# Patient Record
Sex: MALE | Race: WHITE | NOT HISPANIC OR LATINO | Employment: FULL TIME | URBAN - METROPOLITAN AREA
[De-identification: names, ages, dates, MRNs, and addresses within clinical notes are randomized per-mention and may not be internally consistent; named-entity substitution may affect disease eponyms.]

---

## 2023-10-20 ENCOUNTER — HOSPITAL ENCOUNTER (INPATIENT)
Facility: HOSPITAL | Age: 43
LOS: 3 days | Discharge: HOME | DRG: 872 | End: 2023-10-24
Attending: EMERGENCY MEDICINE | Admitting: INTERNAL MEDICINE
Payer: COMMERCIAL

## 2023-10-20 DIAGNOSIS — A41.9 SEPSIS, DUE TO UNSPECIFIED ORGANISM, UNSPECIFIED WHETHER ACUTE ORGAN DYSFUNCTION PRESENT: Primary | ICD-10-CM

## 2023-10-20 DIAGNOSIS — K51.00 PANCOLITIS: ICD-10-CM

## 2023-10-20 DIAGNOSIS — N17.9 AKI (ACUTE KIDNEY INJURY): ICD-10-CM

## 2023-10-20 DIAGNOSIS — R10.84 GENERALIZED ABDOMINAL PAIN: ICD-10-CM

## 2023-10-20 DIAGNOSIS — R11.2 NAUSEA AND VOMITING, UNSPECIFIED VOMITING TYPE: ICD-10-CM

## 2023-10-20 PROCEDURE — 99285 EMERGENCY DEPT VISIT HI MDM: CPT

## 2023-10-21 LAB
ABS NEUT (OLG): 10.4 X10(3)/MCL (ref 2.1–9.2)
ADV 40+41 DNA STL QL NAA+NON-PROBE: NOT DETECTED
ALBUMIN SERPL-MCNC: 2.6 G/DL (ref 3.5–5)
ALBUMIN/GLOB SERPL: 0.6 RATIO (ref 1.1–2)
ALP SERPL-CCNC: 35 UNIT/L (ref 40–150)
ALT SERPL-CCNC: 17 UNIT/L (ref 0–55)
APPEARANCE UR: CLEAR
AST SERPL-CCNC: 12 UNIT/L (ref 5–34)
ASTRO TYP 1-8 RNA STL QL NAA+NON-PROBE: NOT DETECTED
BACTERIA #/AREA URNS AUTO: ABNORMAL /HPF
BASOPHILS NFR BLD MANUAL: 0.12 X10(3)/MCL (ref 0–0.2)
BASOPHILS NFR BLD MANUAL: 1 %
BILIRUB SERPL-MCNC: 0.9 MG/DL
BILIRUB UR QL STRIP.AUTO: ABNORMAL
BUN SERPL-MCNC: 21.7 MG/DL (ref 8.9–20.6)
C CAYETANENSIS DNA STL QL NAA+NON-PROBE: NOT DETECTED
C COLI+JEJ+UPSA DNA STL QL NAA+NON-PROBE: NOT DETECTED
CALCIUM SERPL-MCNC: 8.4 MG/DL (ref 8.4–10.2)
CHLORIDE SERPL-SCNC: 96 MMOL/L (ref 98–107)
CK SERPL-CCNC: <70 U/L (ref 30–200)
CLOSTRIDIUM DIFFICILE GDH ANTIGEN (OHS): NEGATIVE
CLOSTRIDIUM DIFFICILE TOXIN A/B (OHS): NEGATIVE
CO2 SERPL-SCNC: 24 MMOL/L (ref 22–29)
COLOR UR AUTO: ABNORMAL
CREAT SERPL-MCNC: 1.52 MG/DL (ref 0.73–1.18)
CRP SERPL-MCNC: 317.9 MG/L
CRYPTOSP DNA STL QL NAA+NON-PROBE: NOT DETECTED
E HISTOLYT DNA STL QL NAA+NON-PROBE: NOT DETECTED
EAEC PAA PLAS AGGR+AATA ST NAA+NON-PRB: NOT DETECTED
EC STX1+STX2 GENES STL QL NAA+NON-PROBE: NOT DETECTED
EPEC EAE GENE STL QL NAA+NON-PROBE: NOT DETECTED
ERYTHROCYTE [DISTWIDTH] IN BLOOD BY AUTOMATED COUNT: 14.1 % (ref 11.5–17)
ETEC LTA+ST1A+ST1B TOX ST NAA+NON-PROBE: NOT DETECTED
FECAL LEUKOCYTE (OHS): POSITIVE
FLUAV AG UPPER RESP QL IA.RAPID: NOT DETECTED
FLUBV AG UPPER RESP QL IA.RAPID: NOT DETECTED
G LAMBLIA DNA STL QL NAA+NON-PROBE: NOT DETECTED
GFR SERPLBLD CREATININE-BSD FMLA CKD-EPI: 58 MLS/MIN/1.73/M2
GLOBULIN SER-MCNC: 4.1 GM/DL (ref 2.4–3.5)
GLUCOSE SERPL-MCNC: 139 MG/DL (ref 74–100)
GLUCOSE UR QL STRIP.AUTO: NEGATIVE
GRAN CASTS URNS QL MICRO: ABNORMAL /LPF
HCT VFR BLD AUTO: 35.6 % (ref 42–52)
HGB BLD-MCNC: 11.4 G/DL (ref 14–18)
INSTRUMENT WBC (OLG): 11.95 X10(3)/MCL
KETONES UR QL STRIP.AUTO: ABNORMAL
LACTATE SERPL-SCNC: 1.4 MMOL/L (ref 0.5–2.2)
LEUKOCYTE ESTERASE UR QL STRIP.AUTO: NEGATIVE
LIPASE SERPL-CCNC: 8 U/L
LYMPHOCYTES NFR BLD MANUAL: 0.36 X10(3)/MCL
LYMPHOCYTES NFR BLD MANUAL: 3 %
MCH RBC QN AUTO: 28.2 PG (ref 27–31)
MCHC RBC AUTO-ENTMCNC: 32 G/DL (ref 33–36)
MCV RBC AUTO: 88.1 FL (ref 80–94)
METAMYELOCYTES NFR BLD MANUAL: 2 %
MONOCYTES NFR BLD MANUAL: 0.96 X10(3)/MCL (ref 0.1–1.3)
MONOCYTES NFR BLD MANUAL: 8 %
NEUTROPHILS NFR BLD MANUAL: 87 %
NITRITE UR QL STRIP.AUTO: NEGATIVE
NOROVIRUS GI+II RNA STL QL NAA+NON-PROBE: NOT DETECTED
NRBC BLD AUTO-RTO: 0.2 %
NRBC BLD MANUAL-RTO: 1 %
P SHIGELLOIDES DNA STL QL NAA+NON-PROBE: NOT DETECTED
PH UR STRIP.AUTO: 5.5 [PH]
PLATELET # BLD AUTO: 267 X10(3)/MCL (ref 130–400)
PLATELET # BLD EST: NORMAL 10*3/UL
PMV BLD AUTO: 9.7 FL (ref 7.4–10.4)
POTASSIUM SERPL-SCNC: 3.6 MMOL/L (ref 3.5–5.1)
PROT SERPL-MCNC: 6.7 GM/DL (ref 6.4–8.3)
PROT UR QL STRIP.AUTO: ABNORMAL
RBC # BLD AUTO: 4.04 X10(6)/MCL (ref 4.7–6.1)
RBC #/AREA URNS AUTO: <5 /HPF
RBC MORPH BLD: NORMAL
RBC UR QL AUTO: NEGATIVE
RVA RNA STL QL NAA+NON-PROBE: NOT DETECTED
S ENT+BONG DNA STL QL NAA+NON-PROBE: NOT DETECTED
SAPO I+II+IV+V RNA STL QL NAA+NON-PROBE: NOT DETECTED
SARS-COV-2 RNA RESP QL NAA+PROBE: NOT DETECTED
SHIGELLA SP+EIEC IPAH ST NAA+NON-PROBE: NOT DETECTED
SODIUM SERPL-SCNC: 132 MMOL/L (ref 136–145)
SP GR UR STRIP.AUTO: >=1.04 (ref 1–1.03)
SQUAMOUS #/AREA URNS AUTO: <5 /HPF
TSH SERPL-ACNC: 2.88 UIU/ML (ref 0.35–4.94)
UROBILINOGEN UR STRIP-ACNC: 1
V CHOL+PARA+VUL DNA STL QL NAA+NON-PROBE: NOT DETECTED
V CHOLERAE DNA STL QL NAA+NON-PROBE: NOT DETECTED
WBC # SPEC AUTO: 11.95 X10(3)/MCL (ref 4.5–11.5)
WBC #/AREA URNS AUTO: 12 /HPF
Y ENTEROCOL DNA STL QL NAA+NON-PROBE: NOT DETECTED

## 2023-10-21 PROCEDURE — 96375 TX/PRO/DX INJ NEW DRUG ADDON: CPT

## 2023-10-21 PROCEDURE — 81003 URINALYSIS AUTO W/O SCOPE: CPT | Performed by: EMERGENCY MEDICINE

## 2023-10-21 PROCEDURE — 25500020 PHARM REV CODE 255: Performed by: EMERGENCY MEDICINE

## 2023-10-21 PROCEDURE — 87077 CULTURE AEROBIC IDENTIFY: CPT | Performed by: EMERGENCY MEDICINE

## 2023-10-21 PROCEDURE — 85027 COMPLETE CBC AUTOMATED: CPT | Performed by: EMERGENCY MEDICINE

## 2023-10-21 PROCEDURE — 86318 IA INFECTIOUS AGENT ANTIBODY: CPT | Performed by: EMERGENCY MEDICINE

## 2023-10-21 PROCEDURE — 82550 ASSAY OF CK (CPK): CPT | Performed by: EMERGENCY MEDICINE

## 2023-10-21 PROCEDURE — 63600175 PHARM REV CODE 636 W HCPCS: Performed by: PHYSICIAN ASSISTANT

## 2023-10-21 PROCEDURE — 84443 ASSAY THYROID STIM HORMONE: CPT | Performed by: EMERGENCY MEDICINE

## 2023-10-21 PROCEDURE — 63600175 PHARM REV CODE 636 W HCPCS: Performed by: NURSE PRACTITIONER

## 2023-10-21 PROCEDURE — 87507 IADNA-DNA/RNA PROBE TQ 12-25: CPT | Performed by: EMERGENCY MEDICINE

## 2023-10-21 PROCEDURE — 87045 FECES CULTURE AEROBIC BACT: CPT | Performed by: EMERGENCY MEDICINE

## 2023-10-21 PROCEDURE — 86140 C-REACTIVE PROTEIN: CPT | Performed by: INTERNAL MEDICINE

## 2023-10-21 PROCEDURE — 11000001 HC ACUTE MED/SURG PRIVATE ROOM

## 2023-10-21 PROCEDURE — 63600175 PHARM REV CODE 636 W HCPCS: Performed by: INTERNAL MEDICINE

## 2023-10-21 PROCEDURE — 25000003 PHARM REV CODE 250: Performed by: INTERNAL MEDICINE

## 2023-10-21 PROCEDURE — 87040 BLOOD CULTURE FOR BACTERIA: CPT | Performed by: EMERGENCY MEDICINE

## 2023-10-21 PROCEDURE — 96374 THER/PROPH/DIAG INJ IV PUSH: CPT

## 2023-10-21 PROCEDURE — 80053 COMPREHEN METABOLIC PANEL: CPT | Performed by: EMERGENCY MEDICINE

## 2023-10-21 PROCEDURE — 83605 ASSAY OF LACTIC ACID: CPT | Performed by: EMERGENCY MEDICINE

## 2023-10-21 PROCEDURE — 89055 LEUKOCYTE ASSESSMENT FECAL: CPT | Performed by: EMERGENCY MEDICINE

## 2023-10-21 PROCEDURE — 83690 ASSAY OF LIPASE: CPT | Performed by: INTERNAL MEDICINE

## 2023-10-21 PROCEDURE — 27000207 HC ISOLATION

## 2023-10-21 PROCEDURE — 63600175 PHARM REV CODE 636 W HCPCS: Performed by: EMERGENCY MEDICINE

## 2023-10-21 PROCEDURE — 0240U COVID/FLU A&B PCR: CPT | Performed by: STUDENT IN AN ORGANIZED HEALTH CARE EDUCATION/TRAINING PROGRAM

## 2023-10-21 PROCEDURE — 96361 HYDRATE IV INFUSION ADD-ON: CPT

## 2023-10-21 PROCEDURE — 87088 URINE BACTERIA CULTURE: CPT | Performed by: EMERGENCY MEDICINE

## 2023-10-21 RX ORDER — SODIUM CHLORIDE, SODIUM LACTATE, POTASSIUM CHLORIDE, CALCIUM CHLORIDE 600; 310; 30; 20 MG/100ML; MG/100ML; MG/100ML; MG/100ML
INJECTION, SOLUTION INTRAVENOUS CONTINUOUS
Status: DISCONTINUED | OUTPATIENT
Start: 2023-10-21 | End: 2023-10-21

## 2023-10-21 RX ORDER — SODIUM CHLORIDE 9 MG/ML
1000 INJECTION, SOLUTION INTRAVENOUS
Status: DISCONTINUED | OUTPATIENT
Start: 2023-10-21 | End: 2023-10-21

## 2023-10-21 RX ORDER — DIPHENHYDRAMINE HYDROCHLORIDE 50 MG/ML
25 INJECTION INTRAMUSCULAR; INTRAVENOUS EVERY 6 HOURS PRN
Status: DISCONTINUED | OUTPATIENT
Start: 2023-10-21 | End: 2023-10-22

## 2023-10-21 RX ORDER — FENTANYL CITRATE 50 UG/ML
50 INJECTION, SOLUTION INTRAMUSCULAR; INTRAVENOUS
Status: COMPLETED | OUTPATIENT
Start: 2023-10-21 | End: 2023-10-21

## 2023-10-21 RX ORDER — ACETAMINOPHEN 325 MG/1
650 TABLET ORAL EVERY 6 HOURS PRN
Status: DISCONTINUED | OUTPATIENT
Start: 2023-10-21 | End: 2023-10-24 | Stop reason: HOSPADM

## 2023-10-21 RX ORDER — IBUPROFEN 200 MG
24 TABLET ORAL
Status: DISCONTINUED | OUTPATIENT
Start: 2023-10-21 | End: 2023-10-24 | Stop reason: HOSPADM

## 2023-10-21 RX ORDER — DICYCLOMINE HYDROCHLORIDE 10 MG/ML
20 INJECTION INTRAMUSCULAR ONCE
Status: COMPLETED | OUTPATIENT
Start: 2023-10-21 | End: 2023-10-21

## 2023-10-21 RX ORDER — LOPERAMIDE HYDROCHLORIDE 2 MG/1
2 CAPSULE ORAL 4 TIMES DAILY PRN
Status: DISCONTINUED | OUTPATIENT
Start: 2023-10-21 | End: 2023-10-24 | Stop reason: HOSPADM

## 2023-10-21 RX ORDER — CIPROFLOXACIN 2 MG/ML
400 INJECTION, SOLUTION INTRAVENOUS
Status: COMPLETED | OUTPATIENT
Start: 2023-10-21 | End: 2023-10-21

## 2023-10-21 RX ORDER — ONDANSETRON 2 MG/ML
4 INJECTION INTRAMUSCULAR; INTRAVENOUS
Status: COMPLETED | OUTPATIENT
Start: 2023-10-21 | End: 2023-10-21

## 2023-10-21 RX ORDER — MORPHINE SULFATE 4 MG/ML
2 INJECTION, SOLUTION INTRAMUSCULAR; INTRAVENOUS EVERY 4 HOURS PRN
Status: DISCONTINUED | OUTPATIENT
Start: 2023-10-21 | End: 2023-10-24 | Stop reason: HOSPADM

## 2023-10-21 RX ORDER — METRONIDAZOLE 500 MG/100ML
500 INJECTION, SOLUTION INTRAVENOUS
Status: COMPLETED | OUTPATIENT
Start: 2023-10-21 | End: 2023-10-21

## 2023-10-21 RX ORDER — SODIUM CHLORIDE 9 MG/ML
INJECTION, SOLUTION INTRAVENOUS CONTINUOUS
Status: DISCONTINUED | OUTPATIENT
Start: 2023-10-21 | End: 2023-10-24 | Stop reason: HOSPADM

## 2023-10-21 RX ORDER — GLUCAGON 1 MG
1 KIT INJECTION
Status: DISCONTINUED | OUTPATIENT
Start: 2023-10-21 | End: 2023-10-24 | Stop reason: HOSPADM

## 2023-10-21 RX ORDER — DICYCLOMINE HYDROCHLORIDE 10 MG/ML
20 INJECTION INTRAMUSCULAR 4 TIMES DAILY PRN
Status: DISCONTINUED | OUTPATIENT
Start: 2023-10-21 | End: 2023-10-24 | Stop reason: HOSPADM

## 2023-10-21 RX ORDER — IBUPROFEN 200 MG
16 TABLET ORAL
Status: DISCONTINUED | OUTPATIENT
Start: 2023-10-21 | End: 2023-10-24 | Stop reason: HOSPADM

## 2023-10-21 RX ORDER — CIPROFLOXACIN 2 MG/ML
400 INJECTION, SOLUTION INTRAVENOUS
Status: DISCONTINUED | OUTPATIENT
Start: 2023-10-21 | End: 2023-10-24

## 2023-10-21 RX ORDER — ACETAMINOPHEN 10 MG/ML
1000 INJECTION, SOLUTION INTRAVENOUS ONCE
Status: COMPLETED | OUTPATIENT
Start: 2023-10-21 | End: 2023-10-21

## 2023-10-21 RX ORDER — ONDANSETRON 2 MG/ML
4 INJECTION INTRAMUSCULAR; INTRAVENOUS EVERY 4 HOURS PRN
Status: DISCONTINUED | OUTPATIENT
Start: 2023-10-21 | End: 2023-10-24 | Stop reason: HOSPADM

## 2023-10-21 RX ORDER — METRONIDAZOLE 500 MG/100ML
500 INJECTION, SOLUTION INTRAVENOUS
Status: DISCONTINUED | OUTPATIENT
Start: 2023-10-21 | End: 2023-10-24

## 2023-10-21 RX ADMIN — DICYCLOMINE HYDROCHLORIDE 20 MG: 20 INJECTION, SOLUTION INTRAMUSCULAR at 06:10

## 2023-10-21 RX ADMIN — CIPROFLOXACIN 400 MG: 2 INJECTION, SOLUTION INTRAVENOUS at 03:10

## 2023-10-21 RX ADMIN — METRONIDAZOLE 500 MG: 5 INJECTION, SOLUTION INTRAVENOUS at 03:10

## 2023-10-21 RX ADMIN — MORPHINE SULFATE 2 MG: 4 INJECTION, SOLUTION INTRAMUSCULAR; INTRAVENOUS at 06:10

## 2023-10-21 RX ADMIN — ACETAMINOPHEN 1000 MG: 10 INJECTION, SOLUTION INTRAVENOUS at 01:10

## 2023-10-21 RX ADMIN — METRONIDAZOLE 500 MG: 5 INJECTION, SOLUTION INTRAVENOUS at 07:10

## 2023-10-21 RX ADMIN — DICYCLOMINE HYDROCHLORIDE 20 MG: 20 INJECTION, SOLUTION INTRAMUSCULAR at 08:10

## 2023-10-21 RX ADMIN — FENTANYL CITRATE 50 MCG: 50 INJECTION, SOLUTION INTRAMUSCULAR; INTRAVENOUS at 12:10

## 2023-10-21 RX ADMIN — LOPERAMIDE HYDROCHLORIDE 2 MG: 2 CAPSULE ORAL at 04:10

## 2023-10-21 RX ADMIN — LOPERAMIDE HYDROCHLORIDE 2 MG: 2 CAPSULE ORAL at 10:10

## 2023-10-21 RX ADMIN — SODIUM CHLORIDE: 9 INJECTION, SOLUTION INTRAVENOUS at 04:10

## 2023-10-21 RX ADMIN — METHYLPREDNISOLONE SODIUM SUCCINATE 60 MG: 40 INJECTION, POWDER, FOR SOLUTION INTRAMUSCULAR; INTRAVENOUS at 08:10

## 2023-10-21 RX ADMIN — ONDANSETRON 4 MG: 2 INJECTION INTRAMUSCULAR; INTRAVENOUS at 12:10

## 2023-10-21 RX ADMIN — SODIUM CHLORIDE, POTASSIUM CHLORIDE, SODIUM LACTATE AND CALCIUM CHLORIDE 2517 ML: 600; 310; 30; 20 INJECTION, SOLUTION INTRAVENOUS at 12:10

## 2023-10-21 RX ADMIN — DIPHENHYDRAMINE HYDROCHLORIDE 25 MG: 50 INJECTION INTRAMUSCULAR; INTRAVENOUS at 12:10

## 2023-10-21 RX ADMIN — MORPHINE SULFATE 2 MG: 4 INJECTION, SOLUTION INTRAMUSCULAR; INTRAVENOUS at 08:10

## 2023-10-21 RX ADMIN — SODIUM CHLORIDE, POTASSIUM CHLORIDE, SODIUM LACTATE AND CALCIUM CHLORIDE: 600; 310; 30; 20 INJECTION, SOLUTION INTRAVENOUS at 02:10

## 2023-10-21 RX ADMIN — METRONIDAZOLE 500 MG: 5 INJECTION, SOLUTION INTRAVENOUS at 11:10

## 2023-10-21 RX ADMIN — DIPHENHYDRAMINE HYDROCHLORIDE 25 MG: 50 INJECTION INTRAMUSCULAR; INTRAVENOUS at 06:10

## 2023-10-21 RX ADMIN — DICYCLOMINE HYDROCHLORIDE 20 MG: 20 INJECTION, SOLUTION INTRAMUSCULAR at 01:10

## 2023-10-21 RX ADMIN — IOPAMIDOL 100 ML: 755 INJECTION, SOLUTION INTRAVENOUS at 02:10

## 2023-10-21 NOTE — H&P
"[facility]  Hospital Medicine History & Physical Examination       Patient Name: Lionel León  MRN: [id number]  Patient Class: IP- Inpatient   Admission Date: 10/20/2023   Admitting Physician: Melissa Carr MD  Length of Stay: 0  Attending Physician: Sayda Harris MD   Face-to-Face encounter date: 10/21/2023  Code Status: Full code   Chief Complaint: Abdominal Pain, Diarrhea, and Vomiting (Generalized abd pain, vomiting, fever, and diarrhea x3 wks, was seen by his PCP and was given a "steroid shot" that helped the symptoms, but they began again x3 days ago. Hx of diverticulitis. )        Patient information was obtained from patient, patient's family, past medical records and ER records.     HISTORY OF PRESENT ILLNESS:   Lionel León is a 42 y.o. male with a past medical history of diverticulitis s/p colon resection presented to [facility] on 10/20/2023 for abdominal pain, vomiting, diarrhea, and fever.  Patient reported intermittent symptoms for the past 3 weeks.  Patient reported worsening symptoms over the past 3 days.  Patient also endorsed rectal bleeding and dysuria.  Initial vital signs in ED were BP 96/56, pulse 119, respirations 19, temperature 39.1° C, and SpO2 98% on room air.  Labs revealed WBC 11.95, RBC 4.04, hemoglobin 11.4, hematocrit 35.6, sodium 132, chloride 96, BUN 21.7, creatinine 1.52, glucose 139, ALP 35, and lactic acid 1.4.  Flu and COVID testing were negative.  UA revealed 2+ protein, trace ketones, 1+ bilirubin, and 12 wbc's.  Stool, urine, and blood cultures were obtained.  C diff was negative.  Stool was positive for wbc's.  CT abdomen and pelvis with contrast revealed mild diffuse circumferential wall thickening of the ascending, transverse, descending, and sigmoid colon with pericolonic fat stranding, consistent with pan colitis.  Patient was given IV fluids, Ciprofloxacin 400 mg, and Flagyl 500 mg in ED.  Patient was admitted to hospital medicine service for " further medical management.     PAST MEDICAL HISTORY:   Diverticulitis     PAST SURGICAL HISTORY:   Colon resection     ALLERGIES:   Patient has no known allergies.    FAMILY HISTORY:   Father:  Diabetes mellitus    SOCIAL HISTORY:   Occasional alcohol use  Denies illicit drug and tobacco use    HOME MEDICATIONS:     Prior to Admission medications    Not on File       REVIEW OF SYSTEMS:   Except as documented, all other systems reviewed and negative     PHYSICAL EXAM:     VITAL SIGNS: 24 HRS MIN & MAX LAST   Temp  Min: 98.3 °F (36.8 °C)  Max: 102.6 °F (39.2 °C) 98.3 °F (36.8 °C)   BP  Min: 96/56  Max: 118/71 118/71   Pulse  Min: 87  Max: 119  90   Resp  Min: 16  Max: 23 20   SpO2  Min: 95 %  Max: 98 % 96 %       General appearance: Male in no apparent distress.  HEENNT: Atraumatic head.   Lungs: Clear to auscultation bilaterally.   Heart: Regular rate and rhythm.    Abdomen: Soft, non-distended, generalized abdominal tenderness. Bowel sounds are normal.   Extremities:  No deformities.   Skin: No Rash. Warm and dry.   Neuro: Awake, alert, and oriented. Motor and sensory exams grossly intact.   Psych/mental status: Appropriate mood and affect. Responds appropriately to questions.     LABS AND IMAGING:     Recent Labs   Lab 10/21/23  0030   WBC 11.95  11.95*   RBC 4.04*   HGB 11.4*   HCT 35.6*   MCV 88.1   MCH 28.2   MCHC 32.0*   RDW 14.1      MPV 9.7       Recent Labs   Lab 10/21/23  0030   *   K 3.6   CO2 24   BUN 21.7*   CREATININE 1.52*   CALCIUM 8.4   ALBUMIN 2.6*   ALKPHOS 35*   ALT 17   AST 12   BILITOT 0.9       Microbiology Results (last 7 days)       Procedure Component Value Units Date/Time    Urine culture [[id number]] Collected: 10/21/23 0345    Order Status: Sent Specimen: Urine Updated: 10/21/23 0433    Clostridium Diff Toxin, A & B, EIA [[id number]]  (Normal) Collected: 10/21/23 0338    Order Status: Completed Specimen: Stool Updated: 10/21/23 0408     Clostridium Difficile GDH Antigen  Negative     Clostridium Difficile Toxin A/B Negative    Stool Culture **CANNOT BE ORDERED STAT** [[id number]] Collected: 10/21/23 0223    Order Status: Sent Specimen: Stool Updated: 10/21/23 0224    Blood culture #1 **CANNOT BE ORDERED STAT** [[id number]] Collected: 10/21/23 0030    Order Status: Resulted Specimen: Blood Updated: 10/21/23 0030    Blood culture #2 **CANNOT BE ORDERED STAT** [[id number]] Collected: 10/21/23 0030    Order Status: Resulted Specimen: Blood Updated: 10/21/23 0030             CT Abdomen Pelvis With Contrast  START OF REPORT:  Technique: CT of the abdomen and pelvis was performed with axial images as well as sagittal and coronal reconstruction images with intravenous contrast.    Comparison: None available.    Clinical History: Generalized abd pain, vomiting, fever, and diarrhea x3 wks, was seen by his PCP and was given a steroid shot that helped the symptoms, but they began again x3 days ago. Hx of diverticulitis.    Dosage Information: Automated Exposure Control was utilized.    Findings:  Thorax:  Lungs: There is mild nonspecific dependent change at the lung bases.  Pleura: No effusions or thickening are seen.  Heart: The heart size is within normal limits.  Abdomen:  Abdominal Wall: No abdominal wall pathology is seen.  Liver: There is a subcentimeter hypodensity in the left lobe of the liver, which is too small to further characterize. The liver otherwise appears unremarkable.  Biliary System: No intrahepatic or extrahepatic biliary duct dilatation is seen.  Gallbladder: The gallbladder appears unremarkable.  Pancreas: The pancreas appears unremarkable.  Spleen: The spleen appears unremarkable.  Adrenals: The adrenal glands appear unremarkable.  Kidneys: The left kidney appears unremarkable with no stones cysts masses or hydronephrosis. A single cyst measuring 8 mm is seen on series 2, image 77 in the lower pole of the right kidney. The right kidney otherwise appears unremarkable with  no cysts masses or hydronephrosis identified.  Aorta: The abdominal aorta appears unremarkable.  IVC: Unremarkable.  Bowel:  Esophagus: The visualized esophagus appears unremarkable.  Stomach: The stomach appears unremarkable.  Duodenum: Unremarkable appearing duodenum.  Small Bowel: The small bowel appears unremarkable.  Colon: There is mild diffuse circumferential wall thickening of the ascending, transverse, descending and sigmoid colon with pericolonic fat stranding, consistent with pancolitis. Anastomotic sutures are seen in the sigmoid colon.  Appendix: The appendix is not identified but no inflammatory changes are seen in the right lower quadrant to suggest appendicitis.  Peritoneum: No intraperitoneal free air or ascites is seen.    Pelvis:  Bladder: The bladder appears unremarkable.  Male:  Prostate gland: The prostate gland appears unremarkable.    Bony structures:  Dorsal Spine: There is mild spondylosis of the visualized dorsal spine.  Bony Pelvis: The visualized bony structures of the pelvis appear unremarkable.    Impression:  1. There is mild diffuse circumferential wall thickening of the ascending, transverse, descending and sigmoid colon with pericolonic fat stranding, consistent with pancolitis. Correlate with clinical and laboratory findings.  2. Details and other findings as discussed above.        ASSESSMENT & PLAN:   Assessment:  Sepsis secondary to pancolitis   JUAN PABLO   Normocytic anemia   History of diverticulitis s/p colon resection      Plan:  Continue IV Cipro and Flagyl   Blood, stool, and urine cultures pending, follow-up results   GI panel negative  PRN antiemetics and analgesics  IVF   Close H&H monitoring  GI consulted, appreciate recommendations   Continue appropriate home medications once med rec updated   Labs in a.m.    VTE Prophylaxis:  SCDs    __________________________________________________________________________  INPATIENT LIST OF MEDICATIONS     Scheduled Meds:    "ciprofloxacin  400 mg Intravenous Q12H    metronidazole  500 mg Intravenous Q8H     Continuous Infusions:   lactated ringers 125 mL/hr at 10/21/23 0224     PRN Meds:.morphine, ondansetron      IRoger PA-C, have reviewed and discussed the case with Dr. Sayda Harris MD   Please see the following addendum for further assessment and plan from there attending MD.    10/21/2023    ________________________________________________________________________________    MD Addendum:  I,  dr jorge nelson assumed care of this patient today  For the patient encounter, I performed the substantive portion of the visit, I reviewed the PA documentation, treatment plan, and medical decision making.  I had face to face time with this patient     A. History:  Chief Complaint: Abdominal Pain, Diarrhea, and Vomiting (Generalized abd pain, vomiting, fever, and diarrhea x3 wks, was seen by his PCP and was given a "steroid shot" that helped the symptoms, but they began again x3 days ago. Hx of diverticulitis. )        Patient information was obtained from patient, patient's family, past medical records and ER records.     HISTORY OF PRESENT ILLNESS:   Lionel León is a 42 y.o. male with a past medical history of diverticulitis s/p colon resection presented to [facility] on 10/20/2023 for abdominal pain, vomiting, diarrhea, and fever.  Patient reported intermittent symptoms for the past 3 weeks.  Patient reported worsening symptoms over the past 3 days.  Patient also endorsed rectal bleeding and dysuria.   Patient was admitted to hospital medicine service for further medical management.     Initial vital signs in ED were BP 96/56, pulse 119, respirations 19, temperature 39.1° C, and SpO2 98% on room air.      Labs revealed WBC 11.95, RBC 4.04, hemoglobin 11.4, hematocrit 35.6, sodium 132, chloride 96, BUN 21.7, creatinine 1.52, glucose 139, ALP 35, and lactic acid 1.4.  Flu and COVID testing were negative.    UA revealed 2+ protein, " trace ketones, 1+ bilirubin, and 12 wbc's.  C diff was negative.  Stool was positive for wbc's.      Imaging  CT abdomen and pelvis with contrast revealed mild diffuse circumferential wall thickening of the ascending, transverse, descending, and sigmoid colon with pericolonic fat stranding, consistent with pan colitis.       Physical exam:  General appearance: Male in no apparent distress.  HEENNT: Atraumatic head.   Lungs: Clear to auscultation bilaterally.   Heart: Regular rate and rhythm.    Abdomen: Soft, non-distended, generalized abdominal tenderness. Bowel sounds are normal.   Extremities:  No deformities.   Skin: No Rash. Warm and dry.   Neuro: Awake, alert, and oriented. Motor and sensory exams grossly intact.   Psych/mental status: Appropriate mood and affect. Responds appropriately to questions.     Assessment:  Sepsis secondary to pancolitis   2/4 SIRS - Intermittent fevers , Sinus tachycardia  Abdominal pain, nausea and vomiting   Diarrhea  Acute UTI with dysuria   JUAN PABLO , Mild   Hyponatremia, mild , likely due to dehydration  Normocytic anemia   History of diverticulitis s/p colon resection      Plan:  Admit   Continue IV Cipro and Flagyl   Blood, stool, and urine cultures pending, follow-up results   Check ESR, CRP   F/up GI recs   GI panel negative  C diff negative   Imodium prn  IM Bentyl prn abd spasms   PRN antiemetics and analgesics  Close H&H monitoring  Change IV fluids to normal saline, trend BMP, avoid nephrotoxic  Continue appropriate home medications once med rec updated   Labs in a.m.    VTE Prophylaxis:  SCDs    Discharge Planning and Disposition: No mobility needs. Ambulating well. Good social support system.   Anticipated discharge    All diagnosis and differential diagnosis have been reviewed; assessment and plan has been documented; I have personally reviewed the labs and test results that are presently available; I have reviewed the patients medication list; I have reviewed the  consulting providers response and recommendations. I have reviewed or attempted to review medical records based upon their availability.    All of the patient and family questions have been addressed and answered. Patient's is agreeable to the above stated plan. I will continue to monitor closely and make adjustments to medical management as needed.    Critical Care diagnoses sepsis requiring IV fluids and antibiotics   Critical care time greater than 35 minutes       10/21/2023

## 2023-10-21 NOTE — ED PROVIDER NOTES
"Encounter Date: 10/20/2023       History     Chief Complaint   Patient presents with    Abdominal Pain    Diarrhea    Vomiting     Generalized abd pain, vomiting, fever, and diarrhea x3 wks, was seen by his PCP and was given a "steroid shot" that helped the symptoms, but they began again x3 days ago. Hx of diverticulitis.      42-year-old male with a history of diverticulitis status post colon resection presents the ED for evaluation of abdominal pain.  Past couple of weeks he has been having some generalized abdominal discomfort that feels like a stabbing sensation that is slightly improved with vomiting or after a bowel movement but returns.  He was had intermittent fevers, diarrhea as well.  He was seen by his PCP and was given a steroid shot because he was some inflammation in his legs and he had some improvement for a short period of time however the past few days he has had very watery stools and today began vomiting is nonbloody nonbilious emesis.  He was not had any urine output in 6 hours in the last urination was concentrated and has some slight dysuria.  He was a previous episodes similar symptoms and no recent suspicious food intake or antibiotic use.  He has no history of inflammatory bowel disease    The history is provided by the patient. No  was used.     Review of patient's allergies indicates:  No Known Allergies  No past medical history on file.  No past surgical history on file.  No family history on file.     Review of Systems   Constitutional:  Positive for appetite change, fatigue and fever. Negative for activity change and diaphoresis.   HENT:  Negative for congestion, postnasal drip, rhinorrhea, sinus pain, sneezing and sore throat.    Respiratory:  Negative for cough, chest tightness, shortness of breath and wheezing.    Cardiovascular:  Negative for chest pain, palpitations and leg swelling.   Gastrointestinal:  Positive for abdominal pain, diarrhea, nausea and " vomiting. Negative for abdominal distention and blood in stool.   Genitourinary:  Negative for decreased urine volume, difficulty urinating and dysuria.   Musculoskeletal:  Positive for myalgias.   Skin:  Negative for color change and pallor.   Neurological:  Negative for dizziness, speech difficulty, weakness, light-headedness and numbness.   All other systems reviewed and are negative.      Physical Exam     Initial Vitals [10/20/23 2353]   BP Pulse Resp Temp SpO2   (!) 96/56 (!) 119 19 (!) 102.3 °F (39.1 °C) 98 %      MAP       --         Physical Exam    Nursing note and vitals reviewed.  Constitutional: He appears well-developed and well-nourished. He is not diaphoretic. No distress.   HENT:   Head: Normocephalic and atraumatic.   Nose: Nose normal.   Mouth/Throat: Oropharynx is clear and moist.   Eyes: Conjunctivae and EOM are normal. Pupils are equal, round, and reactive to light.   Neck: Trachea normal. Neck supple.   Normal range of motion.  Cardiovascular:  Regular rhythm, normal heart sounds and intact distal pulses.     Exam reveals no gallop and no friction rub.       No murmur heard.  Mildly tachycardic   Pulmonary/Chest: Breath sounds normal. No respiratory distress. He has no wheezes. He has no rhonchi. He has no rales. He exhibits no tenderness.   Abdominal: Abdomen is soft. Bowel sounds are normal. He exhibits no distension and no mass. There is abdominal tenderness.   Mild generalized tenderness There is no rebound.   Musculoskeletal:         General: No tenderness or edema. Normal range of motion.      Cervical back: Normal range of motion and neck supple.      Lumbar back: Normal. No tenderness. Normal range of motion.     Neurological: He is alert and oriented to person, place, and time. He has normal strength. No cranial nerve deficit or sensory deficit.   Skin: Skin is warm and dry. Capillary refill takes less than 2 seconds. No rash and no abscess noted. No erythema. No pallor.    Psychiatric: He has a normal mood and affect. His behavior is normal. Judgment and thought content normal.         ED Course   Critical Care    Date/Time: 10/21/2023 2:48 AM    Performed by: Althea Eagle MD  Authorized by: Althea Eagle MD  Direct patient critical care time: 45 minutes  Total critical care time (exclusive of procedural time) : 45 minutes  Critical care was necessary to treat or prevent imminent or life-threatening deterioration of the following conditions: sepsis and renal failure.  Critical care was time spent personally by me on the following activities: development of treatment plan with patient or surrogate, discussions with consultants, evaluation of patient's response to treatment, examination of patient, obtaining history from patient or surrogate, ordering and performing treatments and interventions, ordering and review of laboratory studies, ordering and review of radiographic studies, pulse oximetry, re-evaluation of patient's condition and review of old charts.        Labs Reviewed   COMPREHENSIVE METABOLIC PANEL - Abnormal; Notable for the following components:       Result Value    Sodium Level 132 (*)     Chloride 96 (*)     Glucose Level 139 (*)     Blood Urea Nitrogen 21.7 (*)     Creatinine 1.52 (*)     Albumin Level 2.6 (*)     Globulin 4.1 (*)     Albumin/Globulin Ratio 0.6 (*)     Alkaline Phosphatase 35 (*)     All other components within normal limits   FECAL LEUKOCYTES - LACTOFERRIN ON  STOOL - Abnormal; Notable for the following components:    Fecal Leukocyte Positive (*)     All other components within normal limits   CBC WITH DIFFERENTIAL - Abnormal; Notable for the following components:    WBC 11.95 (*)     RBC 4.04 (*)     Hgb 11.4 (*)     Hct 35.6 (*)     MCHC 32.0 (*)     All other components within normal limits   MANUAL DIFFERENTIAL - Abnormal; Notable for the following components:    Metamyelocytes % 2 (*)     Neutrophils Abs 10.3965 (*)     Lymphs Abs  0.3585 (*)     All other components within normal limits   COVID/FLU A&B PCR - Normal    Narrative:     The Xpert Xpress SARS-CoV-2/FLU/RSV plus is a rapid, multiplexed real-time PCR test intended for the simultaneous qualitative detection and differentiation of SARS-CoV-2, Influenza A, Influenza B, and respiratory syncytial virus (RSV) viral RNA in either nasopharyngeal swab or nasal swab specimens.         LACTIC ACID, PLASMA - Normal   TSH - Normal   BLOOD CULTURE [facility]   BLOOD CULTURE [facility]   STOOL CULTURE [facility]   CBC W/ AUTO DIFFERENTIAL    Narrative:     The following orders were created for panel order CBC auto differential.  Procedure                               Abnormality         Status                     ---------                               -----------         ------                     CBC with Differential[[id number]]       Abnormal            Final result               Manual Differential[[id number]]         Abnormal            Final result                 Please view results for these tests on the individual orders.   URINALYSIS, REFLEX TO URINE CULTURE   GI PANEL   CK          Imaging Results              CT Abdomen Pelvis With Contrast (Preliminary result)  Result time 10/21/23 02:04:46      Preliminary result by Nikos Smalls MD (10/21/23 02:04:46)                   Narrative:    START OF REPORT:  Technique: CT of the abdomen and pelvis was performed with axial images as well as sagittal and coronal reconstruction images with intravenous contrast.    Comparison: None available.    Clinical History: Generalized abd pain, vomiting, fever, and diarrhea x3 wks, was seen by his PCP and was given a steroid shot that helped the symptoms, but they began again x3 days ago. Hx of diverticulitis.    Dosage Information: Automated Exposure Control was utilized.    Findings:  Thorax:  Lungs: There is mild nonspecific dependent change at the lung bases.  Pleura: No effusions or thickening are seen.  Heart: The  heart size is within normal limits.  Abdomen:  Abdominal Wall: No abdominal wall pathology is seen.  Liver: There is a subcentimeter hypodensity in the left lobe of the liver, which is too small to further characterize. The liver otherwise appears unremarkable.  Biliary System: No intrahepatic or extrahepatic biliary duct dilatation is seen.  Gallbladder: The gallbladder appears unremarkable.  Pancreas: The pancreas appears unremarkable.  Spleen: The spleen appears unremarkable.  Adrenals: The adrenal glands appear unremarkable.  Kidneys: The left kidney appears unremarkable with no stones cysts masses or hydronephrosis. A single cyst measuring 8 mm is seen on series 2, image 77 in the lower pole of the right kidney. The right kidney otherwise appears unremarkable with no cysts masses or hydronephrosis identified.  Aorta: The abdominal aorta appears unremarkable.  IVC: Unremarkable.  Bowel:  Esophagus: The visualized esophagus appears unremarkable.  Stomach: The stomach appears unremarkable.  Duodenum: Unremarkable appearing duodenum.  Small Bowel: The small bowel appears unremarkable.  Colon: There is mild diffuse circumferential wall thickening of the ascending, transverse, descending and sigmoid colon with pericolonic fat stranding, consistent with pancolitis. Anastomotic sutures are seen in the sigmoid colon.  Appendix: The appendix is not identified but no inflammatory changes are seen in the right lower quadrant to suggest appendicitis.  Peritoneum: No intraperitoneal free air or ascites is seen.    Pelvis:  Bladder: The bladder appears unremarkable.  Male:  Prostate gland: The prostate gland appears unremarkable.    Bony structures:  Dorsal Spine: There is mild spondylosis of the visualized dorsal spine.  Bony Pelvis: The visualized bony structures of the pelvis appear unremarkable.      Impression:  1. There is mild diffuse circumferential wall thickening of the ascending, transverse, descending and sigmoid  colon with pericolonic fat stranding, consistent with pancolitis. Correlate with clinical and laboratory findings.  2. Details and other findings as discussed above.                                         Medications   lactated ringers infusion ( Intravenous New Bag 10/21/23 0224)   ciprofloxacin (CIPRO)400mg/200ml D5W IVPB 400 mg (has no administration in time range)   metronidazole IVPB 500 mg (has no administration in time range)   lactated ringers bolus 2,517 mL (0 mLs Intravenous Stopped 10/21/23 0151)   ondansetron injection 4 mg (4 mg Intravenous Given 10/21/23 0045)   fentaNYL injection 50 mcg (50 mcg Intravenous Given 10/21/23 0044)   acetaminophen 1,000 mg/100 mL (10 mg/mL) injection 1,000 mg (0 mg Intravenous Stopped 10/21/23 0115)   iopamidoL (ISOVUE-370) injection 100 mL (100 mLs Intravenous Given 10/21/23 0200)     Medical Decision Making  Given patient's presentation, differential diagnosis includes but is not limited to enteritis, uti, sepsis, renal fialure, diverticulitis, sbo  To evaluate these  possible etiologies  cbc, cmp, lactic, cultures, ua, stool studies, flu/covid were ordered and reviewed    Leukocytosis and JUAN PABLO without lactic acidosis or LFT abnormality.  Positive leukocytes in the stool and CT consistent with pancolitis.  Meet sepsis criteria and received 30 cc/kg of IV fluids and broad-spectrum antibiotics and will be admitted to hospitalist    Problems Addressed:  JUAN PABLO (acute kidney injury): acute illness or injury that poses a threat to life or bodily functions  Generalized abdominal pain: acute illness or injury that poses a threat to life or bodily functions  Nausea and vomiting, unspecified vomiting type: acute illness or injury that poses a threat to life or bodily functions  Pancolitis: acute illness or injury that poses a threat to life or bodily functions  Sepsis, due to unspecified organism, unspecified whether acute organ dysfunction present: acute illness or injury that  "poses a threat to life or bodily functions    Amount and/or Complexity of Data Reviewed  Independent Historian: spouse  External Data Reviewed: notes.  Labs: ordered.  Radiology: ordered.    Risk  OTC drugs.  Prescription drug management.  Parenteral controlled substances.  Decision regarding hospitalization.    Critical Care  Total time providing critical care: 45 minutes               ED Course as of 10/21/23 0249   Sat Oct 21, 2023   0146 Has not yet urinated or had another bowel movement [BS]   0245 Feeling better, agreeable with admit [BS]      ED Course User Index  [BS] Althea Eagle MD                 Medical Decision Making:   History:   Old Medical Records: I decided to obtain old medical records.  Old Records Summarized: records from clinic visits.       <> Summary of Records: Urgent care visits noncontributory  Initial Assessment:   See HPI  Clinical Tests:   Lab Tests: Ordered and Reviewed  Radiological Study: Ordered and Reviewed  Sepsis Perfusion Assessment: "I attest a sepsis perfusion exam was performed within 6 hours of sepsis, severe sepsis, or septic shock presentation, following fluid resuscitation."    Sepsis Perfusion Assessment Complete: 10/21/2023 1:47 AM        Clinical Impression:   Final diagnoses:  [A41.9] Sepsis, due to unspecified organism, unspecified whether acute organ dysfunction present (Primary)  [K51.00] Pancolitis  [N17.9] JUAN PABLO (acute kidney injury)  [R10.84] Generalized abdominal pain  [R11.2] Nausea and vomiting, unspecified vomiting type               Althea Eagle MD  10/21/23 0249    "

## 2023-10-22 LAB
ABS NEUT (OLG): 7.1 X10(3)/MCL (ref 2.1–9.2)
ALBUMIN SERPL-MCNC: 2.2 G/DL (ref 3.5–5)
ALBUMIN/GLOB SERPL: 0.8 RATIO (ref 1.1–2)
ALP SERPL-CCNC: 32 UNIT/L (ref 40–150)
ALT SERPL-CCNC: 19 UNIT/L (ref 0–55)
AST SERPL-CCNC: 27 UNIT/L (ref 5–34)
BILIRUB SERPL-MCNC: 0.5 MG/DL
BUN SERPL-MCNC: 15 MG/DL (ref 8.9–20.6)
CALCIUM SERPL-MCNC: 7.6 MG/DL (ref 8.4–10.2)
CHLORIDE SERPL-SCNC: 103 MMOL/L (ref 98–107)
CO2 SERPL-SCNC: 23 MMOL/L (ref 22–29)
CREAT SERPL-MCNC: 0.87 MG/DL (ref 0.73–1.18)
ERYTHROCYTE [DISTWIDTH] IN BLOOD BY AUTOMATED COUNT: 14.2 % (ref 11.5–17)
ERYTHROCYTE [SEDIMENTATION RATE] IN BLOOD: 85 MM/HR (ref 0–15)
GFR SERPLBLD CREATININE-BSD FMLA CKD-EPI: >60 MLS/MIN/1.73/M2
GLOBULIN SER-MCNC: 2.9 GM/DL (ref 2.4–3.5)
GLUCOSE SERPL-MCNC: 151 MG/DL (ref 74–100)
HCT VFR BLD AUTO: 32.3 % (ref 42–52)
HGB BLD-MCNC: 10 G/DL (ref 14–18)
INSTRUMENT WBC (OLG): 8.26 X10(3)/MCL
LYMPHOCYTES NFR BLD MANUAL: 0.41 X10(3)/MCL
LYMPHOCYTES NFR BLD MANUAL: 5 %
MAGNESIUM SERPL-MCNC: 2 MG/DL (ref 1.6–2.6)
MCH RBC QN AUTO: 27.5 PG (ref 27–31)
MCHC RBC AUTO-ENTMCNC: 31 G/DL (ref 33–36)
MCV RBC AUTO: 88.7 FL (ref 80–94)
METAMYELOCYTES NFR BLD MANUAL: 1 %
MONOCYTES NFR BLD MANUAL: 0.58 X10(3)/MCL (ref 0.1–1.3)
MONOCYTES NFR BLD MANUAL: 7 %
MYELOCYTES NFR BLD MANUAL: 1 %
NEUTROPHILS NFR BLD MANUAL: 86 %
NRBC BLD AUTO-RTO: 0 %
PLATELET # BLD AUTO: 229 X10(3)/MCL (ref 130–400)
PLATELET # BLD EST: ADEQUATE 10*3/UL
PMV BLD AUTO: 10.1 FL (ref 7.4–10.4)
POTASSIUM SERPL-SCNC: 4.2 MMOL/L (ref 3.5–5.1)
PROT SERPL-MCNC: 5.1 GM/DL (ref 6.4–8.3)
RBC # BLD AUTO: 3.64 X10(6)/MCL (ref 4.7–6.1)
RBC MORPH BLD: NORMAL
SODIUM SERPL-SCNC: 134 MMOL/L (ref 136–145)
WBC # SPEC AUTO: 8.26 X10(3)/MCL (ref 4.5–11.5)

## 2023-10-22 PROCEDURE — 11000001 HC ACUTE MED/SURG PRIVATE ROOM

## 2023-10-22 PROCEDURE — 85652 RBC SED RATE AUTOMATED: CPT | Performed by: INTERNAL MEDICINE

## 2023-10-22 PROCEDURE — 85027 COMPLETE CBC AUTOMATED: CPT | Performed by: NURSE PRACTITIONER

## 2023-10-22 PROCEDURE — 25000003 PHARM REV CODE 250: Performed by: INTERNAL MEDICINE

## 2023-10-22 PROCEDURE — 63600175 PHARM REV CODE 636 W HCPCS: Performed by: INTERNAL MEDICINE

## 2023-10-22 PROCEDURE — 63600175 PHARM REV CODE 636 W HCPCS: Performed by: NURSE PRACTITIONER

## 2023-10-22 PROCEDURE — 80053 COMPREHEN METABOLIC PANEL: CPT | Performed by: NURSE PRACTITIONER

## 2023-10-22 PROCEDURE — 83735 ASSAY OF MAGNESIUM: CPT | Performed by: NURSE PRACTITIONER

## 2023-10-22 PROCEDURE — 27000207 HC ISOLATION

## 2023-10-22 RX ORDER — DIPHENHYDRAMINE HYDROCHLORIDE 50 MG/ML
25 INJECTION INTRAMUSCULAR; INTRAVENOUS EVERY 4 HOURS PRN
Status: DISCONTINUED | OUTPATIENT
Start: 2023-10-22 | End: 2023-10-24 | Stop reason: HOSPADM

## 2023-10-22 RX ORDER — SODIUM, POTASSIUM,MAG SULFATES 17.5-3.13G
1 SOLUTION, RECONSTITUTED, ORAL ORAL 2 TIMES DAILY
Status: COMPLETED | OUTPATIENT
Start: 2023-10-22 | End: 2023-10-23

## 2023-10-22 RX ORDER — SODIUM, POTASSIUM,MAG SULFATES 17.5-3.13G
SOLUTION, RECONSTITUTED, ORAL ORAL ONCE
Status: DISCONTINUED | OUTPATIENT
Start: 2023-10-23 | End: 2023-10-22

## 2023-10-22 RX ADMIN — DICYCLOMINE HYDROCHLORIDE 20 MG: 20 INJECTION, SOLUTION INTRAMUSCULAR at 09:10

## 2023-10-22 RX ADMIN — SODIUM SULFATE, POTASSIUM SULFATE, MAGNESIUM SULFATE 354 ML: 17.5; 3.13; 1.6 SOLUTION, CONCENTRATE ORAL at 05:10

## 2023-10-22 RX ADMIN — METHYLPREDNISOLONE SODIUM SUCCINATE 60 MG: 40 INJECTION, POWDER, FOR SOLUTION INTRAMUSCULAR; INTRAVENOUS at 08:10

## 2023-10-22 RX ADMIN — CIPROFLOXACIN 400 MG: 2 INJECTION, SOLUTION INTRAVENOUS at 02:10

## 2023-10-22 RX ADMIN — CIPROFLOXACIN 400 MG: 2 INJECTION, SOLUTION INTRAVENOUS at 03:10

## 2023-10-22 RX ADMIN — DICYCLOMINE HYDROCHLORIDE 20 MG: 20 INJECTION, SOLUTION INTRAMUSCULAR at 12:10

## 2023-10-22 RX ADMIN — METRONIDAZOLE 500 MG: 5 INJECTION, SOLUTION INTRAVENOUS at 11:10

## 2023-10-22 RX ADMIN — METRONIDAZOLE 500 MG: 5 INJECTION, SOLUTION INTRAVENOUS at 03:10

## 2023-10-22 RX ADMIN — DICYCLOMINE HYDROCHLORIDE 20 MG: 20 INJECTION, SOLUTION INTRAMUSCULAR at 05:10

## 2023-10-22 RX ADMIN — LOPERAMIDE HYDROCHLORIDE 2 MG: 2 CAPSULE ORAL at 11:10

## 2023-10-22 NOTE — PROGRESS NOTES
"[facility]  Hospital Medicine Progress Note        A. History:  Chief Complaint: Abdominal Pain, Diarrhea, and Vomiting (Generalized abd pain, vomiting, fever, and diarrhea x3 wks, was seen by his PCP and was given a "steroid shot" that helped the symptoms, but they began again x3 days ago. Hx of diverticulitis. )         Patient information was obtained from patient, patient's family, past medical records and ER records.      HISTORY OF PRESENT ILLNESS:   Lionel León is a 42 y.o. male with a past medical history of diverticulitis s/p colon resection presented to [facility] on 10/20/2023 for abdominal pain, vomiting, diarrhea, and fever.  Patient reported intermittent symptoms for the past 3 weeks.  Patient reported worsening symptoms over the past 3 days.  Patient also endorsed rectal bleeding and dysuria.CT abdomen and pelvis with contrast revealed mild diffuse circumferential wall thickening of the ascending, transverse, descending, and sigmoid colon with pericolonic fat stranding, consistent with pan colitis.  Patient was admitted to hospital medicine service for further medical management.         Today's information  Patient seen and examined at bedside, wife at bedside   Patient reports he is feeling a little better than yesterday   Watery diarrhea persists, bloody, with associated abdominal cramps but improving   Patient reports the Bentyl is helping his pain but given him the redness on his face but he would want to continue with Bentyl   Will continue use of Benadryl to help the redness   GI has evaluated patient concern for possible ulcerative colitis plans for colonoscopy in the morning  Started on IV Solu-Medrol   Continue with IV normal saline given ongoing diarrhea patient unable to keep anything down   Continue antibiotics day 2  Will provide information about ulcerative colitis to patient tomorrow     Physical exam  General appearance: Male in no apparent " distress.  HEENNT: Atraumatic head.   Lungs: Clear to auscultation bilaterally.   Heart: Regular rate and rhythm.    Abdomen: Soft, non-distended, generalized abdominal tenderness. Bowel sounds are normal.   Extremities:  No deformities.   Skin: No Rash. Warm and dry.   Neuro: Awake, alert, and oriented. Motor and sensory exams grossly intact.   Psych/mental status: Appropriate mood and affect. Responds appropriately to questions.          Assessment:  Sepsis secondary to pancolitis   - concern for IBD   2/4 SIRS - Intermittent fevers , Sinus tachycardia  Abdominal pain, nausea and vomiting   Chronic Diarrhea  Acute UTI with dysuria   JUAN PABLO , Mild , resolved   Hyponatremia, mild , likely due to dehydration, resolved   Normocytic anemia   History of diverticulitis s/p colon resection        Plan:  Patient reports he is feeling a little better than yesterday   Watery diarrhea persists, bloody, with associated abdominal cramps but improving   Patient reports the Bentyl is helping his pain but given him the redness on his face but he would want to continue with Bentyl   Will continue use of Benadryl to help the redness   GI has evaluated patient concern for possible ulcerative colitis plans for colonoscopy in the morning  Started on IV Solu-Medrol   Continue with IV normal saline given ongoing diarrhea patient unable to keep anything down   Continue antibiotics day 2  Will provide information about ulcerative colitis to patient tomorrow   Esr, crp markedly elevated   GI panel negative  C diff negative   Imodium prn  IM Bentyl prn abd spasms   PRN antiemetics and analgesics  Close H&H monitoring  Continue appropriate home medications once med rec updated   Labs in a.m.     VTE Prophylaxis:  SCDs     Discharge Planning and Disposition: No mobility needs. Ambulating well. Good social support system.   Anticipated discharge     All diagnosis and differential diagnosis have been reviewed; assessment and plan has been  documented; I have personally reviewed the labs and test results that are presently available; I have reviewed the patients medication list; I have reviewed the consulting providers response and recommendations. I have reviewed or attempted to review medical records based upon their availability.    All of the patient and family questions have been addressed and answered. Patient's is agreeable to the above stated plan. I will continue to monitor closely and make adjustments to medical management as needed.     Critical Care diagnoses sepsis requiring IV fluids and antibiotics   Critical care time greater than 35 minutes            VITAL SIGNS: 24 HRS MIN & MAX LAST   Temp  Min: 98.1 °F (36.7 °C)  Max: 99.9 °F (37.7 °C) 98.5 °F (36.9 °C)   BP  Min: 100/60  Max: 117/67 113/64   Pulse  Min: 86  Max: 109  93   Resp  Min: 18  Max: 21 18   SpO2  Min: 94 %  Max: 97 % (!) 94 %     I have reviewed the following labs:  Recent Labs   Lab 10/21/23  0030 10/22/23  0625   WBC 11.95  11.95* 8.26  8.26   RBC 4.04* 3.64*   HGB 11.4* 10.0*   HCT 35.6* 32.3*   MCV 88.1 88.7   MCH 28.2 27.5   MCHC 32.0* 31.0*   RDW 14.1 14.2    229   MPV 9.7 10.1     Recent Labs   Lab 10/21/23  0030 10/22/23  0625   * 134*   K 3.6 4.2   CO2 24 23   BUN 21.7* 15.0   CREATININE 1.52* 0.87   CALCIUM 8.4 7.6*   MG  --  2.00   ALBUMIN 2.6* 2.2*   ALKPHOS 35* 32*   ALT 17 19   AST 12 27   BILITOT 0.9 0.5     Microbiology Results (last 7 days)       Procedure Component Value Units Date/Time    Stool Culture **CANNOT BE ORDERED STAT** [[id number]]  (Normal) Collected: 10/21/23 0223    Order Status: Completed Specimen: Stool Updated: 10/22/23 0834     Stool Culture Negative for Salmonella, Shigella, Campylobacter, Vibrio, Aeromonas, Pleisiomonas,Yersinia, or Shiga Toxin 1 and 2.    Urine culture [[id number]] Collected: 10/21/23 0345    Order Status: Completed Specimen: Urine Updated: 10/22/23 0633     Urine Culture No Growth At 24 Hours    Blood  culture #1 **CANNOT BE ORDERED STAT** [[id number]]  (Normal) Collected: 10/21/23 0030    Order Status: Completed Specimen: Blood Updated: 10/22/23 0501     CULTURE, BLOOD ([facility]) No Growth At 24 Hours    Blood culture #2 **CANNOT BE ORDERED STAT** [[id number]]  (Normal) Collected: 10/21/23 0030    Order Status: Completed Specimen: Blood Updated: 10/22/23 0501     CULTURE, BLOOD ([facility]) No Growth At 24 Hours    Clostridium Diff Toxin, A & B, EIA [[id number]]  (Normal) Collected: 10/21/23 0338    Order Status: Completed Specimen: Stool Updated: 10/21/23 0408     Clostridium Difficile GDH Antigen Negative     Clostridium Difficile Toxin A/B Negative             See below for Radiology    Scheduled Med:   ciprofloxacin  400 mg Intravenous Q12H    methylPREDNISolone sodium succinate injection  60 mg Intravenous Q12H    metronidazole  500 mg Intravenous Q8H    sodium,potassium,mag sulfates  1 kit Oral BID      Continuous Infusions:   sodium chloride 0.9% 100 mL/hr at 10/22/23 0927      PRN Meds:  acetaminophen, dextrose 10%, dextrose 10%, dicyclomine, diphenhydrAMINE, glucagon (human recombinant), glucose, glucose, loperamide, morphine, ondansetron     Assessment/Plan:      VTE prophylaxis:     Patient condition:  Stable/Fair/Guarded/ Serious/ Critical    Anticipated discharge and Disposition:         All diagnosis and differential diagnosis have been reviewed; assessment and plan has been documented; I have personally reviewed the labs and test results that are presently available; I have reviewed the patients medication list; I have reviewed the consulting providers response and recommendations. I have reviewed or attempted to review medical records based upon their availability    All of the patient's questions have been  addressed and answered. Patient's is agreeable to the above stated plan. I will continue to monitor closely and make adjustments to medical management as  needed.  _____________________________________________________________________    Nutrition Status:    Radiology:  I have personally reviewed the following imaging and agree with the radiologist.     CT Abdomen Pelvis With Contrast  Narrative: EXAMINATION:  CT ABDOMEN PELVIS WITH CONTRAST    CLINICAL HISTORY:  Abdominal pain, acute, nonlocalized;    TECHNIQUE:  Helically acquired images with axial, sagittal and coronal reformations were obtained from the lung bases to the pubic symphysis after the IV administration of contrast.    Automated tube current modulation, weight-based exposure dosing, and/or iterative reconstruction technique utilized to reach lowest reasonably achievable exposure rate.    DLP: 518 mGy*cm    COMPARISON:  No relevant prior available for comparison at the time of dictation.    FINDINGS:  HEART: Normal in size. No pericardial effusion.    LUNG BASES: Mild basilar atelectasis.    LIVER: Normal attenuation. No appreciable focal hepatic lesion.    BILIARY: No calcified gallstones.    PANCREAS: No inflammatory change.    SPLEEN: Normal in size    ADRENALS: No mass.    KIDNEYS/URETERS: The kidneys enhance symmetrically.  No hydronephrosis.    GI TRACT/MESENTERY:  Small bowel is normal in caliber without evidence of obstruction.  There is wall thickening and inflammatory fat stranding at the colon most pronounced at the descending colon.  There is a sigmoid anastomosis which is mildly gas distended without appreciable stricture.  The appendix is normal.    PERITONEUM: No free fluid.No free air.    LYMPH NODES: No enlarged lymph nodes by size criteria.    VASCULATURE: No significant atherosclerosis or aneurysm.    BLADDER: Normal appearance given degree of distention.    REPRODUCTIVE ORGANS: Normal as visualized.    SOFT TISSUES: Unremarkable.    BONES: Degenerative change at the lower lumbar spine.  Impression: 1. Nonspecific colitis  2. The preliminary and final reports are  concordant.    Electronically signed by: Fernanda Robert  Date:    10/21/2023  Time:    12:21      Sayda Harris MD   10/22/2023

## 2023-10-22 NOTE — PROGRESS NOTES
Patient feels much better today.  He denies any further abdominal pain.  He continues with diarrhea.  He denies any nausea or vomiting.    Physical exam: In general:  No acute distress     Abdomen:  Soft, nondistended, nontender, bowel sounds present    Lab:  Sed rate 85  other lab pending , stool studies negative so far with the exception of fecal leukocytes    Impression:  Pan colitis probably secondary to ulcerative colitis    Recommendation:  Continue IV Solu-Medrol.  Colonoscopy tomorrow

## 2023-10-23 ENCOUNTER — ANESTHESIA (OUTPATIENT)
Dept: ENDOSCOPY | Facility: HOSPITAL | Age: 43
DRG: 872 | End: 2023-10-23
Payer: COMMERCIAL

## 2023-10-23 ENCOUNTER — ANESTHESIA EVENT (OUTPATIENT)
Dept: ENDOSCOPY | Facility: HOSPITAL | Age: 43
DRG: 872 | End: 2023-10-23
Payer: COMMERCIAL

## 2023-10-23 VITALS
HEART RATE: 85 BPM | SYSTOLIC BLOOD PRESSURE: 127 MMHG | OXYGEN SATURATION: 97 % | RESPIRATION RATE: 20 BRPM | DIASTOLIC BLOOD PRESSURE: 80 MMHG

## 2023-10-23 LAB
BACTERIA STL CULT: NORMAL
BACTERIA UR CULT: NO GROWTH
CRP SERPL-MCNC: 101.5 MG/L

## 2023-10-23 PROCEDURE — 63600175 PHARM REV CODE 636 W HCPCS: Performed by: INTERNAL MEDICINE

## 2023-10-23 PROCEDURE — 37000009 HC ANESTHESIA EA ADD 15 MINS: Performed by: INTERNAL MEDICINE

## 2023-10-23 PROCEDURE — D9220A PRA ANESTHESIA: Mod: CRNA,,,

## 2023-10-23 PROCEDURE — 86140 C-REACTIVE PROTEIN: CPT | Performed by: INTERNAL MEDICINE

## 2023-10-23 PROCEDURE — 63600175 PHARM REV CODE 636 W HCPCS: Performed by: NURSE PRACTITIONER

## 2023-10-23 PROCEDURE — 11000001 HC ACUTE MED/SURG PRIVATE ROOM

## 2023-10-23 PROCEDURE — 63600175 PHARM REV CODE 636 W HCPCS

## 2023-10-23 PROCEDURE — 25000003 PHARM REV CODE 250

## 2023-10-23 PROCEDURE — D9220A PRA ANESTHESIA: ICD-10-PCS | Mod: CRNA,,,

## 2023-10-23 PROCEDURE — 27201423 OPTIME MED/SURG SUP & DEVICES STERILE SUPPLY: Performed by: INTERNAL MEDICINE

## 2023-10-23 PROCEDURE — 37000008 HC ANESTHESIA 1ST 15 MINUTES: Performed by: INTERNAL MEDICINE

## 2023-10-23 PROCEDURE — D9220A PRA ANESTHESIA: Mod: ANES,,, | Performed by: ANESTHESIOLOGY

## 2023-10-23 PROCEDURE — D9220A PRA ANESTHESIA: ICD-10-PCS | Mod: ANES,,, | Performed by: ANESTHESIOLOGY

## 2023-10-23 PROCEDURE — 45380 COLONOSCOPY AND BIOPSY: CPT | Performed by: INTERNAL MEDICINE

## 2023-10-23 PROCEDURE — 25000003 PHARM REV CODE 250: Performed by: INTERNAL MEDICINE

## 2023-10-23 PROCEDURE — 25000003 PHARM REV CODE 250: Performed by: NURSE PRACTITIONER

## 2023-10-23 RX ORDER — SODIUM CHLORIDE 9 MG/ML
INJECTION, SOLUTION INTRAVENOUS CONTINUOUS
Status: CANCELLED | OUTPATIENT
Start: 2023-10-23

## 2023-10-23 RX ORDER — PROPOFOL 10 MG/ML
VIAL (ML) INTRAVENOUS
Status: COMPLETED
Start: 2023-10-23 | End: 2023-10-23

## 2023-10-23 RX ORDER — PROPOFOL 10 MG/ML
VIAL (ML) INTRAVENOUS
Status: DISPENSED
Start: 2023-10-23 | End: 2023-10-24

## 2023-10-23 RX ORDER — ONDANSETRON 2 MG/ML
4 INJECTION INTRAMUSCULAR; INTRAVENOUS ONCE
Status: CANCELLED | OUTPATIENT
Start: 2023-10-23 | End: 2023-10-23

## 2023-10-23 RX ORDER — SIMETHICONE 80 MG
1 TABLET,CHEWABLE ORAL 3 TIMES DAILY PRN
Status: DISCONTINUED | OUTPATIENT
Start: 2023-10-23 | End: 2023-10-24 | Stop reason: HOSPADM

## 2023-10-23 RX ORDER — METOCLOPRAMIDE HYDROCHLORIDE 5 MG/ML
10 INJECTION INTRAMUSCULAR; INTRAVENOUS EVERY 10 MIN PRN
Status: CANCELLED | OUTPATIENT
Start: 2023-10-23

## 2023-10-23 RX ORDER — LIDOCAINE HYDROCHLORIDE 10 MG/ML
1 INJECTION, SOLUTION EPIDURAL; INFILTRATION; INTRACAUDAL; PERINEURAL ONCE
Status: CANCELLED | OUTPATIENT
Start: 2023-10-23 | End: 2023-10-23

## 2023-10-23 RX ORDER — LIDOCAINE HYDROCHLORIDE 20 MG/ML
INJECTION, SOLUTION EPIDURAL; INFILTRATION; INTRACAUDAL; PERINEURAL
Status: DISCONTINUED | OUTPATIENT
Start: 2023-10-23 | End: 2023-10-23

## 2023-10-23 RX ORDER — PROPOFOL 10 MG/ML
VIAL (ML) INTRAVENOUS
Status: DISCONTINUED | OUTPATIENT
Start: 2023-10-23 | End: 2023-10-23

## 2023-10-23 RX ORDER — LIDOCAINE HYDROCHLORIDE 20 MG/ML
INJECTION, SOLUTION EPIDURAL; INFILTRATION; INTRACAUDAL; PERINEURAL
Status: COMPLETED
Start: 2023-10-23 | End: 2023-10-23

## 2023-10-23 RX ORDER — PREDNISONE 20 MG/1
40 TABLET ORAL DAILY
Status: CANCELLED | OUTPATIENT
Start: 2023-10-23

## 2023-10-23 RX ADMIN — DIPHENHYDRAMINE HYDROCHLORIDE 25 MG: 50 INJECTION INTRAMUSCULAR; INTRAVENOUS at 02:10

## 2023-10-23 RX ADMIN — PROPOFOL 80 MG: 10 INJECTION, EMULSION INTRAVENOUS at 12:10

## 2023-10-23 RX ADMIN — METHYLPREDNISOLONE SODIUM SUCCINATE 60 MG: 40 INJECTION, POWDER, FOR SOLUTION INTRAMUSCULAR; INTRAVENOUS at 08:10

## 2023-10-23 RX ADMIN — LIDOCAINE HYDROCHLORIDE 5 ML: 20 INJECTION, SOLUTION INTRAVENOUS at 12:10

## 2023-10-23 RX ADMIN — SODIUM CHLORIDE, SODIUM GLUCONATE, SODIUM ACETATE, POTASSIUM CHLORIDE AND MAGNESIUM CHLORIDE: 526; 502; 368; 37; 30 INJECTION, SOLUTION INTRAVENOUS at 12:10

## 2023-10-23 RX ADMIN — METRONIDAZOLE 500 MG: 5 INJECTION, SOLUTION INTRAVENOUS at 08:10

## 2023-10-23 RX ADMIN — METRONIDAZOLE 500 MG: 5 INJECTION, SOLUTION INTRAVENOUS at 04:10

## 2023-10-23 RX ADMIN — PROPOFOL 60 MG: 10 INJECTION, EMULSION INTRAVENOUS at 12:10

## 2023-10-23 RX ADMIN — SIMETHICONE 80 MG: 80 TABLET, CHEWABLE ORAL at 09:10

## 2023-10-23 RX ADMIN — CIPROFLOXACIN 400 MG: 2 INJECTION, SOLUTION INTRAVENOUS at 03:10

## 2023-10-23 RX ADMIN — METHYLPREDNISOLONE SODIUM SUCCINATE 60 MG: 40 INJECTION, POWDER, FOR SOLUTION INTRAMUSCULAR; INTRAVENOUS at 10:10

## 2023-10-23 RX ADMIN — METRONIDAZOLE 500 MG: 5 INJECTION, SOLUTION INTRAVENOUS at 02:10

## 2023-10-23 RX ADMIN — PROPOFOL 50 MG: 10 INJECTION, EMULSION INTRAVENOUS at 12:10

## 2023-10-23 RX ADMIN — SODIUM SULFATE, POTASSIUM SULFATE, MAGNESIUM SULFATE 354 ML: 17.5; 3.13; 1.6 SOLUTION, CONCENTRATE ORAL at 04:10

## 2023-10-23 RX ADMIN — PROPOFOL 100 MG: 10 INJECTION, EMULSION INTRAVENOUS at 12:10

## 2023-10-23 RX ADMIN — CIPROFLOXACIN 400 MG: 2 INJECTION, SOLUTION INTRAVENOUS at 02:10

## 2023-10-23 NOTE — ANESTHESIA POSTPROCEDURE EVALUATION
Anesthesia Post Evaluation    Patient: Beau Mau    Procedure(s) Performed: Procedure(s) (LRB):  COLON (N/A)    Final Anesthesia Type: general      Patient location during evaluation: PACU  Level of consciousness: awake and alert  Post-procedure vital signs: reviewed and stable  Pain management: adequate  Airway patency: patent  TABBY mitigation strategies: Multimodal analgesia  PONV status at discharge: No PONV  Anesthetic complications: no      Cardiovascular status: hemodynamically stable  Respiratory status: unassisted  Hydration status: euvolemic  Follow-up not needed.          Vitals Value Taken Time   /79 10/23/23 1322   Temp 38 °C (100.4 °F) 10/23/23 1253   Pulse 84 10/23/23 1322   Resp 15 10/23/23 1322   SpO2 99 % 10/23/23 1322         No case tracking events are documented in the log.      Pain/Leonard Score: Leonard Score: 10 (10/23/2023  1:21 PM)

## 2023-10-23 NOTE — NURSING
Nurses Note -- 4 Eyes      10/23/2023   2:22 PM      Skin assessed during: Admit      [x] No Altered Skin Integrity Present    [x]Prevention Measures Documented      [] Yes- Altered Skin Integrity Present or Discovered   [] LDA Added if Not in Epic (Describe Wound)   [] New Altered Skin Integrity was Present on Admit and Documented in LDA   [] Wound Image Taken    Wound Care Consulted? No    Attending Nurse:  Tammy Weber RN     Second RN/Staff Member:   Sudarshan Cabrera LPN

## 2023-10-23 NOTE — PROGRESS NOTES
"[facility]  Hospital Medicine Progress Note           A. History:  Chief Complaint: Abdominal Pain, Diarrhea, and Vomiting (Generalized abd pain, vomiting, fever, and diarrhea x3 wks, was seen by his PCP and was given a "steroid shot" that helped the symptoms, but they began again x3 days ago. Hx of diverticulitis. )         Patient information was obtained from patient, patient's family, past medical records and ER records.      HISTORY OF PRESENT ILLNESS:   Lionel León is a 42 y.o. male with a past medical history of diverticulitis s/p colon resection presented to [facility] on 10/20/2023 for abdominal pain, vomiting, diarrhea, and fever.  Patient reported intermittent symptoms for the past 3 weeks.  Patient reported worsening symptoms over the past 3 days.  Patient also endorsed rectal bleeding and dysuria.CT abdomen and pelvis with contrast revealed mild diffuse circumferential wall thickening of the ascending, transverse, descending, and sigmoid colon with pericolonic fat stranding, consistent with pan colitis.  Patient was admitted to hospital medicine service for further medical management.        Today's information  Patient has gone to GI lab for colonoscopy will follow up with postprocedure recommendations   Vitals reviewed and stable on room air  No Overnight events reported        Physical exam  General appearance: Male in no apparent distress.  HEENNT: Atraumatic head.   Lungs: Clear to auscultation bilaterally.   Heart: Regular rate and rhythm.    Abdomen: Soft, non-distended, generalized abdominal tenderness. Bowel sounds are normal.   Extremities:  No deformities.   Skin: No Rash. Warm and dry.   Neuro: Awake, alert, and oriented. Motor and sensory exams grossly intact.   Psych/mental status: Appropriate mood and affect. Responds appropriately to questions.            Assessment:  Sepsis secondary to pancolitis   - concern for IBD   2/4 SIRS - Intermittent fevers , Sinus " tachycardia  Abdominal pain, nausea and vomiting   Chronic Diarrhea  Acute UTI with dysuria   JUAN PABLO , Mild , resolved   Hyponatremia, mild , likely due to dehydration, resolved   Normocytic anemia   History of diverticulitis s/p colon resection        Plan:  Patient has gone to GI lab for colonoscopy will follow up with postprocedure recommendations   Vitals reviewed and stable on room air        Watery diarrhea persists, bloody, with associated abdominal cramps but improving   Patient reports the Bentyl is helping his pain but given him the redness on his face but he would want to continue with Bentyl   Will continue use of Benadryl to help the redness   GI has evaluated patient concern for possible ulcerative colitis   IV Solu-Medrol   Continue with IV normal saline given ongoing diarrhea patient unable to keep anything down   Continue antibiotics day 3  Will provide information about ulcerative colitis to patient tomorrow   Esr, crp markedly elevated   GI panel negative  C diff negative   Imodium prn  IM Bentyl prn abd spasms   PRN antiemetics and analgesics  Close H&H monitoring  Continue appropriate home medications once med rec updated   Labs in a.m.     VTE Prophylaxis:  SCDs     Discharge Planning and Disposition: No mobility needs. Ambulating well. Good social support system.   Anticipated discharge     All diagnosis and differential diagnosis have been reviewed; assessment and plan has been documented; I have personally reviewed the labs and test results that are presently available; I have reviewed the patients medication list; I have reviewed the consulting providers response and recommendations. I have reviewed or attempted to review medical records based upon their availability.    All of the patient and family questions have been addressed and answered. Patient's is agreeable to the above stated plan. I will continue to monitor closely and make adjustments to medical management as needed.     Critical  Care diagnoses sepsis requiring IV fluids and antibiotics   Critical care time greater than 35 minutes        VITAL SIGNS: 24 HRS MIN & MAX LAST   Temp  Min: 98 °F (36.7 °C)  Max: 100.4 °F (38 °C) 98.1 °F (36.7 °C)   BP  Min: 106/70  Max: 142/96 111/70   Pulse  Min: 74  Max: 96  96   Resp  Min: 14  Max: 20 15   SpO2  Min: 94 %  Max: 99 % 95 %     I have reviewed the following labs:  Recent Labs   Lab 10/21/23  0030 10/22/23  0625   WBC 11.95  11.95* 8.26  8.26   RBC 4.04* 3.64*   HGB 11.4* 10.0*   HCT 35.6* 32.3*   MCV 88.1 88.7   MCH 28.2 27.5   MCHC 32.0* 31.0*   RDW 14.1 14.2    229   MPV 9.7 10.1     Recent Labs   Lab 10/21/23  0030 10/22/23  0625   * 134*   K 3.6 4.2   CO2 24 23   BUN 21.7* 15.0   CREATININE 1.52* 0.87   CALCIUM 8.4 7.6*   MG  --  2.00   ALBUMIN 2.6* 2.2*   ALKPHOS 35* 32*   ALT 17 19   AST 12 27   BILITOT 0.9 0.5     Microbiology Results (last 7 days)       Procedure Component Value Units Date/Time    Stool Culture **CANNOT BE ORDERED STAT** [[id number]]  (Normal) Collected: 10/21/23 0223    Order Status: Completed Specimen: Stool Updated: 10/23/23 1321     Stool Culture Negative for Salmonella, Shigella, Campylobacter, Vibrio, Aeromonas, Pleisiomonas,Yersinia, or Shiga Toxin 1 and 2.    Urine culture [[id number]] Collected: 10/21/23 0345    Order Status: Completed Specimen: Urine Updated: 10/23/23 0916     Urine Culture No Growth    Blood culture #1 **CANNOT BE ORDERED STAT** [[id number]]  (Normal) Collected: 10/21/23 0030    Order Status: Completed Specimen: Blood Updated: 10/23/23 0500     CULTURE, BLOOD ([facility]) No Growth At 48 Hours    Blood culture #2 **CANNOT BE ORDERED STAT** [[id number]]  (Normal) Collected: 10/21/23 0030    Order Status: Completed Specimen: Blood Updated: 10/23/23 0500     CULTURE, BLOOD ([facility]) No Growth At 48 Hours    Clostridium Diff Toxin, A & B, EIA [[id number]]  (Normal) Collected: 10/21/23 0338    Order Status: Completed Specimen: Stool Updated:  10/21/23 0408     Clostridium Difficile GDH Antigen Negative     Clostridium Difficile Toxin A/B Negative             See below for Radiology    Scheduled Med:   ciprofloxacin  400 mg Intravenous Q12H    methylPREDNISolone sodium succinate injection  60 mg Intravenous Q12H    metronidazole  500 mg Intravenous Q8H    propofol          Continuous Infusions:   sodium chloride 0.9% 100 mL/hr at 10/22/23 0927      PRN Meds:  acetaminophen, dextrose 10%, dextrose 10%, dicyclomine, diphenhydrAMINE, glucagon (human recombinant), glucose, glucose, loperamide, morphine, ondansetron, propofol     Assessment/Plan:      VTE prophylaxis:     Patient condition:  Stable/Fair/Guarded/ Serious/ Critical    Anticipated discharge and Disposition:         All diagnosis and differential diagnosis have been reviewed; assessment and plan has been documented; I have personally reviewed the labs and test results that are presently available; I have reviewed the patients medication list; I have reviewed the consulting providers response and recommendations. I have reviewed or attempted to review medical records based upon their availability    All of the patient's questions have been  addressed and answered. Patient's is agreeable to the above stated plan. I will continue to monitor closely and make adjustments to medical management as needed.  _____________________________________________________________________    Nutrition Status:    Radiology:  I have personally reviewed the following imaging and agree with the radiologist.     CT Abdomen Pelvis With Contrast  Narrative: EXAMINATION:  CT ABDOMEN PELVIS WITH CONTRAST    CLINICAL HISTORY:  Abdominal pain, acute, nonlocalized;    TECHNIQUE:  Helically acquired images with axial, sagittal and coronal reformations were obtained from the lung bases to the pubic symphysis after the IV administration of contrast.    Automated tube current modulation, weight-based exposure dosing, and/or iterative  reconstruction technique utilized to reach lowest reasonably achievable exposure rate.    DLP: 518 mGy*cm    COMPARISON:  No relevant prior available for comparison at the time of dictation.    FINDINGS:  HEART: Normal in size. No pericardial effusion.    LUNG BASES: Mild basilar atelectasis.    LIVER: Normal attenuation. No appreciable focal hepatic lesion.    BILIARY: No calcified gallstones.    PANCREAS: No inflammatory change.    SPLEEN: Normal in size    ADRENALS: No mass.    KIDNEYS/URETERS: The kidneys enhance symmetrically.  No hydronephrosis.    GI TRACT/MESENTERY:  Small bowel is normal in caliber without evidence of obstruction.  There is wall thickening and inflammatory fat stranding at the colon most pronounced at the descending colon.  There is a sigmoid anastomosis which is mildly gas distended without appreciable stricture.  The appendix is normal.    PERITONEUM: No free fluid.No free air.    LYMPH NODES: No enlarged lymph nodes by size criteria.    VASCULATURE: No significant atherosclerosis or aneurysm.    BLADDER: Normal appearance given degree of distention.    REPRODUCTIVE ORGANS: Normal as visualized.    SOFT TISSUES: Unremarkable.    BONES: Degenerative change at the lower lumbar spine.  Impression: 1. Nonspecific colitis  2. The preliminary and final reports are concordant.    Electronically signed by: Fernanda Robert  Date:    10/21/2023  Time:    12:21      Sayda Harris MD   10/23/2023

## 2023-10-23 NOTE — TRANSFER OF CARE
"Anesthesia Transfer of Care Note    Patient: Lionel León    Procedure(s) Performed: Procedure(s) (LRB):  COLON (N/A)    Patient location: PACU    Anesthesia Type: general    Transport from OR: Transported from OR on room air with adequate spontaneous ventilation    Post pain: adequate analgesia    Post assessment: no apparent anesthetic complications    Post vital signs: stable    Level of consciousness: responds to stimulation    Nausea/Vomiting: no nausea/vomiting    Complications: none    Transfer of care protocol was followed      Last vitals:   Visit Vitals  /78 (BP Location: Left arm, Patient Position: Lying)   Pulse 74   Temp (!) 38 °C (100.4 °F) (Tympanic)   Resp 17   Ht 5' 10" (1.778 m)   Wt 86.4 kg (190 lb 7.6 oz)   SpO2 96%   BMI 27.33 kg/m²     "

## 2023-10-23 NOTE — PLAN OF CARE
10/23/23 0855   Discharge Assessment   Assessment Type Discharge Planning Assessment   Confirmed/corrected address, phone number and insurance Yes   Confirmed Demographics Correct on Facesheet   Source of Information patient   Reason For Admission Abdominal Pain   People in Home spouse   Do you expect to return to your current living situation? Yes   Do you have help at home or someone to help you manage your care at home? Yes   Prior to hospitilization cognitive status: Alert/Oriented   Current cognitive status: Alert/Oriented   Home Accessibility stairs to enter home   Number of Stairs, Main Entrance four   Stair Railings, Main Entrance railings safe and in good condition   Home Layout Able to live on 1st floor   Equipment Currently Used at Home none   Readmission within 30 days? No   Patient currently being followed by outpatient case management? No   Do you currently have service(s) that help you manage your care at home? No   Do you take prescription medications? Yes   Do you have prescription coverage? Yes   Coverage BCBS   Do you have any problems affording any of your prescribed medications? No   Is the patient taking medications as prescribed? yes   How do you get to doctors appointments? car, drives self   Are you on dialysis? No   Do you take coumadin? No   DME Needed Upon Discharge  none   Discharge Plan discussed with: Patient;Spouse/sig other   Transition of Care Barriers None   Discharge Plan A Home with family   Discharge Plan B Home Health     Patient lives in a single story home with  spouse with 4 steps with railings upon entrance. Patient independent prior to admission: PCP: [facility] in [place], Pharmacy: [facility]

## 2023-10-23 NOTE — PROGRESS NOTES
Inpatient Nutrition Assessment    Admit Date: 10/20/2023   Total duration of encounter: 3 days     Nutrition Recommendation/Prescription     - continue low fiber diet    Communication of Recommendations: reviewed with nurse    Nutrition Assessment     Malnutrition Assessment/Nutrition-Focused Physical Exam    Malnutrition Context: acute illness or injury (10/23/23 1528)  Malnutrition Level:  (unable to evaluate) (10/23/23 1528)                                                        A minimum of two characteristics is recommended for diagnosis of either severe or non-severe malnutrition.    Chart Review    Reason Seen: malnutrition screening tool (MST)    Malnutrition Screening Tool Results   Have you recently lost weight without trying?: Yes: 2-13 lbs  Have you been eating poorly because of a decreased appetite?: Yes   MST Score: 2   Diagnosis:  Sepsis secondary to pancolitis   - concern for IBD   2/4 SIRS - Intermittent fevers , Sinus tachycardia  Abdominal pain, nausea and vomiting   Chronic Diarrhea  Acute UTI with dysuria   JUAN PABLO , Mild , resolved   Hyponatremia, mild , likely due to dehydration, resolved   Normocytic anemia     Relevant Medical History: diverticulitis s/p colon resection    Nutrition-Related Medications: Na Cl @100ml/hr  Calorie Containing IV Medications: no significant kcals from medications at this time    Nutrition-Related Labs:  10/22: Na 134, Glu 151, Ca 7.6, Alb 2.2, .5    Diet/PN Order: Diet low fiber/residue  Oral Supplement Order: none  Tube Feeding Order: none  Appetite/Oral Intake: NPO/NPO  Factors Affecting Nutritional Intake: NPO  Food/Christian/Cultural Preferences: unable to obtain  Food Allergies: no known food allergies    Wound(s):       Last Bowel Movement: 10/22/23    Comments    10/23 pt out of room for procedure; weight loss and appetite loss reported at admit, noted decreased intake of meals with chronic diarrhea in MD notes; will attempt early f/u; no EMR weight hx  "noted    Anthropometrics    Height: 5' 10" (177.8 cm) Height Method: Stated  Last Weight: 86.4 kg (190 lb 7.6 oz) (10/21/23 1713) Weight Method: Bed Scale  BMI (Calculated): 27.3  BMI Classification: overweight (BMI 25-29.9)        Ideal Body Weight (IBW), Male: 166 lb     % Ideal Body Weight, Male (lb): 114.75 %                          Usual Weight Provided By: unable to obtain usual weight    Wt Readings from Last 5 Encounters:   10/21/23 86.4 kg (190 lb 7.6 oz)     Weight Change(s) Since Admission:  Admit Weight: 83.9 kg (185 lb) (10/20/23 2353)      Estimated Needs    Weight Used For Calorie Calculations: 86.4 kg (190 lb 7.6 oz)  Energy Calorie Requirements (kcal): 2124 kcal (1.2 stress factor)  Energy Need Method: [place]-St Jeor  Weight Used For Protein Calculations: 86.4 kg (190 lb 7.6 oz)  Protein Requirements: 104gm (1.2 gm/kg)  Fluid Requirements (mL): 2124 ml (1ml/kcal)  Temp (24hrs), Av.6 °F (37 °C), Min:98 °F (36.7 °C), Max:100.4 °F (38 °C)       Enteral Nutrition    Patient not receiving enteral nutrition at this time.    Parenteral Nutrition    Patient not receiving parenteral nutrition support at this time.    Evaluation of Received Nutrient Intake    Calories: not meeting estimated needs  Protein: not meeting estimated needs    Patient Education    Not applicable.    Nutrition Diagnosis     PES: Inadequate oral intake related to suboptimal protein/energy intake as evidenced by NPO, <75% est energy needs met. (new)    Interventions/Goals     Intervention(s): collaboration with other providers  Goal: Meet greater than 75% of nutritional needs by follow-up. (new)    Monitoring & Evaluation     Dietitian will monitor energy intake and weight change.  Nutrition Risk/Follow-Up: high (follow-up in 1-4 days)   Please consult if re-assessment needed sooner.     "

## 2023-10-23 NOTE — PROVATION PATIENT INSTRUCTIONS
Discharge Summary/Instructions after an Endoscopic Procedure  Patient Name: Lionel León  Patient MRN: [id number]  Patient YOB: 1980 Monday, October 23, 2023  Dragan Underwood MD  Dear patient,  As a result of recent federal legislation (The Federal Cures Act), you may   receive lab or pathology results from your procedure in your [facility]   account before your physician is able to contact you. Your physician or   their representative will relay the results to you with their   recommendations at their soonest availability.  Thank you,  RESTRICTIONS:  During your procedure today, you received medications for sedation.  These   medications may affect your judgment, balance and coordination.  Therefore,   for 24 hours, you have the following restrictions:   - DO NOT drive a car, operate machinery, make legal/financial decisions,   sign important papers or drink alcohol.    ACTIVITY:  Today: no heavy lifting, straining or running due to procedural   sedation/anesthesia.  The following day: return to full activity including work.  DIET:  Eat and drink normally unless instructed otherwise.     TREATMENT FOR COMMON SIDE EFFECTS:  - Mild abdominal pain, nausea, belching, bloating or excessive gas:  rest,   eat lightly and use a heating pad.  - Sore Throat: treat with throat lozenges and/or gargle with warm salt   water.  - Because air was used during the procedure, expelling large amounts of air   from your rectum or belching is normal.  - If a bowel prep was taken, you may not have a bowel movement for 1-3 days.    This is normal.  SYMPTOMS TO WATCH FOR AND REPORT TO YOUR PHYSICIAN:  1. Abdominal pain or bloating, other than gas cramps.  2. Chest pain.  3. Back pain.  4. Signs of infection such as: chills or fever occurring within 24 hours   after the procedure.  5. Rectal bleeding, which would show as bright red, maroon, or black stools.   (A tablespoon of blood from the rectum is not serious,  especially if   hemorrhoids are present.)  6. Vomiting.  7. Weakness or dizziness.  GO DIRECTLY TO THE NEAREST EMERGENCY ROOM IF YOU HAVE ANY OF THE FOLLOWING:      Difficulty breathing              Chills and/or fever over 101 F   Persistent vomiting and/or vomiting blood   Severe abdominal pain   Severe chest pain   Black, tarry stools   Bleeding- more than one tablespoon   Any other symptom or condition that you feel may need urgent attention  Your doctor recommends these additional instructions:  If any biopsies were taken, your doctors clinic will contact you in 1 to 2   weeks with any results.  - Await pathology results.   -ok to change iv solumderol to prednisone  -ok to stop antibx  -outpatient GI follow up.  WE will overbook him in our clinc in 2 weeks  -we will check on him am  -he will need to go home wiht prednisone taper which we can help with   tomorrow am  -will likely need humira and methotrexate to start  -check TPMT and quant gold  For questions, problems or results please call your physician - Dragan Underwood MD at Work:  [phone].  [facility], EMERGENCY ROOM PHONE NUMBER: [phone]  IF A COMPLICATION OR EMERGENCY SITUATION ARISES AND YOU ARE UNABLE TO REACH   YOUR PHYSICIAN - GO DIRECTLY TO THE EMERGENCY ROOM.  MD Dragan Peacock MD  10/23/2023 1:08:01 PM  This report has been verified and signed electronically.  Dear patient,  As a result of recent federal legislation (The Federal Cures Act), you may   receive lab or pathology results from your procedure in your [facility]   account before your physician is able to contact you. Your physician or   their representative will relay the results to you with their   recommendations at their soonest availability.  Thank you,  PROVATION

## 2023-10-23 NOTE — ANESTHESIA PREPROCEDURE EVALUATION
10/23/2023  Lionel León is a 42 y.o., male w/ past medical history of diverticulitis s/p colon resection presented to [facility] on 10/20/2023 for abdominal pain, vomiting, diarrhea, and fever.  Patient reported intermittent symptoms for the past 3 weeks.  Patient reported worsening symptoms over the past 3 days.  Patient also endorsed rectal bleeding and dysuria.CT abdomen and pelvis with contrast revealed mild diffuse circumferential wall thickening of the ascending, transverse, descending, and sigmoid colon with pericolonic fat stranding, consistent with pan-colitis. He presents this morning for colonoscopy.    Pre-op Assessment    I have reviewed the Patient Summary Reports.     I have reviewed the Nursing Notes. I have reviewed the NPO Status.   I have reviewed the Medications.     Review of Systems  Anesthesia Hx:  No problems with previous Anesthesia    Social:  Non-Smoker    Hematology/Oncology:         -- Anemia:   Hepatic/GI:   Bowel Prep.        Physical Exam  General: Well nourished, Cooperative, Alert and Oriented    Airway:  Mallampati: II   Mouth Opening: Normal  TM Distance: Normal  Tongue: Normal  Neck ROM: Normal ROM    Dental:  Intact    Chest/Lungs:  Clear to auscultation, Normal Respiratory Rate    Heart:  Rate: Normal  Rhythm: Regular Rhythm  Sounds: Normal    Abdomen:  Normal, Soft, Nontender        Anesthesia Plan  Type of Anesthesia, risks & benefits discussed:    Anesthesia Type: MAC  Intra-op Monitoring Plan: Standard ASA Monitors  Post Op Pain Control Plan: multimodal analgesia  Induction:  IV  Airway Plan: Direct  Informed Consent: Informed consent signed with the Patient and all parties understand the risks and agree with anesthesia plan.  All questions answered.   ASA Score: 2  Day of Surgery Review of History & Physical: H&P Update referred to the surgeon/provider.    Ready For  Surgery From Anesthesia Perspective.     .

## 2023-10-24 VITALS
HEART RATE: 76 BPM | OXYGEN SATURATION: 93 % | BODY MASS INDEX: 27.27 KG/M2 | RESPIRATION RATE: 16 BRPM | HEIGHT: 70 IN | DIASTOLIC BLOOD PRESSURE: 74 MMHG | SYSTOLIC BLOOD PRESSURE: 124 MMHG | WEIGHT: 190.5 LBS | TEMPERATURE: 99 F

## 2023-10-24 PROBLEM — K51.00 PANCOLITIS: Status: ACTIVE | Noted: 2023-10-24

## 2023-10-24 LAB
ALBUMIN SERPL-MCNC: 2.2 G/DL (ref 3.5–5)
ALBUMIN/GLOB SERPL: 0.9 RATIO (ref 1.1–2)
ALP SERPL-CCNC: 31 UNIT/L (ref 40–150)
ALT SERPL-CCNC: 22 UNIT/L (ref 0–55)
AST SERPL-CCNC: 20 UNIT/L (ref 5–34)
BASOPHILS # BLD AUTO: 0.05 X10(3)/MCL
BASOPHILS NFR BLD AUTO: 0.7 %
BILIRUB SERPL-MCNC: 0.3 MG/DL
BUN SERPL-MCNC: 12.6 MG/DL (ref 8.9–20.6)
CALCIUM SERPL-MCNC: 7.4 MG/DL (ref 8.4–10.2)
CHLORIDE SERPL-SCNC: 103 MMOL/L (ref 98–107)
CO2 SERPL-SCNC: 27 MMOL/L (ref 22–29)
CREAT SERPL-MCNC: 0.84 MG/DL (ref 0.73–1.18)
CRP SERPL-MCNC: 60.2 MG/L
EOSINOPHIL # BLD AUTO: 0 X10(3)/MCL (ref 0–0.9)
EOSINOPHIL NFR BLD AUTO: 0 %
ERYTHROCYTE [DISTWIDTH] IN BLOOD BY AUTOMATED COUNT: 14.3 % (ref 11.5–17)
GFR SERPLBLD CREATININE-BSD FMLA CKD-EPI: >60 MLS/MIN/1.73/M2
GLOBULIN SER-MCNC: 2.5 GM/DL (ref 2.4–3.5)
GLUCOSE SERPL-MCNC: 169 MG/DL (ref 74–100)
HAV IGM SERPL QL IA: NONREACTIVE
HBV CORE IGM SERPL QL IA: NONREACTIVE
HBV SURFACE AG SERPL QL IA: NONREACTIVE
HCT VFR BLD AUTO: 33.2 % (ref 42–52)
HCV AB SERPL QL IA: NONREACTIVE
HGB BLD-MCNC: 10.6 G/DL (ref 14–18)
IMM GRANULOCYTES # BLD AUTO: 0.21 X10(3)/MCL (ref 0–0.04)
IMM GRANULOCYTES NFR BLD AUTO: 2.8 %
LYMPHOCYTES # BLD AUTO: 0.42 X10(3)/MCL (ref 0.6–4.6)
LYMPHOCYTES NFR BLD AUTO: 5.6 %
MCH RBC QN AUTO: 27.8 PG (ref 27–31)
MCHC RBC AUTO-ENTMCNC: 31.9 G/DL (ref 33–36)
MCV RBC AUTO: 87.1 FL (ref 80–94)
MONOCYTES # BLD AUTO: 0.81 X10(3)/MCL (ref 0.1–1.3)
MONOCYTES NFR BLD AUTO: 10.8 %
NEUTROPHILS # BLD AUTO: 6.03 X10(3)/MCL (ref 2.1–9.2)
NEUTROPHILS NFR BLD AUTO: 80.1 %
NRBC BLD AUTO-RTO: 0.4 %
PLATELET # BLD AUTO: 304 X10(3)/MCL (ref 130–400)
PMV BLD AUTO: 10 FL (ref 7.4–10.4)
POTASSIUM SERPL-SCNC: 3.9 MMOL/L (ref 3.5–5.1)
PROT SERPL-MCNC: 4.7 GM/DL (ref 6.4–8.3)
RBC # BLD AUTO: 3.81 X10(6)/MCL (ref 4.7–6.1)
SODIUM SERPL-SCNC: 137 MMOL/L (ref 136–145)
WBC # SPEC AUTO: 7.52 X10(3)/MCL (ref 4.5–11.5)

## 2023-10-24 PROCEDURE — 63600175 PHARM REV CODE 636 W HCPCS: Performed by: INTERNAL MEDICINE

## 2023-10-24 PROCEDURE — 80053 COMPREHEN METABOLIC PANEL: CPT | Performed by: INTERNAL MEDICINE

## 2023-10-24 PROCEDURE — 86140 C-REACTIVE PROTEIN: CPT | Performed by: INTERNAL MEDICINE

## 2023-10-24 PROCEDURE — 63600175 PHARM REV CODE 636 W HCPCS: Performed by: NURSE PRACTITIONER

## 2023-10-24 PROCEDURE — 80074 ACUTE HEPATITIS PANEL: CPT | Performed by: PHYSICIAN ASSISTANT

## 2023-10-24 PROCEDURE — 85025 COMPLETE CBC W/AUTO DIFF WBC: CPT | Performed by: INTERNAL MEDICINE

## 2023-10-24 RX ORDER — PREDNISONE 20 MG/1
40 TABLET ORAL DAILY
Status: DISCONTINUED | OUTPATIENT
Start: 2023-10-24 | End: 2023-10-24 | Stop reason: HOSPADM

## 2023-10-24 RX ORDER — LOPERAMIDE HYDROCHLORIDE 2 MG/1
2 CAPSULE ORAL 4 TIMES DAILY PRN
Qty: 30 CAPSULE | Refills: 0 | Status: SHIPPED | OUTPATIENT
Start: 2023-10-24 | End: 2023-11-03

## 2023-10-24 RX ORDER — DICYCLOMINE HYDROCHLORIDE 20 MG/1
20 TABLET ORAL 4 TIMES DAILY PRN
Qty: 30 TABLET | Refills: 1 | Status: ON HOLD | OUTPATIENT
Start: 2023-10-24 | End: 2023-11-30 | Stop reason: HOSPADM

## 2023-10-24 RX ORDER — PREDNISONE 10 MG/1
TABLET ORAL
Qty: 74 TABLET | Refills: 0 | Status: ON HOLD | OUTPATIENT
Start: 2023-10-24 | End: 2023-11-30 | Stop reason: HOSPADM

## 2023-10-24 RX ADMIN — METRONIDAZOLE 500 MG: 5 INJECTION, SOLUTION INTRAVENOUS at 06:10

## 2023-10-24 RX ADMIN — METHYLPREDNISOLONE SODIUM SUCCINATE 60 MG: 40 INJECTION, POWDER, FOR SOLUTION INTRAMUSCULAR; INTRAVENOUS at 09:10

## 2023-10-24 NOTE — DISCHARGE SUMMARY
"[facility]  Hospital Medicine Discharge Summary    Admit Date: 10/20/2023  Discharge Date and Time: 10/24/196308:22 PM  Admitting Physician:  Team  Discharging Physician: Sayda Harris MD.  Primary Care Physician: Manoj, Provider  Consults: Gastroenterology and Hospital Medicine    Discharge Diagnoses:  Sepsis secondary to pancolitis   - concern for IBD   2/4 SIRS - Intermittent fevers , Sinus tachycardia  Abdominal pain, nausea and vomiting   Chronic Diarrhea  Acute UTI with dysuria   JUAN PABLO , Mild , resolved   Hyponatremia, mild , likely due to dehydration, resolved   Normocytic anemia   History of diverticulitis s/p colon resection    Hospital Course:      A. History:  Chief Complaint: Abdominal Pain, Diarrhea, and Vomiting (Generalized abd pain, vomiting, fever, and diarrhea x3 wks, was seen by his PCP and was given a "steroid shot" that helped the symptoms, but they began again x3 days ago. Hx of diverticulitis. )         Patient information was obtained from patient, patient's family, past medical records and ER records.      HISTORY OF PRESENT ILLNESS:   Lionel León is a 42 y.o. male with a past medical history of diverticulitis s/p colon resection presented to [facility] on 10/20/2023 for abdominal pain, vomiting, diarrhea, and fever.  Patient reported intermittent symptoms for the past 3 weeks.  Patient reported worsening symptoms over the past 3 days.  Patient also endorsed rectal bleeding and dysuria.CT abdomen and pelvis with contrast revealed mild diffuse circumferential wall thickening of the ascending, transverse, descending, and sigmoid colon with pericolonic fat stranding, consistent with pan colitis.  Patient was admitted to hospital medicine service for further medical management.        Patient is admitted and started on IV antibiotics Flagyl and Cipro.  GI was consulted he is status post colonoscopy on 10/23 with findings at colonoscopy with suggestive of " ulcerative colitis.  Biopsies were taking in 2 places and sent for pathology.  Pathology is pending he will follow-up with GI for results In 2 weeks   GI has sent hepatitis panel, QuantiFERON TB test, TPMT testing in preparation for use of methotrexate or other immunosuppressants           Physical exam  General appearance: Male in no apparent distress.  HEENNT: Atraumatic head.   Lungs: Clear to auscultation bilaterally.   Heart: Regular rate and rhythm.    Abdomen: Soft, non-distended, generalized abdominal tenderness. Bowel sounds are normal.   Extremities:  No deformities.   Skin: No Rash. Warm and dry.   Neuro: Awake, alert, and oriented. Motor and sensory exams grossly intact.   Psych/mental status: Appropriate mood and affect. Responds appropriately to questions.           Pt was seen and examined on the day of discharge  Vitals:  VITAL SIGNS: 24 HRS MIN & MAX LAST   Temp  Min: 97.7 °F (36.5 °C)  Max: 100.4 °F (38 °C) 98.5 °F (36.9 °C)   BP  Min: 111/70  Max: 142/96 124/74   Pulse  Min: 74  Max: 96  76   Resp  Min: 14  Max: 20 16   SpO2  Min: 93 %  Max: 99 % (!) 93 %         Procedures Performed: No admission procedures for hospital encounter.     Significant Diagnostic Studies: See Full reports for all details    Recent Labs   Lab 10/21/23  0030 10/22/23  0625 10/24/23  0520   WBC 11.95  11.95* 8.26  8.26 7.52   RBC 4.04* 3.64* 3.81*   HGB 11.4* 10.0* 10.6*   HCT 35.6* 32.3* 33.2*   MCV 88.1 88.7 87.1   MCH 28.2 27.5 27.8   MCHC 32.0* 31.0* 31.9*   RDW 14.1 14.2 14.3    229 304   MPV 9.7 10.1 10.0       Recent Labs   Lab 10/21/23  0030 10/22/23  0625 10/24/23  0520   * 134* 137   K 3.6 4.2 3.9   CO2 24 23 27   BUN 21.7* 15.0 12.6   CREATININE 1.52* 0.87 0.84   CALCIUM 8.4 7.6* 7.4*   MG  --  2.00  --    ALBUMIN 2.6* 2.2* 2.2*   ALKPHOS 35* 32* 31*   ALT 17 19 22   AST 12 27 20   BILITOT 0.9 0.5 0.3        Microbiology Results (last 7 days)       Procedure Component Value Units Date/Time     Blood culture #1 **CANNOT BE ORDERED STAT** [[id number]]  (Normal) Collected: 10/21/23 0030    Order Status: Completed Specimen: Blood Updated: 10/24/23 0500     CULTURE, BLOOD ([facility]) No Growth At 72 Hours    Blood culture #2 **CANNOT BE ORDERED STAT** [[id number]]  (Normal) Collected: 10/21/23 0030    Order Status: Completed Specimen: Blood Updated: 10/24/23 0500     CULTURE, BLOOD ([facility]) No Growth At 72 Hours    Stool Culture **CANNOT BE ORDERED STAT** [[id number]]  (Normal) Collected: 10/21/23 0223    Order Status: Completed Specimen: Stool Updated: 10/23/23 1321     Stool Culture Negative for Salmonella, Shigella, Campylobacter, Vibrio, Aeromonas, Pleisiomonas,Yersinia, or Shiga Toxin 1 and 2.    Urine culture [[id number]] Collected: 10/21/23 0345    Order Status: Completed Specimen: Urine Updated: 10/23/23 0916     Urine Culture No Growth    Clostridium Diff Toxin, A & B, EIA [[id number]]  (Normal) Collected: 10/21/23 0338    Order Status: Completed Specimen: Stool Updated: 10/21/23 0408     Clostridium Difficile GDH Antigen Negative     Clostridium Difficile Toxin A/B Negative             CT Abdomen Pelvis With Contrast  Narrative: EXAMINATION:  CT ABDOMEN PELVIS WITH CONTRAST    CLINICAL HISTORY:  Abdominal pain, acute, nonlocalized;    TECHNIQUE:  Helically acquired images with axial, sagittal and coronal reformations were obtained from the lung bases to the pubic symphysis after the IV administration of contrast.    Automated tube current modulation, weight-based exposure dosing, and/or iterative reconstruction technique utilized to reach lowest reasonably achievable exposure rate.    DLP: 518 mGy*cm    COMPARISON:  No relevant prior available for comparison at the time of dictation.    FINDINGS:  HEART: Normal in size. No pericardial effusion.    LUNG BASES: Mild basilar atelectasis.    LIVER: Normal attenuation. No appreciable focal hepatic lesion.    BILIARY: No calcified gallstones.    PANCREAS: No  inflammatory change.    SPLEEN: Normal in size    ADRENALS: No mass.    KIDNEYS/URETERS: The kidneys enhance symmetrically.  No hydronephrosis.    GI TRACT/MESENTERY:  Small bowel is normal in caliber without evidence of obstruction.  There is wall thickening and inflammatory fat stranding at the colon most pronounced at the descending colon.  There is a sigmoid anastomosis which is mildly gas distended without appreciable stricture.  The appendix is normal.    PERITONEUM: No free fluid.No free air.    LYMPH NODES: No enlarged lymph nodes by size criteria.    VASCULATURE: No significant atherosclerosis or aneurysm.    BLADDER: Normal appearance given degree of distention.    REPRODUCTIVE ORGANS: Normal as visualized.    SOFT TISSUES: Unremarkable.    BONES: Degenerative change at the lower lumbar spine.  Impression: 1. Nonspecific colitis  2. The preliminary and final reports are concordant.    Electronically signed by: Fernanda Robert  Date:    10/21/2023  Time:    12:21         Medication List        START taking these medications      dicyclomine 20 mg tablet  Commonly known as: BENTYL  Take 1 tablet (20 mg total) by mouth 4 (four) times daily as needed (abd pain/cramp).     loperamide 2 mg capsule  Commonly known as: IMODIUM  Take 1 capsule (2 mg total) by mouth 4 (four) times daily as needed for Diarrhea.     predniSONE 10 MG tablet  Commonly known as: DELTASONE  Take 4 tablets (40 mg total) by mouth once daily for 7 days, THEN 3 tablets (30 mg total) once daily for 7 days, THEN 2 tablets (20 mg total) once daily for 7 days, THEN 1 tablet (10 mg total) once daily for 7 days, THEN 0.5 tablets (5 mg total) once daily for 7 days.  Start taking on: October 24, 2023               Where to Get Your Medications        These medications were sent to [facility] - [address] [address]      Phone: [phone]   dicyclomine 20 mg tablet  loperamide 2  mg capsule  predniSONE 10 MG tablet          Explained in detail to the patient about the discharge plan, medications, and follow-up visits. Pt understands and agrees with the treatment plan  Discharge Disposition:     Discharged Condition: stable  Diet-   Dietary Orders (From admission, onward)       Start     Ordered    10/23/23 1317  Diet low fiber/residue  Diet effective now         10/23/23 1317                   Medications Per DC med rec  Activities as tolerated   Follow-up Information       [facility], Provider Follow up.    Why: needs GI appt in 2 weeks prior to discharge                         For further questions contact hospitalist office    Discharge time 33 minutes    For worsening symptoms, chest pain, shortness of breath, increased abdominal pain, high grade fever, stroke or stroke like symptoms, immediately go to the nearest Emergency Room or call 911 as soon as possible.      Sayda Kimbrough M.D on 10/24/2023. at 12:22 PM.

## 2023-10-24 NOTE — PROGRESS NOTES
"Gastroenterology Progress Note    Subjective/Interval History:  Feeling much better overall.  Still having some bloody diarrhea about 2 episodes today and some abdominal pain, mostly in the RLQ this AM but it is intermittent.  Ate breakfast without issue.    Further hx obtained: he has been having bloody diarrhea and abdominal pain for a few weeks.  Initially dx with stomach bug.  Then it happened again and was associated with joint pain/swelling to the point where it was difficult to walk.  He was given a steroid taper with rapid improvement and then had recurrent symptoms after completing it.  He also had left eye vision change/pain and saw and eye dr who was concerned for IBD extraintestinal manifestation. Denies rashes.      Vital Signs:  /78   Pulse 83   Temp 97.9 °F (36.6 °C) (Oral)   Resp 16   Ht 5' 10" (1.778 m)   Wt 86.4 kg (190 lb 7.6 oz)   SpO2 (!) 94%   BMI 27.33 kg/m²   Body mass index is 27.33 kg/m².    Physical Exam:  Physical Exam  Constitutional:       General: He is not in acute distress.     Appearance: He is not ill-appearing.   HENT:      Head: Normocephalic and atraumatic.   Eyes:      General: No scleral icterus.     Extraocular Movements: Extraocular movements intact.   Cardiovascular:      Rate and Rhythm: Normal rate and regular rhythm.   Pulmonary:      Effort: Pulmonary effort is normal. No respiratory distress.   Abdominal:      General: Bowel sounds are normal. There is no distension.      Palpations: Abdomen is soft. There is no mass.      Tenderness: There is no abdominal tenderness. There is no guarding or rebound.   Musculoskeletal:      Right lower leg: No edema.      Left lower leg: No edema.   Skin:     General: Skin is warm and dry.      Coloration: Skin is not jaundiced.   Neurological:      Mental Status: He is alert and oriented to person, place, and time.   Psychiatric:         Mood and Affect: Mood normal.         Behavior: Behavior normal. "         Labs:  Recent Results (from the past 48 hour(s))   C-Reactive Protein    Collection Time: 10/23/23  2:52 PM   Result Value Ref Range    C-Reactive Protein 101.50 (H) <5.00 mg/L   C-Reactive Protein    Collection Time: 10/24/23  5:20 AM   Result Value Ref Range    C-Reactive Protein 60.20 (H) <5.00 mg/L   Comprehensive Metabolic Panel    Collection Time: 10/24/23  5:20 AM   Result Value Ref Range    Sodium Level 137 136 - 145 mmol/L    Potassium Level 3.9 3.5 - 5.1 mmol/L    Chloride 103 98 - 107 mmol/L    Carbon Dioxide 27 22 - 29 mmol/L    Glucose Level 169 (H) 74 - 100 mg/dL    Blood Urea Nitrogen 12.6 8.9 - 20.6 mg/dL    Creatinine 0.84 0.73 - 1.18 mg/dL    Calcium Level Total 7.4 (L) 8.4 - 10.2 mg/dL    Protein Total 4.7 (L) 6.4 - 8.3 gm/dL    Albumin Level 2.2 (L) 3.5 - 5.0 g/dL    Globulin 2.5 2.4 - 3.5 gm/dL    Albumin/Globulin Ratio 0.9 (L) 1.1 - 2.0 ratio    Bilirubin Total 0.3 <=1.5 mg/dL    Alkaline Phosphatase 31 (L) 40 - 150 unit/L    Alanine Aminotransferase 22 0 - 55 unit/L    Aspartate Aminotransferase 20 5 - 34 unit/L    eGFR >60 mls/min/1.73/m2   CBC with Differential    Collection Time: 10/24/23  5:20 AM   Result Value Ref Range    WBC 7.52 4.50 - 11.50 x10(3)/mcL    RBC 3.81 (L) 4.70 - 6.10 x10(6)/mcL    Hgb 10.6 (L) 14.0 - 18.0 g/dL    Hct 33.2 (L) 42.0 - 52.0 %    MCV 87.1 80.0 - 94.0 fL    MCH 27.8 27.0 - 31.0 pg    MCHC 31.9 (L) 33.0 - 36.0 g/dL    RDW 14.3 11.5 - 17.0 %    Platelet 304 130 - 400 x10(3)/mcL    MPV 10.0 7.4 - 10.4 fL    Neut % 80.1 %    Lymph % 5.6 %    Mono % 10.8 %    Eos % 0.0 %    Basophil % 0.7 %    Lymph # 0.42 (L) 0.6 - 4.6 x10(3)/mcL    Neut # 6.03 2.1 - 9.2 x10(3)/mcL    Mono # 0.81 0.1 - 1.3 x10(3)/mcL    Eos # 0.00 0 - 0.9 x10(3)/mcL    Baso # 0.05 <=0.2 x10(3)/mcL    IG# 0.21 (H) 0 - 0.04 x10(3)/mcL    IG% 2.8 %    NRBC% 0.4 %       Imaging:  CT Abdomen Pelvis With Contrast    Result Date: 10/21/2023  EXAMINATION: CT ABDOMEN PELVIS WITH CONTRAST CLINICAL  HISTORY: Abdominal pain, acute, nonlocalized; TECHNIQUE: Helically acquired images with axial, sagittal and coronal reformations were obtained from the lung bases to the pubic symphysis after the IV administration of contrast. Automated tube current modulation, weight-based exposure dosing, and/or iterative reconstruction technique utilized to reach lowest reasonably achievable exposure rate. DLP: 518 mGy*cm COMPARISON: No relevant prior available for comparison at the time of dictation. FINDINGS: HEART: Normal in size. No pericardial effusion. LUNG BASES: Mild basilar atelectasis. LIVER: Normal attenuation. No appreciable focal hepatic lesion. BILIARY: No calcified gallstones. PANCREAS: No inflammatory change. SPLEEN: Normal in size ADRENALS: No mass. KIDNEYS/URETERS: The kidneys enhance symmetrically.  No hydronephrosis. GI TRACT/MESENTERY:  Small bowel is normal in caliber without evidence of obstruction.  There is wall thickening and inflammatory fat stranding at the colon most pronounced at the descending colon.  There is a sigmoid anastomosis which is mildly gas distended without appreciable stricture.  The appendix is normal. PERITONEUM: No free fluid.No free air. LYMPH NODES: No enlarged lymph nodes by size criteria. VASCULATURE: No significant atherosclerosis or aneurysm. BLADDER: Normal appearance given degree of distention. REPRODUCTIVE ORGANS: Normal as visualized. SOFT TISSUES: Unremarkable. BONES: Degenerative change at the lower lumbar spine.     1. Nonspecific colitis 2. The preliminary and final reports are concordant. Electronically signed by: Fernanda Robert Date:    10/21/2023 Time:    12:21         Assessment/Plan:  42-year-old CM unknown to our group with a history of a partial colon resection for diverticulitis approximately 4 years ago.  Presented with abdominal pain and diarrhea.     Pancolitis, most likely UC    CT 10/21/23: wall thickening and inflammatory fat stranding at the colon most  pronounced at the descending colon.    -- 101 -- 60 this am  ESR 85  GI PCR, stool cx, and cdiff toxin negative.   Colonoscopy 10/23/23: moderate to borderline severe erythmea, edema, ulceration that was continuous from rectum to the cecum. There was near complete loss of normal vascular pattern. This was suspicious for Ulcerative colitis. Bx taken in cecum, AC, TC, DC, and rectum. Somers score of 2. anastamosis at 30cm was healthy appearing.     - f/u pathology   - obtain hep panel, quant gold, and tpmt   - ok to d/c with Prednisone taper: 40 mg x1 week, then 30 mg x1 week, then 20 mg x1 week, then 10 mg x1week, then 5mg x1 week, then stop.     Will arrange clinic follow-up in our office in 2 weeks to discuss path results and initiate biologic therapy.      WESTON AldridgeC

## 2023-10-25 LAB — PSYCHE PATHOLOGY RESULT: NORMAL

## 2023-10-26 LAB
BACTERIA BLD CULT: NORMAL
BACTERIA BLD CULT: NORMAL

## 2023-10-30 LAB
CLINICAL BIOCHEMIST REVIEW: NORMAL
PROVIDER SIGNING NAME: NORMAL
TPMT RBC PRODUCTION 6-MMP RIBOSIDE-CCNC: 6.07 NMOL/ML/H (ref 5.04–9.57)
TPMT RBC PRODUCTION 6-MTG RIBOSIDE-CCNC: 3.19 NMOL/ML/H (ref 2.7–5.84)
TPMT RBC PRODUCTION OF 6-MMP-CCNC: 4.36 NMOL/ML/H (ref 3–6.66)

## 2023-11-06 ENCOUNTER — HOSPITAL ENCOUNTER (INPATIENT)
Facility: HOSPITAL | Age: 43
LOS: 24 days | Discharge: HOME HEALTH | DRG: 853 | End: 2023-11-30
Attending: EMERGENCY MEDICINE | Admitting: INTERNAL MEDICINE
Payer: COMMERCIAL

## 2023-11-06 DIAGNOSIS — R13.10 ODYNOPHAGIA: ICD-10-CM

## 2023-11-06 DIAGNOSIS — K51.919 ULCERATIVE COLITIS WITH COMPLICATION, UNSPECIFIED LOCATION: ICD-10-CM

## 2023-11-06 DIAGNOSIS — A41.9 SEPSIS, DUE TO UNSPECIFIED ORGANISM, UNSPECIFIED WHETHER ACUTE ORGAN DYSFUNCTION PRESENT: ICD-10-CM

## 2023-11-06 DIAGNOSIS — K62.5 RECTAL BLEEDING: ICD-10-CM

## 2023-11-06 DIAGNOSIS — K92.2 LOWER GI BLEED: Primary | ICD-10-CM

## 2023-11-06 DIAGNOSIS — N49.3 FOURNIER'S GANGRENE: ICD-10-CM

## 2023-11-06 DIAGNOSIS — N49.3 FOURNIER GANGRENE: ICD-10-CM

## 2023-11-06 DIAGNOSIS — R55 SYNCOPE: ICD-10-CM

## 2023-11-06 DIAGNOSIS — K12.0 APHTHOUS ULCER: ICD-10-CM

## 2023-11-06 DIAGNOSIS — K51.00 PANCOLITIS: ICD-10-CM

## 2023-11-06 LAB
ABO + RH BLD: NORMAL
ABO + RH BLD: NORMAL
ABORH RETYPE: NORMAL
ABS NEUT (OLG): 5.76 X10(3)/MCL (ref 2.1–9.2)
ALBUMIN SERPL-MCNC: 2 G/DL (ref 3.5–5)
ALBUMIN/GLOB SERPL: 0.7 RATIO (ref 1.1–2)
ALP SERPL-CCNC: 43 UNIT/L (ref 40–150)
ALT SERPL-CCNC: 20 UNIT/L (ref 0–55)
AST SERPL-CCNC: 9 UNIT/L (ref 5–34)
AV INDEX (PROSTH): 0.67
AV MEAN GRADIENT: 6 MMHG
AV PEAK GRADIENT: 11 MMHG
AV VALVE AREA BY VELOCITY RATIO: 3.02 CM²
AV VALVE AREA: 2.78 CM²
AV VELOCITY RATIO: 0.73
BILIRUB SERPL-MCNC: 0.7 MG/DL
BLD PROD TYP BPU: NORMAL
BLD PROD TYP BPU: NORMAL
BLOOD UNIT EXPIRATION DATE: NORMAL
BLOOD UNIT EXPIRATION DATE: NORMAL
BLOOD UNIT TYPE CODE: 5100
BLOOD UNIT TYPE CODE: 5100
BSA FOR ECHO PROCEDURE: 1.92 M2
BUN SERPL-MCNC: 21.9 MG/DL (ref 8.9–20.6)
CALCIUM SERPL-MCNC: 7.7 MG/DL (ref 8.4–10.2)
CHLORIDE SERPL-SCNC: 98 MMOL/L (ref 98–107)
CLOSTRIDIUM DIFFICILE GDH ANTIGEN (OHS): NEGATIVE
CLOSTRIDIUM DIFFICILE TOXIN A/B (OHS): NEGATIVE
CO2 SERPL-SCNC: 24 MMOL/L (ref 22–29)
CREAT SERPL-MCNC: 0.88 MG/DL (ref 0.73–1.18)
CROSSMATCH INTERPRETATION: NORMAL
CROSSMATCH INTERPRETATION: NORMAL
CRP SERPL-MCNC: 192.6 MG/L
CV ECHO LV RWT: 0.38 CM
DISPENSE STATUS: NORMAL
DISPENSE STATUS: NORMAL
DOP CALC AO PEAK VEL: 1.68 M/S
DOP CALC AO VTI: 30.3 CM
DOP CALC LVOT AREA: 4.2 CM2
DOP CALC LVOT DIAMETER: 2.3 CM
DOP CALC LVOT PEAK VEL: 1.22 M/S
DOP CALC LVOT STROKE VOLUME: 84.3 CM3
DOP CALC MV VTI: 28.8 CM
DOP CALCLVOT PEAK VEL VTI: 20.3 CM
E WAVE DECELERATION TIME: 180 MSEC
E/A RATIO: 1.21
E/E' RATIO: 7.07 M/S
ECHO LV POSTERIOR WALL: 1 CM (ref 0.6–1.1)
ERYTHROCYTE [DISTWIDTH] IN BLOOD BY AUTOMATED COUNT: 15.4 % (ref 11.5–17)
ERYTHROCYTE [SEDIMENTATION RATE] IN BLOOD: 86 MM/HR (ref 0–15)
FERRITIN SERPL-MCNC: 299.94 NG/ML (ref 21.81–274.66)
FRACTIONAL SHORTENING: 26 % (ref 28–44)
GFR SERPLBLD CREATININE-BSD FMLA CKD-EPI: >60 MLS/MIN/1.73/M2
GLOBULIN SER-MCNC: 3 GM/DL (ref 2.4–3.5)
GLUCOSE SERPL-MCNC: 130 MG/DL (ref 74–100)
GROUP & RH: NORMAL
HCT VFR BLD AUTO: 23.6 % (ref 42–52)
HGB BLD-MCNC: 7.3 G/DL (ref 14–18)
INDIRECT COOMBS GEL: NORMAL
INSTRUMENT WBC (OLG): 6.7 X10(3)/MCL
INTERVENTRICULAR SEPTUM: 1 CM (ref 0.6–1.1)
LACTATE SERPL-SCNC: 1.3 MMOL/L (ref 0.5–2.2)
LEFT ATRIUM SIZE: 3.9 CM
LEFT ATRIUM VOLUME INDEX MOD: 35 ML/M2
LEFT ATRIUM VOLUME MOD: 67.2 CM3
LEFT INTERNAL DIMENSION IN SYSTOLE: 3.9 CM (ref 2.1–4)
LEFT VENTRICLE DIASTOLIC VOLUME INDEX: 70.31 ML/M2
LEFT VENTRICLE DIASTOLIC VOLUME: 135 ML
LEFT VENTRICLE MASS INDEX: 104 G/M2
LEFT VENTRICLE SYSTOLIC VOLUME INDEX: 34.3 ML/M2
LEFT VENTRICLE SYSTOLIC VOLUME: 65.9 ML
LEFT VENTRICULAR INTERNAL DIMENSION IN DIASTOLE: 5.3 CM (ref 3.5–6)
LEFT VENTRICULAR MASS: 200.4 G
LV LATERAL E/E' RATIO: 6.19 M/S
LV SEPTAL E/E' RATIO: 8.25 M/S
LVOT MG: 3 MMHG
LVOT MV: 0.75 CM/S
LYMPHOCYTES NFR BLD MANUAL: 0.47 X10(3)/MCL
LYMPHOCYTES NFR BLD MANUAL: 7 %
MCH RBC QN AUTO: 26.1 PG (ref 27–31)
MCHC RBC AUTO-ENTMCNC: 30.9 G/DL (ref 33–36)
MCV RBC AUTO: 84.3 FL (ref 80–94)
MONOCYTES NFR BLD MANUAL: 0.47 X10(3)/MCL (ref 0.1–1.3)
MONOCYTES NFR BLD MANUAL: 7 %
MV MEAN GRADIENT: 3 MMHG
MV PEAK A VEL: 0.82 M/S
MV PEAK E VEL: 0.99 M/S
MV PEAK GRADIENT: 5 MMHG
MV VALVE AREA BY CONTINUITY EQUATION: 2.93 CM2
NEUTROPHILS NFR BLD MANUAL: 86 %
NRBC BLD AUTO-RTO: 0.4 %
NRBC BLD MANUAL-RTO: 2 %
OHS LV EJECTION FRACTION SIMPSONS BIPLANE MOD: 53 %
PLATELET # BLD AUTO: 358 X10(3)/MCL (ref 130–400)
PLATELET # BLD EST: NORMAL 10*3/UL
PMV BLD AUTO: 9.2 FL (ref 7.4–10.4)
POTASSIUM SERPL-SCNC: 3.7 MMOL/L (ref 3.5–5.1)
PROT SERPL-MCNC: 5 GM/DL (ref 6.4–8.3)
PV PEAK GRADIENT: 6 MMHG
PV PEAK VELOCITY: 1.27 M/S
RA PRESSURE ESTIMATED: 3 MMHG
RBC # BLD AUTO: 2.8 X10(6)/MCL (ref 4.7–6.1)
RBC MORPH BLD: NORMAL
SODIUM SERPL-SCNC: 134 MMOL/L (ref 136–145)
SPECIMEN OUTDATE: NORMAL
TDI LATERAL: 0.16 M/S
TDI SEPTAL: 0.12 M/S
TDI: 0.14 M/S
TRICUSPID ANNULAR PLANE SYSTOLIC EXCURSION: 2.61 CM
UNIT NUMBER: NORMAL
UNIT NUMBER: NORMAL
WBC # SPEC AUTO: 6.72 X10(3)/MCL (ref 4.5–11.5)
Z-SCORE OF LEFT VENTRICULAR DIMENSION IN END DIASTOLE: -0.22
Z-SCORE OF LEFT VENTRICULAR DIMENSION IN END SYSTOLE: 1.23

## 2023-11-06 PROCEDURE — 96361 HYDRATE IV INFUSION ADD-ON: CPT

## 2023-11-06 PROCEDURE — 25000003 PHARM REV CODE 250: Performed by: INTERNAL MEDICINE

## 2023-11-06 PROCEDURE — 96375 TX/PRO/DX INJ NEW DRUG ADDON: CPT

## 2023-11-06 PROCEDURE — 86923 COMPATIBILITY TEST ELECTRIC: CPT | Performed by: INTERNAL MEDICINE

## 2023-11-06 PROCEDURE — 86140 C-REACTIVE PROTEIN: CPT | Performed by: NURSE PRACTITIONER

## 2023-11-06 PROCEDURE — 63600175 PHARM REV CODE 636 W HCPCS: Performed by: EMERGENCY MEDICINE

## 2023-11-06 PROCEDURE — 82728 ASSAY OF FERRITIN: CPT | Performed by: INTERNAL MEDICINE

## 2023-11-06 PROCEDURE — 99291 CRITICAL CARE FIRST HOUR: CPT

## 2023-11-06 PROCEDURE — C9113 INJ PANTOPRAZOLE SODIUM, VIA: HCPCS | Performed by: PHYSICIAN ASSISTANT

## 2023-11-06 PROCEDURE — 86318 IA INFECTIOUS AGENT ANTIBODY: CPT

## 2023-11-06 PROCEDURE — 25000003 PHARM REV CODE 250: Performed by: PHYSICIAN ASSISTANT

## 2023-11-06 PROCEDURE — 93010 EKG 12-LEAD: ICD-10-PCS | Mod: ,,, | Performed by: STUDENT IN AN ORGANIZED HEALTH CARE EDUCATION/TRAINING PROGRAM

## 2023-11-06 PROCEDURE — 96374 THER/PROPH/DIAG INJ IV PUSH: CPT

## 2023-11-06 PROCEDURE — 63600175 PHARM REV CODE 636 W HCPCS: Performed by: PHYSICIAN ASSISTANT

## 2023-11-06 PROCEDURE — 93005 ELECTROCARDIOGRAM TRACING: CPT

## 2023-11-06 PROCEDURE — 11000001 HC ACUTE MED/SURG PRIVATE ROOM

## 2023-11-06 PROCEDURE — 85652 RBC SED RATE AUTOMATED: CPT | Performed by: NURSE PRACTITIONER

## 2023-11-06 PROCEDURE — 85027 COMPLETE CBC AUTOMATED: CPT | Performed by: EMERGENCY MEDICINE

## 2023-11-06 PROCEDURE — 87040 BLOOD CULTURE FOR BACTERIA: CPT | Performed by: EMERGENCY MEDICINE

## 2023-11-06 PROCEDURE — 96376 TX/PRO/DX INJ SAME DRUG ADON: CPT

## 2023-11-06 PROCEDURE — 80053 COMPREHEN METABOLIC PANEL: CPT | Performed by: EMERGENCY MEDICINE

## 2023-11-06 PROCEDURE — 63600175 PHARM REV CODE 636 W HCPCS: Performed by: INTERNAL MEDICINE

## 2023-11-06 PROCEDURE — 86850 RBC ANTIBODY SCREEN: CPT | Performed by: EMERGENCY MEDICINE

## 2023-11-06 PROCEDURE — 25500020 PHARM REV CODE 255: Performed by: INTERNAL MEDICINE

## 2023-11-06 PROCEDURE — 82272 OCCULT BLD FECES 1-3 TESTS: CPT

## 2023-11-06 PROCEDURE — 27000207 HC ISOLATION

## 2023-11-06 PROCEDURE — P9016 RBC LEUKOCYTES REDUCED: HCPCS | Performed by: INTERNAL MEDICINE

## 2023-11-06 PROCEDURE — 36430 TRANSFUSION BLD/BLD COMPNT: CPT

## 2023-11-06 PROCEDURE — 93010 ELECTROCARDIOGRAM REPORT: CPT | Mod: ,,, | Performed by: STUDENT IN AN ORGANIZED HEALTH CARE EDUCATION/TRAINING PROGRAM

## 2023-11-06 PROCEDURE — 83605 ASSAY OF LACTIC ACID: CPT | Performed by: EMERGENCY MEDICINE

## 2023-11-06 RX ORDER — DICYCLOMINE HYDROCHLORIDE 10 MG/ML
20 INJECTION INTRAMUSCULAR 4 TIMES DAILY PRN
Status: DISCONTINUED | OUTPATIENT
Start: 2023-11-06 | End: 2023-11-30 | Stop reason: HOSPADM

## 2023-11-06 RX ORDER — IBUPROFEN 200 MG
16 TABLET ORAL
Status: DISCONTINUED | OUTPATIENT
Start: 2023-11-06 | End: 2023-11-30 | Stop reason: HOSPADM

## 2023-11-06 RX ORDER — SODIUM CHLORIDE 0.9 % (FLUSH) 0.9 %
10 SYRINGE (ML) INJECTION EVERY 12 HOURS PRN
Status: DISCONTINUED | OUTPATIENT
Start: 2023-11-06 | End: 2023-11-30 | Stop reason: HOSPADM

## 2023-11-06 RX ORDER — HYDROCORTISONE ACETATE 25 MG/1
25 SUPPOSITORY RECTAL 2 TIMES DAILY
Status: DISCONTINUED | OUTPATIENT
Start: 2023-11-06 | End: 2023-11-08

## 2023-11-06 RX ORDER — HYDROMORPHONE HYDROCHLORIDE 2 MG/ML
1 INJECTION, SOLUTION INTRAMUSCULAR; INTRAVENOUS; SUBCUTANEOUS EVERY 4 HOURS PRN
Status: DISCONTINUED | OUTPATIENT
Start: 2023-11-06 | End: 2023-11-09

## 2023-11-06 RX ORDER — ACETAMINOPHEN 325 MG/1
650 TABLET ORAL EVERY 8 HOURS PRN
Status: DISCONTINUED | OUTPATIENT
Start: 2023-11-06 | End: 2023-11-30 | Stop reason: HOSPADM

## 2023-11-06 RX ORDER — PANTOPRAZOLE SODIUM 40 MG/10ML
40 INJECTION, POWDER, LYOPHILIZED, FOR SOLUTION INTRAVENOUS 2 TIMES DAILY
Status: DISCONTINUED | OUTPATIENT
Start: 2023-11-06 | End: 2023-11-06

## 2023-11-06 RX ORDER — SODIUM CHLORIDE 9 MG/ML
INJECTION, SOLUTION INTRAVENOUS CONTINUOUS
Status: ACTIVE | OUTPATIENT
Start: 2023-11-06 | End: 2023-11-07

## 2023-11-06 RX ORDER — HYDROMORPHONE HYDROCHLORIDE 2 MG/ML
0.5 INJECTION, SOLUTION INTRAMUSCULAR; INTRAVENOUS; SUBCUTANEOUS
Status: COMPLETED | OUTPATIENT
Start: 2023-11-06 | End: 2023-11-06

## 2023-11-06 RX ORDER — ONDANSETRON 2 MG/ML
4 INJECTION INTRAMUSCULAR; INTRAVENOUS
Status: COMPLETED | OUTPATIENT
Start: 2023-11-06 | End: 2023-11-06

## 2023-11-06 RX ORDER — ONDANSETRON 2 MG/ML
4 INJECTION INTRAMUSCULAR; INTRAVENOUS EVERY 4 HOURS PRN
Status: DISCONTINUED | OUTPATIENT
Start: 2023-11-06 | End: 2023-11-30 | Stop reason: HOSPADM

## 2023-11-06 RX ORDER — GLUCAGON 1 MG
1 KIT INJECTION
Status: DISCONTINUED | OUTPATIENT
Start: 2023-11-06 | End: 2023-11-30 | Stop reason: HOSPADM

## 2023-11-06 RX ORDER — ACETAMINOPHEN 325 MG/1
650 TABLET ORAL EVERY 4 HOURS PRN
Status: DISCONTINUED | OUTPATIENT
Start: 2023-11-06 | End: 2023-11-30 | Stop reason: HOSPADM

## 2023-11-06 RX ORDER — HYDROCODONE BITARTRATE AND ACETAMINOPHEN 500; 5 MG/1; MG/1
TABLET ORAL
Status: CANCELLED | OUTPATIENT
Start: 2023-11-06

## 2023-11-06 RX ORDER — HYDROCODONE BITARTRATE AND ACETAMINOPHEN 500; 5 MG/1; MG/1
TABLET ORAL
Status: DISCONTINUED | OUTPATIENT
Start: 2023-11-06 | End: 2023-11-12

## 2023-11-06 RX ORDER — IBUPROFEN 200 MG
24 TABLET ORAL
Status: DISCONTINUED | OUTPATIENT
Start: 2023-11-06 | End: 2023-11-30 | Stop reason: HOSPADM

## 2023-11-06 RX ADMIN — METHYLPREDNISOLONE SODIUM SUCCINATE 30 MG: 40 INJECTION, POWDER, FOR SOLUTION INTRAMUSCULAR; INTRAVENOUS at 11:11

## 2023-11-06 RX ADMIN — ONDANSETRON 4 MG: 2 INJECTION INTRAMUSCULAR; INTRAVENOUS at 04:11

## 2023-11-06 RX ADMIN — HYDROCORTISONE SODIUM SUCCINATE 100 MG: 100 INJECTION, POWDER, FOR SOLUTION INTRAMUSCULAR; INTRAVENOUS at 04:11

## 2023-11-06 RX ADMIN — SODIUM CHLORIDE, POTASSIUM CHLORIDE, SODIUM LACTATE AND CALCIUM CHLORIDE 1000 ML: 600; 310; 30; 20 INJECTION, SOLUTION INTRAVENOUS at 04:11

## 2023-11-06 RX ADMIN — HYDROMORPHONE HYDROCHLORIDE 0.5 MG: 2 INJECTION INTRAMUSCULAR; INTRAVENOUS; SUBCUTANEOUS at 04:11

## 2023-11-06 RX ADMIN — HYDROCORTISONE ACETATE 25 MG: 25 SUPPOSITORY RECTAL at 09:11

## 2023-11-06 RX ADMIN — HYDROMORPHONE HYDROCHLORIDE 1 MG: 2 INJECTION INTRAMUSCULAR; INTRAVENOUS; SUBCUTANEOUS at 09:11

## 2023-11-06 RX ADMIN — HYDROCORTISONE ACETATE 25 MG: 25 SUPPOSITORY RECTAL at 02:11

## 2023-11-06 RX ADMIN — IOPAMIDOL 100 ML: 755 INJECTION, SOLUTION INTRAVENOUS at 11:11

## 2023-11-06 RX ADMIN — PANTOPRAZOLE SODIUM 40 MG: 40 INJECTION, POWDER, FOR SOLUTION INTRAVENOUS at 08:11

## 2023-11-06 RX ADMIN — SODIUM CHLORIDE: 9 INJECTION, SOLUTION INTRAVENOUS at 08:11

## 2023-11-06 RX ADMIN — HYDROMORPHONE HYDROCHLORIDE 1 MG: 2 INJECTION INTRAMUSCULAR; INTRAVENOUS; SUBCUTANEOUS at 11:11

## 2023-11-06 NOTE — ED PROVIDER NOTES
Encounter Date: 11/6/2023    SCRIBE #1 NOTE: I, Florencio Roberson, am scribing for, and in the presence of,  Jermaine Abebe III, MD. I have scribed the following portions of the note - Other sections scribed: HPI,ROS,PE.       History     Chief Complaint   Patient presents with    Rectal Bleeding     Pt c/o GI bleeding and rectal pain since he was released  from the hospital on 10/31/23. PMH of UC. Pt states had 2 syncopal episodes today at home and hit his head once on the floor. Denies blood thinners.     41 y/o male with Pmhx of ulcerative colitis and JUAN PABLO presents to ED c/o rectal pain onset 7x days. Pt reports going to the hospital 10x days ago and was diagnosed with ulcerative colitis. He reports being discharged 7x days ago. He reports symptoms of rectal bleeding with bright red blood since onset, abdominal pain since onset, hot flashes when standing up, and sore throat for 2x weeks. He denies chest pain, SOB, headache, neck pain, or fevers. He reports having 2x syncopal episodes on 11/5 when he got up to go use the restroom. He reports having an appointment with gastrology on 11/17. He reports having a colonoscopy done when he was admitted to the hospital. He denies any drug use.     The history is provided by the patient. No  was used.     Review of patient's allergies indicates:  No Known Allergies  No past medical history on file.  Past Surgical History:   Procedure Laterality Date    COLONOSCOPY, WITH 1 OR MORE BIOPSIES N/A 10/23/2023    Procedure: COLON;  Surgeon: Dragan Underwood MD;  Location: [facility] ENDOSCOPY;  Service: Gastroenterology;  Laterality: N/A;     No family history on file.     Review of Systems   Constitutional:  Negative for chills and fever.        Hot flashes after standing    HENT:  Positive for sore throat.    Respiratory:  Negative for cough, chest tightness and shortness of breath.    Cardiovascular:  Negative for chest pain.   Gastrointestinal:   Positive for abdominal pain, anal bleeding and rectal pain. Negative for nausea and vomiting.   Musculoskeletal:  Negative for myalgias and neck pain.   Neurological:  Positive for syncope. Negative for headaches.   All other systems reviewed and are negative.      Physical Exam     Initial Vitals [11/06/23 0328]   BP Pulse Resp Temp SpO2   (!) 96/51 109 17 98.3 °F (36.8 °C) 99 %      MAP       --         Physical Exam    Nursing note and vitals reviewed.  Constitutional: No distress.   HENT:   Head: Normocephalic and atraumatic.   Mouth/Throat: Mucous membranes are dry.   Neck: Trachea normal.   Cardiovascular:  Regular rhythm.   Tachycardia present.         No murmur heard.  Pulmonary/Chest: Breath sounds normal. No respiratory distress.   Abdominal: Abdomen is soft. Bowel sounds are normal. He exhibits no distension. There is abdominal tenderness (global abdominal tenderness). There is no rebound and no guarding.   Musculoskeletal:         General: Normal range of motion.      Lumbar back: Normal.     Neurological: He is alert and oriented to person, place, and time. He has normal strength. No cranial nerve deficit.   Skin: Skin is warm and dry. No rash noted.   Psychiatric: He has a normal mood and affect. Judgment normal.         ED Course   Critical Care    Date/Time: 11/6/2023 5:54 AM    Performed by: Jermaine Abebe III, MD  Authorized by: Jermaine Abebe III, MD  Direct patient critical care time: 35 minutes  Documentation critical care time: 5 minutes  Consulting other physicians critical care time: 5 minutes  Total critical care time (exclusive of procedural time) : 45 minutes  Critical care was necessary to treat or prevent imminent or life-threatening deterioration of the following conditions: metabolic crisis.  Critical care was time spent personally by me on the following activities: discussions with primary provider, discussions with consultants, examination of patient, re-evaluation of patient's  condition, review of old charts, ordering and review of laboratory studies and ordering and performing treatments and interventions.        Labs Reviewed   COMPREHENSIVE METABOLIC PANEL - Abnormal; Notable for the following components:       Result Value    Sodium Level 134 (*)     Glucose Level 130 (*)     Blood Urea Nitrogen 21.9 (*)     Calcium Level Total 7.7 (*)     Protein Total 5.0 (*)     Albumin Level 2.0 (*)     Albumin/Globulin Ratio 0.7 (*)     All other components within normal limits   CBC WITH DIFFERENTIAL - Abnormal; Notable for the following components:    RBC 2.80 (*)     Hgb 7.3 (*)     Hct 23.6 (*)     MCH 26.1 (*)     MCHC 30.9 (*)     All other components within normal limits   LACTIC ACID, PLASMA - Normal   BLOOD CULTURE [facility]   BLOOD CULTURE [facility]   CBC W/ AUTO DIFFERENTIAL    Narrative:     The following orders were created for panel order CBC auto differential.  Procedure                               Abnormality         Status                     ---------                               -----------         ------                     CBC with Differential[[id number]]       Abnormal            Final result               Manual Differential[[id number]]                             In process                   Please view results for these tests on the individual orders.   MANUAL DIFFERENTIAL   SEDIMENTATION RATE   C-REACTIVE PROTEIN   TYPE & SCREEN   ABORH RETYPE     EKG Readings: (Independently Interpreted)   Initial Reading: No STEMI. Rhythm: Sinus Tachycardia. Heart Rate: 108. Ectopy: No Ectopy. Conduction: Normal. ST Segments: Normal ST Segments. T Waves: Normal. Axis: Normal.   Taken at 0323       Imaging Results    None          Medications   HYDROmorphone (PF) injection 0.5 mg (0.5 mg Intravenous Given 11/6/23 8952)   ondansetron injection 4 mg (4 mg Intravenous Given 11/6/23 0423)   lactated ringers bolus 1,000 mL (0 mLs Intravenous Stopped 11/6/23 0500)   hydrocortisone sodium succinate  injection 100 mg (100 mg Intravenous Given 11/6/23 0423)   lactated ringers bolus 1,000 mL (1,000 mLs Intravenous New Bag 11/6/23 1095)             Scribe Attestation:   Scribe #1: I performed the above scribed service and the documentation accurately describes the services I performed. I attest to the accuracy of the note.    Attending Attestation:           Physician Attestation for Scribe:  Physician Attestation Statement for Scribe #1: I, Jermaine Abebe III, MD, reviewed documentation, as scribed by Florencio Roberson in my presence, and it is both accurate and complete.         Medical Decision Making  The differential diagnosis includes, but is not limited to, anemia, ulcerative colitis, colitis, diverticulitis, and dehydration.    Patient maintained stable blood pressure outside of attempting to do orthostatics whenever patient stood up he felt weak tired and had a hypotensive event.  Patient was given IV fluids was also given hydrocortisone secondary to patient being on recent prednisone patient decrease in H&H will admit and transfuse 2 units normal lactic acid discussed case with Hospital Medicine    Problems Addressed:  Rectal bleeding: acute illness or injury  Ulcerative colitis with complication, unspecified location: complicated acute illness or injury that poses a threat to life or bodily functions    Amount and/or Complexity of Data Reviewed  Labs: ordered. Decision-making details documented in ED Course.  ECG/medicine tests: ordered and independent interpretation performed. Decision-making details documented in ED Course.    Risk  Prescription drug management.  Decision regarding hospitalization.                            Clinical Impression:   Final diagnoses:  [R55] Syncope  [K62.5] Rectal bleeding (Primary)  [K92.2] Lower GI bleed  [K51.919] Ulcerative colitis with complication, unspecified location        ED Disposition Condition    Admit Stable                Jermaine Abebe III, MD  11/06/23  4717

## 2023-11-06 NOTE — H&P
[facility] Hospital Medicine History & Physical Examination       Patient Name: Lionel León  MRN: [id number]  Patient Class: IP- Inpatient   Admission Date: 11/6/2023   Admitting Physician: Melissa Carr MD   Length of Stay: 0  Attending Physician: Abdoulaye Garcia MD   Primary Care Provider: Sherry Aranda  Face-to-Face encounter date: 11/06/2023  Code Status: Full Code   Chief Complaint: Rectal Bleeding (Pt c/o GI bleeding and rectal pain since he was released  from the hospital on 10/31/23. PMH of UC. Pt states had 2 syncopal episodes today at home and hit his head once on the floor. Denies blood thinners.)        Patient information was obtained from patient, patient's family, past medical records and ER records.     HISTORY OF PRESENT ILLNESS:   Lionel León is a 42 y.o. White male with a past medical history of diverticulitis status post colon resection and ulcerative colitis. Patient had recent admission to hospital medicine services at [facility] on 10/21/2023 to 10/24/2023 for sepsis secondary to pancolitis. He was treated with antibiotics and GI was consulted. Colonoscopy was performed on 10/23/2023 with findings concerning for ulcerative colitis and biopsies of the cecum, ascending, transverse, ascending colon and rectum positive for active chronic colitis consistent with inflammatory bowel disorder. No transfusion was required for rectal bleeding. Patient was discharged with prednisone taper and GI follow up appointment on 11/17/2023. The patient presented to [facility] on 11/6/2023 with a primary complaint of bright red rectal bleeding and lower abdominal pain.  Patient reports rectal bleeding has been ongoing since discharge.  Other associated symptoms include nausea, rectal pain, subjective fevers, weakness, fatigue and a 30 40 lb weight loss over last 3 weeks.  Patient states this morning he was walking to the bathroom when he felt flushed and vision went black. He passed out and  hit his head on the wooden floor. Approximately hour and a half later when he went to get up he passed out again.  Second syncopal episode was witnessed by patient's wife who is at bedside reports loss of consciousness lasts for approximately 30 seconds.    Upon presentation to the ED, temperature 98.3F, heart rate 109, blood pressure 96/51, respiratory rate 17, spO2 99%. Labs with H&H 7.3/23.6 (10.6/33.2 on 10/24/2023), BUN 21.9, creatinine 0.88, calcium 7.7, .6, ESR 86. EKG sinus tachycardia with a ventricular rate of 108 and age undetermined possible inferior infarct. In the ED patient received Dilaudid, Zofran, Hydrocortisone and IVF. He was typed and crossmatched for transfusion of 2 units of packed RBC. Patient is admitted to hospital medicine services for further medical management.     PAST MEDICAL HISTORY:   Ulcerative colitis  Diverticulitis status post colon resection     PAST SURGICAL HISTORY:     Past Surgical History:   Procedure Laterality Date    COLONOSCOPY, WITH 1 OR MORE BIOPSIES N/A 10/23/2023    Procedure: COLON;  Surgeon: Dragan Underwood MD;  Location: [facility] ENDOSCOPY;  Service: Gastroenterology;  Laterality: N/A;   Colon resection     ALLERGIES:   Patient has no known allergies.    FAMILY HISTORY:   Father:  Diabetes mellitus    SOCIAL HISTORY:   Occasional alcohol use  Denies illicit drug and tobacco use    HOME MEDICATIONS:     Prior to Admission medications    Medication Sig Start Date End Date Taking? Authorizing Provider   dicyclomine (BENTYL) 20 mg tablet Take 1 tablet (20 mg total) by mouth 4 (four) times daily as needed (abd pain/cramp). 10/24/23 11/23/23  Sayda Harris MD   predniSONE (DELTASONE) 10 MG tablet Take 4 tablets (40 mg total) by mouth once daily for 7 days, THEN 3 tablets (30 mg total) once daily for 7 days, THEN 2 tablets (20 mg total) once daily for 7 days, THEN 1 tablet (10 mg total) once daily for 7 days, THEN 0.5 tablets (5 mg total) once daily  for 7 days. 10/24/23 11/28/23  Sayda Harris MD       REVIEW OF SYSTEMS:   Except as documented, all other systems reviewed and negative     PHYSICAL EXAM:     VITAL SIGNS: 24 HRS MIN & MAX LAST   Temp  Min: 98.2 °F (36.8 °C)  Max: 98.3 °F (36.8 °C) 98.2 °F (36.8 °C)   BP  Min: 87/54  Max: 118/60 108/60   Pulse  Min: 78  Max: 109  78   Resp  Min: 14  Max: 18 18   SpO2  Min: 95 %  Max: 99 % 97 %       General appearance: Well-developed, well-nourished male in no apparent distress. Wife at bedside.  HEENT: Atraumatic head. Moist mucous membranes of oral cavity.  Lungs: Clear to auscultation bilaterally.   Heart: Regular rate and rhythm.   Abdomen: Soft, non-distended, generalized tenderness, lower quadrants greater than upper. Bowel sounds are hypoactive.   Extremities: No cyanosis, clubbing. No deformities.  Skin: No Rash. Warm and dry.  Neuro: Awake, alert and oriented. Motor and sensory exams grossly intact.  Psych/mental status: Appropriate mood and affect. Cooperative. Responds appropriately to questions.       LABS AND IMAGING:     Recent Labs   Lab 11/06/23 0405   WBC 6.72   RBC 2.80*   HGB 7.3*   HCT 23.6*   MCV 84.3   MCH 26.1*   MCHC 30.9*   RDW 15.4      MPV 9.2       Recent Labs   Lab 11/06/23 0405   *   K 3.7   CO2 24   BUN 21.9*   CREATININE 0.88   CALCIUM 7.7*   ALBUMIN 2.0*   ALKPHOS 43   ALT 20   AST 9   BILITOT 0.7       Microbiology Results (last 7 days)       Procedure Component Value Units Date/Time    Blood culture #1 **CANNOT BE ORDERED STAT** [[id number]] Collected: 11/06/23 0405    Order Status: Resulted Specimen: Blood from Antecubital, Right Updated: 11/06/23 0406    Blood culture #2 **CANNOT BE ORDERED STAT** [[id number]] Collected: 11/06/23 0405    Order Status: Resulted Specimen: Blood from Antecubital, Left Updated: 11/06/23 0406             CT Abdomen Pelvis With Contrast  Narrative: EXAMINATION:  CT ABDOMEN PELVIS WITH CONTRAST    CLINICAL HISTORY:  Abdominal pain,  acute, nonlocalized;    TECHNIQUE:  Helically acquired images with axial, sagittal and coronal reformations were obtained from the lung bases to the pubic symphysis after the IV administration of contrast.    Automated tube current modulation, weight-based exposure dosing, and/or iterative reconstruction technique utilized to reach lowest reasonably achievable exposure rate.    DLP: 518 mGy*cm    COMPARISON:  No relevant prior available for comparison at the time of dictation.    FINDINGS:  HEART: Normal in size. No pericardial effusion.    LUNG BASES: Mild basilar atelectasis.    LIVER: Normal attenuation. No appreciable focal hepatic lesion.    BILIARY: No calcified gallstones.    PANCREAS: No inflammatory change.    SPLEEN: Normal in size    ADRENALS: No mass.    KIDNEYS/URETERS: The kidneys enhance symmetrically.  No hydronephrosis.    GI TRACT/MESENTERY:  Small bowel is normal in caliber without evidence of obstruction.  There is wall thickening and inflammatory fat stranding at the colon most pronounced at the descending colon.  There is a sigmoid anastomosis which is mildly gas distended without appreciable stricture.  The appendix is normal.    PERITONEUM: No free fluid.No free air.    LYMPH NODES: No enlarged lymph nodes by size criteria.    VASCULATURE: No significant atherosclerosis or aneurysm.    BLADDER: Normal appearance given degree of distention.    REPRODUCTIVE ORGANS: Normal as visualized.    SOFT TISSUES: Unremarkable.    BONES: Degenerative change at the lower lumbar spine.  Impression: 1. Nonspecific colitis  2. The preliminary and final reports are concordant.    Electronically signed by: Fernanda Robert  Date:    10/21/2023  Time:    12:21        ASSESSMENT & PLAN:   Assessment:  Acute blood loss anemia secondary to GI bleed due to acute ulcerative colitis flare  Syncope due to acute blood loss anemia    Plan:  - GI consulted. Appreciated recommendation   - Continue with transfusion of 2  units of packed RBCs  - NPO. IVF hydration   - IV Protonix BID  - IM Bentyl and IV Dilaudid  - CT head and echo ordered for syncope. Follow results  - Resume appropriate home medications when deemed necessary   - Labs in AM      VTE Prophylaxis: will be placed on SCD for DVT prophylaxis and will be advised to be as mobile as possible and sit in a chair as tolerated      __________________________________________________________________________  INPATIENT LIST OF MEDICATIONS     Current Facility-Administered Medications:     0.9%  NaCl infusion (for blood administration), , Intravenous, Q24H PRN, Abdoulaye Garcia MD    0.9%  NaCl infusion, , Intravenous, Continuous, Karie Lynch, PA-WENDY    acetaminophen tablet 650 mg, 650 mg, Oral, Q8H PRN, Karie Lynch, PA-C    acetaminophen tablet 650 mg, 650 mg, Oral, Q4H PRN, Karie Lynch, PA-WENDY    dextrose 10% bolus 125 mL 125 mL, 12.5 g, Intravenous, PRN, Karie Lynch, PA-C    dextrose 10% bolus 250 mL 250 mL, 25 g, Intravenous, PRN, Karie Lynch, PA-C    glucagon (human recombinant) injection 1 mg, 1 mg, Intramuscular, PRN, Karie Lynch, PA-WENDY    glucose chewable tablet 16 g, 16 g, Oral, PRNSyed Kallie E., PA-C    glucose chewable tablet 24 g, 24 g, Oral, PRN, Karie Lynch, PA-C    ondansetron injection 4 mg, 4 mg, Intravenous, Q4H PRN, Karie Lynch, PA-WENDY    pantoprazole injection 40 mg, 40 mg, Intravenous, BID, Karie Lynch, PA-C    sodium chloride 0.9% flush 10 mL, 10 mL, Intravenous, Q12H PRN, Karie Lynch, PA-C    Current Outpatient Medications:     dicyclomine (BENTYL) 20 mg tablet, Take 1 tablet (20 mg total) by mouth 4 (four) times daily as needed (abd pain/cramp)., Disp: 30 tablet, Rfl: 1    predniSONE (DELTASONE) 10 MG tablet, Take 4 tablets (40 mg total) by mouth once daily for 7 days, THEN 3 tablets (30 mg total) once daily for 7 days, THEN 2 tablets (20 mg total) once daily for 7 days, THEN 1 tablet (10 mg total) once  daily for 7 days, THEN 0.5 tablets (5 mg total) once daily for 7 days., Disp: 74 tablet, Rfl: 0      Scheduled Meds:   pantoprazole  40 mg Intravenous BID     Continuous Infusions:   sodium chloride 0.9%       PRN Meds:.0.9%  NaCl infusion (for blood administration), acetaminophen, acetaminophen, dextrose 10%, dextrose 10%, glucagon (human recombinant), glucose, glucose, ondansetron, sodium chloride 0.9%      Discharge Planning and Disposition: Anticipated discharge to be determined.    IKarie PA, have reviewed and discussed the case with Dr. Abdoulaye Garcia.    Please see the following addendum for further assessment and plan from there attending MD.    Karie Lynch PA-C  11/06/2023

## 2023-11-06 NOTE — CONSULTS
Gastroenterology Consultation Note    Reason for Consult:  UC Flare     PCP:   Manoj, Provider      History of Present Illness (HPI):  42-year-old male known to Dr. He with past medical history of diverticulitis s/p colon resection and recent new diagnosis of ulcerative colitis.  He presented to the ED with complaints of bright red blood per rectum and lower abdominal pain associated with nausea, rectal pain, weakness and 30-40 lb weight loss over the past 3 weeks with syncopal episode x2- 2nd episode with LOC.    Patient was seen by our group on recent admit 10/21/2023 for sepsis secondary to pancolitis.  Colonoscopy 10/23/2023 with findings c/w IBD.  He was discharged on prednisone taper with outpatient follow up appointment scheduled 11/17/2023.    On arrival, patient hypotensive but otherwise stable with labs notable for H&H 7.3/23.6, sed rate 86, .60.  Patient has received Dilaudid, Zofran, hydrocortisone and IV fluids with plans to receive 2 units PRBC.  CT head and echo pending for syncope.     Following recent discharge, patient was sent home on prednisone taper with last dose to have been taken today.  On prednisone, patient reports that abdominal pain and frequency of bowel movements did improve however hematochezia persisted and never went away. Denies N/V.   He was initially having up to a dozen bowel movements a day, now having a 4-5 associated with nocturnal symptoms.  He reports lower abdominal pain and severe rectal pain and pressure.  Rectal pain has worsened since being discharged.  He denies fever/chills.    ROS:  Review of Systems   Constitutional:  Positive for weight loss. Negative for chills and fever.   HENT:  Negative for sore throat.    Respiratory:  Negative for shortness of breath, wheezing and stridor.    Gastrointestinal:  Positive for abdominal pain, blood in stool and diarrhea. Negative for heartburn, melena, nausea and vomiting.   Skin:  Negative for itching and  "rash.   Neurological:  Positive for loss of consciousness and weakness. Negative for seizures.   Psychiatric/Behavioral:  The patient is not nervous/anxious.        Medical History:   No past medical history on file.    Surgical History:   Past Surgical History:   Procedure Laterality Date    COLONOSCOPY, WITH 1 OR MORE BIOPSIES N/A 10/23/2023    Procedure: COLON;  Surgeon: Dragan Underwood MD;  Location: [facility] ENDOSCOPY;  Service: Gastroenterology;  Laterality: N/A;       Family History:   No family history on file..     Social History:   Social History     Tobacco Use    Smoking status: Not on file    Smokeless tobacco: Not on file   Substance Use Topics    Alcohol use: Not on file       Allergies:  Review of patient's allergies indicates:  No Known Allergies    (Not in a hospital admission)        Objective Findings:    Vital Signs:  /60   Pulse 70   Temp 98.1 °F (36.7 °C)   Resp 16   Ht 5' 10" (1.778 m)   Wt 74.8 kg (165 lb)   SpO2 97%   BMI 23.68 kg/m²   Body mass index is 23.68 kg/m².    Physical Exam:  Physical Exam  Constitutional:       General: He is not in acute distress.  HENT:      Head: Normocephalic and atraumatic.      Nose: Nose normal.   Eyes:      General: No scleral icterus.  Cardiovascular:      Pulses: Normal pulses.   Pulmonary:      Effort: Pulmonary effort is normal. No respiratory distress.      Breath sounds: Normal breath sounds. No stridor.   Abdominal:      General: Bowel sounds are normal. There is no distension.      Tenderness: There is abdominal tenderness (mild to lower abdomen).   Genitourinary:     Comments: Limited RITIKA secondary to pain. Palpable inflamed, flesh-colored lump on external inspection @12 o'clock position. Mild tender to palpation.   Neurological:      Mental Status: He is alert.         Labs:  Recent Results (from the past 24 hour(s))   ABORH RETYPE    Collection Time: 11/06/23  4:00 AM   Result Value Ref Range    ABORH Retype O POS  "   Comprehensive metabolic panel    Collection Time: 11/06/23  4:05 AM   Result Value Ref Range    Sodium Level 134 (L) 136 - 145 mmol/L    Potassium Level 3.7 3.5 - 5.1 mmol/L    Chloride 98 98 - 107 mmol/L    Carbon Dioxide 24 22 - 29 mmol/L    Glucose Level 130 (H) 74 - 100 mg/dL    Blood Urea Nitrogen 21.9 (H) 8.9 - 20.6 mg/dL    Creatinine 0.88 0.73 - 1.18 mg/dL    Calcium Level Total 7.7 (L) 8.4 - 10.2 mg/dL    Protein Total 5.0 (L) 6.4 - 8.3 gm/dL    Albumin Level 2.0 (L) 3.5 - 5.0 g/dL    Globulin 3.0 2.4 - 3.5 gm/dL    Albumin/Globulin Ratio 0.7 (L) 1.1 - 2.0 ratio    Bilirubin Total 0.7 <=1.5 mg/dL    Alkaline Phosphatase 43 40 - 150 unit/L    Alanine Aminotransferase 20 0 - 55 unit/L    Aspartate Aminotransferase 9 5 - 34 unit/L    eGFR >60 mls/min/1.73/m2   CBC with Differential    Collection Time: 11/06/23  4:05 AM   Result Value Ref Range    WBC 6.72 4.50 - 11.50 x10(3)/mcL    RBC 2.80 (L) 4.70 - 6.10 x10(6)/mcL    Hgb 7.3 (L) 14.0 - 18.0 g/dL    Hct 23.6 (L) 42.0 - 52.0 %    MCV 84.3 80.0 - 94.0 fL    MCH 26.1 (L) 27.0 - 31.0 pg    MCHC 30.9 (L) 33.0 - 36.0 g/dL    RDW 15.4 11.5 - 17.0 %    Platelet 358 130 - 400 x10(3)/mcL    MPV 9.2 7.4 - 10.4 fL    NRBC% 0.4 %   Lactic acid, plasma    Collection Time: 11/06/23  4:05 AM   Result Value Ref Range    Lactic Acid Level 1.3 0.5 - 2.2 mmol/L   Manual Differential    Collection Time: 11/06/23  4:05 AM   Result Value Ref Range    WBC 6.7 x10(3)/mcL    Neutrophils % 86 %    Lymphs % 7 %    Monocytes % 7 %    nRBC % 2 %    Neutrophils Abs 5.762 2.1 - 9.2 x10(3)/mcL    Lymphs Abs 0.469 (L) 0.6 - 4.6 x10(3)/mcL    Monocytes Abs 0.469 0.1 - 1.3 x10(3)/mcL    Platelets Normal Normal, Adequate    RBC Morph Normal Normal   C-Reactive Protein    Collection Time: 11/06/23  4:05 AM   Result Value Ref Range    C-Reactive Protein 192.60 (H) <5.00 mg/L   Type & Screen    Collection Time: 11/06/23  4:21 AM   Result Value Ref Range    Group & Rh O POS     Indirect Arthur  GEL NEG     Specimen Outdate 11/09/2023 23:59    Prepare RBC 2 Units; bleed    Collection Time: 11/06/23  4:21 AM   Result Value Ref Range    UNIT NUMBER [id number]     UNIT ABO/RH O POS     DISPENSE STATUS Issued     Unit Expiration [id number]     Product Code [id number]     Unit Blood Type Code 5100     CROSSMATCH INTERPRETATION Compatible     UNIT NUMBER [id number]     UNIT ABO/RH O POS     DISPENSE STATUS Selected     Unit Expiration [id number]     Product Code [id number]     Unit Blood Type Code 5100     CROSSMATCH INTERPRETATION Compatible    Sedimentation rate    Collection Time: 11/06/23  6:00 AM   Result Value Ref Range    Sed Rate 86 (H) 0 - 15 mm/hr       No orders to display       Assessment/Plan:  42-year-old male known to Dr. He with past medical history of diverticulitis s/p colon resection and recent new diagnosis of ulcerative colitis.  He presented to the ED with complaints of bright red blood per rectum and lower abdominal pain associated with nausea, rectal pain, weakness and 30-40 lb weight loss over the past 3 weeks with syncopal episode x2- 2nd episode with LOC.    Newly diagnosed UC, active flare  - will check stool for cdiff  -  will start IV Solumedrol, 30mg BID and monitor for improvement   - CT abd/pelvis to further assess    - upcoming outpatient appt scheduled for 11/17   - [facility] care      Thank you for allowing us to participate in the care of Lionel León.    Leslie He PA-C  Gastroenterology  [facility]

## 2023-11-07 LAB
ABS NEUT (OLG): 4.86 X10(3)/MCL (ref 2.1–9.2)
ALBUMIN SERPL-MCNC: 1.7 G/DL (ref 3.5–5)
ALBUMIN/GLOB SERPL: 0.6 RATIO (ref 1.1–2)
ALP SERPL-CCNC: 39 UNIT/L (ref 40–150)
ALT SERPL-CCNC: 16 UNIT/L (ref 0–55)
AST SERPL-CCNC: 8 UNIT/L (ref 5–34)
BILIRUB SERPL-MCNC: 0.5 MG/DL
BUN SERPL-MCNC: 17.3 MG/DL (ref 8.9–20.6)
CALCIUM SERPL-MCNC: 7.4 MG/DL (ref 8.4–10.2)
CHLORIDE SERPL-SCNC: 100 MMOL/L (ref 98–107)
CO2 SERPL-SCNC: 26 MMOL/L (ref 22–29)
CREAT SERPL-MCNC: 0.77 MG/DL (ref 0.73–1.18)
ERYTHROCYTE [DISTWIDTH] IN BLOOD BY AUTOMATED COUNT: 15.5 % (ref 11.5–17)
GFR SERPLBLD CREATININE-BSD FMLA CKD-EPI: >60 MLS/MIN/1.73/M2
GLOBULIN SER-MCNC: 2.8 GM/DL (ref 2.4–3.5)
GLUCOSE SERPL-MCNC: 161 MG/DL (ref 74–100)
HCT VFR BLD AUTO: 25.9 % (ref 42–52)
HGB BLD-MCNC: 8.2 G/DL (ref 14–18)
INSTRUMENT WBC (OLG): 5.4 X10(3)/MCL
LYMPHOCYTES NFR BLD MANUAL: 0.16 X10(3)/MCL
LYMPHOCYTES NFR BLD MANUAL: 3 %
MCH RBC QN AUTO: 27.1 PG (ref 27–31)
MCHC RBC AUTO-ENTMCNC: 31.7 G/DL (ref 33–36)
MCV RBC AUTO: 85.5 FL (ref 80–94)
MONOCYTES NFR BLD MANUAL: 0.38 X10(3)/MCL (ref 0.1–1.3)
MONOCYTES NFR BLD MANUAL: 7 %
NEUTROPHILS NFR BLD MANUAL: 90 %
NRBC BLD AUTO-RTO: 0.4 %
PLATELET # BLD AUTO: 277 X10(3)/MCL (ref 130–400)
PLATELET # BLD EST: NORMAL 10*3/UL
PMV BLD AUTO: 9.2 FL (ref 7.4–10.4)
POTASSIUM SERPL-SCNC: 4.4 MMOL/L (ref 3.5–5.1)
PROT SERPL-MCNC: 4.5 GM/DL (ref 6.4–8.3)
RBC # BLD AUTO: 3.03 X10(6)/MCL (ref 4.7–6.1)
RBC MORPH BLD: NORMAL
SODIUM SERPL-SCNC: 133 MMOL/L (ref 136–145)
WBC # SPEC AUTO: 5.52 X10(3)/MCL (ref 4.5–11.5)

## 2023-11-07 PROCEDURE — 80053 COMPREHEN METABOLIC PANEL: CPT | Performed by: PHYSICIAN ASSISTANT

## 2023-11-07 PROCEDURE — 25000003 PHARM REV CODE 250: Performed by: PHYSICIAN ASSISTANT

## 2023-11-07 PROCEDURE — 11000001 HC ACUTE MED/SURG PRIVATE ROOM

## 2023-11-07 PROCEDURE — 63600175 PHARM REV CODE 636 W HCPCS: Performed by: INTERNAL MEDICINE

## 2023-11-07 PROCEDURE — 63600175 PHARM REV CODE 636 W HCPCS: Performed by: PHYSICIAN ASSISTANT

## 2023-11-07 PROCEDURE — 25000003 PHARM REV CODE 250: Performed by: INTERNAL MEDICINE

## 2023-11-07 PROCEDURE — 85027 COMPLETE CBC AUTOMATED: CPT | Performed by: PHYSICIAN ASSISTANT

## 2023-11-07 RX ORDER — HYDROCORTISONE 25 MG/G
CREAM TOPICAL
Status: DISPENSED | OUTPATIENT
Start: 2023-11-07 | End: 2023-11-17

## 2023-11-07 RX ADMIN — HYDROCORTISONE ACETATE 25 MG: 25 SUPPOSITORY RECTAL at 08:11

## 2023-11-07 RX ADMIN — HYDROMORPHONE HYDROCHLORIDE 1 MG: 2 INJECTION INTRAMUSCULAR; INTRAVENOUS; SUBCUTANEOUS at 08:11

## 2023-11-07 RX ADMIN — METHYLPREDNISOLONE SODIUM SUCCINATE 30 MG: 40 INJECTION, POWDER, FOR SOLUTION INTRAMUSCULAR; INTRAVENOUS at 10:11

## 2023-11-07 RX ADMIN — HYDROMORPHONE HYDROCHLORIDE 1 MG: 2 INJECTION INTRAMUSCULAR; INTRAVENOUS; SUBCUTANEOUS at 04:11

## 2023-11-07 RX ADMIN — HYDROCORTISONE ACETATE 25 MG: 25 SUPPOSITORY RECTAL at 09:11

## 2023-11-07 RX ADMIN — HYDROMORPHONE HYDROCHLORIDE 1 MG: 2 INJECTION INTRAMUSCULAR; INTRAVENOUS; SUBCUTANEOUS at 12:11

## 2023-11-07 RX ADMIN — HYDROMORPHONE HYDROCHLORIDE 1 MG: 2 INJECTION INTRAMUSCULAR; INTRAVENOUS; SUBCUTANEOUS at 02:11

## 2023-11-07 RX ADMIN — SODIUM CHLORIDE: 9 INJECTION, SOLUTION INTRAVENOUS at 04:11

## 2023-11-07 RX ADMIN — HYDROMORPHONE HYDROCHLORIDE 1 MG: 2 INJECTION INTRAMUSCULAR; INTRAVENOUS; SUBCUTANEOUS at 09:11

## 2023-11-07 RX ADMIN — SODIUM CHLORIDE: 9 INJECTION, SOLUTION INTRAVENOUS at 05:11

## 2023-11-07 NOTE — PROGRESS NOTES
[facility] - 8th Floor [facility] MEDICINE ~ PROGRESS NOTE        CHIEF COMPLAINT   Hospital follow up    HOSPITAL COURSE       Today  Doing better feeling better pain is better.  4 bowel movements yesterday with some blood as well.  Hemoglobin did not have appropriate response but will continue to monitor.  Discuss with GI physician assistant as well.        OBJECTIVE/PHYSICAL EXAM     VITAL SIGNS (MOST RECENT):  Temp: 98.4 °F (36.9 °C) (11/07/23 1100)  Pulse: 98 (11/07/23 1100)  Resp: 16 (11/07/23 1242)  BP: 131/78 (11/07/23 1100)  SpO2: 98 % (11/07/23 1100) VITAL SIGNS (24 HOUR RANGE):  Temp:  [98.1 °F (36.7 °C)-100.3 °F (37.9 °C)] 98.4 °F (36.9 °C)  Pulse:  [77-98] 98  Resp:  [11-20] 16  SpO2:  [96 %-98 %] 98 %  BP: (102-131)/(55-78) 131/78   GENERAL: In no acute distress, afebrile  HEENT:  CHEST: Clear to auscultation bilaterally  HEART: S1, S2, no appreciable murmur  ABDOMEN: Soft, nontender, BS +  MSK: Warm, no lower extremity edema, no clubbing or cyanosis  NEUROLOGIC: Alert and oriented x4, moving all extremities with good strength   INTEGUMENTARY:  PSYCHIATRY:        ASSESSMENT/PLAN   Acute severe ulcerative colitis   Orthostatic syncope   Symptomatic anemia requiring blood transfusion  Acute blood loss anemia        GI following.  Solu-Medrol 30 b.i.d., he was on steroids by our to coming into the hospital and did not improve.  Likely needs biologic agent.  Continue Anusol suppository b.i.d.  Status post 2 units    DVT prophylaxis:     Anticipated discharge and disposition:   __________________________________________________________________________    LABS/MICRO/MEDS/DIAGNOSTICS       LABS  Recent Labs     11/07/23  0445   *   K 4.4   CHLORIDE 100   CO2 26   BUN 17.3   CREATININE 0.77   GLUCOSE 161*   CALCIUM 7.4*   ALKPHOS 39*   AST 8   ALT 16   ALBUMIN 1.7*     Recent Labs     11/07/23  0443   WBC 5.4  5.52   RBC 3.03*   HCT 25.9*   MCV 85.5     "      MICROBIOLOGY  Microbiology Results (last 7 days)       Procedure Component Value Units Date/Time    Blood culture #1 **CANNOT BE ORDERED STAT** [[id number]]  (Normal) Collected: 11/06/23 0405    Order Status: Completed Specimen: Blood from Antecubital, Right Updated: 11/07/23 0700     CULTURE, BLOOD ([facility]) No Growth At 24 Hours    Blood culture #2 **CANNOT BE ORDERED STAT** [[id number]]  (Normal) Collected: 11/06/23 0405    Order Status: Completed Specimen: Blood from Antecubital, Left Updated: 11/07/23 0600     CULTURE, BLOOD ([facility]) No Growth At 24 Hours    Clostridium Diff Toxin, A & B, EIA [[id number]]  (Normal) Collected: 11/06/23 1741    Order Status: Completed Specimen: Stool Updated: 11/06/23 1816     Clostridium Difficile GDH Antigen Negative     Clostridium Difficile Toxin A/B Negative               MEDICATIONS   hydrocortisone  25 mg Rectal BID    methylPREDNISolone sodium succinate injection  30 mg Intravenous Q12H         INFUSIONS         DIAGNOSTIC TESTS  CT Abdomen Pelvis W Wo Contrast   Final Result      Findings seen consistent with colitis involving the ascending colon and portions of the descending colon.  Follow-up is recommended to resolution as underlying mass cannot be completely excluded.      The gastric wall appears to be slightly thickened.  Underlying gastritis should be excluded.         Electronically signed by: John Gale   Date:    11/06/2023   Time:    12:02           No results found for: "EF"       NUTRITION STATUS  Patient meets ASPEN criteria for   malnutrition of   per RD assessment as evidenced by:                       A minimum of two characteristics is recommended for diagnosis of either severe or non-severe malnutrition.       Case related differential diagnoses have been reviewed; assessment and plan has been documented. I have personally reviewed the labs and test results that are currently available; I have reviewed the patients medication list. I have " reviewed the consulting providers recommendations. I have reviewed or attempted to review medical records based upon their availability.  All of the patient's and/or family's questions have been addressed and answered to the best of my ability.  I will continue to monitor closely and make adjustments to medical management as needed.  This document was created using [employer].  Transcription errors may have been made.  Please contact me if any questions may rise regarding documentation to clarify transcription.        Abdoulaye Garcia MD   Internal Medicine  Department of Hospital Medicine  [facility] - 8th Floor Med Surg

## 2023-11-07 NOTE — NURSING
Nurses Note -- 4 Eyes      11/7/2023   6:49 AM      Skin assessed during: Admit      [x] No Altered Skin Integrity Present    []Prevention Measures Documented      [] Yes- Altered Skin Integrity Present or Discovered   [] LDA Added if Not in Epic (Describe Wound)   [] New Altered Skin Integrity was Present on Admit and Documented in LDA   [] Wound Image Taken    Wound Care Consulted? No    Attending Nurse:  Mariah Shelton RN/Staff Member:   Kae SIERRA RN

## 2023-11-07 NOTE — PROGRESS NOTES
"Gastroenterology Progress Note    Subjective:  NAEON. Pt with 4-5BMs thus far today, however becoming more formed per pt. Hematochezia persists.   Hgb 7.3--8.2  Main complaints continues to be rectal pain with bowel movements, suppository helping.  Tolerating all PO    Objective:    ROS:    Review of Systems   Constitutional:  Negative for chills and fever.   HENT:  Negative for sore throat.    Respiratory:  Negative for shortness of breath and wheezing.    Gastrointestinal:  Positive for abdominal pain (lower) and blood in stool. Negative for heartburn, nausea and vomiting.   Neurological:  Negative for seizures, loss of consciousness and weakness.   Psychiatric/Behavioral:  The patient is not nervous/anxious.          Vital Signs:  /78   Pulse 98   Temp 98.4 °F (36.9 °C)   Resp 16   Ht 5' 10" (1.778 m)   Wt 74.8 kg (165 lb)   SpO2 98%   BMI 23.68 kg/m²   Body mass index is 23.68 kg/m².    Physical Exam:    Physical Exam  Constitutional:       General: He is not in acute distress.     Appearance: He is not ill-appearing.   HENT:      Head: Normocephalic and atraumatic.      Nose: Nose normal.   Eyes:      General: No scleral icterus.  Cardiovascular:      Pulses: Normal pulses.   Pulmonary:      Effort: No respiratory distress.      Breath sounds: No stridor.   Abdominal:      General: There is no distension.      Tenderness: There is abdominal tenderness (lower abd). There is no guarding.   Skin:     General: Skin is warm.      Coloration: Skin is not jaundiced or pale.   Neurological:      General: No focal deficit present.      Mental Status: He is alert and oriented to person, place, and time. Mental status is at baseline.   Psychiatric:         Mood and Affect: Mood normal.         Behavior: Behavior normal.         Thought Content: Thought content normal.         Judgment: Judgment normal.         Labs:  Recent Results (from the past 24 hour(s))   Clostridium Diff Toxin, A & B, EIA    Collection " Time: 11/06/23  5:41 PM    Specimen: Stool   Result Value Ref Range    Clostridium Difficile GDH Antigen Negative Negative    Clostridium Difficile Toxin A/B Negative Negative   CBC with Differential    Collection Time: 11/07/23  4:43 AM   Result Value Ref Range    WBC 5.52 4.50 - 11.50 x10(3)/mcL    RBC 3.03 (L) 4.70 - 6.10 x10(6)/mcL    Hgb 8.2 (L) 14.0 - 18.0 g/dL    Hct 25.9 (L) 42.0 - 52.0 %    MCV 85.5 80.0 - 94.0 fL    MCH 27.1 27.0 - 31.0 pg    MCHC 31.7 (L) 33.0 - 36.0 g/dL    RDW 15.5 11.5 - 17.0 %    Platelet 277 130 - 400 x10(3)/mcL    MPV 9.2 7.4 - 10.4 fL    NRBC% 0.4 %   Manual Differential    Collection Time: 11/07/23  4:43 AM   Result Value Ref Range    WBC 5.4 x10(3)/mcL    Neutrophils % 90 %    Lymphs % 3 %    Monocytes % 7 %    Neutrophils Abs 4.86 2.1 - 9.2 x10(3)/mcL    Lymphs Abs 0.162 (L) 0.6 - 4.6 x10(3)/mcL    Monocytes Abs 0.378 0.1 - 1.3 x10(3)/mcL    Platelets Normal Normal, Adequate    RBC Morph Normal Normal   Comprehensive Metabolic Panel (CMP)    Collection Time: 11/07/23  4:45 AM   Result Value Ref Range    Sodium Level 133 (L) 136 - 145 mmol/L    Potassium Level 4.4 3.5 - 5.1 mmol/L    Chloride 100 98 - 107 mmol/L    Carbon Dioxide 26 22 - 29 mmol/L    Glucose Level 161 (H) 74 - 100 mg/dL    Blood Urea Nitrogen 17.3 8.9 - 20.6 mg/dL    Creatinine 0.77 0.73 - 1.18 mg/dL    Calcium Level Total 7.4 (L) 8.4 - 10.2 mg/dL    Protein Total 4.5 (L) 6.4 - 8.3 gm/dL    Albumin Level 1.7 (L) 3.5 - 5.0 g/dL    Globulin 2.8 2.4 - 3.5 gm/dL    Albumin/Globulin Ratio 0.6 (L) 1.1 - 2.0 ratio    Bilirubin Total 0.5 <=1.5 mg/dL    Alkaline Phosphatase 39 (L) 40 - 150 unit/L    Alanine Aminotransferase 16 0 - 55 unit/L    Aspartate Aminotransferase 8 5 - 34 unit/L    eGFR >60 mls/min/1.73/m2         Assessment/Plan:  42-year-old male known to Dr. He with past medical history of diverticulitis s/p colon resection and recent new diagnosis of ulcerative colitis.  He presented to the ED with  complaints of bright red blood per rectum and lower abdominal pain associated with nausea, rectal pain, weakness and 30-40 lb weight loss over the past 3 weeks with syncopal episode x2- 2nd episode with LOC.     Newly diagnosed UC, active flare  - cdiff neg  -  continue IV Solumedrol, 30mg BID and monitor for improvement   - upcoming outpatient appt scheduled for 11/17- plans to start humira and imura- currently in the process of getting approval. Message was sent to our office for anything that can be done to speed up this process   - supp care  2. Rectal pain with BM, hx hemorrhoids  - alleviated with hydrocortisone suppository   - will also order cream prn         Thank you for allowing us to participate in the care of Lionel Restrepotenot.       WESTON NelsonC  Gastroenterology  [facility]

## 2023-11-07 NOTE — PROGRESS NOTES
Inpatient Nutrition Assessment    Admit Date: 11/6/2023   Total duration of encounter: 1 day   Patient Age: 42 y.o.    Nutrition Recommendation/Prescription     -Continue current diet as tolerated.   -Pain and diarrhea management per MD.   -Consider a MVI with minerals as medically feasible.   -Monitor wt, labs, and intake.    Communication of Recommendations: reviewed with patient and reviewed with family    Nutrition Assessment     Malnutrition Assessment/Nutrition-Focused Physical Exam    Malnutrition Context: acute illness or injury (11/07/23 1313)  Malnutrition Level: moderate (11/07/23 1313)  Energy Intake (Malnutrition): less than 75% for greater than 7 days (11/07/23 1313)  Weight Loss (Malnutrition): greater than 2% in 1 week (11/07/23 1313)  Subcutaneous Fat (Malnutrition): moderate depletion (11/07/23 1313)  Orbital Region (Subcutaneous Fat Loss): moderate depletion        Muscle Mass (Malnutrition): moderate depletion (11/07/23 1313)  Bailey Region (Muscle Loss): moderate depletion                       Fluid Accumulation (Malnutrition): other (see comments) (does not meet criteria) (11/07/23 1313)  Hand  Strength, Left (Malnutrition): unable to evaluate (11/07/23 1313)  Hand  Strength, Right (Malnutrition): unable to evaluate (11/07/23 1313)  A minimum of two characteristics is recommended for diagnosis of either severe or non-severe malnutrition.    Chart Review    Reason Seen: malnutrition screening tool (MST)    Malnutrition Screening Tool Results   Have you recently lost weight without trying?: Yes: 34 lbs or more  Have you been eating poorly because of a decreased appetite?: Yes   MST Score: 5   Diagnosis:  Acute severe ulcerative colitis   Orthostatic syncope   Symptomatic anemia requiring blood transfusion  Acute blood loss anemia    Relevant Medical History: none noted    Nutrition-Related Medications: Reviewed  Calorie Containing IV Medications: no significant kcals from medications at  "this time    Nutrition-Related Labs:  23: Na 133, Glu 161, GFR>60    Nutrition Orders:   Diet low fiber/residue      Appetite/Oral Intake: good/% of meals    Factors Affecting Nutritional Intake: none identified    Food/Druze/Cultural Preferences: none reported    Food Allergies: no known food allergies    Wound(s):   none noted    Last Bowel Movement: 23    Comments    23: Pt reports good appetite/intake while in hospital; reports that his pain and diarrhea are better managed here than it is at home. Pt reports significant wt loss and frequent watery and bloody diarrhea for the last 3 weeks.     Anthropometrics    Height: 5' 10" (177.8 cm) Height Method: Stated  Last Weight: 74.8 kg (165 lb) (23 1312) Weight Method: Bed Scale  BMI (Calculated): 23.7  BMI Classification: normal (BMI 18.5-24.9)        Ideal Body Weight (IBW), Male: 166 lb     % Ideal Body Weight, Male (lb): 99.4 %                 Usual Body Weight (UBW), k.18 kg  % Usual Body Weight: 80.49  % Weight Change From Usual Weight: -19.68 %  Usual Weight Provided By: patient    Wt Readings from Last 5 Encounters:   23 74.8 kg (165 lb)   10/21/23 86.4 kg (190 lb 7.6 oz)     Weight Change(s) Since Admission:   Wt Readings from Last 1 Encounters:   23 1312 74.8 kg (165 lb)   23 1835 74.8 kg (165 lb)   23 0328 74.8 kg (165 lb)   Admit Weight: 74.8 kg (165 lb) (23 0328), Weight Method: Stated    Estimated Needs    Weight Used For Calorie Calculations: 74.8 kg (164 lb 14.5 oz)  Energy Calorie Requirements (kcal): 8451-8462 kcal (30-35 kcal/kg)  Energy Need Method: Kcal/kg  Weight Used For Protein Calculations: 74.8 kg (164 lb 14.5 oz)  Protein Requirements: 112 gm (1.5g/kg)  Fluid Requirements (mL): 2244 mL  Temp (24hrs), Av °F (37.2 °C), Min:98.1 °F (36.7 °C), Max:100.3 °F (37.9 °C)       Enteral Nutrition    Patient not receiving enteral nutrition at this time.    Parenteral " Nutrition    Patient not receiving parenteral nutrition support at this time.    Evaluation of Received Nutrient Intake    Calories: meeting estimated needs  Protein: meeting estimated needs    Patient Education    Not applicable.    Nutrition Diagnosis     PES: Malnutrition related to altered GI function as evidenced by >2% wt loss x 1 week, <75% est energy requirements met >7 days, physical evidence of moderate fat and muscle wasting. (new)    Interventions/Goals     Intervention(s): modified composition of meals/snacks, multivitamin/mineral supplement therapy, prescription medication, and collaboration with other providers    Goal: Maintain weight throughout hospitalization. (new)    Monitoring & Evaluation     Dietitian will monitor energy intake and weight change.    Nutrition Risk/Follow-Up: high (follow-up in 1-4 days)   Please consult if re-assessment needed sooner.

## 2023-11-07 NOTE — PLAN OF CARE
11/07/23 1445   Discharge Assessment   Assessment Type Discharge Planning Assessment   Confirmed/corrected address, phone number and insurance Yes   Confirmed Demographics Correct on Facesheet   Source of Information patient;family  (Pt's wifeLul)   Communicated TOD with patient/caregiver Date not available/Unable to determine   Reason For Admission GI bleed   People in Home spouse;child(karl), dependent  (Pt lives with is wifeLul and 3 teenage sons in a single story home with 5 steps to enter)   Do you expect to return to your current living situation? Yes   Do you have help at home or someone to help you manage your care at home? Yes   Who are your caregiver(s) and their phone number(s)? Pt's wifeLul will be able to assist pt should he need   Prior to hospitilization cognitive status: Alert/Oriented   Current cognitive status: Alert/Oriented   Walking or Climbing Stairs   (independent with mobility)   Home Layout Able to live on 1st floor   Equipment Currently Used at Home none   Readmission within 30 days? No   Patient currently being followed by outpatient case management? No   Do you currently have service(s) that help you manage your care at home? No   Who is going to help you get home at discharge? pt's wifeLul   How do you get to doctors appointments? car, drives self   Are you on dialysis? No   DME Needed Upon Discharge  none   Discharge Plan discussed with: Patient;Spouse/sig other   Name(s) and Number(s) Lul---[phone]   Transition of Care Barriers None   Discharge Plan A Home with family   Discharge Plan B Home with family   Housing Stability   In the last 12 months, was there a time when you were not able to pay the mortgage or rent on time? N   Transportation Needs   In the past 12 months, has lack of transportation kept you from medical appointments or from getting medications? no   Food Insecurity   Within the past 12 months, you worried that your food would run out before you got  the money to buy more. Never true   OTHER   Name(s) of People in Home pt's wife, Lul and 3 teenage sons     [facility] [place]. Pt's  is his wife, Lul ([phone]). Pt has never had HH services. Pt uses [facility] in [place]. Pt does drive and works in the [employer] field. CM to follow for dc needs.

## 2023-11-08 LAB
ABS NEUT (OLG): 5.07 X10(3)/MCL (ref 2.1–9.2)
ERYTHROCYTE [DISTWIDTH] IN BLOOD BY AUTOMATED COUNT: 15.3 % (ref 11.5–17)
HCT VFR BLD AUTO: 25.6 % (ref 42–52)
HGB BLD-MCNC: 8.1 G/DL (ref 14–18)
INSTRUMENT WBC (OLG): 5.51 X10(3)/MCL
LYMPHOCYTES NFR BLD MANUAL: 0.28 X10(3)/MCL
LYMPHOCYTES NFR BLD MANUAL: 5 %
MCH RBC QN AUTO: 26.4 PG (ref 27–31)
MCHC RBC AUTO-ENTMCNC: 31.6 G/DL (ref 33–36)
MCV RBC AUTO: 83.4 FL (ref 80–94)
MONOCYTES NFR BLD MANUAL: 0.17 X10(3)/MCL (ref 0.1–1.3)
MONOCYTES NFR BLD MANUAL: 3 %
NEUTROPHILS NFR BLD MANUAL: 92 %
NRBC BLD AUTO-RTO: 0.4 %
PLATELET # BLD AUTO: 283 X10(3)/MCL (ref 130–400)
PLATELET # BLD EST: ADEQUATE 10*3/UL
PMV BLD AUTO: 10 FL (ref 7.4–10.4)
RBC # BLD AUTO: 3.07 X10(6)/MCL (ref 4.7–6.1)
RBC MORPH BLD: NORMAL
WBC # SPEC AUTO: 5.51 X10(3)/MCL (ref 4.5–11.5)

## 2023-11-08 PROCEDURE — 85027 COMPLETE CBC AUTOMATED: CPT | Performed by: INTERNAL MEDICINE

## 2023-11-08 PROCEDURE — 25000003 PHARM REV CODE 250: Performed by: INTERNAL MEDICINE

## 2023-11-08 PROCEDURE — 25000003 PHARM REV CODE 250

## 2023-11-08 PROCEDURE — 63600175 PHARM REV CODE 636 W HCPCS: Performed by: PHYSICIAN ASSISTANT

## 2023-11-08 PROCEDURE — 11000001 HC ACUTE MED/SURG PRIVATE ROOM

## 2023-11-08 PROCEDURE — 63600175 PHARM REV CODE 636 W HCPCS: Performed by: INTERNAL MEDICINE

## 2023-11-08 RX ORDER — HYDROCORTISONE ACETATE 25 MG/1
25 SUPPOSITORY RECTAL 3 TIMES DAILY
Status: DISCONTINUED | OUTPATIENT
Start: 2023-11-08 | End: 2023-11-11

## 2023-11-08 RX ADMIN — HYDROMORPHONE HYDROCHLORIDE 1 MG: 2 INJECTION INTRAMUSCULAR; INTRAVENOUS; SUBCUTANEOUS at 10:11

## 2023-11-08 RX ADMIN — HYDROMORPHONE HYDROCHLORIDE 1 MG: 2 INJECTION INTRAMUSCULAR; INTRAVENOUS; SUBCUTANEOUS at 06:11

## 2023-11-08 RX ADMIN — HYDROMORPHONE HYDROCHLORIDE 1 MG: 2 INJECTION INTRAMUSCULAR; INTRAVENOUS; SUBCUTANEOUS at 07:11

## 2023-11-08 RX ADMIN — HYDROMORPHONE HYDROCHLORIDE 1 MG: 2 INJECTION INTRAMUSCULAR; INTRAVENOUS; SUBCUTANEOUS at 11:11

## 2023-11-08 RX ADMIN — HYDROCORTISONE ACETATE 25 MG: 25 SUPPOSITORY RECTAL at 08:11

## 2023-11-08 RX ADMIN — METHYLPREDNISOLONE SODIUM SUCCINATE 30 MG: 40 INJECTION, POWDER, FOR SOLUTION INTRAMUSCULAR; INTRAVENOUS at 10:11

## 2023-11-08 RX ADMIN — HYDROMORPHONE HYDROCHLORIDE 1 MG: 2 INJECTION INTRAMUSCULAR; INTRAVENOUS; SUBCUTANEOUS at 01:11

## 2023-11-08 RX ADMIN — METHYLPREDNISOLONE SODIUM SUCCINATE 30 MG: 40 INJECTION, POWDER, FOR SOLUTION INTRAMUSCULAR; INTRAVENOUS at 11:11

## 2023-11-08 RX ADMIN — HYDROCORTISONE ACETATE 25 MG: 25 SUPPOSITORY RECTAL at 02:11

## 2023-11-08 RX ADMIN — HYDROMORPHONE HYDROCHLORIDE 1 MG: 2 INJECTION INTRAMUSCULAR; INTRAVENOUS; SUBCUTANEOUS at 02:11

## 2023-11-08 RX ADMIN — HYDROCORTISONE ACETATE 25 MG: 25 SUPPOSITORY RECTAL at 09:11

## 2023-11-08 RX ADMIN — HYDROCORTISONE ACETATE 25 MG: 25 SUPPOSITORY RECTAL at 07:11

## 2023-11-08 NOTE — PROGRESS NOTES
"Gastroenterology Progress Note    Subjective:  Pt continues to improve, one BM overnight and one this am. Stool continues to become more formed with ongoing hematochezia- though pt unaware if this is r/t to UC or hemorrhoids.   Rectal pain improving with suppository and cream.     Tolerating regular diet without issues.     Objective:    ROS:    Review of Systems   Constitutional:  Negative for chills and fever.   HENT:  Negative for sore throat.    Respiratory:  Negative for shortness of breath and wheezing.    Gastrointestinal:  Positive for abdominal pain (lower) and blood in stool. Negative for heartburn, nausea and vomiting.   Neurological:  Negative for seizures, loss of consciousness and weakness.   Psychiatric/Behavioral:  The patient is not nervous/anxious.          Vital Signs:  /74   Pulse 86   Temp 98 °F (36.7 °C) (Oral)   Resp 16   Ht 5' 10" (1.778 m)   Wt 74.8 kg (165 lb)   SpO2 99%   BMI 23.68 kg/m²   Body mass index is 23.68 kg/m².    Physical Exam:    Physical Exam  Constitutional:       General: He is not in acute distress.     Appearance: He is not ill-appearing.   HENT:      Head: Normocephalic and atraumatic.      Nose: Nose normal.   Eyes:      General: No scleral icterus.  Cardiovascular:      Pulses: Normal pulses.   Pulmonary:      Effort: No respiratory distress.      Breath sounds: No stridor.   Abdominal:      General: There is no distension.      Tenderness: There is abdominal tenderness (lower abd). There is no guarding.   Skin:     General: Skin is warm.      Coloration: Skin is not jaundiced or pale.   Neurological:      General: No focal deficit present.      Mental Status: He is alert and oriented to person, place, and time. Mental status is at baseline.   Psychiatric:         Mood and Affect: Mood normal.         Behavior: Behavior normal.         Thought Content: Thought content normal.         Judgment: Judgment normal.         Labs:  Recent Results (from the past 24 " hour(s))   CBC with Differential    Collection Time: 11/08/23  4:47 AM   Result Value Ref Range    WBC 5.51 4.50 - 11.50 x10(3)/mcL    RBC 3.07 (L) 4.70 - 6.10 x10(6)/mcL    Hgb 8.1 (L) 14.0 - 18.0 g/dL    Hct 25.6 (L) 42.0 - 52.0 %    MCV 83.4 80.0 - 94.0 fL    MCH 26.4 (L) 27.0 - 31.0 pg    MCHC 31.6 (L) 33.0 - 36.0 g/dL    RDW 15.3 11.5 - 17.0 %    Platelet 283 130 - 400 x10(3)/mcL    MPV 10.0 7.4 - 10.4 fL    NRBC% 0.4 %   Manual Differential    Collection Time: 11/08/23  4:47 AM   Result Value Ref Range    WBC 5.51 x10(3)/mcL    Neutrophils % 92 %    Lymphs % 5 %    Monocytes % 3 %    Neutrophils Abs 5.0692 2.1 - 9.2 x10(3)/mcL    Lymphs Abs 0.2755 (L) 0.6 - 4.6 x10(3)/mcL    Monocytes Abs 0.1653 0.1 - 1.3 x10(3)/mcL    Platelets Adequate Normal, Adequate    RBC Morph Normal Normal         Assessment/Plan:  42-year-old male known to Dr. He with past medical history of diverticulitis s/p colon resection and recent new diagnosis of ulcerative colitis.  He presented to the ED with complaints of bright red blood per rectum and lower abdominal pain associated with nausea, rectal pain, weakness and 30-40 lb weight loss over the past 3 weeks with syncopal episode x2- 2nd episode with LOC.     Newly diagnosed UC, active flare  - cdiff neg  -  continue IV Solumedrol, 30mg BID and monitor for improvement   - upcoming outpatient appt scheduled for 11/17- plans to start humira and imura- currently in the process of getting approval. Message was sent to our office and medication has been expedited/ also looking into availability of samples in the meantime   - supp care  2. Rectal pain with BM, hx hemorrhoids  - alleviated with hydrocortisone suppository- increased to TID  - continue cream prn      Thank you for allowing us to participate in the care of Lionel León.     Leslie He PA-C  Gastroenterology  [facility]

## 2023-11-08 NOTE — PLAN OF CARE
Problem: Infection  Goal: Absence of Infection Signs and Symptoms  Outcome: Ongoing, Progressing     Problem: Adult Inpatient Plan of Care  Goal: Readiness for Transition of Care  Outcome: Ongoing, Progressing     Problem: Adult Inpatient Plan of Care  Goal: Optimal Comfort and Wellbeing  Outcome: Ongoing, Progressing     Problem: Adult Inpatient Plan of Care  Goal: Absence of Hospital-Acquired Illness or Injury  Outcome: Ongoing, Progressing

## 2023-11-09 LAB
ALBUMIN SERPL-MCNC: 1.6 G/DL (ref 3.5–5)
ALBUMIN/GLOB SERPL: 0.5 RATIO (ref 1.1–2)
ALP SERPL-CCNC: 64 UNIT/L (ref 40–150)
ALT SERPL-CCNC: 83 UNIT/L (ref 0–55)
AST SERPL-CCNC: 52 UNIT/L (ref 5–34)
BASOPHILS # BLD AUTO: 0.03 X10(3)/MCL
BASOPHILS NFR BLD AUTO: 0.5 %
BILIRUB SERPL-MCNC: 0.3 MG/DL
BUN SERPL-MCNC: 14.7 MG/DL (ref 8.9–20.6)
CALCIUM SERPL-MCNC: 7.3 MG/DL (ref 8.4–10.2)
CHLORIDE SERPL-SCNC: 96 MMOL/L (ref 98–107)
CO2 SERPL-SCNC: 31 MMOL/L (ref 22–29)
CREAT SERPL-MCNC: 0.78 MG/DL (ref 0.73–1.18)
CRP SERPL-MCNC: 95.4 MG/L
EOSINOPHIL # BLD AUTO: 0 X10(3)/MCL (ref 0–0.9)
EOSINOPHIL NFR BLD AUTO: 0 %
ERYTHROCYTE [DISTWIDTH] IN BLOOD BY AUTOMATED COUNT: 15.3 % (ref 11.5–17)
ERYTHROCYTE [SEDIMENTATION RATE] IN BLOOD: 87 MM/HR (ref 0–15)
GFR SERPLBLD CREATININE-BSD FMLA CKD-EPI: >60 MLS/MIN/1.73/M2
GLOBULIN SER-MCNC: 3 GM/DL (ref 2.4–3.5)
GLUCOSE SERPL-MCNC: 164 MG/DL (ref 74–100)
HCT VFR BLD AUTO: 24.7 % (ref 42–52)
HGB BLD-MCNC: 7.8 G/DL (ref 14–18)
IMM GRANULOCYTES # BLD AUTO: 0.07 X10(3)/MCL (ref 0–0.04)
IMM GRANULOCYTES NFR BLD AUTO: 1.1 %
LYMPHOCYTES # BLD AUTO: 0.27 X10(3)/MCL (ref 0.6–4.6)
LYMPHOCYTES NFR BLD AUTO: 4.1 %
MAGNESIUM SERPL-MCNC: 2.3 MG/DL (ref 1.6–2.6)
MCH RBC QN AUTO: 26.4 PG (ref 27–31)
MCHC RBC AUTO-ENTMCNC: 31.6 G/DL (ref 33–36)
MCV RBC AUTO: 83.7 FL (ref 80–94)
MONOCYTES # BLD AUTO: 0.36 X10(3)/MCL (ref 0.1–1.3)
MONOCYTES NFR BLD AUTO: 5.4 %
NEUTROPHILS # BLD AUTO: 5.89 X10(3)/MCL (ref 2.1–9.2)
NEUTROPHILS NFR BLD AUTO: 88.9 %
NRBC BLD AUTO-RTO: 0.5 %
PLATELET # BLD AUTO: 266 X10(3)/MCL (ref 130–400)
PMV BLD AUTO: 9.3 FL (ref 7.4–10.4)
POTASSIUM SERPL-SCNC: 5 MMOL/L (ref 3.5–5.1)
PROT SERPL-MCNC: 4.6 GM/DL (ref 6.4–8.3)
RBC # BLD AUTO: 2.95 X10(6)/MCL (ref 4.7–6.1)
SODIUM SERPL-SCNC: 130 MMOL/L (ref 136–145)
WBC # SPEC AUTO: 6.62 X10(3)/MCL (ref 4.5–11.5)

## 2023-11-09 PROCEDURE — 86140 C-REACTIVE PROTEIN: CPT | Performed by: INTERNAL MEDICINE

## 2023-11-09 PROCEDURE — 63600175 PHARM REV CODE 636 W HCPCS: Performed by: INTERNAL MEDICINE

## 2023-11-09 PROCEDURE — 25000003 PHARM REV CODE 250: Performed by: PHYSICIAN ASSISTANT

## 2023-11-09 PROCEDURE — 11000001 HC ACUTE MED/SURG PRIVATE ROOM

## 2023-11-09 PROCEDURE — 25000003 PHARM REV CODE 250: Performed by: INTERNAL MEDICINE

## 2023-11-09 PROCEDURE — 85652 RBC SED RATE AUTOMATED: CPT | Performed by: INTERNAL MEDICINE

## 2023-11-09 PROCEDURE — 25000003 PHARM REV CODE 250

## 2023-11-09 PROCEDURE — 63600175 PHARM REV CODE 636 W HCPCS: Performed by: PHYSICIAN ASSISTANT

## 2023-11-09 PROCEDURE — 80053 COMPREHEN METABOLIC PANEL: CPT | Performed by: INTERNAL MEDICINE

## 2023-11-09 PROCEDURE — 83735 ASSAY OF MAGNESIUM: CPT | Performed by: INTERNAL MEDICINE

## 2023-11-09 PROCEDURE — 85025 COMPLETE CBC W/AUTO DIFF WBC: CPT | Performed by: INTERNAL MEDICINE

## 2023-11-09 RX ORDER — SODIUM CHLORIDE 9 MG/ML
INJECTION, SOLUTION INTRAVENOUS CONTINUOUS
Status: DISCONTINUED | OUTPATIENT
Start: 2023-11-09 | End: 2023-11-11

## 2023-11-09 RX ORDER — OXYCODONE AND ACETAMINOPHEN 10; 325 MG/1; MG/1
1 TABLET ORAL EVERY 6 HOURS PRN
Status: DISCONTINUED | OUTPATIENT
Start: 2023-11-09 | End: 2023-11-12

## 2023-11-09 RX ORDER — OXYCODONE AND ACETAMINOPHEN 10; 325 MG/1; MG/1
1 TABLET ORAL EVERY 6 HOURS PRN
Status: DISCONTINUED | OUTPATIENT
Start: 2023-11-09 | End: 2023-11-09

## 2023-11-09 RX ORDER — HYDROMORPHONE HYDROCHLORIDE 2 MG/ML
0.5 INJECTION, SOLUTION INTRAMUSCULAR; INTRAVENOUS; SUBCUTANEOUS EVERY 4 HOURS PRN
Status: DISCONTINUED | OUTPATIENT
Start: 2023-11-09 | End: 2023-11-12

## 2023-11-09 RX ADMIN — HYDROMORPHONE HYDROCHLORIDE 0.5 MG: 2 INJECTION INTRAMUSCULAR; INTRAVENOUS; SUBCUTANEOUS at 11:11

## 2023-11-09 RX ADMIN — HYDROMORPHONE HYDROCHLORIDE 1 MG: 2 INJECTION INTRAMUSCULAR; INTRAVENOUS; SUBCUTANEOUS at 06:11

## 2023-11-09 RX ADMIN — METHYLPREDNISOLONE SODIUM SUCCINATE 30 MG: 40 INJECTION, POWDER, FOR SOLUTION INTRAMUSCULAR; INTRAVENOUS at 11:11

## 2023-11-09 RX ADMIN — METHYLPREDNISOLONE SODIUM SUCCINATE 30 MG: 40 INJECTION, POWDER, FOR SOLUTION INTRAMUSCULAR; INTRAVENOUS at 10:11

## 2023-11-09 RX ADMIN — HYDROMORPHONE HYDROCHLORIDE 0.5 MG: 2 INJECTION INTRAMUSCULAR; INTRAVENOUS; SUBCUTANEOUS at 03:11

## 2023-11-09 RX ADMIN — HYDROCORTISONE ACETATE 25 MG: 25 SUPPOSITORY RECTAL at 02:11

## 2023-11-09 RX ADMIN — HYDROCORTISONE ACETATE 25 MG: 25 SUPPOSITORY RECTAL at 08:11

## 2023-11-09 RX ADMIN — HYDROMORPHONE HYDROCHLORIDE 1 MG: 2 INJECTION INTRAMUSCULAR; INTRAVENOUS; SUBCUTANEOUS at 03:11

## 2023-11-09 RX ADMIN — SODIUM CHLORIDE: 9 INJECTION, SOLUTION INTRAVENOUS at 01:11

## 2023-11-09 RX ADMIN — HYDROCORTISONE ACETATE 25 MG: 25 SUPPOSITORY RECTAL at 09:11

## 2023-11-09 RX ADMIN — HYDROMORPHONE HYDROCHLORIDE 0.5 MG: 2 INJECTION INTRAMUSCULAR; INTRAVENOUS; SUBCUTANEOUS at 07:11

## 2023-11-09 RX ADMIN — ACETAMINOPHEN 650 MG: 325 TABLET, FILM COATED ORAL at 08:11

## 2023-11-09 RX ADMIN — OXYCODONE AND ACETAMINOPHEN 1 TABLET: 10; 325 TABLET ORAL at 04:11

## 2023-11-09 RX ADMIN — OXYCODONE AND ACETAMINOPHEN 1 TABLET: 10; 325 TABLET ORAL at 10:11

## 2023-11-09 NOTE — PROGRESS NOTES
"Gastroenterology Progress Note    Subjective:  Today, patient reports stool has again become loose with increase in hematochezia- BM x3 thus far today associated with weakness. BP 98/57  He reports abd pain only when having a BM and rectal pain is ongoing but not worsening- controlled with current regimen.  Continue to tolerate PO    Objective:    ROS:    Review of Systems   Constitutional:  Negative for chills and fever.   HENT:  Negative for sore throat.    Respiratory:  Negative for shortness of breath and wheezing.    Gastrointestinal:  Positive for abdominal pain (lower) and blood in stool. Negative for heartburn, nausea and vomiting.   Neurological:  Negative for seizures, loss of consciousness and weakness.   Psychiatric/Behavioral:  The patient is not nervous/anxious.          Vital Signs:  BP (!) 98/57   Pulse 88   Temp 99.2 °F (37.3 °C) (Oral)   Resp 16   Ht 5' 10" (1.778 m)   Wt 74.8 kg (165 lb)   SpO2 97%   BMI 23.68 kg/m²   Body mass index is 23.68 kg/m².    Physical Exam:    Physical Exam  Constitutional:       General: He is not in acute distress.     Appearance: He is not ill-appearing.   HENT:      Head: Normocephalic and atraumatic.      Nose: Nose normal.   Eyes:      General: No scleral icterus.  Cardiovascular:      Pulses: Normal pulses.   Pulmonary:      Effort: No respiratory distress.      Breath sounds: No stridor.   Abdominal:      General: There is no distension.      Tenderness: There is no abdominal tenderness. There is no guarding.   Skin:     General: Skin is warm.      Coloration: Skin is not jaundiced or pale.   Neurological:      General: No focal deficit present.      Mental Status: He is alert and oriented to person, place, and time. Mental status is at baseline.   Psychiatric:         Mood and Affect: Mood normal.         Behavior: Behavior normal.         Thought Content: Thought content normal.         Judgment: Judgment normal.         Labs:  Recent Results (from the " past 24 hour(s))   Comprehensive Metabolic Panel    Collection Time: 11/09/23  4:42 AM   Result Value Ref Range    Sodium Level 130 (L) 136 - 145 mmol/L    Potassium Level 5.0 3.5 - 5.1 mmol/L    Chloride 96 (L) 98 - 107 mmol/L    Carbon Dioxide 31 (H) 22 - 29 mmol/L    Glucose Level 164 (H) 74 - 100 mg/dL    Blood Urea Nitrogen 14.7 8.9 - 20.6 mg/dL    Creatinine 0.78 0.73 - 1.18 mg/dL    Calcium Level Total 7.3 (L) 8.4 - 10.2 mg/dL    Protein Total 4.6 (L) 6.4 - 8.3 gm/dL    Albumin Level 1.6 (L) 3.5 - 5.0 g/dL    Globulin 3.0 2.4 - 3.5 gm/dL    Albumin/Globulin Ratio 0.5 (L) 1.1 - 2.0 ratio    Bilirubin Total 0.3 <=1.5 mg/dL    Alkaline Phosphatase 64 40 - 150 unit/L    Alanine Aminotransferase 83 (H) 0 - 55 unit/L    Aspartate Aminotransferase 52 (H) 5 - 34 unit/L    eGFR >60 mls/min/1.73/m2   Magnesium    Collection Time: 11/09/23  4:42 AM   Result Value Ref Range    Magnesium Level 2.30 1.60 - 2.60 mg/dL   Sedimentation rate    Collection Time: 11/09/23  4:42 AM   Result Value Ref Range    Sed Rate 87 (H) 0 - 15 mm/hr   C-Reactive Protein    Collection Time: 11/09/23  4:42 AM   Result Value Ref Range    C-Reactive Protein 95.40 (H) <5.00 mg/L   CBC with Differential    Collection Time: 11/09/23  4:42 AM   Result Value Ref Range    WBC 6.62 4.50 - 11.50 x10(3)/mcL    RBC 2.95 (L) 4.70 - 6.10 x10(6)/mcL    Hgb 7.8 (L) 14.0 - 18.0 g/dL    Hct 24.7 (L) 42.0 - 52.0 %    MCV 83.7 80.0 - 94.0 fL    MCH 26.4 (L) 27.0 - 31.0 pg    MCHC 31.6 (L) 33.0 - 36.0 g/dL    RDW 15.3 11.5 - 17.0 %    Platelet 266 130 - 400 x10(3)/mcL    MPV 9.3 7.4 - 10.4 fL    Neut % 88.9 %    Lymph % 4.1 %    Mono % 5.4 %    Eos % 0.0 %    Basophil % 0.5 %    Lymph # 0.27 (L) 0.6 - 4.6 x10(3)/mcL    Neut # 5.89 2.1 - 9.2 x10(3)/mcL    Mono # 0.36 0.1 - 1.3 x10(3)/mcL    Eos # 0.00 0 - 0.9 x10(3)/mcL    Baso # 0.03 <=0.2 x10(3)/mcL    IG# 0.07 (H) 0 - 0.04 x10(3)/mcL    IG% 1.1 %    NRBC% 0.5 %         Assessment/Plan:  42-year-old male known to  Dr. He with past medical history of diverticulitis s/p colon resection and recent new diagnosis of ulcerative colitis.  He presented to the ED with complaints of bright red blood per rectum and lower abdominal pain associated with nausea, rectal pain, weakness and 30-40 lb weight loss over the past 3 weeks with syncopal episode x2- 2nd episode with LOC.     Newly diagnosed UC, active flare  - cdiff neg  - continue IV Solumedrol, 30mg BID and monitor for improvement   - upcoming outpatient appt scheduled for 11/17- plans to start humira and imura- currently in the process of getting approval. Message was sent to our office and medication has been expedited/ also looking into availability of samples in the meantime. Update as of 11/09- medication has been approved with hopeful shipping in the next 24-48 hrs.   - supp care  2. Rectal pain with BM, hx hemorrhoids  - alleviated with hydrocortisone suppository- increased to TID  - continue cream prn        Thank you for allowing us to participate in the care of Emilianoarabella León.     WESTON NelsonC  Gastroenterology  [facility]

## 2023-11-09 NOTE — PROGRESS NOTES
Inpatient Nutrition Assessment    Admit Date: 11/6/2023   Total duration of encounter: 3 days   Patient Age: 42 y.o.    Nutrition Recommendation/Prescription     -Continue current diet as tolerated.   -Consider a MVI with minerals as medically feasible.   -Monitor wt, labs, and intake.    Communication of Recommendations: reviewed with patient and reviewed with family    Nutrition Assessment     Malnutrition Assessment/Nutrition-Focused Physical Exam    Malnutrition Context: acute illness or injury (11/07/23 1313)  Malnutrition Level: moderate (11/07/23 1313)  Energy Intake (Malnutrition): less than 75% for greater than 7 days (11/07/23 1313)  Weight Loss (Malnutrition): greater than 2% in 1 week (11/07/23 1313)  Subcutaneous Fat (Malnutrition): moderate depletion (11/07/23 1313)  Orbital Region (Subcutaneous Fat Loss): moderate depletion        Muscle Mass (Malnutrition): moderate depletion (11/07/23 1313)  Congregation Region (Muscle Loss): moderate depletion                       Fluid Accumulation (Malnutrition): other (see comments) (does not meet criteria) (11/07/23 1313)  Hand  Strength, Left (Malnutrition): unable to evaluate (11/07/23 1313)  Hand  Strength, Right (Malnutrition): unable to evaluate (11/07/23 1313)  A minimum of two characteristics is recommended for diagnosis of either severe or non-severe malnutrition.    Chart Review    Reason Seen: malnutrition screening tool (MST) and follow-up    Malnutrition Screening Tool Results   Have you recently lost weight without trying?: Yes: 34 lbs or more  Have you been eating poorly because of a decreased appetite?: Yes   MST Score: 5   Diagnosis:  Acute severe ulcerative colitis   Orthostatic syncope   Symptomatic anemia requiring blood transfusion  Acute blood loss anemia    Relevant Medical History: none noted    Nutrition-Related Medications: Reviewed  Calorie Containing IV Medications: no significant kcals from medications at this  "time    Nutrition-Related Labs:  23: Na 133, Glu 161, GFR>60  23: Na 130, Glu 164, GFR>60, CRP 95.4    Nutrition Orders:   Diet low fiber/residue      Appetite/Oral Intake: good/% of meals    Factors Affecting Nutritional Intake: none identified    Food/Samaritan/Cultural Preferences: none reported    Food Allergies: no known food allergies    Wound(s):   none noted    Last Bowel Movement: 23    Comments    23: Pt reports good appetite/intake while in hospital; reports that his pain and diarrhea are better managed here than it is at home. Pt reports significant wt loss and frequent watery and bloody diarrhea for the last 3 weeks.     23: Per pt and pt's wife, pt is eating well, most of meals; denies n/v.     Anthropometrics    Height: 5' 10" (177.8 cm) Height Method: Stated  Last Weight: 74.8 kg (165 lb) (23 1312) Weight Method: Bed Scale  BMI (Calculated): 23.7  BMI Classification: normal (BMI 18.5-24.9)        Ideal Body Weight (IBW), Male: 166 lb     % Ideal Body Weight, Male (lb): 99.4 %                 Usual Body Weight (UBW), k.18 kg  % Usual Body Weight: 80.49  % Weight Change From Usual Weight: -19.68 %  Usual Weight Provided By: patient    Wt Readings from Last 5 Encounters:   23 74.8 kg (165 lb)   10/21/23 86.4 kg (190 lb 7.6 oz)     Weight Change(s) Since Admission:   Wt Readings from Last 1 Encounters:   23 1312 74.8 kg (165 lb)   23 1835 74.8 kg (165 lb)   23 0328 74.8 kg (165 lb)   Admit Weight: 74.8 kg (165 lb) (23 0328), Weight Method: Stated  23: no new wt noted    Estimated Needs    Weight Used For Calorie Calculations: 74.8 kg (164 lb 14.5 oz)  Energy Calorie Requirements (kcal): 6060-7221 kcal (30-35 kcal/kg)  Energy Need Method: Kcal/kg  Weight Used For Protein Calculations: 74.8 kg (164 lb 14.5 oz)  Protein Requirements: 112 gm (1.5g/kg)  Fluid Requirements (mL): 2244 mL  Temp (24hrs), Av.9 °F (37.2 °C), Min:97.9 " °F (36.6 °C), Max:99.7 °F (37.6 °C)       Enteral Nutrition    Patient not receiving enteral nutrition at this time.    Parenteral Nutrition    Patient not receiving parenteral nutrition support at this time.    Evaluation of Received Nutrient Intake    Calories: meeting estimated needs  Protein: meeting estimated needs    Patient Education    Not applicable.    Nutrition Diagnosis     PES: Malnutrition related to altered GI function as evidenced by >2% wt loss x 1 week, <75% est energy requirements met >7 days, physical evidence of moderate fat and muscle wasting. (active)    Interventions/Goals     Intervention(s): modified composition of meals/snacks, multivitamin/mineral supplement therapy, prescription medication, and collaboration with other providers    Goal: Maintain weight throughout hospitalization. (goal progressing)    Monitoring & Evaluation     Dietitian will monitor energy intake and weight change.    Nutrition Risk/Follow-Up: high (follow-up in 1-4 days)   Please consult if re-assessment needed sooner.

## 2023-11-09 NOTE — PROGRESS NOTES
[facility] - 8th Floor [facility] Surg  [facility] MEDICINE ~ PROGRESS NOTE        CHIEF COMPLAINT   Hospital follow up    HOSPITAL COURSE   Lionel León is a 42 y.o. White male with a past medical history of diverticulitis status post colon resection and ulcerative colitis. Patient had recent admission to hospital medicine services at [facility] on 10/21/2023 to 10/24/2023 for sepsis secondary to pancolitis. He was treated with antibiotics and GI was consulted. Colonoscopy was performed on 10/23/2023 with findings concerning for ulcerative colitis and biopsies of the cecum, ascending, transverse, ascending colon and rectum positive for active chronic colitis consistent with inflammatory bowel disorder. No transfusion was required for rectal bleeding. Patient was discharged with prednisone taper and GI follow up appointment on 11/17/2023. The patient presented to [facility] on 11/6/2023 with a primary complaint of bright red rectal bleeding and lower abdominal pain.  Patient reports rectal bleeding has been ongoing since discharge.  Other associated symptoms include nausea, rectal pain, subjective fevers, weakness, fatigue and a 30 40 lb weight loss over last 3 weeks.  Patient states this morning he was walking to the bathroom when he felt flushed and vision went black. He passed out and hit his head on the wooden floor. Approximately hour and a half later when he went to get up he passed out again.  Second syncopal episode was witnessed by patient's wife who is at bedside reports loss of consciousness lasts for approximately 30 seconds.         Upon presentation to the ED, temperature 98.3F, heart rate 109, blood pressure 96/51, respiratory rate 17, spO2 99%. Labs with H&H 7.3/23.6 (10.6/33.2 on 10/24/2023), BUN 21.9, creatinine 0.88, calcium 7.7, .6, ESR 86. EKG sinus tachycardia with a ventricular rate of 108 and age undetermined possible inferior infarct. In the ED patient received Dilaudid, Zofran,  Hydrocortisone and IVF. He was typed and crossmatched for transfusion of 2 units of packed RBC. Patient is admitted to hospital medicine services for further medical management.      Patient was seen by GI and started on IV steroids, Solu-Medrol 30 mg IV b.I.d. for active ulcerative colitis flare up  Plus Anusol suppository per rectum b.I.d..  Patient is status post 2 units PRBC for symptomatic anemia.  CT abdomen and pelvis was also ordered to assess extent. CT of the abdomen and pelvis was pertinent for findings consistent with colitis involving the ascending colon, portions of the descending colon.  Underlying gastritis also can not be excluded secondary to slightly thickened gastric wall.      May complain continues to be rectal pain but refers that suppositories are helping.  Patient is tolerating p.o. intake     According to GI notes plans is for patient to start Humira/imura once approved by patient's insurance.                    Today  11/7/23-Doing better feeling better pain is better.  4 bowel movements yesterday with some blood as well.  Hemoglobin did not have appropriate response but will continue to monitor.  Discuss with GI physician assistant as well.    11/8/23 dr alvarez -  Today patient had 1 BM overnight and another one this a.m..  Refers that stools are becoming more formed still with some ongoing hematochezia.  Rectal pain continues to improve.  For now we will continue hydrocortisone suppositories t.I.d. as well as IV Solu-Medrol 30 mg IV b.I.d. with plans to start Humira/ Imura as outpt.  Patient today refers feeling okay, his pain goes up and down but overall feels improved since admission not ready to go home.  We will continue present management and continue following recommendations by GI    11/09/2023 Dr. Alvarez-chart reviewed patient examined.  Still having episodes of bright red per rectum.  Also refers some dizziness when getting out of bed.  Hematocrit dropped to 24.7.  BP running at  98/57.  We started normal saline solution.  will require PRBC transfusion tomorrow.        OBJECTIVE/PHYSICAL EXAM     VITAL SIGNS (MOST RECENT):  Temp: 99.2 °F (37.3 °C) (11/09/23 1109)  Pulse: 88 (11/09/23 1109)  Resp: 16 (11/09/23 1112)  BP: (!) 98/57 (11/09/23 1109)  SpO2: 97 % (11/09/23 1109) VITAL SIGNS (24 HOUR RANGE):  Temp:  [97.9 °F (36.6 °C)-99.7 °F (37.6 °C)] 99.2 °F (37.3 °C)  Pulse:  [] 88  Resp:  [14-20] 16  SpO2:  [96 %-97 %] 97 %  BP: ()/(57-75) 98/57   GENERAL: In no acute distress, afebrile  HEENT:  CHEST: Clear to auscultation bilaterally  HEART: S1, S2, no appreciable murmur  ABDOMEN: Soft,  generalized abdominal pain, BS +  MSK: Warm, no lower extremity edema, no clubbing or cyanosis  NEUROLOGIC: Alert and oriented x4, moving all extremities with good strength   INTEGUMENTARY: intact  PSYCHIATRY: alert/ active and oriented.        ASSESSMENT/PLAN   Acute severe ulcerative colitis   Orthostatic syncope   Symptomatic anemia requiring blood transfusion  Acute blood loss anemia        GI following.  Solu-Medrol 30 b.i.d.    Awaiting Humira as outpt   Continue Anusol suppository b.i.d.>>tid  Severe protein calorie malnutrition      Cc time 35 minutes  Cc dx- symptomatic anemia with low bp  requiring 500 cc nss bolus.       DVT prophylaxis:     Anticipated discharge and disposition: tbd  __________________________________________________________________________    LABS/MICRO/MEDS/DIAGNOSTICS       LABS  Recent Labs     11/09/23  0442   *   K 5.0   CHLORIDE 96*   CO2 31*   BUN 14.7   CREATININE 0.78   GLUCOSE 164*   CALCIUM 7.3*   ALKPHOS 64   AST 52*   ALT 83*   ALBUMIN 1.6*       Recent Labs     11/09/23  0442   WBC 6.62   RBC 2.95*   HCT 24.7*   MCV 83.7            MICROBIOLOGY  Microbiology Results (last 7 days)       Procedure Component Value Units Date/Time    Blood culture #1 **CANNOT BE ORDERED STAT** [[id number]]  (Normal) Collected: 11/06/23 0405    Order Status:  "Completed Specimen: Blood from Antecubital, Right Updated: 11/09/23 0702     CULTURE, BLOOD ([facility]) No Growth At 72 Hours    Blood culture #2 **CANNOT BE ORDERED STAT** [[id number]]  (Normal) Collected: 11/06/23 0405    Order Status: Completed Specimen: Blood from Antecubital, Left Updated: 11/09/23 0601     CULTURE, BLOOD ([facility]) No Growth At 72 Hours    Clostridium Diff Toxin, A & B, EIA [[id number]]  (Normal) Collected: 11/06/23 1741    Order Status: Completed Specimen: Stool Updated: 11/06/23 1816     Clostridium Difficile GDH Antigen Negative     Clostridium Difficile Toxin A/B Negative               MEDICATIONS   hydrocortisone  25 mg Rectal TID    methylPREDNISolone sodium succinate injection  30 mg Intravenous Q12H         INFUSIONS   sodium chloride 0.9%            DIAGNOSTIC TESTS  CT Abdomen Pelvis W Wo Contrast   Final Result      Findings seen consistent with colitis involving the ascending colon and portions of the descending colon.  Follow-up is recommended to resolution as underlying mass cannot be completely excluded.      The gastric wall appears to be slightly thickened.  Underlying gastritis should be excluded.         Electronically signed by: John Gale   Date:    11/06/2023   Time:    12:02           No results found for: "EF"       NUTRITION STATUS  Patient meets ASPEN criteria for moderate malnutrition of acute illness or injury per RD assessment as evidenced by:  Energy Intake (Malnutrition): less than 75% for greater than 7 days  Weight Loss (Malnutrition): greater than 2% in 1 week  Subcutaneous Fat (Malnutrition): moderate depletion  Muscle Mass (Malnutrition): moderate depletion  Fluid Accumulation (Malnutrition): other (see comments) (does not meet criteria)  Hand  Strength, Left (Malnutrition): unable to evaluate  Hand  Strength, Right (Malnutrition): unable to evaluate  A minimum of two characteristics is recommended for diagnosis of either severe or non-severe " malnutrition.       Case related differential diagnoses have been reviewed; assessment and plan has been documented. I have personally reviewed the labs and test results that are currently available; I have reviewed the patients medication list. I have reviewed the consulting providers recommendations. I have reviewed or attempted to review medical records based upon their availability.  All of the patient's and/or family's questions have been addressed and answered to the best of my ability.  I will continue to monitor closely and make adjustments to medical management as needed.  This document was created using M*Modal Fluency Direct.  Transcription errors may have been made.  Please contact me if any questions may rise regarding documentation to clarify transcription.        Sloan Alvarez MD   Internal Medicine  Department of Hospital Medicine  [facility] - 8th Floor Med Surg

## 2023-11-09 NOTE — PROGRESS NOTES
[facility] - 8th Floor [facility] Surg  [facility] MEDICINE ~ PROGRESS NOTE        CHIEF COMPLAINT   Hospital follow up    HOSPITAL COURSE   Lionel León is a 42 y.o. White male with a past medical history of diverticulitis status post colon resection and ulcerative colitis. Patient had recent admission to hospital medicine services at [facility] on 10/21/2023 to 10/24/2023 for sepsis secondary to pancolitis. He was treated with antibiotics and GI was consulted. Colonoscopy was performed on 10/23/2023 with findings concerning for ulcerative colitis and biopsies of the cecum, ascending, transverse, ascending colon and rectum positive for active chronic colitis consistent with inflammatory bowel disorder. No transfusion was required for rectal bleeding. Patient was discharged with prednisone taper and GI follow up appointment on 11/17/2023. The patient presented to [facility] on 11/6/2023 with a primary complaint of bright red rectal bleeding and lower abdominal pain.  Patient reports rectal bleeding has been ongoing since discharge.  Other associated symptoms include nausea, rectal pain, subjective fevers, weakness, fatigue and a 30 40 lb weight loss over last 3 weeks.  Patient states this morning he was walking to the bathroom when he felt flushed and vision went black. He passed out and hit his head on the wooden floor. Approximately hour and a half later when he went to get up he passed out again.  Second syncopal episode was witnessed by patient's wife who is at bedside reports loss of consciousness lasts for approximately 30 seconds.         Upon presentation to the ED, temperature 98.3F, heart rate 109, blood pressure 96/51, respiratory rate 17, spO2 99%. Labs with H&H 7.3/23.6 (10.6/33.2 on 10/24/2023), BUN 21.9, creatinine 0.88, calcium 7.7, .6, ESR 86. EKG sinus tachycardia with a ventricular rate of 108 and age undetermined possible inferior infarct. In the ED patient received Dilaudid, Zofran,  Hydrocortisone and IVF. He was typed and crossmatched for transfusion of 2 units of packed RBC. Patient is admitted to hospital medicine services for further medical management.      Patient was seen by GI and started on IV steroids, Solu-Medrol 30 mg IV b.I.d. for active ulcerative colitis flare up  Plus Anusol suppository per rectum b.I.d..  Patient is status post 2 units PRBC for symptomatic anemia.  CT abdomen and pelvis was also ordered to assess extent. CT of the abdomen and pelvis was pertinent for findings consistent with colitis involving the ascending colon, portions of the descending colon.  Underlying gastritis also can not be excluded secondary to slightly thickened gastric wall.      May complain continues to be rectal pain but refers that suppositories are helping.  Patient is tolerating p.o. intake     According to GI notes plans is for patient to start Humira/imura once approved by patient's insurance.                    Today  11/7/23-Doing better feeling better pain is better.  4 bowel movements yesterday with some blood as well.  Hemoglobin did not have appropriate response but will continue to monitor.  Discuss with GI physician assistant as well.    11/8/23 dr umanzor -  Today patient had 1 BM overnight and another one this a.m..  Refers that stools are becoming more formed still with some ongoing hematochezia.  Rectal pain continues to improve.  For now we will continue hydrocortisone suppositories t.I.d. as well as IV Solu-Medrol 30 mg IV b.I.d. with plans to start Humira/ Imura as outpt.  Patient today refers feeling okay, his pain goes up and down but overall feels improved since admission not ready to go home.  We will continue present management and continue following recommendations by GI        OBJECTIVE/PHYSICAL EXAM     VITAL SIGNS (MOST RECENT):  Temp: 99.2 °F (37.3 °C) (11/08/23 1918)  Pulse: 92 (11/08/23 1918)  Resp: 16 (11/08/23 1920)  BP: (!) 140/68 (11/08/23 1918)  SpO2: 97 %  (11/08/23 1918) VITAL SIGNS (24 HOUR RANGE):  Temp:  [98 °F (36.7 °C)-99.2 °F (37.3 °C)] 99.2 °F (37.3 °C)  Pulse:  [86-92] 92  Resp:  [16-18] 16  SpO2:  [97 %-99 %] 97 %  BP: (127-140)/(68-84) 140/68   GENERAL: In no acute distress, afebrile  HEENT:  CHEST: Clear to auscultation bilaterally  HEART: S1, S2, no appreciable murmur  ABDOMEN: Soft,  generalized abdominal pain, BS +  MSK: Warm, no lower extremity edema, no clubbing or cyanosis  NEUROLOGIC: Alert and oriented x4, moving all extremities with good strength   INTEGUMENTARY: intact  PSYCHIATRY: alert/ active and oriented.        ASSESSMENT/PLAN   Acute severe ulcerative colitis   Orthostatic syncope   Symptomatic anemia requiring blood transfusion  Acute blood loss anemia        GI following.  Solu-Medrol 30 b.i.d., he was on steroids by our to coming into the hospital and did not improve.   Awaiting Humira/imura  Continue Anusol suppository b.i.d.>>tid   Severe protein calorie malnutrition    DVT prophylaxis:     Anticipated discharge and disposition:   __________________________________________________________________________    LABS/MICRO/MEDS/DIAGNOSTICS       LABS  Recent Labs     11/07/23  0445   *   K 4.4   CHLORIDE 100   CO2 26   BUN 17.3   CREATININE 0.77   GLUCOSE 161*   CALCIUM 7.4*   ALKPHOS 39*   AST 8   ALT 16   ALBUMIN 1.7*       Recent Labs     11/08/23  0447   WBC 5.51  5.51   RBC 3.07*   HCT 25.6*   MCV 83.4            MICROBIOLOGY  Microbiology Results (last 7 days)       Procedure Component Value Units Date/Time    Blood culture #1 **CANNOT BE ORDERED STAT** [[id number]]  (Normal) Collected: 11/06/23 0405    Order Status: Completed Specimen: Blood from Antecubital, Right Updated: 11/08/23 0700     CULTURE, BLOOD ([facility]) No Growth At 48 Hours    Blood culture #2 **CANNOT BE ORDERED STAT** [[id number]]  (Normal) Collected: 11/06/23 0405    Order Status: Completed Specimen: Blood from Antecubital, Left Updated: 11/08/23 0601      "CULTURE, BLOOD ([facility]) No Growth At 48 Hours    Clostridium Diff Toxin, A & B, EIA [[id number]]  (Normal) Collected: 11/06/23 1741    Order Status: Completed Specimen: Stool Updated: 11/06/23 1816     Clostridium Difficile GDH Antigen Negative     Clostridium Difficile Toxin A/B Negative               MEDICATIONS   hydrocortisone  25 mg Rectal TID    methylPREDNISolone sodium succinate injection  30 mg Intravenous Q12H         INFUSIONS         DIAGNOSTIC TESTS  CT Abdomen Pelvis W Wo Contrast   Final Result      Findings seen consistent with colitis involving the ascending colon and portions of the descending colon.  Follow-up is recommended to resolution as underlying mass cannot be completely excluded.      The gastric wall appears to be slightly thickened.  Underlying gastritis should be excluded.         Electronically signed by: John Gale   Date:    11/06/2023   Time:    12:02           No results found for: "EF"       NUTRITION STATUS  Patient meets ASPEN criteria for moderate malnutrition of acute illness or injury per RD assessment as evidenced by:  Energy Intake (Malnutrition): less than 75% for greater than 7 days  Weight Loss (Malnutrition): greater than 2% in 1 week  Subcutaneous Fat (Malnutrition): moderate depletion  Muscle Mass (Malnutrition): moderate depletion  Fluid Accumulation (Malnutrition): other (see comments) (does not meet criteria)  Hand  Strength, Left (Malnutrition): unable to evaluate  Hand  Strength, Right (Malnutrition): unable to evaluate  A minimum of two characteristics is recommended for diagnosis of either severe or non-severe malnutrition.       Case related differential diagnoses have been reviewed; assessment and plan has been documented. I have personally reviewed the labs and test results that are currently available; I have reviewed the patients medication list. I have reviewed the consulting providers recommendations. I have reviewed or attempted to review " medical records based upon their availability.  All of the patient's and/or family's questions have been addressed and answered to the best of my ability.  I will continue to monitor closely and make adjustments to medical management as needed.  This document was created using M*Modal Fluency Direct.  Transcription errors may have been made.  Please contact me if any questions may rise regarding documentation to clarify transcription.        Sloan Alvarez MD   Internal Medicine  Department of Hospital Medicine [facility] - 8th Floor Med Surg

## 2023-11-10 LAB
ABS NEUT (OLG): 6.73 X10(3)/MCL (ref 2.1–9.2)
ANION GAP SERPL CALC-SCNC: 8 MEQ/L
APPEARANCE UR: CLEAR
BACTERIA #/AREA URNS AUTO: ABNORMAL /HPF
BILIRUB UR QL STRIP.AUTO: NEGATIVE
BUN SERPL-MCNC: 12.9 MG/DL (ref 8.9–20.6)
CALCIUM SERPL-MCNC: 7.8 MG/DL (ref 8.4–10.2)
CHLORIDE SERPL-SCNC: 98 MMOL/L (ref 98–107)
CO2 SERPL-SCNC: 27 MMOL/L (ref 22–29)
COLOR UR AUTO: ABNORMAL
CREAT SERPL-MCNC: 0.73 MG/DL (ref 0.73–1.18)
CREAT/UREA NIT SERPL: 18
ERYTHROCYTE [DISTWIDTH] IN BLOOD BY AUTOMATED COUNT: 15.2 % (ref 11.5–17)
GFR SERPLBLD CREATININE-BSD FMLA CKD-EPI: >60 MLS/MIN/1.73/M2
GLUCOSE SERPL-MCNC: 169 MG/DL (ref 74–100)
GLUCOSE UR QL STRIP.AUTO: NORMAL
GROUP & RH: NORMAL
HCT VFR BLD AUTO: 24.6 % (ref 42–52)
HGB BLD-MCNC: 7.6 G/DL (ref 14–18)
INDIRECT COOMBS GEL: NORMAL
INSTRUMENT WBC (OLG): 7.32 X10(3)/MCL
KETONES UR QL STRIP.AUTO: NEGATIVE
LEUKOCYTE ESTERASE UR QL STRIP.AUTO: NEGATIVE
LYMPHOCYTES NFR BLD MANUAL: 0.37 X10(3)/MCL
LYMPHOCYTES NFR BLD MANUAL: 5 %
MAGNESIUM SERPL-MCNC: 2.2 MG/DL (ref 1.6–2.6)
MCH RBC QN AUTO: 25.8 PG (ref 27–31)
MCHC RBC AUTO-ENTMCNC: 30.9 G/DL (ref 33–36)
MCV RBC AUTO: 83.4 FL (ref 80–94)
METAMYELOCYTES NFR BLD MANUAL: 2 %
MONOCYTES NFR BLD MANUAL: 0.07 X10(3)/MCL (ref 0.1–1.3)
MONOCYTES NFR BLD MANUAL: 1 %
NEUTROPHILS NFR BLD MANUAL: 92 %
NITRITE UR QL STRIP.AUTO: NEGATIVE
NRBC BLD AUTO-RTO: 0.3 %
NRBC BLD MANUAL-RTO: 1 %
PH UR STRIP.AUTO: 6.5 [PH]
PLATELET # BLD AUTO: 268 X10(3)/MCL (ref 130–400)
PLATELET # BLD EST: NORMAL 10*3/UL
PMV BLD AUTO: 9.4 FL (ref 7.4–10.4)
POTASSIUM SERPL-SCNC: 4.4 MMOL/L (ref 3.5–5.1)
PROT UR QL STRIP.AUTO: ABNORMAL
RBC # BLD AUTO: 2.95 X10(6)/MCL (ref 4.7–6.1)
RBC #/AREA URNS AUTO: ABNORMAL /HPF
RBC MORPH BLD: NORMAL
RBC UR QL AUTO: ABNORMAL
SODIUM SERPL-SCNC: 133 MMOL/L (ref 136–145)
SP GR UR STRIP.AUTO: 1.02 (ref 1–1.03)
SPECIMEN OUTDATE: NORMAL
SQUAMOUS #/AREA URNS LPF: ABNORMAL /HPF
UROBILINOGEN UR STRIP-ACNC: NORMAL
WBC # SPEC AUTO: 7.32 X10(3)/MCL (ref 4.5–11.5)
WBC #/AREA URNS AUTO: ABNORMAL /HPF

## 2023-11-10 PROCEDURE — 80048 BASIC METABOLIC PNL TOTAL CA: CPT | Performed by: INTERNAL MEDICINE

## 2023-11-10 PROCEDURE — 63600175 PHARM REV CODE 636 W HCPCS: Performed by: INTERNAL MEDICINE

## 2023-11-10 PROCEDURE — 25000003 PHARM REV CODE 250

## 2023-11-10 PROCEDURE — 25000003 PHARM REV CODE 250: Performed by: INTERNAL MEDICINE

## 2023-11-10 PROCEDURE — 81001 URINALYSIS AUTO W/SCOPE: CPT | Performed by: INTERNAL MEDICINE

## 2023-11-10 PROCEDURE — 25000003 PHARM REV CODE 250: Performed by: PHYSICIAN ASSISTANT

## 2023-11-10 PROCEDURE — 87040 BLOOD CULTURE FOR BACTERIA: CPT | Performed by: INTERNAL MEDICINE

## 2023-11-10 PROCEDURE — 86480 TB TEST CELL IMMUN MEASURE: CPT

## 2023-11-10 PROCEDURE — 86923 COMPATIBILITY TEST ELECTRIC: CPT | Mod: 91 | Performed by: INTERNAL MEDICINE

## 2023-11-10 PROCEDURE — 85027 COMPLETE CBC AUTOMATED: CPT | Performed by: INTERNAL MEDICINE

## 2023-11-10 PROCEDURE — 83735 ASSAY OF MAGNESIUM: CPT | Performed by: INTERNAL MEDICINE

## 2023-11-10 PROCEDURE — 86901 BLOOD TYPING SEROLOGIC RH(D): CPT | Performed by: NURSE PRACTITIONER

## 2023-11-10 PROCEDURE — 11000001 HC ACUTE MED/SURG PRIVATE ROOM

## 2023-11-10 PROCEDURE — 87507 IADNA-DNA/RNA PROBE TQ 12-25: CPT | Performed by: INTERNAL MEDICINE

## 2023-11-10 PROCEDURE — 89055 LEUKOCYTE ASSESSMENT FECAL: CPT | Performed by: INTERNAL MEDICINE

## 2023-11-10 PROCEDURE — P9016 RBC LEUKOCYTES REDUCED: HCPCS | Performed by: INTERNAL MEDICINE

## 2023-11-10 RX ORDER — HYDROCODONE BITARTRATE AND ACETAMINOPHEN 500; 5 MG/1; MG/1
TABLET ORAL
Status: DISCONTINUED | OUTPATIENT
Start: 2023-11-10 | End: 2023-11-12

## 2023-11-10 RX ADMIN — OXYCODONE AND ACETAMINOPHEN 1 TABLET: 10; 325 TABLET ORAL at 10:11

## 2023-11-10 RX ADMIN — HYDROMORPHONE HYDROCHLORIDE 0.5 MG: 2 INJECTION INTRAMUSCULAR; INTRAVENOUS; SUBCUTANEOUS at 07:11

## 2023-11-10 RX ADMIN — HYDROCORTISONE ACETATE 25 MG: 25 SUPPOSITORY RECTAL at 07:11

## 2023-11-10 RX ADMIN — HYDROMORPHONE HYDROCHLORIDE 0.5 MG: 2 INJECTION INTRAMUSCULAR; INTRAVENOUS; SUBCUTANEOUS at 03:11

## 2023-11-10 RX ADMIN — METHYLPREDNISOLONE SODIUM SUCCINATE 30 MG: 40 INJECTION, POWDER, FOR SOLUTION INTRAMUSCULAR; INTRAVENOUS at 11:11

## 2023-11-10 RX ADMIN — HYDROMORPHONE HYDROCHLORIDE 0.5 MG: 2 INJECTION INTRAMUSCULAR; INTRAVENOUS; SUBCUTANEOUS at 11:11

## 2023-11-10 RX ADMIN — METHYLPREDNISOLONE SODIUM SUCCINATE 30 MG: 40 INJECTION, POWDER, FOR SOLUTION INTRAMUSCULAR; INTRAVENOUS at 10:11

## 2023-11-10 RX ADMIN — HYDROCORTISONE ACETATE 25 MG: 25 SUPPOSITORY RECTAL at 02:11

## 2023-11-10 RX ADMIN — ACETAMINOPHEN 650 MG: 325 TABLET, FILM COATED ORAL at 03:11

## 2023-11-10 NOTE — PROGRESS NOTES
"Gastroenterology Progress Note    Subjective:  Abdominal/rectal pain persists- not worsening. He continues to have hematochezia, stool is forming.  Denies N/V, tolerating PO.     Objective:    ROS:    Review of Systems   Constitutional:  Negative for chills and fever.   HENT:  Negative for sore throat.    Respiratory:  Negative for shortness of breath and wheezing.    Gastrointestinal:  Positive for abdominal pain (lower) and blood in stool. Negative for heartburn, nausea and vomiting.   Neurological:  Negative for seizures, loss of consciousness and weakness.   Psychiatric/Behavioral:  The patient is not nervous/anxious.          Vital Signs:  /63   Pulse 92   Temp 99.7 °F (37.6 °C)   Resp 18   Ht 5' 10" (1.778 m)   Wt 74.8 kg (165 lb)   SpO2 97%   BMI 23.68 kg/m²   Body mass index is 23.68 kg/m².    Physical Exam:    Physical Exam  Constitutional:       General: He is not in acute distress.     Appearance: He is not ill-appearing.   HENT:      Head: Normocephalic and atraumatic.      Nose: Nose normal.   Eyes:      General: No scleral icterus.  Cardiovascular:      Pulses: Normal pulses.   Pulmonary:      Effort: No respiratory distress.      Breath sounds: No stridor.   Abdominal:      General: There is no distension.      Tenderness: There is no abdominal tenderness. There is no guarding.   Genitourinary:     Comments: Erythema/irritation to bilat buttocks w/ calmoseptine cream  Skin:     General: Skin is warm.      Coloration: Skin is not jaundiced or pale.   Neurological:      General: No focal deficit present.      Mental Status: He is alert and oriented to person, place, and time. Mental status is at baseline.   Psychiatric:         Mood and Affect: Mood normal.         Behavior: Behavior normal.         Thought Content: Thought content normal.         Judgment: Judgment normal.         Labs:  Recent Results (from the past 24 hour(s))   Basic Metabolic Panel    Collection Time: 11/10/23  4:38 AM "   Result Value Ref Range    Sodium Level 133 (L) 136 - 145 mmol/L    Potassium Level 4.4 3.5 - 5.1 mmol/L    Chloride 98 98 - 107 mmol/L    Carbon Dioxide 27 22 - 29 mmol/L    Glucose Level 169 (H) 74 - 100 mg/dL    Blood Urea Nitrogen 12.9 8.9 - 20.6 mg/dL    Creatinine 0.73 0.73 - 1.18 mg/dL    BUN/Creatinine Ratio 18     Calcium Level Total 7.8 (L) 8.4 - 10.2 mg/dL    Anion Gap 8.0 mEq/L    eGFR >60 mls/min/1.73/m2   Magnesium    Collection Time: 11/10/23  4:38 AM   Result Value Ref Range    Magnesium Level 2.20 1.60 - 2.60 mg/dL   Type & Screen    Collection Time: 11/10/23  4:38 AM   Result Value Ref Range    Group & Rh O POS     Indirect Arthur GEL NEG     Specimen Outdate 11/13/2023 23:59    CBC with Differential    Collection Time: 11/10/23  4:38 AM   Result Value Ref Range    WBC 7.32 4.50 - 11.50 x10(3)/mcL    RBC 2.95 (L) 4.70 - 6.10 x10(6)/mcL    Hgb 7.6 (L) 14.0 - 18.0 g/dL    Hct 24.6 (L) 42.0 - 52.0 %    MCV 83.4 80.0 - 94.0 fL    MCH 25.8 (L) 27.0 - 31.0 pg    MCHC 30.9 (L) 33.0 - 36.0 g/dL    RDW 15.2 11.5 - 17.0 %    Platelet 268 130 - 400 x10(3)/mcL    MPV 9.4 7.4 - 10.4 fL    NRBC% 0.3 %   Manual Differential    Collection Time: 11/10/23  4:38 AM   Result Value Ref Range    WBC 7.32 x10(3)/mcL    Neutrophils % 92 %    Lymphs % 5 %    Monocytes % 1 %    Metamyelocytes % 2 (H) <=0 %    nRBC % 1 %    Neutrophils Abs 6.7344 2.1 - 9.2 x10(3)/mcL    Lymphs Abs 0.366 (L) 0.6 - 4.6 x10(3)/mcL    Monocytes Abs 0.0732 (L) 0.1 - 1.3 x10(3)/mcL    Platelets Normal Normal, Adequate    RBC Morph Normal Normal         Assessment/Plan:  42-year-old male known to Dr. He with past medical history of diverticulitis s/p colon resection and recent new diagnosis of ulcerative colitis.  He presented to the ED with complaints of bright red blood per rectum and lower abdominal pain associated with nausea, rectal pain, weakness and 30-40 lb weight loss over the past 3 weeks with syncopal episode x2- 2nd episode with  LOC.     Newly diagnosed UC, active flare  - cdiff neg  - continue IV Solumedrol, 30mg BID and monitor for improvement   - he will need to be transitioned to PO steroids 24hrs prior to eventual dc  - upcoming outpatient appt scheduled for 11/17- plans to start humira and imura- currently in the process of getting approval. Message was sent to our office and medication has been expedited/ also looking into availability of samples in the meantime. Update as of 11/09- medication had been approved with hopeful shipping in the next 24-48 hrs.   - supp care  2. Rectal pain with BM, hx hemorrhoids  - alleviated with hydrocortisone suppository- increased to TID  - continue cream prn     Addendum @ 16:30- Patient now has Humira samples and was planning to give loading dose this afternoon however pt with documented temp of 102.9, repeat normal. Unable to give samples today. GI will follow up tomorrow.       Thank you for allowing us to participate in the care of Lionel León.     WESTON NelsonC  Gastroenterology  [facility]

## 2023-11-10 NOTE — PROGRESS NOTES
[facility] - 8th Floor [facility]  [facility] MEDICINE ~ PROGRESS NOTE        CHIEF COMPLAINT   Hospital follow up    HOSPITAL COURSE   Lionel León is a 42 y.o. White male with a past medical history of diverticulitis status post colon resection and ulcerative colitis. Patient had recent admission to hospital medicine services at [facility] on 10/21/2023 to 10/24/2023 for sepsis secondary to pancolitis. He was treated with antibiotics and GI was consulted. Colonoscopy was performed on 10/23/2023 with findings concerning for ulcerative colitis and biopsies of the cecum, ascending, transverse, ascending colon and rectum positive for active chronic colitis consistent with inflammatory bowel disorder. No transfusion was required for rectal bleeding. Patient was discharged with prednisone taper and GI follow up appointment on 11/17/2023. The patient presented to [facility] on 11/6/2023 with a primary complaint of bright red rectal bleeding and lower abdominal pain.  Patient reports rectal bleeding has been ongoing since discharge.  Other associated symptoms include nausea, rectal pain, subjective fevers, weakness, fatigue and a 30 40 lb weight loss over last 3 weeks.  Patient states this morning he was walking to the bathroom when he felt flushed and vision went black. He passed out and hit his head on the wooden floor. Approximately hour and a half later when he went to get up he passed out again.  Second syncopal episode was witnessed by patient's wife who is at bedside reports loss of consciousness lasts for approximately 30 seconds.         Upon presentation to the ED, temperature 98.3F, heart rate 109, blood pressure 96/51, respiratory rate 17, spO2 99%. Labs with H&H 7.3/23.6 (10.6/33.2 on 10/24/2023), BUN 21.9, creatinine 0.88, calcium 7.7, .6, ESR 86. EKG sinus tachycardia with a ventricular rate of 108 and age undetermined possible inferior infarct. In the ED patient received Dilaudid, Zofran,  Hydrocortisone and IVF. He was typed and crossmatched for transfusion of 2 units of packed RBC. Patient is admitted to hospital medicine services for further medical management.      Patient was seen by GI and started on IV steroids, Solu-Medrol 30 mg IV b.I.d. for active ulcerative colitis flare up  Plus Anusol suppository per rectum b.I.d..  Patient is status post 2 units PRBC for symptomatic anemia.  CT abdomen and pelvis was also ordered to assess extent. CT of the abdomen and pelvis was pertinent for findings consistent with colitis involving the ascending colon, portions of the descending colon.  Underlying gastritis also can not be excluded secondary to slightly thickened gastric wall.      May complain continues to be rectal pain but refers that suppositories are helping.  Patient is tolerating p.o. intake     According to GI notes plans is for patient to start Humira/imura once approved by patient's insurance.                    Today  11/7/23-Doing better feeling better pain is better.  4 bowel movements yesterday with some blood as well.  Hemoglobin did not have appropriate response but will continue to monitor.  Discuss with GI physician assistant as well.    11/8/23 dr alvarez -  Today patient had 1 BM overnight and another one this a.m..  Refers that stools are becoming more formed still with some ongoing hematochezia.  Rectal pain continues to improve.  For now we will continue hydrocortisone suppositories t.I.d. as well as IV Solu-Medrol 30 mg IV b.I.d. with plans to start Humira/ Imura as outpt.  Patient today refers feeling okay, his pain goes up and down but overall feels improved since admission not ready to go home.  We will continue present management and continue following recommendations by GI    11/09/2023 Dr. Alvarez-chart reviewed patient examined.  Still having episodes of bright red per rectum.  Also refers some dizziness when getting out of bed.  Hematocrit dropped to 24.7.  BP running at  98/57.  We started normal saline solution.  will require PRBC transfusion tomorrow.      11/10/2023 Dr. Alvarez-chart reviewed patient examined.  Remains with complains of dizziness with hematocrit around 24.7.  Samples of Humira available but patient ran a fever of around 102.5 that quickly resolved without antipyretic.  Now Humira on hold.  We will go ahead and transfuse 2 units PRBC.  Patient received 1 g acetaminophen.  Also refers his stools are more formed with less abdominal pain and good appetite.        OBJECTIVE/PHYSICAL EXAM     VITAL SIGNS (MOST RECENT):  Temp: 97.6 °F (36.4 °C) (11/10/23 1540)  Pulse: 109 (11/10/23 1529)  Resp: 16 (11/10/23 1541)  BP: (!) 119/57 (11/10/23 1529)  SpO2: 98 % (11/10/23 1529) VITAL SIGNS (24 HOUR RANGE):  Temp:  [97.6 °F (36.4 °C)-102.9 °F (39.4 °C)] 97.6 °F (36.4 °C)  Pulse:  [] 109  Resp:  [16-20] 16  SpO2:  [96 %-99 %] 98 %  BP: (104-119)/(55-73) 119/57   GENERAL: In no acute distress, afebrile  HEENT:  CHEST: Clear to auscultation bilaterally  HEART: S1, S2, no appreciable murmur  ABDOMEN: Soft,  generalized abdominal pain, BS +  MSK: Warm, no lower extremity edema, no clubbing or cyanosis  NEUROLOGIC: Alert and oriented x4, moving all extremities with good strength   INTEGUMENTARY: intact  PSYCHIATRY: alert/ active and oriented.        ASSESSMENT/PLAN   Acute severe ulcerative colitis , new onset  -C diff negative  -Solu-Medrol 30 b.i.d.        Acute blood loss anemia requiring blood transfusion  - transfuse 2 units PRBC each over 4 hours    Orthostatic syncope probably related to symptomatic anemia/intravascular volume depletion    Fever x1-resolved without antipyretics    Rectal pain with history of hemorrhoids  -continue Anusol t.i.d.     GI following.    Humira samples were obtained but on hold secondary to febrile episode  Severe protein calorie malnutrition  IS given, encouraged to ambulate        Cc time 35 minutes  Cc dx- symptomatic anemia requiring  transfusion of PRBC      DVT prophylaxis:     Anticipated discharge and disposition: tbd  __________________________________________________________________________    LABS/MICRO/MEDS/DIAGNOSTICS       LABS  Recent Labs     11/09/23  0442 11/10/23  0438   * 133*   K 5.0 4.4   CHLORIDE 96* 98   CO2 31* 27   BUN 14.7 12.9   CREATININE 0.78 0.73   GLUCOSE 164* 169*   CALCIUM 7.3* 7.8*   ALKPHOS 64  --    AST 52*  --    ALT 83*  --    ALBUMIN 1.6*  --        Recent Labs     11/10/23  0438   WBC 7.32  7.32   RBC 2.95*   HCT 24.6*   MCV 83.4            MICROBIOLOGY  Microbiology Results (last 7 days)       Procedure Component Value Units Date/Time    Blood culture #1 **CANNOT BE ORDERED STAT** [[id number]]  (Normal) Collected: 11/06/23 0405    Order Status: Completed Specimen: Blood from Antecubital, Right Updated: 11/10/23 0701     CULTURE, BLOOD ([facility]) No Growth At 96 Hours    Blood culture #2 **CANNOT BE ORDERED STAT** [[id number]]  (Normal) Collected: 11/06/23 0405    Order Status: Completed Specimen: Blood from Antecubital, Left Updated: 11/10/23 0600     CULTURE, BLOOD ([facility]) No Growth At 96 Hours    Clostridium Diff Toxin, A & B, EIA [[id number]]  (Normal) Collected: 11/06/23 1741    Order Status: Completed Specimen: Stool Updated: 11/06/23 1816     Clostridium Difficile GDH Antigen Negative     Clostridium Difficile Toxin A/B Negative               MEDICATIONS   hydrocortisone  25 mg Rectal TID    methylPREDNISolone sodium succinate injection  30 mg Intravenous Q12H         INFUSIONS   sodium chloride 0.9% 75 mL/hr at 11/09/23 1849          DIAGNOSTIC TESTS  CT Abdomen Pelvis W Wo Contrast   Final Result      Findings seen consistent with colitis involving the ascending colon and portions of the descending colon.  Follow-up is recommended to resolution as underlying mass cannot be completely excluded.      The gastric wall appears to be slightly thickened.  Underlying gastritis should be excluded.     "     Electronically signed by: Patelreji Mezashelby   Date:    11/06/2023   Time:    12:02           No results found for: "EF"       NUTRITION STATUS  Patient meets ASPEN criteria for moderate malnutrition of acute illness or injury per RD assessment as evidenced by:  Energy Intake (Malnutrition): less than 75% for greater than 7 days  Weight Loss (Malnutrition): greater than 2% in 1 week  Subcutaneous Fat (Malnutrition): moderate depletion  Muscle Mass (Malnutrition): moderate depletion  Fluid Accumulation (Malnutrition): other (see comments) (does not meet criteria)  Hand  Strength, Left (Malnutrition): unable to evaluate  Hand  Strength, Right (Malnutrition): unable to evaluate  A minimum of two characteristics is recommended for diagnosis of either severe or non-severe malnutrition.       Case related differential diagnoses have been reviewed; assessment and plan has been documented. I have personally reviewed the labs and test results that are currently available; I have reviewed the patients medication list. I have reviewed the consulting providers recommendations. I have reviewed or attempted to review medical records based upon their availability.  All of the patient's and/or family's questions have been addressed and answered to the best of my ability.  I will continue to monitor closely and make adjustments to medical management as needed.  This document was created using M*Modal Fluency Direct.  Transcription errors may have been made.  Please contact me if any questions may rise regarding documentation to clarify transcription.        Sloan Alvarez MD   Internal Medicine  Department of Hospital Medicine  [facility] - 8th Floor Med Surg               "

## 2023-11-11 ENCOUNTER — ANESTHESIA EVENT (OUTPATIENT)
Dept: SURGERY | Facility: HOSPITAL | Age: 43
DRG: 853 | End: 2023-11-11
Payer: COMMERCIAL

## 2023-11-11 ENCOUNTER — ANESTHESIA (OUTPATIENT)
Dept: SURGERY | Facility: HOSPITAL | Age: 43
DRG: 853 | End: 2023-11-11
Payer: COMMERCIAL

## 2023-11-11 LAB
ABS NEUT (OLG): 13.45 X10(3)/MCL (ref 2.1–9.2)
ADV 40+41 DNA STL QL NAA+NON-PROBE: NOT DETECTED
ANION GAP SERPL CALC-SCNC: 11 MEQ/L
ASTRO TYP 1-8 RNA STL QL NAA+NON-PROBE: NOT DETECTED
BACTERIA BLD CULT: NORMAL
BACTERIA BLD CULT: NORMAL
BUN SERPL-MCNC: 14.1 MG/DL (ref 8.9–20.6)
C CAYETANENSIS DNA STL QL NAA+NON-PROBE: NOT DETECTED
C COLI+JEJ+UPSA DNA STL QL NAA+NON-PROBE: NOT DETECTED
CALCIUM SERPL-MCNC: 7.8 MG/DL (ref 8.4–10.2)
CHLORIDE SERPL-SCNC: 96 MMOL/L (ref 98–107)
CO2 SERPL-SCNC: 24 MMOL/L (ref 22–29)
CREAT SERPL-MCNC: 0.81 MG/DL (ref 0.73–1.18)
CREAT/UREA NIT SERPL: 17
CRYPTOSP DNA STL QL NAA+NON-PROBE: NOT DETECTED
E HISTOLYT DNA STL QL NAA+NON-PROBE: NOT DETECTED
EAEC PAA PLAS AGGR+AATA ST NAA+NON-PRB: NOT DETECTED
EC STX1+STX2 GENES STL QL NAA+NON-PROBE: NOT DETECTED
EPEC EAE GENE STL QL NAA+NON-PROBE: NOT DETECTED
ERYTHROCYTE [DISTWIDTH] IN BLOOD BY AUTOMATED COUNT: 15.5 % (ref 11.5–17)
ETEC LTA+ST1A+ST1B TOX ST NAA+NON-PROBE: NOT DETECTED
FECAL LEUKOCYTE (OHS): POSITIVE
G LAMBLIA DNA STL QL NAA+NON-PROBE: NOT DETECTED
GFR SERPLBLD CREATININE-BSD FMLA CKD-EPI: >60 MLS/MIN/1.73/M2
GLUCOSE SERPL-MCNC: 173 MG/DL (ref 74–100)
HCT VFR BLD AUTO: 25.3 % (ref 42–52)
HGB BLD-MCNC: 8 G/DL (ref 14–18)
INSTRUMENT WBC (OLG): 14.16 X10(3)/MCL
LYMPHOCYTES NFR BLD MANUAL: 0.42 X10(3)/MCL
LYMPHOCYTES NFR BLD MANUAL: 3 %
MCH RBC QN AUTO: 26.1 PG (ref 27–31)
MCHC RBC AUTO-ENTMCNC: 31.6 G/DL (ref 33–36)
MCV RBC AUTO: 82.4 FL (ref 80–94)
METAMYELOCYTES NFR BLD MANUAL: 1 %
MYELOCYTES NFR BLD MANUAL: 1 %
NEUTROPHILS NFR BLD MANUAL: 95 %
NOROVIRUS GI+II RNA STL QL NAA+NON-PROBE: NOT DETECTED
NRBC BLD AUTO-RTO: 0.1 %
P SHIGELLOIDES DNA STL QL NAA+NON-PROBE: NOT DETECTED
PLATELET # BLD AUTO: 296 X10(3)/MCL (ref 130–400)
PLATELET # BLD EST: NORMAL 10*3/UL
PMV BLD AUTO: 9.8 FL (ref 7.4–10.4)
POTASSIUM SERPL-SCNC: 4 MMOL/L (ref 3.5–5.1)
RBC # BLD AUTO: 3.07 X10(6)/MCL (ref 4.7–6.1)
RBC MORPH BLD: NORMAL
RVA RNA STL QL NAA+NON-PROBE: NOT DETECTED
S ENT+BONG DNA STL QL NAA+NON-PROBE: NOT DETECTED
SAPO I+II+IV+V RNA STL QL NAA+NON-PROBE: NOT DETECTED
SHIGELLA SP+EIEC IPAH ST NAA+NON-PROBE: NOT DETECTED
SODIUM SERPL-SCNC: 131 MMOL/L (ref 136–145)
V CHOL+PARA+VUL DNA STL QL NAA+NON-PROBE: NOT DETECTED
V CHOLERAE DNA STL QL NAA+NON-PROBE: NOT DETECTED
WBC # SPEC AUTO: 14.16 X10(3)/MCL (ref 4.5–11.5)
Y ENTEROCOL DNA STL QL NAA+NON-PROBE: NOT DETECTED

## 2023-11-11 PROCEDURE — 27201423 OPTIME MED/SURG SUP & DEVICES STERILE SUPPLY: Performed by: STUDENT IN AN ORGANIZED HEALTH CARE EDUCATION/TRAINING PROGRAM

## 2023-11-11 PROCEDURE — 87206 SMEAR FLUORESCENT/ACID STAI: CPT | Performed by: STUDENT IN AN ORGANIZED HEALTH CARE EDUCATION/TRAINING PROGRAM

## 2023-11-11 PROCEDURE — 87102 FUNGUS ISOLATION CULTURE: CPT | Performed by: NURSE PRACTITIONER

## 2023-11-11 PROCEDURE — 36000706: Performed by: STUDENT IN AN ORGANIZED HEALTH CARE EDUCATION/TRAINING PROGRAM

## 2023-11-11 PROCEDURE — 36000707: Performed by: STUDENT IN AN ORGANIZED HEALTH CARE EDUCATION/TRAINING PROGRAM

## 2023-11-11 PROCEDURE — 25000003 PHARM REV CODE 250

## 2023-11-11 PROCEDURE — 71000033 HC RECOVERY, INTIAL HOUR: Performed by: STUDENT IN AN ORGANIZED HEALTH CARE EDUCATION/TRAINING PROGRAM

## 2023-11-11 PROCEDURE — 63600175 PHARM REV CODE 636 W HCPCS: Performed by: INTERNAL MEDICINE

## 2023-11-11 PROCEDURE — D9220A PRA ANESTHESIA: ICD-10-PCS | Mod: CRNA,,, | Performed by: NURSE ANESTHETIST, CERTIFIED REGISTERED

## 2023-11-11 PROCEDURE — 37000009 HC ANESTHESIA EA ADD 15 MINS: Performed by: STUDENT IN AN ORGANIZED HEALTH CARE EDUCATION/TRAINING PROGRAM

## 2023-11-11 PROCEDURE — 63600175 PHARM REV CODE 636 W HCPCS: Performed by: NURSE ANESTHETIST, CERTIFIED REGISTERED

## 2023-11-11 PROCEDURE — 25000003 PHARM REV CODE 250: Performed by: NURSE ANESTHETIST, CERTIFIED REGISTERED

## 2023-11-11 PROCEDURE — 87077 CULTURE AEROBIC IDENTIFY: CPT | Performed by: STUDENT IN AN ORGANIZED HEALTH CARE EDUCATION/TRAINING PROGRAM

## 2023-11-11 PROCEDURE — 11000001 HC ACUTE MED/SURG PRIVATE ROOM

## 2023-11-11 PROCEDURE — 87102 FUNGUS ISOLATION CULTURE: CPT | Performed by: STUDENT IN AN ORGANIZED HEALTH CARE EDUCATION/TRAINING PROGRAM

## 2023-11-11 PROCEDURE — 85027 COMPLETE CBC AUTOMATED: CPT | Performed by: INTERNAL MEDICINE

## 2023-11-11 PROCEDURE — 25500020 PHARM REV CODE 255: Performed by: INTERNAL MEDICINE

## 2023-11-11 PROCEDURE — 25000003 PHARM REV CODE 250: Performed by: STUDENT IN AN ORGANIZED HEALTH CARE EDUCATION/TRAINING PROGRAM

## 2023-11-11 PROCEDURE — D9220A PRA ANESTHESIA: ICD-10-PCS | Mod: ANES,,, | Performed by: ANESTHESIOLOGY

## 2023-11-11 PROCEDURE — 87220 TISSUE EXAM FOR FUNGI: CPT | Performed by: NURSE PRACTITIONER

## 2023-11-11 PROCEDURE — 11043 DBRDMT MUSC&/FSCA 1ST 20/<: CPT | Mod: ,,, | Performed by: STUDENT IN AN ORGANIZED HEALTH CARE EDUCATION/TRAINING PROGRAM

## 2023-11-11 PROCEDURE — D9220A PRA ANESTHESIA: Mod: CRNA,,, | Performed by: NURSE ANESTHETIST, CERTIFIED REGISTERED

## 2023-11-11 PROCEDURE — D9220A PRA ANESTHESIA: Mod: ANES,,, | Performed by: ANESTHESIOLOGY

## 2023-11-11 PROCEDURE — 37000008 HC ANESTHESIA 1ST 15 MINUTES: Performed by: STUDENT IN AN ORGANIZED HEALTH CARE EDUCATION/TRAINING PROGRAM

## 2023-11-11 PROCEDURE — 80048 BASIC METABOLIC PNL TOTAL CA: CPT | Performed by: INTERNAL MEDICINE

## 2023-11-11 PROCEDURE — 25000003 PHARM REV CODE 250: Performed by: INTERNAL MEDICINE

## 2023-11-11 PROCEDURE — 11043 PR DEBRIDEMENT, SKIN, SUB-Q TISSUE,MUSCLE,=<20 SQ CM: ICD-10-PCS | Mod: ,,, | Performed by: STUDENT IN AN ORGANIZED HEALTH CARE EDUCATION/TRAINING PROGRAM

## 2023-11-11 PROCEDURE — 63600175 PHARM REV CODE 636 W HCPCS: Performed by: STUDENT IN AN ORGANIZED HEALTH CARE EDUCATION/TRAINING PROGRAM

## 2023-11-11 PROCEDURE — 87116 MYCOBACTERIA CULTURE: CPT | Performed by: STUDENT IN AN ORGANIZED HEALTH CARE EDUCATION/TRAINING PROGRAM

## 2023-11-11 PROCEDURE — 87205 SMEAR GRAM STAIN: CPT | Performed by: STUDENT IN AN ORGANIZED HEALTH CARE EDUCATION/TRAINING PROGRAM

## 2023-11-11 RX ORDER — HYDROMORPHONE HYDROCHLORIDE 2 MG/ML
INJECTION, SOLUTION INTRAMUSCULAR; INTRAVENOUS; SUBCUTANEOUS
Status: DISCONTINUED | OUTPATIENT
Start: 2023-11-11 | End: 2023-11-11

## 2023-11-11 RX ORDER — PROPOFOL 10 MG/ML
VIAL (ML) INTRAVENOUS
Status: DISCONTINUED | OUTPATIENT
Start: 2023-11-11 | End: 2023-11-11

## 2023-11-11 RX ORDER — CALCIUM CHLORIDE INJECTION 100 MG/ML
INJECTION, SOLUTION INTRAVENOUS
Status: DISCONTINUED | OUTPATIENT
Start: 2023-11-11 | End: 2023-11-11

## 2023-11-11 RX ORDER — LIDOCAINE HYDROCHLORIDE 10 MG/ML
1 INJECTION, SOLUTION EPIDURAL; INFILTRATION; INTRACAUDAL; PERINEURAL ONCE
Status: CANCELLED | OUTPATIENT
Start: 2023-11-11 | End: 2023-11-11

## 2023-11-11 RX ORDER — SUCCINYLCHOLINE CHLORIDE 20 MG/ML
INJECTION INTRAMUSCULAR; INTRAVENOUS
Status: DISCONTINUED | OUTPATIENT
Start: 2023-11-11 | End: 2023-11-11

## 2023-11-11 RX ORDER — ROCURONIUM BROMIDE 10 MG/ML
INJECTION, SOLUTION INTRAVENOUS
Status: DISCONTINUED | OUTPATIENT
Start: 2023-11-11 | End: 2023-11-11

## 2023-11-11 RX ORDER — FAMOTIDINE 10 MG/ML
20 INJECTION INTRAVENOUS ONCE
Status: CANCELLED | OUTPATIENT
Start: 2023-11-11

## 2023-11-11 RX ORDER — SODIUM CHLORIDE, SODIUM LACTATE, POTASSIUM CHLORIDE, CALCIUM CHLORIDE 600; 310; 30; 20 MG/100ML; MG/100ML; MG/100ML; MG/100ML
INJECTION, SOLUTION INTRAVENOUS CONTINUOUS
Status: DISCONTINUED | OUTPATIENT
Start: 2023-11-11 | End: 2023-11-18

## 2023-11-11 RX ORDER — FENTANYL CITRATE 50 UG/ML
INJECTION, SOLUTION INTRAMUSCULAR; INTRAVENOUS
Status: DISCONTINUED | OUTPATIENT
Start: 2023-11-11 | End: 2023-11-11

## 2023-11-11 RX ORDER — GLYCOPYRROLATE 0.2 MG/ML
INJECTION INTRAMUSCULAR; INTRAVENOUS
Status: DISCONTINUED | OUTPATIENT
Start: 2023-11-11 | End: 2023-11-11

## 2023-11-11 RX ORDER — LIDOCAINE HYDROCHLORIDE 20 MG/ML
INJECTION, SOLUTION EPIDURAL; INFILTRATION; INTRACAUDAL; PERINEURAL
Status: DISCONTINUED | OUTPATIENT
Start: 2023-11-11 | End: 2023-11-11

## 2023-11-11 RX ORDER — GABAPENTIN 300 MG/1
300 CAPSULE ORAL ONCE
Status: CANCELLED | OUTPATIENT
Start: 2023-11-11

## 2023-11-11 RX ORDER — CLINDAMYCIN PHOSPHATE 600 MG/50ML
600 INJECTION, SOLUTION INTRAVENOUS
Status: DISCONTINUED | OUTPATIENT
Start: 2023-11-11 | End: 2023-11-13

## 2023-11-11 RX ORDER — HYDROMORPHONE HYDROCHLORIDE 2 MG/ML
0.2 INJECTION, SOLUTION INTRAMUSCULAR; INTRAVENOUS; SUBCUTANEOUS EVERY 5 MIN PRN
Status: DISCONTINUED | OUTPATIENT
Start: 2023-11-11 | End: 2023-11-12 | Stop reason: HOSPADM

## 2023-11-11 RX ORDER — ONDANSETRON 2 MG/ML
INJECTION INTRAMUSCULAR; INTRAVENOUS
Status: DISCONTINUED | OUTPATIENT
Start: 2023-11-11 | End: 2023-11-11

## 2023-11-11 RX ORDER — KETOROLAC TROMETHAMINE 30 MG/ML
INJECTION, SOLUTION INTRAMUSCULAR; INTRAVENOUS
Status: DISCONTINUED | OUTPATIENT
Start: 2023-11-11 | End: 2023-11-11

## 2023-11-11 RX ORDER — HYDROCODONE BITARTRATE AND ACETAMINOPHEN 5; 325 MG/1; MG/1
1 TABLET ORAL
Status: DISCONTINUED | OUTPATIENT
Start: 2023-11-11 | End: 2023-11-12

## 2023-11-11 RX ORDER — SODIUM CHLORIDE 0.9 % (FLUSH) 0.9 %
10 SYRINGE (ML) INJECTION
Status: DISCONTINUED | OUTPATIENT
Start: 2023-11-11 | End: 2023-11-12 | Stop reason: HOSPADM

## 2023-11-11 RX ORDER — MIDAZOLAM HYDROCHLORIDE 1 MG/ML
INJECTION INTRAMUSCULAR; INTRAVENOUS
Status: DISCONTINUED | OUTPATIENT
Start: 2023-11-11 | End: 2023-11-11

## 2023-11-11 RX ADMIN — SODIUM CHLORIDE, POTASSIUM CHLORIDE, SODIUM LACTATE AND CALCIUM CHLORIDE: 600; 310; 30; 20 INJECTION, SOLUTION INTRAVENOUS at 09:11

## 2023-11-11 RX ADMIN — METHYLPREDNISOLONE SODIUM SUCCINATE 15 MG: 40 INJECTION, POWDER, FOR SOLUTION INTRAMUSCULAR; INTRAVENOUS at 09:11

## 2023-11-11 RX ADMIN — SUGAMMADEX 150 MG: 100 INJECTION, SOLUTION INTRAVENOUS at 07:11

## 2023-11-11 RX ADMIN — HYDROMORPHONE HYDROCHLORIDE 0.5 MG: 2 INJECTION INTRAMUSCULAR; INTRAVENOUS; SUBCUTANEOUS at 04:11

## 2023-11-11 RX ADMIN — SUCCINYLCHOLINE CHLORIDE 140 MG: 20 INJECTION, SOLUTION INTRAMUSCULAR; INTRAVENOUS at 06:11

## 2023-11-11 RX ADMIN — FENTANYL CITRATE 100 MCG: 50 INJECTION, SOLUTION INTRAMUSCULAR; INTRAVENOUS at 06:11

## 2023-11-11 RX ADMIN — ROCURONIUM BROMIDE 10 MG: 10 SOLUTION INTRAVENOUS at 06:11

## 2023-11-11 RX ADMIN — PIPERACILLIN SODIUM AND TAZOBACTAM SODIUM 4.5 G: 4; .5 INJECTION, POWDER, FOR SOLUTION INTRAVENOUS at 05:11

## 2023-11-11 RX ADMIN — SODIUM CHLORIDE 100 MG: 9 INJECTION, SOLUTION INTRAVENOUS at 06:11

## 2023-11-11 RX ADMIN — MIDAZOLAM HYDROCHLORIDE 2 MG: 1 INJECTION, SOLUTION INTRAMUSCULAR; INTRAVENOUS at 06:11

## 2023-11-11 RX ADMIN — GLYCOPYRROLATE 0.2 MG: 0.2 INJECTION INTRAMUSCULAR; INTRAVENOUS at 06:11

## 2023-11-11 RX ADMIN — CLINDAMYCIN PHOSPHATE 600 MG: 600 INJECTION, SOLUTION INTRAVENOUS at 09:11

## 2023-11-11 RX ADMIN — OXYCODONE AND ACETAMINOPHEN 1 TABLET: 10; 325 TABLET ORAL at 04:11

## 2023-11-11 RX ADMIN — METHYLPREDNISOLONE SODIUM SUCCINATE 30 MG: 40 INJECTION, POWDER, FOR SOLUTION INTRAMUSCULAR; INTRAVENOUS at 10:11

## 2023-11-11 RX ADMIN — ONDANSETRON 4 MG: 2 INJECTION INTRAMUSCULAR; INTRAVENOUS at 06:11

## 2023-11-11 RX ADMIN — IOPAMIDOL 100 ML: 755 INJECTION, SOLUTION INTRAVENOUS at 01:11

## 2023-11-11 RX ADMIN — PROPOFOL 130 MG: 10 INJECTION, EMULSION INTRAVENOUS at 06:11

## 2023-11-11 RX ADMIN — HYDROCORTISONE ACETATE 25 MG: 25 SUPPOSITORY RECTAL at 09:11

## 2023-11-11 RX ADMIN — ROCURONIUM BROMIDE 40 MG: 10 SOLUTION INTRAVENOUS at 06:11

## 2023-11-11 RX ADMIN — LIDOCAINE HYDROCHLORIDE 4 ML: 20 INJECTION, SOLUTION EPIDURAL; INFILTRATION; INTRACAUDAL; PERINEURAL at 06:11

## 2023-11-11 RX ADMIN — CALCIUM CHLORIDE INJECTION 1 G: 100 INJECTION, SOLUTION INTRAVENOUS at 06:11

## 2023-11-11 RX ADMIN — OXYCODONE AND ACETAMINOPHEN 1 TABLET: 10; 325 TABLET ORAL at 10:11

## 2023-11-11 RX ADMIN — HYDROMORPHONE HYDROCHLORIDE 1 MG: 2 INJECTION, SOLUTION INTRAMUSCULAR; INTRAVENOUS; SUBCUTANEOUS at 07:11

## 2023-11-11 RX ADMIN — SODIUM CHLORIDE: 0.9 INJECTION, SOLUTION INTRAVENOUS at 06:11

## 2023-11-11 RX ADMIN — VANCOMYCIN HYDROCHLORIDE 1750 MG: 500 INJECTION, POWDER, LYOPHILIZED, FOR SOLUTION INTRAVENOUS at 06:11

## 2023-11-11 RX ADMIN — KETOROLAC TROMETHAMINE 30 MG: 30 INJECTION, SOLUTION INTRAMUSCULAR; INTRAVENOUS at 07:11

## 2023-11-11 RX ADMIN — FENTANYL CITRATE 50 MCG: 50 INJECTION, SOLUTION INTRAMUSCULAR; INTRAVENOUS at 06:11

## 2023-11-11 NOTE — ANESTHESIA PREPROCEDURE EVALUATION
42-year-old male known to Dr. He with past medical history of diverticulitis s/p colon resection and recent new diagnosis of ulcerative colitis.  He presented to the ED with complaints of bright red blood per rectum and lower abdominal pain associated with nausea, rectal pain, weakness and 30-40 lb weight loss over the past 3 weeks with syncopal episode x2- 2nd episode with LOC.     1. Newly diagnosed UC, active flare  - will check stool for cdiff  -  will start IV Solumedrol, 30mg BID and monitor for improvement   - CT abd/pelvis to further assess    - upcoming outpatient appt scheduled for 11/17   - supp care                                                                                                  11/11/2023  Lionel León is a 42 y.o., male.  Procedure Information    Case: [id number] Date/Time: 11/11/23 1750   Procedure: DEBRIDEMENT, EXTERNAL GENITALIA, PERINEUM, AND ABDOMINAL WALL, FOR NECROTIZING SOFT TISSUE INFECTION (Bilateral) - Prone positioning.   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     I have reviewed the Nursing Notes. I have reviewed the NPO Status.   I have reviewed the Medications.     Review of Systems  Anesthesia Hx:  No problems with previous Anesthesia    Social:  Non-Smoker    Cardiovascular:   Denies Hypertension.  Denies MI.      Pulmonary:   Denies COPD.  Denies Asthma.    Renal/:   Denies Chronic Renal Disease. no renal calculi     Hepatic/GI:   Denies GERD.  Denies Hepatitis. pancolitis   Neurological:   Denies CVA. Denies Seizures.    Endocrine:   Denies Diabetes. Denies Hypothyroidism. Denies Hyperthyroidism.  Denies Obesity / BMI > 30      Physical Exam  General: Well nourished, Cooperative, Alert and Oriented    Airway:  Mallampati: I   Mouth Opening:  Normal  TM Distance: Normal  Tongue: Normal  Neck ROM: Normal ROM    Dental:  Intact       Latest Reference Range & Units Most Recent   WBC 4.50 - 11.50 x10(3)/mcL 14.16 (H)  11/11/23 04:35   RBC 4.70 - 6.10 x10(6)/mcL 3.07 (L)  11/11/23 04:35   Hemoglobin 14.0 - 18.0 g/dL 8.0 (L)  11/11/23 04:35   Hematocrit 42.0 - 52.0 % 25.3 (L)  11/11/23 04:35   MCV 80.0 - 94.0 fL 82.4  11/11/23 04:35   MCH 27.0 - 31.0 pg 26.1 (L)  11/11/23 04:35   MCHC 33.0 - 36.0 g/dL 31.6 (L)  11/11/23 04:35   RDW 11.5 - 17.0 % 15.5  11/11/23 04:35   Platelet Count 130 - 400 x10(3)/mcL 296  11/11/23 04:35   MPV 7.4 - 10.4 fL 9.8  11/11/23 04:35   Platelet Estimate Normal, Adequate  Normal  11/11/23 04:35   Neutrophils Relative % 95  11/11/23 04:35   Neut % % 88.9  11/9/23 04:42   LYMPH % % 4.1  11/9/23 04:42   Lymphs % % 3  11/11/23 04:35   Mono % % 1  11/10/23 04:38   Eosinophil % % 0.0  11/9/23 04:42   Basophil % % 0.5  11/9/23 04:42   Immature Granulocytes % 1.1  11/9/23 04:42   Gran # (ANC) 2.1 - 9.2 x10(3)/mcL 13.452 (H)  11/11/23 04:35   Neut # 2.1 - 9.2 x10(3)/mcL 5.89  11/9/23 04:42   Lymph # 0.6 - 4.6 x10(3)/mcL 0.4248 (L)  11/11/23 04:35   Mono # 0.1 - 1.3 x10(3)/mcL 0.0732 (L)  11/10/23 04:38   Eos # 0 - 0.9 x10(3)/mcL 0.00  11/9/23 04:42   Baso # <=0.2 x10(3)/mcL 0.03  11/9/23 04:42   Immature Grans (Abs) 0 - 0.04 x10(3)/mcL 0.07 (H)  11/9/23 04:42   Metamyelocytes <=0 % 1 (H)  11/11/23 04:35   Myelocytes <=0 % 1 (H)  11/11/23 04:35   nRBC % 0.1  11/11/23 04:35   nRBC % % 1  11/10/23 04:38   RBC Morph Normal  Normal  11/11/23 04:35   Ferritin 21.81 - 274.66 ng/mL 299.94 (H)  11/6/23 04:05   Sed Rate 0 - 15 mm/hr 87 (H)  11/9/23 04:42   Reviewed By  SEE COMMENTS  10/24/23 12:21   Interpretation  SEE COMMENTS  10/24/23 12:21   Sodium 136 - 145 mmol/L 131 (L)  11/11/23 04:35   Potassium 3.5 - 5.1 mmol/L 4.0  11/11/23 04:35   Chloride 98 - 107 mmol/L 96 (L)  11/11/23 04:35   CO2 22 - 29 mmol/L 24  11/11/23 04:35   Anion Gap mEq/L  11.0  11/11/23 04:35   BUN 8.9 - 20.6 mg/dL 14.1  11/11/23 04:35   Creatinine 0.73 - 1.18 mg/dL 0.81  11/11/23 04:35   BUN/CREAT RATIO  17  11/11/23 04:35   eGFR mls/min/1.73/m2 >60  11/11/23 04:35   Glucose 74 - 100 mg/dL 173 (H)  11/11/23 04:35   Calcium 8.4 - 10.2 mg/dL 7.8 (L)  11/11/23 04:35   Magnesium  1.60 - 2.60 mg/dL 2.20  11/10/23 04:38   ALP 40 - 150 unit/L 64  11/9/23 04:42   PROTEIN TOTAL 6.4 - 8.3 gm/dL 4.6 (L)  11/9/23 04:42   Albumin 3.5 - 5.0 g/dL 1.6 (L)  11/9/23 04:42   Albumin/Globulin Ratio 1.1 - 2.0 ratio 0.5 (L)  11/9/23 04:42   BILIRUBIN TOTAL <=1.5 mg/dL 0.3  11/9/23 04:42   AST 5 - 34 unit/L 52 (H)  11/9/23 04:42   ALT 0 - 55 unit/L 83 (H)  11/9/23 04:42   Lipase <=60 U/L 8  10/21/23 00:30   CRP <5.00 mg/L 95.40 (H)  11/9/23 04:42   Globulin, Total 2.4 - 3.5 gm/dL 3.0  11/9/23 04:42   CPK 30 - 200 U/L <70  10/21/23 00:30   CPK MB <=7.2 ng/mL <1.0  10/13/23 16:50   Lactate, Frederick 0.5 - 2.2 mmol/L 1.3  11/6/23 04:05   TSH 0.350 - 4.940 uIU/mL 2.876  10/21/23 00:30   Group & Rh  O POS  11/10/23 04:38   ABO  O (E)  5/4/22 05:50   Rh Type  Positive (E)  5/4/22 05:50   Indirect Arthur GEL  NEG  11/10/23 04:38   Specimen Outdate  11/13/2023 23:59  11/10/23 04:38   PREPARE RBC SOFT  Rpt  11/10/23 04:38   Hep A IgM Nonreactive  Nonreactive  10/24/23 10:59   Hep B C IgM Nonreactive  Nonreactive  10/24/23 10:59   Hepatitis B Surface Antigen Nonreactive  Nonreactive  10/24/23 10:59   Hepatitis C Ab Nonreactive  Nonreactive  10/24/23 10:59   Influenza A, Molecular Not Detected  Not Detected  10/21/23 00:04   Influenza B, Molecular Not Detected  Not Detected  10/21/23 00:04   SARS-CoV2 (COVID-19) Qualitative PCR Not Detected, Negative, Invalid  Not Detected  10/21/23 00:04   Color, UA Yellow, Light-Yellow, Straw, Dark-Yellow  Light-Yellow  11/10/23 21:58   Appearance, UA Clear  Clear  11/10/23 21:58   Specific Gravity,UA 1.005 - 1.030  1.016  11/10/23 21:58   pH, UA 5.0 - 8.5  6.5  11/10/23 21:58    Protein, UA Negative  Trace !  11/10/23 21:58   Glucose, UA Negative, Normal  Normal  11/10/23 21:58   Ketones, UA Negative  Negative  11/10/23 21:58   Occult Blood UA Negative  Trace !  11/10/23 21:58   NITRITE UA Negative  Negative  11/10/23 21:58   Bilirubin, UA Negative  Negative  11/10/23 21:58   Urobilinogen, UA 0.2, 1.0, Normal  Normal  11/10/23 21:58   Leukocytes, UA Negative  Negative  11/10/23 21:58   RBC, UA None Seen, 0-2, 3-5, 0-5 /HPF 0-5  11/10/23 21:58   WBC, UA None Seen, 0-2, 3-5, 0-5 /HPF 0-5  11/10/23 21:58   Bacteria, UA None Seen, Trace /HPF None Seen  11/10/23 21:58   Squam Epithel, UA <=5 /HPF <5  10/21/23 03:45   Squamous Epithelial Cells, UA None Seen /HPF Trace  11/10/23 21:58   Granular Casts, UA None Seen /LPF Moderate !  10/21/23 03:45   Stool WBC Negative  Positive !  11/10/23 21:58   BLOOD CULTURE [facility]  Rpt  11/10/23 20:48   C DIFF ANTIGEN Negative  Negative  11/6/23 17:41   C DIFF TOXIN Negative  Negative  11/6/23 17:41   CULTURE, URINE  Rpt  10/21/23 03:45   STOOL CULTURE [facility]  Rpt  10/21/23 02:23   Adenovirus F 40/41 Not Detected  Not Detected  11/10/23 21:58   ASTROVIRUS Not Detected  Not Detected  11/10/23 21:58   CAMPYLOBACTER Not Detected  Not Detected  11/10/23 21:58   CRYPTOSPORIDIUM Not Detected  Not Detected  11/10/23 21:58   Cycolospora cayetanensis Not Detected  Not Detected  11/10/23 21:58   Entamoeba histolytica Not Detected  Not Detected  11/10/23 21:58   ENTEROAGGREGATIVE E. COLI (EAEC) Not Detected  Not Detected  11/10/23 21:58   ENTEROPATHOGENIC E. COLI (EPEC) Not Detected  Not Detected  11/10/23 21:58   Enterotoxigenic E. coli (ETEC) Not Detected  Not Detected  11/10/23 21:58   Giardia lamblia Not Detected  Not Detected  11/10/23 21:58   Norovirus GI/GII Not Detected  Not Detected  11/10/23 21:58   PLESIOMONAS SHIGELLOIDES Not Detected  Not Detected  11/10/23 21:58   Rotavirus A Not Detected  Not Detected  11/10/23 21:58   SALMONELLA Not Detected  Not  Detected  11/10/23 21:58   SAPOVIRUS Not Detected  Not Detected  11/10/23 21:58   SHIGA-LIKE TOXIN-PRODUCING E. COLI (STEC) Not Detected  Not Detected  11/10/23 21:58   Shigella/Enteroinvasive E. coli (EIEC) Not Detected  Not Detected  11/10/23 21:58   Vibrio Not Detected  Not Detected  11/10/23 21:58   VIBRIO CHOLERAE Not Detected  Not Detected  11/10/23 21:58   YERSINIA ENTEROCOLITICA Not Detected  Not Detected  11/10/23 21:58   CT ABDOMEN PELVIS W WO CONTRAST  Rpt !  11/11/23 13:03   CT ABDOMEN PELVIS WITH IV CONTRAST  Rpt  10/21/23 02:04   XR CHEST PA AND LATERAL  Rpt  11/10/23 22:20   EKG 12-LEAD  Rpt  11/6/23 03:23   ECHO (CUPID ONLY)  Rpt !  11/6/23 11:26   SPECIMEN TO PATHOLOGY  Rpt  10/23/23 12:39   6-Methylmercaptopurine 3.00 - 6.66 nmol/mL/h 4.36  10/24/23 12:21   6-Methylmercaptopurine riboside 5.04 - 9.57 nmol/mL/h 6.07  10/24/23 12:21   6-Methylthioguanine riboside 2.70 - 5.84 nmol/mL/h 3.19  10/24/23 12:21   Ao peak jerry m/s 1.68  11/6/23 11:26   Ao VTI cm 30.30  11/6/23 11:26   AV valve area cm² 2.78  11/6/23 11:26   KAROL by Velocity Ratio cm² 3.02  11/6/23 11:26   AV index (prosthetic)  0.67  11/6/23 11:26   AV peak gradient mmHg 11  11/6/23 11:26   CARDIAC MONITORING STRIPS  Rpt  10/24/23 06:58   Crossmatch Interpretation  Compatible (P)  11/10/23 04:38  Compatible  11/10/23 04:38   Left Ventricle Relative Wall Thickness cm 0.38  11/6/23 11:26   E/E' ratio m/s 7.07  11/6/23 11:26   Pathology Result  See Comments  10/23/23 12:39   LA Volume Index (Mod) mL/m2 35.0  11/6/23 11:26   LVOT area cm2 4.2  11/6/23 11:26   LV mass g 200.40  11/6/23 11:26   Left Ventricular Outflow Tract Mean Gradient mmHg 3.00  11/6/23 11:26   Left Ventricular Outflow Tract Mean Velocity cm/s 0.75  11/6/23 11:26   MV valve area by continuity eq cm2 2.93  11/6/23 11:26   Ann's Biplane MOD Ejection Fraction % 53  11/6/23 11:26   PV peak gradient mmHg 6  11/6/23 11:26   (H): Data is abnormally high  (L): Data is abnormally  low  !: Data is abnormal  (P): Preliminary  (E): External lab result  Rpt: View report in Results Review for more information  Left Ventricle: The left ventricle is normal in size. Normal wall thickness. Normal wall motion. There is normal systolic function with a visually estimated ejection fraction of 50 - 55%. Biplane (2D) method of discs ejection fraction is 53%. There is normal diastolic function.    Right Ventricle: Normal right ventricular cavity size. Systolic function is normal. TAPSE is 2.61 cm.    Left Atrium: Agitated saline study of the atrial septum is positive, intracardiac shunt at atrial level.    Aortic Valve: The aortic valve is a trileaflet valve.    Pulmonic Valve: There is mild regurgitation.    IVC/SVC: Normal venous pressure at 3 mmHg.    Pericardium: There is a small effusion. No indication of cardiac tamponade.         Anesthesia Plan  Type of Anesthesia, risks & benefits discussed:    Anesthesia Type: Gen ETT  Intra-op Monitoring Plan: Standard ASA Monitors  Post Op Pain Control Plan: multimodal analgesia  Induction:  IV  Airway Plan: Video  Informed Consent: Informed consent signed with the Patient and all parties understand the risks and agree with anesthesia plan.  All questions answered.   ASA Score: 2  Day of Surgery Review of History & Physical: H&P Update referred to the surgeon/provider.    Ready For Surgery From Anesthesia Perspective.     .

## 2023-11-11 NOTE — CONSULTS
Acute Care Surgery   Consult Note    Patient Name: Lionel León  YOB: 1980  Date: 11/11/2023 4:20 PM  Date of Admission: 11/6/2023  HD#5  POD#* No surgery date entered *    PRESENTING HISTORY   Chief Complaint/Reason for Admission: Allison's Gangrene    History of Present Illness:  41 yo M with a PMH of diverticulitis s/p colectomy who was recently diagnosed with ulcerative colitis after a colonoscopy was performed on 10/23/23, he was found to have pancolitis at that time. He reports severe perianal pain over the past four days. He has been having high fevers on the floor, up to 102.9.    Review of Systems:  12 point ROS negative except as stated in HPI    PAST HISTORY:   Past medical history:  No past medical history on file.    Past surgical history:  Past Surgical History:   Procedure Laterality Date    COLONOSCOPY, WITH 1 OR MORE BIOPSIES N/A 10/23/2023    Procedure: COLON;  Surgeon: Dragan Underwood MD;  [facility] END[facility];  Service: Gastroenterology;  Laterality: N/A;       Family history:  No family history on file.    Social history:  Social History     Socioeconomic History    Marital status:      Social Determinants of Health     Food Insecurity: Unknown (11/7/2023)    Hunger Vital Sign     Worried About Running Out of Food in the Last Year: Never true   Transportation Needs: Unknown (11/7/2023)    PRAPARE - Transportation     Lack of Transportation (Medical): No   Housing Stability: Unknown (11/7/2023)    Housing Stability Vital Sign     Unable to Pay for Housing in the Last Year: No     Social History     Tobacco Use   Smoking Status Not on file   Smokeless Tobacco Not on file      Social History     Substance and Sexual Activity   Alcohol Use None        MEDICATIONS & ALLERGIES:     No current facility-administered medications on file prior to encounter.     Current Outpatient Medications on File Prior to Encounter   Medication Sig    dicyclomine (BENTYL)  "20 mg tablet Take 1 tablet (20 mg total) by mouth 4 (four) times daily as needed (abd pain/cramp).    predniSONE (DELTASONE) 10 MG tablet Take 4 tablets (40 mg total) by mouth once daily for 7 days, THEN 3 tablets (30 mg total) once daily for 7 days, THEN 2 tablets (20 mg total) once daily for 7 days, THEN 1 tablet (10 mg total) once daily for 7 days, THEN 0.5 tablets (5 mg total) once daily for 7 days.       Allergies: Review of patient's allergies indicates:  No Known Allergies    Scheduled Meds:   hydrocortisone  25 mg Rectal TID    methylPREDNISolone sodium succinate injection  30 mg Intravenous Q12H    piperacillin-tazobactam (Zosyn) IV (PEDS and ADULTS) (extended infusion is not appropriate)  4.5 g Intravenous Q8H       Continuous Infusions:   sodium chloride 0.9% Stopped (11/10/23 0158)       PRN Meds:0.9%  NaCl infusion (for blood administration), 0.9%  NaCl infusion (for blood administration), acetaminophen, acetaminophen, dextrose 10%, dextrose 10%, dicyclomine, glucagon (human recombinant), glucose, glucose, hydrocortisone, HYDROmorphone, ondansetron, oxyCODONE-acetaminophen, sodium chloride 0.9%, Pharmacy to dose Vancomycin consult **AND** vancomycin - pharmacy to dose    OBJECTIVE:   Vital Signs:  VITAL SIGNS: 24 HR MIN & MAX LAST   Temp  Min: 98.2 °F (36.8 °C)  Max: 102.9 °F (39.4 °C)  98.7 °F (37.1 °C)   BP  Min: 91/46  Max: 123/72  107/64    Pulse  Min: 90  Max: 112  95    Resp  Min: 16  Max: 18  18    SpO2  Min: 97 %  Max: 98 %  97 %      HT: 5' 10" (177.8 cm)  WT: 74.8 kg (165 lb)  BMI: 23.7     Intake/output:  Intake/Output - Last 3 Shifts         11/09 0700  11/10 0659 11/10 0700  11/11 0659 11/11 0700  11/12 0659    P.O. 2820 1660 2000    I.V. (mL/kg) 406.5 (5.4) 0 (0)     Total Intake(mL/kg) 3226.5 (43.1) 1660 (22.2) 2000 (26.7)    Urine (mL/kg/hr) 3250 (1.8) 3450 (1.9) 3475 (5)    Stool 0 0 0    Total Output 3250 3450 3475    Net -23.5 -1790 -1475           Urine Occurrence  1 x     Stool " "Occurrence 3 x 4 x 3 x            Intake/Output Summary (Last 24 hours) at 11/11/2023 1620  Last data filed at 11/11/2023 1400  Gross per 24 hour   Intake 2540 ml   Output 6575 ml   Net -4035 ml         Physical Exam:  General: Well developed, well nourished, no acute distress  HEENT: Normocephalic, atraumatic, PERRL  CV: RR  Resp: NWOB  GI:  Abdomen soft, non-tender, non-distended, no guarding, no rebound, normoactive bowel sounds, no masses.  :  Diffuse erythema and induration to perineum on physical exam. Rectal exam deferred. See photos.  MSK: No muscle atrophy, cyanosis, peripheral edema, moving all extremities spontaneously  Skin/wounds:  No rashes, ulcers, erythema  Neuro:  CNII-XII grossly intact, alert and oriented to person, place, and time              Labs:  Troponin:  No results for input(s): "TROPONINI" in the last 72 hours.  CBC:  Recent Labs     11/09/23  0442 11/09/23  0442 11/10/23  0438 11/11/23  0435   WBC 6.62   < > 7.32  7.32 14.16  14.16*   RBC 2.95*  --  2.95* 3.07*   HGB 7.8*  --  7.6* 8.0*   HCT 24.7*  --  24.6* 25.3*     --  268 296   MCV 83.7  --  83.4 82.4   MCH 26.4*  --  25.8* 26.1*   MCHC 31.6*  --  30.9* 31.6*    < > = values in this interval not displayed.     CMP:  Recent Labs     11/09/23  0442 11/10/23  0438 11/11/23  0435   CALCIUM 7.3*   < > 7.8*   ALBUMIN 1.6*  --   --    *   < > 131*   K 5.0   < > 4.0   CO2 31*   < > 24   BUN 14.7   < > 14.1   CREATININE 0.78   < > 0.81   ALKPHOS 64  --   --    ALT 83*  --   --    AST 52*  --   --    BILITOT 0.3  --   --     < > = values in this interval not displayed.     Lactic Acid:  No results for input(s): "LACTATE" in the last 72 hours.  ETOH:  No results for input(s): "ETHANOL" in the last 72 hours.   Urine Drug Screen:  No results for input(s): "COCAINE", "OPIATE", "BARBITURATE", "AMPHETAMINE", "FENTANYL", "CANNABINOIDS", "MDMA" in the last 72 hours.    Invalid input(s): "BENZODIAZEPINE", "PHENCYCLIDINE"   ABG:  No " "results for input(s): "PH", "PO2", "PCO2", "HCO3", "BE" in the last 168 hours.     Diagnostic Results:  CT Abdomen Pelvis W Wo Contrast   Final Result      X-Ray Chest PA And Lateral   Final Result      No acute abnormality.         Electronically signed by: Quinton Hernandez MD   Date:    11/11/2023   Time:    08:49      CT Abdomen Pelvis W Wo Contrast   Final Result      Findings seen consistent with colitis involving the ascending colon and portions of the descending colon.  Follow-up is recommended to resolution as underlying mass cannot be completely excluded.      The gastric wall appears to be slightly thickened.  Underlying gastritis should be excluded.         Electronically signed by: John Gale   Date:    11/06/2023   Time:    12:02          ASSESSMENT & PLAN:    41 yo M with a PMH of diverticulitis and recently diagnosed ulcerative colitis who presents with xochitl's gangrene.    To OR emergently for sharp excisional debridement of perineum. Consent obtained  NPO, IV Fluids  Start IV Vancomycin and Zosyn  Hold IV steroids and suppository    Ruddy Edmonds MD  General Surgery [id number]  "

## 2023-11-11 NOTE — PROGRESS NOTES
Pharmacokinetic Initial Assessment: IV Vancomycin    Assessment/Plan:    Initiate intravenous vancomycin with loading dose of 1750 mg once followed by a maintenance dose of vancomycin 1250mg IV every 12 hours  Desired empiric serum trough concentration is 15 to 20 mcg/mL  Draw vancomycin trough on 11/13 at 0400.  Pharmacy will continue to follow and monitor vancomycin.        Patient brief summary:  Lionel León is a 42 y.o. male initiated on antimicrobial therapy with IV Vancomycin for treatment of suspected skin & soft tissue infection    Drug Allergies:   Review of patient's allergies indicates:  No Known Allergies    Actual Body Weight:   74.8kg    Renal Function:   Estimated Creatinine Clearance: 122.7 mL/min (based on SCr of 0.81 mg/dL).,     Dialysis Method (if applicable):  N/A    CBC (last 72 hours):  Recent Labs   Lab Result Units 11/09/23  0442 11/10/23  0438 11/11/23  0435   WBC x10(3)/mcL 6.62 7.32  7.32 14.16  14.16*   Hgb g/dL 7.8* 7.6* 8.0*   Hct % 24.7* 24.6* 25.3*   Platelet x10(3)/mcL 266 268 296   Mono % % 5.4  --   --    Monocytes % %  --  1  --    Eos % % 0.0  --   --    Basophil % % 0.5  --   --        Metabolic Panel (last 72 hours):  Recent Labs   Lab Result Units 11/09/23  0442 11/10/23  0438 11/10/23  2158 11/11/23  0435   Sodium Level mmol/L 130* 133*  --  131*   Potassium Level mmol/L 5.0 4.4  --  4.0   Chloride mmol/L 96* 98  --  96*   Carbon Dioxide mmol/L 31* 27  --  24   Glucose Level mg/dL 164* 169*  --  173*   Glucose, UA   --   --  Normal  --    Blood Urea Nitrogen mg/dL 14.7 12.9  --  14.1   Creatinine mg/dL 0.78 0.73  --  0.81   Albumin Level g/dL 1.6*  --   --   --    Bilirubin Total mg/dL 0.3  --   --   --    Alkaline Phosphatase unit/L 64  --   --   --    Aspartate Aminotransferase unit/L 52*  --   --   --    Astrovirus   --   --  Not Detected  --    Alanine Aminotransferase unit/L 83*  --   --   --    Magnesium Level mg/dL 2.30 2.20  --   --        Drug levels (last 3  "results):  No results for input(s): "VANCOMYCINRA", "VANCORANDOM", "VANCOMYCINPE", "VANCOPEAK", "VANCOMYCINTR", "VANCOTROUGH" in the last 72 hours.    Microbiologic Results:  Microbiology Results (last 7 days)       Procedure Component Value Units Date/Time    Blood culture #1 **CANNOT BE ORDERED STAT** [[id number]]  (Normal) Collected: 11/06/23 0405    Order Status: Completed Specimen: Blood from Antecubital, Right Updated: 11/11/23 0701     CULTURE, BLOOD ([facility]) No Growth at 5 days    Blood culture #2 **CANNOT BE ORDERED STAT** [[id number]]  (Normal) Collected: 11/06/23 0405    Order Status: Completed Specimen: Blood from Antecubital, Left Updated: 11/11/23 0600     CULTURE, BLOOD ([facility]) No Growth at 5 days    Blood Culture [[id number]] Collected: 11/10/23 2048    Order Status: Resulted Specimen: Blood from Antecubital, Left Updated: 11/10/23 2226    Clostridium Diff Toxin, A & B, EIA [[id number]]  (Normal) Collected: 11/06/23 1741    Order Status: Completed Specimen: Stool Updated: 11/06/23 1816     Clostridium Difficile GDH Antigen Negative     Clostridium Difficile Toxin A/B Negative            "

## 2023-11-11 NOTE — NURSING
Spoke with Angeles in lab who states pathologist Dr. Carrillo says it is ok to give products if needed but to pre medicate prior to blood.

## 2023-11-11 NOTE — PROGRESS NOTES
[facility] - 8th Floor [facility]  [facility] [place] MEDICINE ~ PROGRESS NOTE        CHIEF COMPLAINT   Hospital follow up    HOSPITAL COURSE   Lionel León is a 42 y.o. White male with a past medical history of diverticulitis status post colon resection and ulcerative colitis. Patient had recent admission to hospital medicine services at [facility] on 10/21/2023 to 10/24/2023 for sepsis secondary to pancolitis. He was treated with antibiotics and GI was consulted. Colonoscopy was performed on 10/23/2023 with findings concerning for ulcerative colitis and biopsies of the cecum, ascending, transverse, ascending colon and rectum positive for active chronic colitis consistent with inflammatory bowel disorder. No transfusion was required for rectal bleeding. Patient was discharged with prednisone taper and GI follow up appointment on 11/17/2023. The patient presented to [facility] on 11/6/2023 with a primary complaint of bright red rectal bleeding and lower abdominal pain.  Patient reports rectal bleeding has been ongoing since discharge.  Other associated symptoms include nausea, rectal pain, subjective fevers, weakness, fatigue and a 30 40 lb weight loss over last 3 weeks.  Patient states this morning he was walking to the bathroom when he felt flushed and vision went black. He passed out and hit his head on the wooden floor. Approximately hour and a half later when he went to get up he passed out again.  Second syncopal episode was witnessed by patient's wife who is at bedside reports loss of consciousness lasts for approximately 30 seconds.         Upon presentation to the ED, temperature 98.3F, heart rate 109, blood pressure 96/51, respiratory rate 17, spO2 99%. Labs with H&H 7.3/23.6 (10.6/33.2 on 10/24/2023), BUN 21.9, creatinine 0.88, calcium 7.7, .6, ESR 86. EKG sinus tachycardia with a ventricular rate of 108 and age undetermined possible inferior infarct. In the ED patient received Dilaudid, Zofran,  Hydrocortisone and IVF. He was typed and crossmatched for transfusion of 2 units of packed RBC. Patient is admitted to hospital medicine services for further medical management.      Patient was seen by GI and started on IV steroids, Solu-Medrol 30 mg IV b.I.d. for active ulcerative colitis flare up  Plus Anusol suppository per rectum b.I.d..  Patient is status post 2 units PRBC for symptomatic anemia.  CT abdomen and pelvis was also ordered to assess extent. CT of the abdomen and pelvis was pertinent for findings consistent with colitis involving the ascending colon, portions of the descending colon.  Underlying gastritis also can not be excluded secondary to slightly thickened gastric wall.      May complain continues to be rectal pain but refers that suppositories are helping.  Patient is tolerating p.o. intake     According to GI notes plans is for patient to start Humira/imura once approved by patient's insurance.                    Today  11/7/23-Doing better feeling better pain is better.  4 bowel movements yesterday with some blood as well.  Hemoglobin did not have appropriate response but will continue to monitor.  Discuss with GI physician assistant as well.    11/8/23 dr alvarez -  Today patient had 1 BM overnight and another one this a.m..  Refers that stools are becoming more formed still with some ongoing hematochezia.  Rectal pain continues to improve.  For now we will continue hydrocortisone suppositories t.I.d. as well as IV Solu-Medrol 30 mg IV b.I.d. with plans to start Humira/ Imura as outpt.  Patient today refers feeling okay, his pain goes up and down but overall feels improved since admission not ready to go home.  We will continue present management and continue following recommendations by GI    11/09/2023 Dr. Alvarez-chart reviewed patient examined.  Still having episodes of bright red per rectum.  Also refers some dizziness when getting out of bed.  Hematocrit dropped to 24.7.  BP running at  98/57.  We started normal saline solution.  will require PRBC transfusion tomorrow.      11/10/2023 Dr. Alvarez-chart reviewed patient examined.  Remains with complains of dizziness with hematocrit around 24.7.  Samples of Humira available but patient ran a fever of around 102.5 that quickly resolved without antipyretic.  Now Humira on hold.  We will go ahead and transfuse 2 units PRBC.  Patient received 1 g acetaminophen.  Also refers his stools are more formed with less abdominal pain and good appetite.    Plan-11-23 Dr. Alvarez-chart reviewed patient examined.  Yesterday patient started having temperature spikes.  Today he has complains of  pain over perineum/buttocks that has worsened.  Area with extensive erythema and induration over medial aspects of buttocks.  CT abdomen and pelvis with contrast was done showing findings pertinent for Allison gangrene.  Patient is on vancomycin and Zosyn, will add clindamycin for toxin control.  Patient has been seen by surgical hospitalist with plans for OR for exploration ,debridement today.  We will go ahead and consult Infectious Disease for evaluation as well .        OBJECTIVE/PHYSICAL EXAM     VITAL SIGNS (MOST RECENT):  Temp: 98.4 °F (36.9 °C) (11/11/23 1624)  Pulse: 103 (11/11/23 1624)  Resp: 18 (11/11/23 1604)  BP: 104/63 (11/11/23 1624)  SpO2: 97 % (11/11/23 1624) VITAL SIGNS (24 HOUR RANGE):  Temp:  [98.4 °F (36.9 °C)-102.9 °F (39.4 °C)] 98.4 °F (36.9 °C)  Pulse:  [] 103  Resp:  [16-18] 18  SpO2:  [97 %-98 %] 97 %  BP: ()/(46-72) 104/63   GENERAL: In no acute distress, afebrile  HEENT:normocephalic/ no masses  CHEST: Clear to auscultation bilaterally  HEART: S1, S2, no appreciable murmur  ABDOMEN: Soft,  generalized abdominal pain, BS +  MSK: Warm, no lower extremity edema, no clubbing or cyanosis  Perineum-extensive erythema from a scrotal area all the way around to lower back, covers medial aspect of buttocks with induration and  tenderness.  NEUROLOGIC: Alert and oriented x4, moving all extremities with good strength   INTEGUMENTARY: intact  PSYCHIATRY: alert/ active and oriented.                 ASSESSMENT/PLAN   Acute severe ulcerative colitis , new onset  -C diff negative  -DC Solu-Medrol 30 b.i.d.  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     GI following/surgery in case. Will consult infectious diseases.   WE NEED INTRAOPERATIVE CULTURES !!!           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       Recent Labs     11/11/23 0435   WBC 14.16  14.16*   RBC 3.07*   HCT 25.3*   MCV 82.4            MICROBIOLOGY  Microbiology Results (last 7 days)       Procedure Component Value Units Date/Time    Blood culture #1 **CANNOT BE ORDERED STAT** [[id number]]  (Normal) Collected: 11/06/23 0402    Order Status: Completed Specimen: Blood from Antecubital, Right Updated: 11/11/23 0701     CULTURE, BLOOD ([facility]) No Growth at 5 days     "Blood culture #2 **CANNOT BE ORDERED STAT** [[id number]]  (Normal) Collected: 11/06/23 0405    Order Status: Completed Specimen: Blood from Antecubital, Left Updated: 11/11/23 0600     CULTURE, BLOOD ([facility]) No Growth at 5 days    Blood Culture [[id number]] Collected: 11/10/23 2048    Order Status: Resulted Specimen: Blood from Antecubital, Left Updated: 11/10/23 2226    Clostridium Diff Toxin, A & B, EIA [[id number]]  (Normal) Collected: 11/06/23 1741    Order Status: Completed Specimen: Stool Updated: 11/06/23 1816     Clostridium Difficile GDH Antigen Negative     Clostridium Difficile Toxin A/B Negative               MEDICATIONS   clindamycin (CLEOCIN) IVPB  600 mg Intravenous Q8H    hydrocortisone  25 mg Rectal TID    methylPREDNISolone sodium succinate injection  30 mg Intravenous Q12H    piperacillin-tazobactam (Zosyn) IV (PEDS and ADULTS) (extended infusion is not appropriate)  4.5 g Intravenous Q8H    vancomycin (VANCOCIN) IV (PEDS and ADULTS)  25 mg/kg (Dosing Weight) Intravenous Once    [START ON 11/12/2023] vancomycin (VANCOCIN) IV (PEDS and ADULTS)  1,250 mg Intravenous Q12H         INFUSIONS   sodium chloride 0.9% Stopped (11/10/23 0158)          DIAGNOSTIC TESTS  CT Abdomen Pelvis W Wo Contrast   Final Result      X-Ray Chest PA And Lateral   Final Result      No acute abnormality.         Electronically signed by: Quinton Hernandez MD   Date:    11/11/2023   Time:    08:49      CT Abdomen Pelvis W Wo Contrast   Final Result      Findings seen consistent with colitis involving the ascending colon and portions of the descending colon.  Follow-up is recommended to resolution as underlying mass cannot be completely excluded.      The gastric wall appears to be slightly thickened.  Underlying gastritis should be excluded.         Electronically signed by: John Gale   Date:    11/06/2023   Time:    12:02           No results found for: "EF"       NUTRITION STATUS  Patient meets ASPEN criteria for " moderate malnutrition of acute illness or injury per RD assessment as evidenced by:  Energy Intake (Malnutrition): less than 75% for greater than 7 days  Weight Loss (Malnutrition): greater than 2% in 1 week  Subcutaneous Fat (Malnutrition): moderate depletion  Muscle Mass (Malnutrition): moderate depletion  Fluid Accumulation (Malnutrition): other (see comments) (does not meet criteria)  Hand  Strength, Left (Malnutrition): unable to evaluate  Hand  Strength, Right (Malnutrition): unable to evaluate  A minimum of two characteristics is recommended for diagnosis of either severe or non-severe malnutrition.       Case related differential diagnoses have been reviewed; assessment and plan has been documented. I have personally reviewed the labs and test results that are currently available; I have reviewed the patients medication list. I have reviewed the consulting providers recommendations. I have reviewed or attempted to review medical records based upon their availability.  All of the patient's and/or family's questions have been addressed and answered to the best of my ability.  I will continue to monitor closely and make adjustments to medical management as needed.  This document was created using M*Modal Fluency Direct.  Transcription errors may have been made.  Please contact me if any questions may rise regarding documentation to clarify transcription.        Sloan Alvarez MD   Internal Medicine  Department of Hospital Medicine  [facility] - 8th Floor Med Surg

## 2023-11-11 NOTE — PROGRESS NOTES
Progress Note         Hospital follow up    Subjective:     Rans fever several times yesterday, sean after being given blood, which transfusion was stopped. Last fever at 23:21 yesterday. Today afebrile as of now. No new or worsening symptoms.     Review of Systems:     Review of Systems    Objective:     Scheduled Meds:   hydrocortisone  25 mg Rectal TID    methylPREDNISolone sodium succinate injection  30 mg Intravenous Q12H     Continuous Infusions:   sodium chloride 0.9% Stopped (11/10/23 0158)     PRN Meds:  0.9%  NaCl infusion (for blood administration), 0.9%  NaCl infusion (for blood administration), acetaminophen, acetaminophen, dextrose 10%, dextrose 10%, dicyclomine, glucagon (human recombinant), glucose, glucose, hydrocortisone, HYDROmorphone, iopamidoL, ondansetron, oxyCODONE-acetaminophen, sodium chloride 0.9%      VITAL SIGNS: 24 HR MIN & MAX LAST    Temp  Min: 97.6 °F (36.4 °C)  Max: 102.9 °F (39.4 °C)  98.5 °F (36.9 °C)        BP  Min: 91/46  Max: 123/72  (!) 107/58     Pulse  Min: 90  Max: 112  92     Resp  Min: 16  Max: 20  16    SpO2  Min: 97 %  Max: 99 %  97 %        Intake/Output Summary (Last 24 hours) at 11/11/2023 0936  Last data filed at 11/11/2023 0559  Gross per 24 hour   Intake 1660 ml   Output 3450 ml   Net -1790 ml       Physical Exam  Constitutional:       General: He is not in acute distress.  HENT:      Head: Normocephalic.   Cardiovascular:      Rate and Rhythm: Normal rate and regular rhythm.   Abdominal:      General: There is no distension.      Tenderness: There is no guarding.   Neurological:      General: No focal deficit present.   Psychiatric:         Mood and Affect: Mood normal.           Recent Results (from the past 24 hour(s))   Urinalysis, Reflex to Urine Culture    Collection Time: 11/10/23  9:58 PM    Specimen: Urine   Result Value Ref Range    Color, UA Light-Yellow Yellow, Light-Yellow, Straw, Dark-Yellow    Appearance, UA Clear Clear    Specific [place], UA 1.016  1.005 - 1.030    pH, UA 6.5 5.0 - 8.5    Protein, UA Trace (A) Negative    Glucose, UA Normal Negative, Normal    Ketones, UA Negative Negative    Blood, UA Trace (A) Negative    Bilirubin, UA Negative Negative    Urobilinogen, UA Normal 0.2, 1.0, Normal    Nitrites, UA Negative Negative    Leukocyte Esterase, UA Negative Negative    WBC, UA 0-5 None Seen, 0-2, 3-5, 0-5 /HPF    Bacteria, UA None Seen None Seen, Trace /HPF    Squamous Epithelial Cells, UA Trace None Seen /HPF    RBC, UA 0-5 None Seen, 0-2, 3-5, 0-5 /HPF   Gastrointestinal Pathogens Panel, PCR    Collection Time: 11/10/23  9:58 PM   Result Value Ref Range    CAMPYLOBACTER Not Detected Not Detected    PLESIOMONAS SHIGELLOIDES Not Detected Not Detected    SALMONELLA Not Detected Not Detected    Vibrio Not Detected Not Detected    YERSINIA ENTEROCOLITICA Not Detected Not Detected    ENTEROAGGREGATIVE E. COLI (EAEC) Not Detected Not Detected    ENTEROPATHOGENIC E. COLI (EPEC) Not Detected Not Detected    Enterotoxigenic E. coli (ETEC) Not Detected Not Detected    SHIGA-LIKE TOXIN-PRODUCING E. COLI (STEC) Not Detected Not Detected    Shigella/Enteroinvasive E. coli (EIEC) Not Detected Not Detected    CRYPTOSPORIDIUM Not Detected Not Detected    Cycolospora cayetanensis Not Detected Not Detected    Entamoeba histolytica Not Detected Not Detected    Giardia lamblia Not Detected Not Detected    Adenovirus F 40/41 Not Detected Not Detected    Astrovirus Not Detected Not Detected    Norovirus GI/GII Not Detected Not Detected    Rotavirus A Not Detected Not Detected    Sapovirus Not Detected Not Detected    VIBRIO CHOLERAE Not Detected Not Detected   FECAL LEUKOCYTES - LACTOFERRIN ON  STOOL    Collection Time: 11/10/23  9:58 PM   Result Value Ref Range    Fecal Leukocyte Positive (A) Negative   Basic Metabolic Panel    Collection Time: 11/11/23  4:35 AM   Result Value Ref Range    Sodium Level 131 (L) 136 - 145 mmol/L    Potassium Level 4.0 3.5 - 5.1 mmol/L     Chloride 96 (L) 98 - 107 mmol/L    Carbon Dioxide 24 22 - 29 mmol/L    Glucose Level 173 (H) 74 - 100 mg/dL    Blood Urea Nitrogen 14.1 8.9 - 20.6 mg/dL    Creatinine 0.81 0.73 - 1.18 mg/dL    BUN/Creatinine Ratio 17     Calcium Level Total 7.8 (L) 8.4 - 10.2 mg/dL    Anion Gap 11.0 mEq/L    eGFR >60 mls/min/1.73/m2   CBC with Differential    Collection Time: 11/11/23  4:35 AM   Result Value Ref Range    WBC 14.16 (H) 4.50 - 11.50 x10(3)/mcL    RBC 3.07 (L) 4.70 - 6.10 x10(6)/mcL    Hgb 8.0 (L) 14.0 - 18.0 g/dL    Hct 25.3 (L) 42.0 - 52.0 %    MCV 82.4 80.0 - 94.0 fL    MCH 26.1 (L) 27.0 - 31.0 pg    MCHC 31.6 (L) 33.0 - 36.0 g/dL    RDW 15.5 11.5 - 17.0 %    Platelet 296 130 - 400 x10(3)/mcL    MPV 9.8 7.4 - 10.4 fL    NRBC% 0.1 %   Manual Differential    Collection Time: 11/11/23  4:35 AM   Result Value Ref Range    WBC 14.16 x10(3)/mcL    Neutrophils % 95 %    Lymphs % 3 %    Metamyelocytes % 1 (H) <=0 %    Myelocytes % 1 (H) <=0 %    Neutrophils Abs 13.452 (H) 2.1 - 9.2 x10(3)/mcL    Lymphs Abs 0.4248 (L) 0.6 - 4.6 x10(3)/mcL    Platelets Normal Normal, Adequate    RBC Morph Normal Normal       X-Ray Chest PA And Lateral    Result Date: 11/11/2023  EXAMINATION: XR CHEST PA AND LATERAL CLINICAL HISTORY: fever; TECHNIQUE: PA and lateral views of the chest were performed. COMPARISON: None FINDINGS: The lungs are clear, with normal appearance of pulmonary vasculature and no pleural effusion or pneumothorax. The cardiac silhouette is normal in size. The hilar and mediastinal contours are unremarkable. Bones are intact.     No acute abnormality. Electronically signed by: Quinton Hernandez MD Date:    11/11/2023 Time:    08:49    Echo Saline Bubble? Yes    Result Date: 11/6/2023    Left Ventricle: The left ventricle is normal in size. Normal wall thickness. Normal wall motion. There is normal systolic function with a visually estimated ejection fraction of 50 - 55%. Biplane (2D) method of discs ejection fraction is 53%.  There is normal diastolic function.   Right Ventricle: Normal right ventricular cavity size. Systolic function is normal. TAPSE is 2.61 cm.   Left Atrium: Agitated saline study of the atrial septum is positive, intracardiac shunt at atrial level.   Aortic Valve: The aortic valve is a trileaflet valve.   Pulmonic Valve: There is mild regurgitation.   IVC/SVC: Normal venous pressure at 3 mmHg.   Pericardium: There is a small effusion. No indication of cardiac tamponade.     CT Abdomen Pelvis W Wo Contrast    Result Date: 11/6/2023  EXAMINATION: CT ABDOMEN PELVIS W WO CONTRAST CLINICAL HISTORY: Abd and rectal pain, HX IBD; TECHNIQUE: Low dose axial images, sagittal and coronal reformations were obtained from the lung bases to the pubic symphysis following the IV administration of  contrast.  Delayed imaging and pre contrasted imaging was performed as well. Automatic exposure control is utilized to reduce patient radiation exposure. COMPARISON: 10/21/2023 FINDINGS: The lung bases are clear. The stomach is decompressed with gastric wall appears to be mildly thickened. The liver appears normal.  No liver mass or lesion is seen.  Portal and hepatic veins appear normal. The gallbladder appears grossly unremarkable. The pancreas appears normal.  No pancreatic mass or lesion is seen. The spleen appears normal.  No splenic mass or lesion is seen.  There is a small splenule seen at the splenic hilum. The adrenal glands appear normal.  No adrenal nodule is seen. The kidneys are normal in size.  No hydronephrosis is seen.  No hydroureter is seen.  No nephrolithiasis or ureteral stone is seen. Urinary bladder appears normal. There is diffuse wall thickening seen involving the right colon consistent with diffuse colitis.  Inflammatory changes are seen adjacent to the colon as well.  Some inflammatory changes are also seen involving the descending colon.  There are postsurgical changes seen in the sigmoid colon. No free air is seen.   No free fluid is seen. The bones appear grossly unremarkable.     Findings seen consistent with colitis involving the ascending colon and portions of the descending colon.  Follow-up is recommended to resolution as underlying mass cannot be completely excluded. The gastric wall appears to be slightly thickened.  Underlying gastritis should be excluded. Electronically signed by: John Gale Date:    11/06/2023 Time:    12:02    CT Abdomen Pelvis With Contrast    Result Date: 10/21/2023  EXAMINATION: CT ABDOMEN PELVIS WITH CONTRAST CLINICAL HISTORY: Abdominal pain, acute, nonlocalized; TECHNIQUE: Helically acquired images with axial, sagittal and coronal reformations were obtained from the lung bases to the pubic symphysis after the IV administration of contrast. Automated tube current modulation, weight-based exposure dosing, and/or iterative reconstruction technique utilized to reach lowest reasonably achievable exposure rate. DLP: 518 mGy*cm COMPARISON: No relevant prior available for comparison at the time of dictation. FINDINGS: HEART: Normal in size. No pericardial effusion. LUNG BASES: Mild basilar atelectasis. LIVER: Normal attenuation. No appreciable focal hepatic lesion. BILIARY: No calcified gallstones. PANCREAS: No inflammatory change. SPLEEN: Normal in size ADRENALS: No mass. KIDNEYS/URETERS: The kidneys enhance symmetrically.  No hydronephrosis. GI TRACT/MESENTERY:  Small bowel is normal in caliber without evidence of obstruction.  There is wall thickening and inflammatory fat stranding at the colon most pronounced at the descending colon.  There is a sigmoid anastomosis which is mildly gas distended without appreciable stricture.  The appendix is normal. PERITONEUM: No free fluid.No free air. LYMPH NODES: No enlarged lymph nodes by size criteria. VASCULATURE: No significant atherosclerosis or aneurysm. BLADDER: Normal appearance given degree of distention. REPRODUCTIVE ORGANS: Normal as  visualized. SOFT TISSUES: Unremarkable. BONES: Degenerative change at the lower lumbar spine.     1. Nonspecific colitis 2. The preliminary and final reports are concordant. Electronically signed by: Fernanda Robert Date:    10/21/2023 Time:    12:21      Assessment / Plan:     Assessment & Plan:      42-year-old male known to Dr. He with past medical history of diverticulitis s/p colon resection and recent new diagnosis of ulcerative colitis.  He presented to the ED on 11/6 with complaints of bright red blood per rectum and lower abdominal pain associated with nausea, rectal pain, weakness and 30-40 lb weight loss over the past 3 weeks with syncopal episode x2- 2nd episode with LOC.     Newly diagnosed UC, active flare  - Colonoscopy 10/23/2023 with findings c/w IBD.  He was discharged on prednisone taper with outpatient follow up appointment scheduled 11/17/2023, but ended up presenting here on 11/6  -  on 11/6---95.40 on 10th  - currently on IV Solumedrol, 30mg BID   - he will need to be transitioned to PO steroids 24hrs prior to eventual dc  - upcoming outpatient appt scheduled for 11/17- plans to start humira and imuran- currently in the process of getting approval. Message was sent to our office and medication has been expedited/ also looking into availability of samples in the meantime. Update as of 11/09- medication had been approved with hopeful shipping in the next 24-48 hrs.   - supp care  2. Rectal pain with BM, hx hemorrhoids  - alleviated with hydrocortisone suppository- increased to TID  - continue cream prn     3. Fever  - started yesterday, then persisted after being given blood  - UA on 10th was unremarkable.   - Stool for Cdiff neg on 7th   - Blood cultures neg from 11/6  - CT abd on 11/6 Findings seen consistent with colitis involving the ascending colon and portions of the descending colon.  Follow-up is recommended to resolution as underlying mass cannot be completely excluded.  "The gastric wall appears to be slightly thickened.  Underlying gastritis should be excluded.       Patient now has Humira samples and can have loading dose once he is afebrile for 24 hours.   Will check Fecal jen given I do not see he has had one  Will check CRP to note trend   Monitor daily number of stooling   Consider repeat CT pending serial abdominal examinations.     If he continues to remain afebrile     ADDENDUM AT 10:52. Pt reported to staff rectal pain with a "bump", I then went back into room to exam and pt with an area of induration, firm, warmth, erythema, tenderness, swelling at the perineum, in btw scrotum and anal area, does extend up into the scrotum and up into the buttocks. Will proceed with CT of abd/pelvic with and without IV contrast. Do not give Humira.  "

## 2023-11-12 ENCOUNTER — ANESTHESIA EVENT (OUTPATIENT)
Dept: SURGERY | Facility: HOSPITAL | Age: 43
DRG: 853 | End: 2023-11-12
Payer: COMMERCIAL

## 2023-11-12 ENCOUNTER — ANESTHESIA (OUTPATIENT)
Dept: SURGERY | Facility: HOSPITAL | Age: 43
DRG: 853 | End: 2023-11-12
Payer: COMMERCIAL

## 2023-11-12 LAB
ABO + RH BLD: NORMAL
ABO + RH BLD: NORMAL
ABS NEUT (OLG): 12.42 X10(3)/MCL (ref 2.1–9.2)
ALBUMIN SERPL-MCNC: 1.3 G/DL (ref 3.5–5)
ALBUMIN/GLOB SERPL: 0.4 RATIO (ref 1.1–2)
ALP SERPL-CCNC: 90 UNIT/L (ref 40–150)
ALT SERPL-CCNC: 98 UNIT/L (ref 0–55)
AST SERPL-CCNC: 15 UNIT/L (ref 5–34)
BILIRUB SERPL-MCNC: 0.5 MG/DL
BLD PROD TYP BPU: NORMAL
BLD PROD TYP BPU: NORMAL
BLOOD UNIT EXPIRATION DATE: NORMAL
BLOOD UNIT EXPIRATION DATE: NORMAL
BLOOD UNIT TYPE CODE: 5100
BLOOD UNIT TYPE CODE: 5100
BUN SERPL-MCNC: 17 MG/DL (ref 8.9–20.6)
CALCIUM SERPL-MCNC: 8 MG/DL (ref 8.4–10.2)
CHLORIDE SERPL-SCNC: 96 MMOL/L (ref 98–107)
CO2 SERPL-SCNC: 27 MMOL/L (ref 22–29)
CREAT SERPL-MCNC: 0.8 MG/DL (ref 0.73–1.18)
CROSSMATCH INTERPRETATION: NORMAL
CROSSMATCH INTERPRETATION: NORMAL
DISPENSE STATUS: NORMAL
DISPENSE STATUS: NORMAL
ERYTHROCYTE [DISTWIDTH] IN BLOOD BY AUTOMATED COUNT: 15.5 % (ref 11.5–17)
GFR SERPLBLD CREATININE-BSD FMLA CKD-EPI: >60 MLS/MIN/1.73/M2
GLOBULIN SER-MCNC: 3.6 GM/DL (ref 2.4–3.5)
GLUCOSE SERPL-MCNC: 185 MG/DL (ref 74–100)
GRAM STN SPEC: NORMAL
HCT VFR BLD AUTO: 22.6 % (ref 42–52)
HGB BLD-MCNC: 6.9 G/DL (ref 14–18)
INSTRUMENT WBC (OLG): 13.35 X10(3)/MCL
LYMPHOCYTES NFR BLD MANUAL: 0.53 X10(3)/MCL
LYMPHOCYTES NFR BLD MANUAL: 4 %
MAGNESIUM SERPL-MCNC: 2.1 MG/DL (ref 1.6–2.6)
MCH RBC QN AUTO: 25.7 PG (ref 27–31)
MCHC RBC AUTO-ENTMCNC: 30.5 G/DL (ref 33–36)
MCV RBC AUTO: 84.3 FL (ref 80–94)
METAMYELOCYTES NFR BLD MANUAL: 1 %
MONOCYTES NFR BLD MANUAL: 0.27 X10(3)/MCL (ref 0.1–1.3)
MONOCYTES NFR BLD MANUAL: 2 %
MYELOCYTES NFR BLD MANUAL: 1 %
NEUTROPHILS NFR BLD MANUAL: 93 %
NRBC BLD AUTO-RTO: 0.2 %
PLATELET # BLD AUTO: 253 X10(3)/MCL (ref 130–400)
PLATELET # BLD EST: NORMAL 10*3/UL
PMV BLD AUTO: 9.8 FL (ref 7.4–10.4)
POIKILOCYTOSIS BLD QL SMEAR: ABNORMAL
POTASSIUM SERPL-SCNC: 4.4 MMOL/L (ref 3.5–5.1)
PROT SERPL-MCNC: 4.9 GM/DL (ref 6.4–8.3)
RBC # BLD AUTO: 2.68 X10(6)/MCL (ref 4.7–6.1)
RBC MORPH BLD: ABNORMAL
SODIUM SERPL-SCNC: 129 MMOL/L (ref 136–145)
TARGETS BLD QL SMEAR: ABNORMAL
UNIT NUMBER: NORMAL
UNIT NUMBER: NORMAL
WBC # SPEC AUTO: 13.35 X10(3)/MCL (ref 4.5–11.5)

## 2023-11-12 PROCEDURE — P9047 ALBUMIN (HUMAN), 25%, 50ML: HCPCS | Mod: JZ,JG | Performed by: NURSE ANESTHETIST, CERTIFIED REGISTERED

## 2023-11-12 PROCEDURE — D9220A PRA ANESTHESIA: ICD-10-PCS | Mod: CRNA,,, | Performed by: NURSE ANESTHETIST, CERTIFIED REGISTERED

## 2023-11-12 PROCEDURE — 63600175 PHARM REV CODE 636 W HCPCS: Performed by: INTERNAL MEDICINE

## 2023-11-12 PROCEDURE — 85027 COMPLETE CBC AUTOMATED: CPT | Performed by: INTERNAL MEDICINE

## 2023-11-12 PROCEDURE — 25000003 PHARM REV CODE 250: Performed by: NURSE ANESTHETIST, CERTIFIED REGISTERED

## 2023-11-12 PROCEDURE — 63600175 PHARM REV CODE 636 W HCPCS: Performed by: NURSE ANESTHETIST, CERTIFIED REGISTERED

## 2023-11-12 PROCEDURE — 25000003 PHARM REV CODE 250: Performed by: STUDENT IN AN ORGANIZED HEALTH CARE EDUCATION/TRAINING PROGRAM

## 2023-11-12 PROCEDURE — 37000009 HC ANESTHESIA EA ADD 15 MINS: Performed by: STUDENT IN AN ORGANIZED HEALTH CARE EDUCATION/TRAINING PROGRAM

## 2023-11-12 PROCEDURE — 37000008 HC ANESTHESIA 1ST 15 MINUTES: Performed by: STUDENT IN AN ORGANIZED HEALTH CARE EDUCATION/TRAINING PROGRAM

## 2023-11-12 PROCEDURE — 25000003 PHARM REV CODE 250: Performed by: INTERNAL MEDICINE

## 2023-11-12 PROCEDURE — 36000707: Performed by: STUDENT IN AN ORGANIZED HEALTH CARE EDUCATION/TRAINING PROGRAM

## 2023-11-12 PROCEDURE — 11004 PR DEBRIDE NECROTIC SKIN/ TISSUE, GENIT & PERINM: ICD-10-PCS | Mod: ,,, | Performed by: STUDENT IN AN ORGANIZED HEALTH CARE EDUCATION/TRAINING PROGRAM

## 2023-11-12 PROCEDURE — 25000003 PHARM REV CODE 250: Performed by: ANESTHESIOLOGY

## 2023-11-12 PROCEDURE — 63600175 PHARM REV CODE 636 W HCPCS: Performed by: ANESTHESIOLOGY

## 2023-11-12 PROCEDURE — 27201423 OPTIME MED/SURG SUP & DEVICES STERILE SUPPLY: Performed by: STUDENT IN AN ORGANIZED HEALTH CARE EDUCATION/TRAINING PROGRAM

## 2023-11-12 PROCEDURE — D9220A PRA ANESTHESIA: Mod: ANES,,, | Performed by: ANESTHESIOLOGY

## 2023-11-12 PROCEDURE — 36000706: Performed by: STUDENT IN AN ORGANIZED HEALTH CARE EDUCATION/TRAINING PROGRAM

## 2023-11-12 PROCEDURE — P9016 RBC LEUKOCYTES REDUCED: HCPCS | Performed by: INTERNAL MEDICINE

## 2023-11-12 PROCEDURE — 83735 ASSAY OF MAGNESIUM: CPT | Performed by: INTERNAL MEDICINE

## 2023-11-12 PROCEDURE — 11000001 HC ACUTE MED/SURG PRIVATE ROOM

## 2023-11-12 PROCEDURE — D9220A PRA ANESTHESIA: ICD-10-PCS | Mod: ANES,,, | Performed by: ANESTHESIOLOGY

## 2023-11-12 PROCEDURE — 11004 DBRDMT SKIN XTRNL GENT&PER: CPT | Mod: ,,, | Performed by: STUDENT IN AN ORGANIZED HEALTH CARE EDUCATION/TRAINING PROGRAM

## 2023-11-12 PROCEDURE — D9220A PRA ANESTHESIA: Mod: CRNA,,, | Performed by: NURSE ANESTHETIST, CERTIFIED REGISTERED

## 2023-11-12 PROCEDURE — 71000033 HC RECOVERY, INTIAL HOUR: Performed by: STUDENT IN AN ORGANIZED HEALTH CARE EDUCATION/TRAINING PROGRAM

## 2023-11-12 PROCEDURE — 80053 COMPREHEN METABOLIC PANEL: CPT | Performed by: INTERNAL MEDICINE

## 2023-11-12 PROCEDURE — 63600175 PHARM REV CODE 636 W HCPCS: Performed by: STUDENT IN AN ORGANIZED HEALTH CARE EDUCATION/TRAINING PROGRAM

## 2023-11-12 RX ORDER — FENTANYL CITRATE 50 UG/ML
INJECTION, SOLUTION INTRAMUSCULAR; INTRAVENOUS
Status: DISCONTINUED | OUTPATIENT
Start: 2023-11-12 | End: 2023-11-12

## 2023-11-12 RX ORDER — OXYCODONE AND ACETAMINOPHEN 10; 325 MG/1; MG/1
1 TABLET ORAL EVERY 6 HOURS PRN
Status: DISCONTINUED | OUTPATIENT
Start: 2023-11-12 | End: 2023-11-12

## 2023-11-12 RX ORDER — SODIUM CHLORIDE 0.9 % (FLUSH) 0.9 %
10 SYRINGE (ML) INJECTION
Status: DISCONTINUED | OUTPATIENT
Start: 2023-11-12 | End: 2023-11-12 | Stop reason: HOSPADM

## 2023-11-12 RX ORDER — MIDAZOLAM HYDROCHLORIDE 1 MG/ML
1 INJECTION INTRAMUSCULAR; INTRAVENOUS ONCE
Status: COMPLETED | OUTPATIENT
Start: 2023-11-12 | End: 2023-11-12

## 2023-11-12 RX ORDER — MUPIROCIN 20 MG/G
OINTMENT TOPICAL 2 TIMES DAILY
Status: DISCONTINUED | OUTPATIENT
Start: 2023-11-12 | End: 2023-11-12

## 2023-11-12 RX ORDER — DEXAMETHASONE SODIUM PHOSPHATE 4 MG/ML
INJECTION, SOLUTION INTRA-ARTICULAR; INTRALESIONAL; INTRAMUSCULAR; INTRAVENOUS; SOFT TISSUE
Status: DISCONTINUED | OUTPATIENT
Start: 2023-11-12 | End: 2023-11-12

## 2023-11-12 RX ORDER — KETAMINE HYDROCHLORIDE 50 MG/ML
INJECTION, SOLUTION INTRAMUSCULAR; INTRAVENOUS
Status: DISCONTINUED | OUTPATIENT
Start: 2023-11-12 | End: 2023-11-12

## 2023-11-12 RX ORDER — DEXMEDETOMIDINE HYDROCHLORIDE 100 UG/ML
INJECTION, SOLUTION INTRAVENOUS
Status: DISCONTINUED | OUTPATIENT
Start: 2023-11-12 | End: 2023-11-12

## 2023-11-12 RX ORDER — HYDROMORPHONE HYDROCHLORIDE 2 MG/ML
0.2 INJECTION, SOLUTION INTRAMUSCULAR; INTRAVENOUS; SUBCUTANEOUS EVERY 5 MIN PRN
Status: DISCONTINUED | OUTPATIENT
Start: 2023-11-12 | End: 2023-11-12

## 2023-11-12 RX ORDER — MIDAZOLAM HYDROCHLORIDE 1 MG/ML
INJECTION INTRAMUSCULAR; INTRAVENOUS
Status: DISCONTINUED | OUTPATIENT
Start: 2023-11-12 | End: 2023-11-12

## 2023-11-12 RX ORDER — ALBUMIN HUMAN 250 G/1000ML
SOLUTION INTRAVENOUS
Status: DISCONTINUED | OUTPATIENT
Start: 2023-11-12 | End: 2023-11-12

## 2023-11-12 RX ORDER — FENTANYL CITRATE 50 UG/ML
50 INJECTION, SOLUTION INTRAMUSCULAR; INTRAVENOUS ONCE
Status: COMPLETED | OUTPATIENT
Start: 2023-11-12 | End: 2023-11-12

## 2023-11-12 RX ORDER — HYDROCODONE BITARTRATE AND ACETAMINOPHEN 500; 5 MG/1; MG/1
TABLET ORAL
Status: DISCONTINUED | OUTPATIENT
Start: 2023-11-12 | End: 2023-11-21

## 2023-11-12 RX ORDER — ONDANSETRON 2 MG/ML
INJECTION INTRAMUSCULAR; INTRAVENOUS
Status: DISCONTINUED | OUTPATIENT
Start: 2023-11-12 | End: 2023-11-12

## 2023-11-12 RX ORDER — HYDROMORPHONE HYDROCHLORIDE 2 MG/ML
1 INJECTION, SOLUTION INTRAMUSCULAR; INTRAVENOUS; SUBCUTANEOUS EVERY 6 HOURS PRN
Status: DISCONTINUED | OUTPATIENT
Start: 2023-11-12 | End: 2023-11-13

## 2023-11-12 RX ORDER — PROPOFOL 10 MG/ML
VIAL (ML) INTRAVENOUS
Status: DISCONTINUED | OUTPATIENT
Start: 2023-11-12 | End: 2023-11-12

## 2023-11-12 RX ORDER — ROCURONIUM BROMIDE 10 MG/ML
INJECTION, SOLUTION INTRAVENOUS
Status: DISCONTINUED | OUTPATIENT
Start: 2023-11-12 | End: 2023-11-12

## 2023-11-12 RX ORDER — PHENYLEPHRINE HYDROCHLORIDE 10 MG/ML
INJECTION INTRAVENOUS
Status: DISCONTINUED | OUTPATIENT
Start: 2023-11-12 | End: 2023-11-12

## 2023-11-12 RX ORDER — OXYCODONE AND ACETAMINOPHEN 10; 325 MG/1; MG/1
1 TABLET ORAL EVERY 6 HOURS PRN
Status: DISCONTINUED | OUTPATIENT
Start: 2023-11-12 | End: 2023-11-14

## 2023-11-12 RX ORDER — HYDROCODONE BITARTRATE AND ACETAMINOPHEN 5; 325 MG/1; MG/1
1 TABLET ORAL
Status: DISCONTINUED | OUTPATIENT
Start: 2023-11-12 | End: 2023-11-12 | Stop reason: HOSPADM

## 2023-11-12 RX ORDER — HYDROMORPHONE HYDROCHLORIDE 2 MG/ML
INJECTION, SOLUTION INTRAMUSCULAR; INTRAVENOUS; SUBCUTANEOUS
Status: DISCONTINUED | OUTPATIENT
Start: 2023-11-12 | End: 2023-11-12

## 2023-11-12 RX ORDER — LIDOCAINE HYDROCHLORIDE 10 MG/ML
1 INJECTION, SOLUTION EPIDURAL; INFILTRATION; INTRACAUDAL; PERINEURAL ONCE
Status: DISCONTINUED | OUTPATIENT
Start: 2023-11-12 | End: 2023-11-12 | Stop reason: HOSPADM

## 2023-11-12 RX ORDER — FAMOTIDINE 10 MG/ML
20 INJECTION INTRAVENOUS ONCE
Status: COMPLETED | OUTPATIENT
Start: 2023-11-12 | End: 2023-11-12

## 2023-11-12 RX ORDER — FENTANYL CITRATE 50 UG/ML
25 INJECTION, SOLUTION INTRAMUSCULAR; INTRAVENOUS EVERY 5 MIN PRN
Status: DISCONTINUED | OUTPATIENT
Start: 2023-11-12 | End: 2023-11-12 | Stop reason: HOSPADM

## 2023-11-12 RX ADMIN — PHENYLEPHRINE HYDROCHLORIDE 200 MCG: 10 INJECTION INTRAVENOUS at 01:11

## 2023-11-12 RX ADMIN — CLINDAMYCIN PHOSPHATE 600 MG: 600 INJECTION, SOLUTION INTRAVENOUS at 10:11

## 2023-11-12 RX ADMIN — MIDAZOLAM HYDROCHLORIDE 2 MG: 1 INJECTION, SOLUTION INTRAMUSCULAR; INTRAVENOUS at 01:11

## 2023-11-12 RX ADMIN — SODIUM CHLORIDE, POTASSIUM CHLORIDE, SODIUM LACTATE AND CALCIUM CHLORIDE: 600; 310; 30; 20 INJECTION, SOLUTION INTRAVENOUS at 04:11

## 2023-11-12 RX ADMIN — PROPOFOL 170 MG: 10 INJECTION, EMULSION INTRAVENOUS at 01:11

## 2023-11-12 RX ADMIN — HYDROMORPHONE HYDROCHLORIDE 0.5 MG: 2 INJECTION INTRAMUSCULAR; INTRAVENOUS; SUBCUTANEOUS at 11:11

## 2023-11-12 RX ADMIN — VANCOMYCIN HYDROCHLORIDE 1250 MG: 1.25 INJECTION, POWDER, LYOPHILIZED, FOR SOLUTION INTRAVENOUS at 05:11

## 2023-11-12 RX ADMIN — HYDROMORPHONE HYDROCHLORIDE 1 MG: 2 INJECTION, SOLUTION INTRAMUSCULAR; INTRAVENOUS; SUBCUTANEOUS at 02:11

## 2023-11-12 RX ADMIN — METHYLPREDNISOLONE SODIUM SUCCINATE 15 MG: 40 INJECTION, POWDER, FOR SOLUTION INTRAMUSCULAR; INTRAVENOUS at 10:11

## 2023-11-12 RX ADMIN — ROCURONIUM BROMIDE 30 MG: 10 SOLUTION INTRAVENOUS at 01:11

## 2023-11-12 RX ADMIN — ONDANSETRON 4 MG: 2 INJECTION INTRAMUSCULAR; INTRAVENOUS at 01:11

## 2023-11-12 RX ADMIN — CLINDAMYCIN PHOSPHATE 600 MG: 600 INJECTION, SOLUTION INTRAVENOUS at 02:11

## 2023-11-12 RX ADMIN — DEXMEDETOMIDINE 6 MCG: 200 INJECTION, SOLUTION INTRAVENOUS at 01:11

## 2023-11-12 RX ADMIN — PIPERACILLIN SODIUM AND TAZOBACTAM SODIUM 4.5 G: 4; .5 INJECTION, POWDER, FOR SOLUTION INTRAVENOUS at 10:11

## 2023-11-12 RX ADMIN — MIDAZOLAM HYDROCHLORIDE 1 MG: 1 INJECTION, SOLUTION INTRAMUSCULAR; INTRAVENOUS at 01:11

## 2023-11-12 RX ADMIN — HYDROMORPHONE HYDROCHLORIDE 1 MG: 2 INJECTION INTRAMUSCULAR; INTRAVENOUS; SUBCUTANEOUS at 06:11

## 2023-11-12 RX ADMIN — OXYCODONE AND ACETAMINOPHEN 1 TABLET: 10; 325 TABLET ORAL at 04:11

## 2023-11-12 RX ADMIN — SUGAMMADEX 150 MG: 100 INJECTION, SOLUTION INTRAVENOUS at 02:11

## 2023-11-12 RX ADMIN — VANCOMYCIN HYDROCHLORIDE 1250 MG: 1.25 INJECTION, POWDER, LYOPHILIZED, FOR SOLUTION INTRAVENOUS at 04:11

## 2023-11-12 RX ADMIN — DEXAMETHASONE SODIUM PHOSPHATE 8 MG: 4 INJECTION, SOLUTION INTRA-ARTICULAR; INTRALESIONAL; INTRAMUSCULAR; INTRAVENOUS; SOFT TISSUE at 01:11

## 2023-11-12 RX ADMIN — FENTANYL CITRATE 50 MCG: 50 INJECTION, SOLUTION INTRAMUSCULAR; INTRAVENOUS at 02:11

## 2023-11-12 RX ADMIN — FAMOTIDINE 20 MG: 10 INJECTION, SOLUTION INTRAVENOUS at 01:11

## 2023-11-12 RX ADMIN — CLINDAMYCIN PHOSPHATE 600 MG: 600 INJECTION, SOLUTION INTRAVENOUS at 05:11

## 2023-11-12 RX ADMIN — SODIUM CHLORIDE, SODIUM GLUCONATE, SODIUM ACETATE, POTASSIUM CHLORIDE AND MAGNESIUM CHLORIDE: 526; 502; 368; 37; 30 INJECTION, SOLUTION INTRAVENOUS at 01:11

## 2023-11-12 RX ADMIN — PIPERACILLIN SODIUM AND TAZOBACTAM SODIUM 4.5 G: 4; .5 INJECTION, POWDER, FOR SOLUTION INTRAVENOUS at 04:11

## 2023-11-12 RX ADMIN — FENTANYL CITRATE 50 MCG: 50 INJECTION, SOLUTION INTRAMUSCULAR; INTRAVENOUS at 01:11

## 2023-11-12 RX ADMIN — ALBUMIN (HUMAN) 100 ML: 12.5 SOLUTION INTRAVENOUS at 01:11

## 2023-11-12 RX ADMIN — ROCURONIUM BROMIDE 20 MG: 10 SOLUTION INTRAVENOUS at 02:11

## 2023-11-12 RX ADMIN — OXYCODONE AND ACETAMINOPHEN 1 TABLET: 10; 325 TABLET ORAL at 10:11

## 2023-11-12 RX ADMIN — KETAMINE HYDROCHLORIDE 20 MG: 50 INJECTION INTRAMUSCULAR; INTRAVENOUS at 01:11

## 2023-11-12 RX ADMIN — PIPERACILLIN SODIUM AND TAZOBACTAM SODIUM 4.5 G: 4; .5 INJECTION, POWDER, FOR SOLUTION INTRAVENOUS at 01:11

## 2023-11-12 NOTE — BRIEF OP NOTE
Brief Operative Note  Lionel León  MRN:  [id number]  : 1980  [unfilled]   [facility]  Surgeon(s):  Vladimir Vick MD   General        Procedure: Procedures:    * DEBRIDEMENT, EXTERNAL GENITALIA/BUTTOCKS FOR NECROTIZING SOFT TISSUE INFECTION    Pre-op Dx: Necrotizing Soft Tissue Infection Gluteus    Post-op Dx: Necrotizing Soft Tissue Infection Gluteus     Staff: Dr. Nava MD    Resident Surgeon: Ruddy Edmonds MD    Anesthesia: General      Indication for Procedure:   42-year-old male with a past medical history of ulcerative colitis recently diagnosed in 2023 who was found to have a necrotizing soft tissue infection of his perineum with high fevers and leukocytosis.  CT scan demonstrated large amount of soft tissue gas in his perineal region.         Procedure Details   See full op note for details.    Findings:  Sharp excisional debridement of perineum.  He had necrotic tissue extending down to the muscle.  The necrotizing soft tissue infection encircled his anus.  The wound was copiously irrigated and packed with antimicrobial moistened Kerlix gauze, gauze, ABD, and tape.  A flexi Seal was placed at the conclusion of the case.      Estimated Blood Loss:  50 cc         Drains:  Rectal tube.           Specimens:   Specimens (From admission, onward)      None                   Implants: * No implants in log *     Complications:  None           Disposition: PACU - hemodynamically stable.           Condition: stable    Vladimir Vick MD  Phone Number: [phone]      Ruddy Edmonds MD 2023 7:55 PM

## 2023-11-12 NOTE — TRANSFER OF CARE
"Anesthesia Transfer of Care Note    Patient: Lionel León    Procedure(s) Performed: Procedure(s) (LRB):  DEBRIDEMENT, EXTERNAL GENITALIA/BUTTOCKS FOR NECROTIZING SOFT TISSUE INFECTION (Bilateral)    Patient location: PACU    Anesthesia Type: general    Transport from OR: Transported from OR on room air with adequate spontaneous ventilation    Post pain: adequate analgesia    Post assessment: no apparent anesthetic complications    Post vital signs: stable    Level of consciousness: awake and alert    Nausea/Vomiting: no nausea/vomiting    Complications: none    Transfer of care protocol was followed      Last vitals:   Visit Vitals  BP (!) 99/54 (BP Location: Right arm, Patient Position: Lying)   Pulse (!) 111   Temp (!) 38.2 °C (100.8 °F) (Skin)   Resp 12   Ht 5' 10" (1.778 m)   Wt 74.8 kg (165 lb)   SpO2 99%   BMI 23.68 kg/m²     "

## 2023-11-12 NOTE — PROGRESS NOTES
[facility] - 8th Floor [facility] Surg  [facility] MEDICINE ~ PROGRESS NOTE        CHIEF COMPLAINT   Hospital follow up    HOSPITAL COURSE   Lionel León is a 42 y.o. White male with a past medical history of diverticulitis status post colon resection and ulcerative colitis. Patient had recent admission to hospital medicine services at [facility] on 10/21/2023 to 10/24/2023 for sepsis secondary to pancolitis. He was treated with antibiotics and GI was consulted. Colonoscopy was performed on 10/23/2023 with findings concerning for ulcerative colitis and biopsies of the cecum, ascending, transverse, ascending colon and rectum positive for active chronic colitis consistent with inflammatory bowel disorder. No transfusion was required for rectal bleeding. Patient was discharged with prednisone taper and GI follow up appointment on 11/17/2023. The patient presented to [facility] on 11/6/2023 with a primary complaint of bright red rectal bleeding and lower abdominal pain.  Patient reports rectal bleeding has been ongoing since discharge.  Other associated symptoms include nausea, rectal pain, subjective fevers, weakness, fatigue and a 30 40 lb weight loss over last 3 weeks.  Patient states this morning he was walking to the bathroom when he felt flushed and vision went black. He passed out and hit his head on the wooden floor. Approximately hour and a half later when he went to get up he passed out again.  Second syncopal episode was witnessed by patient's wife who is at bedside reports loss of consciousness lasts for approximately 30 seconds.         Upon presentation to the ED, temperature 98.3F, heart rate 109, blood pressure 96/51, respiratory rate 17, spO2 99%. Labs with H&H 7.3/23.6 (10.6/33.2 on 10/24/2023), BUN 21.9, creatinine 0.88, calcium 7.7, .6, ESR 86. EKG sinus tachycardia with a ventricular rate of 108 and age undetermined possible inferior infarct. In the ED patient received Dilaudid, Zofran,  Hydrocortisone and IVF. He was typed and crossmatched for transfusion of 2 units of packed RBC. Patient is admitted to hospital medicine services for further medical management.      Patient was seen by GI and started on IV steroids, Solu-Medrol 30 mg IV b.I.d. for active ulcerative colitis flare up  Plus Anusol suppository per rectum b.I.d..  Patient is status post 2 units PRBC for symptomatic anemia.  CT abdomen and pelvis was also ordered to assess extent. CT of the abdomen and pelvis was pertinent for findings consistent with colitis involving the ascending colon, portions of the descending colon.  Underlying gastritis also can not be excluded secondary to slightly thickened gastric wall.      May complain continues to be rectal pain but refers that suppositories are helping.  Patient is tolerating p.o. intake     According to GI notes plans is for patient to start Humira/imura once approved by patient's insurance.                    Today  11/7/23-Doing better feeling better pain is better.  4 bowel movements yesterday with some blood as well.  Hemoglobin did not have appropriate response but will continue to monitor.  Discuss with GI physician assistant as well.    11/8/23 dr alvarez -  Today patient had 1 BM overnight and another one this a.m..  Refers that stools are becoming more formed still with some ongoing hematochezia.  Rectal pain continues to improve.  For now we will continue hydrocortisone suppositories t.I.d. as well as IV Solu-Medrol 30 mg IV b.I.d. with plans to start Humira/ Imura as outpt.  Patient today refers feeling okay, his pain goes up and down but overall feels improved since admission not ready to go home.  We will continue present management and continue following recommendations by GI    11/09/2023 Dr. Alvarez-chart reviewed patient examined.  Still having episodes of bright red per rectum.  Also refers some dizziness when getting out of bed.  Hematocrit dropped to 24.7.  BP running at  98/57.  We started normal saline solution.  will require PRBC transfusion tomorrow.      11/10/2023 Dr. Koo reviewed patient examined.  Remains with complains of dizziness with hematocrit around 24.7.  Samples of Humira available but patient ran a fever of around 102.5 that quickly resolved without antipyretic.  Now Humira on hold.  We will go ahead and transfuse 2 units PRBC.  Patient received 1 g acetaminophen.  Also refers his stools are more formed with less abdominal pain and good appetite.    11-11-23 Dr. Koo reviewed patient examined.  Yesterday patient started having temperature spikes.  Today he has complains of  pain over perineum/buttocks that has worsened.  Area with extensive erythema and induration over medial aspects of buttocks.  CT abdomen and pelvis with contrast was done showing findings pertinent for Xochitl gangrene.  Patient is on vancomycin and Zosyn, will add clindamycin for toxin control.  Patient has been seen by surgical hospitalist with plans for OR for exploration ,debridement today.  We will go ahead and consult Infectious Disease for evaluation as well .    11/12/2023 Dr. Koo reviewed patient examined.  Patient was taken to OR yesterday for debridement of perineal/sacral area for findings concerning xochitl's gangrene.  We will return to OR today for further debridement.  Eventually will need diverting colostomy this coming week.  Refers feeling better, has a rectal tube that is functional.  Continues on cleaned the/Zosyn/vancomycin with cultures so far with many Gram-negative rods.  Given 1 unit PRBC        OBJECTIVE/PHYSICAL EXAM     VITAL SIGNS (MOST RECENT):  Temp: 99.3 °F (37.4 °C) (11/12/23 1131)  Pulse: 85 (11/12/23 1131)  Resp: 18 (11/12/23 1141)  BP: 112/71 (11/12/23 1131)  SpO2: 99 % (11/12/23 1015) VITAL SIGNS (24 HOUR RANGE):  Temp:  [98.1 °F (36.7 °C)-100.8 °F (38.2 °C)] 99.3 °F (37.4 °C)  Pulse:  [] 85  Resp:  [12-20] 18  SpO2:  [95 %-99  %] 99 %  BP: ()/(51-71) 112/71   GENERAL: In no acute distress, afebrile  HEENT:normocephalic/ no masses  CHEST: Clear to auscultation bilaterally  HEART: S1, S2, no appreciable murmur  ABDOMEN: Soft,  generalized abdominal pain, BS +  MSK: Warm, no lower extremity edema, no clubbing or cyanosis  Perineum-extensive erythema from a scrotal area all the way around to lower back, covers medial aspect of buttocks with induration and tenderness.  NEUROLOGIC: Alert and oriented x4, moving all extremities with good strength   INTEGUMENTARY: intact  PSYCHIATRY: alert/ active and oriented.                 ASSESSMENT/PLAN   Acute severe ulcerative colitis , new onset  -C diff negative  -DC Solu-Medrol 30 b.i.d. 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 Today 11/12/2023  -rectal tube with plans for diverting colostomy    Orthostatic syncope probably related to symptomatic anemia/intravascular volume depletion    Sepsis due to Allison gangrene     Rectal pain with history of hemorrhoids  -discontinue Anusol t.i.d.    Severe protein calorie malnutrition     GI following/surgery in case. infectious diseases consulted for Monday.             Cc time 35 minutes  Cc dx- SEPSIS/ FOURNIERS GANGRENE /symptomatic anemia requiring PRBC transfusion      DVT prophylaxis:     Anticipated discharge and disposition: tbd  __________________________________________________________________________    LABS/MICRO/MEDS/DIAGNOSTICS       LABS  Recent Labs     11/12/23  0445   *   K 4.4   CHLORIDE 96*   CO2 27   BUN 17.0   CREATININE 0.80   GLUCOSE 185*   CALCIUM 8.0*   ALKPHOS 90   AST 15   ALT 98*   ALBUMIN 1.3*       Recent Labs     11/12/23  0445   WBC 13.35  13.35*   RBC 2.68*   HCT 22.6*   MCV 84.3            MICROBIOLOGY  Microbiology Results (last 7 days)        Procedure Component Value Units Date/Time    Wound Culture [[id number]]  (Abnormal) Collected: 11/11/23 1851    Order Status: Completed Specimen: Abscess from Rectum Updated: 11/12/23 1150     Wound Culture Many Escherichia coli     Comment: with normal tyrel       Gram Stain [[id number]] Collected: 11/11/23 1851    Order Status: Completed Specimen: Abscess from Rectum Updated: 11/12/23 1129     GRAM STAIN Many WBC observed      Many Gram positive cocci      Many Gram Negative Rods      Many Gram Positive Rods    Blood Culture [[id number]]  (Normal) Collected: 11/10/23 2048    Order Status: Completed Specimen: Blood from Antecubital, Left Updated: 11/11/23 2300     CULTURE, BLOOD ([facility]) No Growth At 24 Hours    Fungal Culture [[id number]] Collected: 11/11/23 1851    Order Status: Sent Specimen: Abscess from Rectum Updated: 11/11/23 1907    AFB Smear [[id number]] Collected: 11/11/23 1851    Order Status: Sent Specimen: Abscess from Rectum     Mycobacteria and Nocardia Culture [[id number]] Collected: 11/11/23 1851    Order Status: Sent Specimen: Abscess from Rectum     Blood culture #1 **CANNOT BE ORDERED STAT** [[id number]]  (Normal) Collected: 11/06/23 0405    Order Status: Completed Specimen: Blood from Antecubital, Right Updated: 11/11/23 0701     CULTURE, BLOOD ([facility]) No Growth at 5 days    Blood culture #2 **CANNOT BE ORDERED STAT** [[id number]]  (Normal) Collected: 11/06/23 0405    Order Status: Completed Specimen: Blood from Antecubital, Left Updated: 11/11/23 0600     CULTURE, BLOOD ([facility]) No Growth at 5 days    Clostridium Diff Toxin, A & B, EIA [[id number]]  (Normal) Collected: 11/06/23 1741    Order Status: Completed Specimen: Stool Updated: 11/06/23 1816     Clostridium Difficile GDH Antigen Negative     Clostridium Difficile Toxin A/B Negative               MEDICATIONS   clindamycin (CLEOCIN) IVPB  600 mg Intravenous Q8H    methylPREDNISolone sodium succinate injection  15 mg Intravenous BID     "piperacillin-tazobactam (Zosyn) IV (PEDS and ADULTS) (extended infusion is not appropriate)  4.5 g Intravenous Q8H    vancomycin (VANCOCIN) IV (PEDS and ADULTS)  1,250 mg Intravenous Q12H         INFUSIONS   lactated ringers Stopped (11/12/23 0541)          DIAGNOSTIC TESTS  CT Abdomen Pelvis W Wo Contrast   Final Result      X-Ray Chest PA And Lateral   Final Result      No acute abnormality.         Electronically signed by: Quinton Hernandez MD   Date:    11/11/2023   Time:    08:49      CT Abdomen Pelvis W Wo Contrast   Final Result      Findings seen consistent with colitis involving the ascending colon and portions of the descending colon.  Follow-up is recommended to resolution as underlying mass cannot be completely excluded.      The gastric wall appears to be slightly thickened.  Underlying gastritis should be excluded.         Electronically signed by: John Gale   Date:    11/06/2023   Time:    12:02           No results found for: "EF"       NUTRITION STATUS  Patient meets ASPEN criteria for moderate malnutrition of acute illness or injury per RD assessment as evidenced by:  Energy Intake (Malnutrition): less than 75% for greater than 7 days  Weight Loss (Malnutrition): greater than 2% in 1 week  Subcutaneous Fat (Malnutrition): moderate depletion  Muscle Mass (Malnutrition): moderate depletion  Fluid Accumulation (Malnutrition): other (see comments) (does not meet criteria)  Hand  Strength, Left (Malnutrition): unable to evaluate  Hand  Strength, Right (Malnutrition): unable to evaluate  A minimum of two characteristics is recommended for diagnosis of either severe or non-severe malnutrition.       Case related differential diagnoses have been reviewed; assessment and plan has been documented. I have personally reviewed the labs and test results that are currently available; I have reviewed the patients medication list. I have reviewed the consulting providers recommendations. I have " reviewed or attempted to review medical records based upon their availability.  All of the patient's and/or family's questions have been addressed and answered to the best of my ability.  I will continue to monitor closely and make adjustments to medical management as needed.  This document was created using M*Modal Fluency Direct.  Transcription errors may have been made.  Please contact me if any questions may rise regarding documentation to clarify transcription.        Sloan Alvarez MD   Internal Medicine  Department of Hospital Medicine [facility] - 8th Floor Med Surg

## 2023-11-12 NOTE — ANESTHESIA PREPROCEDURE EVALUATION
11/12/2023  Lionel León is a 42 y.o., male.      Pre-op Assessment    I have reviewed the Patient Summary Reports.     I have reviewed the Nursing Notes. I have reviewed the NPO Status.   I have reviewed the Medications.     Review of Systems  Anesthesia Hx:  No problems with previous Anesthesia    Social:  Non-Smoker    Cardiovascular:   Denies Hypertension.  Denies MI.      Pulmonary:   Denies COPD.  Denies Asthma.    Renal/:   Denies Chronic Renal Disease.     Hepatic/GI:   Denies GERD.  Denies Hepatitis.    Neurological:   Denies CVA. Denies Seizures.    Endocrine:   Denies Diabetes. Denies Hypothyroidism.  Denies Obesity / BMI > 30  Dermatological:   Perineum gangrene       Physical Exam  General: Well nourished, Cooperative, Alert and Oriented    Airway:  Mallampati: I   Mouth Opening: Normal  TM Distance: Normal  Tongue: Normal  Neck ROM: Normal ROM    Dental:  Intact        Anesthesia Plan  Type of Anesthesia, risks & benefits discussed:    Anesthesia Type: Gen Supraglottic Airway  Intra-op Monitoring Plan: Standard ASA Monitors  Post Op Pain Control Plan: multimodal analgesia  Induction:  IV  Informed Consent: Informed consent signed with the Patient and all parties understand the risks and agree with anesthesia plan.  All questions answered.   ASA Score: 1  Day of Surgery Review of History & Physical: H&P Update referred to the surgeon/provider.    Ready For Surgery From Anesthesia Perspective.     .

## 2023-11-12 NOTE — ANESTHESIA PROCEDURE NOTES
Intubation    Date/Time: 11/11/2023 6:26 PM    Performed by: Oc Ham CRNA  Authorized by: Saul Martell MD    Intubation:     Induction:  Rapid sequence induction    Intubated:  Postinduction    Mask Ventilation:  Not attempted    Attempts:  1    Attempted By:  CRNA    Method of Intubation:  Direct    Blade:  Pollard 2    Laryngeal View Grade: Grade I - full view of cords      Difficult Airway Encountered?: No      Complications:  None    Airway Device:  Oral endotracheal tube    Airway Device Size:  7.5    Style/Cuff Inflation:  Cuffed    Inflation Amount (mL):  7    Tube secured:  22    Secured at:  The lips    Placement Verified By:  Capnometry    Complicating Factors:  None    Findings Post-Intubation:  BS equal bilateral and atraumatic/condition of teeth unchanged

## 2023-11-12 NOTE — PROGRESS NOTES
"Progress Note         Hospital follow up    Subjective:     11-10-23  Rans fever several times yesterday, sean after being given blood, which transfusion was stopped. Last fever at 23:21 yesterday. Today afebrile as of now. No new or worsening symptoms.     11-11-23  Tmax 99.5 at 8pm last night  Pt went to OR for I & D of abscess to perineum area  Has rectal tube now  Some cramping   Loose stools  "Feels better"  Discussed needs to be fever free for 24 hours prior to starting HuMira    Review of Systems:   12 point system reviewed and is negative except as noted in HPI     Objective:     Scheduled Meds:   clindamycin (CLEOCIN) IVPB  600 mg Intravenous Q8H    methylPREDNISolone sodium succinate injection  15 mg Intravenous BID    piperacillin-tazobactam (Zosyn) IV (PEDS and ADULTS) (extended infusion is not appropriate)  4.5 g Intravenous Q8H    vancomycin (VANCOCIN) IV (PEDS and ADULTS)  1,250 mg Intravenous Q12H     Continuous Infusions:   lactated ringers Stopped (11/12/23 0541)     PRN Meds:  0.9%  NaCl infusion (for blood administration), 0.9%  NaCl infusion (for blood administration), acetaminophen, acetaminophen, dextrose 10%, dextrose 10%, dicyclomine, glucagon (human recombinant), glucose, glucose, HYDROcodone-acetaminophen, hydrocortisone, HYDROmorphone, HYDROmorphone, HYDROmorphone, ondansetron, oxyCODONE-acetaminophen, sodium chloride 0.9%, sodium chloride 0.9%, Pharmacy to dose Vancomycin consult **AND** vancomycin - pharmacy to dose      VITAL SIGNS: 24 HR MIN & MAX LAST    Temp  Min: 98.3 °F (36.8 °C)  Max: 100.8 °F (38.2 °C)  98.4 °F (36.9 °C)        BP  Min: 90/54  Max: 110/57  (!) 94/58     Pulse  Min: 70  Max: 111  81     Resp  Min: 12  Max: 20  16    SpO2  Min: 95 %  Max: 99 %  98 %        Intake/Output Summary (Last 24 hours) at 11/12/2023 0602  Last data filed at 11/12/2023 0548  Gross per 24 hour   Intake 4455.92 ml   Output 4375 ml   Net 80.92 ml         Physical Exam  Constitutional:       " General: He is not in acute distress.  HENT:      Head: Normocephalic.   Cardiovascular:      Rate and Rhythm: Normal rate and regular rhythm.   Abdominal:      General: There is no distension.      Tenderness: There is no guarding.   Neurological:      General: No focal deficit present.   Psychiatric:         Mood and Affect: Mood normal.     GI - Rectal tube, dressing CDI      Recent Results (from the past 24 hour(s))   Comprehensive Metabolic Panel    Collection Time: 11/12/23  4:45 AM   Result Value Ref Range    Sodium Level 129 (L) 136 - 145 mmol/L    Potassium Level 4.4 3.5 - 5.1 mmol/L    Chloride 96 (L) 98 - 107 mmol/L    Carbon Dioxide 27 22 - 29 mmol/L    Glucose Level 185 (H) 74 - 100 mg/dL    Blood Urea Nitrogen 17.0 8.9 - 20.6 mg/dL    Creatinine 0.80 0.73 - 1.18 mg/dL    Calcium Level Total 8.0 (L) 8.4 - 10.2 mg/dL    Protein Total 4.9 (L) 6.4 - 8.3 gm/dL    Albumin Level 1.3 (L) 3.5 - 5.0 g/dL    Globulin 3.6 (H) 2.4 - 3.5 gm/dL    Albumin/Globulin Ratio 0.4 (L) 1.1 - 2.0 ratio    Bilirubin Total 0.5 <=1.5 mg/dL    Alkaline Phosphatase 90 40 - 150 unit/L    Alanine Aminotransferase 98 (H) 0 - 55 unit/L    Aspartate Aminotransferase 15 5 - 34 unit/L    eGFR >60 mls/min/1.73/m2   Magnesium    Collection Time: 11/12/23  4:45 AM   Result Value Ref Range    Magnesium Level 2.10 1.60 - 2.60 mg/dL   CBC with Differential    Collection Time: 11/12/23  4:45 AM   Result Value Ref Range    WBC 13.35 (H) 4.50 - 11.50 x10(3)/mcL    RBC 2.68 (L) 4.70 - 6.10 x10(6)/mcL    Hgb 6.9 (L) 14.0 - 18.0 g/dL    Hct 22.6 (L) 42.0 - 52.0 %    MCV 84.3 80.0 - 94.0 fL    MCH 25.7 (L) 27.0 - 31.0 pg    MCHC 30.5 (L) 33.0 - 36.0 g/dL    RDW 15.5 11.5 - 17.0 %    Platelet 253 130 - 400 x10(3)/mcL    MPV 9.8 7.4 - 10.4 fL    NRBC% 0.2 %       X-Ray Chest PA And Lateral    Result Date: 11/11/2023  EXAMINATION: XR CHEST PA AND LATERAL CLINICAL HISTORY: fever; TECHNIQUE: PA and lateral views of the chest were performed. COMPARISON:  None FINDINGS: The lungs are clear, with normal appearance of pulmonary vasculature and no pleural effusion or pneumothorax. The cardiac silhouette is normal in size. The hilar and mediastinal contours are unremarkable. Bones are intact.     No acute abnormality. Electronically signed by: Quinton Hernandez MD Date:    11/11/2023 Time:    08:49    Echo Saline Bubble? Yes    Result Date: 11/6/2023    Left Ventricle: The left ventricle is normal in size. Normal wall thickness. Normal wall motion. There is normal systolic function with a visually estimated ejection fraction of 50 - 55%. Biplane (2D) method of discs ejection fraction is 53%. There is normal diastolic function.   Right Ventricle: Normal right ventricular cavity size. Systolic function is normal. TAPSE is 2.61 cm.   Left Atrium: Agitated saline study of the atrial septum is positive, intracardiac shunt at atrial level.   Aortic Valve: The aortic valve is a trileaflet valve.   Pulmonic Valve: There is mild regurgitation.   IVC/SVC: Normal venous pressure at 3 mmHg.   Pericardium: There is a small effusion. No indication of cardiac tamponade.     CT Abdomen Pelvis W Wo Contrast    Result Date: 11/6/2023  EXAMINATION: CT ABDOMEN PELVIS W WO CONTRAST CLINICAL HISTORY: Abd and rectal pain, HX IBD; TECHNIQUE: Low dose axial images, sagittal and coronal reformations were obtained from the lung bases to the pubic symphysis following the IV administration of  contrast.  Delayed imaging and pre contrasted imaging was performed as well. Automatic exposure control is utilized to reduce patient radiation exposure. COMPARISON: 10/21/2023 FINDINGS: The lung bases are clear. The stomach is decompressed with gastric wall appears to be mildly thickened. The liver appears normal.  No liver mass or lesion is seen.  Portal and hepatic veins appear normal. The gallbladder appears grossly unremarkable. The pancreas appears normal.  No pancreatic mass or lesion is seen. The spleen  appears normal.  No splenic mass or lesion is seen.  There is a small splenule seen at the splenic hilum. The adrenal glands appear normal.  No adrenal nodule is seen. The kidneys are normal in size.  No hydronephrosis is seen.  No hydroureter is seen.  No nephrolithiasis or ureteral stone is seen. Urinary bladder appears normal. There is diffuse wall thickening seen involving the right colon consistent with diffuse colitis.  Inflammatory changes are seen adjacent to the colon as well.  Some inflammatory changes are also seen involving the descending colon.  There are postsurgical changes seen in the sigmoid colon. No free air is seen.  No free fluid is seen. The bones appear grossly unremarkable.     Findings seen consistent with colitis involving the ascending colon and portions of the descending colon.  Follow-up is recommended to resolution as underlying mass cannot be completely excluded. The gastric wall appears to be slightly thickened.  Underlying gastritis should be excluded. Electronically signed by: John Gale Date:    11/06/2023 Time:    12:02    CT Abdomen Pelvis With Contrast    Result Date: 10/21/2023  EXAMINATION: CT ABDOMEN PELVIS WITH CONTRAST CLINICAL HISTORY: Abdominal pain, acute, nonlocalized; TECHNIQUE: Helically acquired images with axial, sagittal and coronal reformations were obtained from the lung bases to the pubic symphysis after the IV administration of contrast. Automated tube current modulation, weight-based exposure dosing, and/or iterative reconstruction technique utilized to reach lowest reasonably achievable exposure rate. DLP: 518 mGy*cm COMPARISON: No relevant prior available for comparison at the time of dictation. FINDINGS: HEART: Normal in size. No pericardial effusion. LUNG BASES: Mild basilar atelectasis. LIVER: Normal attenuation. No appreciable focal hepatic lesion. BILIARY: No calcified gallstones. PANCREAS: No inflammatory change. SPLEEN: Normal in size  ADRENALS: No mass. KIDNEYS/URETERS: The kidneys enhance symmetrically.  No hydronephrosis. GI TRACT/MESENTERY:  Small bowel is normal in caliber without evidence of obstruction.  There is wall thickening and inflammatory fat stranding at the colon most pronounced at the descending colon.  There is a sigmoid anastomosis which is mildly gas distended without appreciable stricture.  The appendix is normal. PERITONEUM: No free fluid.No free air. LYMPH NODES: No enlarged lymph nodes by size criteria. VASCULATURE: No significant atherosclerosis or aneurysm. BLADDER: Normal appearance given degree of distention. REPRODUCTIVE ORGANS: Normal as visualized. SOFT TISSUES: Unremarkable. BONES: Degenerative change at the lower lumbar spine.     1. Nonspecific colitis 2. The preliminary and final reports are concordant. Electronically signed by: Fernanda Robert Date:    10/21/2023 Time:    12:21      Assessment / Plan:     Assessment & Plan:      42-year-old male known to Dr. He with past medical history of diverticulitis s/p colon resection and recent new diagnosis of ulcerative colitis.  He presented to the ED on 11/6 with complaints of bright red blood per rectum and lower abdominal pain associated with nausea, rectal pain, weakness and 30-40 lb weight loss over the past 3 weeks with syncopal episode x2- 2nd episode with LOC.     Newly diagnosed UC, active flare  - Colonoscopy 10/23/2023 with findings c/w IBD.  He was discharged on prednisone taper with outpatient follow up appointment scheduled 11/17/2023, but ended up presenting here on 11/6  -  on 11/6---95.40 on 10th  - currently on IV Solumedrol, 30mg BID   - he will need to be transitioned to PO steroids 24hrs prior to eventual dc  - upcoming outpatient appt scheduled for 11/17- plans to start humira and imuran- currently in the process of getting approval. Message was sent to our office and medication has been expedited/ also looking into availability of  samples in the meantime. Update as of 11/09- medication had been approved with hopeful shipping in the next 24-48 hrs.   - supp care  2. Rectal pain with BM, hx hemorrhoids  - alleviated with hydrocortisone suppository- increased to TID  - continue cream prn     3. Fever  - started yesterday, then persisted after being given blood  - UA on 10th was unremarkable.   - Stool for Cdiff neg on 7th   - Blood cultures neg from 11/6  - CT abd on 11/6 Findings seen consistent with colitis involving the ascending colon and portions of the descending colon.  Follow-up is recommended to resolution as underlying mass cannot be completely excluded. The gastric wall appears to be slightly thickened.  Underlying gastritis should be excluded.       Patient now has Humira samples and can have loading dose once he is afebrile for 24 hours.   Will check Fecal jen given I do not see he has had one  Will check CRP to note trend   Monitor daily number of stooling   Consider repeat CT pending serial abdominal examinations.     Pt had I & D of perineum abscess yesterday - appreciate surgery assistance with this patient.     Continue supportive care    Malu Lynch Np as scribe for Dr. Ramirez Bean

## 2023-11-12 NOTE — TRANSFER OF CARE
"Anesthesia Transfer of Care Note    Patient: Lionel León    Procedure(s) Performed: Procedure(s) (LRB):  DEBRIDEMENT, EXTERNAL GENITALIA, PERINEUM, AND ABDOMINAL WALL, FOR NECROTIZING SOFT TISSUE INFECTION (N/A)    Patient location: PACU    Anesthesia Type: general    Transport from OR: Transported from OR on room air with adequate spontaneous ventilation    Post pain: adequate analgesia    Post assessment: no apparent anesthetic complications    Post vital signs: stable    Level of consciousness: awake and responds to stimulation    Nausea/Vomiting: no nausea/vomiting    Complications: none    Transfer of care protocol was followed      Last vitals:   Visit Vitals  /61   Pulse 86   Temp 37.4 °C (99.3 °F) (Oral)   Resp 12   Ht 5' 10" (1.778 m)   Wt 74.8 kg (165 lb)   SpO2 99%   BMI 23.68 kg/m²     "

## 2023-11-12 NOTE — ANESTHESIA PROCEDURE NOTES
Intubation    Date/Time: 11/12/2023 1:40 PM    Performed by: Star Ramirez CRNA  Authorized by: Juan Friedman DO    Intubation:     Induction:  Intravenous    Intubated:  Postinduction    Mask Ventilation:  Easy with oral airway    Attempts:  1    Attempted By:  CRNA    Method of Intubation:  Direct    Blade:  Pollard 2    Laryngeal View Grade: Grade IIA - cords partially seen      Difficult Airway Encountered?: No      Complications:  None    Airway Device:  Oral endotracheal tube    Airway Device Size:  7.5    Style/Cuff Inflation:  Cuffed    Inflation Amount (mL):  5    Tube secured:  22    Secured at:  The lips    Placement Verified By:  Capnometry    Complicating Factors:  None    Findings Post-Intubation:  BS equal bilateral and atraumatic/condition of teeth unchanged

## 2023-11-12 NOTE — PROGRESS NOTES
Acute Care Surgery   Progress Note  Admit Date: 11/6/2023  HD#6  POD#1 Day Post-Op    Subjective:   Interval history:  NAEO.  Afebrile.  Hemodynamically stable.  Hemoglobin 6.9 this morning.  Adequate urine output.  Flexi Seal holding in rectum output from tube.  He tells me that he feels better after debridement.  Home Meds:  Current Outpatient Medications   Medication Instructions    dicyclomine (BENTYL) 20 mg, Oral, 4 times daily PRN    predniSONE (DELTASONE) 10 MG tablet Take 4 tablets (40 mg total) by mouth once daily for 7 days, THEN 3 tablets (30 mg total) once daily for 7 days, THEN 2 tablets (20 mg total) once daily for 7 days, THEN 1 tablet (10 mg total) once daily for 7 days, THEN 0.5 tablets (5 mg total) once daily for 7 days.      Scheduled Meds:   clindamycin (CLEOCIN) IVPB  600 mg Intravenous Q8H    methylPREDNISolone sodium succinate injection  15 mg Intravenous BID    piperacillin-tazobactam (Zosyn) IV (PEDS and ADULTS) (extended infusion is not appropriate)  4.5 g Intravenous Q8H    vancomycin (VANCOCIN) IV (PEDS and ADULTS)  1,250 mg Intravenous Q12H     Continuous Infusions:   lactated ringers Stopped (11/12/23 0541)     PRN Meds:0.9%  NaCl infusion (for blood administration), 0.9%  NaCl infusion (for blood administration), 0.9%  NaCl infusion (for blood administration), acetaminophen, acetaminophen, dextrose 10%, dextrose 10%, dicyclomine, glucagon (human recombinant), glucose, glucose, HYDROcodone-acetaminophen, hydrocortisone, HYDROmorphone, HYDROmorphone, HYDROmorphone, ondansetron, oxyCODONE-acetaminophen, sodium chloride 0.9%, sodium chloride 0.9%, Pharmacy to dose Vancomycin consult **AND** vancomycin - pharmacy to dose     Objective:     VITAL SIGNS: 24 HR MIN & MAX LAST   Temp  Min: 98.1 °F (36.7 °C)  Max: 100.8 °F (38.2 °C)  98.2 °F (36.8 °C)   BP  Min: 90/54  Max: 110/57  108/68    Pulse  Min: 70  Max: 111  81    Resp  Min: 12  Max: 20  16    SpO2  Min: 95 %  Max: 99 %  99 %   "    HT: 5' 10" (177.8 cm)  WT: 74.8 kg (165 lb)  BMI: 23.7     Intake/output:  Intake/Output - Last 3 Shifts         11/10 0700 11/11 0659 11/11 0700 11/12 0659 11/12 0700 11/13 0659    P.O. 1660 2000     I.V. (mL/kg) 0 (0) 444 (5.9)     IV Piggyback  2011.9     Total Intake(mL/kg) 1660 (22.2) 4455.9 (59.6)     Urine (mL/kg/hr) 3450 (1.9) 4375 (2.4)     Stool 0 0     Total Output 3450 4375     Net -1790 +80.9            Urine Occurrence 1 x      Stool Occurrence 4 x 3 x             Intake/Output Summary (Last 24 hours) at 11/12/2023 0802  Last data filed at 11/12/2023 0548  Gross per 24 hour   Intake 4455.92 ml   Output 4375 ml   Net 80.92 ml           Lines/drains/airway:       Peripheral IV - Single Lumen 18 G Right Hand (Active)   Site Assessment Clean;Dry;Intact 11/12/23 0000   Extremity Assessment Distal to IV No abnormal discoloration;No redness;No swelling 11/11/23 2010   Line Status Saline locked 11/12/23 0000   Dressing Status Clean;Dry;Intact 11/12/23 0000   Dressing Intervention Integrity maintained 11/11/23 2010   Number of days:             Rectal Tube 11/11/23 1944 fecal management system (Active)   Stool Color Brown;Red 11/11/23 2100   Number of days: 0            Urethral Catheter 11/11/23 1954 Double-lumen;Silicone;Non-latex 16 Fr. (Active)   Site Assessment Clean;Intact 11/11/23 2100   Collection Container Urimeter 11/11/23 2100   Securement Method secured to top of thigh w/ adhesive device 11/11/23 2100   Catheter Care Performed yes 11/11/23 2100   Reason for Continuing Urinary Catheterization Post operative 11/11/23 2100   CAUTI Prevention Bundle Securement Device in place with 1" slack;Intact seal between catheter & drainage tubing;Drainage bag/urimeter off the floor;Sheeting clip in use;No dependent loops or kinks;Drainage bag/urimeter not overfilled (<2/3 full);Drainage bag/urimeter below bladder 11/11/23 2100   Output (mL) 700 mL 11/12/23 0548   Number of days: 0       Physical " "examination:  Gen: NAD, AAOx3, answering questions appropriately  HEENT: PERHAYDER  CV: RR  Resp: NWOB  Abd: S/NT/ND  Perineum:  Large wounds to bilateral buttocks dressing is in place none saturated.  Flexiseal in place.  Ext: moving all extremities spontaneously and purposefully  Neuro: CN II-XII grossly intact    Labs:  Renal:  Recent Labs     11/10/23  0438 11/11/23  0435 11/12/23  0445   BUN 12.9 14.1 17.0   CREATININE 0.73 0.81 0.80     No results for input(s): "LACTIC" in the last 72 hours.  FENGI:  Recent Labs     11/10/23  0438 11/11/23  0435 11/12/23  0445   * 131* 129*   K 4.4 4.0 4.4   CO2 27 24 27   CALCIUM 7.8* 7.8* 8.0*   MG 2.20  --  2.10   ALBUMIN  --   --  1.3*   BILITOT  --   --  0.5   AST  --   --  15   ALKPHOS  --   --  90   ALT  --   --  98*     Heme:  Recent Labs     11/10/23  0438 11/11/23  0435 11/12/23  0445   HGB 7.6* 8.0* 6.9*   HCT 24.6* 25.3* 22.6*    296 253     ID:  Recent Labs     11/11/23 0435 11/12/23 0445   WBC 14.16  14.16* 13.35  13.35*     CBG:  Recent Labs     11/10/23  0438 11/11/23  0435 11/12/23  0445   GLUCOSE 169* 173* 185*      Cardiovascular:  No results for input(s): "TROPONINI", "CKTOTAL", "CKMB", "BNP" in the last 168 hours.  ABG:  No results for input(s): "PH", "PO2", "PCO2", "HCO3", "BE" in the last 168 hours.   I have reviewed all pertinent lab results within the past 24 hours.    Imaging:  CT Abdomen Pelvis W Wo Contrast   Final Result      X-Ray Chest PA And Lateral   Final Result      No acute abnormality.         Electronically signed by: Quinton Hernandez MD   Date:    11/11/2023   Time:    08:49      CT Abdomen Pelvis W Wo Contrast   Final Result      Findings seen consistent with colitis involving the ascending colon and portions of the descending colon.  Follow-up is recommended to resolution as underlying mass cannot be completely excluded.      The gastric wall appears to be slightly thickened.  Underlying gastritis should be excluded.       "   Electronically signed by: John Gale   Date:    11/06/2023   Time:    12:02         I have reviewed all pertinent imaging results/findings within the past 24 hours.    Micro/Path/Other:  Microbiology Results (last 7 days)       Procedure Component Value Units Date/Time    Wound Culture [[id number]]  (Abnormal) Collected: 11/11/23 1851    Order Status: Completed Specimen: Abscess from Rectum Updated: 11/12/23 0711     Wound Culture Many Gram-negative Rods     Comment: with normal tyrel       Blood Culture [[id number]]  (Normal) Collected: 11/10/23 2048    Order Status: Completed Specimen: Blood from Antecubital, Left Updated: 11/11/23 2300     CULTURE, BLOOD ([facility]) No Growth At 24 Hours    Gram Stain [[id number]] Collected: 11/11/23 1851    Order Status: Sent Specimen: Abscess from Rectum Updated: 11/11/23 1907    Fungal Culture [[id number]] Collected: 11/11/23 1851    Order Status: Sent Specimen: Abscess from Rectum Updated: 11/11/23 1907    AFB Smear [[id number]] Collected: 11/11/23 1851    Order Status: Sent Specimen: Abscess from Rectum     Mycobacteria and Nocardia Culture [[id number]] Collected: 11/11/23 1851    Order Status: Sent Specimen: Abscess from Rectum     Blood culture #1 **CANNOT BE ORDERED STAT** [[id number]]  (Normal) Collected: 11/06/23 0405    Order Status: Completed Specimen: Blood from Antecubital, Right Updated: 11/11/23 0701     CULTURE, BLOOD ([facility]) No Growth at 5 days    Blood culture #2 **CANNOT BE ORDERED STAT** [[id number]]  (Normal) Collected: 11/06/23 0405    Order Status: Completed Specimen: Blood from Antecubital, Left Updated: 11/11/23 0600     CULTURE, BLOOD ([facility]) No Growth at 5 days    Clostridium Diff Toxin, A & B, EIA [[id number]]  (Normal) Collected: 11/06/23 1741    Order Status: Completed Specimen: Stool Updated: 11/06/23 1816     Clostridium Difficile GDH Antigen Negative     Clostridium Difficile Toxin A/B Negative           Specimen (168h ago, onward)      None              Assessment & Plan:   42-year-old male with a past medical history of ulcerative colitis recently diagnosed in October who presented to the hospital with necrotizing fasciitis of his perineum status post wide excisional debridement was peritoneum on November 11, 2023     Plan for repeat trip to the operating room today for another debridement.  We will obtain consent at the bedside   Continue IV antibiotics   Decreased the dose of IV steroids today to 15 mg Solu-Cortef b.i.d..    NPO, IV fluids  Give 1 unit PRBCs this morning.    Ruddy Edmonds MD  General Surgery [id number]

## 2023-11-13 LAB
ABS NEUT (OLG): 12.71 X10(3)/MCL (ref 2.1–9.2)
ALBUMIN SERPL-MCNC: 1.7 G/DL (ref 3.5–5)
ALBUMIN/GLOB SERPL: 0.6 RATIO (ref 1.1–2)
ALP SERPL-CCNC: 119 UNIT/L (ref 40–150)
ALT SERPL-CCNC: 76 UNIT/L (ref 0–55)
AST SERPL-CCNC: 24 UNIT/L (ref 5–34)
BILIRUB SERPL-MCNC: 0.5 MG/DL
BUN SERPL-MCNC: 12.3 MG/DL (ref 8.9–20.6)
CALCIUM SERPL-MCNC: 7.3 MG/DL (ref 8.4–10.2)
CHLORIDE SERPL-SCNC: 96 MMOL/L (ref 98–107)
CO2 SERPL-SCNC: 30 MMOL/L (ref 22–29)
CREAT SERPL-MCNC: 0.69 MG/DL (ref 0.73–1.18)
ERYTHROCYTE [DISTWIDTH] IN BLOOD BY AUTOMATED COUNT: 15.6 % (ref 11.5–17)
GFR SERPLBLD CREATININE-BSD FMLA CKD-EPI: >60 MLS/MIN/1.73/M2
GLOBULIN SER-MCNC: 2.8 GM/DL (ref 2.4–3.5)
GLUCOSE SERPL-MCNC: 165 MG/DL (ref 74–100)
HCT VFR BLD AUTO: 25.5 % (ref 42–52)
HGB BLD-MCNC: 7.9 G/DL (ref 14–18)
INSTRUMENT WBC (OLG): 13.24 X10(3)/MCL
KOH PREP SPEC: NORMAL
LYMPHOCYTES NFR BLD MANUAL: 0.4 X10(3)/MCL
LYMPHOCYTES NFR BLD MANUAL: 3 %
M AVIUM PARATB TISS QL ZN STN: NORMAL
MAGNESIUM SERPL-MCNC: 2.2 MG/DL (ref 1.6–2.6)
MCH RBC QN AUTO: 26.2 PG (ref 27–31)
MCHC RBC AUTO-ENTMCNC: 31 G/DL (ref 33–36)
MCV RBC AUTO: 84.4 FL (ref 80–94)
MONOCYTES NFR BLD MANUAL: 0.26 X10(3)/MCL (ref 0.1–1.3)
MONOCYTES NFR BLD MANUAL: 2 %
NEUTROPHILS NFR BLD MANUAL: 96 %
NRBC BLD AUTO-RTO: 0.2 %
PLATELET # BLD AUTO: 305 X10(3)/MCL (ref 130–400)
PLATELET # BLD EST: NORMAL 10*3/UL
PMV BLD AUTO: 10 FL (ref 7.4–10.4)
POIKILOCYTOSIS BLD QL SMEAR: ABNORMAL
POTASSIUM SERPL-SCNC: 4.5 MMOL/L (ref 3.5–5.1)
PROT SERPL-MCNC: 4.5 GM/DL (ref 6.4–8.3)
RBC # BLD AUTO: 3.02 X10(6)/MCL (ref 4.7–6.1)
RBC MORPH BLD: ABNORMAL
SODIUM SERPL-SCNC: 133 MMOL/L (ref 136–145)
VANCOMYCIN TROUGH SERPL-MCNC: 5.2 UG/ML (ref 15–20)
WBC # SPEC AUTO: 13.24 X10(3)/MCL (ref 4.5–11.5)

## 2023-11-13 PROCEDURE — 11000001 HC ACUTE MED/SURG PRIVATE ROOM

## 2023-11-13 PROCEDURE — 63600175 PHARM REV CODE 636 W HCPCS: Performed by: INTERNAL MEDICINE

## 2023-11-13 PROCEDURE — 63600175 PHARM REV CODE 636 W HCPCS: Performed by: NURSE PRACTITIONER

## 2023-11-13 PROCEDURE — 83735 ASSAY OF MAGNESIUM: CPT | Performed by: INTERNAL MEDICINE

## 2023-11-13 PROCEDURE — 99223 1ST HOSP IP/OBS HIGH 75: CPT | Mod: FS,,, | Performed by: GENERAL PRACTICE

## 2023-11-13 PROCEDURE — 63600175 PHARM REV CODE 636 W HCPCS: Performed by: STUDENT IN AN ORGANIZED HEALTH CARE EDUCATION/TRAINING PROGRAM

## 2023-11-13 PROCEDURE — 85027 COMPLETE CBC AUTOMATED: CPT | Performed by: INTERNAL MEDICINE

## 2023-11-13 PROCEDURE — 25000003 PHARM REV CODE 250: Performed by: INTERNAL MEDICINE

## 2023-11-13 PROCEDURE — 80202 ASSAY OF VANCOMYCIN: CPT | Performed by: INTERNAL MEDICINE

## 2023-11-13 PROCEDURE — 99223 PR INITIAL HOSPITAL CARE,LEVL III: ICD-10-PCS | Mod: FS,,, | Performed by: GENERAL PRACTICE

## 2023-11-13 PROCEDURE — 80053 COMPREHEN METABOLIC PANEL: CPT | Performed by: INTERNAL MEDICINE

## 2023-11-13 PROCEDURE — 25000003 PHARM REV CODE 250: Performed by: STUDENT IN AN ORGANIZED HEALTH CARE EDUCATION/TRAINING PROGRAM

## 2023-11-13 PROCEDURE — 51798 US URINE CAPACITY MEASURE: CPT

## 2023-11-13 RX ORDER — HYDROMORPHONE HYDROCHLORIDE 2 MG/ML
1 INJECTION, SOLUTION INTRAMUSCULAR; INTRAVENOUS; SUBCUTANEOUS EVERY 4 HOURS PRN
Status: DISCONTINUED | OUTPATIENT
Start: 2023-11-13 | End: 2023-11-14

## 2023-11-13 RX ORDER — HYDROMORPHONE HYDROCHLORIDE 2 MG/ML
1 INJECTION, SOLUTION INTRAMUSCULAR; INTRAVENOUS; SUBCUTANEOUS ONCE
Status: COMPLETED | OUTPATIENT
Start: 2023-11-13 | End: 2023-11-13

## 2023-11-13 RX ADMIN — CLINDAMYCIN PHOSPHATE 600 MG: 600 INJECTION, SOLUTION INTRAVENOUS at 06:11

## 2023-11-13 RX ADMIN — HYDROMORPHONE HYDROCHLORIDE 1 MG: 2 INJECTION INTRAMUSCULAR; INTRAVENOUS; SUBCUTANEOUS at 11:11

## 2023-11-13 RX ADMIN — PIPERACILLIN SODIUM AND TAZOBACTAM SODIUM 4.5 G: 4; .5 INJECTION, POWDER, FOR SOLUTION INTRAVENOUS at 08:11

## 2023-11-13 RX ADMIN — OXYCODONE AND ACETAMINOPHEN 1 TABLET: 10; 325 TABLET ORAL at 04:11

## 2023-11-13 RX ADMIN — METHYLPREDNISOLONE SODIUM SUCCINATE 15 MG: 40 INJECTION, POWDER, FOR SOLUTION INTRAMUSCULAR; INTRAVENOUS at 08:11

## 2023-11-13 RX ADMIN — PIPERACILLIN SODIUM AND TAZOBACTAM SODIUM 4.5 G: 4; .5 INJECTION, POWDER, FOR SOLUTION INTRAVENOUS at 03:11

## 2023-11-13 RX ADMIN — HYDROMORPHONE HYDROCHLORIDE 1 MG: 2 INJECTION INTRAMUSCULAR; INTRAVENOUS; SUBCUTANEOUS at 06:11

## 2023-11-13 RX ADMIN — METHYLPREDNISOLONE SODIUM SUCCINATE 15 MG: 40 INJECTION, POWDER, FOR SOLUTION INTRAMUSCULAR; INTRAVENOUS at 07:11

## 2023-11-13 RX ADMIN — OXYCODONE AND ACETAMINOPHEN 1 TABLET: 10; 325 TABLET ORAL at 10:11

## 2023-11-13 RX ADMIN — HYDROMORPHONE HYDROCHLORIDE 1 MG: 2 INJECTION INTRAMUSCULAR; INTRAVENOUS; SUBCUTANEOUS at 12:11

## 2023-11-13 RX ADMIN — HYDROMORPHONE HYDROCHLORIDE 1 MG: 2 INJECTION INTRAMUSCULAR; INTRAVENOUS; SUBCUTANEOUS at 07:11

## 2023-11-13 RX ADMIN — HYDROMORPHONE HYDROCHLORIDE 1 MG: 2 INJECTION INTRAMUSCULAR; INTRAVENOUS; SUBCUTANEOUS at 03:11

## 2023-11-13 RX ADMIN — PIPERACILLIN SODIUM AND TAZOBACTAM SODIUM 4.5 G: 4; .5 INJECTION, POWDER, FOR SOLUTION INTRAVENOUS at 05:11

## 2023-11-13 RX ADMIN — VANCOMYCIN HYDROCHLORIDE 1250 MG: 1.25 INJECTION, POWDER, LYOPHILIZED, FOR SOLUTION INTRAVENOUS at 07:11

## 2023-11-13 NOTE — PROGRESS NOTES
Pharmacokinetic Assessment Follow Up: IV Vancomycin    Vancomycin serum concentration assessment(s):    The trough level was drawn correctly and can be used to guide therapy at this time. The measurement is below the desired definitive target range of 15 to 20 mcg/mL.    Vancomycin Regimen Plan:    Change regimen to Vancomycin 1250 mg IV every 8 hours with next serum trough concentration measured at 0700 prior to 4th dose on 11/14    Scheduled Administration Times        Drug levels (last 3 results):  Recent Labs   Lab Result Units 11/13/23  0441   Vancomycin Trough ug/ml 5.2*       Vancomycin Administrations:  vancomycin given in the last 96 hours                     vancomycin 1.25 g in dextrose 5% 250 mL IVPB (ready to mix) (mg) 1,250 mg New Bag 11/12/23 1645     1,250 mg New Bag  0542    vancomycin (VANCOCIN) 1,750 mg in dextrose 5 % (D5W) 500 mL IVPB (mg) 1,750 mg New Bag 11/11/23 1819                    Pharmacy will continue to follow and monitor vancomycin.    Please contact pharmacy at extension [phone] for questions regarding this assessment.    Thank you for the consult,   Ilda He       Patient brief summary:  Lionel León is a 42 y.o. male initiated on antimicrobial therapy with IV Vancomycin for treatment of skin & soft tissue infection    The patient's current regimen is 1250 mg q8h    Drug Allergies:   Review of patient's allergies indicates:  No Known Allergies    Actual Body Weight:   74.8 kg    Renal Function:   Estimated Creatinine Clearance: 144 mL/min (A) (based on SCr of 0.69 mg/dL (L)).,     Dialysis Method (if applicable):  N/A    CBC (last 72 hours):  Recent Labs   Lab Result Units 11/11/23  0435 11/12/23  0445 11/13/23  0441   WBC x10(3)/mcL 14.16  14.16* 13.35  13.35* 13.24*   Hgb g/dL 8.0* 6.9* 7.9*   Hct % 25.3* 22.6* 25.5*   Platelet x10(3)/mcL 296 253 305   Monocytes % %  --  2  --        Metabolic Panel (last 72 hours):  Recent Labs   Lab Result Units 11/10/23  9127  11/11/23  0435 11/12/23  0445 11/13/23 0441   Sodium Level mmol/L  --  131* 129* 133*   Potassium Level mmol/L  --  4.0 4.4 4.5   Chloride mmol/L  --  96* 96* 96*   Carbon Dioxide mmol/L  --  24 27 30*   Glucose Level mg/dL  --  173* 185* 165*   Glucose, UA  Normal  --   --   --    Blood Urea Nitrogen mg/dL  --  14.1 17.0 12.3   Creatinine mg/dL  --  0.81 0.80 0.69*   Albumin Level g/dL  --   --  1.3* 1.7*   Bilirubin Total mg/dL  --   --  0.5 0.5   Alkaline Phosphatase unit/L  --   --  90 119   Aspartate Aminotransferase unit/L  --   --  15 24   Astrovirus  Not Detected  --   --   --    Alanine Aminotransferase unit/L  --   --  98* 76*   Magnesium Level mg/dL  --   --  2.10 2.20       Microbiologic Results:  Microbiology Results (last 7 days)       Procedure Component Value Units Date/Time    Blood Culture [[id number]]  (Normal) Collected: 11/10/23 2048    Order Status: Completed Specimen: Blood from Antecubital, Left Updated: 11/12/23 2300     CULTURE, BLOOD ([facility]) No Growth At 48 Hours    AFB Smear [[id number]] Collected: 11/11/23 1851    Order Status: Sent Specimen: Abscess from Rectum Updated: 11/12/23 1404    Mycobacteria and Nocardia Culture [[id number]] Collected: 11/11/23 1851    Order Status: Sent Specimen: Abscess from Rectum Updated: 11/12/23 1404    Wound Culture [[id number]]  (Abnormal) Collected: 11/11/23 1851    Order Status: Completed Specimen: Abscess from Rectum Updated: 11/12/23 1150     Wound Culture Many Escherichia coli     Comment: with normal tyrel       Gram Stain [[id number]] Collected: 11/11/23 1851    Order Status: Completed Specimen: Abscess from Rectum Updated: 11/12/23 1129     GRAM STAIN Many WBC observed      Many Gram positive cocci      Many Gram Negative Rods      Many Gram Positive Rods    Fungal Culture [[id number]] Collected: 11/11/23 1851    Order Status: Sent Specimen: Abscess from Rectum Updated: 11/11/23 1907    Blood culture #1 **CANNOT BE ORDERED STAT** [[id number]]   (Normal) Collected: 11/06/23 0405    Order Status: Completed Specimen: Blood from Antecubital, Right Updated: 11/11/23 0701     CULTURE, BLOOD ([facility]) No Growth at 5 days    Blood culture #2 **CANNOT BE ORDERED STAT** [[id number]]  (Normal) Collected: 11/06/23 0405    Order Status: Completed Specimen: Blood from Antecubital, Left Updated: 11/11/23 0600     CULTURE, BLOOD ([facility]) No Growth at 5 days    Clostridium Diff Toxin, A & B, EIA [[id number]]  (Normal) Collected: 11/06/23 1741    Order Status: Completed Specimen: Stool Updated: 11/06/23 1816     Clostridium Difficile GDH Antigen Negative     Clostridium Difficile Toxin A/B Negative

## 2023-11-13 NOTE — PROGRESS NOTES
"Gastroenterology Progress Note    Subjective/Interval History:  Tmax over the past 24h 99.3F  WBC 13.24 - on zosyn, clinda, vanc  H&H 7.9/25.5 s/p 1u PRBC - appropriate response    Discussed with ID NP, surgical hospitalist, and hospitalist. Plans for CRS consult to discuss total colectomy. Diverting colostomy not favored at this time due to inflammation/infection    Patient resting in bed, wife at bedside. Nursing staff performing wound care to perineum. He states he is in tremendous pain in his rectum - rectal tube in place with reddish output, this is irritating him immensely. He states he would rather have a colostomy than deal with a rectal tube any longer. Otherwise denies abd pain, n/v. Admits to occasional cramping which he attributes to gas as this resolves when he belches or passes flatus. Abd soft and nontender.    ROS:  Review of Systems   Constitutional:  Negative for malaise/fatigue.   Respiratory:  Negative for cough and shortness of breath.    Cardiovascular:  Negative for chest pain.   Gastrointestinal:  Positive for blood in stool. Negative for abdominal pain, nausea and vomiting.   Neurological:  Negative for weakness.       Vital Signs:  /62   Pulse 83   Temp 98.6 °F (37 °C) (Oral)   Resp 20   Ht 5' 10" (1.778 m)   Wt 74.8 kg (165 lb)   SpO2 97%   BMI 23.68 kg/m²   Body mass index is 23.68 kg/m².    Physical Exam:  Physical Exam  Constitutional:       General: He is not in acute distress.     Appearance: He is ill-appearing.   HENT:      Head: Normocephalic and atraumatic.      Right Ear: External ear normal.      Left Ear: External ear normal.      Nose: Nose normal.      Mouth/Throat:      Pharynx: Oropharynx is clear.   Eyes:      Conjunctiva/sclera: Conjunctivae normal.   Pulmonary:      Effort: Pulmonary effort is normal. No respiratory distress.   Abdominal:      General: Abdomen is flat. There is no distension.      Palpations: Abdomen is soft.      Tenderness: There is no " abdominal tenderness. There is no guarding.   Genitourinary:     Comments: Rectal tube with reddish output  Musculoskeletal:         General: Normal range of motion.      Cervical back: Normal range of motion.   Skin:     General: Skin is warm and dry.      Coloration: Skin is not pale.   Neurological:      Mental Status: He is alert and oriented to person, place, and time. Mental status is at baseline.      Motor: No weakness.   Psychiatric:         Mood and Affect: Mood normal.         Behavior: Behavior normal.         Thought Content: Thought content normal.         Labs:  Recent Results (from the past 24 hour(s))   VANCOMYCIN, TROUGH    Collection Time: 11/13/23  4:41 AM   Result Value Ref Range    Vancomycin Trough 5.2 (L) 15.0 - 20.0 ug/ml   Comprehensive Metabolic Panel    Collection Time: 11/13/23  4:41 AM   Result Value Ref Range    Sodium Level 133 (L) 136 - 145 mmol/L    Potassium Level 4.5 3.5 - 5.1 mmol/L    Chloride 96 (L) 98 - 107 mmol/L    Carbon Dioxide 30 (H) 22 - 29 mmol/L    Glucose Level 165 (H) 74 - 100 mg/dL    Blood Urea Nitrogen 12.3 8.9 - 20.6 mg/dL    Creatinine 0.69 (L) 0.73 - 1.18 mg/dL    Calcium Level Total 7.3 (L) 8.4 - 10.2 mg/dL    Protein Total 4.5 (L) 6.4 - 8.3 gm/dL    Albumin Level 1.7 (L) 3.5 - 5.0 g/dL    Globulin 2.8 2.4 - 3.5 gm/dL    Albumin/Globulin Ratio 0.6 (L) 1.1 - 2.0 ratio    Bilirubin Total 0.5 <=1.5 mg/dL    Alkaline Phosphatase 119 40 - 150 unit/L    Alanine Aminotransferase 76 (H) 0 - 55 unit/L    Aspartate Aminotransferase 24 5 - 34 unit/L    eGFR >60 mls/min/1.73/m2   Magnesium    Collection Time: 11/13/23  4:41 AM   Result Value Ref Range    Magnesium Level 2.20 1.60 - 2.60 mg/dL   CBC with Differential    Collection Time: 11/13/23  4:41 AM   Result Value Ref Range    WBC 13.24 (H) 4.50 - 11.50 x10(3)/mcL    RBC 3.02 (L) 4.70 - 6.10 x10(6)/mcL    Hgb 7.9 (L) 14.0 - 18.0 g/dL    Hct 25.5 (L) 42.0 - 52.0 %    MCV 84.4 80.0 - 94.0 fL    MCH 26.2 (L) 27.0 - 31.0  pg    MCHC 31.0 (L) 33.0 - 36.0 g/dL    RDW 15.6 11.5 - 17.0 %    Platelet 305 130 - 400 x10(3)/mcL    MPV 10.0 7.4 - 10.4 fL    NRBC% 0.2 %   Manual Differential    Collection Time: 11/13/23  4:41 AM   Result Value Ref Range    WBC 13.24 x10(3)/mcL    Neutrophils % 96 %    Lymphs % 3 %    Monocytes % 2 %    Neutrophils Abs 12.7104 (H) 2.1 - 9.2 x10(3)/mcL    Lymphs Abs 0.3972 (L) 0.6 - 4.6 x10(3)/mcL    Monocytes Abs 0.2648 0.1 - 1.3 x10(3)/mcL    Platelets Normal Normal, Adequate    RBC Morph Abnormal (A) Normal    Poikilocytosis 1+ (A) (none)         Assessment/Plan:  42-year-old male known to Dr. He with past medical history of diverticulitis s/p colon resection and recent new diagnosis of ulcerative colitis.  He presented to the ED on 11/6 with complaints of bright red blood per rectum and lower abdominal pain associated with nausea, rectal pain, weakness and 30-40 lb weight loss over the past 3 weeks with syncopal episode x2- 2nd episode with LOC.     Newly diagnosed UC, active flare  Rectal pain with BM, hx hemorrhoids  - Colonoscopy 10/23/2023 with findings c/w IBD.  He was discharged on prednisone taper with outpatient follow up appointment scheduled 11/17/2023, but ended up presenting here on 11/6  -  on 11/6---95.40 on 10th  - currently on IV methylprednisone 15mg BID  - upcoming outpatient appt scheduled for 11/17 with plans to start humira and imuran- currently in the process of getting approval. Message was sent to our office and medication has been expedited/ also looking into availability of samples in the meantime. Patient did obtain Humira samples and was planning on having loading dose; however patient became febrile and CT revealed xochitl's gangrene. S/p I&D of perineum 11/11/23 - currently on zosyn, clinda, vanc  - .9 on 10/21/23; as of 11/09/23 it is 95.4  - discussed case with ID, gen surg, and hospitalist; Humira will be held at this time. Patient will likely need  surgery to address UC - CRS consulted for total colectomy consideration. Agree with CRS consult.     3. Fever - resolved  - UA on 10th was unremarkable.   - Stool for Cdiff neg on 7th   - Blood cultures neg from 11/6  - CT abd on 11/6 Findings seen consistent with colitis involving the ascending colon and portions of the descending colon.  Follow-up is recommended to resolution as underlying mass cannot be completely excluded. The gastric wall appears to be slightly thickened.  Underlying gastritis should be excluded.      - will discuss further with Dr. Bandar Pacheco, PA-C  Gastroenterology  [facility]

## 2023-11-13 NOTE — ANESTHESIA POSTPROCEDURE EVALUATION
Anesthesia Post Evaluation    Patient: Beau Mau    Procedure(s) Performed: Procedure(s) (LRB):  DEBRIDEMENT, EXTERNAL GENITALIA/BUTTOCKS FOR NECROTIZING SOFT TISSUE INFECTION (Bilateral)    Final Anesthesia Type: general      Patient location during evaluation: PACU  Patient participation: Yes- Able to Participate  Level of consciousness: awake and alert and oriented  Post-procedure vital signs: reviewed and stable  Pain management: adequate  Airway patency: patent  TABBY mitigation strategies: Verification of full reversal of neuromuscular block  PONV status at discharge: No PONV  Anesthetic complications: no      Cardiovascular status: blood pressure returned to baseline and stable  Respiratory status: spontaneous ventilation and unassisted  Hydration status: euvolemic  Follow-up not needed.  Comments: [facility]          Vitals Value Taken Time   /56 11/12/23 1953   Temp 36.8 °C (98.3 °F) 11/12/23 1953   Pulse 89 11/12/23 1953   Resp 16 11/12/23 1953   SpO2 98 % 11/12/23 1953         Event Time   Out of Recovery 11/11/2023 20:50:00         Pain/Leonard Score: Pain Rating Prior to Med Admin: 8 (11/12/2023  6:03 PM)  Pain Rating Post Med Admin: 2 (11/12/2023  6:16 PM)  Leonard Score: 10 (11/12/2023  3:26 PM)

## 2023-11-13 NOTE — PROGRESS NOTES
Inpatient Nutrition Assessment    Admit Date: 11/6/2023   Total duration of encounter: 7 days   Patient Age: 42 y.o.    Nutrition Recommendation/Prescription     -Advance diet as tolerated per MD. Goal Diet: Low Residue Diet.   -If unable to advance diet in the next 24 hrs, consider PPN. Recommend Clinimix E 4.25/5 @ 75 mL/hr with IVPB IL 20% 250mL 2x/week.   -Consider a MVI with minerals as medically feasible.   -Monitor wt, labs, and intake.    Communication of Recommendations: reviewed with patient and reviewed with family    Nutrition Assessment     Malnutrition Assessment/Nutrition-Focused Physical Exam    Malnutrition Context: acute illness or injury (11/07/23 1313)  Malnutrition Level: moderate (11/07/23 1313)  Energy Intake (Malnutrition): less than 75% for greater than 7 days (11/07/23 1313)  Weight Loss (Malnutrition): greater than 2% in 1 week (11/07/23 1313)  Subcutaneous Fat (Malnutrition): moderate depletion (11/07/23 1313)  Orbital Region (Subcutaneous Fat Loss): moderate depletion        Muscle Mass (Malnutrition): moderate depletion (11/07/23 1313)  Silva Region (Muscle Loss): moderate depletion                       Fluid Accumulation (Malnutrition): other (see comments) (does not meet criteria) (11/07/23 1313)  Hand  Strength, Left (Malnutrition): unable to evaluate (11/07/23 1313)  Hand  Strength, Right (Malnutrition): unable to evaluate (11/07/23 1313)  A minimum of two characteristics is recommended for diagnosis of either severe or non-severe malnutrition.    Chart Review    Reason Seen: malnutrition screening tool (MST) and follow-up    Malnutrition Screening Tool Results   Have you recently lost weight without trying?: Yes: 34 lbs or more  Have you been eating poorly because of a decreased appetite?: Yes   MST Score: 5   Diagnosis:  Acute severe ulcerative colitis   Orthostatic syncope   Symptomatic anemia requiring blood transfusion  Acute blood loss anemia    Relevant Medical  "History: none noted    Nutrition-Related Medications: LR  Calorie Containing IV Medications: no significant kcals from medications at this time    Nutrition-Related Labs:  23: Na 133, Glu 161, GFR>60  23: Na 130, Glu 164, GFR>60, CRP 95.4  23: Na 133, Glu 165, GFR>60    Nutrition Orders:   Diet clear liquid      Appetite/Oral Intake: good/% of meals    Factors Affecting Nutritional Intake: clear liquid diet    Food/Jew/Cultural Preferences: none reported    Food Allergies: no known food allergies    Wound(s):   none noted    Last Bowel Movement: 23    Comments    23: Pt reports good appetite/intake while in hospital; reports that his pain and diarrhea are better managed here than it is at home. Pt reports significant wt loss and frequent watery and bloody diarrhea for the last 3 weeks.     23: Per pt and pt's wife, pt is eating well, most of meals; denies n/v.     23: Pt tolerating liquids per RN.     Anthropometrics    Height: 5' 10" (177.8 cm) Height Method: Stated  Last Weight: 74.8 kg (165 lb) (23 1312) Weight Method: Bed Scale  BMI (Calculated): 23.7  BMI Classification: normal (BMI 18.5-24.9)        Ideal Body Weight (IBW), Male: 166 lb     % Ideal Body Weight, Male (lb): 99.4 %                 Usual Body Weight (UBW), k.18 kg  % Usual Body Weight: 80.49  % Weight Change From Usual Weight: -19.68 %  Usual Weight Provided By: patient    Wt Readings from Last 5 Encounters:   23 74.8 kg (165 lb)   10/21/23 86.4 kg (190 lb 7.6 oz)     Weight Change(s) Since Admission:   Wt Readings from Last 1 Encounters:   23 1312 74.8 kg (165 lb)   23 1835 74.8 kg (165 lb)   23 032 74.8 kg (165 lb)   Admit Weight: 74.8 kg (165 lb) (23 0328), Weight Method: Stated  23: no new wt noted    Estimated Needs    Weight Used For Calorie Calculations: 74.8 kg (164 lb 14.5 oz)  Energy Calorie Requirements (kcal): 2074-7902 kcal (30-35 " kcal/kg)  Energy Need Method: Kcal/kg  Weight Used For Protein Calculations: 74.8 kg (164 lb 14.5 oz)  Protein Requirements: 112 gm (1.5g/kg)  Fluid Requirements (mL): 2244 mL  Temp (24hrs), Av.5 °F (36.9 °C), Min:98.1 °F (36.7 °C), Max:98.7 °F (37.1 °C)       Enteral Nutrition    Patient not receiving enteral nutrition at this time.    Parenteral Nutrition    Patient not receiving parenteral nutrition support at this time.    Evaluation of Received Nutrient Intake    Calories: not meeting estimated needs  Protein: not meeting estimated needs    Patient Education    Not applicable.    Nutrition Diagnosis     PES: Malnutrition related to altered GI function as evidenced by >2% wt loss x 1 week, <75% est energy requirements met >7 days, physical evidence of moderate fat and muscle wasting. (active)    Interventions/Goals     Intervention(s): modified composition of meals/snacks, multivitamin/mineral supplement therapy, prescription medication, and collaboration with other providers    Goal: Maintain weight throughout hospitalization. (goal progressing)    Monitoring & Evaluation     Dietitian will monitor energy intake and weight change.    Nutrition Risk/Follow-Up: high (follow-up in 1-4 days)   Please consult if re-assessment needed sooner.

## 2023-11-13 NOTE — PROGRESS NOTES
[facility] - 8th Floor [facility] Surg  [facility] MEDICINE ~ PROGRESS NOTE        CHIEF COMPLAINT   Hospital follow up    HOSPITAL COURSE   Lionel León is a 42 y.o. White male with a past medical history of diverticulitis status post colon resection and ulcerative colitis. Patient had recent admission to hospital medicine services at [facility] on 10/21/2023 to 10/24/2023 for sepsis secondary to pancolitis. He was treated with antibiotics and GI was consulted. Colonoscopy was performed on 10/23/2023 with findings concerning for ulcerative colitis and biopsies of the cecum, ascending, transverse, ascending colon and rectum positive for active chronic colitis consistent with inflammatory bowel disorder. No transfusion was required for rectal bleeding. Patient was discharged with prednisone taper and GI follow up appointment on 11/17/2023. The patient presented to [facility] on 11/6/2023 with a primary complaint of bright red rectal bleeding and lower abdominal pain.  Patient reports rectal bleeding has been ongoing since discharge.  Other associated symptoms include nausea, rectal pain, subjective fevers, weakness, fatigue and a 30 40 lb weight loss over last 3 weeks.  Patient states this morning he was walking to the bathroom when he felt flushed and vision went black. He passed out and hit his head on the wooden floor. Approximately hour and a half later when he went to get up he passed out again.  Second syncopal episode was witnessed by patient's wife who is at bedside reports loss of consciousness lasts for approximately 30 seconds.         Upon presentation to the ED, temperature 98.3F, heart rate 109, blood pressure 96/51, respiratory rate 17, spO2 99%. Labs with H&H 7.3/23.6 (10.6/33.2 on 10/24/2023), BUN 21.9, creatinine 0.88, calcium 7.7, .6, ESR 86. EKG sinus tachycardia with a ventricular rate of 108 and age undetermined possible inferior infarct. In the ED patient received Dilaudid, Zofran,  Hydrocortisone and IVF. He was typed and crossmatched for transfusion of 2 units of packed RBC. Patient is admitted to hospital medicine services for further medical management.      Patient was seen by GI and started on IV steroids, Solu-Medrol 30 mg IV b.I.d. for active ulcerative colitis flare up  Plus Anusol suppository per rectum b.I.d..  Patient is status post 2 units PRBC for symptomatic anemia.  CT abdomen and pelvis was also ordered to assess extent. CT of the abdomen and pelvis was pertinent for findings consistent with colitis involving the ascending colon, portions of the descending colon.  Underlying gastritis also can not be excluded secondary to slightly thickened gastric wall.      May complain continues to be rectal pain but refers that suppositories are helping.  Patient is tolerating p.o. intake     According to GI notes plans is for patient to start Humira/imura once approved by patient's insurance.                    Today  11/7/23-Doing better feeling better pain is better.  4 bowel movements yesterday with some blood as well.  Hemoglobin did not have appropriate response but will continue to monitor.  Discuss with GI physician assistant as well.    11/8/23 dr alvarez -  Today patient had 1 BM overnight and another one this a.m..  Refers that stools are becoming more formed still with some ongoing hematochezia.  Rectal pain continues to improve.  For now we will continue hydrocortisone suppositories t.I.d. as well as IV Solu-Medrol 30 mg IV b.I.d. with plans to start Humira/ Imura as outpt.  Patient today refers feeling okay, his pain goes up and down but overall feels improved since admission not ready to go home.  We will continue present management and continue following recommendations by GI    11/09/2023 Dr. Alvarez-chart reviewed patient examined.  Still having episodes of bright red per rectum.  Also refers some dizziness when getting out of bed.  Hematocrit dropped to 24.7.  BP running at  98/57.  We started normal saline solution.  will require PRBC transfusion tomorrow.      11/10/2023 Dr. Koo reviewed patient examined.  Remains with complains of dizziness with hematocrit around 24.7.  Samples of Humira available but patient ran a fever of around 102.5 that quickly resolved without antipyretic.  Now Humira on hold.  We will go ahead and transfuse 2 units PRBC.  Patient received 1 g acetaminophen.  Also refers his stools are more formed with less abdominal pain and good appetite.    11-11-23 Dr. Koo reviewed patient examined.  Yesterday patient started having temperature spikes.  Today he has complains of  pain over perineum/buttocks that has worsened.  Area with extensive erythema and induration over medial aspects of buttocks.  CT abdomen and pelvis with contrast was done showing findings pertinent for Xochitl gangrene.  Patient is on vancomycin and Zosyn, will add clindamycin for toxin control.  Patient has been seen by surgical hospitalist with plans for OR for exploration ,debridement today.  We will go ahead and consult Infectious Disease for evaluation as well .    11/12/2023 Dr. Koo reviewed patient examined.  Patient was taken to OR yesterday for debridement of perineal/sacral area for findings concerning xochitl's gangrene.  We will return to OR today for further debridement.  Eventually will need diverting colostomy this coming week.  Refers feeling better, has a rectal tube that is functional.  Continues on cleaned the/Zosyn/vancomycin with cultures so far with many Gram-negative rods.  Given 1 unit PRBC    11/13/2023 Dr. Koo reviewed patient examined.  Patient was seen with Infectious Disease nurse practitioner Ms. Cate He.  Case was later discussed with infectious disease specialist Dr. Hampton.  Case was also discussed with General surgery.  There were plans for diverting colostomy but patient with inflammatory bowel disease involving  ascending/descending colon and might benefit from colectomy/colostomy.  We decided to consult colorectal surgeon Dr. Fredi nicole for evaluation.  On physical examination he has an open wound that has been debrided twice already, he has a rectal tube but still there was spillage of stools into the wound.  Perhaps subsided and by recommendations from Infectious Disease vancomycin  Has been discontinued as well as clindamycin, we will continue Zosyn for now.      OBJECTIVE/PHYSICAL EXAM     VITAL SIGNS (MOST RECENT):  Temp: 98.1 °F (36.7 °C) (11/13/23 1226)  Pulse: 87 (11/13/23 1226)  Resp: 18 (11/13/23 1226)  BP: (!) 96/56 (11/13/23 1226)  SpO2: 97 % (11/13/23 1226) VITAL SIGNS (24 HOUR RANGE):  Temp:  [98.1 °F (36.7 °C)-98.7 °F (37.1 °C)] 98.1 °F (36.7 °C)  Pulse:  [76-92] 87  Resp:  [11-20] 18  SpO2:  [82 %-100 %] 97 %  BP: ()/(56-73) 96/56   GENERAL: In no acute distress, afebrile  HEENT:normocephalic/ no masses  CHEST: Clear to auscultation bilaterally  HEART: S1, S2, no appreciable murmur  ABDOMEN: Soft,  generalized abdominal pain, BS +  MSK: Warm, no lower extremity edema, no clubbing or cyanosis  Perineum-extensive erythema from a scrotal area all the way around to lower back, covers medial   aspect of buttocks with induration and tenderness.  11/13/2023 Dr. Alvarez-patient is status post surgical intervention x2 now with a pretty good size surgical wound with rectal tube.  Wound appears contaminated with stools.  NEUROLOGIC: Alert and oriented x4, moving all extremities with good strength   INTEGUMENTARY:surgical wound to buttocks/ perineum   PSYCHIATRY: alert/ active and oriented.                 ASSESSMENT/PLAN   Acute severe ulcerative colitis , new onset  -C diff negative  -DC Solu-Medrol 30 b.i.d. 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  Today 11/12/2023 and 11-13-23  -rectal tube with plans for diverting colostomy but colorectal surgeon consulted to see if colectomy is more appropriate since he has severe active disease to ascending as well as descending colon.  -vancomycin as well as clindamycin discontinue, continue Zosyn.      Orthostatic syncope probably related to symptomatic anemia/intravascular volume depletion    Sepsis due to Allison gangrene     Rectal pain with history of hemorrhoids/hemorrhoids repair at [facility] couple of weeks ago  -discontinue Anusol t.i.d.    Severe protein calorie malnutrition     GI /infectious disease/internal Medicine/Colorectal surgery/general surgery in case.           Cc time 45 minutes  Cc dx- SEPSIS/ FOURNIERS GANGRENE /symptomatic anemia requiring PRBC transfusion      DVT prophylaxis:     Anticipated discharge and disposition: tbd  __________________________________________________________________________    LABS/MICRO/MEDS/DIAGNOSTICS       LABS  Recent Labs     11/13/23  0441   *   K 4.5   CHLORIDE 96*   CO2 30*   BUN 12.3   CREATININE 0.69*   GLUCOSE 165*   CALCIUM 7.3*   ALKPHOS 119   AST 24   ALT 76*   ALBUMIN 1.7*       Recent Labs     11/13/23  0441   WBC 13.24  13.24*   RBC 3.02*   HCT 25.5*   MCV 84.4            MICROBIOLOGY  Microbiology Results (last 7 days)       Procedure Component Value Units Date/Time    MARLENI Prep [[id number]] Collected: 11/11/23 1851    Order Status: Completed Specimen: Abscess Updated: 11/13/23 1219     KOH Prep No fungal elements seen    Fungal Culture [[id number]] Collected: 11/11/23 1851    Order Status: Sent Specimen: Abscess Updated: 11/13/23 1122    Wound Culture [[id number]]  (Abnormal)  (Susceptibility) Collected: 11/11/23 1851    Order Status: Completed Specimen: Abscess from Rectum Updated: 11/13/23 1012     Wound Culture Many Escherichia coli    AFB Smear [[id number]] Collected: 11/11/23 1851    Order Status: Completed Specimen: Abscess  from Rectum Updated: 11/13/23 0754     AFB Smear No AFB seen (Direct smear only) - Concentration to follow    Blood Culture [[id number]]  (Normal) Collected: 11/10/23 2048    Order Status: Completed Specimen: Blood from Antecubital, Left Updated: 11/12/23 2300     CULTURE, BLOOD ([facility]) No Growth At 48 Hours    Mycobacteria and Nocardia Culture [[id number]] Collected: 11/11/23 1851    Order Status: Sent Specimen: Abscess from Rectum Updated: 11/12/23 1404    Gram Stain [[id number]] Collected: 11/11/23 1851    Order Status: Completed Specimen: Abscess from Rectum Updated: 11/12/23 1129     GRAM STAIN Many WBC observed      Many Gram positive cocci      Many Gram Negative Rods      Many Gram Positive Rods    Fungal Culture [[id number]] Collected: 11/11/23 1851    Order Status: Sent Specimen: Abscess from Rectum Updated: 11/11/23 1907    Blood culture #1 **CANNOT BE ORDERED STAT** [[id number]]  (Normal) Collected: 11/06/23 0405    Order Status: Completed Specimen: Blood from Antecubital, Right Updated: 11/11/23 0701     CULTURE, BLOOD ([facility]) No Growth at 5 days    Blood culture #2 **CANNOT BE ORDERED STAT** [[id number]]  (Normal) Collected: 11/06/23 0405    Order Status: Completed Specimen: Blood from Antecubital, Left Updated: 11/11/23 0600     CULTURE, BLOOD ([facility]) No Growth at 5 days    Clostridium Diff Toxin, A & B, EIA [[id number]]  (Normal) Collected: 11/06/23 1741    Order Status: Completed Specimen: Stool Updated: 11/06/23 1816     Clostridium Difficile GDH Antigen Negative     Clostridium Difficile Toxin A/B Negative               MEDICATIONS   methylPREDNISolone sodium succinate injection  15 mg Intravenous BID    piperacillin-tazobactam (Zosyn) IV (PEDS and ADULTS) (extended infusion is not appropriate)  4.5 g Intravenous Q8H         INFUSIONS   lactated ringers Stopped (11/12/23 0541)          DIAGNOSTIC TESTS  CT Abdomen Pelvis W Wo Contrast   Final Result      X-Ray Chest PA And Lateral   Final Result      No  "acute abnormality.         Electronically signed by: Quinton Hernandez MD   Date:    11/11/2023   Time:    08:49      CT Abdomen Pelvis W Wo Contrast   Final Result      Findings seen consistent with colitis involving the ascending colon and portions of the descending colon.  Follow-up is recommended to resolution as underlying mass cannot be completely excluded.      The gastric wall appears to be slightly thickened.  Underlying gastritis should be excluded.         Electronically signed by: John Gale   Date:    11/06/2023   Time:    12:02           No results found for: "EF"       NUTRITION STATUS  Patient meets ASPEN criteria for moderate malnutrition of acute illness or injury per RD assessment as evidenced by:  Energy Intake (Malnutrition): less than 75% for greater than 7 days  Weight Loss (Malnutrition): greater than 2% in 1 week  Subcutaneous Fat (Malnutrition): moderate depletion  Muscle Mass (Malnutrition): moderate depletion  Fluid Accumulation (Malnutrition): other (see comments) (does not meet criteria)  Hand  Strength, Left (Malnutrition): unable to evaluate  Hand  Strength, Right (Malnutrition): unable to evaluate  A minimum of two characteristics is recommended for diagnosis of either severe or non-severe malnutrition.       Case related differential diagnoses have been reviewed; assessment and plan has been documented. I have personally reviewed the labs and test results that are currently available; I have reviewed the patients medication list. I have reviewed the consulting providers recommendations. I have reviewed or attempted to review medical records based upon their availability.  All of the patient's and/or family's questions have been addressed and answered to the best of my ability.  I will continue to monitor closely and make adjustments to medical management as needed.  This document was created using M*Modal Fluency Direct.  Transcription errors may have been made.  Please " contact me if any questions may rise regarding documentation to clarify transcription.        Sloan Alvarez MD   Internal Medicine  Department of Hospital Medicine [facility] - 8th Floor Med Surg

## 2023-11-13 NOTE — ANESTHESIA POSTPROCEDURE EVALUATION
Anesthesia Post Evaluation    Patient: Lionel León    Procedure(s) Performed: Procedure(s) (LRB):  DEBRIDEMENT, EXTERNAL GENITALIA, PERINEUM, AND ABDOMINAL WALL, FOR NECROTIZING SOFT TISSUE INFECTION (N/A)    Final Anesthesia Type: general      Patient location during evaluation: PACU  Patient participation: Yes- Able to Participate  Level of consciousness: awake and alert  Post-procedure vital signs: reviewed and stable  Pain management: adequate  Airway patency: patent    PONV status at discharge: No PONV  Anesthetic complications: no      Cardiovascular status: blood pressure returned to baseline  Respiratory status: unassisted and spontaneous ventilation  Hydration status: euvolemic  Follow-up needed           Vitals Value Taken Time   BP 96/56 11/13/23 1226   Temp 36.7 °C (98.1 °F) 11/13/23 1226   Pulse 87 11/13/23 1226   Resp 18 11/13/23 1226   SpO2 97 % 11/13/23 1226         Event Time   Out of Recovery 11/12/2023 15:26:00         Pain/Leonard Score: Pain Rating Prior to Med Admin: 8 (11/13/2023 11:11 AM)  Pain Rating Post Med Admin: 4 (11/13/2023  6:30 AM)  Leonard Score: 10 (11/13/2023  7:20 AM)

## 2023-11-13 NOTE — PROGRESS NOTES
"   Acute Care Surgery   Progress Note  Admit Date: 11/6/2023  HD#7  POD#1 Day Post-Op    Subjective:   Interval history:  NAEO.  Afebrile.  Hemodynamically stable.   Dressings changed at bedside just prior to my evaluation.   States pain is improved.     Home Meds:  Current Outpatient Medications   Medication Instructions    dicyclomine (BENTYL) 20 mg, Oral, 4 times daily PRN    predniSONE (DELTASONE) 10 MG tablet Take 4 tablets (40 mg total) by mouth once daily for 7 days, THEN 3 tablets (30 mg total) once daily for 7 days, THEN 2 tablets (20 mg total) once daily for 7 days, THEN 1 tablet (10 mg total) once daily for 7 days, THEN 0.5 tablets (5 mg total) once daily for 7 days.      Scheduled Meds:   clindamycin (CLEOCIN) IVPB  600 mg Intravenous Q8H    methylPREDNISolone sodium succinate injection  15 mg Intravenous BID    piperacillin-tazobactam (Zosyn) IV (PEDS and ADULTS) (extended infusion is not appropriate)  4.5 g Intravenous Q8H    vancomycin (VANCOCIN) IV (PEDS and ADULTS)  1,250 mg Intravenous Q8H     Continuous Infusions:   lactated ringers Stopped (11/12/23 0541)     PRN Meds:0.9%  NaCl infusion (for blood administration), acetaminophen, acetaminophen, dextrose 10%, dextrose 10%, dicyclomine, glucagon (human recombinant), glucose, glucose, hydrocortisone, HYDROmorphone, ondansetron, oxyCODONE-acetaminophen, sodium chloride 0.9%, Pharmacy to dose Vancomycin consult **AND** vancomycin - pharmacy to dose     Objective:     VITAL SIGNS: 24 HR MIN & MAX LAST   Temp  Min: 98.3 °F (36.8 °C)  Max: 99.3 °F (37.4 °C)  98.6 °F (37 °C)   BP  Min: 101/56  Max: 120/73  103/62    Pulse  Min: 76  Max: 92  83    Resp  Min: 11  Max: 18  18    SpO2  Min: 82 %  Max: 100 %  97 %      HT: 5' 10" (177.8 cm)  WT: 74.8 kg (165 lb)  BMI: 23.7     Intake/output:  Intake/Output - Last 3 Shifts         11/11 0700 11/12 0659 11/12 0700 11/13 0659 11/13 0700 11/14 0659    P.O. 2000 940     I.V. (mL/kg) 444 (5.9) 100 (1.3)     Blood " " 297.5     NG/GT  250     IV Piggyback 2011.9 888.3     Total Intake(mL/kg) 4455.9 (59.6) 2475.8 (33.1)     Urine (mL/kg/hr) 4375 (2.4) 3100 (1.7)     Stool 0 100     Total Output 4375 3200     Net +80.9 -724.2            Stool Occurrence 3 x              Intake/Output Summary (Last 24 hours) at 11/13/2023 0939  Last data filed at 11/13/2023 0640  Gross per 24 hour   Intake 2475.83 ml   Output 3200 ml   Net -724.17 ml             Lines/drains/airway:       Peripheral IV - Single Lumen 18 G Right Hand (Active)   Site Assessment Clean;Dry;Intact 11/12/23 0000   Extremity Assessment Distal to IV No abnormal discoloration;No redness;No swelling 11/11/23 2010   Line Status Saline locked 11/12/23 0000   Dressing Status Clean;Dry;Intact 11/12/23 0000   Dressing Intervention Integrity maintained 11/11/23 2010   Number of days:             Rectal Tube 11/11/23 1944 fecal management system (Active)   Stool Color Brown;Red 11/11/23 2100   Number of days: 0            Urethral Catheter 11/11/23 1954 Double-lumen;Silicone;Non-latex 16 Fr. (Active)   Site Assessment Clean;Intact 11/11/23 2100   Collection Container Urimeter 11/11/23 2100   Securement Method secured to top of thigh w/ adhesive device 11/11/23 2100   Catheter Care Performed yes 11/11/23 2100   Reason for Continuing Urinary Catheterization Post operative 11/11/23 2100   CAUTI Prevention Bundle Securement Device in place with 1" slack;Intact seal between catheter & drainage tubing;Drainage bag/urimeter off the floor;Sheeting clip in use;No dependent loops or kinks;Drainage bag/urimeter not overfilled (<2/3 full);Drainage bag/urimeter below bladder 11/11/23 2100   Output (mL) 700 mL 11/12/23 0548   Number of days: 0       Physical examination:  Gen: NAD, AAOx3, answering questions appropriately  HEENT: PERRLA  CV: RR  Resp: NWOB  Abd: S/NT/ND  Perineum:  Large wounds to bilateral buttocks dressing is in place none saturated.  Flexiseal in place.  Ext: moving all " "extremities spontaneously and purposefully  Neuro: CN II-XII grossly intact    Labs:  Renal:  Recent Labs     11/11/23 0435 11/12/23 0445 11/13/23 0441   BUN 14.1 17.0 12.3   CREATININE 0.81 0.80 0.69*       No results for input(s): "LACTIC" in the last 72 hours.  FENGI:  Recent Labs     11/11/23 0435 11/12/23 0445 11/13/23 0441   * 129* 133*   K 4.0 4.4 4.5   CO2 24 27 30*   CALCIUM 7.8* 8.0* 7.3*   MG  --  2.10 2.20   ALBUMIN  --  1.3* 1.7*   BILITOT  --  0.5 0.5   AST  --  15 24   ALKPHOS  --  90 119   ALT  --  98* 76*       Heme:  Recent Labs     11/11/23 0435 11/12/23 0445 11/13/23 0441   HGB 8.0* 6.9* 7.9*   HCT 25.3* 22.6* 25.5*    253 305       ID:  Recent Labs     11/12/23 0445 11/13/23 0441   WBC 13.35  13.35* 13.24  13.24*       CBG:  Recent Labs     11/11/23 0435 11/12/23 0445 11/13/23 0441   GLUCOSE 173* 185* 165*        Cardiovascular:  No results for input(s): "TROPONINI", "CKTOTAL", "CKMB", "BNP" in the last 168 hours.  ABG:  No results for input(s): "PH", "PO2", "PCO2", "HCO3", "BE" in the last 168 hours.   I have reviewed all pertinent lab results within the past 24 hours.    Imaging:  CT Abdomen Pelvis W Wo Contrast   Final Result      X-Ray Chest PA And Lateral   Final Result      No acute abnormality.         Electronically signed by: Quinton Hernandez MD   Date:    11/11/2023   Time:    08:49      CT Abdomen Pelvis W Wo Contrast   Final Result      Findings seen consistent with colitis involving the ascending colon and portions of the descending colon.  Follow-up is recommended to resolution as underlying mass cannot be completely excluded.      The gastric wall appears to be slightly thickened.  Underlying gastritis should be excluded.         Electronically signed by: John Gale   Date:    11/06/2023   Time:    12:02         I have reviewed all pertinent imaging results/findings within the past 24 hours.    Micro/Path/Other:  Microbiology Results (last 7 days) "       Procedure Component Value Units Date/Time    Wound Culture [[id number]]  (Abnormal)  (Susceptibility) Collected: 11/11/23 1851    Order Status: Completed Specimen: Abscess from Rectum Updated: 11/13/23 0937     Wound Culture Many Escherichia coli     Comment: with normal tyrel       AFB Smear [[id number]] Collected: 11/11/23 1851    Order Status: Completed Specimen: Abscess from Rectum Updated: 11/13/23 0754     AFB Smear No AFB seen (Direct smear only) - Concentration to follow    Blood Culture [[id number]]  (Normal) Collected: 11/10/23 2048    Order Status: Completed Specimen: Blood from Antecubital, Left Updated: 11/12/23 2300     CULTURE, BLOOD ([facility]) No Growth At 48 Hours    Mycobacteria and Nocardia Culture [[id number]] Collected: 11/11/23 1851    Order Status: Sent Specimen: Abscess from Rectum Updated: 11/12/23 1404    Gram Stain [[id number]] Collected: 11/11/23 1851    Order Status: Completed Specimen: Abscess from Rectum Updated: 11/12/23 1129     GRAM STAIN Many WBC observed      Many Gram positive cocci      Many Gram Negative Rods      Many Gram Positive Rods    Fungal Culture [[id number]] Collected: 11/11/23 1851    Order Status: Sent Specimen: Abscess from Rectum Updated: 11/11/23 1907    Blood culture #1 **CANNOT BE ORDERED STAT** [[id number]]  (Normal) Collected: 11/06/23 0405    Order Status: Completed Specimen: Blood from Antecubital, Right Updated: 11/11/23 0701     CULTURE, BLOOD ([facility]) No Growth at 5 days    Blood culture #2 **CANNOT BE ORDERED STAT** [[id number]]  (Normal) Collected: 11/06/23 0405    Order Status: Completed Specimen: Blood from Antecubital, Left Updated: 11/11/23 0600     CULTURE, BLOOD ([facility]) No Growth at 5 days    Clostridium Diff Toxin, A & B, EIA [[id number]]  (Normal) Collected: 11/06/23 1741    Order Status: Completed Specimen: Stool Updated: 11/06/23 1816     Clostridium Difficile GDH Antigen Negative     Clostridium Difficile Toxin A/B Negative           Specimen  (168h ago, onward)      None             Assessment & Plan:   42-year-old male with a past medical history of ulcerative colitis recently diagnosed in October who presented to the hospital with necrotizing fasciitis of his perineum status post wide excisional debridement was peritoneum on 11/11 and on 11/12.    - HDS, AF  - Continue local wound care at this time with wet-to-dry  - will discuss patient's case with Colorectal Surgery this morning; will likely need abdominal surgery to address UC   - Continue IV clinda, vanc and zosyn  - Methylpred 15 mg BID       Michelle Wang MD  [employer] General Surgery, PGY-2

## 2023-11-13 NOTE — NURSING
Dr Loli Sutton, informed stool leakage noted around rectal tube drainage noted to a small area of rectal packing, stool soiled packing removed and replaced.

## 2023-11-13 NOTE — CONSULTS
[facility]  Infectious Diseases Consult        ASSESSMENT & PLAN:     He is a 42-year-old male with a past medical history of diverticulitis, status post colon resection, and new diagnosis of ulcerative colitis for which he was hospitalized towards the end of October.  He was evaluated by GI and underwent a colonoscopy at that time and was discharged on a steroid taper.  He was also noted to have hemorrhoids which was treated with hydrocortisone suppository.  He then presented to the emergency room on 11/06/2023 with complaints of bright red blood per rectum and lower abdominal pain as well as subjective fevers and syncopal episode at home.  He was initiated on IV steroids per GI for suspected ulcerative colitis flare.  CT of the abdomen and pelvis showed findings consistent with colitis and possible underlying gastritis.  Over the weekend, patient had a fever of 102.5° F and some hypotension.  He also reported worsening pain near the perineum and was noted to have extensive erythema and induration.  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  He was initiated on empiric antimicrobials with vancomycin, Zosyn, and clindamycin.  He was taken to the operating room by General surgery on 11/11 for an I&D - Gram stain with GPCs, GNRs, GPRs - culture with relatively sensitive E. coli thus far.  Blood cultures are negative thus far.  We have been consulted for assistance with antimicrobials.    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   Continue current abx with Zosyn 4.5g IV q8h.  DC clindamycin and vancomycin.  BCx NGTD.   Noted atrial shunt on TTE - further work-up per primary.   Wound care as ordered.   Going for repeat debridement and possible diverting  colostomy tomorrow.   Mgmt UC flare per GI - remains on Solumedrol at present.  Discussed with patient, wife, nursing, and. Dr. Alvarez.       HISTORY OF PRESENT ILLNESS:   He is a 42-year-old male with a past medical history of diverticulitis, status post colon resection, and new diagnosis of ulcerative colitis for which he was hospitalized towards the end of October.  He was evaluated by GI and underwent a colonoscopy at that time and was discharged on a steroid taper.  He was also noted to have hemorrhoids which was treated with hydrocortisone suppository.  He then presented to the emergency room on 11/06/2023 with complaints of bright red blood per rectum and lower abdominal pain as well as subjective fevers and syncopal episode at home.  He was initiated on IV steroids per GI for suspected ulcerative colitis flare.  CT of the abdomen and pelvis showed findings consistent with colitis and possible underlying gastritis.  Over the weekend, patient had a fever of 102.5° F and some hypotension.  He also reported worsening pain near the perineum and was noted to have extensive erythema and induration.  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  He was initiated on empiric antimicrobials with vancomycin, Zosyn, and clindamycin.  He was taken to the operating room by General surgery on 11/11 for an I&D - Gram stain with GPCs, GNRs, GPRs - culture with relatively sensitive E. coli thus far.  Blood cultures are negative thus far.  We have been consulted for assistance with antimicrobials.      PAST MEDICAL HISTORY:   As above    PAST SURGICAL HISTORY:     Past Surgical History:   Procedure Laterality Date    COLONOSCOPY, WITH 1 OR MORE BIOPSIES N/A 10/23/2023    Procedure: COLON;  Surgeon: Dragan Underwood MD;  Location: [facility] ENDOSCOPY;  Service: Gastroenterology;  Laterality: N/A;       ALLERGIES:   Patient has no known allergies.    FAMILY HISTORY:   Reviewed and  "non-contributory     SOCIAL HISTORY:     Social History     Tobacco Use    Smoking status: Not on file    Smokeless tobacco: Not on file   Substance Use Topics    Alcohol use: Not on file        MEDICATIONS:   Reviewed in EMR    REVIEW OF SYSTEMS:   Except as documented, all other systems reviewed and negative     PHYSICAL EXAM:   T 98.6 °F (37 °C)   /62   P 83   RR 18   O2 97 %  GENERAL: NAD; does not appear toxic  SKIN: no rash  HEENT: sclera non-icteric; PERRL   NECK: supple; no LAD  CHEST: CTA; nonlabored, equal expansion; no adventitious BS  CARDIOVASCULAR: RRR, S1S2; no murmur; strong, equal peripheral pulses; no edema  ABDOMEN:  active bowel sounds; abdomen soft, nondistended, nontender to palpation  GENITOURINARY: Devi / rectal tube in place  EXTREMITIES: no cyanosis or clubbing  NEURO: AAO x4; CN II-XII grossly intact  PSYCH: Mentation and affect appropriate     LABS AND IMAGING:     Recent Labs     11/12/23 0445 11/13/23 0441   WBC 13.35  13.35* 13.24  13.24*   RBC 2.68* 3.02*   HGB 6.9* 7.9*   HCT 22.6* 25.5*   MCV 84.3 84.4   MCH 25.7* 26.2*   MCHC 30.5* 31.0*   RDW 15.5 15.6    305     No results for input(s): "LACTIC" in the last 72 hours.  No results for input(s): "INR", "APTT", "D-DIMER" in the last 72 hours.  No results for input(s): "HGBA1C", "CHOL", "TRIG", "LDL", "VLDL", "HDL" in the last 72 hours.   Recent Labs     11/12/23 0445 11/13/23 0441   * 133*   K 4.4 4.5   CHLORIDE 96* 96*   CO2 27 30*   BUN 17.0 12.3   CREATININE 0.80 0.69*   GLUCOSE 185* 165*   CALCIUM 8.0* 7.3*   MG 2.10 2.20   ALBUMIN 1.3* 1.7*   GLOBULIN 3.6* 2.8   ALKPHOS 90 119   ALT 98* 76*   AST 15 24   BILITOT 0.5 0.5     No results for input(s): "BNP", "CPK", "TROPONINI" in the last 72 hours.       CT Abdomen Pelvis W Wo Contrast  EXAMINATION  CT ABDOMEN PELVIS W WO CONTRAST    CLINICAL HISTORY  Abdominal abscess/infection suspected;Pt with induration, erythema, at the perineum, in btw scrotum and " anal area. Pt with IBD/UC;    TECHNIQUE  Pre- and post-contrast helical-acquisition CT images were obtained and multiplanar reformats accomplished by a CT technologist at a separate workstation, pushed to PACS for physician review.    CONTRAST  *IV: ISOVUE-370, 100 mL  *Enteric: Gastroview, 30 mL diluted in water    COMPARISON  6 November 2023    FINDINGS  Images were reviewed in soft tissue, lung, and bone windows.    Exam quality: adequate for evaluation    Lines/tubes: none visualized    There is been inadequate duration of time between contrast ingestion and scan acquisition, resulting in incomplete transit of oral contrast agent through the GI tract.  Contrast is visualized only to the mid to distal small bowel loops.  There are no findings of high-grade bowel obstruction or new focal gastrointestinal process.  Previously described findings of colitis are similar to slightly improved.  No definite new areas of GI tract inflammatory change appreciated.    There is interval development of extensive subcutaneous edema and inflammatory fat stranding through the area of the perineum and posteromedial buttocks.  Associated tracking subcutaneous gas is also noted through the region.  No expansile fluid collection or other drainable component is identified.    No other significant short interval changes through the scanned region.    IMPRESSION  1. Interval changes of perineum concerning for development of Allison's gangrene.  No associated drainable collection.  2. Similar to slightly improved appearance of previously described colitis.  ==========    This report was flagged in Epic as abnormal.    RADIATION DOSE  Automated tube current modulation, weight-based exposure dosing, and/or iterative reconstruction technique utilized to reach lowest reasonably achievable exposure rate.    DLP: 873 mGy*cm    Of note, the pre-contrast acquisition provides no additional diagnostic value relative to the added radiation  exposure to the patient.  Routine abdominopelvic CT with single phase post-contrast acquisition would be adequate for follow-up.    Electronically signed by: Kem Gannon  Date:    11/11/2023  Time:    13:23  X-Ray Chest PA And Lateral  Narrative: EXAMINATION:  XR CHEST PA AND LATERAL    CLINICAL HISTORY:  fever;    TECHNIQUE:  PA and lateral views of the chest were performed.    COMPARISON:  None    FINDINGS:  The lungs are clear, with normal appearance of pulmonary vasculature and no pleural effusion or pneumothorax.    The cardiac silhouette is normal in size. The hilar and mediastinal contours are unremarkable.    Bones are intact.  Impression: No acute abnormality.    Electronically signed by: Quinton Hernandez MD  Date:    11/11/2023  Time:    08:49         SHERLYN Davis  Infectious Diseases

## 2023-11-14 LAB
ALBUMIN SERPL-MCNC: 1.6 G/DL (ref 3.5–5)
ALBUMIN/GLOB SERPL: 0.5 RATIO (ref 1.1–2)
ALP SERPL-CCNC: 91 UNIT/L (ref 40–150)
ALT SERPL-CCNC: 84 UNIT/L (ref 0–55)
AST SERPL-CCNC: 30 UNIT/L (ref 5–34)
BASOPHILS # BLD AUTO: 0.02 X10(3)/MCL
BASOPHILS NFR BLD AUTO: 0.1 %
BILIRUB SERPL-MCNC: 0.5 MG/DL
BUN SERPL-MCNC: 13.6 MG/DL (ref 8.9–20.6)
CALCIUM SERPL-MCNC: 7.3 MG/DL (ref 8.4–10.2)
CHLORIDE SERPL-SCNC: 94 MMOL/L (ref 98–107)
CO2 SERPL-SCNC: 29 MMOL/L (ref 22–29)
CREAT SERPL-MCNC: 0.75 MG/DL (ref 0.73–1.18)
EOSINOPHIL # BLD AUTO: 0 X10(3)/MCL (ref 0–0.9)
EOSINOPHIL NFR BLD AUTO: 0 %
ERYTHROCYTE [DISTWIDTH] IN BLOOD BY AUTOMATED COUNT: 15.8 % (ref 11.5–17)
GAMMA INTERFERON BACKGROUND BLD IA-ACNC: 0.03 IU/ML
GFR SERPLBLD CREATININE-BSD FMLA CKD-EPI: >60 MLS/MIN/1.73/M2
GLOBULIN SER-MCNC: 3 GM/DL (ref 2.4–3.5)
GLUCOSE SERPL-MCNC: 127 MG/DL (ref 74–100)
GROUP & RH: NORMAL
HCT VFR BLD AUTO: 25.9 % (ref 42–52)
HGB BLD-MCNC: 8.1 G/DL (ref 14–18)
IMM GRANULOCYTES # BLD AUTO: 0.32 X10(3)/MCL (ref 0–0.04)
IMM GRANULOCYTES NFR BLD AUTO: 2.1 %
INDIRECT COOMBS GEL: NORMAL
LYMPHOCYTES # BLD AUTO: 0.33 X10(3)/MCL (ref 0.6–4.6)
LYMPHOCYTES NFR BLD AUTO: 2.2 %
M TB IFN-G BLD-IMP: ABNORMAL
M TB IFN-G CD4+ BCKGRND COR BLD-ACNC: 0 IU/ML
M TB IFN-G CD4+CD8+ BCKGRND COR BLD-ACNC: -0.01 IU/ML
MAGNESIUM SERPL-MCNC: 2.1 MG/DL (ref 1.6–2.6)
MCH RBC QN AUTO: 26.2 PG (ref 27–31)
MCHC RBC AUTO-ENTMCNC: 31.3 G/DL (ref 33–36)
MCV RBC AUTO: 83.8 FL (ref 80–94)
MITOGEN IGNF BCKGRD COR BLD-ACNC: 0.39 IU/ML
MONOCYTES # BLD AUTO: 0.23 X10(3)/MCL (ref 0.1–1.3)
MONOCYTES NFR BLD AUTO: 1.5 %
NEUTROPHILS # BLD AUTO: 14.08 X10(3)/MCL (ref 2.1–9.2)
NEUTROPHILS NFR BLD AUTO: 94.1 %
NRBC BLD AUTO-RTO: 0.1 %
PLATELET # BLD AUTO: 397 X10(3)/MCL (ref 130–400)
PMV BLD AUTO: 9.7 FL (ref 7.4–10.4)
POTASSIUM SERPL-SCNC: 4.3 MMOL/L (ref 3.5–5.1)
PROT SERPL-MCNC: 4.6 GM/DL (ref 6.4–8.3)
RBC # BLD AUTO: 3.09 X10(6)/MCL (ref 4.7–6.1)
RBCS: NORMAL
SODIUM SERPL-SCNC: 131 MMOL/L (ref 136–145)
SPECIMEN OUTDATE: NORMAL
WBC # SPEC AUTO: 14.98 X10(3)/MCL (ref 4.5–11.5)

## 2023-11-14 PROCEDURE — 63600175 PHARM REV CODE 636 W HCPCS: Performed by: INTERNAL MEDICINE

## 2023-11-14 PROCEDURE — 99233 SBSQ HOSP IP/OBS HIGH 50: CPT | Mod: FS,,, | Performed by: GENERAL PRACTICE

## 2023-11-14 PROCEDURE — 86900 BLOOD TYPING SEROLOGIC ABO: CPT | Performed by: COLON & RECTAL SURGERY

## 2023-11-14 PROCEDURE — 99223 PR INITIAL HOSPITAL CARE,LEVL III: ICD-10-PCS | Mod: ,,, | Performed by: COLON & RECTAL SURGERY

## 2023-11-14 PROCEDURE — 25000003 PHARM REV CODE 250: Performed by: STUDENT IN AN ORGANIZED HEALTH CARE EDUCATION/TRAINING PROGRAM

## 2023-11-14 PROCEDURE — 11000001 HC ACUTE MED/SURG PRIVATE ROOM

## 2023-11-14 PROCEDURE — 85025 COMPLETE CBC W/AUTO DIFF WBC: CPT | Performed by: INTERNAL MEDICINE

## 2023-11-14 PROCEDURE — 25000003 PHARM REV CODE 250: Performed by: INTERNAL MEDICINE

## 2023-11-14 PROCEDURE — 99233 PR SUBSEQUENT HOSPITAL CARE,LEVL III: ICD-10-PCS | Mod: FS,,, | Performed by: GENERAL PRACTICE

## 2023-11-14 PROCEDURE — 99223 1ST HOSP IP/OBS HIGH 75: CPT | Mod: ,,, | Performed by: COLON & RECTAL SURGERY

## 2023-11-14 PROCEDURE — 80053 COMPREHEN METABOLIC PANEL: CPT | Performed by: INTERNAL MEDICINE

## 2023-11-14 PROCEDURE — 83735 ASSAY OF MAGNESIUM: CPT | Performed by: INTERNAL MEDICINE

## 2023-11-14 PROCEDURE — 63600175 PHARM REV CODE 636 W HCPCS: Performed by: STUDENT IN AN ORGANIZED HEALTH CARE EDUCATION/TRAINING PROGRAM

## 2023-11-14 PROCEDURE — 94761 N-INVAS EAR/PLS OXIMETRY MLT: CPT

## 2023-11-14 PROCEDURE — 63600175 PHARM REV CODE 636 W HCPCS: Performed by: NURSE PRACTITIONER

## 2023-11-14 RX ORDER — HYDROMORPHONE HYDROCHLORIDE 2 MG/ML
2 INJECTION, SOLUTION INTRAMUSCULAR; INTRAVENOUS; SUBCUTANEOUS EVERY 4 HOURS PRN
Status: DISCONTINUED | OUTPATIENT
Start: 2023-11-14 | End: 2023-11-19

## 2023-11-14 RX ORDER — HYDROMORPHONE HYDROCHLORIDE 2 MG/ML
2 INJECTION, SOLUTION INTRAMUSCULAR; INTRAVENOUS; SUBCUTANEOUS ONCE
Status: COMPLETED | OUTPATIENT
Start: 2023-11-14 | End: 2023-11-14

## 2023-11-14 RX ORDER — OXYCODONE AND ACETAMINOPHEN 10; 325 MG/1; MG/1
1 TABLET ORAL EVERY 4 HOURS PRN
Status: DISCONTINUED | OUTPATIENT
Start: 2023-11-14 | End: 2023-11-15

## 2023-11-14 RX ADMIN — HYDROMORPHONE HYDROCHLORIDE 2 MG: 2 INJECTION INTRAMUSCULAR; INTRAVENOUS; SUBCUTANEOUS at 02:11

## 2023-11-14 RX ADMIN — METHYLPREDNISOLONE SODIUM SUCCINATE 15 MG: 40 INJECTION, POWDER, FOR SOLUTION INTRAMUSCULAR; INTRAVENOUS at 08:11

## 2023-11-14 RX ADMIN — PIPERACILLIN SODIUM AND TAZOBACTAM SODIUM 4.5 G: 4; .5 INJECTION, POWDER, FOR SOLUTION INTRAVENOUS at 12:11

## 2023-11-14 RX ADMIN — HYDROMORPHONE HYDROCHLORIDE 1 MG: 2 INJECTION INTRAMUSCULAR; INTRAVENOUS; SUBCUTANEOUS at 07:11

## 2023-11-14 RX ADMIN — OXYCODONE AND ACETAMINOPHEN 1 TABLET: 10; 325 TABLET ORAL at 07:11

## 2023-11-14 RX ADMIN — HYDROMORPHONE HYDROCHLORIDE 2 MG: 2 INJECTION INTRAMUSCULAR; INTRAVENOUS; SUBCUTANEOUS at 10:11

## 2023-11-14 RX ADMIN — SODIUM CHLORIDE, POTASSIUM CHLORIDE, SODIUM LACTATE AND CALCIUM CHLORIDE: 600; 310; 30; 20 INJECTION, SOLUTION INTRAVENOUS at 07:11

## 2023-11-14 RX ADMIN — OXYCODONE AND ACETAMINOPHEN 1 TABLET: 10; 325 TABLET ORAL at 04:11

## 2023-11-14 RX ADMIN — OXYCODONE AND ACETAMINOPHEN 1 TABLET: 10; 325 TABLET ORAL at 11:11

## 2023-11-14 RX ADMIN — HYDROMORPHONE HYDROCHLORIDE 2 MG: 2 INJECTION INTRAMUSCULAR; INTRAVENOUS; SUBCUTANEOUS at 06:11

## 2023-11-14 RX ADMIN — SODIUM CHLORIDE, POTASSIUM CHLORIDE, SODIUM LACTATE AND CALCIUM CHLORIDE: 600; 310; 30; 20 INJECTION, SOLUTION INTRAVENOUS at 03:11

## 2023-11-14 RX ADMIN — HYDROMORPHONE HYDROCHLORIDE 1 MG: 2 INJECTION INTRAMUSCULAR; INTRAVENOUS; SUBCUTANEOUS at 03:11

## 2023-11-14 RX ADMIN — PIPERACILLIN SODIUM AND TAZOBACTAM SODIUM 4.5 G: 4; .5 INJECTION, POWDER, FOR SOLUTION INTRAVENOUS at 06:11

## 2023-11-14 RX ADMIN — OXYCODONE AND ACETAMINOPHEN 1 TABLET: 10; 325 TABLET ORAL at 03:11

## 2023-11-14 RX ADMIN — PIPERACILLIN SODIUM AND TAZOBACTAM SODIUM 4.5 G: 4; .5 INJECTION, POWDER, FOR SOLUTION INTRAVENOUS at 09:11

## 2023-11-14 NOTE — PROGRESS NOTES
[facility] - 8th Floor [facility] Surg  [facility] MEDICINE ~ PROGRESS NOTE        CHIEF COMPLAINT   Hospital follow up    HOSPITAL COURSE   Lionel León is a 42 y.o. White male with a past medical history of diverticulitis status post colon resection and ulcerative colitis. Patient had recent admission to hospital medicine services at [facility] on 10/21/2023 to 10/24/2023 for sepsis secondary to pancolitis. He was treated with antibiotics and GI was consulted. Colonoscopy was performed on 10/23/2023 with findings concerning for ulcerative colitis and biopsies of the cecum, ascending, transverse, ascending colon and rectum positive for active chronic colitis consistent with inflammatory bowel disorder. No transfusion was required for rectal bleeding. Patient was discharged with prednisone taper and GI follow up appointment on 11/17/2023. The patient presented to [facility] on 11/6/2023 with a primary complaint of bright red rectal bleeding and lower abdominal pain.  Patient reports rectal bleeding has been ongoing since discharge.  Other associated symptoms include nausea, rectal pain, subjective fevers, weakness, fatigue and a 30 40 lb weight loss over last 3 weeks.  Patient states this morning he was walking to the bathroom when he felt flushed and vision went black. He passed out and hit his head on the wooden floor. Approximately hour and a half later when he went to get up he passed out again.  Second syncopal episode was witnessed by patient's wife who is at bedside reports loss of consciousness lasts for approximately 30 seconds.         Upon presentation to the ED, temperature 98.3F, heart rate 109, blood pressure 96/51, respiratory rate 17, spO2 99%. Labs with H&H 7.3/23.6 (10.6/33.2 on 10/24/2023), BUN 21.9, creatinine 0.88, calcium 7.7, .6, ESR 86. EKG sinus tachycardia with a ventricular rate of 108 and age undetermined possible inferior infarct. In the ED patient received Dilaudid, Zofran,  Hydrocortisone and IVF. He was typed and crossmatched for transfusion of 2 units of packed RBC. Patient is admitted to hospital medicine services for further medical management.      Patient was seen by GI and started on IV steroids, Solu-Medrol 30 mg IV b.I.d. for active ulcerative colitis flare up  Plus Anusol suppository per rectum b.I.d..  Patient is status post 2 units PRBC for symptomatic anemia.  CT abdomen and pelvis was also ordered to assess extent. CT of the abdomen and pelvis was pertinent for findings consistent with colitis involving the ascending colon, portions of the descending colon.  Underlying gastritis also can not be excluded secondary to slightly thickened gastric wall.      May complain continues to be rectal pain but refers that suppositories are helping.  Patient is tolerating p.o. intake     According to GI notes plans is for patient to start Humira/imura once approved by patient's insurance.                    Today  11/7/23-Doing better feeling better pain is better.  4 bowel movements yesterday with some blood as well.  Hemoglobin did not have appropriate response but will continue to monitor.  Discuss with GI physician assistant as well.    11/8/23 dr alvarez -  Today patient had 1 BM overnight and another one this a.m..  Refers that stools are becoming more formed still with some ongoing hematochezia.  Rectal pain continues to improve.  For now we will continue hydrocortisone suppositories t.I.d. as well as IV Solu-Medrol 30 mg IV b.I.d. with plans to start Humira/ Imura as outpt.  Patient today refers feeling okay, his pain goes up and down but overall feels improved since admission not ready to go home.  We will continue present management and continue following recommendations by GI    11/09/2023 Dr. Alvarez-chart reviewed patient examined.  Still having episodes of bright red per rectum.  Also refers some dizziness when getting out of bed.  Hematocrit dropped to 24.7.  BP running at  98/57.  We started normal saline solution.  will require PRBC transfusion tomorrow.      11/10/2023 Dr. Koo reviewed patient examined.  Remains with complains of dizziness with hematocrit around 24.7.  Samples of Humira available but patient ran a fever of around 102.5 that quickly resolved without antipyretic.  Now Humira on hold.  We will go ahead and transfuse 2 units PRBC.  Patient received 1 g acetaminophen.  Also refers his stools are more formed with less abdominal pain and good appetite.    11-11-23 Dr. Koo reviewed patient examined.  Yesterday patient started having temperature spikes.  Today he has complains of  pain over perineum/buttocks that has worsened.  Area with extensive erythema and induration over medial aspects of buttocks.  CT abdomen and pelvis with contrast was done showing findings pertinent for Xochitl gangrene.  Patient is on vancomycin and Zosyn, will add clindamycin for toxin control.  Patient has been seen by surgical hospitalist with plans for OR for exploration ,debridement today.  We will go ahead and consult Infectious Disease for evaluation as well .    11/12/2023 Dr. Koo reviewed patient examined.  Patient was taken to OR yesterday for debridement of perineal/sacral area for findings concerning xochitl's gangrene.  We will return to OR today for further debridement.  Eventually will need diverting colostomy this coming week.  Refers feeling better, has a rectal tube that is functional.  Continues on cleaned the/Zosyn/vancomycin with cultures so far with many Gram-negative rods.  Given 1 unit PRBC    11/13/2023 Dr. Koo reviewed patient examined.  Patient was seen with Infectious Disease nurse practitioner Ms. Cate He.  Case was later discussed with infectious disease specialist Dr. Hampton.  Case was also discussed with General surgery.  There were plans for diverting colostomy but patient with inflammatory bowel disease involving  ascending/descending colon and might benefit from colectomy/colostomy.  We decided to consult colorectal surgeon Dr. Fredi nicole for evaluation.  On physical examination he has an open wound that has been debrided twice already, he has a rectal tube but still there was spillage of stools into the wound.  Perhaps subsided and by recommendations from Infectious Disease vancomycin  Has been discontinued as well as clindamycin, we will continue Zosyn for now.    11/14/2023 Dr. Alvarez-chart reviewed patient examined.  Complains of intense pain with excellent level of consciousness.  Medications were upgraded.  Patient to be seen by Colorectal surgeon this a.m..  We will continue present management for now awaiting recommendations by Colorectal surgery.      OBJECTIVE/PHYSICAL EXAM     VITAL SIGNS (MOST RECENT):  Temp: 98.2 °F (36.8 °C) (11/14/23 0700)  Pulse: 96 (11/14/23 0700)  Resp: 16 (11/14/23 0735)  BP: 109/67 (11/14/23 0700)  SpO2: 97 % (11/14/23 0700) VITAL SIGNS (24 HOUR RANGE):  Temp:  [98.1 °F (36.7 °C)-98.9 °F (37.2 °C)] 98.2 °F (36.8 °C)  Pulse:  [] 96  Resp:  [16-22] 16  SpO2:  [97 %-98 %] 97 %  BP: ()/(56-67) 109/67   GENERAL: In no acute distress, afebrile  HEENT:normocephalic/ no masses  CHEST: Clear to auscultation bilaterally  HEART: S1, S2, no appreciable murmur  ABDOMEN: Soft,  generalized abdominal pain, BS +  MSK: Warm, no lower extremity edema, no clubbing or cyanosis  Perineum-extensive erythema from a scrotal area all the way around to lower back, covers medial   aspect of buttocks with induration and tenderness.  11/13/2023 Dr. Alvarez-patient is status post surgical intervention x2 now with a pretty good size surgical wound with rectal tube.  Wound appears contaminated with stools.  NEUROLOGIC: Alert and oriented x4, moving all extremities with good strength   INTEGUMENTARY:surgical wound to buttocks/ perineum   PSYCHIATRY: alert/ active and oriented.                  ASSESSMENT/PLAN   Acute severe ulcerative colitis , new onset  -C diff negative  -DC Solu-Medrol 30 b.i.d. 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 Today 11/12/2023 and 11-13-23  -rectal tube with plans for diverting colostomy or colectomy .colorectal surgeon consulted to see if colectomy is more appropriate since he has severe active disease to ascending as well as descending colon.  -vancomycin as well as clindamycin discontinue, continue Zosyn.- cx's w e.coli      Orthostatic syncope probably related to symptomatic anemia/intravascular volume depletion    Sepsis due to Allison gangrene     Rectal pain with history of hemorrhoids/hemorrhoids repair at [facility] couple of weeks ago  -discontinue Anusol t.i.d.    Severe protein calorie malnutrition     GI /infectious disease/internal Medicine/Colorectal surgery/general surgery in case.           Cc time 45 minutes  Cc dx- SEPSIS/ FOURNIERS GANGRENE /symptomatic anemia requiring PRBC transfusion      DVT prophylaxis:     Anticipated discharge and disposition: tbd  __________________________________________________________________________    LABS/MICRO/MEDS/DIAGNOSTICS       LABS  Recent Labs     11/14/23  0449   *   K 4.3   CHLORIDE 94*   CO2 29   BUN 13.6   CREATININE 0.75   GLUCOSE 127*   CALCIUM 7.3*   ALKPHOS 91   AST 30   ALT 84*   ALBUMIN 1.6*       Recent Labs     11/14/23  0449   WBC 14.98*   RBC 3.09*   HCT 25.9*   MCV 83.8            MICROBIOLOGY  Microbiology Results (last 7 days)       Procedure Component Value Units Date/Time    Wound Culture [[id number]]  (Abnormal)  (Susceptibility) Collected: 11/11/23 1851    Order Status: Completed Specimen: Abscess from Rectum Updated: 11/14/23 0997     Wound Culture Many Escherichia coli    Blood Culture [[id number]]  (Normal)  Collected: 11/10/23 2048    Order Status: Completed Specimen: Blood from Antecubital, Left Updated: 11/13/23 2300     CULTURE, BLOOD ([facility]) No Growth At 72 Hours    MARLENI Prep [[id number]] Collected: 11/11/23 1851    Order Status: Completed Specimen: Abscess Updated: 11/13/23 1219     KOH Prep No fungal elements seen    Fungal Culture [[id number]] Collected: 11/11/23 1851    Order Status: Sent Specimen: Abscess Updated: 11/13/23 1122    AFB Smear [[id number]] Collected: 11/11/23 1851    Order Status: Completed Specimen: Abscess from Rectum Updated: 11/13/23 0754     AFB Smear No AFB seen (Direct smear only) - Concentration to follow    Mycobacteria and Nocardia Culture [[id number]] Collected: 11/11/23 1851    Order Status: Sent Specimen: Abscess from Rectum Updated: 11/12/23 1404    Gram Stain [[id number]] Collected: 11/11/23 1851    Order Status: Completed Specimen: Abscess from Rectum Updated: 11/12/23 1129     GRAM STAIN Many WBC observed      Many Gram positive cocci      Many Gram Negative Rods      Many Gram Positive Rods    Fungal Culture [[id number]] Collected: 11/11/23 1851    Order Status: Sent Specimen: Abscess from Rectum Updated: 11/11/23 1907    Blood culture #1 **CANNOT BE ORDERED STAT** [[id number]]  (Normal) Collected: 11/06/23 0405    Order Status: Completed Specimen: Blood from Antecubital, Right Updated: 11/11/23 0701     CULTURE, BLOOD ([facility]) No Growth at 5 days    Blood culture #2 **CANNOT BE ORDERED STAT** [[id number]]  (Normal) Collected: 11/06/23 0405    Order Status: Completed Specimen: Blood from Antecubital, Left Updated: 11/11/23 0600     CULTURE, BLOOD ([facility]) No Growth at 5 days               MEDICATIONS   HYDROmorphone  2 mg Intravenous Once    methylPREDNISolone sodium succinate injection  15 mg Intravenous BID    piperacillin-tazobactam (Zosyn) IV (PEDS and ADULTS) (extended infusion is not appropriate)  4.5 g Intravenous Q8H         INFUSIONS   lactated ringers 125 mL/hr at 11/14/23 0603  "         DIAGNOSTIC TESTS  CT Abdomen Pelvis W Wo Contrast   Final Result      X-Ray Chest PA And Lateral   Final Result      No acute abnormality.         Electronically signed by: Quinton Hernandez MD   Date:    11/11/2023   Time:    08:49      CT Abdomen Pelvis W Wo Contrast   Final Result      Findings seen consistent with colitis involving the ascending colon and portions of the descending colon.  Follow-up is recommended to resolution as underlying mass cannot be completely excluded.      The gastric wall appears to be slightly thickened.  Underlying gastritis should be excluded.         Electronically signed by: John Gale   Date:    11/06/2023   Time:    12:02           No results found for: "EF"       NUTRITION STATUS  Patient meets ASPEN criteria for moderate malnutrition of acute illness or injury per RD assessment as evidenced by:  Energy Intake (Malnutrition): less than 75% for greater than 7 days  Weight Loss (Malnutrition): greater than 2% in 1 week  Subcutaneous Fat (Malnutrition): moderate depletion  Muscle Mass (Malnutrition): moderate depletion  Fluid Accumulation (Malnutrition): other (see comments) (does not meet criteria)  Hand  Strength, Left (Malnutrition): unable to evaluate  Hand  Strength, Right (Malnutrition): unable to evaluate  A minimum of two characteristics is recommended for diagnosis of either severe or non-severe malnutrition.       Case related differential diagnoses have been reviewed; assessment and plan has been documented. I have personally reviewed the labs and test results that are currently available; I have reviewed the patients medication list. I have reviewed the consulting providers recommendations. I have reviewed or attempted to review medical records based upon their availability.  All of the patient's and/or family's questions have been addressed and answered to the best of my ability.  I will continue to monitor closely and make adjustments to medical " management as needed.  This document was created using M*Modal Fluency Direct.  Transcription errors may have been made.  Please contact me if any questions may rise regarding documentation to clarify transcription.        Sloan Alvarez MD   Internal Medicine  Department of Hospital Medicine [facility] - 8th Floor Med Surg

## 2023-11-14 NOTE — PROGRESS NOTES
"   Acute Care Surgery   Progress Note  Admit Date: 11/6/2023  HD#8  POD#2 Days Post-Op    Subjective:   Interval history:  NAEON; AF, VSS.  Dressings changed at bedside yesterday afternoon with Vashe-soaked Kerlix. Please see physical exam for picture below.  Tolerating clears, no nausea or vomiting.   Dressings changed by nursing this morning around 0400. Patient states just prior to dressing change, had a sharp, gas pain to RLQ.     Home Meds:  Current Outpatient Medications   Medication Instructions    dicyclomine (BENTYL) 20 mg, Oral, 4 times daily PRN    predniSONE (DELTASONE) 10 MG tablet Take 4 tablets (40 mg total) by mouth once daily for 7 days, THEN 3 tablets (30 mg total) once daily for 7 days, THEN 2 tablets (20 mg total) once daily for 7 days, THEN 1 tablet (10 mg total) once daily for 7 days, THEN 0.5 tablets (5 mg total) once daily for 7 days.      Scheduled Meds:   methylPREDNISolone sodium succinate injection  15 mg Intravenous BID    piperacillin-tazobactam (Zosyn) IV (PEDS and ADULTS) (extended infusion is not appropriate)  4.5 g Intravenous Q8H     Continuous Infusions:   lactated ringers 125 mL/hr at 11/14/23 0547     PRN Meds:0.9%  NaCl infusion (for blood administration), acetaminophen, acetaminophen, dextrose 10%, dextrose 10%, dicyclomine, glucagon (human recombinant), glucose, glucose, hydrocortisone, HYDROmorphone, ondansetron, oxyCODONE-acetaminophen, sodium chloride 0.9%     Objective:     VITAL SIGNS: 24 HR MIN & MAX LAST   Temp  Min: 98.1 °F (36.7 °C)  Max: 98.9 °F (37.2 °C)  98.4 °F (36.9 °C)   BP  Min: 96/56  Max: 109/65  104/66    Pulse  Min: 83  Max: 101  101    Resp  Min: 18  Max: 22  18    SpO2  Min: 97 %  Max: 98 %  97 %      HT: 5' 10" (177.8 cm)  WT: 74.8 kg (165 lb)  BMI: 23.7     Intake/output:  Intake/Output - Last 3 Shifts         11/12 0700  11/13 0659 11/13 0700 11/14 0659    P.O. 940 960    I.V. (mL/kg) 100 (1.3) 767.5 (10.3)    Blood 297.5     NG/     IV " "Piggyback 888.3 200    Total Intake(mL/kg) 2475.8 (33.1) 1927.5 (25.8)    Urine (mL/kg/hr) 3100 (1.7) 2550 (1.4)    Stool 100 500    Total Output 3200 3050    Net -724.2 -1122.5                  Intake/Output Summary (Last 24 hours) at 11/14/2023 0548  Last data filed at 11/14/2023 0547  Gross per 24 hour   Intake 2757.48 ml   Output 4850 ml   Net -2092.52 ml             Lines/drains/airway:       Peripheral IV - Single Lumen 18 G Right Hand (Active)   Site Assessment Clean;Dry;Intact 11/12/23 0000   Extremity Assessment Distal to IV No abnormal discoloration;No redness;No swelling 11/11/23 2010   Line Status Saline locked 11/12/23 0000   Dressing Status Clean;Dry;Intact 11/12/23 0000   Dressing Intervention Integrity maintained 11/11/23 2010   Number of days:             Rectal Tube 11/11/23 1944 fecal management system (Active)   Stool Color Brown;Red 11/11/23 2100   Number of days: 0            Urethral Catheter 11/11/23 1954 Double-lumen;Silicone;Non-latex 16 Fr. (Active)   Site Assessment Clean;Intact 11/11/23 2100   Collection Container Urimeter 11/11/23 2100   Securement Method secured to top of thigh w/ adhesive device 11/11/23 2100   Catheter Care Performed yes 11/11/23 2100   Reason for Continuing Urinary Catheterization Post operative 11/11/23 2100   CAUTI Prevention Bundle Securement Device in place with 1" slack;Intact seal between catheter & drainage tubing;Drainage bag/urimeter off the floor;Sheeting clip in use;No dependent loops or kinks;Drainage bag/urimeter not overfilled (<2/3 full);Drainage bag/urimeter below bladder 11/11/23 2100   Output (mL) 700 mL 11/12/23 0548   Number of days: 0       Physical examination:  Gen: NAD, AAOx3, answering questions appropriately  HEENT: PERRLA  CV: RR  Resp: NWOB  Abd: S/NT/ND  Perineum:  Large wounds to bilateral buttocks dressing is in place none saturated.  Flexiseal in place.    Ext: moving all extremities spontaneously and purposefully  Neuro: CN II-XII " "grossly intact    Labs:  Renal:  Recent Labs     11/12/23 0445 11/13/23 0441   BUN 17.0 12.3   CREATININE 0.80 0.69*       No results for input(s): "LACTIC" in the last 72 hours.  FENGI:  Recent Labs     11/12/23 0445 11/13/23 0441   * 133*   K 4.4 4.5   CO2 27 30*   CALCIUM 8.0* 7.3*   MG 2.10 2.20   ALBUMIN 1.3* 1.7*   BILITOT 0.5 0.5   AST 15 24   ALKPHOS 90 119   ALT 98* 76*       Heme:  Recent Labs     11/12/23 0445 11/13/23 0441 11/14/23 0449   HGB 6.9* 7.9* 8.1*   HCT 22.6* 25.5* 25.9*    305 397       ID:  Recent Labs     11/12/23 0445 11/13/23 0441 11/14/23 0449   WBC 13.35  13.35* 13.24  13.24* 14.98*       CBG:  Recent Labs     11/12/23 0445 11/13/23 0441   GLUCOSE 185* 165*        Cardiovascular:  No results for input(s): "TROPONINI", "CKTOTAL", "CKMB", "BNP" in the last 168 hours.  ABG:  No results for input(s): "PH", "PO2", "PCO2", "HCO3", "BE" in the last 168 hours.   I have reviewed all pertinent lab results within the past 24 hours.    Imaging:  CT Abdomen Pelvis W Wo Contrast   Final Result      X-Ray Chest PA And Lateral   Final Result      No acute abnormality.         Electronically signed by: Quinton Hernandez MD   Date:    11/11/2023   Time:    08:49      CT Abdomen Pelvis W Wo Contrast   Final Result      Findings seen consistent with colitis involving the ascending colon and portions of the descending colon.  Follow-up is recommended to resolution as underlying mass cannot be completely excluded.      The gastric wall appears to be slightly thickened.  Underlying gastritis should be excluded.         Electronically signed by: John Gale   Date:    11/06/2023   Time:    12:02         I have reviewed all pertinent imaging results/findings within the past 24 hours.    Micro/Path/Other:  Microbiology Results (last 7 days)       Procedure Component Value Units Date/Time    Blood Culture [[id number]]  (Normal) Collected: 11/10/23 2048    Order Status: Completed " Specimen: Blood from Antecubital, Left Updated: 11/13/23 2300     CULTURE, BLOOD ([facility]) No Growth At 72 Hours    MARLENI Prep [[id number]] Collected: 11/11/23 1851    Order Status: Completed Specimen: Abscess Updated: 11/13/23 1219     KOH Prep No fungal elements seen    Fungal Culture [[id number]] Collected: 11/11/23 1851    Order Status: Sent Specimen: Abscess Updated: 11/13/23 1122    Wound Culture [[id number]]  (Abnormal)  (Susceptibility) Collected: 11/11/23 1851    Order Status: Completed Specimen: Abscess from Rectum Updated: 11/13/23 1012     Wound Culture Many Escherichia coli    AFB Smear [[id number]] Collected: 11/11/23 1851    Order Status: Completed Specimen: Abscess from Rectum Updated: 11/13/23 0754     AFB Smear No AFB seen (Direct smear only) - Concentration to follow    Mycobacteria and Nocardia Culture [[id number]] Collected: 11/11/23 1851    Order Status: Sent Specimen: Abscess from Rectum Updated: 11/12/23 1404    Gram Stain [[id number]] Collected: 11/11/23 1851    Order Status: Completed Specimen: Abscess from Rectum Updated: 11/12/23 1129     GRAM STAIN Many WBC observed      Many Gram positive cocci      Many Gram Negative Rods      Many Gram Positive Rods    Fungal Culture [[id number]] Collected: 11/11/23 1851    Order Status: Sent Specimen: Abscess from Rectum Updated: 11/11/23 1907    Blood culture #1 **CANNOT BE ORDERED STAT** [[id number]]  (Normal) Collected: 11/06/23 0405    Order Status: Completed Specimen: Blood from Antecubital, Right Updated: 11/11/23 0701     CULTURE, BLOOD ([facility]) No Growth at 5 days    Blood culture #2 **CANNOT BE ORDERED STAT** [[id number]]  (Normal) Collected: 11/06/23 0405    Order Status: Completed Specimen: Blood from Antecubital, Left Updated: 11/11/23 0600     CULTURE, BLOOD ([facility]) No Growth at 5 days           Specimen (168h ago, onward)      None             Assessment & Plan:   42-year-old male with a past medical history of ulcerative colitis recently  diagnosed in October who presented to the hospital with necrotizing fasciitis of his perineum status post wide excisional debridement was peritoneum on 11/11 and on 11/12.    - HDS, AF  - Continue local wound care at this time with wet-to-dry using Vashe; surgery will change dressings again this afternoon   - Colorectal Surgery consulted for possible surgical intervention; appreciate their assistance   - Continue IV zosyn  - Methylpred 15 mg BID; please taper steroids       Michelle Wang MD  [employer] General Surgery, PGY-2

## 2023-11-14 NOTE — PLAN OF CARE
Problem: Adult Inpatient Plan of Care  Goal: Plan of Care Review  Outcome: Ongoing, Progressing  Flowsheets (Taken 11/14/2023 0134)  Plan of Care Reviewed With:   patient   spouse  Goal: Patient-Specific Goal (Individualized)  Outcome: Ongoing, Progressing  Goal: Absence of Hospital-Acquired Illness or Injury  Outcome: Ongoing, Progressing  Intervention: Identify and Manage Fall Risk  Flowsheets (Taken 11/14/2023 0134)  Safety Promotion/Fall Prevention:   assistive device/personal item within reach   side rails raised x 3   lighting adjusted  Intervention: Prevent Skin Injury  Flowsheets (Taken 11/14/2023 0134)  Body Position:   weight shifting   position changed independently  Skin Protection:   tubing/devices free from skin contact   incontinence pads utilized  Intervention: Prevent and Manage VTE (Venous Thromboembolism) Risk  Flowsheets (Taken 11/14/2023 0134)  Activity Management:   Arm raise - L1   Rolling - L1   Leg kicks - L2  VTE Prevention/Management:   intravenous hydration   dorsiflexion/plantar flexion performed   fluids promoted  Range of Motion:   active ROM (range of motion) encouraged   ROM (range of motion) performed  Intervention: Prevent Infection  Flowsheets (Taken 11/14/2023 0134)  Infection Prevention:   rest/sleep promoted   single patient room provided   visitors restricted/screened   equipment surfaces disinfected   hand hygiene promoted   personal protective equipment utilized   environmental surveillance performed  Goal: Optimal Comfort and Wellbeing  Outcome: Ongoing, Progressing  Intervention: Monitor Pain and Promote Comfort  Flowsheets (Taken 11/14/2023 0134)  Pain Management Interventions:   pain management plan reviewed with patient/caregiver   care clustered   medication offered   relaxation techniques promoted   quiet environment facilitated   around-the-clock dosing utilized   awakened for pain meds per patient request   breathing exercises utilized   cold  applied  Intervention: Provide Person-Centered Care  Flowsheets (Taken 11/14/2023 0134)  Trust Relationship/Rapport:   care explained   reassurance provided   thoughts/feelings acknowledged   emotional support provided   empathic listening provided   questions answered   questions encouraged   choices provided  Goal: Readiness for Transition of Care  Outcome: Ongoing, Progressing     Problem: Infection  Goal: Absence of Infection Signs and Symptoms  Outcome: Ongoing, Progressing  Intervention: Prevent or Manage Infection  Flowsheets (Taken 11/14/2023 0134)  Infection Management: aseptic technique maintained  Isolation Precautions: precautions maintained     Problem: Pain Acute  Goal: Acceptable Pain Control and Functional Ability  Outcome: Ongoing, Progressing  Intervention: Develop Pain Management Plan  Flowsheets (Taken 11/14/2023 0134)  Pain Management Interventions:   pain management plan reviewed with patient/caregiver   care clustered   medication offered   relaxation techniques promoted   quiet environment facilitated   around-the-clock dosing utilized   awakened for pain meds per patient request   breathing exercises utilized   cold applied

## 2023-11-14 NOTE — PROGRESS NOTES
"Gastroenterology Progress Note    Subjective/Interval History:  Afebrile over the past 24h  WBC 14.98 - on zosyn. Clinda and vanc discontinued  H&H 8.1/25.9 - stable    Spoke with nurse regarding patient. Pain is worse. Patient's wife stating his rectal tube output is redder than previously. CRS has not seen patient yet.    Patient resting in bed, wife at bedside. Patient states he feels like he is dying. He wants to have surgery and has been fasting in case this can be done today. He states he is having increased abd pressures today, per wife yesterday patient felt better after some gas was able to be released via rectal tube. Rectal tube output in bag is reddish/brown, however closer to rectum it appears darker red in color.    ROS:  Review of Systems   Constitutional:  Positive for malaise/fatigue.   Respiratory:  Negative for cough and shortness of breath.    Cardiovascular:  Negative for chest pain.   Gastrointestinal:  Positive for abdominal pain and blood in stool. Negative for nausea and vomiting.   Neurological:  Negative for weakness.       Vital Signs:  /67   Pulse 96   Temp 98.2 °F (36.8 °C)   Resp 16   Ht 5' 10" (1.778 m)   Wt 74.8 kg (165 lb)   SpO2 97%   BMI 23.68 kg/m²   Body mass index is 23.68 kg/m².    Physical Exam:  Physical Exam  Constitutional:       General: He is not in acute distress.     Appearance: He is ill-appearing.   HENT:      Head: Normocephalic and atraumatic.      Right Ear: External ear normal.      Left Ear: External ear normal.      Nose: Nose normal.      Mouth/Throat:      Pharynx: Oropharynx is clear.   Eyes:      Conjunctiva/sclera: Conjunctivae normal.   Pulmonary:      Effort: Pulmonary effort is normal. No respiratory distress.   Abdominal:      General: Abdomen is flat. There is no distension.      Palpations: Abdomen is soft.      Tenderness: There is no abdominal tenderness. There is no guarding.   Genitourinary:     Comments: Rectal tube with " reddish/brown output  Musculoskeletal:         General: Normal range of motion.      Cervical back: Normal range of motion.   Skin:     General: Skin is warm and dry.      Coloration: Skin is not pale.   Neurological:      Mental Status: He is alert and oriented to person, place, and time. Mental status is at baseline.      Motor: No weakness.   Psychiatric:         Mood and Affect: Mood normal.         Behavior: Behavior normal.         Thought Content: Thought content normal.         Labs:  Recent Results (from the past 24 hour(s))   Comprehensive Metabolic Panel    Collection Time: 11/14/23  4:49 AM   Result Value Ref Range    Sodium Level 131 (L) 136 - 145 mmol/L    Potassium Level 4.3 3.5 - 5.1 mmol/L    Chloride 94 (L) 98 - 107 mmol/L    Carbon Dioxide 29 22 - 29 mmol/L    Glucose Level 127 (H) 74 - 100 mg/dL    Blood Urea Nitrogen 13.6 8.9 - 20.6 mg/dL    Creatinine 0.75 0.73 - 1.18 mg/dL    Calcium Level Total 7.3 (L) 8.4 - 10.2 mg/dL    Protein Total 4.6 (L) 6.4 - 8.3 gm/dL    Albumin Level 1.6 (L) 3.5 - 5.0 g/dL    Globulin 3.0 2.4 - 3.5 gm/dL    Albumin/Globulin Ratio 0.5 (L) 1.1 - 2.0 ratio    Bilirubin Total 0.5 <=1.5 mg/dL    Alkaline Phosphatase 91 40 - 150 unit/L    Alanine Aminotransferase 84 (H) 0 - 55 unit/L    Aspartate Aminotransferase 30 5 - 34 unit/L    eGFR >60 mls/min/1.73/m2   Magnesium    Collection Time: 11/14/23  4:49 AM   Result Value Ref Range    Magnesium Level 2.10 1.60 - 2.60 mg/dL   CBC with Differential    Collection Time: 11/14/23  4:49 AM   Result Value Ref Range    WBC 14.98 (H) 4.50 - 11.50 x10(3)/mcL    RBC 3.09 (L) 4.70 - 6.10 x10(6)/mcL    Hgb 8.1 (L) 14.0 - 18.0 g/dL    Hct 25.9 (L) 42.0 - 52.0 %    MCV 83.8 80.0 - 94.0 fL    MCH 26.2 (L) 27.0 - 31.0 pg    MCHC 31.3 (L) 33.0 - 36.0 g/dL    RDW 15.8 11.5 - 17.0 %    Platelet 397 130 - 400 x10(3)/mcL    MPV 9.7 7.4 - 10.4 fL    Neut % 94.1 %    Lymph % 2.2 %    Mono % 1.5 %    Eos % 0.0 %    Basophil % 0.1 %    Lymph # 0.33  (L) 0.6 - 4.6 x10(3)/mcL    Neut # 14.08 (H) 2.1 - 9.2 x10(3)/mcL    Mono # 0.23 0.1 - 1.3 x10(3)/mcL    Eos # 0.00 0 - 0.9 x10(3)/mcL    Baso # 0.02 <=0.2 x10(3)/mcL    IG# 0.32 (H) 0 - 0.04 x10(3)/mcL    IG% 2.1 %    NRBC% 0.1 %         Assessment/Plan:  42-year-old male known to Dr. He with past medical history of diverticulitis s/p colon resection and recent new diagnosis of ulcerative colitis.  He presented to the ED on 11/6 with complaints of bright red blood per rectum and lower abdominal pain associated with nausea, rectal pain, weakness and 30-40 lb weight loss over the past 3 weeks with syncopal episode x2- 2nd episode with LOC.     Newly diagnosed UC, active flare  Rectal pain with BM, hx hemorrhoids  - Colonoscopy 10/23/2023 with findings c/w IBD.  He was discharged on prednisone taper with outpatient follow up appointment scheduled 11/17/2023, but ended up presenting here on 11/6  -  on 11/6---95.40 on 10th  - currently on IV methylprednisone 15mg BID  - upcoming outpatient appt scheduled for 11/17 with plans to start humira and imuran- currently in the process of getting approval. Message was sent to our office and medication has been expedited/ also looking into availability of samples in the meantime. Patient did obtain Humira samples and was planning on having loading dose; however patient became febrile and CT revealed xochitl's gangrene. S/p I&D of perineum 11/11/23 - currently on zosyn, clinda, vanc  - .9 on 10/21/23; as of 11/09/23 it is 95.4  - discussed case with ID, gen surg, and hospitalist; Humira will be held at this time. Patient will likely need surgery to address UC - CRS consulted for total colectomy consideration. Agree with CRS consult. If total colectomy performed, will need small bowel surveillance to assess for Crohn's disease     3. Fever - resolved  - UA on 10th was unremarkable.   - Stool for Cdiff neg on 7th   - Blood cultures neg from 11/6  - CT abd on  11/6 Findings seen consistent with colitis involving the ascending colon and portions of the descending colon.  Follow-up is recommended to resolution as underlying mass cannot be completely excluded. The gastric wall appears to be slightly thickened.  Underlying gastritis should be excluded.     Jeanine Pacheco PA-C  Gastroenterology  [facility]

## 2023-11-14 NOTE — PROGRESS NOTES
[facility]  Infectious Diseases Progress Note        ASSESSMENT & PLAN:   He is a 42-year-old male with a past medical history of diverticulitis, status post colon resection, and new diagnosis of ulcerative colitis for which he was hospitalized towards the end of October.  He was evaluated by GI and underwent a colonoscopy at that time and was discharged on a steroid taper.  He was also noted to have hemorrhoids which was treated with hydrocortisone suppository.  He then presented to the emergency room on 11/06/2023 with complaints of bright red blood per rectum and lower abdominal pain as well as subjective fevers and syncopal episode at home.  He was initiated on IV steroids per GI for suspected ulcerative colitis flare.  CT of the abdomen and pelvis showed findings consistent with colitis and possible underlying gastritis.  Over the weekend, patient had a fever of 102.5° F and some hypotension.  He also reported worsening pain near the perineum and was noted to have extensive erythema and induration.  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  He was initiated on empiric antimicrobials with vancomycin, Zosyn, and clindamycin.  He was taken to the operating room by General surgery on 11/11 for an I&D - Gram stain with GPCs, GNRs, GPRs - culture with relatively sensitive E. coli thus far.  Blood cultures are negative thus far.  We have been consulted for assistance with antimicrobials.     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  Going for repeat debridement and total colectomy / colostomy today.  Continue Zosyn 4.5g IV q8h.   Discussed with patient and wife.       MEDICATIONS:   Reviewed in EMR    REVIEW OF SYSTEMS:   Except as documented,  "all other systems reviewed and negative     PHYSICAL EXAM:   T 98.8 °F (37.1 °C)   /66   P 96   RR 20   O2 97 %  GENERAL: NAD; does not appear toxic  SKIN: no rash  HEENT: sclera non-icteric; PERRL   NECK: supple; no LAD  CHEST: CTA; nonlabored, equal expansion; no adventitious BS  CARDIOVASCULAR: RRR, S1S2; no murmur; strong, equal peripheral pulses; no edema  ABDOMEN:  active bowel sounds; abdomen soft, nondistended, nontender to palpation  GENITOURINARY: Devi / rectal tube in place  EXTREMITIES: no cyanosis or clubbing  NEURO: AAO x4; CN II-XII grossly intact  PSYCH: Mentation and affect appropriate     LABS AND IMAGING:     Recent Labs     11/13/23 0441 11/14/23 0449   WBC 13.24  13.24* 14.98*   RBC 3.02* 3.09*   HGB 7.9* 8.1*   HCT 25.5* 25.9*   MCV 84.4 83.8   MCH 26.2* 26.2*   MCHC 31.0* 31.3*   RDW 15.6 15.8    397     No results for input(s): "LACTIC" in the last 72 hours.  No results for input(s): "INR", "APTT", "D-DIMER" in the last 72 hours.  No results for input(s): "HGBA1C", "CHOL", "TRIG", "LDL", "VLDL", "HDL" in the last 72 hours.   Recent Labs     11/13/23 0441 11/14/23 0449   * 131*   K 4.5 4.3   CHLORIDE 96* 94*   CO2 30* 29   BUN 12.3 13.6   CREATININE 0.69* 0.75   GLUCOSE 165* 127*   CALCIUM 7.3* 7.3*   MG 2.20 2.10   ALBUMIN 1.7* 1.6*   GLOBULIN 2.8 3.0   ALKPHOS 119 91   ALT 76* 84*   AST 24 30   BILITOT 0.5 0.5     No results for input(s): "BNP", "CPK", "TROPONINI" in the last 72 hours.       CT Abdomen Pelvis W Wo Contrast  EXAMINATION  CT ABDOMEN PELVIS W WO CONTRAST    CLINICAL HISTORY  Abdominal abscess/infection suspected;Pt with induration, erythema, at the perineum, in btw scrotum and anal area. Pt with IBD/UC;    TECHNIQUE  Pre- and post-contrast helical-acquisition CT images were obtained and multiplanar reformats accomplished by a CT technologist at a separate workstation, pushed to PACS for physician review.    CONTRAST  *IV: ISOVUE-370, 100 mL  *Enteric: " Gastroview, 30 mL diluted in water    COMPARISON  6 November 2023    FINDINGS  Images were reviewed in soft tissue, lung, and bone windows.    Exam quality: adequate for evaluation    Lines/tubes: none visualized    There is been inadequate duration of time between contrast ingestion and scan acquisition, resulting in incomplete transit of oral contrast agent through the GI tract.  Contrast is visualized only to the mid to distal small bowel loops.  There are no findings of high-grade bowel obstruction or new focal gastrointestinal process.  Previously described findings of colitis are similar to slightly improved.  No definite new areas of GI tract inflammatory change appreciated.    There is interval development of extensive subcutaneous edema and inflammatory fat stranding through the area of the perineum and posteromedial buttocks.  Associated tracking subcutaneous gas is also noted through the region.  No expansile fluid collection or other drainable component is identified.    No other significant short interval changes through the scanned region.    IMPRESSION  1. Interval changes of perineum concerning for development of Allison's gangrene.  No associated drainable collection.  2. Similar to slightly improved appearance of previously described colitis.  ==========    This report was flagged in Epic as abnormal.    RADIATION DOSE  Automated tube current modulation, weight-based exposure dosing, and/or iterative reconstruction technique utilized to reach lowest reasonably achievable exposure rate.    DLP: 873 mGy*cm    Of note, the pre-contrast acquisition provides no additional diagnostic value relative to the added radiation exposure to the patient.  Routine abdominopelvic CT with single phase post-contrast acquisition would be adequate for follow-up.    Electronically signed by: Kem Gannon  Date:    11/11/2023  Time:    13:23  X-Ray Chest PA And Lateral  Narrative: EXAMINATION:  XR CHEST PA AND  LATERAL    CLINICAL HISTORY:  fever;    TECHNIQUE:  PA and lateral views of the chest were performed.    COMPARISON:  None    FINDINGS:  The lungs are clear, with normal appearance of pulmonary vasculature and no pleural effusion or pneumothorax.    The cardiac silhouette is normal in size. The hilar and mediastinal contours are unremarkable.    Bones are intact.  Impression: No acute abnormality.    Electronically signed by: Quinton Hernandez MD  Date:    11/11/2023  Time:    08:49       SHERLYN Davis  Infectious Diseases

## 2023-11-14 NOTE — CONSULTS
[facility] - 8th [facility] Med Surg  Colon & Rectal Surgery  Consult Note    Inpatient consult to Colorectal Surgery  Consult performed by: Fredi Shields MD  Consult ordered by: Sloan Alvarez MD        Subjective:     Chief Complaint/Reason for Admission: Ulcerative colitis with complication    History of Present Illness:  42-year-old white male with past surgical history of sigmoid colectomy for diverticulitis and recent diagnosis of ulcerative colitis presented to the emergency room 11/06/2023 complaining of abdominal pain, rectal pain, nausea, and weakness.  Laboratory demonstrated symptomatic anemia with an elevated CRP to 192.  CT scan abdomen/pelvis showed diffuse wall thickening of the ascending colon and descending colon with inflammatory changes.  He was admitted, transfused 2 units of packed red blood cells, and started on IV steroids.  Repeat CT scan abdomen/pelvis 11/11/2023 for rectal pain demonstrated interval development of extensive subcutaneous edema and inflammatory fat stranding through the perineum and posterior medial buttocks with the associated subcutaneous gas concerning for Allison gangrene.  Surgical hospitalist were consulted and performed emergent debridement that day and the following day.  He currently has a bowel management system in place to prevent further soiling of his large perirectal/perineal wound.  Due to the persistent sepsis, his IBD colitis cannot be properly treated with immunosuppressant medications so surgery was consulted.  He complains of ongoing abdominal pain and 50 lbs weight loss.    No current facility-administered medications on file prior to encounter.     Current Outpatient Medications on File Prior to Encounter   Medication Sig    dicyclomine (BENTYL) 20 mg tablet Take 1 tablet (20 mg total) by mouth 4 (four) times daily as needed (abd pain/cramp).    predniSONE (DELTASONE) 10 MG tablet Take 4 tablets (40 mg total) by mouth once daily for  7 days, THEN 3 tablets (30 mg total) once daily for 7 days, THEN 2 tablets (20 mg total) once daily for 7 days, THEN 1 tablet (10 mg total) once daily for 7 days, THEN 0.5 tablets (5 mg total) once daily for 7 days.       Review of patient's allergies indicates:  No Known Allergies    History reviewed. No pertinent past medical history.  Past Surgical History:   Procedure Laterality Date    COLONOSCOPY, WITH 1 OR MORE BIOPSIES N/A 10/23/2023    Procedure: COLON;  Surgeon: Dragan Underwood MD;  Location: [facility] ENDOSCOPY;  Service: Gastroenterology;  Laterality: N/A;    DEBRIDEMENT, EXTERNAL GENITALIA, PERINEUM, AND ABDOMINAL WALL, FOR NECROTIZING SOFT TISSUE INFECTION Bilateral 11/11/2023    Procedure: DEBRIDEMENT, EXTERNAL GENITALIA/BUTTOCKS FOR NECROTIZING SOFT TISSUE INFECTION;  Surgeon: Vladimir Vick MD;  Location: [facility];  Service: General;  Laterality: Bilateral;  Prone positioning.    DEBRIDEMENT, EXTERNAL GENITALIA, PERINEUM, AND ABDOMINAL WALL, FOR NECROTIZING SOFT TISSUE INFECTION N/A 11/12/2023    Procedure: DEBRIDEMENT, EXTERNAL GENITALIA, PERINEUM, AND ABDOMINAL WALL, FOR NECROTIZING SOFT TISSUE INFECTION;  Surgeon: Vladimir Vick MD;  Location: [facility];  Service: General;  Laterality: N/A;  Place patient in dorsal lithotomy positioning.     Family History    None       Tobacco Use    Smoking status: Not on file    Smokeless tobacco: Not on file   Substance and Sexual Activity    Alcohol use: Not on file    Drug use: Not on file    Sexual activity: Not on file     Review of Systems   Constitutional:  Positive for unexpected weight change. Negative for chills, diaphoresis, fatigue and fever.   HENT:  Negative for facial swelling, rhinorrhea and sore throat.    Eyes:  Negative for redness.   Respiratory:  Negative for cough and shortness of breath.    Cardiovascular:  Negative for chest pain.   Gastrointestinal:  Positive for abdominal pain, anal bleeding, diarrhea and rectal pain. Negative  for abdominal distention and constipation.   Genitourinary:  Negative for dysuria and hematuria.   Musculoskeletal:  Negative for back pain.   Skin:  Negative for rash.   Neurological:  Negative for dizziness and syncope.   Psychiatric/Behavioral:  Negative for confusion.      Objective:     Vital Signs (Most Recent):  Temp: 98.8 °F (37.1 °C) (11/14/23 1143)  Pulse: 96 (11/14/23 1143)  Resp: 20 (11/14/23 1143)  BP: 105/66 (11/14/23 1143)  SpO2: 97 % (11/14/23 1143) Vital Signs (24h Range):  Temp:  [98.2 °F (36.8 °C)-98.9 °F (37.2 °C)] 98.8 °F (37.1 °C)  Pulse:  [] 96  Resp:  [16-22] 20  SpO2:  [97 %-98 %] 97 %  BP: (104-109)/(65-67) 105/66     Weight: 74.8 kg (165 lb)  Body mass index is 23.68 kg/m².      Intake/Output Summary (Last 24 hours) at 11/14/2023 1350  Last data filed at 11/14/2023 0603  Gross per 24 hour   Intake 1140.78 ml   Output 2850 ml   Net -1709.22 ml       Physical Exam  Vitals reviewed.   Constitutional:       General: He is not in acute distress.  HENT:      Head: Normocephalic and atraumatic.      Nose: Nose normal.   Eyes:      General: No scleral icterus.  Cardiovascular:      Rate and Rhythm: Normal rate and regular rhythm.   Pulmonary:      Effort: Pulmonary effort is normal. No respiratory distress.   Abdominal:      General: There is distension.      Palpations: Abdomen is soft.      Tenderness: There is no abdominal tenderness. There is no guarding or rebound.   Genitourinary:     Comments: Large perianal and perineal wound. Bowel management system in place.  Musculoskeletal:         General: Normal range of motion.   Neurological:      Mental Status: He is alert and oriented to person, place, and time.         Significant Labs:  CBC:   Recent Labs   Lab 11/14/23 0449   WBC 14.98*   RBC 3.09*   HGB 8.1*   HCT 25.9*      MCV 83.8   MCH 26.2*   MCHC 31.3*     CMP:   Recent Labs   Lab 11/14/23 0449   CALCIUM 7.3*   ALBUMIN 1.6*   *   K 4.3   CO2 29   BUN 13.6    CREATININE 0.75   ALKPHOS 91   ALT 84*   AST 30   BILITOT 0.5       Significant Diagnostics:  CT abdomen/pelvis reviewed.    Assessment/Plan:     Active Diagnoses:    Diagnosis Date Noted POA    Pancolitis [K51.00] 10/24/2023 Yes      Problems Resolved During this Admission:     I agree with surgical management of his IBD colitis given his ongoing perineal sepsis and inability to use immunosuppressants. I explained the surgery, potential adverse events, and expected postoperative course.  Consult ET nurse for ileostomy marking.  Schedule Lap total colectomy with end ileostomy tomorrow.      Thank you for your consult.     Fredi Shields MD  Colon & Rectal Surgery  [facility] - 8th Floor Med Surg

## 2023-11-15 ENCOUNTER — ANESTHESIA (OUTPATIENT)
Dept: SURGERY | Facility: HOSPITAL | Age: 43
DRG: 853 | End: 2023-11-15
Payer: COMMERCIAL

## 2023-11-15 ENCOUNTER — ANESTHESIA EVENT (OUTPATIENT)
Dept: SURGERY | Facility: HOSPITAL | Age: 43
DRG: 853 | End: 2023-11-15
Payer: COMMERCIAL

## 2023-11-15 LAB
ABS NEUT (OLG): 6.01 X10(3)/MCL (ref 2.1–9.2)
ALBUMIN SERPL-MCNC: 1.5 G/DL (ref 3.5–5)
ALBUMIN/GLOB SERPL: 0.5 RATIO (ref 1.1–2)
ALP SERPL-CCNC: 80 UNIT/L (ref 40–150)
ALT SERPL-CCNC: 54 UNIT/L (ref 0–55)
AST SERPL-CCNC: 16 UNIT/L (ref 5–34)
BACTERIA BLD CULT: NORMAL
BILIRUB SERPL-MCNC: 0.4 MG/DL
BUN SERPL-MCNC: 15.4 MG/DL (ref 8.9–20.6)
CALCIUM SERPL-MCNC: 7.6 MG/DL (ref 8.4–10.2)
CHLORIDE SERPL-SCNC: 96 MMOL/L (ref 98–107)
CO2 SERPL-SCNC: 29 MMOL/L (ref 22–29)
CREAT SERPL-MCNC: 0.69 MG/DL (ref 0.73–1.18)
ERYTHROCYTE [DISTWIDTH] IN BLOOD BY AUTOMATED COUNT: 15.6 % (ref 11.5–17)
GFR SERPLBLD CREATININE-BSD FMLA CKD-EPI: >60 MLS/MIN/1.73/M2
GLOBULIN SER-MCNC: 2.8 GM/DL (ref 2.4–3.5)
GLUCOSE SERPL-MCNC: 145 MG/DL (ref 74–100)
HCT VFR BLD AUTO: 23.4 % (ref 42–52)
HGB BLD-MCNC: 7.4 G/DL (ref 14–18)
INSTRUMENT WBC (OLG): 6.99 X10(3)/MCL
LYMPHOCYTES NFR BLD MANUAL: 0.35 X10(3)/MCL
LYMPHOCYTES NFR BLD MANUAL: 5 %
MAGNESIUM SERPL-MCNC: 2.2 MG/DL (ref 1.6–2.6)
MCH RBC QN AUTO: 26.1 PG (ref 27–31)
MCHC RBC AUTO-ENTMCNC: 31.6 G/DL (ref 33–36)
MCV RBC AUTO: 82.7 FL (ref 80–94)
METAMYELOCYTES NFR BLD MANUAL: 2 %
MONOCYTES NFR BLD MANUAL: 0.42 X10(3)/MCL (ref 0.1–1.3)
MONOCYTES NFR BLD MANUAL: 6 %
MYELOCYTES NFR BLD MANUAL: 1 %
NEUTROPHILS NFR BLD MANUAL: 86 %
NRBC BLD AUTO-RTO: 0 %
PATH INTP: NORMAL
PLATELET # BLD AUTO: 305 X10(3)/MCL (ref 130–400)
PLATELET # BLD EST: NORMAL 10*3/UL
PLATELETS.RETICULATED NFR BLD AUTO: 3.3 % (ref 0.9–11.2)
PMV BLD AUTO: 10.3 FL (ref 7.4–10.4)
POIKILOCYTOSIS BLD QL SMEAR: ABNORMAL
POTASSIUM SERPL-SCNC: 5.1 MMOL/L (ref 3.5–5.1)
PROT SERPL-MCNC: 4.3 GM/DL (ref 6.4–8.3)
RBC # BLD AUTO: 2.83 X10(6)/MCL (ref 4.7–6.1)
RBC MORPH BLD: ABNORMAL
SODIUM SERPL-SCNC: 132 MMOL/L (ref 136–145)
STOMATOCYTES (OLG): ABNORMAL
WBC # SPEC AUTO: 6.99 X10(3)/MCL (ref 4.5–11.5)

## 2023-11-15 PROCEDURE — 80053 COMPREHEN METABOLIC PANEL: CPT | Performed by: INTERNAL MEDICINE

## 2023-11-15 PROCEDURE — P9047 ALBUMIN (HUMAN), 25%, 50ML: HCPCS | Mod: JZ,JG

## 2023-11-15 PROCEDURE — 25000003 PHARM REV CODE 250: Performed by: NURSE ANESTHETIST, CERTIFIED REGISTERED

## 2023-11-15 PROCEDURE — D9220A PRA ANESTHESIA: ICD-10-PCS | Mod: CRNA,,,

## 2023-11-15 PROCEDURE — 99900035 HC TECH TIME PER 15 MIN (STAT)

## 2023-11-15 PROCEDURE — 25000003 PHARM REV CODE 250: Performed by: INTERNAL MEDICINE

## 2023-11-15 PROCEDURE — 63600175 PHARM REV CODE 636 W HCPCS: Performed by: INTERNAL MEDICINE

## 2023-11-15 PROCEDURE — 44210 LAPARO TOTAL PROCTOCOLECTOMY: CPT | Mod: ,,, | Performed by: COLON & RECTAL SURGERY

## 2023-11-15 PROCEDURE — 85027 COMPLETE CBC AUTOMATED: CPT | Performed by: INTERNAL MEDICINE

## 2023-11-15 PROCEDURE — 63600175 PHARM REV CODE 636 W HCPCS: Performed by: COLON & RECTAL SURGERY

## 2023-11-15 PROCEDURE — D9220A PRA ANESTHESIA: ICD-10-PCS | Mod: ANES,,, | Performed by: ANESTHESIOLOGY

## 2023-11-15 PROCEDURE — 37000008 HC ANESTHESIA 1ST 15 MINUTES: Performed by: COLON & RECTAL SURGERY

## 2023-11-15 PROCEDURE — 83735 ASSAY OF MAGNESIUM: CPT | Performed by: INTERNAL MEDICINE

## 2023-11-15 PROCEDURE — 63600175 PHARM REV CODE 636 W HCPCS

## 2023-11-15 PROCEDURE — 25000003 PHARM REV CODE 250

## 2023-11-15 PROCEDURE — 86923 COMPATIBILITY TEST ELECTRIC: CPT | Mod: 91 | Performed by: COLON & RECTAL SURGERY

## 2023-11-15 PROCEDURE — 36000710: Performed by: COLON & RECTAL SURGERY

## 2023-11-15 PROCEDURE — 63600175 PHARM REV CODE 636 W HCPCS: Performed by: ANESTHESIOLOGY

## 2023-11-15 PROCEDURE — 94799 UNLISTED PULMONARY SVC/PX: CPT

## 2023-11-15 PROCEDURE — C9290 INJ, BUPIVACAINE LIPOSOME: HCPCS | Performed by: COLON & RECTAL SURGERY

## 2023-11-15 PROCEDURE — D9220A PRA ANESTHESIA: Mod: ANES,,, | Performed by: ANESTHESIOLOGY

## 2023-11-15 PROCEDURE — 27201423 OPTIME MED/SURG SUP & DEVICES STERILE SUPPLY: Performed by: COLON & RECTAL SURGERY

## 2023-11-15 PROCEDURE — 11000001 HC ACUTE MED/SURG PRIVATE ROOM

## 2023-11-15 PROCEDURE — 37000009 HC ANESTHESIA EA ADD 15 MINS: Performed by: COLON & RECTAL SURGERY

## 2023-11-15 PROCEDURE — 63600175 PHARM REV CODE 636 W HCPCS: Performed by: STUDENT IN AN ORGANIZED HEALTH CARE EDUCATION/TRAINING PROGRAM

## 2023-11-15 PROCEDURE — 36000711: Performed by: COLON & RECTAL SURGERY

## 2023-11-15 PROCEDURE — 63600175 PHARM REV CODE 636 W HCPCS: Performed by: NURSE ANESTHETIST, CERTIFIED REGISTERED

## 2023-11-15 PROCEDURE — 71000033 HC RECOVERY, INTIAL HOUR: Performed by: COLON & RECTAL SURGERY

## 2023-11-15 PROCEDURE — 44210 PR LAP,SURG,COLECTOMY,TOTAL,W/O PROCTECTOMY: ICD-10-PCS | Mod: ,,, | Performed by: COLON & RECTAL SURGERY

## 2023-11-15 PROCEDURE — 25000003 PHARM REV CODE 250: Performed by: COLON & RECTAL SURGERY

## 2023-11-15 PROCEDURE — D9220A PRA ANESTHESIA: Mod: CRNA,,,

## 2023-11-15 PROCEDURE — 25000003 PHARM REV CODE 250: Performed by: STUDENT IN AN ORGANIZED HEALTH CARE EDUCATION/TRAINING PROGRAM

## 2023-11-15 RX ORDER — MORPHINE SULFATE 10 MG/ML
2 INJECTION INTRAMUSCULAR; INTRAVENOUS; SUBCUTANEOUS EVERY 4 HOURS PRN
Status: DISCONTINUED | OUTPATIENT
Start: 2023-11-15 | End: 2023-11-21

## 2023-11-15 RX ORDER — OXYCODONE HYDROCHLORIDE 10 MG/1
10 TABLET ORAL EVERY 4 HOURS PRN
Status: DISCONTINUED | OUTPATIENT
Start: 2023-11-15 | End: 2023-11-30 | Stop reason: HOSPADM

## 2023-11-15 RX ORDER — ONDANSETRON 2 MG/ML
INJECTION INTRAMUSCULAR; INTRAVENOUS
Status: DISCONTINUED | OUTPATIENT
Start: 2023-11-15 | End: 2023-11-15

## 2023-11-15 RX ORDER — SODIUM CHLORIDE, SODIUM LACTATE, POTASSIUM CHLORIDE, CALCIUM CHLORIDE 600; 310; 30; 20 MG/100ML; MG/100ML; MG/100ML; MG/100ML
INJECTION, SOLUTION INTRAVENOUS CONTINUOUS
Status: DISCONTINUED | OUTPATIENT
Start: 2023-11-15 | End: 2023-11-15

## 2023-11-15 RX ORDER — ROCURONIUM BROMIDE 10 MG/ML
INJECTION, SOLUTION INTRAVENOUS
Status: DISCONTINUED | OUTPATIENT
Start: 2023-11-15 | End: 2023-11-15

## 2023-11-15 RX ORDER — PROPOFOL 10 MG/ML
VIAL (ML) INTRAVENOUS
Status: DISCONTINUED | OUTPATIENT
Start: 2023-11-15 | End: 2023-11-15

## 2023-11-15 RX ORDER — PROCHLORPERAZINE EDISYLATE 5 MG/ML
5 INJECTION INTRAMUSCULAR; INTRAVENOUS EVERY 6 HOURS PRN
Status: DISCONTINUED | OUTPATIENT
Start: 2023-11-15 | End: 2023-11-20

## 2023-11-15 RX ORDER — PHENYLEPHRINE HYDROCHLORIDE 10 MG/ML
INJECTION INTRAVENOUS
Status: DISCONTINUED | OUTPATIENT
Start: 2023-11-15 | End: 2023-11-15

## 2023-11-15 RX ORDER — ONDANSETRON 2 MG/ML
4 INJECTION INTRAMUSCULAR; INTRAVENOUS DAILY PRN
Status: DISCONTINUED | OUTPATIENT
Start: 2023-11-15 | End: 2023-11-15 | Stop reason: HOSPADM

## 2023-11-15 RX ORDER — KETOROLAC TROMETHAMINE 30 MG/ML
15 INJECTION, SOLUTION INTRAMUSCULAR; INTRAVENOUS EVERY 6 HOURS
Status: COMPLETED | OUTPATIENT
Start: 2023-11-15 | End: 2023-11-18

## 2023-11-15 RX ORDER — LIDOCAINE HYDROCHLORIDE 20 MG/ML
INJECTION, SOLUTION EPIDURAL; INFILTRATION; INTRACAUDAL; PERINEURAL
Status: DISCONTINUED | OUTPATIENT
Start: 2023-11-15 | End: 2023-11-15

## 2023-11-15 RX ORDER — MIDAZOLAM HYDROCHLORIDE 1 MG/ML
INJECTION INTRAMUSCULAR; INTRAVENOUS
Status: DISCONTINUED | OUTPATIENT
Start: 2023-11-15 | End: 2023-11-15

## 2023-11-15 RX ORDER — SODIUM CHLORIDE 9 MG/ML
INJECTION, SOLUTION INTRAVENOUS CONTINUOUS PRN
Status: DISCONTINUED | OUTPATIENT
Start: 2023-11-15 | End: 2023-11-15

## 2023-11-15 RX ORDER — MIDAZOLAM HYDROCHLORIDE 1 MG/ML
2 INJECTION INTRAMUSCULAR; INTRAVENOUS ONCE AS NEEDED
Status: CANCELLED | OUTPATIENT
Start: 2023-11-15 | End: 2035-04-13

## 2023-11-15 RX ORDER — LIDOCAINE HYDROCHLORIDE 10 MG/ML
1 INJECTION, SOLUTION EPIDURAL; INFILTRATION; INTRACAUDAL; PERINEURAL ONCE
Status: CANCELLED | OUTPATIENT
Start: 2023-11-15 | End: 2023-11-15

## 2023-11-15 RX ORDER — ALBUMIN HUMAN 250 G/1000ML
SOLUTION INTRAVENOUS
Status: DISCONTINUED | OUTPATIENT
Start: 2023-11-15 | End: 2023-11-15

## 2023-11-15 RX ORDER — DIPHENHYDRAMINE HYDROCHLORIDE 50 MG/ML
25 INJECTION INTRAMUSCULAR; INTRAVENOUS EVERY 6 HOURS PRN
Status: DISCONTINUED | OUTPATIENT
Start: 2023-11-15 | End: 2023-11-15 | Stop reason: HOSPADM

## 2023-11-15 RX ORDER — DEXAMETHASONE SODIUM PHOSPHATE 4 MG/ML
INJECTION, SOLUTION INTRA-ARTICULAR; INTRALESIONAL; INTRAMUSCULAR; INTRAVENOUS; SOFT TISSUE
Status: DISCONTINUED | OUTPATIENT
Start: 2023-11-15 | End: 2023-11-15

## 2023-11-15 RX ORDER — BUPIVACAINE HYDROCHLORIDE 5 MG/ML
INJECTION, SOLUTION EPIDURAL; INTRACAUDAL
Status: DISCONTINUED | OUTPATIENT
Start: 2023-11-15 | End: 2023-11-15 | Stop reason: HOSPADM

## 2023-11-15 RX ORDER — SODIUM CHLORIDE 0.9 % (FLUSH) 0.9 %
10 SYRINGE (ML) INJECTION
Status: DISCONTINUED | OUTPATIENT
Start: 2023-11-15 | End: 2023-11-15 | Stop reason: HOSPADM

## 2023-11-15 RX ORDER — FENTANYL CITRATE 50 UG/ML
INJECTION, SOLUTION INTRAMUSCULAR; INTRAVENOUS
Status: DISCONTINUED | OUTPATIENT
Start: 2023-11-15 | End: 2023-11-15

## 2023-11-15 RX ORDER — ACETAMINOPHEN 10 MG/ML
INJECTION, SOLUTION INTRAVENOUS
Status: DISCONTINUED | OUTPATIENT
Start: 2023-11-15 | End: 2023-11-15

## 2023-11-15 RX ORDER — ACETAMINOPHEN 10 MG/ML
1000 INJECTION, SOLUTION INTRAVENOUS ONCE
Status: DISCONTINUED | OUTPATIENT
Start: 2023-11-15 | End: 2023-11-15 | Stop reason: HOSPADM

## 2023-11-15 RX ORDER — HYDROMORPHONE HYDROCHLORIDE 2 MG/ML
0.2 INJECTION, SOLUTION INTRAMUSCULAR; INTRAVENOUS; SUBCUTANEOUS EVERY 5 MIN PRN
Status: DISCONTINUED | OUTPATIENT
Start: 2023-11-15 | End: 2023-11-15 | Stop reason: HOSPADM

## 2023-11-15 RX ORDER — HYDROMORPHONE HYDROCHLORIDE 2 MG/ML
0.4 INJECTION, SOLUTION INTRAMUSCULAR; INTRAVENOUS; SUBCUTANEOUS EVERY 5 MIN PRN
Status: DISCONTINUED | OUTPATIENT
Start: 2023-11-15 | End: 2023-11-15 | Stop reason: HOSPADM

## 2023-11-15 RX ADMIN — MIDAZOLAM HYDROCHLORIDE 2 MG: 1 INJECTION, SOLUTION INTRAMUSCULAR; INTRAVENOUS at 01:11

## 2023-11-15 RX ADMIN — PROPOFOL 200 MG: 10 INJECTION, EMULSION INTRAVENOUS at 01:11

## 2023-11-15 RX ADMIN — HYDROMORPHONE HYDROCHLORIDE 0.2 MG: 2 INJECTION, SOLUTION INTRAMUSCULAR; INTRAVENOUS; SUBCUTANEOUS at 06:11

## 2023-11-15 RX ADMIN — PIPERACILLIN SODIUM AND TAZOBACTAM SODIUM 4.5 G: 4; .5 INJECTION, POWDER, FOR SOLUTION INTRAVENOUS at 02:11

## 2023-11-15 RX ADMIN — PIPERACILLIN SODIUM AND TAZOBACTAM SODIUM 4.5 G: 4; .5 INJECTION, POWDER, FOR SOLUTION INTRAVENOUS at 04:11

## 2023-11-15 RX ADMIN — HYDROMORPHONE HYDROCHLORIDE 2 MG: 2 INJECTION INTRAMUSCULAR; INTRAVENOUS; SUBCUTANEOUS at 10:11

## 2023-11-15 RX ADMIN — FENTANYL CITRATE 100 MCG: 50 INJECTION, SOLUTION INTRAMUSCULAR; INTRAVENOUS at 01:11

## 2023-11-15 RX ADMIN — FENTANYL CITRATE 100 MCG: 50 INJECTION, SOLUTION INTRAMUSCULAR; INTRAVENOUS at 03:11

## 2023-11-15 RX ADMIN — SUGAMMADEX 200 MG: 100 INJECTION, SOLUTION INTRAVENOUS at 05:11

## 2023-11-15 RX ADMIN — OXYCODONE AND ACETAMINOPHEN 1 TABLET: 10; 325 TABLET ORAL at 07:11

## 2023-11-15 RX ADMIN — PHENYLEPHRINE HYDROCHLORIDE 100 MCG: 10 INJECTION INTRAVENOUS at 04:11

## 2023-11-15 RX ADMIN — OXYCODONE AND ACETAMINOPHEN 1 TABLET: 10; 325 TABLET ORAL at 11:11

## 2023-11-15 RX ADMIN — KETOROLAC TROMETHAMINE 15 MG: 30 INJECTION, SOLUTION INTRAMUSCULAR at 07:11

## 2023-11-15 RX ADMIN — ALBUMIN (HUMAN) 100 ML: 12.5 SOLUTION INTRAVENOUS at 04:11

## 2023-11-15 RX ADMIN — ONDANSETRON 4 MG: 2 INJECTION INTRAMUSCULAR; INTRAVENOUS at 05:11

## 2023-11-15 RX ADMIN — OXYCODONE HYDROCHLORIDE 10 MG: 10 TABLET ORAL at 07:11

## 2023-11-15 RX ADMIN — FENTANYL CITRATE 50 MCG: 50 INJECTION, SOLUTION INTRAMUSCULAR; INTRAVENOUS at 05:11

## 2023-11-15 RX ADMIN — ROCURONIUM BROMIDE 20 MG: 10 SOLUTION INTRAVENOUS at 02:11

## 2023-11-15 RX ADMIN — SODIUM CHLORIDE, SODIUM GLUCONATE, SODIUM ACETATE, POTASSIUM CHLORIDE AND MAGNESIUM CHLORIDE: 526; 502; 368; 37; 30 INJECTION, SOLUTION INTRAVENOUS at 01:11

## 2023-11-15 RX ADMIN — PIPERACILLIN SODIUM AND TAZOBACTAM SODIUM 4.5 G: 4; .5 INJECTION, POWDER, FOR SOLUTION INTRAVENOUS at 08:11

## 2023-11-15 RX ADMIN — PHENYLEPHRINE HYDROCHLORIDE 100 MCG: 10 INJECTION INTRAVENOUS at 05:11

## 2023-11-15 RX ADMIN — HYDROMORPHONE HYDROCHLORIDE 2 MG: 2 INJECTION INTRAMUSCULAR; INTRAVENOUS; SUBCUTANEOUS at 06:11

## 2023-11-15 RX ADMIN — METHYLPREDNISOLONE SODIUM SUCCINATE 15 MG: 40 INJECTION, POWDER, FOR SOLUTION INTRAMUSCULAR; INTRAVENOUS at 08:11

## 2023-11-15 RX ADMIN — ROCURONIUM BROMIDE 20 MG: 10 SOLUTION INTRAVENOUS at 03:11

## 2023-11-15 RX ADMIN — DEXAMETHASONE SODIUM PHOSPHATE 8 MG: 4 INJECTION, SOLUTION INTRA-ARTICULAR; INTRALESIONAL; INTRAMUSCULAR; INTRAVENOUS; SOFT TISSUE at 02:11

## 2023-11-15 RX ADMIN — ROCURONIUM BROMIDE 50 MG: 10 SOLUTION INTRAVENOUS at 01:11

## 2023-11-15 RX ADMIN — SODIUM CHLORIDE: 9 INJECTION, SOLUTION INTRAVENOUS at 01:11

## 2023-11-15 RX ADMIN — OXYCODONE AND ACETAMINOPHEN 1 TABLET: 10; 325 TABLET ORAL at 03:11

## 2023-11-15 RX ADMIN — METHYLPREDNISOLONE SODIUM SUCCINATE 10 MG: 40 INJECTION, POWDER, FOR SOLUTION INTRAMUSCULAR; INTRAVENOUS at 08:11

## 2023-11-15 RX ADMIN — HYDROMORPHONE HYDROCHLORIDE 2 MG: 2 INJECTION INTRAMUSCULAR; INTRAVENOUS; SUBCUTANEOUS at 02:11

## 2023-11-15 RX ADMIN — ACETAMINOPHEN 1000 MG: 10 INJECTION, SOLUTION INTRAVENOUS at 04:11

## 2023-11-15 RX ADMIN — LIDOCAINE HYDROCHLORIDE 50 MG: 20 INJECTION, SOLUTION EPIDURAL; INFILTRATION; INTRACAUDAL; PERINEURAL at 01:11

## 2023-11-15 NOTE — ANESTHESIA PROCEDURE NOTES
Intubation    Date/Time: 11/15/2023 1:54 PM    Performed by: Ross Dye CRNA  Authorized by: Gio Curtis MD    Intubation:     Induction:  Intravenous    Intubated:  Postinduction    Mask Ventilation:  Easy with oral airway    Attempts:  1    Attempted By:  CRNA    Method of Intubation:  Direct    Blade:  Pollard 2    Laryngeal View Grade: Grade I - full view of cords      Difficult Airway Encountered?: No      Complications:  None    Airway Device:  Oral endotracheal tube    Airway Device Size:  7.5    Style/Cuff Inflation:  Cuffed (inflated to minimal occlusive pressure)    Tube secured:  21    Secured at:  The lips    Placement Verified By:  Capnometry    Complicating Factors:  None    Findings Post-Intubation:  BS equal bilateral and atraumatic/condition of teeth unchanged

## 2023-11-15 NOTE — NURSING
Visited with patient and assigned nurse Eric at bedside, in holding area for request to have patient provided preop teaching and site selection or marking for ileostomy for Dr. Shields. Introduced self and explained procedure to patient , assessed his abdomen in lying and modified seated position due to perianal wound , noting RLQ of abdomen and its contours and pateint reported usual location of clothing attire especially belt of pants. Selected site with patients approval within his rectus abdominal muscles upon his infraumbilical bulge, and marked with surgical site marker and covered with transparent dressing. Answered all his questions to his satisfaction. Provided , written verbal and demonstrated preop education related to living with an ileostomy.

## 2023-11-15 NOTE — PROGRESS NOTES
[facility] - 8th Floor [facility] Surg  [facility] MEDICINE ~ PROGRESS NOTE        CHIEF COMPLAINT   Hospital follow up    HOSPITAL COURSE   Lionel León is a 42 y.o. White male with a past medical history of diverticulitis status post colon resection and ulcerative colitis. Patient had recent admission to hospital medicine services at [facility] on 10/21/2023 to 10/24/2023 for sepsis secondary to pancolitis. He was treated with antibiotics and GI was consulted. Colonoscopy was performed on 10/23/2023 with findings concerning for ulcerative colitis and biopsies of the cecum, ascending, transverse, ascending colon and rectum positive for active chronic colitis consistent with inflammatory bowel disorder. No transfusion was required for rectal bleeding. Patient was discharged with prednisone taper and GI follow up appointment on 11/17/2023. The patient presented to [facility] on 11/6/2023 with a primary complaint of bright red rectal bleeding and lower abdominal pain.  Patient reports rectal bleeding has been ongoing since discharge.  Other associated symptoms include nausea, rectal pain, subjective fevers, weakness, fatigue and a 30 40 lb weight loss over last 3 weeks.  Patient states this morning he was walking to the bathroom when he felt flushed and vision went black. He passed out and hit his head on the wooden floor. Approximately hour and a half later when he went to get up he passed out again.  Second syncopal episode was witnessed by patient's wife who is at bedside reports loss of consciousness lasts for approximately 30 seconds.         Upon presentation to the ED, temperature 98.3F, heart rate 109, blood pressure 96/51, respiratory rate 17, spO2 99%. Labs with H&H 7.3/23.6 (10.6/33.2 on 10/24/2023), BUN 21.9, creatinine 0.88, calcium 7.7, .6, ESR 86. EKG sinus tachycardia with a ventricular rate of 108 and age undetermined possible inferior infarct. In the ED patient received Dilaudid, Zofran,  Hydrocortisone and IVF. He was typed and crossmatched for transfusion of 2 units of packed RBC. Patient is admitted to hospital medicine services for further medical management.      Patient was seen by GI and started on IV steroids, Solu-Medrol 30 mg IV b.I.d. for active ulcerative colitis flare up  Plus Anusol suppository per rectum b.I.d..  Patient is status post 2 units PRBC for symptomatic anemia.  CT abdomen and pelvis was also ordered to assess extent. CT of the abdomen and pelvis was pertinent for findings consistent with colitis involving the ascending colon, portions of the descending colon.  Underlying gastritis also can not be excluded secondary to slightly thickened gastric wall.      May complain continues to be rectal pain but refers that suppositories are helping.  Patient is tolerating p.o. intake     According to GI notes plans is for patient to start Humira/imura once approved by patient's insurance.                    Today  11/7/23-Doing better feeling better pain is better.  4 bowel movements yesterday with some blood as well.  Hemoglobin did not have appropriate response but will continue to monitor.  Discuss with GI physician assistant as well.    11/8/23 dr alvarez -  Today patient had 1 BM overnight and another one this a.m..  Refers that stools are becoming more formed still with some ongoing hematochezia.  Rectal pain continues to improve.  For now we will continue hydrocortisone suppositories t.I.d. as well as IV Solu-Medrol 30 mg IV b.I.d. with plans to start Humira/ Imura as outpt.  Patient today refers feeling okay, his pain goes up and down but overall feels improved since admission not ready to go home.  We will continue present management and continue following recommendations by GI    11/09/2023 Dr. Alvarez-chart reviewed patient examined.  Still having episodes of bright red per rectum.  Also refers some dizziness when getting out of bed.  Hematocrit dropped to 24.7.  BP running at  98/57.  We started normal saline solution.  will require PRBC transfusion tomorrow.      11/10/2023 Dr. Koo reviewed patient examined.  Remains with complains of dizziness with hematocrit around 24.7.  Samples of Humira available but patient ran a fever of around 102.5 that quickly resolved without antipyretic.  Now Humira on hold.  We will go ahead and transfuse 2 units PRBC.  Patient received 1 g acetaminophen.  Also refers his stools are more formed with less abdominal pain and good appetite.    11-11-23 Dr. Koo reviewed patient examined.  Yesterday patient started having temperature spikes.  Today he has complains of  pain over perineum/buttocks that has worsened.  Area with extensive erythema and induration over medial aspects of buttocks.  CT abdomen and pelvis with contrast was done showing findings pertinent for Xochitl gangrene.  Patient is on vancomycin and Zosyn, will add clindamycin for toxin control.  Patient has been seen by surgical hospitalist with plans for OR for exploration ,debridement today.  We will go ahead and consult Infectious Disease for evaluation as well .    11/12/2023 Dr. Koo reviewed patient examined.  Patient was taken to OR yesterday for debridement of perineal/sacral area for findings concerning xochitl's gangrene.  We will return to OR today for further debridement.  Eventually will need diverting colostomy this coming week.  Refers feeling better, has a rectal tube that is functional.  Continues on cleaned the/Zosyn/vancomycin with cultures so far with many Gram-negative rods.  Given 1 unit PRBC    11/13/2023 Dr. Koo reviewed patient examined.  Patient was seen with Infectious Disease nurse practitioner Ms. Cate He.  Case was later discussed with infectious disease specialist Dr. Hampton.  Case was also discussed with General surgery.  There were plans for diverting colostomy but patient with inflammatory bowel disease involving  ascending/descending colon and might benefit from colectomy/colostomy.  We decided to consult colorectal surgeon Dr. Fredi nicole for evaluation.  On physical examination he has an open wound that has been debrided twice already, he has a rectal tube but still there was spillage of stools into the wound.  Perhaps subsided and by recommendations from Infectious Disease vancomycin  Has been discontinued as well as clindamycin, we will continue Zosyn for now.    11/14/2023 Dr. Alvarez-sridhar reviewed patient examined.  Complains of intense pain with excellent level of consciousness.  Medications were upgraded.  Patient to be seen by Colorectal surgeon this a.m..  We will continue present management for now awaiting recommendations by Colorectal surgery.    11/15/23 dr alvarez - sridhar reviewed and pt examined. Plans for total colectomy today by dr nicole. Pain better controlled with upgraded dosing. . Steroids being tapered. Will continue present abx's as per ID recs.      OBJECTIVE/PHYSICAL EXAM     VITAL SIGNS (MOST RECENT):  Temp: 98.2 °F (36.8 °C) (11/15/23 1100)  Pulse: 74 (11/15/23 1100)  Resp: 18 (11/15/23 1106)  BP: 111/70 (11/15/23 1100)  SpO2: 97 % (11/15/23 1100) VITAL SIGNS (24 HOUR RANGE):  Temp:  [97.9 °F (36.6 °C)-99.2 °F (37.3 °C)] 98.2 °F (36.8 °C)  Pulse:  [74-95] 74  Resp:  [16-20] 18  SpO2:  [96 %-98 %] 97 %  BP: (110-118)/(69-75) 111/70   GENERAL: In no acute distress, afebrile  HEENT:normocephalic/ no masses  CHEST: Clear to auscultation bilaterally  HEART: S1, S2, no appreciable murmur  ABDOMEN: Soft,  generalized abdominal pain, BS +  MSK: Warm, no lower extremity edema, no clubbing or cyanosis  Perineum-extensive erythema from a scrotal area all the way around to lower back, covers medial   aspect of buttocks with induration and tenderness.  11/13/2023 Dr. Alvarez-patient is status post surgical intervention x2 now with a pretty good size surgical wound with rectal tube.  Wound appears  contaminated with stools.  NEUROLOGIC: Alert and oriented x4, moving all extremities with good strength   INTEGUMENTARY:surgical wound to buttocks/ perineum   PSYCHIATRY: alert/ active and oriented.                                     ASSESSMENT/PLAN   Acute severe ulcerative colitis , new onset  -C diff negative  -DC Solu-Medrol 30 b.i.d. 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 Today 11/12/2023 and 11-13-23  -rectal tube with plans for colectomy today .  -vancomycin as well as clindamycin discontinue, continue Zosyn.- cx's w e.coli      Orthostatic syncope probably related to symptomatic anemia/intravascular volume depletion    Sepsis due to Allison gangrene     Rectal pain with history of hemorrhoids/hemorrhoidectomy at [facility] couple of weeks ago    Severe protein calorie malnutrition     GI /infectious disease/internal Medicine/Colorectal surgery/general surgery in case.           Cc time 45 minutes  Cc dx- SEPSIS/ FOURNIERS GANGRENE /symptomatic anemia requiring PRBC transfusion      DVT prophylaxis:     Anticipated discharge and disposition: tbd  __________________________________________________________________________    LABS/MICRO/MEDS/DIAGNOSTICS       LABS  Recent Labs     11/15/23  0508   *   K 5.1   CHLORIDE 96*   CO2 29   BUN 15.4   CREATININE 0.69*   GLUCOSE 145*   CALCIUM 7.6*   ALKPHOS 80   AST 16   ALT 54   ALBUMIN 1.5*       Recent Labs     11/15/23  0508   WBC 6.99  6.99   RBC 2.83*   HCT 23.4*   MCV 82.7            MICROBIOLOGY  Microbiology Results (last 7 days)       Procedure Component Value Units Date/Time    Blood Culture [[id number]]  (Normal) Collected: 11/10/23 2048    Order Status: Completed Specimen: Blood from Antecubital, Left Updated: 11/14/23 2134     CULTURE, BLOOD ([facility]) No  Growth At 96 Hours    Wound Culture [[id number]]  (Abnormal)  (Susceptibility) Collected: 11/11/23 1851    Order Status: Completed Specimen: Abscess from Rectum Updated: 11/14/23 1034     Wound Culture Many Escherichia coli      Many BETA HEMOLYTIC STREP GROUP F    KOH Prep [[id number]] Collected: 11/11/23 1851    Order Status: Completed Specimen: Abscess Updated: 11/13/23 1219     KOH Prep No fungal elements seen    Fungal Culture [[id number]] Collected: 11/11/23 1851    Order Status: Sent Specimen: Abscess Updated: 11/13/23 1122    AFB Smear [[id number]] Collected: 11/11/23 1851    Order Status: Completed Specimen: Abscess from Rectum Updated: 11/13/23 0754     AFB Smear No AFB seen (Direct smear only) - Concentration to follow    Mycobacteria and Nocardia Culture [[id number]] Collected: 11/11/23 1851    Order Status: Sent Specimen: Abscess from Rectum Updated: 11/12/23 1404    Gram Stain [[id number]] Collected: 11/11/23 1851    Order Status: Completed Specimen: Abscess from Rectum Updated: 11/12/23 1129     GRAM STAIN Many WBC observed      Many Gram positive cocci      Many Gram Negative Rods      Many Gram Positive Rods    Fungal Culture [[id number]] Collected: 11/11/23 1851    Order Status: Sent Specimen: Abscess from Rectum Updated: 11/11/23 1907    Blood culture #1 **CANNOT BE ORDERED STAT** [[id number]]  (Normal) Collected: 11/06/23 0405    Order Status: Completed Specimen: Blood from Antecubital, Right Updated: 11/11/23 0701     CULTURE, BLOOD ([facility]) No Growth at 5 days    Blood culture #2 **CANNOT BE ORDERED STAT** [[id number]]  (Normal) Collected: 11/06/23 0405    Order Status: Completed Specimen: Blood from Antecubital, Left Updated: 11/11/23 0600     CULTURE, BLOOD ([facility]) No Growth at 5 days               MEDICATIONS   methylPREDNISolone sodium succinate injection  10 mg Intravenous BID    piperacillin-tazobactam (Zosyn) IV (PEDS and ADULTS) (extended infusion is not appropriate)  4.5 g Intravenous Q8H  "        INFUSIONS   lactated ringers 125 mL/hr at 11/14/23 1918          DIAGNOSTIC TESTS  CT Abdomen Pelvis W Wo Contrast   Final Result      X-Ray Chest PA And Lateral   Final Result      No acute abnormality.         Electronically signed by: Quinton Hernandez MD   Date:    11/11/2023   Time:    08:49      CT Abdomen Pelvis W Wo Contrast   Final Result      Findings seen consistent with colitis involving the ascending colon and portions of the descending colon.  Follow-up is recommended to resolution as underlying mass cannot be completely excluded.      The gastric wall appears to be slightly thickened.  Underlying gastritis should be excluded.         Electronically signed by: John Gale   Date:    11/06/2023   Time:    12:02           No results found for: "EF"       NUTRITION STATUS  Patient meets ASPEN criteria for moderate malnutrition of acute illness or injury per RD assessment as evidenced by:  Energy Intake (Malnutrition): less than 75% for greater than 7 days  Weight Loss (Malnutrition): greater than 2% in 1 week  Subcutaneous Fat (Malnutrition): moderate depletion  Muscle Mass (Malnutrition): moderate depletion  Fluid Accumulation (Malnutrition): other (see comments) (does not meet criteria)  Hand  Strength, Left (Malnutrition): unable to evaluate  Hand  Strength, Right (Malnutrition): unable to evaluate  A minimum of two characteristics is recommended for diagnosis of either severe or non-severe malnutrition.       Case related differential diagnoses have been reviewed; assessment and plan has been documented. I have personally reviewed the labs and test results that are currently available; I have reviewed the patients medication list. I have reviewed the consulting providers recommendations. I have reviewed or attempted to review medical records based upon their availability.  All of the patient's and/or family's questions have been addressed and answered to the best of my ability.  I " will continue to monitor closely and make adjustments to medical management as needed.  This document was created using M*Modal Fluency Direct.  Transcription errors may have been made.  Please contact me if any questions may rise regarding documentation to clarify transcription.        Sloan Alvarez MD   Internal Medicine  Department of Hospital Medicine [facility] - 8th Floor Med Surg

## 2023-11-15 NOTE — ANESTHESIA PREPROCEDURE EVALUATION
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  Returning to OR today for diverting colectomy and ileostomy.    "Chief Complaint/Reason for Admission: Ulcerative colitis with complication     History of Present Illness:  42-year-old white male with past surgical history of sigmoid colectomy for diverticulitis and recent diagnosis of ulcerative colitis presented to the emergency room 11/06/2023 complaining of abdominal pain, rectal pain, nausea, and weakness.  Laboratory demonstrated symptomatic anemia with an elevated CRP to 192.  CT scan abdomen/pelvis showed diffuse wall thickening of the ascending colon and descending colon with inflammatory changes.  He was admitted, transfused 2 units of packed red blood cells, and started on IV steroids.  Repeat CT scan abdomen/pelvis 11/11/2023 for rectal pain demonstrated interval development of extensive subcutaneous edema and inflammatory fat stranding through the perineum and posterior medial buttocks with the associated subcutaneous gas concerning for Xochitl gangrene.  Surgical hospitalist were consulted and performed emergent debridement that day and the following day.  He currently has a bowel management system in place to prevent further soiling of his large perirectal/perineal wound.  Due to the persistent sepsis, his IBD colitis cannot be properly treated with immunosuppressant medications so surgery was consulted.  He complains of ongoing abdominal pain and 50 lbs weight loss...    Active Diagnoses:     Diagnosis Date Noted POA    Pancolitis [K51.00] 10/24/2023 Yes       Problems Resolved During this Admission:      I agree with surgical management of his IBD colitis given his ongoing perineal sepsis and inability to use immunosuppressants. I explained the surgery, " "potential adverse events, and expected postoperative course.  Consult ET nurse for ileostomy marking.  Schedule Lap total colectomy with end ileostomy tomorrow."         Pre-op Assessment    I have reviewed the Patient Summary Reports.     I have reviewed the Nursing Notes. I have reviewed the NPO Status.   I have reviewed the Medications.     Review of Systems  Anesthesia Hx:  No problems with previous Anesthesia                Social:  Non-Smoker       Hematology/Oncology:                   Hematology Comments: Sepsis                    Cardiovascular:      Denies Hypertension.   Denies MI.                                         Pulmonary:    Denies COPD.  Denies Asthma.                    Renal/:   Denies Chronic Renal Disease.                Hepatic/GI:   PUD,   Denies GERD.   Denies Hepatitis.           Neurological:    Denies CVA.    Denies Seizures.                                Endocrine:  Denies Diabetes. Denies Hypothyroidism.        Denies Obesity / BMI > 30  Dermatological:  Perineum gangrene         Physical Exam  General: Well nourished, Cooperative, Alert and Oriented    Airway:  Mallampati: I   Mouth Opening: Normal  TM Distance: Normal  Tongue: Normal  Neck ROM: Normal ROM    Dental:  Intact        Anesthesia Plan  Type of Anesthesia, risks & benefits discussed:    Anesthesia Type: Gen ETT  Intra-op Monitoring Plan: Standard ASA Monitors  Post Op Pain Control Plan: multimodal analgesia  Induction:  IV  Informed Consent: Informed consent signed with the Patient and all parties understand the risks and agree with anesthesia plan.  All questions answered.   ASA Score: 3  Day of Surgery Review of History & Physical: H&P Update referred to the surgeon/provider.    Ready For Surgery From Anesthesia Perspective.     .      "

## 2023-11-15 NOTE — PROGRESS NOTES
"Gastroenterology Progress Note    Subjective/Interval History:  Spoke with nurse and colorectal surgery regarding patient. He is going to the OR today for colectomy. Patient received solumedrol 15mg this a.m.    Patient resting in bed, wife at bedside. He states his pain is better controlled today. He rolled over for wound care last night and states a gas bubble that was stuck was able to be released via belching and this has helped his abd pain and bloating tremendously. He is ready to go for his surgery. Abd firm but nontender.    ROS:  Review of Systems   Constitutional:  Positive for malaise/fatigue.   Respiratory:  Negative for cough and shortness of breath.    Cardiovascular:  Negative for chest pain.   Gastrointestinal:  Positive for blood in stool. Negative for abdominal pain, nausea and vomiting.   Neurological:  Negative for weakness.       Vital Signs:  /75   Pulse 80   Temp 97.9 °F (36.6 °C)   Resp 20   Ht 5' 10" (1.778 m)   Wt 74.8 kg (165 lb)   SpO2 96%   BMI 23.68 kg/m²   Body mass index is 23.68 kg/m².    Physical Exam:  Physical Exam  Constitutional:       General: He is not in acute distress.     Appearance: He is not ill-appearing.   HENT:      Head: Normocephalic and atraumatic.      Right Ear: External ear normal.      Left Ear: External ear normal.      Nose: Nose normal.      Mouth/Throat:      Pharynx: Oropharynx is clear.   Eyes:      Conjunctiva/sclera: Conjunctivae normal.   Pulmonary:      Effort: Pulmonary effort is normal. No respiratory distress.   Abdominal:      General: Abdomen is flat. There is no distension.      Tenderness: There is no abdominal tenderness. There is no guarding.      Comments: Abd firm   Genitourinary:     Comments: Rectal tube with reddish/brown output  Musculoskeletal:         General: Normal range of motion.      Cervical back: Normal range of motion.   Skin:     General: Skin is warm and dry.      Coloration: Skin is not pale.   Neurological:     " B12 injection    Pre-Injection Information: Vital signs entered., Allergies reviewed as required for injection type., Pt states feeling well, no complaints. and Pt denies signs and symptoms of infection.    Refer to MAR (medication administration record) for type of injection and medication given.  Needle Size: 25 g. 1\"  Patient tolerated well: Stable    2/4/2020 implanted port accessed by lab using a 20 gauge non-coring needle primed with 0.9% sodium chloride.  Good blood return obtained.  Blood obtained and sent to lab. Flushed with 20ml 0.9% sodium chloride followed by Heparin 500 units/5ml . Implanted port site is not sensitive to touch, not swollen, not warm and not reddened.  Patient tolerated procedure well.    Dr. Sadiq Ma is supervising clinician today.    Mental Status: He is alert and oriented to person, place, and time. Mental status is at baseline.      Motor: No weakness.   Psychiatric:         Mood and Affect: Mood normal.         Behavior: Behavior normal.         Thought Content: Thought content normal.         Labs:  Recent Results (from the past 24 hour(s))   Type & Screen    Collection Time: 11/14/23 12:28 PM   Result Value Ref Range    Group & Rh O POS     Indirect Arthur GEL NEG     Specimen Outdate 11/17/2023 23:59    Prepare RBC 2 Units; SX    Collection Time: 11/14/23 12:28 PM   Result Value Ref Range    UNIT NUMBER [id number]     UNIT ABO/RH O POS     DISPENSE STATUS Selected     Unit Expiration [id number]     Product Code Y9540J04     Unit Blood Type Code 5100     CROSSMATCH INTERPRETATION Compatible     UNIT NUMBER [id number]     UNIT ABO/RH O POS     DISPENSE STATUS Selected     Unit Expiration [id number]     Product Code [id number]     Unit Blood Type Code 5100     CROSSMATCH INTERPRETATION Compatible    Magnesium    Collection Time: 11/15/23  5:08 AM   Result Value Ref Range    Magnesium Level 2.20 1.60 - 2.60 mg/dL   Comprehensive Metabolic Panel    Collection Time: 11/15/23  5:08 AM   Result Value Ref Range    Sodium Level 132 (L) 136 - 145 mmol/L    Potassium Level 5.1 3.5 - 5.1 mmol/L    Chloride 96 (L) 98 - 107 mmol/L    Carbon Dioxide 29 22 - 29 mmol/L    Glucose Level 145 (H) 74 - 100 mg/dL    Blood Urea Nitrogen 15.4 8.9 - 20.6 mg/dL    Creatinine 0.69 (L) 0.73 - 1.18 mg/dL    Calcium Level Total 7.6 (L) 8.4 - 10.2 mg/dL    Protein Total 4.3 (L) 6.4 - 8.3 gm/dL    Albumin Level 1.5 (L) 3.5 - 5.0 g/dL    Globulin 2.8 2.4 - 3.5 gm/dL    Albumin/Globulin Ratio 0.5 (L) 1.1 - 2.0 ratio    Bilirubin Total 0.4 <=1.5 mg/dL    Alkaline Phosphatase 80 40 - 150 unit/L    Alanine Aminotransferase 54 0 - 55 unit/L    Aspartate Aminotransferase 16 5 - 34 unit/L    eGFR >60 mls/min/1.73/m2   CBC with Differential    Collection Time: 11/15/23   5:08 AM   Result Value Ref Range    WBC 6.99 4.50 - 11.50 x10(3)/mcL    RBC 2.83 (L) 4.70 - 6.10 x10(6)/mcL    Hgb 7.4 (L) 14.0 - 18.0 g/dL    Hct 23.4 (L) 42.0 - 52.0 %    MCV 82.7 80.0 - 94.0 fL    MCH 26.1 (L) 27.0 - 31.0 pg    MCHC 31.6 (L) 33.0 - 36.0 g/dL    RDW 15.6 11.5 - 17.0 %    Platelet 305 130 - 400 x10(3)/mcL    MPV 10.3 7.4 - 10.4 fL    NRBC% 0.0 %    IPF 3.3 0.9 - 11.2 %   Manual Differential    Collection Time: 11/15/23  5:08 AM   Result Value Ref Range    WBC 6.99 x10(3)/mcL    Neutrophils % 86 %    Lymphs % 5 %    Monocytes % 6 %    Metamyelocytes % 2 (H) <=0 %    Myelocytes % 1 (H) <=0 %    Neutrophils Abs 6.0114 2.1 - 9.2 x10(3)/mcL    Lymphs Abs 0.3495 (L) 0.6 - 4.6 x10(3)/mcL    Monocytes Abs 0.4194 0.1 - 1.3 x10(3)/mcL    Platelets Normal Normal, Adequate    RBC Morph Abnormal (A) Normal    Poikilocytosis 1+ (A) (none)    Stomatocytes 1+ (A) (none)         Assessment/Plan:  42-year-old male known to Dr. He with past medical history of diverticulitis s/p colon resection and recent new diagnosis of ulcerative colitis.  He presented to the ED on 11/6 with complaints of bright red blood per rectum and lower abdominal pain associated with nausea, rectal pain, weakness and 30-40 lb weight loss over the past 3 weeks with syncopal episode x2- 2nd episode with LOC.     Newly diagnosed UC, active flare  Rectal pain with BM, hx hemorrhoids  Allison's gangrene s/p I&D x2  - Colonoscopy 10/23/2023 with findings c/w IBD.  He was discharged on prednisone taper with outpatient follow up appointment scheduled 11/17/2023, but ended up presenting here on 11/6  -  on 11/6---95.40 on 10th  - currently on IV methylprednisone 15mg BID. OK to taper 5mg daily. Ordered 10mg BID today.  - upcoming outpatient appt scheduled for 11/17 with plans to start humira and imuran- currently in the process of getting approval. Message was sent to our office and medication has been expedited/ also looking into  availability of samples in the meantime. Patient did obtain Humira samples and was planning on having loading dose; however patient became febrile and CT revealed xochitl's gangrene. S/p I&D of perineum 11/11/23 - currently on zosyn, clinda, vanc  - .9 on 10/21/23; as of 11/09/23 it is 95.4  - patient to OR today for colectomy     3. Fever - resolved  - UA on 10th was unremarkable.   - Stool for Cdiff neg on 7th   - Blood cultures neg from 11/6  - CT abd on 11/6 Findings seen consistent with colitis involving the ascending colon and portions of the descending colon.  Follow-up is recommended to resolution as underlying mass cannot be completely excluded. The gastric wall appears to be slightly thickened.  Underlying gastritis should be excluded.     Jeanine Pacheco PA-C  Gastroenterology  [facility]

## 2023-11-15 NOTE — PROGRESS NOTES
"   Acute Care Surgery   Progress Note  Admit Date: 11/6/2023  HD#9  POD#Day of Surgery    Subjective:   Interval history:  NAEON; AF, VSS.  Dressings changed at bedside yesterday afternoon, tolerated well.   To OR this morning with CRS.     Home Meds:  Current Outpatient Medications   Medication Instructions    dicyclomine (BENTYL) 20 mg, Oral, 4 times daily PRN    predniSONE (DELTASONE) 10 MG tablet Take 4 tablets (40 mg total) by mouth once daily for 7 days, THEN 3 tablets (30 mg total) once daily for 7 days, THEN 2 tablets (20 mg total) once daily for 7 days, THEN 1 tablet (10 mg total) once daily for 7 days, THEN 0.5 tablets (5 mg total) once daily for 7 days.      Scheduled Meds:   methylPREDNISolone sodium succinate injection  10 mg Intravenous BID    piperacillin-tazobactam (Zosyn) IV (PEDS and ADULTS) (extended infusion is not appropriate)  4.5 g Intravenous Q8H     Continuous Infusions:   lactated ringers 125 mL/hr at 11/14/23 1918     PRN Meds:0.9%  NaCl infusion (for blood administration), acetaminophen, acetaminophen, dextrose 10%, dextrose 10%, dicyclomine, glucagon (human recombinant), glucose, glucose, hydrocortisone, HYDROmorphone, ondansetron, oxyCODONE-acetaminophen, sodium chloride 0.9%     Objective:     VITAL SIGNS: 24 HR MIN & MAX LAST   Temp  Min: 97.9 °F (36.6 °C)  Max: 99.2 °F (37.3 °C)  97.9 °F (36.6 °C)   BP  Min: 105/66  Max: 118/73  115/75    Pulse  Min: 80  Max: 96  80    Resp  Min: 16  Max: 20  20    SpO2  Min: 96 %  Max: 98 %  96 %      HT: 5' 10" (177.8 cm)  WT: 74.8 kg (165 lb)  BMI: 23.7     Intake/output:  Intake/Output - Last 3 Shifts         11/13 0700  11/14 0659 11/14 0700  11/15 0659 11/15 0700  11/16 0659    P.O. 960 0     I.V. (mL/kg) 800.8 (10.7)      Blood       NG/GT  150     IV Piggyback 200      Total Intake(mL/kg) 1960.8 (26.2) 150 (2)     Urine (mL/kg/hr) 2550 (1.4) 3350 (1.9)     Stool 500      Total Output 3050 3350     Net -1089.2 -3200              " "      Intake/Output Summary (Last 24 hours) at 11/15/2023 0947  Last data filed at 11/15/2023 0629  Gross per 24 hour   Intake 150 ml   Output 3350 ml   Net -3200 ml             Lines/drains/airway:       Peripheral IV - Single Lumen 18 G Right Hand (Active)   Site Assessment Clean;Dry;Intact 11/12/23 0000   Extremity Assessment Distal to IV No abnormal discoloration;No redness;No swelling 11/11/23 2010   Line Status Saline locked 11/12/23 0000   Dressing Status Clean;Dry;Intact 11/12/23 0000   Dressing Intervention Integrity maintained 11/11/23 2010   Number of days:             Rectal Tube 11/11/23 1944 fecal management system (Active)   Stool Color Brown;Red 11/11/23 2100   Number of days: 0            Urethral Catheter 11/11/23 1954 Double-lumen;Silicone;Non-latex 16 Fr. (Active)   Site Assessment Clean;Intact 11/11/23 2100   Collection Container Urimeter 11/11/23 2100   Securement Method secured to top of thigh w/ adhesive device 11/11/23 2100   Catheter Care Performed yes 11/11/23 2100   Reason for Continuing Urinary Catheterization Post operative 11/11/23 2100   CAUTI Prevention Bundle Securement Device in place with 1" slack;Intact seal between catheter & drainage tubing;Drainage bag/urimeter off the floor;Sheeting clip in use;No dependent loops or kinks;Drainage bag/urimeter not overfilled (<2/3 full);Drainage bag/urimeter below bladder 11/11/23 2100   Output (mL) 700 mL 11/12/23 0548   Number of days: 0       Physical examination:  Gen: NAD, AAOx3, answering questions appropriately  HEENT: SHAKIRA  CV: RR  Resp: NWOB  Abd: S/NT/ND  Perineum:  Large wounds to bilateral buttocks dressing is in place none saturated.  Flexiseal in place.  Ext: moving all extremities spontaneously and purposefully  Neuro: CN II-XII grossly intact    Labs:  Renal:  Recent Labs     11/13/23  0441 11/14/23  0449 11/15/23  0508   BUN 12.3 13.6 15.4   CREATININE 0.69* 0.75 0.69*       No results for input(s): "LACTIC" in the last 72 " "hours.  FENGI:  Recent Labs     11/13/23 0441 11/14/23 0449 11/15/23  0508   * 131* 132*   K 4.5 4.3 5.1   CO2 30* 29 29   CALCIUM 7.3* 7.3* 7.6*   MG 2.20 2.10 2.20   ALBUMIN 1.7* 1.6* 1.5*   BILITOT 0.5 0.5 0.4   AST 24 30 16   ALKPHOS 119 91 80   ALT 76* 84* 54       Heme:  Recent Labs     11/13/23 0441 11/14/23 0449 11/15/23  0508   HGB 7.9* 8.1* 7.4*   HCT 25.5* 25.9* 23.4*    397 305       ID:  Recent Labs     11/13/23  0441 11/14/23  0449 11/15/23  0508   WBC 13.24  13.24* 14.98* 6.99  6.99       CBG:  Recent Labs     11/13/23  0441 11/14/23  0449 11/15/23  0508   GLUCOSE 165* 127* 145*        Cardiovascular:  No results for input(s): "TROPONINI", "CKTOTAL", "CKMB", "BNP" in the last 168 hours.  ABG:  No results for input(s): "PH", "PO2", "PCO2", "HCO3", "BE" in the last 168 hours.   I have reviewed all pertinent lab results within the past 24 hours.    Imaging:  CT Abdomen Pelvis W Wo Contrast   Final Result      X-Ray Chest PA And Lateral   Final Result      No acute abnormality.         Electronically signed by: Quinton Hernandez MD   Date:    11/11/2023   Time:    08:49      CT Abdomen Pelvis W Wo Contrast   Final Result      Findings seen consistent with colitis involving the ascending colon and portions of the descending colon.  Follow-up is recommended to resolution as underlying mass cannot be completely excluded.      The gastric wall appears to be slightly thickened.  Underlying gastritis should be excluded.         Electronically signed by: John Gale   Date:    11/06/2023   Time:    12:02         I have reviewed all pertinent imaging results/findings within the past 24 hours.    Micro/Path/Other:  Microbiology Results (last 7 days)       Procedure Component Value Units Date/Time    Blood Culture [[id number]]  (Normal) Collected: 11/10/23 2048    Order Status: Completed Specimen: Blood from Antecubital, Left Updated: 11/14/23 2300     CULTURE, BLOOD ([facility]) No Growth At 96 " Hours    Wound Culture [[id number]]  (Abnormal)  (Susceptibility) Collected: 11/11/23 1851    Order Status: Completed Specimen: Abscess from Rectum Updated: 11/14/23 1034     Wound Culture Many Escherichia coli      Many BETA HEMOLYTIC STREP GROUP F    KOH Prep [[id number]] Collected: 11/11/23 1851    Order Status: Completed Specimen: Abscess Updated: 11/13/23 1219     KOH Prep No fungal elements seen    Fungal Culture [[id number]] Collected: 11/11/23 1851    Order Status: Sent Specimen: Abscess Updated: 11/13/23 1122    AFB Smear [[id number]] Collected: 11/11/23 1851    Order Status: Completed Specimen: Abscess from Rectum Updated: 11/13/23 0754     AFB Smear No AFB seen (Direct smear only) - Concentration to follow    Mycobacteria and Nocardia Culture [[id number]] Collected: 11/11/23 1851    Order Status: Sent Specimen: Abscess from Rectum Updated: 11/12/23 1404    Gram Stain [[id number]] Collected: 11/11/23 1851    Order Status: Completed Specimen: Abscess from Rectum Updated: 11/12/23 1129     GRAM STAIN Many WBC observed      Many Gram positive cocci      Many Gram Negative Rods      Many Gram Positive Rods    Fungal Culture [[id number]] Collected: 11/11/23 1851    Order Status: Sent Specimen: Abscess from Rectum Updated: 11/11/23 1907    Blood culture #1 **CANNOT BE ORDERED STAT** [[id number]]  (Normal) Collected: 11/06/23 0405    Order Status: Completed Specimen: Blood from Antecubital, Right Updated: 11/11/23 0701     CULTURE, BLOOD ([facility]) No Growth at 5 days    Blood culture #2 **CANNOT BE ORDERED STAT** [[id number]]  (Normal) Collected: 11/06/23 0405    Order Status: Completed Specimen: Blood from Antecubital, Left Updated: 11/11/23 0600     CULTURE, BLOOD ([facility]) No Growth at 5 days           Specimen (168h ago, onward)      None             Assessment & Plan:   42-year-old male with a past medical history of ulcerative colitis recently diagnosed in October who presented to the hospital with 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    - to OR today with CRS for laparoscopic total colectomy with end ileostomy   - Continue local wound care at this time with wet-to-dry using Vashe; will discuss possibly placing wound vac   - Continue IV zosyn  - Methylpred 15 mg BID; please taper steroids      Michelle Wang MD  [employer] General Surgery, PGY-2

## 2023-11-16 LAB
ABS NEUT (OLG): 11.97 X10(3)/MCL (ref 2.1–9.2)
ALBUMIN SERPL-MCNC: 1.6 G/DL (ref 3.5–5)
ALBUMIN/GLOB SERPL: 0.8 RATIO (ref 1.1–2)
ALP SERPL-CCNC: 57 UNIT/L (ref 40–150)
ALT SERPL-CCNC: 34 UNIT/L (ref 0–55)
ANISOCYTOSIS BLD QL SMEAR: ABNORMAL
AST SERPL-CCNC: 17 UNIT/L (ref 5–34)
BILIRUB SERPL-MCNC: 0.5 MG/DL
BUN SERPL-MCNC: 17.4 MG/DL (ref 8.9–20.6)
CALCIUM SERPL-MCNC: 6.9 MG/DL (ref 8.4–10.2)
CHLORIDE SERPL-SCNC: 97 MMOL/L (ref 98–107)
CO2 SERPL-SCNC: 32 MMOL/L (ref 22–29)
CREAT SERPL-MCNC: 0.75 MG/DL (ref 0.73–1.18)
ERYTHROCYTE [DISTWIDTH] IN BLOOD BY AUTOMATED COUNT: 15.6 % (ref 11.5–17)
ERYTHROCYTE [DISTWIDTH] IN BLOOD BY AUTOMATED COUNT: 15.9 % (ref 11.5–17)
GFR SERPLBLD CREATININE-BSD FMLA CKD-EPI: >60 MLS/MIN/1.73/M2
GLOBULIN SER-MCNC: 2.1 GM/DL (ref 2.4–3.5)
GLUCOSE SERPL-MCNC: 182 MG/DL (ref 74–100)
HCT VFR BLD AUTO: 20.2 % (ref 42–52)
HCT VFR BLD AUTO: 27.6 % (ref 42–52)
HGB BLD-MCNC: 6.2 G/DL (ref 14–18)
HGB BLD-MCNC: 9 G/DL (ref 14–18)
INSTRUMENT WBC (OLG): 12.47 X10(3)/MCL
LYMPHOCYTES NFR BLD MANUAL: 0.37 X10(3)/MCL
LYMPHOCYTES NFR BLD MANUAL: 3 %
MACROCYTES BLD QL SMEAR: ABNORMAL
MAGNESIUM SERPL-MCNC: 2.2 MG/DL (ref 1.6–2.6)
MCH RBC QN AUTO: 26.3 PG (ref 27–31)
MCH RBC QN AUTO: 27.7 PG (ref 27–31)
MCHC RBC AUTO-ENTMCNC: 30.7 G/DL (ref 33–36)
MCHC RBC AUTO-ENTMCNC: 32.6 G/DL (ref 33–36)
MCV RBC AUTO: 84.9 FL (ref 80–94)
MCV RBC AUTO: 85.6 FL (ref 80–94)
MICROCYTES BLD QL SMEAR: ABNORMAL
MONOCYTES NFR BLD MANUAL: 0.12 X10(3)/MCL (ref 0.1–1.3)
MONOCYTES NFR BLD MANUAL: 1 %
NEUTROPHILS NFR BLD MANUAL: 96 %
NRBC BLD AUTO-RTO: 0.2 %
NRBC BLD AUTO-RTO: 0.6 %
NRBC BLD MANUAL-RTO: 2 %
PLATELET # BLD AUTO: 277 X10(3)/MCL (ref 130–400)
PLATELET # BLD AUTO: 278 X10(3)/MCL (ref 130–400)
PLATELET # BLD EST: ADEQUATE 10*3/UL
PMV BLD AUTO: 9.7 FL (ref 7.4–10.4)
PMV BLD AUTO: 9.8 FL (ref 7.4–10.4)
POIKILOCYTOSIS BLD QL SMEAR: ABNORMAL
POTASSIUM SERPL-SCNC: 4.8 MMOL/L (ref 3.5–5.1)
PROT SERPL-MCNC: 3.7 GM/DL (ref 6.4–8.3)
RBC # BLD AUTO: 2.36 X10(6)/MCL (ref 4.7–6.1)
RBC # BLD AUTO: 3.25 X10(6)/MCL (ref 4.7–6.1)
RBC MORPH BLD: ABNORMAL
SODIUM SERPL-SCNC: 131 MMOL/L (ref 136–145)
STOMATOCYTES (OLG): ABNORMAL
WBC # SPEC AUTO: 12.47 X10(3)/MCL (ref 4.5–11.5)
WBC # SPEC AUTO: 18.88 X10(3)/MCL (ref 4.5–11.5)

## 2023-11-16 PROCEDURE — 63600175 PHARM REV CODE 636 W HCPCS: Performed by: STUDENT IN AN ORGANIZED HEALTH CARE EDUCATION/TRAINING PROGRAM

## 2023-11-16 PROCEDURE — 99233 PR SUBSEQUENT HOSPITAL CARE,LEVL III: ICD-10-PCS | Mod: FS,,, | Performed by: GENERAL PRACTICE

## 2023-11-16 PROCEDURE — 63600175 PHARM REV CODE 636 W HCPCS

## 2023-11-16 PROCEDURE — P9016 RBC LEUKOCYTES REDUCED: HCPCS | Performed by: COLON & RECTAL SURGERY

## 2023-11-16 PROCEDURE — 11000001 HC ACUTE MED/SURG PRIVATE ROOM

## 2023-11-16 PROCEDURE — 80053 COMPREHEN METABOLIC PANEL: CPT | Performed by: COLON & RECTAL SURGERY

## 2023-11-16 PROCEDURE — 25000003 PHARM REV CODE 250: Performed by: COLON & RECTAL SURGERY

## 2023-11-16 PROCEDURE — 99233 SBSQ HOSP IP/OBS HIGH 50: CPT | Mod: FS,,, | Performed by: GENERAL PRACTICE

## 2023-11-16 PROCEDURE — 99900035 HC TECH TIME PER 15 MIN (STAT)

## 2023-11-16 PROCEDURE — 83735 ASSAY OF MAGNESIUM: CPT | Performed by: COLON & RECTAL SURGERY

## 2023-11-16 PROCEDURE — 86923 COMPATIBILITY TEST ELECTRIC: CPT | Mod: 91 | Performed by: COLON & RECTAL SURGERY

## 2023-11-16 PROCEDURE — 63600175 PHARM REV CODE 636 W HCPCS: Performed by: COLON & RECTAL SURGERY

## 2023-11-16 PROCEDURE — 94761 N-INVAS EAR/PLS OXIMETRY MLT: CPT

## 2023-11-16 PROCEDURE — 85027 COMPLETE CBC AUTOMATED: CPT | Performed by: INTERNAL MEDICINE

## 2023-11-16 PROCEDURE — 25000003 PHARM REV CODE 250: Performed by: STUDENT IN AN ORGANIZED HEALTH CARE EDUCATION/TRAINING PROGRAM

## 2023-11-16 PROCEDURE — 85027 COMPLETE CBC AUTOMATED: CPT | Performed by: STUDENT IN AN ORGANIZED HEALTH CARE EDUCATION/TRAINING PROGRAM

## 2023-11-16 RX ORDER — HYDROCODONE BITARTRATE AND ACETAMINOPHEN 500; 5 MG/1; MG/1
TABLET ORAL
Status: DISCONTINUED | OUTPATIENT
Start: 2023-11-16 | End: 2023-11-21

## 2023-11-16 RX ADMIN — METHYLPREDNISOLONE SODIUM SUCCINATE 5 MG: 40 INJECTION, POWDER, FOR SOLUTION INTRAMUSCULAR; INTRAVENOUS at 08:11

## 2023-11-16 RX ADMIN — PIPERACILLIN SODIUM AND TAZOBACTAM SODIUM 4.5 G: 4; .5 INJECTION, POWDER, FOR SOLUTION INTRAVENOUS at 01:11

## 2023-11-16 RX ADMIN — HYDROMORPHONE HYDROCHLORIDE 2 MG: 2 INJECTION INTRAMUSCULAR; INTRAVENOUS; SUBCUTANEOUS at 06:11

## 2023-11-16 RX ADMIN — OXYCODONE HYDROCHLORIDE 10 MG: 10 TABLET ORAL at 12:11

## 2023-11-16 RX ADMIN — METHYLPREDNISOLONE SODIUM SUCCINATE 10 MG: 40 INJECTION, POWDER, FOR SOLUTION INTRAMUSCULAR; INTRAVENOUS at 08:11

## 2023-11-16 RX ADMIN — HYDROMORPHONE HYDROCHLORIDE 2 MG: 2 INJECTION INTRAMUSCULAR; INTRAVENOUS; SUBCUTANEOUS at 07:11

## 2023-11-16 RX ADMIN — OXYCODONE HYDROCHLORIDE 10 MG: 10 TABLET ORAL at 04:11

## 2023-11-16 RX ADMIN — KETOROLAC TROMETHAMINE 15 MG: 30 INJECTION, SOLUTION INTRAMUSCULAR at 06:11

## 2023-11-16 RX ADMIN — HYDROMORPHONE HYDROCHLORIDE 2 MG: 2 INJECTION INTRAMUSCULAR; INTRAVENOUS; SUBCUTANEOUS at 10:11

## 2023-11-16 RX ADMIN — KETOROLAC TROMETHAMINE 15 MG: 30 INJECTION, SOLUTION INTRAMUSCULAR at 12:11

## 2023-11-16 RX ADMIN — OXYCODONE HYDROCHLORIDE 10 MG: 10 TABLET ORAL at 08:11

## 2023-11-16 RX ADMIN — PIPERACILLIN SODIUM AND TAZOBACTAM SODIUM 4.5 G: 4; .5 INJECTION, POWDER, FOR SOLUTION INTRAVENOUS at 04:11

## 2023-11-16 RX ADMIN — SODIUM CHLORIDE, POTASSIUM CHLORIDE, SODIUM LACTATE AND CALCIUM CHLORIDE: 600; 310; 30; 20 INJECTION, SOLUTION INTRAVENOUS at 02:11

## 2023-11-16 RX ADMIN — HYDROMORPHONE HYDROCHLORIDE 2 MG: 2 INJECTION INTRAMUSCULAR; INTRAVENOUS; SUBCUTANEOUS at 02:11

## 2023-11-16 RX ADMIN — HYDROMORPHONE HYDROCHLORIDE 2 MG: 2 INJECTION INTRAMUSCULAR; INTRAVENOUS; SUBCUTANEOUS at 11:11

## 2023-11-16 NOTE — PROGRESS NOTES
"   Acute Care Surgery   Progress Note  Admit Date: 11/6/2023  HD#10  POD#1 Day Post-Op    Subjective:   Interval history:  NAEON; AF, VSS.  Tolerated OR with CRS yesterday - ostomy with air in bag.  Perineal dressings placed at the end of case, needed reinforcing overnight.     Home Meds:  Current Outpatient Medications   Medication Instructions    dicyclomine (BENTYL) 20 mg, Oral, 4 times daily PRN    predniSONE (DELTASONE) 10 MG tablet Take 4 tablets (40 mg total) by mouth once daily for 7 days, THEN 3 tablets (30 mg total) once daily for 7 days, THEN 2 tablets (20 mg total) once daily for 7 days, THEN 1 tablet (10 mg total) once daily for 7 days, THEN 0.5 tablets (5 mg total) once daily for 7 days.      Scheduled Meds:   ketorolac  15 mg Intravenous Q6H    methylPREDNISolone sodium succinate injection  10 mg Intravenous BID    piperacillin-tazobactam (Zosyn) IV (PEDS and ADULTS) (extended infusion is not appropriate)  4.5 g Intravenous Q8H     Continuous Infusions:   lactated ringers 125 mL/hr at 11/16/23 0223     PRN Meds:0.9%  NaCl infusion (for blood administration), 0.9%  NaCl infusion (for blood administration), 0.9%  NaCl infusion (for blood administration), acetaminophen, acetaminophen, dextrose 10%, dextrose 10%, dicyclomine, glucagon (human recombinant), glucose, glucose, hydrocortisone, HYDROmorphone, morphine, ondansetron, oxyCODONE, prochlorperazine, sodium chloride 0.9%     Objective:     VITAL SIGNS: 24 HR MIN & MAX LAST   Temp  Min: 98.1 °F (36.7 °C)  Max: 99.7 °F (37.6 °C)  98.1 °F (36.7 °C)   BP  Min: 94/52  Max: 126/81  (!) 98/56    Pulse  Min: 74  Max: 122  84    Resp  Min: 10  Max: 19  18    SpO2  Min: 87 %  Max: 100 %  (!) 94 %      HT: 5' 10" (177.8 cm)  WT: 74.8 kg (165 lb)  BMI: 23.7     Intake/output:  Intake/Output - Last 3 Shifts         11/14 0700  11/15 0659 11/15 0700  11/16 0659 11/16 0700 11/17 0659    P.O. 0 120 240    I.V. (mL/kg)  1100 (14.7)     NG/      IV Piggyback  " "1100     Total Intake(mL/kg) 150 (2) 2320 (31) 240 (3.2)    Urine (mL/kg/hr) 3350 (1.9) 150 (0.1) 700 (4.2)    Stool  0 0    Total Output 3350 150 700    Net -3200 +2170 -460           Stool Occurrence  0 x 0 x            Intake/Output Summary (Last 24 hours) at 11/16/2023 0912  Last data filed at 11/16/2023 0724  Gross per 24 hour   Intake 2560 ml   Output 850 ml   Net 1710 ml             Lines/drains/airway:       Peripheral IV - Single Lumen 18 G Right Hand (Active)   Site Assessment Clean;Dry;Intact 11/12/23 0000   Extremity Assessment Distal to IV No abnormal discoloration;No redness;No swelling 11/11/23 2010   Line Status Saline locked 11/12/23 0000   Dressing Status Clean;Dry;Intact 11/12/23 0000   Dressing Intervention Integrity maintained 11/11/23 2010   Number of days:             Rectal Tube 11/11/23 1944 fecal management system (Active)   Stool Color Brown;Red 11/11/23 2100   Number of days: 0            Urethral Catheter 11/11/23 1954 Double-lumen;Silicone;Non-latex 16 Fr. (Active)   Site Assessment Clean;Intact 11/11/23 2100   Collection Container Urimeter 11/11/23 2100   Securement Method secured to top of thigh w/ adhesive device 11/11/23 2100   Catheter Care Performed yes 11/11/23 2100   Reason for Continuing Urinary Catheterization Post operative 11/11/23 2100   CAUTI Prevention Bundle Securement Device in place with 1" slack;Intact seal between catheter & drainage tubing;Drainage bag/urimeter off the floor;Sheeting clip in use;No dependent loops or kinks;Drainage bag/urimeter not overfilled (<2/3 full);Drainage bag/urimeter below bladder 11/11/23 2100   Output (mL) 700 mL 11/12/23 0548   Number of days: 0       Physical examination:  Gen: NAD, AAOx3, answering questions appropriately  HEENT: PERRLA  CV: RR  Resp: NWOB  Abd: Soft, non-distended. Ostomy to LLQ; mucosa is healthy. Air in bag. Abdomen appropriately tender around midline and ostomy.   Perineum:  Large wounds to bilateral buttocks " "dressing is in place none saturated.  Flexiseal in place.  Ext: moving all extremities spontaneously and purposefully  Neuro: CN II-XII grossly intact    Labs:  Renal:  Recent Labs     11/14/23  0449 11/15/23  0508 11/16/23  0453   BUN 13.6 15.4 17.4   CREATININE 0.75 0.69* 0.75       No results for input(s): "LACTIC" in the last 72 hours.  FENGI:  Recent Labs     11/14/23  0449 11/15/23  0508 11/16/23  0453   * 132* 131*   K 4.3 5.1 4.8   CO2 29 29 32*   CALCIUM 7.3* 7.6* 6.9*   MG 2.10 2.20 2.20   ALBUMIN 1.6* 1.5* 1.6*   BILITOT 0.5 0.4 0.5   AST 30 16 17   ALKPHOS 91 80 57   ALT 84* 54 34       Heme:  Recent Labs     11/14/23  0449 11/15/23  0508 11/16/23  0453   HGB 8.1* 7.4* 6.2*   HCT 25.9* 23.4* 20.2*    305 278       ID:  Recent Labs     11/15/23  0508 11/16/23  0453   WBC 6.99  6.99 12.47  12.47*       CBG:  Recent Labs     11/14/23  0449 11/15/23  0508 11/16/23  0453   GLUCOSE 127* 145* 182*        Cardiovascular:  No results for input(s): "TROPONINI", "CKTOTAL", "CKMB", "BNP" in the last 168 hours.  ABG:  No results for input(s): "PH", "PO2", "PCO2", "HCO3", "BE" in the last 168 hours.   I have reviewed all pertinent lab results within the past 24 hours.    Imaging:  CT Abdomen Pelvis W Wo Contrast   Final Result      X-Ray Chest PA And Lateral   Final Result      No acute abnormality.         Electronically signed by: Quinton Hernandez MD   Date:    11/11/2023   Time:    08:49      CT Abdomen Pelvis W Wo Contrast   Final Result      Findings seen consistent with colitis involving the ascending colon and portions of the descending colon.  Follow-up is recommended to resolution as underlying mass cannot be completely excluded.      The gastric wall appears to be slightly thickened.  Underlying gastritis should be excluded.         Electronically signed by: John Gale   Date:    11/06/2023   Time:    12:02         I have reviewed all pertinent imaging results/findings within the past 24 " hours.    Micro/Path/Other:  Microbiology Results (last 7 days)       Procedure Component Value Units Date/Time    Blood Culture [[id number]]  (Normal) Collected: 11/10/23 2048    Order Status: Completed Specimen: Blood from Antecubital, Left Updated: 11/15/23 2301     CULTURE, BLOOD ([facility]) No Growth at 5 days    Wound Culture [[id number]]  (Abnormal)  (Susceptibility) Collected: 11/11/23 1851    Order Status: Completed Specimen: Abscess from Rectum Updated: 11/14/23 1034     Wound Culture Many Escherichia coli      Many BETA HEMOLYTIC STREP GROUP F    KOH Prep [[id number]] Collected: 11/11/23 1851    Order Status: Completed Specimen: Abscess Updated: 11/13/23 1219     KOH Prep No fungal elements seen    Fungal Culture [[id number]] Collected: 11/11/23 1851    Order Status: Sent Specimen: Abscess Updated: 11/13/23 1122    AFB Smear [[id number]] Collected: 11/11/23 1851    Order Status: Completed Specimen: Abscess from Rectum Updated: 11/13/23 0754     AFB Smear No AFB seen (Direct smear only) - Concentration to follow    Mycobacteria and Nocardia Culture [[id number]] Collected: 11/11/23 1851    Order Status: Sent Specimen: Abscess from Rectum Updated: 11/12/23 1404    Gram Stain [[id number]] Collected: 11/11/23 1851    Order Status: Completed Specimen: Abscess from Rectum Updated: 11/12/23 1129     GRAM STAIN Many WBC observed      Many Gram positive cocci      Many Gram Negative Rods      Many Gram Positive Rods    Fungal Culture [[id number]] Collected: 11/11/23 1851    Order Status: Sent Specimen: Abscess from Rectum Updated: 11/11/23 1907    Blood culture #1 **CANNOT BE ORDERED STAT** [[id number]]  (Normal) Collected: 11/06/23 0405    Order Status: Completed Specimen: Blood from Antecubital, Right Updated: 11/11/23 0701     CULTURE, BLOOD ([facility]) No Growth at 5 days    Blood culture #2 **CANNOT BE ORDERED STAT** [[id number]]  (Normal) Collected: 11/06/23 0405    Order Status: Completed Specimen: Blood from  Antecubital, Left Updated: 11/11/23 0600     CULTURE, BLOOD ([facility]) No Growth at 5 days           Specimen (168h ago, onward)       Start     Ordered    11/15/23 1503  Specimen to Pathology  RELEASE UPON ORDERING        References:    Click here for ordering Quick Tip   Question:  Release to patient  Answer:  Immediate    11/15/23 1503                     Assessment & Plan:   42-year-old male with a past medical history of ulcerative colitis recently diagnosed in October who presented to the hospital with necrotizing fasciitis of his perineum status post wide excisional debridement was peritoneum on 11/11 and on 11/12. Now s/p laparoscopic total colectomy with end ileostomy placement 11/15.      - Continue local wound care at this time with wet-to-dry using Vashe; wound care consulted for wound vac placement. Attempted to reach Ms. Zavaleta; appreciate assistance   - Continue IV zosyn  - Methylpred 15 mg BID; please taper steroids      Michelle Wang MD  [employer] General Surgery, PGY-2

## 2023-11-16 NOTE — PLAN OF CARE
Problem: Adult Inpatient Plan of Care  Goal: Plan of Care Review  Outcome: Ongoing, Progressing  Goal: Patient-Specific Goal (Individualized)  Outcome: Ongoing, Progressing  Goal: Absence of Hospital-Acquired Illness or Injury  Outcome: Ongoing, Progressing  Goal: Optimal Comfort and Wellbeing  Outcome: Ongoing, Progressing  Goal: Readiness for Transition of Care  Outcome: Ongoing, Progressing     Problem: Infection  Goal: Absence of Infection Signs and Symptoms  Outcome: Ongoing, Progressing     Problem: Pain Acute  Goal: Acceptable Pain Control and Functional Ability  Outcome: Ongoing, Progressing     Problem: Fall Injury Risk  Goal: Absence of Fall and Fall-Related Injury  Outcome: Ongoing, Progressing     Problem: Skin Injury Risk Increased  Goal: Skin Health and Integrity  Outcome: Ongoing, Progressing

## 2023-11-16 NOTE — OP NOTE
[facility] - Periop Services  Operative Note      Date of Procedure: 11/15/2023     Procedure: Laparoscopic total colectomy with end ileostomy    Surgeon(s) and Role:     * Fredi Shields MD - Primary    Assisting Surgeons:   Adriana Raygoza MD PGY 4  2.   Michelle Padilla MD PGY 2    Pre-Operative Diagnosis: Fulminant IBD colitis    Post-Operative Diagnosis: Same    Anesthesia: General    Estimated Blood Loss (EBL): 100 mL           Specimens: Colon     Description of Technical Procedures:  After informed consent was obtained, patient was brought to the operating room and placed in the supine position.  Next general endotracheal anesthesia was administered by member of the anesthesia team.  The abdomen was prepped and draped in sterile surgical fashion.  A small vertical infraumbilical incision was made with a 15 blade.  Access to the peritoneal cavity was achieved with an 11 mm Optiview trocar and a 10 mm 0 degree laparoscope.  Pneumoperitoneum was achieved.  Bilateral tap blocks were performed using Marcaine and Exparel solution.  There was significant intra-abdominal inflammation consistent with peritonitis.  Loops of small bowel were dilated and adherent to the transverse and sigmoid colon.  Some loops of small bowel had a fibrinous rind.  Thin purulent fluid was noted in the pelvis and right upper quadrant.  The left colon was grasped and retracted towards the anterior abdominal wall thus delineating the left colic vascular pedicle.  The peritoneum was incised with harmonic scalpel and the left colon was mobilized in a medial to lateral fashion.  Attention was turned to the transverse colon which was retracted towards the anterior abdominal wall to delineate the middle colic pedicle.  This was skeletonized and isolated.  Omentum was excised off of the colon and the gastrocolic ligament was divided in order to enter the lesser sac.  This plane was developed towards the left upper quadrant until  the splenic flexure was completely taken down.  The sigmoid colon was mobilized by incising the lateral peritoneal attachment working cephalad along the white line of Toldt and the medial dissection was joined.  Left ureter was identified and kept out of harm's way.  A distal transection point was identified at the level of the rectum just above the peritoneal reflection and divided using a 45 mm echelon stapler with a green cartridge.  The mesentery was then divided using a combination of harmonic scalpel and the echelon stapler with white cartridges to divide named vessels.  The right colon was mobilized in a medial to lateral fashion and the hepatic flexure was divided with harmonic scalpel.  The duodenum was identified and protected throughout the dissection.  Finally the terminal ileum and right colon were mobilized by dividing the lateral attachment working cephalad along the white line of Toldt and joining the medial dissection.  At this point the transection staple line was grasped with a laparoscopic grasper and the infraumbilical incision was extended inferiorly a short distance with a 15 blade.  Incision was deepened with electrocautery and the fascia was divided vertically in the midline.  The peritoneum was incised sharply and a small Baljinder wound protector was utilized.  The colon was then delivered through this wound and and the terminal ileum was divided using the echelon stapler.  The colon was then passed off the table as surgical specimen.  A circular skin incision was made over the right lateral abdominal wall with a 15 blade.  Incision was deepened with the electrocautery.  A vertical incision was made in the anterior rectus sheath and muscle-splitting technique was employed.  Posterior fascia and peritoneum were incised to permit 2 fingers.  The terminal ileum was then delivered through this wound.  The Baljinder was closed and pneumoperitoneum was achieved.  Proper orientation of the ileum and  mesentery was confirmed.  The abdominal cavity was irrigated with multiple L of warm saline solution and the effluent was clear.  Pneumoperitoneum was released and working trocars were removed.  The Baljinder was removed and we changed gloves.  The bilateral lower quadrant 12 mm trocar fascial defects were repaired with 0 Vicryl suture.  The midline fascial defect was repaired with running loop PDS suture.  All wounds were irrigated and the skin edges reapproximated with Monocryl suture in a subcuticular fashion.  Incisions were isolated with a blue towel.  The staple line was excised off of the ileum and an end ileostomy was matured in a Althea fashion using interrupted 3-0 Vicryl suture.  The mucosa was pink and the stoma was widely patent.  Incisions were cleaned and sterile dressings applied.  An ostomy appliance was placed around the ileostomy.  Patient tolerated the procedure well and there were no complications.  He was awakened and extubated in the operating room then subsequently transferred to recovery in satisfactory condition.

## 2023-11-16 NOTE — PROGRESS NOTES
[facility]  Infectious Diseases Progress Note        ASSESSMENT & PLAN:   He is a 42-year-old male with a past medical history of diverticulitis, status post colon resection, and new diagnosis of ulcerative colitis for which he was hospitalized towards the end of October.  He was evaluated by GI and underwent a colonoscopy at that time and was discharged on a steroid taper.  He was also noted to have hemorrhoids which was treated with hydrocortisone suppository.  He then presented to the emergency room on 11/06/2023 with complaints of bright red blood per rectum and lower abdominal pain as well as subjective fevers and syncopal episode at home.  He was initiated on IV steroids per GI for suspected ulcerative colitis flare.  CT of the abdomen and pelvis showed findings consistent with colitis and possible underlying gastritis.  Over the weekend, patient had a fever of 102.5° F and some hypotension.  He also reported worsening pain near the perineum and was noted to have extensive erythema and induration.  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  He was initiated on empiric antimicrobials with vancomycin, Zosyn, and clindamycin.  He was taken to the operating room by General surgery on 11/11 for an I&D - Gram stain with GPCs, GNRs, GPRs - culture with relatively sensitive E. coli thus far.  Blood cultures are negative thus far.  We have been consulted for assistance with antimicrobials.     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   Continue Zosyn 4.5g IV q8h.   Noted indeterminate Quant on 11/10 - will need repeat at some point.  Wound care as ordered.  Consider PRS eval for wound coverage.  Discussed with  "patient and Dr. Alvarez.      SUBJECTIVE:   AF, VSS.  No events overnight.  No complaints at present, pain controlled.       MEDICATIONS:   Reviewed in EMR    REVIEW OF SYSTEMS:   Except as documented, all other systems reviewed and negative     PHYSICAL EXAM:   T 98.1 °F (36.7 °C)   /66   P 97   RR 18   O2 (!) 92 %  GENERAL: NAD; does not appear toxic  SKIN: no rash  HEENT: sclera non-icteric; PERRL   NECK: supple; no LAD  CHEST: CTA; nonlabored, equal expansion; no adventitious BS  CARDIOVASCULAR: RRR, S1S2; no murmur; strong, equal peripheral pulses; no edema  ABDOMEN:  active bowel sounds; abdomen soft, nondistended, appropriately TTP  GENITOURINARY: Devi in place  EXTREMITIES: no cyanosis or clubbing  NEURO: AAO x4; CN II-XII grossly intact  PSYCH: Mentation and affect appropriate     LABS AND IMAGING:     Recent Labs     11/15/23  0508 11/16/23 0453   WBC 6.99  6.99 12.47  12.47*   RBC 2.83* 2.36*   HGB 7.4* 6.2*   HCT 23.4* 20.2*   MCV 82.7 85.6   MCH 26.1* 26.3*   MCHC 31.6* 30.7*   RDW 15.6 15.9    278       No results for input(s): "LACTIC" in the last 72 hours.  No results for input(s): "INR", "APTT", "D-DIMER" in the last 72 hours.  No results for input(s): "HGBA1C", "CHOL", "TRIG", "LDL", "VLDL", "HDL" in the last 72 hours.   Recent Labs     11/15/23  0508 11/16/23  0453   * 131*   K 5.1 4.8   CHLORIDE 96* 97*   CO2 29 32*   BUN 15.4 17.4   CREATININE 0.69* 0.75   GLUCOSE 145* 182*   CALCIUM 7.6* 6.9*   MG 2.20 2.20   ALBUMIN 1.5* 1.6*   GLOBULIN 2.8 2.1*   ALKPHOS 80 57   ALT 54 34   AST 16 17   BILITOT 0.4 0.5       No results for input(s): "BNP", "CPK", "TROPONINI" in the last 72 hours.       CT Abdomen Pelvis W Wo Contrast  EXAMINATION  CT ABDOMEN PELVIS W WO CONTRAST    CLINICAL HISTORY  Abdominal abscess/infection suspected;Pt with induration, erythema, at the perineum, in btw scrotum and anal area. Pt with IBD/UC;    TECHNIQUE  Pre- and post-contrast helical-acquisition CT " images were obtained and multiplanar reformats accomplished by a CT technologist at a separate workstation, pushed to PACS for physician review.    CONTRAST  *IV: ISOVUE-370, 100 mL  *Enteric: Gastroview, 30 mL diluted in water    COMPARISON  6 November 2023    FINDINGS  Images were reviewed in soft tissue, lung, and bone windows.    Exam quality: adequate for evaluation    Lines/tubes: none visualized    There is been inadequate duration of time between contrast ingestion and scan acquisition, resulting in incomplete transit of oral contrast agent through the GI tract.  Contrast is visualized only to the mid to distal small bowel loops.  There are no findings of high-grade bowel obstruction or new focal gastrointestinal process.  Previously described findings of colitis are similar to slightly improved.  No definite new areas of GI tract inflammatory change appreciated.    There is interval development of extensive subcutaneous edema and inflammatory fat stranding through the area of the perineum and posteromedial buttocks.  Associated tracking subcutaneous gas is also noted through the region.  No expansile fluid collection or other drainable component is identified.    No other significant short interval changes through the scanned region.    IMPRESSION  1. Interval changes of perineum concerning for development of Allison's gangrene.  No associated drainable collection.  2. Similar to slightly improved appearance of previously described colitis.  ==========    This report was flagged in Epic as abnormal.    RADIATION DOSE  Automated tube current modulation, weight-based exposure dosing, and/or iterative reconstruction technique utilized to reach lowest reasonably achievable exposure rate.    DLP: 873 mGy*cm    Of note, the pre-contrast acquisition provides no additional diagnostic value relative to the added radiation exposure to the patient.  Routine abdominopelvic CT with single phase post-contrast  acquisition would be adequate for follow-up.    Electronically signed by: Kem Gannon  Date:    11/11/2023  Time:    13:23  X-Ray Chest PA And Lateral  Narrative: EXAMINATION:  XR CHEST PA AND LATERAL    CLINICAL HISTORY:  fever;    TECHNIQUE:  PA and lateral views of the chest were performed.    COMPARISON:  None    FINDINGS:  The lungs are clear, with normal appearance of pulmonary vasculature and no pleural effusion or pneumothorax.    The cardiac silhouette is normal in size. The hilar and mediastinal contours are unremarkable.    Bones are intact.  Impression: No acute abnormality.    Electronically signed by: Quinton Hernandez MD  Date:    11/11/2023  Time:    08:49       SHERLYN Davis  Infectious Diseases

## 2023-11-16 NOTE — PROGRESS NOTES
Inpatient Nutrition Assessment    Admit Date: 11/6/2023   Total duration of encounter: 10 days   Patient Age: 42 y.o.    Nutrition Recommendation/Prescription     -Advance diet as tolerated per MD. Goal Diet: Low Residue Diet.   -Trial Boost Plus TID for additional nourishment while on full liquids; provides an additional 360 kcal and 14 gm protein per container.    -Consider a MVI with minerals as medically feasible.   -Monitor wt, labs, and intake.    Communication of Recommendations: reviewed with patient and reviewed with family    Nutrition Assessment     Malnutrition Assessment/Nutrition-Focused Physical Exam    Malnutrition Context: acute illness or injury (11/07/23 1313)  Malnutrition Level: moderate (11/07/23 1313)  Energy Intake (Malnutrition): less than 75% for greater than 7 days (11/07/23 1313)  Weight Loss (Malnutrition): greater than 2% in 1 week (11/07/23 1313)  Subcutaneous Fat (Malnutrition): moderate depletion (11/07/23 1313)  Orbital Region (Subcutaneous Fat Loss): moderate depletion        Muscle Mass (Malnutrition): moderate depletion (11/07/23 1313)  Mosque Region (Muscle Loss): moderate depletion                       Fluid Accumulation (Malnutrition): other (see comments) (does not meet criteria) (11/07/23 1313)  Hand  Strength, Left (Malnutrition): unable to evaluate (11/07/23 1313)  Hand  Strength, Right (Malnutrition): unable to evaluate (11/07/23 1313)  A minimum of two characteristics is recommended for diagnosis of either severe or non-severe malnutrition.    Chart Review    Reason Seen: malnutrition screening tool (MST) and follow-up    Malnutrition Screening Tool Results   Have you recently lost weight without trying?: Yes: 34 lbs or more  Have you been eating poorly because of a decreased appetite?: Yes   MST Score: 5   Diagnosis:  Acute severe ulcerative colitis   Orthostatic syncope   Symptomatic anemia requiring blood transfusion  Acute blood loss anemia    Relevant  "Medical History: none noted    Nutrition-Related Medications: LR  Calorie Containing IV Medications: no significant kcals from medications at this time    Nutrition-Related Labs:  23: Na 133, Glu 161, GFR>60  23: Na 130, Glu 164, GFR>60, CRP 95.4  23: Na 133, Glu 165, GFR>60  23: Na 131, Glu 182, GFR>60    Nutrition Orders:   Diet full liquid      Appetite/Oral Intake: good/% of meals    Factors Affecting Nutritional Intake:  liquid diet    Food/Temple/Cultural Preferences: none reported    Food Allergies: no known food allergies    Wound(s):   none noted    Last Bowel Movement: 11/15/23    Comments    23: Pt reports good appetite/intake while in hospital; reports that his pain and diarrhea are better managed here than it is at home. Pt reports significant wt loss and frequent watery and bloody diarrhea for the last 3 weeks.     23: Per pt and pt's wife, pt is eating well, most of meals; denies n/v.     23: Pt tolerating liquids per RN.     23: Per pt's wife, pt is tolerating full liquids well.     Anthropometrics    Height: 5' 10" (177.8 cm) Height Method: Stated  Last Weight: 74.8 kg (165 lb) (23 1312) Weight Method: Bed Scale  BMI (Calculated): 23.7  BMI Classification: normal (BMI 18.5-24.9)        Ideal Body Weight (IBW), Male: 166 lb     % Ideal Body Weight, Male (lb): 99.4 %                 Usual Body Weight (UBW), k.18 kg  % Usual Body Weight: 80.49  % Weight Change From Usual Weight: -19.68 %  Usual Weight Provided By: patient    Wt Readings from Last 5 Encounters:   23 74.8 kg (165 lb)   10/21/23 86.4 kg (190 lb 7.6 oz)     Weight Change(s) Since Admission:   Wt Readings from Last 1 Encounters:   23 1312 74.8 kg (165 lb)   23 1835 74.8 kg (165 lb)   23 0328 74.8 kg (165 lb)   Admit Weight: 74.8 kg (165 lb) (23 0328), Weight Method: Stated  23: no new wt noted    Estimated Needs    Weight Used For Calorie " Calculations: 74.8 kg (164 lb 14.5 oz)  Energy Calorie Requirements (kcal): 0258-0854 kcal (30-35 kcal/kg)  Energy Need Method: Kcal/kg  Weight Used For Protein Calculations: 74.8 kg (164 lb 14.5 oz)  Protein Requirements: 112 gm (1.5g/kg)  Fluid Requirements (mL): 2244 mL  Temp (24hrs), Av.6 °F (37 °C), Min:98.1 °F (36.7 °C), Max:99.7 °F (37.6 °C)       Enteral Nutrition    Patient not receiving enteral nutrition at this time.    Parenteral Nutrition    Patient not receiving parenteral nutrition support at this time.    Evaluation of Received Nutrient Intake    Calories: not meeting estimated needs  Protein: not meeting estimated needs    Patient Education    Not applicable.    Nutrition Diagnosis     PES: Malnutrition related to altered GI function as evidenced by >2% wt loss x 1 week, <75% est energy requirements met >7 days, physical evidence of moderate fat and muscle wasting. (active)    Interventions/Goals     Intervention(s): modified composition of meals/snacks, multivitamin/mineral supplement therapy, prescription medication, and collaboration with other providers    Goal: Maintain weight throughout hospitalization. (goal progressing)    Monitoring & Evaluation     Dietitian will monitor energy intake and weight change.    Nutrition Risk/Follow-Up: high (follow-up in 1-4 days)   Please consult if re-assessment needed sooner.

## 2023-11-16 NOTE — TRANSFER OF CARE
"Anesthesia Transfer of Care Note    Patient: Lionel León    Procedure(s) Performed: Procedure(s) (LRB):  COLECTOMY, TOTAL, LAPAROSCOPIC (N/A)  CREATION, ILEOSTOMY, LAPAROSCOPIC (N/A)    Patient location: PACU    Anesthesia Type: general    Transport from OR: Transported from OR on room air with adequate spontaneous ventilation    Post pain: adequate analgesia    Post assessment: no apparent anesthetic complications    Post vital signs: stable    Level of consciousness: awake    Nausea/Vomiting: no nausea/vomiting    Complications: none    Transfer of care protocol was followed      Last vitals: Visit Vitals  /70 (BP Location: Left arm, Patient Position: Sitting)   Pulse 74   Temp 36.8 °C (98.2 °F)   Resp 18   Ht 5' 10" (1.778 m)   Wt 74.8 kg (165 lb)   SpO2 98%   BMI 23.68 kg/m²     "
Detail Level: Detailed
Detail Level: Zone
Detail Level: Simple

## 2023-11-16 NOTE — ANESTHESIA POSTPROCEDURE EVALUATION
Anesthesia Post Evaluation    Patient: Lionel León    Procedure(s) Performed: Procedure(s) (LRB):  COLECTOMY, TOTAL, LAPAROSCOPIC (N/A)  CREATION, ILEOSTOMY, LAPAROSCOPIC (N/A)    Final Anesthesia Type: general      Patient location during evaluation: PACU  Patient participation: Yes- Able to Participate  Level of consciousness: awake  Post-procedure vital signs: reviewed and stable  Pain management: adequate  Airway patency: patent  TABBY mitigation strategies: Multimodal analgesia  PONV status at discharge: No PONV  Anesthetic complications: no      Cardiovascular status: stable  Respiratory status: unassisted  Hydration status: euvolemic  Follow-up not needed.          Vitals Value Taken Time   /66 11/16/23 1654   Temp 37.3 °C (99.2 °F) 11/16/23 1654   Pulse 91 11/16/23 1654   Resp 18 11/16/23 1648   SpO2 92 % 11/16/23 1200         Event Time   Out of Recovery 11/15/2023 18:40:00         Pain/Leonard Score: Pain Rating Prior to Med Admin: 8 (11/16/2023  4:48 PM)  Pain Rating Post Med Admin: 4 (11/16/2023  5:16 AM)  Leonard Score: 10 (11/15/2023  6:45 PM)

## 2023-11-16 NOTE — PROGRESS NOTES
Colon & Rectal Surgery Progress Note    Post Op Day 1     Subjective:  Pain controlled  Tolerating clears  Reports ileostomy output overnight    Objective:  Temp:  [98.2 °F (36.8 °C)-99.7 °F (37.6 °C)]   Pulse:  []   Resp:  [10-19]   BP: ()/(52-81)   SpO2:  [87 %-100 %]     Physical Exam:  NAD  Regular rate and rhythm  Non-labored respirations  Abdomen soft, appropriate, ileostomy pink  SCDs in place      Intake/Output Summary (Last 24 hours) at 11/16/2023 0759  Last data filed at 11/16/2023 0724  Gross per 24 hour   Intake 2560 ml   Output 850 ml   Net 1710 ml       Recent Labs     11/16/23  0453   WBC 12.47  12.47*   HGB 6.2*   HCT 20.2*      *   K 4.8   CO2 32*   BUN 17.4   CREATININE 0.75   BILITOT 0.5   AST 17   ALT 34   ALKPHOS 57   CALCIUM 6.9*   ALBUMIN 1.6*   MG 2.20       Assessment/Plan  - transfuse 2 units PRBC  - full liquids  - Devi to monitor UOP  - pain control  - continue Naveen Shields MD  Colon and Rectal Surgery

## 2023-11-16 NOTE — PROGRESS NOTES
HBO Consult    DEMETRIUS Hernandez suggested to general surgery this patient would be a good HBO candidate. Pt unable to come down to HBO clinic for eval and first session as he will be getting a blood transfusion for next 4 hrs. Will call him down in morning.

## 2023-11-16 NOTE — PROGRESS NOTES
"Gastroenterology Progress Note    Subjective/Interval History:  S/p laparoscopic total colectomy with end ileostomy 11/15/23    H&H 6.2/20.2 today - 1u PRBC transfused, repeat H&H pending    Patient resting in bed. He states as long as he does not move he does not experience pain in perineum. Ostomy in RLQ with some bloody output noted.    ROS:  Review of Systems   Constitutional:  Positive for malaise/fatigue.   Respiratory:  Negative for cough and shortness of breath.    Cardiovascular:  Negative for chest pain.   Gastrointestinal:  Positive for blood in stool. Negative for abdominal pain, nausea and vomiting.   Neurological:  Negative for weakness.       Vital Signs:  /66   Pulse 97   Temp 98.1 °F (36.7 °C) (Oral)   Resp 18   Ht 5' 10" (1.778 m)   Wt 74.8 kg (165 lb)   SpO2 (!) 92%   BMI 23.68 kg/m²   Body mass index is 23.68 kg/m².    Physical Exam:  Physical Exam  Constitutional:       General: He is not in acute distress.     Appearance: He is not ill-appearing.   HENT:      Head: Normocephalic and atraumatic.      Right Ear: External ear normal.      Left Ear: External ear normal.      Nose: Nose normal.      Mouth/Throat:      Pharynx: Oropharynx is clear.   Eyes:      Conjunctiva/sclera: Conjunctivae normal.   Pulmonary:      Effort: Pulmonary effort is normal. No respiratory distress.   Abdominal:      General: Abdomen is flat. There is no distension.      Tenderness: There is no abdominal tenderness. There is no guarding.      Comments: Abd firm. Ostomy in RLQ with some bloody output   Musculoskeletal:         General: Normal range of motion.      Cervical back: Normal range of motion.   Skin:     General: Skin is warm and dry.      Coloration: Skin is not pale.   Neurological:      Mental Status: He is alert and oriented to person, place, and time. Mental status is at baseline.      Motor: No weakness.   Psychiatric:         Mood and Affect: Mood normal.         Behavior: Behavior normal.    "      Thought Content: Thought content normal.         Labs:  Recent Results (from the past 24 hour(s))   Comprehensive Metabolic Panel    Collection Time: 11/16/23  4:53 AM   Result Value Ref Range    Sodium Level 131 (L) 136 - 145 mmol/L    Potassium Level 4.8 3.5 - 5.1 mmol/L    Chloride 97 (L) 98 - 107 mmol/L    Carbon Dioxide 32 (H) 22 - 29 mmol/L    Glucose Level 182 (H) 74 - 100 mg/dL    Blood Urea Nitrogen 17.4 8.9 - 20.6 mg/dL    Creatinine 0.75 0.73 - 1.18 mg/dL    Calcium Level Total 6.9 (LL) 8.4 - 10.2 mg/dL    Protein Total 3.7 (L) 6.4 - 8.3 gm/dL    Albumin Level 1.6 (L) 3.5 - 5.0 g/dL    Globulin 2.1 (L) 2.4 - 3.5 gm/dL    Albumin/Globulin Ratio 0.8 (L) 1.1 - 2.0 ratio    Bilirubin Total 0.5 <=1.5 mg/dL    Alkaline Phosphatase 57 40 - 150 unit/L    Alanine Aminotransferase 34 0 - 55 unit/L    Aspartate Aminotransferase 17 5 - 34 unit/L    eGFR >60 mls/min/1.73/m2   Magnesium    Collection Time: 11/16/23  4:53 AM   Result Value Ref Range    Magnesium Level 2.20 1.60 - 2.60 mg/dL   CBC with Differential    Collection Time: 11/16/23  4:53 AM   Result Value Ref Range    WBC 12.47 (H) 4.50 - 11.50 x10(3)/mcL    RBC 2.36 (L) 4.70 - 6.10 x10(6)/mcL    Hgb 6.2 (L) 14.0 - 18.0 g/dL    Hct 20.2 (L) 42.0 - 52.0 %    MCV 85.6 80.0 - 94.0 fL    MCH 26.3 (L) 27.0 - 31.0 pg    MCHC 30.7 (L) 33.0 - 36.0 g/dL    RDW 15.9 11.5 - 17.0 %    Platelet 278 130 - 400 x10(3)/mcL    MPV 9.8 7.4 - 10.4 fL    NRBC% 0.2 %   Manual Differential    Collection Time: 11/16/23  4:53 AM   Result Value Ref Range    WBC 12.47 x10(3)/mcL    Neutrophils % 96 %    Lymphs % 3 %    Monocytes % 1 %    nRBC % 2 %    Neutrophils Abs 11.9712 (H) 2.1 - 9.2 x10(3)/mcL    Lymphs Abs 0.3741 (L) 0.6 - 4.6 x10(3)/mcL    Monocytes Abs 0.1247 0.1 - 1.3 x10(3)/mcL    Platelets Adequate Normal, Adequate    RBC Morph Abnormal (A) Normal    Poikilocytosis 2+ (A) (none)    Anisocytosis 1+ (A) (none)    Microcytosis 1+ (A) (none)    Macrocytosis 1+ (A) (none)     Stomatocytes 2+ (A) (none)         Assessment/Plan:  42-year-old male known to Dr. He with past medical history of diverticulitis s/p colon resection and recent new diagnosis of ulcerative colitis.  He presented to the ED on 11/6 with complaints of bright red blood per rectum and lower abdominal pain associated with nausea, rectal pain, weakness and 30-40 lb weight loss over the past 3 weeks with syncopal episode x2- 2nd episode with LOC.     Newly diagnosed UC, active flare  Rectal pain with BM, hx hemorrhoids  Xochitl's gangrene s/p I&D x2  - Colonoscopy 10/23/2023 with findings c/w IBD.  He was discharged on prednisone taper with outpatient follow up appointment scheduled 11/17/2023, but ended up presenting here on 11/6  -  on 11/6---95.40 on 11/10  - currently on IV methylprednisone BID. OK to taper 5mg daily until weaned off completely  - outpatient appt in our office was scheduled for 11/17 with plans to start humira and imuran- Patient did obtain Humira samples and was planning on having loading dose; however patient became febrile and CT revealed xochitl's gangrene. S/p I&D of perineum 11/11/23 - currently on zosyn  - .9 on 10/21/23; as of 11/09/23 it is 95.4  - s/p total colectomy with end ileostomy 11/15/23     3. Fever - resolved  - UA on 10th was unremarkable.   - Stool for Cdiff neg on 7th   - Blood cultures neg from 11/6  - CT abd on 11/6 Findings seen consistent with colitis involving the ascending colon and portions of the descending colon.  Follow-up is recommended to resolution as underlying mass cannot be completely excluded. The gastric wall appears to be slightly thickened.  Underlying gastritis should be excluded.     Jeanine Pacheco PA-C  Gastroenterology  [facility]

## 2023-11-17 PROBLEM — N49.3 FOURNIER GANGRENE: Status: ACTIVE | Noted: 2023-11-17

## 2023-11-17 LAB
ABS NEUT (OLG): 16.87 X10(3)/MCL (ref 2.1–9.2)
ALBUMIN SERPL-MCNC: 1.5 G/DL (ref 3.5–5)
ALBUMIN/GLOB SERPL: 0.4 RATIO (ref 1.1–2)
ALP SERPL-CCNC: 117 UNIT/L (ref 40–150)
ALT SERPL-CCNC: 32 UNIT/L (ref 0–55)
AST SERPL-CCNC: 29 UNIT/L (ref 5–34)
BACTERIA WND CULT: ABNORMAL
BACTERIA WND CULT: ABNORMAL
BILIRUB SERPL-MCNC: 0.7 MG/DL
BUN SERPL-MCNC: 13.4 MG/DL (ref 8.9–20.6)
CALCIUM SERPL-MCNC: 8 MG/DL (ref 8.4–10.2)
CHLORIDE SERPL-SCNC: 96 MMOL/L (ref 98–107)
CO2 SERPL-SCNC: 33 MMOL/L (ref 22–29)
CREAT SERPL-MCNC: 0.72 MG/DL (ref 0.73–1.18)
EOSINOPHIL NFR BLD MANUAL: 0.17 X10(3)/MCL (ref 0–0.9)
EOSINOPHIL NFR BLD MANUAL: 1 %
ERYTHROCYTE [DISTWIDTH] IN BLOOD BY AUTOMATED COUNT: 15.9 % (ref 11.5–17)
GFR SERPLBLD CREATININE-BSD FMLA CKD-EPI: >60 MLS/MIN/1.73/M2
GLOBULIN SER-MCNC: 3.4 GM/DL (ref 2.4–3.5)
GLUCOSE SERPL-MCNC: 105 MG/DL (ref 74–100)
HCT VFR BLD AUTO: 27.2 % (ref 42–52)
HGB BLD-MCNC: 8.7 G/DL (ref 14–18)
INSTRUMENT WBC (OLG): 17.21 X10(3)/MCL
LYMPHOCYTES NFR BLD MANUAL: 0.17 X10(3)/MCL
LYMPHOCYTES NFR BLD MANUAL: 1 %
MAGNESIUM SERPL-MCNC: 2.1 MG/DL (ref 1.6–2.6)
MCH RBC QN AUTO: 27.2 PG (ref 27–31)
MCHC RBC AUTO-ENTMCNC: 32 G/DL (ref 33–36)
MCV RBC AUTO: 85 FL (ref 80–94)
NEUTROPHILS NFR BLD MANUAL: 98 %
NRBC BLD AUTO-RTO: 0.1 %
PLATELET # BLD AUTO: 284 X10(3)/MCL (ref 130–400)
PLATELET # BLD EST: NORMAL 10*3/UL
PMV BLD AUTO: 9.4 FL (ref 7.4–10.4)
POTASSIUM SERPL-SCNC: 4.5 MMOL/L (ref 3.5–5.1)
PREALB SERPL-MCNC: 10.6 MG/DL (ref 18–45)
PROT SERPL-MCNC: 4.9 GM/DL (ref 6.4–8.3)
RBC # BLD AUTO: 3.2 X10(6)/MCL (ref 4.7–6.1)
RBC MORPH BLD: NORMAL
SODIUM SERPL-SCNC: 132 MMOL/L (ref 136–145)
WBC # SPEC AUTO: 17.21 X10(3)/MCL (ref 4.5–11.5)

## 2023-11-17 PROCEDURE — 25000003 PHARM REV CODE 250: Performed by: STUDENT IN AN ORGANIZED HEALTH CARE EDUCATION/TRAINING PROGRAM

## 2023-11-17 PROCEDURE — 80053 COMPREHEN METABOLIC PANEL: CPT | Performed by: INTERNAL MEDICINE

## 2023-11-17 PROCEDURE — 63600175 PHARM REV CODE 636 W HCPCS: Performed by: STUDENT IN AN ORGANIZED HEALTH CARE EDUCATION/TRAINING PROGRAM

## 2023-11-17 PROCEDURE — 85027 COMPLETE CBC AUTOMATED: CPT | Performed by: INTERNAL MEDICINE

## 2023-11-17 PROCEDURE — 99183 HYPERBARIC OXYGEN THERAPY: CPT | Mod: ,,, | Performed by: EMERGENCY MEDICINE

## 2023-11-17 PROCEDURE — G0277 HBOT, FULL BODY CHAMBER, 30M: HCPCS

## 2023-11-17 PROCEDURE — 25000003 PHARM REV CODE 250: Performed by: COLON & RECTAL SURGERY

## 2023-11-17 PROCEDURE — 11000001 HC ACUTE MED/SURG PRIVATE ROOM

## 2023-11-17 PROCEDURE — 83735 ASSAY OF MAGNESIUM: CPT | Performed by: INTERNAL MEDICINE

## 2023-11-17 PROCEDURE — 99183 PR HYPERBARIC OXYGEN THERAPY ATTENDANCE/SUPERVISION, PER SESSION: ICD-10-PCS | Mod: ,,, | Performed by: EMERGENCY MEDICINE

## 2023-11-17 PROCEDURE — 99223 1ST HOSP IP/OBS HIGH 75: CPT | Mod: ,,, | Performed by: SURGERY

## 2023-11-17 PROCEDURE — 84134 ASSAY OF PREALBUMIN: CPT | Performed by: SURGERY

## 2023-11-17 PROCEDURE — 63600175 PHARM REV CODE 636 W HCPCS: Performed by: COLON & RECTAL SURGERY

## 2023-11-17 PROCEDURE — 99223 PR INITIAL HOSPITAL CARE,LEVL III: ICD-10-PCS | Mod: 25,,, | Performed by: EMERGENCY MEDICINE

## 2023-11-17 PROCEDURE — 99223 PR INITIAL HOSPITAL CARE,LEVL III: ICD-10-PCS | Mod: ,,, | Performed by: SURGERY

## 2023-11-17 PROCEDURE — 63600175 PHARM REV CODE 636 W HCPCS

## 2023-11-17 PROCEDURE — 99223 1ST HOSP IP/OBS HIGH 75: CPT | Mod: 25,,, | Performed by: EMERGENCY MEDICINE

## 2023-11-17 RX ADMIN — OXYCODONE HYDROCHLORIDE 10 MG: 10 TABLET ORAL at 07:11

## 2023-11-17 RX ADMIN — PIPERACILLIN SODIUM AND TAZOBACTAM SODIUM 4.5 G: 4; .5 INJECTION, POWDER, FOR SOLUTION INTRAVENOUS at 01:11

## 2023-11-17 RX ADMIN — HYDROMORPHONE HYDROCHLORIDE 2 MG: 2 INJECTION INTRAMUSCULAR; INTRAVENOUS; SUBCUTANEOUS at 09:11

## 2023-11-17 RX ADMIN — HYDROMORPHONE HYDROCHLORIDE 2 MG: 2 INJECTION INTRAMUSCULAR; INTRAVENOUS; SUBCUTANEOUS at 02:11

## 2023-11-17 RX ADMIN — OXYCODONE HYDROCHLORIDE 10 MG: 10 TABLET ORAL at 10:11

## 2023-11-17 RX ADMIN — HYDROMORPHONE HYDROCHLORIDE 2 MG: 2 INJECTION INTRAMUSCULAR; INTRAVENOUS; SUBCUTANEOUS at 01:11

## 2023-11-17 RX ADMIN — SODIUM CHLORIDE, POTASSIUM CHLORIDE, SODIUM LACTATE AND CALCIUM CHLORIDE: 600; 310; 30; 20 INJECTION, SOLUTION INTRAVENOUS at 02:11

## 2023-11-17 RX ADMIN — OXYCODONE HYDROCHLORIDE 10 MG: 10 TABLET ORAL at 02:11

## 2023-11-17 RX ADMIN — SODIUM CHLORIDE, POTASSIUM CHLORIDE, SODIUM LACTATE AND CALCIUM CHLORIDE: 600; 310; 30; 20 INJECTION, SOLUTION INTRAVENOUS at 10:11

## 2023-11-17 RX ADMIN — KETOROLAC TROMETHAMINE 15 MG: 30 INJECTION, SOLUTION INTRAMUSCULAR at 05:11

## 2023-11-17 RX ADMIN — KETOROLAC TROMETHAMINE 15 MG: 30 INJECTION, SOLUTION INTRAMUSCULAR at 01:11

## 2023-11-17 RX ADMIN — HYDROMORPHONE HYDROCHLORIDE 2 MG: 2 INJECTION INTRAMUSCULAR; INTRAVENOUS; SUBCUTANEOUS at 07:11

## 2023-11-17 RX ADMIN — KETOROLAC TROMETHAMINE 15 MG: 30 INJECTION, SOLUTION INTRAMUSCULAR at 06:11

## 2023-11-17 RX ADMIN — OXYCODONE HYDROCHLORIDE 10 MG: 10 TABLET ORAL at 06:11

## 2023-11-17 RX ADMIN — PIPERACILLIN SODIUM AND TAZOBACTAM SODIUM 4.5 G: 4; .5 INJECTION, POWDER, FOR SOLUTION INTRAVENOUS at 09:11

## 2023-11-17 RX ADMIN — HYDROMORPHONE HYDROCHLORIDE 2 MG: 2 INJECTION INTRAMUSCULAR; INTRAVENOUS; SUBCUTANEOUS at 05:11

## 2023-11-17 RX ADMIN — METHYLPREDNISOLONE SODIUM SUCCINATE 5 MG: 40 INJECTION, POWDER, FOR SOLUTION INTRAMUSCULAR; INTRAVENOUS at 09:11

## 2023-11-17 RX ADMIN — OXYCODONE HYDROCHLORIDE 10 MG: 10 TABLET ORAL at 01:11

## 2023-11-17 RX ADMIN — KETOROLAC TROMETHAMINE 15 MG: 30 INJECTION, SOLUTION INTRAMUSCULAR at 12:11

## 2023-11-17 NOTE — PROGRESS NOTES
[facility] - 8th Floor [facility] Surg  hospitals MEDICINE ~ PROGRESS NOTE        CHIEF COMPLAINT   Hospital follow up    HOSPITAL COURSE   Lionel León is a 42 y.o. White male with a past medical history of diverticulitis status post colon resection and ulcerative colitis. Patient had recent admission to hospital medicine services at [facility] on 10/21/2023 to 10/24/2023 for sepsis secondary to pancolitis. He was treated with antibiotics and GI was consulted. Colonoscopy was performed on 10/23/2023 with findings concerning for ulcerative colitis and biopsies of the cecum, ascending, transverse, ascending colon and rectum positive for active chronic colitis consistent with inflammatory bowel disorder. No transfusion was required for rectal bleeding. Patient was discharged with prednisone taper and GI follow up appointment on 11/17/2023. The patient presented to [facility] on 11/6/2023 with a primary complaint of bright red rectal bleeding and lower abdominal pain.  Patient reports rectal bleeding has been ongoing since discharge.  Other associated symptoms include nausea, rectal pain, subjective fevers, weakness, fatigue and a 30 40 lb weight loss over last 3 weeks.  Patient states this morning he was walking to the bathroom when he felt flushed and vision went black. He passed out and hit his head on the wooden floor. Approximately hour and a half later when he went to get up he passed out again.  Second syncopal episode was witnessed by patient's wife who is at bedside reports loss of consciousness lasts for approximately 30 seconds.         Upon presentation to the ED, temperature 98.3F, heart rate 109, blood pressure 96/51, respiratory rate 17, spO2 99%. Labs with H&H 7.3/23.6 (10.6/33.2 on 10/24/2023), BUN 21.9, creatinine 0.88, calcium 7.7, .6, ESR 86. EKG sinus tachycardia with a ventricular rate of 108 and age undetermined possible inferior infarct. In the ED patient received Dilaudid, Zofran,  Hydrocortisone and IVF. He was typed and crossmatched for transfusion of 2 units of packed RBC. Patient is admitted to hospital medicine services for further medical management.      Patient was seen by GI and started on IV steroids, Solu-Medrol 30 mg IV b.I.d. for active ulcerative colitis flare up  Plus Anusol suppository per rectum b.I.d..  Patient is status post 2 units PRBC for symptomatic anemia.  CT abdomen and pelvis was also ordered to assess extent. CT of the abdomen and pelvis was pertinent for findings consistent with colitis involving the ascending colon, portions of the descending colon.  Underlying gastritis also can not be excluded secondary to slightly thickened gastric wall.      May complain continues to be rectal pain but refers that suppositories are helping.  Patient is tolerating p.o. intake     According to GI notes plans is for patient to start Humira/imura once approved by patient's insurance.                    Today  11/7/23-Doing better feeling better pain is better.  4 bowel movements yesterday with some blood as well.  Hemoglobin did not have appropriate response but will continue to monitor.  Discuss with GI physician assistant as well.    11/8/23 dr alvarez -  Today patient had 1 BM overnight and another one this a.m..  Refers that stools are becoming more formed still with some ongoing hematochezia.  Rectal pain continues to improve.  For now we will continue hydrocortisone suppositories t.I.d. as well as IV Solu-Medrol 30 mg IV b.I.d. with plans to start Humira/ Imura as outpt.  Patient today refers feeling okay, his pain goes up and down but overall feels improved since admission not ready to go home.  We will continue present management and continue following recommendations by GI    11/09/2023 Dr. Alvarez-chart reviewed patient examined.  Still having episodes of bright red per rectum.  Also refers some dizziness when getting out of bed.  Hematocrit dropped to 24.7.  BP running at  98/57.  We started normal saline solution.  will require PRBC transfusion tomorrow.      11/10/2023 Dr. Koo reviewed patient examined.  Remains with complains of dizziness with hematocrit around 24.7.  Samples of Humira available but patient ran a fever of around 102.5 that quickly resolved without antipyretic.  Now Humira on hold.  We will go ahead and transfuse 2 units PRBC.  Patient received 1 g acetaminophen.  Also refers his stools are more formed with less abdominal pain and good appetite.    11-11-23 Dr. Koo reviewed patient examined.  Yesterday patient started having temperature spikes.  Today he has complains of  pain over perineum/buttocks that has worsened.  Area with extensive erythema and induration over medial aspects of buttocks.  CT abdomen and pelvis with contrast was done showing findings pertinent for Xochitl gangrene.  Patient is on vancomycin and Zosyn, will add clindamycin for toxin control.  Patient has been seen by surgical hospitalist with plans for OR for exploration ,debridement today.  We will go ahead and consult Infectious Disease for evaluation as well .    11/12/2023 Dr. Koo reviewed patient examined.  Patient was taken to OR yesterday for debridement of perineal/sacral area for findings concerning xochitl's gangrene.  We will return to OR today for further debridement.  Eventually will need diverting colostomy this coming week.  Refers feeling better, has a rectal tube that is functional.  Continues on cleaned the/Zosyn/vancomycin with cultures so far with many Gram-negative rods.  Given 1 unit PRBC    11/13/2023 Dr. Koo reviewed patient examined.  Patient was seen with Infectious Disease nurse practitioner Ms. Cate He.  Case was later discussed with infectious disease specialist Dr. Hampton.  Case was also discussed with General surgery.  There were plans for diverting colostomy but patient with inflammatory bowel disease involving  ascending/descending colon and might benefit from colectomy/colostomy.  We decided to consult colorectal surgeon Dr. Fredi nicole for evaluation.  On physical examination he has an open wound that has been debrided twice already, he has a rectal tube but still there was spillage of stools into the wound.  Perhaps subsided and by recommendations from Infectious Disease vancomycin  Has been discontinued as well as clindamycin, we will continue Zosyn for now.    11/14/2023 Dr. Koo reviewed patient examined.  Complains of intense pain with excellent level of consciousness.  Medications were upgraded.  Patient to be seen by Colorectal surgeon this a.m..  We will continue present management for now awaiting recommendations by Colorectal surgery.    11/15/23 dr noé waller reviewed and pt examined. Plans for total colectomy today by dr nicole. Pain better controlled with upgraded dosing. . Steroids being tapered. Will continue present abx's as per ID recs.      11/16/2023 Dr. Alvarez-pod 1.  Colectomy and ileostomy formation.  Patient with good bowel sounds some bloody output noted in bag.  Abdomen slightly distended.  Transfused 2 units PRBC for a hematocrit of around 20.  Vital signs are stable.  Continues on Zosyn with cultures pertinent for E coli in wound.  Refers some improvement    OBJECTIVE/PHYSICAL EXAM     VITAL SIGNS (MOST RECENT):  Temp: 99.2 °F (37.3 °C) (11/16/23 1654)  Pulse: 91 (11/16/23 1654)  Resp: 18 (11/16/23 1648)  BP: 112/66 (11/16/23 1654)  SpO2: (!) 92 % (11/16/23 1200) VITAL SIGNS (24 HOUR RANGE):  Temp:  [98.1 °F (36.7 °C)-99.5 °F (37.5 °C)] 99.2 °F (37.3 °C)  Pulse:  [] 91  Resp:  [12-20] 18  SpO2:  [87 %-100 %] 92 %  BP: ()/(55-81) 112/66   GENERAL: In no acute distress, afebrile  HEENT:normocephalic/ no masses  CHEST: Clear to auscultation bilaterally  HEART: S1, S2, no appreciable murmur  ABDOMEN:  Slightly distended, ileostomy with some bloody output/ generalized  abdominal pain, BS present  MSK: Warm, no lower extremity edema, no clubbing or cyanosis  Perineum-extensive erythema from a scrotal area all the way around to lower back, covers medial   aspect of buttocks with induration and tenderness.  11/13/2023 Dr. Alvarez-patient is status post surgical intervention x2 now with a pretty good size surgical wound with rectal tube.  Wound appears contaminated with stools.  NEUROLOGIC: Alert and oriented x4, moving all extremities with good strength   INTEGUMENTARY:surgical wound to buttocks/ perineum   PSYCHIATRY: alert/ active and oriented.                                     ASSESSMENT/PLAN   Acute severe ulcerative colitis , new onset  -C diff negative  -DC Solu-Medrol 30 b.i.d. 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 Today 11/12/2023 and 11-13-23  -r post colectomy/ileostomy formation  -vancomycin as well as clindamycin discontinue, continue Zosyn.- cx's w e.coli  -we will consult plastics for evaluation      Post Orthostatic syncope probably related to symptomatic anemia/intravascular volume depletion    Sepsis due to Allison gangrene /continue Zosyn    Rectal pain with history of hemorrhoids/hemorrhoidectomy at [facility]   Severe protein calorie malnutrition  Continue full liquids     GI /infectious disease/internal Medicine/Colorectal surgery/general surgery in case.       Symptomatic anemia-status post transfusion 2 units PRBC    Cc time 45 minutes  Cc dx- SEPSIS/ FOURNIERS GANGRENE /symptomatic anemia requiring PRBC transfusion      DVT prophylaxis:     Anticipated discharge and disposition: tbd  __________________________________________________________________________    LABS/MICRO/MEDS/DIAGNOSTICS       LABS  Recent Labs     11/16/23  0453   *   K 4.8    CHLORIDE 97*   CO2 32*   BUN 17.4   CREATININE 0.75   GLUCOSE 182*   CALCIUM 6.9*   ALKPHOS 57   AST 17   ALT 34   ALBUMIN 1.6*       Recent Labs     11/16/23  0453   WBC 12.47  12.47*   RBC 2.36*   HCT 20.2*   MCV 85.6            MICROBIOLOGY  Microbiology Results (last 7 days)       Procedure Component Value Units Date/Time    Wound Culture [[id number]]  (Abnormal)  (Susceptibility) Collected: 11/11/23 1851    Order Status: Completed Specimen: Abscess from Rectum Updated: 11/16/23 1154     Wound Culture Many Escherichia coli      Many Streptococcus constellatus    Blood Culture [[id number]]  (Normal) Collected: 11/10/23 2048    Order Status: Completed Specimen: Blood from Antecubital, Left Updated: 11/15/23 2301     CULTURE, BLOOD ([facility]) No Growth at 5 days    MARLENI Prep [[id number]] Collected: 11/11/23 1851    Order Status: Completed Specimen: Abscess Updated: 11/13/23 1219     KOH Prep No fungal elements seen    Fungal Culture [[id number]] Collected: 11/11/23 1851    Order Status: Sent Specimen: Abscess Updated: 11/13/23 1122    AFB Smear [[id number]] Collected: 11/11/23 1851    Order Status: Completed Specimen: Abscess from Rectum Updated: 11/13/23 0754     AFB Smear No AFB seen (Direct smear only) - Concentration to follow    Mycobacteria and Nocardia Culture [[id number]] Collected: 11/11/23 1851    Order Status: Sent Specimen: Abscess from Rectum Updated: 11/12/23 1404    Gram Stain [[id number]] Collected: 11/11/23 1851    Order Status: Completed Specimen: Abscess from Rectum Updated: 11/12/23 1129     GRAM STAIN Many WBC observed      Many Gram positive cocci      Many Gram Negative Rods      Many Gram Positive Rods    Fungal Culture [[id number]] Collected: 11/11/23 1851    Order Status: Sent Specimen: Abscess from Rectum Updated: 11/11/23 1907    Blood culture #1 **CANNOT BE ORDERED STAT** [[id number]]  (Normal) Collected: 11/06/23 0405    Order Status: Completed Specimen: Blood from Antecubital,  "Right Updated: 11/11/23 0701     CULTURE, BLOOD ([facility]) No Growth at 5 days    Blood culture #2 **CANNOT BE ORDERED STAT** [[id number]]  (Normal) Collected: 11/06/23 0405    Order Status: Completed Specimen: Blood from Antecubital, Left Updated: 11/11/23 0600     CULTURE, BLOOD ([facility]) No Growth at 5 days               MEDICATIONS   ketorolac  15 mg Intravenous Q6H    methylPREDNISolone sodium succinate injection  5 mg Intravenous BID    piperacillin-tazobactam (Zosyn) IV (PEDS and ADULTS) (extended infusion is not appropriate)  4.5 g Intravenous Q8H         INFUSIONS   lactated ringers 125 mL/hr at 11/16/23 0223          DIAGNOSTIC TESTS  CT Abdomen Pelvis W Wo Contrast   Final Result      X-Ray Chest PA And Lateral   Final Result      No acute abnormality.         Electronically signed by: Quinton Hernandez MD   Date:    11/11/2023   Time:    08:49      CT Abdomen Pelvis W Wo Contrast   Final Result      Findings seen consistent with colitis involving the ascending colon and portions of the descending colon.  Follow-up is recommended to resolution as underlying mass cannot be completely excluded.      The gastric wall appears to be slightly thickened.  Underlying gastritis should be excluded.         Electronically signed by: John Gale   Date:    11/06/2023   Time:    12:02           No results found for: "EF"       NUTRITION STATUS  Patient meets ASPEN criteria for moderate malnutrition of acute illness or injury per RD assessment as evidenced by:  Energy Intake (Malnutrition): less than 75% for greater than 7 days  Weight Loss (Malnutrition): greater than 2% in 1 week  Subcutaneous Fat (Malnutrition): moderate depletion  Muscle Mass (Malnutrition): moderate depletion  Fluid Accumulation (Malnutrition): other (see comments) (does not meet criteria)  Hand  Strength, Left (Malnutrition): unable to evaluate  Hand  Strength, Right (Malnutrition): unable to evaluate  A minimum of two characteristics is " recommended for diagnosis of either severe or non-severe malnutrition.       Case related differential diagnoses have been reviewed; assessment and plan has been documented. I have personally reviewed the labs and test results that are currently available; I have reviewed the patients medication list. I have reviewed the consulting providers recommendations. I have reviewed or attempted to review medical records based upon their availability.  All of the patient's and/or family's questions have been addressed and answered to the best of my ability.  I will continue to monitor closely and make adjustments to medical management as needed.  This document was created using M*Modal Fluency Direct.  Transcription errors may have been made.  Please contact me if any questions may rise regarding documentation to clarify transcription.        Sloan Alvarez MD   Internal Medicine  Department of Hospital Medicine  [facility] - 8th Floor Med Surg

## 2023-11-17 NOTE — CONSULTS
[facility]  Wound Care  Consult Note    Patient Name: Lionel León  MRN: [id number]  Admission Date: 11/6/2023  Hospital Length of Stay: 11 days  Attending Physician: Horace León MD  Primary Care Provider: Sherry Aranda     Inpatient consult to Wound Care Physician  Consult performed by: Madeleine Lagos MD  Consult ordered by: Michelle Padilla MD        Subjective:     History of Present Illness:  HBO    42-year-old WM in normal health until fairly recent diagnosis of  ulcerative colitis during a recent admission to [facility] from 10/21/2023 - 10/24/2023. He was readmitted 2 weeks later on 11/6/23 with complaint of rectal pain, bleeding and severe anemia.  He was subsequently diagnosed with necrotizing fasciitis of the bilateral gluteal areas and to have 2 operative debridements by the General surgery team on 11/11/23 and 11/15/23 and also s/p Laparoscopic total colectomy with end ileostomy . WOCN appropriately suggested the use of the adjuvant hyperbaric oxygen therapy.  Patient could not come down yesterday because of active blood transfusions but came down this morning on 11/17/23 for 1st session.  His wife came down as well. Both agree to it's use.His dressing was just changed upstairs so I will not take it down.    Scheduled Meds:   ketorolac  15 mg Intravenous Q6H    piperacillin-tazobactam (Zosyn) IV (PEDS and ADULTS) (extended infusion is not appropriate)  4.5 g Intravenous Q8H     Continuous Infusions:   lactated ringers 125 mL/hr at 11/17/23 0257     PRN Meds:0.9%  NaCl infusion (for blood administration), 0.9%  NaCl infusion (for blood administration), 0.9%  NaCl infusion (for blood administration), 0.9%  NaCl infusion (for blood administration), acetaminophen, acetaminophen, dextrose 10%, dextrose 10%, dicyclomine, glucagon (human recombinant), glucose, glucose, hydrocortisone, HYDROmorphone, morphine, ondansetron, oxyCODONE, prochlorperazine, sodium  chloride 0.9%    Review of patient's allergies indicates:  No Known Allergies     History reviewed. No pertinent past medical history.  Past Surgical History:   Procedure Laterality Date    COLONOSCOPY, WITH 1 OR MORE BIOPSIES N/A 10/23/2023    Procedure: COLON;  Surgeon: Dragan Underwood MD;  Location: [facility] ENDOSCOPY;  Service: Gastroenterology;  Laterality: N/A;    DEBRIDEMENT, EXTERNAL GENITALIA, PERINEUM, AND ABDOMINAL WALL, FOR NECROTIZING SOFT TISSUE INFECTION Bilateral 11/11/2023    Procedure: DEBRIDEMENT, EXTERNAL GENITALIA/BUTTOCKS FOR NECROTIZING SOFT TISSUE INFECTION;  Surgeon: Vladimir Vick MD;  Location: [facility] OR;  Service: General;  Laterality: Bilateral;  Prone positioning.    DEBRIDEMENT, EXTERNAL GENITALIA, PERINEUM, AND ABDOMINAL WALL, FOR NECROTIZING SOFT TISSUE INFECTION N/A 11/12/2023    Procedure: DEBRIDEMENT, EXTERNAL GENITALIA, PERINEUM, AND ABDOMINAL WALL, FOR NECROTIZING SOFT TISSUE INFECTION;  Surgeon: Vladimir Vick MD;  Location: [facility];  Service: General;  Laterality: N/A;  Place patient in dorsal lithotomy positioning.    LAPAROSCOPIC ILEOSTOMY N/A 11/15/2023    Procedure: CREATION, ILEOSTOMY, LAPAROSCOPIC;  Surgeon: Fredi Shields MD;  Location: [facility];  Service: Colon and Rectal;  Laterality: N/A;    LAPAROSCOPIC TOTAL COLECTOMY N/A 11/15/2023    Procedure: COLECTOMY, TOTAL, LAPAROSCOPIC;  Surgeon: Fredi Shields MD;  Location: [facility];  Service: Colon and Rectal;  Laterality: N/A;  LAP TOTAL COLECTOMY WITH ILEOSTOMY       Family History    None       Tobacco Use    Smoking status: Not on file    Smokeless tobacco: Not on file   Substance and Sexual Activity    Alcohol use: Not on file    Drug use: Not on file    Sexual activity: Not on file     Review of Systems   Constitutional:  Positive for activity change and fatigue. Negative for chills.   HENT: Negative.     Respiratory: Negative.     Cardiovascular: Negative.    Gastrointestinal:  Abdominal pain:  some post op discomfort he says.   Musculoskeletal:  Positive for joint swelling.   Skin:  Positive for wound.   Neurological: Negative.        Objective:     Vital Signs (Most Recent):  Temp: 99.8 °F (37.7 °C) (11/17/23 1100)  Pulse: 93 (11/17/23 1100)  Resp: 16 (11/17/23 1100)  BP: 117/75 (11/17/23 1100)  SpO2: 99 % (11/17/23 1100) Vital Signs (24h Range):  Temp:  [98.1 °F (36.7 °C)-99.8 °F (37.7 °C)] 99.8 °F (37.7 °C)  Pulse:  [] 93  Resp:  [15-18] 16  SpO2:  [95 %-99 %] 99 %  BP: ()/(64-75) 117/75     Weight: 74.8 kg (165 lb)  Body mass index is 23.68 kg/m².     Physical Exam  Vitals reviewed.   Constitutional:       Appearance: Normal appearance.   HENT:      Right Ear: Tympanic membrane normal.      Left Ear: Tympanic membrane normal.   Pulmonary:      Effort: Pulmonary effort is normal.   Skin:     Capillary Refill: Capillary refill takes less than 2 seconds.   Neurological:      General: No focal deficit present.      Mental Status: He is alert and oriented to person, place, and time. Mental status is at baseline.     Image pulled in taken by staff on 11/16/23       Laboratory:  CBC:   Recent Labs   Lab 11/17/23  0514   WBC 17.21  17.21*   RBC 3.20*   HGB 8.7*   HCT 27.2*      MCV 85.0   MCH 27.2   MCHC 32.0*     CMP:   Recent Labs   Lab 11/17/23  0514   CALCIUM 8.0*   ALBUMIN 1.5*   *   K 4.5   CO2 33*   BUN 13.4   CREATININE 0.72*   ALKPHOS 117   ALT 32   AST 29   BILITOT 0.7     Microbiology Results (last 7 days)       Procedure Component Value Units Date/Time    Wound Culture [[id number]]  (Abnormal)  (Susceptibility) Collected: 11/11/23 1851    Order Status: Completed Specimen: Abscess from Rectum Updated: 11/16/23 1154     Wound Culture Many Escherichia coli      Many Streptococcus constellatus    Blood Culture [[id number]]  (Normal) Collected: 11/10/23 2048    Order Status: Completed Specimen: Blood from Antecubital, Left Updated: 11/15/23 2301     CULTURE, BLOOD ([facility]) No  Growth at 5 days    MARLENI Prep [[id number]] Collected: 11/11/23 1851    Order Status: Completed Specimen: Abscess Updated: 11/13/23 1219     KOH Prep No fungal elements seen    Fungal Culture [[id number]] Collected: 11/11/23 1851    Order Status: Sent Specimen: Abscess Updated: 11/13/23 1122    AFB Smear [[id number]] Collected: 11/11/23 1851    Order Status: Completed Specimen: Abscess from Rectum Updated: 11/13/23 0754     AFB Smear No AFB seen (Direct smear only) - Concentration to follow    Mycobacteria and Nocardia Culture [[id number]] Collected: 11/11/23 1851    Order Status: Sent Specimen: Abscess from Rectum Updated: 11/12/23 1404    Gram Stain [[id number]] Collected: 11/11/23 1851    Order Status: Completed Specimen: Abscess from Rectum Updated: 11/12/23 1129     GRAM STAIN Many WBC observed      Many Gram positive cocci      Many Gram Negative Rods      Many Gram Positive Rods    Fungal Culture [[id number]] Collected: 11/11/23 1851    Order Status: Sent Specimen: Abscess from Rectum Updated: 11/11/23 1907    Blood culture #1 **CANNOT BE ORDERED STAT** [[id number]]  (Normal) Collected: 11/06/23 0405    Order Status: Completed Specimen: Blood from Antecubital, Right Updated: 11/11/23 0701     CULTURE, BLOOD ([facility]) No Growth at 5 days    Blood culture #2 **CANNOT BE ORDERED STAT** [[id number]]  (Normal) Collected: 11/06/23 0405    Order Status: Completed Specimen: Blood from Antecubital, Left Updated: 11/11/23 0600     CULTURE, BLOOD ([facility]) No Growth at 5 days                Assessment/Plan:     Necrotizing fasciitis status post operative debridement  Positive cultures for E coli and Streptococcus, on IV Zosyn  Ulcerative colitis, new diagnosis October 2023  Now status post total colectomy and end ileostomy on 11/15/23       The principal treatment for progressive necrotizing infection such as necrotizing fasciitis is surgical debridement and systemic antibiotics which has been done.  HBO therapy is an adjunct to  help reduce the extensive inflammation and necrosis of the subcutaneous fat, fascia and muscle. Hyperbaric oxygen will increase the  concentration of dissolved oxygen in the blood which can augment oxygenation to all parts of the body,   stimulates the formation of a collagen matrix , promotes angiogenesis and can be bactericidal.   Recommendations for 90 minutes at 2.4 BEAR while patient is hospitalized.    Physician Supervision: I provided direct supervision and was immediately available to furnish assistance and direction throughout the performance of the procedure.A trained emergency response team and emergency services were available throughout procedure. Pt had no complaints: no chest pain or sob or earache or sinus pressure      Madeleine Lagos MD  Wound Care  [facility] - 8th Floor Med Surg       The time spent including preparing to see the patient, obtaining patient history and assessment, evaluation of the plan of care, patient/caregiver counseling and education, orders, documentation, coordination of care, and other professional medical management activities for today's encounter was 70 minutes.

## 2023-11-17 NOTE — PROGRESS NOTES
[facility] - 8th Floor Med Surg  Wound Care    Patient Name:  Lionel León   MRN:  [id number]  Date: 11/17/2023  Diagnosis: <principal problem not specified>    History:     History reviewed. No pertinent past medical history.    Social History     Socioeconomic History    Marital status:      Social Determinants of Health     Food Insecurity: Unknown (11/7/2023)    Hunger Vital Sign     Worried About Running Out of Food in the Last Year: Never true   Transportation Needs: Unknown (11/7/2023)    PRAPARE - Transportation     Lack of Transportation (Medical): No   Housing Stability: Unknown (11/7/2023)    Housing Stability Vital Sign     Unable to Pay for Housing in the Last Year: No       Precautions:     Allergies as of 11/06/2023    (No Known Allergies)       [facility] Assessment Details/Treatment     Follow up visit, assisted PRS Dr. Diamond to visit and examine patients perineal/buttock wound, cleansed wound and redressed with Vashe moist gauze under dry gauze and ABD pad secured with cloth tape. Patient remains resting on a low air loss Immerse mattress and demonstrated ability to mobilize self well in bed to off load his bony prominences and wound  . His ileostomy appliance is intact to gravity drainage with small amount of liquid stool noted. Reviewed also education related to living with an ileostomy, using written , verbal , video, and demonstrated   information. Patient and spouse verbalize understanding and [facility] to [facility].  (Add Subjective Assessment as Narrative)       11/17/23 0750        Incision/Site 11/11/23 1921 Buttocks   Date First Assessed/Time First Assessed: 11/11/23 1921   Location: Buttocks   Incision WDL ex   Dressing Appearance Moist drainage   Drainage Amount Small   Drainage Characteristics/Odor Serosanguineous   Appearance Pink;Red   Red (%), Wound Tissue Color 80 %   Periwound Area Intact   Wound Edges Defined;Jagged   Wound Length  (cm) 15 cm   Wound Width (cm) 10 cm   Wound Surface Area (cm^2) 150 cm^2   Care Antimicrobial agent   Dressing Gauze;Rolled gauze  (Vashe moist gauze in wound bed unde ABD pad secured with cloth tape.)   Dressing Change Due 11/17/23       Recommendations made to primary team  Danae DENNIS for follow up wound care and monitoring of ostomy appliance and education related to living with an ostomy . Orders placed.     11/17/2023

## 2023-11-17 NOTE — PROGRESS NOTES
[facility] Hospital Medicine Progress Note        Chief Complaint: Inpatient follow-up on ulcerative colitis    HPI:   Lionel León is a 42 y.o. White male with a past medical history of diverticulitis status post colon resection and ulcerative colitis. Patient had recent admission to hospital medicine services at [facility] on 10/21/2023 to 10/24/2023 for sepsis secondary to pancolitis. He was treated with antibiotics and GI was consulted. Colonoscopy was performed on 10/23/2023 with findings concerning for ulcerative colitis and biopsies of the cecum, ascending, transverse, ascending colon and rectum positive for active chronic colitis consistent with inflammatory bowel disorder. No transfusion was required for rectal bleeding. Patient was discharged with prednisone taper and GI follow up appointment on 11/17/2023. The patient presented to [facility] on 11/6/2023 with a primary complaint of bright red rectal bleeding and lower abdominal pain.  Patient reports rectal bleeding has been ongoing since discharge.  Other associated symptoms include nausea, rectal pain, subjective fevers, weakness, fatigue and a 30 40 lb weight loss over last 3 weeks.  Patient states this morning he was walking to the bathroom when he felt flushed and vision went black. He passed out and hit his head on the wooden floor. Approximately hour and a half later when he went to get up he passed out again.  Second syncopal episode was witnessed by patient's wife who is at bedside reports loss of consciousness lasts for approximately 30 seconds.           Upon presentation to the ED, temperature 98.3F, heart rate 109, blood pressure 96/51, respiratory rate 17, spO2 99%. Labs with H&H 7.3/23.6 (10.6/33.2 on 10/24/2023), BUN 21.9, creatinine 0.88, calcium 7.7, .6, ESR 86. EKG sinus tachycardia with a ventricular rate of 108 and age undetermined possible inferior infarct. In the ED patient received Dilaudid, Zofran,  Hydrocortisone and IVF. He was typed and crossmatched for transfusion of 2 units of packed RBC. Patient is admitted to hospital medicine services for further medical management.       Patient was seen by GI and started on IV steroids, Solu-Medrol 30 mg IV b.I.d. for active ulcerative colitis flare up  Plus Anusol suppository per rectum b.I.d..  Patient is status post 2 units PRBC for symptomatic anemia.  CT abdomen and pelvis was also ordered to assess extent. CT of the abdomen and pelvis was pertinent for findings consistent with colitis involving the ascending colon, portions of the descending colon.  Underlying gastritis also can not be excluded secondary to slightly thickened gastric wall.       May complain continues to be rectal pain but refers that suppositories are helping.  Patient is tolerating p.o. intake      According to GI notes plans is for patient to start Humira/imura once approved by patient's insurance.    Today  11/7/23-Doing better feeling better pain is better.  4 bowel movements yesterday with some blood as well.  Hemoglobin did not have appropriate response but will continue to monitor.  Discuss with GI physician assistant as well.     11/8/23 dr alvarez -  Today patient had 1 BM overnight and another one this a.m..  Refers that stools are becoming more formed still with some ongoing hematochezia.  Rectal pain continues to improve.  For now we will continue hydrocortisone suppositories t.I.d. as well as IV Solu-Medrol 30 mg IV b.I.d. with plans to start Humira/ Imura as outpt.  Patient today refers feeling okay, his pain goes up and down but overall feels improved since admission not ready to go home.  We will continue present management and continue following recommendations by GI     11/09/2023 Dr. Alvarez-chart reviewed patient examined.  Still having episodes of bright red per rectum.  Also refers some dizziness when getting out of bed.  Hematocrit dropped to 24.7.  BP running at 98/57.  We  started normal saline solution.  will require PRBC transfusion tomorrow.        11/10/2023 Dr. Koo reviewed patient examined.  Remains with complains of dizziness with hematocrit around 24.7.  Samples of Humira available but patient ran a fever of around 102.5 that quickly resolved without antipyretic.  Now Humira on hold.  We will go ahead and transfuse 2 units PRBC.  Patient received 1 g acetaminophen.  Also refers his stools are more formed with less abdominal pain and good appetite.     11-11-23 Dr. Koo reviewed patient examined.  Yesterday patient started having temperature spikes.  Today he has complains of  pain over perineum/buttocks that has worsened.  Area with extensive erythema and induration over medial aspects of buttocks.  CT abdomen and pelvis with contrast was done showing findings pertinent for Xochitl gangrene.  Patient is on vancomycin and Zosyn, will add clindamycin for toxin control.  Patient has been seen by surgical hospitalist with plans for OR for exploration ,debridement today.  We will go ahead and consult Infectious Disease for evaluation as well .     11/12/2023 Dr. Koo reviewed patient examined.  Patient was taken to OR yesterday for debridement of perineal/sacral area for findings concerning xochitl's gangrene.  We will return to OR today for further debridement.  Eventually will need diverting colostomy this coming week.  Refers feeling better, has a rectal tube that is functional.  Continues on cleaned the/Zosyn/vancomycin with cultures so far with many Gram-negative rods.  Given 1 unit PRBC     11/13/2023 Dr. Koo reviewed patient examined.  Patient was seen with Infectious Disease nurse practitioner Ms. Cate He.  Case was later discussed with infectious disease specialist Dr. Hampton.  Case was also discussed with General surgery.  There were plans for diverting colostomy but patient with inflammatory bowel disease involving  ascending/descending colon and might benefit from colectomy/colostomy.  We decided to consult colorectal surgeon Dr. Fredi nicole for evaluation.  On physical examination he has an open wound that has been debrided twice already, he has a rectal tube but still there was spillage of stools into the wound.  Perhaps subsided and by recommendations from Infectious Disease vancomycin  Has been discontinued as well as clindamycin, we will continue Zosyn for now.     11/14/2023 Dr. Alvarez-sridhar reviewed patient examined.  Complains of intense pain with excellent level of consciousness.  Medications were upgraded.  Patient to be seen by Colorectal surgeon this a.m..  We will continue present management for now awaiting recommendations by Colorectal surgery.     11/15/23 dr alvarez - sridhar reviewed and pt examined. Plans for total colectomy today by dr nicole. Pain better controlled with upgraded dosing. . Steroids being tapered. Will continue present abx's as per ID recs.        11/16/2023 Dr. Alvarez-pod 1.  Colectomy and ileostomy formation.  Patient with good bowel sounds some bloody output noted in bag.  Abdomen slightly distended.  Transfused 2 units PRBC for a hematocrit of around 20.  Vital signs are stable.  Continues on Zosyn with cultures pertinent for E coli in wound.  Refers some improvement    Interval Hx:  The patient's nurse, the wound care nurse, plastic surgeon as well as the patient's wife are all at bedside.  Patient is having his wound redressed.  No new issues have been reported to me.  Patient is afebrile, on room air, and hemodynamically stable.    Case was discussed with patient's nurse.    Objective/physical exam:  General: in no acute distress  HENT: normocephalic, atraumatic  Eye: PERRL, EOMI, clear conjunctiva  Neck: full ROM, no thyromegaly, no JVD  Respiratory: clear to auscultation bilaterally  Cardiovascular: regular rate and rhythm  Gastrointestinal: non-distended, positive bowel sounds,  non-tender, patent ileostomy  Genitourinary:  Devi catheter place; perineal wound with packing in place  Neurological: cranial nerves grossly intact, no focal neurological deficit  Psychiatric: cooperative      VITAL SIGNS: 24 HRS MIN & MAX LAST   Temp  Min: 98.1 °F (36.7 °C)  Max: 99.2 °F (37.3 °C) 98.2 °F (36.8 °C)   BP  Min: 99/72  Max: 117/64 117/64   Pulse  Min: 87  Max: 100  94   Resp  Min: 15  Max: 18 16   SpO2  Min: 95 %  Max: 96 % 95 %     I have reviewed the following labs:  Recent Labs   Lab 11/16/23  0453 11/16/23  1811 11/17/23  0514   WBC 12.47  12.47* 18.88* 17.21  17.21*   RBC 2.36* 3.25* 3.20*   HGB 6.2* 9.0* 8.7*   HCT 20.2* 27.6* 27.2*   MCV 85.6 84.9 85.0   MCH 26.3* 27.7 27.2   MCHC 30.7* 32.6* 32.0*   RDW 15.9 15.6 15.9    277 284   MPV 9.8 9.7 9.4     Recent Labs   Lab 11/15/23  0508 11/16/23  0453 11/17/23  0514   * 131* 132*   K 5.1 4.8 4.5   CO2 29 32* 33*   BUN 15.4 17.4 13.4   CREATININE 0.69* 0.75 0.72*   CALCIUM 7.6* 6.9* 8.0*   MG 2.20 2.20 2.10   ALBUMIN 1.5* 1.6* 1.5*   ALKPHOS 80 57 117   ALT 54 34 32   AST 16 17 29   BILITOT 0.4 0.5 0.7     Microbiology Results (last 7 days)       Procedure Component Value Units Date/Time    Wound Culture [[id number]]  (Abnormal)  (Susceptibility) Collected: 11/11/23 1851    Order Status: Completed Specimen: Abscess from Rectum Updated: 11/16/23 1154     Wound Culture Many Escherichia coli      Many Streptococcus constellatus    Blood Culture [[id number]]  (Normal) Collected: 11/10/23 2048    Order Status: Completed Specimen: Blood from Antecubital, Left Updated: 11/15/23 2301     CULTURE, BLOOD ([facility]) No Growth at 5 days    MARLENI Prep [[id number]] Collected: 11/11/23 1851    Order Status: Completed Specimen: Abscess Updated: 11/13/23 1219     KOH Prep No fungal elements seen    Fungal Culture [[id number]] Collected: 11/11/23 1851    Order Status: Sent Specimen: Abscess Updated: 11/13/23 1122    AFB Smear [[id number]] Collected:  11/11/23 1851    Order Status: Completed Specimen: Abscess from Rectum Updated: 11/13/23 0754     AFB Smear No AFB seen (Direct smear only) - Concentration to follow    Mycobacteria and Nocardia Culture [[id number]] Collected: 11/11/23 1851    Order Status: Sent Specimen: Abscess from Rectum Updated: 11/12/23 1404    Gram Stain [[id number]] Collected: 11/11/23 1851    Order Status: Completed Specimen: Abscess from Rectum Updated: 11/12/23 1129     GRAM STAIN Many WBC observed      Many Gram positive cocci      Many Gram Negative Rods      Many Gram Positive Rods    Fungal Culture [[id number]] Collected: 11/11/23 1851    Order Status: Sent Specimen: Abscess from Rectum Updated: 11/11/23 1907    Blood culture #1 **CANNOT BE ORDERED STAT** [[id number]]  (Normal) Collected: 11/06/23 0405    Order Status: Completed Specimen: Blood from Antecubital, Right Updated: 11/11/23 0701     CULTURE, BLOOD ([facility]) No Growth at 5 days    Blood culture #2 **CANNOT BE ORDERED STAT** [[id number]]  (Normal) Collected: 11/06/23 0405    Order Status: Completed Specimen: Blood from Antecubital, Left Updated: 11/11/23 0600     CULTURE, BLOOD ([facility]) No Growth at 5 days             See below for Radiology    Scheduled Med:   ketorolac  15 mg Intravenous Q6H    piperacillin-tazobactam (Zosyn) IV (PEDS and ADULTS) (extended infusion is not appropriate)  4.5 g Intravenous Q8H      Continuous Infusions:   lactated ringers 125 mL/hr at 11/17/23 0257      PRN Meds:  0.9%  NaCl infusion (for blood administration), 0.9%  NaCl infusion (for blood administration), 0.9%  NaCl infusion (for blood administration), 0.9%  NaCl infusion (for blood administration), acetaminophen, acetaminophen, dextrose 10%, dextrose 10%, dicyclomine, glucagon (human recombinant), glucose, glucose, hydrocortisone, HYDROmorphone, morphine, ondansetron, oxyCODONE, prochlorperazine, sodium chloride 0.9%     Assessment/Plan:  Acute severe ulcerative colitis , new onset  -C diff  negative  Completed corticosteroid taper    -post total colectomy/ileostomy         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 11/12/2023 and 11-13-23  - post total colectomy/ileostomy formation  -vancomycin as well as clindamycin discontinued, continue Zosyn.- cx's w e.coli        Post Orthostatic syncope probably related to symptomatic anemia/intravascular volume depletion     Sepsis due to Allison gangrene /continue Zosyn     Rectal pain with history of hemorrhoids/hemorrhoidectomy at [facility]     Severe protein calorie malnutrition  Continue full liquids     GI /infectious disease//Colorectal surgery/general surgery/Plastic surgery consulted         Symptomatic anemia-status post transfusion 2 units PRBC    VTE prophylaxis:  SCDs    Patient condition:  Stable    Anticipated discharge and Disposition:   TBD      All diagnosis and differential diagnosis have been reviewed; assessment and plan has been documented; I have personally reviewed the labs and test results that are presently available; I have reviewed the patients medication list; I have reviewed the consulting providers response and recommendations. I have reviewed or attempted to review medical records based upon their availability    All of the patient's questions have been  addressed and answered. Patient's is agreeable to the above stated plan. I will continue to monitor closely and make adjustments to medical management as needed.  _____________________________________________________________________    Nutrition Status:    Radiology:  I have personally reviewed the following imaging and agree with the radiologist.     CT Abdomen Pelvis W Wo Contrast  EXAMINATION  CT ABDOMEN PELVIS W WO CONTRAST    CLINICAL HISTORY  Abdominal abscess/infection suspected;Pt with induration, erythema, at the perineum, in btw scrotum and anal area. Pt with  IBD/UC;    TECHNIQUE  Pre- and post-contrast helical-acquisition CT images were obtained and multiplanar reformats accomplished by a CT technologist at a separate workstation, pushed to PACS for physician review.    CONTRAST  *IV: ISOVUE-370, 100 mL  *Enteric: Gastroview, 30 mL diluted in water    COMPARISON  6 November 2023    FINDINGS  Images were reviewed in soft tissue, lung, and bone windows.    Exam quality: adequate for evaluation    Lines/tubes: none visualized    There is been inadequate duration of time between contrast ingestion and scan acquisition, resulting in incomplete transit of oral contrast agent through the GI tract.  Contrast is visualized only to the mid to distal small bowel loops.  There are no findings of high-grade bowel obstruction or new focal gastrointestinal process.  Previously described findings of colitis are similar to slightly improved.  No definite new areas of GI tract inflammatory change appreciated.    There is interval development of extensive subcutaneous edema and inflammatory fat stranding through the area of the perineum and posteromedial buttocks.  Associated tracking subcutaneous gas is also noted through the region.  No expansile fluid collection or other drainable component is identified.    No other significant short interval changes through the scanned region.    IMPRESSION  1. Interval changes of perineum concerning for development of Allison's gangrene.  No associated drainable collection.  2. Similar to slightly improved appearance of previously described colitis.  ==========    This report was flagged in Epic as abnormal.    RADIATION DOSE  Automated tube current modulation, weight-based exposure dosing, and/or iterative reconstruction technique utilized to reach lowest reasonably achievable exposure rate.    DLP: 873 mGy*cm    Of note, the pre-contrast acquisition provides no additional diagnostic value relative to the added radiation exposure to the patient.   Routine abdominopelvic CT with single phase post-contrast acquisition would be adequate for follow-up.    Electronically signed by: Kem Gannon  Date:    11/11/2023  Time:    13:23  X-Ray Chest PA And Lateral  Narrative: EXAMINATION:  XR CHEST PA AND LATERAL    CLINICAL HISTORY:  fever;    TECHNIQUE:  PA and lateral views of the chest were performed.    COMPARISON:  None    FINDINGS:  The lungs are clear, with normal appearance of pulmonary vasculature and no pleural effusion or pneumothorax.    The cardiac silhouette is normal in size. The hilar and mediastinal contours are unremarkable.    Bones are intact.  Impression: No acute abnormality.    Electronically signed by: Quinton Hernandez MD  Date:    11/11/2023  Time:    08:49      Horace León MD   11/17/2023

## 2023-11-17 NOTE — PROGRESS NOTES
"Gastroenterology Progress Note    Subjective/Interval History:  H&H 8.7/27.2 s/p 2u PRBC - appropriate response    WBC 17.21 - remains on zosyn    Patient en route to hyperbaric therapy. He is doing ok. Ostomy output with slightly less red color, becoming browner.     ROS:  Review of Systems   Constitutional:  Positive for malaise/fatigue.   Respiratory:  Negative for cough and shortness of breath.    Cardiovascular:  Negative for chest pain.   Gastrointestinal:  Positive for blood in stool (decreasing red color to ostomy output). Negative for abdominal pain, nausea and vomiting.   Neurological:  Negative for weakness.       Vital Signs:  BP 99/72   Pulse 100   Temp 98.7 °F (37.1 °C) (Oral)   Resp 18   Ht 5' 10" (1.778 m)   Wt 74.8 kg (165 lb)   SpO2 95%   BMI 23.68 kg/m²   Body mass index is 23.68 kg/m².    Physical Exam:  Physical Exam  Constitutional:       General: He is not in acute distress.     Appearance: He is not ill-appearing.   HENT:      Head: Normocephalic and atraumatic.      Right Ear: External ear normal.      Left Ear: External ear normal.      Nose: Nose normal.      Mouth/Throat:      Pharynx: Oropharynx is clear.   Eyes:      Conjunctiva/sclera: Conjunctivae normal.   Pulmonary:      Effort: Pulmonary effort is normal. No respiratory distress.   Abdominal:      General: Abdomen is flat. There is no distension.      Tenderness: There is no abdominal tenderness. There is no guarding.      Comments: Abd firm. Ostomy in RLQ with less bloody output   Musculoskeletal:         General: Normal range of motion.      Cervical back: Normal range of motion.   Skin:     General: Skin is warm and dry.      Coloration: Skin is not pale.   Neurological:      Mental Status: He is alert and oriented to person, place, and time. Mental status is at baseline.      Motor: No weakness.   Psychiatric:         Mood and Affect: Mood normal.         Behavior: Behavior normal.         Thought Content: Thought " content normal.         Labs:  Recent Results (from the past 24 hour(s))   CBC Without Differential    Collection Time: 11/16/23  6:11 PM   Result Value Ref Range    WBC 18.88 (H) 4.50 - 11.50 x10(3)/mcL    RBC 3.25 (L) 4.70 - 6.10 x10(6)/mcL    Hgb 9.0 (L) 14.0 - 18.0 g/dL    Hct 27.6 (L) 42.0 - 52.0 %    MCV 84.9 80.0 - 94.0 fL    MCH 27.7 27.0 - 31.0 pg    MCHC 32.6 (L) 33.0 - 36.0 g/dL    RDW 15.6 11.5 - 17.0 %    Platelet 277 130 - 400 x10(3)/mcL    MPV 9.7 7.4 - 10.4 fL    NRBC% 0.6 %   Comprehensive Metabolic Panel    Collection Time: 11/17/23  5:14 AM   Result Value Ref Range    Sodium Level 132 (L) 136 - 145 mmol/L    Potassium Level 4.5 3.5 - 5.1 mmol/L    Chloride 96 (L) 98 - 107 mmol/L    Carbon Dioxide 33 (H) 22 - 29 mmol/L    Glucose Level 105 (H) 74 - 100 mg/dL    Blood Urea Nitrogen 13.4 8.9 - 20.6 mg/dL    Creatinine 0.72 (L) 0.73 - 1.18 mg/dL    Calcium Level Total 8.0 (L) 8.4 - 10.2 mg/dL    Protein Total 4.9 (L) 6.4 - 8.3 gm/dL    Albumin Level 1.5 (L) 3.5 - 5.0 g/dL    Globulin 3.4 2.4 - 3.5 gm/dL    Albumin/Globulin Ratio 0.4 (L) 1.1 - 2.0 ratio    Bilirubin Total 0.7 <=1.5 mg/dL    Alkaline Phosphatase 117 40 - 150 unit/L    Alanine Aminotransferase 32 0 - 55 unit/L    Aspartate Aminotransferase 29 5 - 34 unit/L    eGFR >60 mls/min/1.73/m2   Magnesium    Collection Time: 11/17/23  5:14 AM   Result Value Ref Range    Magnesium Level 2.10 1.60 - 2.60 mg/dL   Prealbumin    Collection Time: 11/17/23  5:14 AM   Result Value Ref Range    Prealbumin 10.6 (L) 18.0 - 45.0 mg/dL   CBC with Differential    Collection Time: 11/17/23  5:14 AM   Result Value Ref Range    WBC 17.21 (H) 4.50 - 11.50 x10(3)/mcL    RBC 3.20 (L) 4.70 - 6.10 x10(6)/mcL    Hgb 8.7 (L) 14.0 - 18.0 g/dL    Hct 27.2 (L) 42.0 - 52.0 %    MCV 85.0 80.0 - 94.0 fL    MCH 27.2 27.0 - 31.0 pg    MCHC 32.0 (L) 33.0 - 36.0 g/dL    RDW 15.9 11.5 - 17.0 %    Platelet 284 130 - 400 x10(3)/mcL    MPV 9.4 7.4 - 10.4 fL    NRBC% 0.1 %   Manual  Differential    Collection Time: 11/17/23  5:14 AM   Result Value Ref Range    WBC 17.21 x10(3)/mcL    Neutrophils % 98 %    Lymphs % 1 %    Eosinophils % 1 %    Neutrophils Abs 16.8658 (H) 2.1 - 9.2 x10(3)/mcL    Lymphs Abs 0.1721 (L) 0.6 - 4.6 x10(3)/mcL    Eosinophils Abs 0.1721 0 - 0.9 x10(3)/mcL    Platelets Normal Normal, Adequate    RBC Morph Normal Normal         Assessment/Plan:  42-year-old male known to Dr. He with past medical history of diverticulitis s/p colon resection and recent new diagnosis of ulcerative colitis.  He presented to the ED on 11/6 with complaints of bright red blood per rectum and lower abdominal pain associated with nausea, rectal pain, weakness and 30-40 lb weight loss over the past 3 weeks with syncopal episode x2- 2nd episode with LOC.     Newly diagnosed UC, active flare - s/p total colectomy  Xochitl's gangrene s/p I&D x2  - Colonoscopy 10/23/2023 with findings c/w IBD.  He was discharged on prednisone taper with outpatient follow up appointment scheduled 11/17/2023, but ended up presenting here on 11/6  -  on 11/6---95.40 on 11/10  - outpatient appt in our office was scheduled for 11/17 with plans to start humira and imuran- Patient did obtain Humira samples and was planning on having loading dose; however patient became febrile and CT revealed xochitl's gangrene. S/p I&D of perineum 11/11/23 - currently on zosyn  - .9 on 10/21/23; as of 11/09/23 it is 95.4  - s/p total colectomy with end ileostomy 11/15/23 - management per surgery  - d/c IV solumedrol - he has been weaned off 5mg per day over the last few days     3. Fever - resolved  - UA on 10th was unremarkable.   - Stool for Cdiff neg on 7th   - Blood cultures neg from 11/6  - CT abd on 11/6 Findings seen consistent with colitis involving the ascending colon and portions of the descending colon.  Follow-up is recommended to resolution as underlying mass cannot be completely excluded. The gastric wall  appears to be slightly thickened.  Underlying gastritis should be excluded.     We will arrange outpatient follow up in our office.    No further GI recommendations at this time. GI will sign off. Please call with any questions.    Jeanine Pacheco PA-C  Gastroenterology  [facility]

## 2023-11-17 NOTE — PROGRESS NOTES
"   Acute Care Surgery   Progress Note  Admit Date: 11/6/2023  HD#11  POD#2 Days Post-Op    Subjective:   Interval history:  NAEON; AF, VSS.  Pain well controlled. Ambulating.   Evaluated by wound care - will be getting hyperbarics this morning.     Home Meds:  Current Outpatient Medications   Medication Instructions    dicyclomine (BENTYL) 20 mg, Oral, 4 times daily PRN    predniSONE (DELTASONE) 10 MG tablet Take 4 tablets (40 mg total) by mouth once daily for 7 days, THEN 3 tablets (30 mg total) once daily for 7 days, THEN 2 tablets (20 mg total) once daily for 7 days, THEN 1 tablet (10 mg total) once daily for 7 days, THEN 0.5 tablets (5 mg total) once daily for 7 days.      Scheduled Meds:   ketorolac  15 mg Intravenous Q6H    methylPREDNISolone sodium succinate injection  5 mg Intravenous BID    piperacillin-tazobactam (Zosyn) IV (PEDS and ADULTS) (extended infusion is not appropriate)  4.5 g Intravenous Q8H     Continuous Infusions:   lactated ringers 125 mL/hr at 11/17/23 0257     PRN Meds:0.9%  NaCl infusion (for blood administration), 0.9%  NaCl infusion (for blood administration), 0.9%  NaCl infusion (for blood administration), 0.9%  NaCl infusion (for blood administration), acetaminophen, acetaminophen, dextrose 10%, dextrose 10%, dicyclomine, glucagon (human recombinant), glucose, glucose, hydrocortisone, HYDROmorphone, morphine, ondansetron, oxyCODONE, prochlorperazine, sodium chloride 0.9%     Objective:     VITAL SIGNS: 24 HR MIN & MAX LAST   Temp  Min: 98.1 °F (36.7 °C)  Max: 99.5 °F (37.5 °C)  98.5 °F (36.9 °C)   BP  Min: 95/55  Max: 115/68  105/69    Pulse  Min: 84  Max: 97  87    Resp  Min: 15  Max: 20  15    SpO2  Min: 92 %  Max: 96 %  96 %      HT: 5' 10" (177.8 cm)  WT: 74.8 kg (165 lb)  BMI: 23.7     Intake/output:  Intake/Output - Last 3 Shifts         11/15 0700 11/16 0659 11/16 0700 11/17 0659 11/17 0700  11/18 0659    P.O. 120 1450     I.V. (mL/kg) 1100 (14.7) 1432.7 (19.2)     Blood  " "557.9     NG/GT       IV Piggyback 1100 691.5     Total Intake(mL/kg) 2320 (31) 4132.1 (55.2)     Urine (mL/kg/hr) 150 (0.1) 2850 (1.6)     Stool 0 10     Total Output 150 2860     Net +2170 +1272.1            Stool Occurrence 0 x 0 x             Intake/Output Summary (Last 24 hours) at 11/17/2023 0708  Last data filed at 11/17/2023 0639  Gross per 24 hour   Intake 4132.09 ml   Output 2860 ml   Net 1272.09 ml             Lines/drains/airway:       Peripheral IV - Single Lumen 18 G Right Hand (Active)   Site Assessment Clean;Dry;Intact 11/12/23 0000   Extremity Assessment Distal to IV No abnormal discoloration;No redness;No swelling 11/11/23 2010   Line Status Saline locked 11/12/23 0000   Dressing Status Clean;Dry;Intact 11/12/23 0000   Dressing Intervention Integrity maintained 11/11/23 2010   Number of days:             Rectal Tube 11/11/23 1944 fecal management system (Active)   Stool Color Brown;Red 11/11/23 2100   Number of days: 0            Urethral Catheter 11/11/23 1954 Double-lumen;Silicone;Non-latex 16 Fr. (Active)   Site Assessment Clean;Intact 11/11/23 2100   Collection Container Urimeter 11/11/23 2100   Securement Method secured to top of thigh w/ adhesive device 11/11/23 2100   Catheter Care Performed yes 11/11/23 2100   Reason for Continuing Urinary Catheterization Post operative 11/11/23 2100   CAUTI Prevention Bundle Securement Device in place with 1" slack;Intact seal between catheter & drainage tubing;Drainage bag/urimeter off the floor;Sheeting clip in use;No dependent loops or kinks;Drainage bag/urimeter not overfilled (<2/3 full);Drainage bag/urimeter below bladder 11/11/23 2100   Output (mL) 700 mL 11/12/23 0548   Number of days: 0       Physical examination:  Gen: NAD, AAOx3, answering questions appropriately  HEENT: PERRLA  CV: RR  Resp: NWOB  Abd: Soft, non-distended. Ostomy to LLQ; mucosa is healthy. Air in bag. Abdomen appropriately tender around midline and ostomy.   Perineum:  Large " "wounds to bilateral buttocks dressing is in place none saturated.  Flexiseal in place.  Ext: moving all extremities spontaneously and purposefully  Neuro: CN II-XII grossly intact    Labs:  Renal:  Recent Labs     11/15/23  0508 11/16/23 0453 11/17/23  0514   BUN 15.4 17.4 13.4   CREATININE 0.69* 0.75 0.72*       No results for input(s): "LACTIC" in the last 72 hours.  FENGI:  Recent Labs     11/15/23  0508 11/16/23 0453 11/17/23  0514   * 131* 132*   K 5.1 4.8 4.5   CO2 29 32* 33*   CALCIUM 7.6* 6.9* 8.0*   MG 2.20 2.20 2.10   ALBUMIN 1.5* 1.6* 1.5*   BILITOT 0.4 0.5 0.7   AST 16 17 29   ALKPHOS 80 57 117   ALT 54 34 32       Heme:  Recent Labs     11/15/23  0508 11/16/23 0453 11/16/23  1811 11/17/23  0514   HGB 7.4* 6.2* 9.0* 8.7*   HCT 23.4* 20.2* 27.6* 27.2*    278 277 284       ID:  Recent Labs     11/16/23 0453 11/16/23 1811 11/17/23  0514   WBC 12.47  12.47* 18.88* 17.21  17.21*       CBG:  Recent Labs     11/15/23  0508 11/16/23 0453 11/17/23  0514   GLUCOSE 145* 182* 105*        Cardiovascular:  No results for input(s): "TROPONINI", "CKTOTAL", "CKMB", "BNP" in the last 168 hours.  ABG:  No results for input(s): "PH", "PO2", "PCO2", "HCO3", "BE" in the last 168 hours.   I have reviewed all pertinent lab results within the past 24 hours.    Imaging:  CT Abdomen Pelvis W Wo Contrast   Final Result      X-Ray Chest PA And Lateral   Final Result      No acute abnormality.         Electronically signed by: Quinton Hernandez MD   Date:    11/11/2023   Time:    08:49      CT Abdomen Pelvis W Wo Contrast   Final Result      Findings seen consistent with colitis involving the ascending colon and portions of the descending colon.  Follow-up is recommended to resolution as underlying mass cannot be completely excluded.      The gastric wall appears to be slightly thickened.  Underlying gastritis should be excluded.         Electronically signed by: John Gale   Date:    11/06/2023 "   Time:    12:02         I have reviewed all pertinent imaging results/findings within the past 24 hours.    Micro/Path/Other:  Microbiology Results (last 7 days)       Procedure Component Value Units Date/Time    Wound Culture [[id number]]  (Abnormal)  (Susceptibility) Collected: 11/11/23 1851    Order Status: Completed Specimen: Abscess from Rectum Updated: 11/16/23 1154     Wound Culture Many Escherichia coli      Many Streptococcus constellatus    Blood Culture [[id number]]  (Normal) Collected: 11/10/23 2048    Order Status: Completed Specimen: Blood from Antecubital, Left Updated: 11/15/23 2301     CULTURE, BLOOD ([facility]) No Growth at 5 days    MARLENI Prep [[id number]] Collected: 11/11/23 1851    Order Status: Completed Specimen: Abscess Updated: 11/13/23 1219     KOH Prep No fungal elements seen    Fungal Culture [[id number]] Collected: 11/11/23 1851    Order Status: Sent Specimen: Abscess Updated: 11/13/23 1122    AFB Smear [[id number]] Collected: 11/11/23 1851    Order Status: Completed Specimen: Abscess from Rectum Updated: 11/13/23 0754     AFB Smear No AFB seen (Direct smear only) - Concentration to follow    Mycobacteria and Nocardia Culture [[id number]] Collected: 11/11/23 1851    Order Status: Sent Specimen: Abscess from Rectum Updated: 11/12/23 1404    Gram Stain [[id number]] Collected: 11/11/23 1851    Order Status: Completed Specimen: Abscess from Rectum Updated: 11/12/23 1129     GRAM STAIN Many WBC observed      Many Gram positive cocci      Many Gram Negative Rods      Many Gram Positive Rods    Fungal Culture [[id number]] Collected: 11/11/23 1851    Order Status: Sent Specimen: Abscess from Rectum Updated: 11/11/23 1907    Blood culture #1 **CANNOT BE ORDERED STAT** [[id number]]  (Normal) Collected: 11/06/23 0405    Order Status: Completed Specimen: Blood from Antecubital, Right Updated: 11/11/23 0701     CULTURE, BLOOD ([facility]) No Growth at 5 days    Blood culture #2 **CANNOT BE ORDERED STAT**  [[id number]]  (Normal) Collected: 11/06/23 0405    Order Status: Completed Specimen: Blood from Antecubital, Left Updated: 11/11/23 0600     CULTURE, BLOOD ([facility]) No Growth at 5 days           Specimen (168h ago, onward)       Start     Ordered    11/15/23 1503  Specimen to Pathology  RELEASE UPON ORDERING        References:    Click here for ordering Quick Tip   Question:  Release to patient  Answer:  Immediate    11/15/23 1503                     Assessment & Plan:   42-year-old male with a past medical history of ulcerative colitis recently diagnosed in October who presented to the hospital with necrotizing fasciitis of his perineum status post wide excisional debridement was peritoneum on 11/11 and on 11/12. Now s/p laparoscopic total colectomy with end ileostomy placement 11/15.      - Wound care on board for perineal wound; plan for hyperbaric therapy this morning   - general surgery will sign off at this time; please call with questions or concerns       Michelle Wang MD  [employer] General Surgery, PGY-2

## 2023-11-17 NOTE — CONSULTS
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  I have been asked to evaluate from a reconstructive standpoint.    No current facility-administered medications on file prior to encounter.     Current Outpatient Medications on File Prior to Encounter   Medication Sig    dicyclomine (BENTYL) 20 mg tablet Take 1 tablet (20 mg total) by mouth 4 (four) times daily as needed (abd pain/cramp).    predniSONE (DELTASONE) 10 MG tablet Take 4 tablets (40 mg total) by mouth once daily for 7 days, THEN 3 tablets (30 mg total) once daily for 7 days, THEN 2 tablets (20 mg total) once daily for 7 days, THEN 1 tablet (10 mg total) once daily for 7 days, THEN 0.5 tablets (5 mg total) once daily for 7 days.       Review of patient's allergies indicates:  No Known Allergies    History reviewed. No pertinent past medical history.  Past Surgical History:   Procedure Laterality Date    COLONOSCOPY, WITH 1 OR MORE BIOPSIES N/A 10/23/2023    Procedure: COLON;  Surgeon: Dragan Underwood MD;  Location: [facility] ENDOSCOPY;  Service: Gastroenterology;  Laterality: N/A;    DEBRIDEMENT, EXTERNAL GENITALIA, PERINEUM, AND ABDOMINAL WALL, FOR NECROTIZING SOFT TISSUE INFECTION Bilateral 11/11/2023    Procedure: DEBRIDEMENT, EXTERNAL GENITALIA/BUTTOCKS FOR NECROTIZING SOFT TISSUE INFECTION;  Surgeon: Vladimir Vick MD;  Location: [facility] OR;  Service: General;  Laterality: Bilateral;  Prone positioning.    DEBRIDEMENT, EXTERNAL GENITALIA,  PERINEUM, AND ABDOMINAL WALL, FOR NECROTIZING SOFT TISSUE INFECTION N/A 11/12/2023    Procedure: DEBRIDEMENT, EXTERNAL GENITALIA, PERINEUM, AND ABDOMINAL WALL, FOR NECROTIZING SOFT TISSUE INFECTION;  Surgeon: Vladimir Vick MD;  Location: [facility] OR;  Service: General;  Laterality: N/A;  Place patient in dorsal lithotomy positioning.    LAPAROSCOPIC ILEOSTOMY N/A 11/15/2023    Procedure: CREATION, ILEOSTOMY, LAPAROSCOPIC;  Surgeon: Fredi Shields MD;  Location: [facility] OR;  Service: Colon and Rectal;  Laterality: N/A;    LAPAROSCOPIC TOTAL COLECTOMY N/A 11/15/2023    Procedure: COLECTOMY, TOTAL, LAPAROSCOPIC;  Surgeon: Fredi Shields MD;  Location: [facility] OR;  Service: Colon and Rectal;  Laterality: N/A;  LAP TOTAL COLECTOMY WITH ILEOSTOMY     Family History    None       Tobacco Use    Smoking status: Not on file    Smokeless tobacco: Not on file   Substance and Sexual Activity    Alcohol use: Not on file    Drug use: Not on file    Sexual activity: Not on file     Review of Systems   All other systems reviewed and are negative.    Objective:     Vital Signs (Most Recent):  Temp: 98.7 °F (37.1 °C) (11/17/23 0748)  Pulse: 100 (11/17/23 0748)  Resp: 18 (11/17/23 0759)  BP: 99/72 (11/17/23 0748)  SpO2: 95 % (11/17/23 0748) Vital Signs (24h Range):  Temp:  [98.1 °F (36.7 °C)-99.5 °F (37.5 °C)] 98.7 °F (37.1 °C)  Pulse:  [] 100  Resp:  [15-20] 18  SpO2:  [92 %-96 %] 95 %  BP: ()/(61-72) 99/72     Weight: 74.8 kg (165 lb)  Body mass index is 23.68 kg/m².      Intake/Output Summary (Last 24 hours) at 11/17/2023 0921  Last data filed at 11/17/2023 0639  Gross per 24 hour   Intake 3892.09 ml   Output 2160 ml   Net 1732.09 ml       Physical Exam  Vitals reviewed.   HENT:      Head: Normocephalic.      Right Ear: External ear normal.      Left Ear: External ear normal.      Nose: Nose normal.      Mouth/Throat:      Mouth: Mucous membranes are moist.   Eyes:      Extraocular Movements: Extraocular movements  intact.   Cardiovascular:      Rate and Rhythm: Normal rate.      Pulses: Normal pulses.   Pulmonary:      Effort: Pulmonary effort is normal.   Abdominal:      General: Abdomen is flat.   Musculoskeletal:         General: Normal range of motion.      Cervical back: Normal range of motion.   Skin:     General: Skin is warm.      Capillary Refill: Capillary refill takes less than 2 seconds.      Comments: Extensive alden anal wound, no signs of infection, fibrinous exudate, no granulation tissue   Neurological:      General: No focal deficit present.      Mental Status: He is alert.   Psychiatric:         Mood and Affect: Mood normal.         Significant Labs:  All pertinent labs from the last 24 hours have been reviewed.  Prealbumin 10    Significant Diagnostics:  I have reviewed all pertinent imaging results/findings within the past 24 hours.    Assessment/Plan:    Perianal wound:  Wound has been debrided recently and appears to be free of infection.  From a reconstructive standpoint this wound is not yet ready for coverage/closure.  Please continue wound care and nutritional support to help promote the formation of granulation tissue.  Discussed possible future options with the patient including closure by secondary intention, primary closure and skin grafting in detail.  Our team will follow.  If discharged please arrange follow up with us for ongoing evaluation.     Active Diagnoses:    Diagnosis Date Noted POA    Pancolitis [K51.00] 10/24/2023 Yes      Problems Resolved During this Admission:       Thank you for your consult. I will follow-up with patient. Please contact us if you have any additional questions.    Rod Diamond MD  Plastic Surgery  [facility] - 8th Floor Med Surg

## 2023-11-17 NOTE — HPI
HBO    42-year-old WM with recent admit on 11/6/23 for necrotizing fascitis of his perineal /gluteal areas s/p extensive surgical debridement along with total colectomy with end ileostomy.  He has hx of recently being diagnosed with  Ulcerative Colitis during an earlier admission to [facility] from 10/21/2023 - 10/24/2023. He was given high doses of steroids he tells me durin this initial treatment/diagnosis timeframe. He went home and apparently became much sicker with rectal bleeding and pain as well as syncopal episode for which he returned to the hospital.   We are using adjuvant HBOT sessions while hospitalized and he is tolerating  the treatments well. Today is treatment 7#.

## 2023-11-17 NOTE — PROGRESS NOTES
Colorectal Surgery Progress Note  Admit Date: 11/6/2023  Hospital Day: 11  Procedure: 2 Days Post-Op lap total abdominal colectomy with creation end ileostomy    S:  AF HDS  UOP adequate  Rebecca FLD  Wound care came by yesterday  Wife able to change perineal wound dressings  Responded to blood transfusion appropriately    O:  Vitals:   Temp:  [98.1 °F (36.7 °C)-99.5 °F (37.5 °C)]   Pulse:  [84-97]   Resp:  [15-20]   BP: ()/(55-69)   SpO2:  [92 %-96 %]     Diet full liquid   Intake/Output Summary (Last 24 hours) at 11/17/2023 0727  Last data filed at 11/17/2023 0639  Gross per 24 hour   Intake 3892.09 ml   Output 2160 ml   Net 1732.09 ml            Physical Exam:  Gen: NAD, AAOx3  HEENT: EOMI, NCAT  CV: RR   Resp: no shortness of breath, normal WOB  Abd: soft, mildly distended, ostomy pink and patent  Ext: no edema      Labs:  Recent Labs     11/15/23  0508 11/16/23  0453 11/16/23  1811 11/17/23  0514   WBC 6.99  6.99 12.47  12.47* 18.88* 17.21  17.21*   HGB 7.4* 6.2* 9.0* 8.7*   HCT 23.4* 20.2* 27.6* 27.2*    278 277 284   * 131*  --  132*   K 5.1 4.8  --  4.5   CO2 29 32*  --  33*   BUN 15.4 17.4  --  13.4   CREATININE 0.69* 0.75  --  0.72*   BILITOT 0.4 0.5  --  0.7   AST 16 17  --  29   ALT 54 34  --  32   ALKPHOS 80 57  --  117   CALCIUM 7.6* 6.9*  --  8.0*   ALBUMIN 1.5* 1.6*  --  1.5*   MG 2.20 2.20  --  2.10     Scheduled Meds: ketorolac, 15 mg, Q6H  methylPREDNISolone sodium succinate injection, 5 mg, BID  piperacillin-tazobactam (Zosyn) IV (PEDS and ADULTS) (extended infusion is not appropriate), 4.5 g, Q8H     lactated ringers 125 mL/hr at 11/17/23 0257       A/P:  41 yo M with IBD s/p TAC with end ileostomy. Doing well    -Advance diet as tolerated  -Wound care teaching for ostomy  -Home health for discharge  -Dc gray today  -Cont Zosyn until at least POD4    Adriana Raygoza MD  [employer] General Surgery, [facility]

## 2023-11-17 NOTE — SUBJECTIVE & OBJECTIVE
Scheduled Meds:   ketorolac  15 mg Intravenous Q6H    piperacillin-tazobactam (Zosyn) IV (PEDS and ADULTS) (extended infusion is not appropriate)  4.5 g Intravenous Q8H     Continuous Infusions:   lactated ringers 125 mL/hr at 11/17/23 0257     PRN Meds:0.9%  NaCl infusion (for blood administration), 0.9%  NaCl infusion (for blood administration), 0.9%  NaCl infusion (for blood administration), 0.9%  NaCl infusion (for blood administration), acetaminophen, acetaminophen, dextrose 10%, dextrose 10%, dicyclomine, glucagon (human recombinant), glucose, glucose, hydrocortisone, HYDROmorphone, morphine, ondansetron, oxyCODONE, prochlorperazine, sodium chloride 0.9%    Review of patient's allergies indicates:  No Known Allergies     History reviewed. No pertinent past medical history.  Past Surgical History:   Procedure Laterality Date    COLONOSCOPY, WITH 1 OR MORE BIOPSIES N/A 10/23/2023    Procedure: COLON;  Surgeon: Dragan Underwood MD;  Location: [facility] ENDOSCOPY;  Service: Gastroenterology;  Laterality: N/A;    DEBRIDEMENT, EXTERNAL GENITALIA, PERINEUM, AND ABDOMINAL WALL, FOR NECROTIZING SOFT TISSUE INFECTION Bilateral 11/11/2023    Procedure: DEBRIDEMENT, EXTERNAL GENITALIA/BUTTOCKS FOR NECROTIZING SOFT TISSUE INFECTION;  Surgeon: Vladimir Vick MD;  Location: [facility];  Service: General;  Laterality: Bilateral;  Prone positioning.    DEBRIDEMENT, EXTERNAL GENITALIA, PERINEUM, AND ABDOMINAL WALL, FOR NECROTIZING SOFT TISSUE INFECTION N/A 11/12/2023    Procedure: DEBRIDEMENT, EXTERNAL GENITALIA, PERINEUM, AND ABDOMINAL WALL, FOR NECROTIZING SOFT TISSUE INFECTION;  Surgeon: Vladimir Vick MD;  Location: [facility];  Service: General;  Laterality: N/A;  Place patient in dorsal lithotomy positioning.    LAPAROSCOPIC ILEOSTOMY N/A 11/15/2023    Procedure: CREATION, ILEOSTOMY, LAPAROSCOPIC;  Surgeon: Fredi Shields MD;  Location: [facility];  Service: Colon and Rectal;  Laterality: N/A;    LAPAROSCOPIC TOTAL  COLECTOMY N/A 11/15/2023    Procedure: COLECTOMY, TOTAL, LAPAROSCOPIC;  Surgeon: Fredi Shields MD;  Location: [facility] OR;  Service: Colon and Rectal;  Laterality: N/A;  LAP TOTAL COLECTOMY WITH ILEOSTOMY       Family History    None       Tobacco Use    Smoking status: Not on file    Smokeless tobacco: Not on file   Substance and Sexual Activity    Alcohol use: Not on file    Drug use: Not on file    Sexual activity: Not on file     Review of Systems   Constitutional:  Positive for activity change and fatigue. Negative for chills.   HENT: Negative.     Respiratory: Negative.     Cardiovascular: Negative.    Gastrointestinal:  Abdominal pain: some post op discomfort he says.   Musculoskeletal:  Positive for joint swelling.   Skin:  Positive for wound.   Neurological: Negative.        Objective:     Vital Signs (Most Recent):  Temp: 99.8 °F (37.7 °C) (11/17/23 1100)  Pulse: 93 (11/17/23 1100)  Resp: 16 (11/17/23 1100)  BP: 117/75 (11/17/23 1100)  SpO2: 99 % (11/17/23 1100) Vital Signs (24h Range):  Temp:  [98.1 °F (36.7 °C)-99.8 °F (37.7 °C)] 99.8 °F (37.7 °C)  Pulse:  [] 93  Resp:  [15-18] 16  SpO2:  [95 %-99 %] 99 %  BP: ()/(64-75) 117/75     Weight: 74.8 kg (165 lb)  Body mass index is 23.68 kg/m².     Physical Exam  Vitals reviewed.   Constitutional:       Appearance: Normal appearance.   HENT:      Right Ear: Tympanic membrane normal.      Left Ear: Tympanic membrane normal.   Pulmonary:      Effort: Pulmonary effort is normal.   Skin:     Capillary Refill: Capillary refill takes less than 2 seconds.   Neurological:      General: No focal deficit present.      Mental Status: He is alert and oriented to person, place, and time. Mental status is at baseline.     Image pulled in taken by staff on 11/16/23       Laboratory:  CBC:   Recent Labs   Lab 11/17/23  0514   WBC 17.21  17.21*   RBC 3.20*   HGB 8.7*   HCT 27.2*      MCV 85.0   MCH 27.2   MCHC 32.0*     CMP:   Recent Labs   Lab  11/17/23  0514   CALCIUM 8.0*   ALBUMIN 1.5*   *   K 4.5   CO2 33*   BUN 13.4   CREATININE 0.72*   ALKPHOS 117   ALT 32   AST 29   BILITOT 0.7     Microbiology Results (last 7 days)       Procedure Component Value Units Date/Time    Wound Culture [[id number]]  (Abnormal)  (Susceptibility) Collected: 11/11/23 1851    Order Status: Completed Specimen: Abscess from Rectum Updated: 11/16/23 1154     Wound Culture Many Escherichia coli      Many Streptococcus constellatus    Blood Culture [[id number]]  (Normal) Collected: 11/10/23 2048    Order Status: Completed Specimen: Blood from Antecubital, Left Updated: 11/15/23 2301     CULTURE, BLOOD ([facility]) No Growth at 5 days    MARLENI Prep [[id number]] Collected: 11/11/23 1851    Order Status: Completed Specimen: Abscess Updated: 11/13/23 1219     KOH Prep No fungal elements seen    Fungal Culture [[id number]] Collected: 11/11/23 1851    Order Status: Sent Specimen: Abscess Updated: 11/13/23 1122    AFB Smear [[id number]] Collected: 11/11/23 1851    Order Status: Completed Specimen: Abscess from Rectum Updated: 11/13/23 0754     AFB Smear No AFB seen (Direct smear only) - Concentration to follow    Mycobacteria and Nocardia Culture [[id number]] Collected: 11/11/23 1851    Order Status: Sent Specimen: Abscess from Rectum Updated: 11/12/23 1404    Gram Stain [[id number]] Collected: 11/11/23 1851    Order Status: Completed Specimen: Abscess from Rectum Updated: 11/12/23 1129     GRAM STAIN Many WBC observed      Many Gram positive cocci      Many Gram Negative Rods      Many Gram Positive Rods    Fungal Culture [[id number]] Collected: 11/11/23 1851    Order Status: Sent Specimen: Abscess from Rectum Updated: 11/11/23 1907    Blood culture #1 **CANNOT BE ORDERED STAT** [[id number]]  (Normal) Collected: 11/06/23 0405    Order Status: Completed Specimen: Blood from Antecubital, Right Updated: 11/11/23 0701     CULTURE, BLOOD ([facility]) No Growth at 5 days    Blood culture #2 **CANNOT BE  ORDERED STAT** [[id number]]  (Normal) Collected: 11/06/23 0405    Order Status: Completed Specimen: Blood from Antecubital, Left Updated: 11/11/23 0600     CULTURE, BLOOD ([facility]) No Growth at 5 days

## 2023-11-17 NOTE — PROGRESS NOTES
[facility] - 8th Floor Med Surg  Hyperbaric Medicine    Patient Name: Lionel León  MRN: [id number]  Date: 11/17/2023  Diagnosis:   Active Problem List with Overview Notes    Diagnosis Date Noted    Pancolitis 10/24/2023          Subjective:     Patient ID: Lionel León is a 42 y.o. male.Pt states that he was d/c'd from hospital on 10/24/2023. He states that he was prescribed prednisone and felt good until he finished the prescribed duration. He said then he started to have diarrhea again, hot flashes, sore throat, abdominal pain, and ractal bleeding. He said he had two episodes of syncope prior to returning to ED. On 11/11/23 pt found to have soft tissue infection to perineum- was taken to OR emergently. Pt had 2 debridements in the OR. Pt starting HBOT today for soft tissue necrotizing infection.         Objective     Visit Information  Arrival Condition: Stable  Ambulatory Status: Stretcher  Patient Identification Verified: Yes  Secondary Verification Process Completed: Yes  Patient is in Isolation Precaution?: No  Is patient oriented to person, place and time?: Yes  History Since Last Visit  All ordered tests and consults were completed.: Yes  Experienced change in activities of daily living that may affect risk of falls: No  Experienced any changes in pain level or management: No  Signs of symptoms of abuse and/or neglect since last visit?: No  Patient is alert and oriented times three.   BP: 116/64  Pulse: 94  Resp: 16  Temp: 98 °F (36.7 °C)  Pain Level: 0  Nose Symptoms: smell intact, no tenderness, no drainage, breathes through nostrils, symmetrical  Respiratory WDL: WDL  Breath Sounds: All Fields  All Lung Fields Breath Sounds: equal bilaterally, clear  Heart Sounds: S1, S2    Hyperbaric Pre inspection/Safety checklist:     Hyperbaric Pre-Inspection  Mattress cleaned prior to treatment: Yes  2 Patient Identifiers Verified: Yes  For Inpatients: Verify correct ID band/correct chart:  Yes  Consent Obtained: Yes  Cotton Gown: Yes  Patient voided: Yes  Drainage Bags Emptied: Yes  Drainage tubes secured: Yes  Patient Pregnant: Not applicable  Glasses, Jewelry, Makeup, etc. Removed: Yes  Hard contacts removed: Yes  Dentures, Hearing Aid, Prosthetic Devices Removed: Not applicable  Touch hair to check for hair spray: Yes  Cold/Flu Symptoms: No  Diabetic Patient Eaten: Not applicable  Medications Given: Yes  For Inpatients: Medication sheet sent with patient: Yes  Fears and apprehensions verbalized: Yes  Ground Wire Secured: Yes    Hyperbaric Treatment Course:      Hyperbaric Treatment Order:  2.40 BEAR x90 mins with 100% oxygen and no air breaks with an ascent rate of 1.5 psi/min and a descent rate of 2.0 psi/min    HBO Treatment Course Details  Treatment Course Number: 1  Total Treatments Ordered: 10  Ordering Provider: Dr. Lagos  Indications: Progressive necrotizing infections  HBO Treatment Start Date: 11/17/23    HBO Treatment Details  Treatment Number: 1  Inpatient Visit: Yes  Total Treatment Length Calc (minutes): 108  Adjusted Treatment Length (minutes): 120  Chamber Type: Monoplace  Chamber #: 2  HBO Dive Log #: [id number]  Treatment Protocol: Other Treatment Protocol (enter in treatment notes) (2.4 BEAR x90 mins w/ 100% oxygen and no air breaks)    Treatment Details  Dive Rate Down: 1.5 psi/minute  Dive Rate Up: 2.0 psi/minute  Compress Begins: 1.10  Clock Time: 1040  Tx Pressure Reached: 2.40  Clock Time: 1053  Decompress Begins: 2.40  Clock Time: 1218  Decompress Ends: 1.10  Clock Time: 1228    Treatment Details/Response:     Pt completed treatment without complaints/complications. TEED grade 0 pre and post tx. If pt still here will bring down Monday for tx.   Post HBO Vital Signs  BP: 132/78  Pulse: 80  Resp: 16  Temp: 98.3 °F (36.8 °C)  Pain Level: 0    Ear Evaluation  Left Teed Scale Pre: Grade 0  Left Teed Scale Post: Grade 0  Right Teed Scale Pre: Grade 0  Right Teed Scale Post: Grade 0      Visit Discharge Information  Discharge Condition: Stable  Ambulatory Status: Stretcher  Discharge Destination: Other (back to room)  Transportation: Other (patient transport)    Physician Supervision: The hyperbaric physician provided direct supervision and was immediately available to furnish assistance and direction throughout the performance of the procedure.A trained emergency response team and emergency services were available throughout procedure.     Sheilaail Oswalt, LPN [facility] - 8th Floor Med Surg  Hyperbaric Medicine

## 2023-11-17 NOTE — PROGRESS NOTES
[facility] - 8th Floor Med Surg  Wound Care    Patient Name:  Lionel León   MRN:  [id number]  Date: 11/17/2023  Diagnosis: <principal problem not specified>    History:     History reviewed. No pertinent past medical history.    Social History     Socioeconomic History    Marital status:      Social Determinants of Health     Food Insecurity: Unknown (11/7/2023)    Hunger Vital Sign     Worried About Running Out of Food in the Last Year: Never true   Transportation Needs: Unknown (11/7/2023)    PRAPARE - Transportation     Lack of Transportation (Medical): No   Housing Stability: Unknown (11/7/2023)    Housing Stability Vital Sign     Unable to Pay for Housing in the Last Year: No       Precautions:     Allergies as of 11/06/2023    (No Known Allergies)       WOC Assessment Details/Treatment     WOCN visit, provided post op education related to living with an ileostomy to patient and spouse , which included written, verbal and demonstrated information and complete appliance change related to leakag from appliance.Spouse and patient verbalized understanding and reported same to assigned nurse Yu DENNIS.         11/16/23 0750        Ileostomy 11/15/23 1730 RUQ   Placement Date/Time: 11/15/23 1730   Inserted by: MD  Location: RUQ   Stoma Appearance round;moist;red   Stoma Size (in) 13/4   Appliance 2-piece;leakage;to drainage bag to dependent drainage;changed   Accessories/Skin Care ostomy deodorant;cleansed w/ water;skin barrier ring   Treatment Bag change   Stoma Function flatus   Peristomal Assessment Intact   Tolerance no signs/symptoms of discomfort       Recommendations made to primary team for continued monitoring of appliance and local wound care as per surgery team.     11/17/2023

## 2023-11-18 LAB
ABO + RH BLD: NORMAL
ALBUMIN SERPL-MCNC: 1.4 G/DL (ref 3.5–5)
ALBUMIN/GLOB SERPL: 0.5 RATIO (ref 1.1–2)
ALP SERPL-CCNC: 112 UNIT/L (ref 40–150)
ALT SERPL-CCNC: 40 UNIT/L (ref 0–55)
AST SERPL-CCNC: 38 UNIT/L (ref 5–34)
BASOPHILS # BLD AUTO: 0.02 X10(3)/MCL
BASOPHILS # BLD AUTO: 0.03 X10(3)/MCL
BASOPHILS NFR BLD AUTO: 0.1 %
BASOPHILS NFR BLD AUTO: 0.2 %
BILIRUB SERPL-MCNC: 0.7 MG/DL
BLD PROD TYP BPU: NORMAL
BLOOD UNIT EXPIRATION DATE: NORMAL
BLOOD UNIT TYPE CODE: 5100
BUN SERPL-MCNC: 13.2 MG/DL (ref 8.9–20.6)
CALCIUM SERPL-MCNC: 7.5 MG/DL (ref 8.4–10.2)
CHLORIDE SERPL-SCNC: 100 MMOL/L (ref 98–107)
CO2 SERPL-SCNC: 29 MMOL/L (ref 22–29)
CREAT SERPL-MCNC: 0.72 MG/DL (ref 0.73–1.18)
CROSSMATCH INTERPRETATION: NORMAL
DISPENSE STATUS: NORMAL
EOSINOPHIL # BLD AUTO: 0.02 X10(3)/MCL (ref 0–0.9)
EOSINOPHIL # BLD AUTO: 0.04 X10(3)/MCL (ref 0–0.9)
EOSINOPHIL NFR BLD AUTO: 0.1 %
EOSINOPHIL NFR BLD AUTO: 0.3 %
ERYTHROCYTE [DISTWIDTH] IN BLOOD BY AUTOMATED COUNT: 16 % (ref 11.5–17)
ERYTHROCYTE [DISTWIDTH] IN BLOOD BY AUTOMATED COUNT: 16 % (ref 11.5–17)
FLUAV AG UPPER RESP QL IA.RAPID: NOT DETECTED
FLUBV AG UPPER RESP QL IA.RAPID: NOT DETECTED
GFR SERPLBLD CREATININE-BSD FMLA CKD-EPI: >60 MLS/MIN/1.73/M2
GLOBULIN SER-MCNC: 2.6 GM/DL (ref 2.4–3.5)
GLUCOSE SERPL-MCNC: 89 MG/DL (ref 74–100)
HCT VFR BLD AUTO: 24.3 % (ref 42–52)
HCT VFR BLD AUTO: 25.6 % (ref 42–52)
HGB BLD-MCNC: 7.7 G/DL (ref 14–18)
HGB BLD-MCNC: 8.1 G/DL (ref 14–18)
IMM GRANULOCYTES # BLD AUTO: 0.77 X10(3)/MCL (ref 0–0.04)
IMM GRANULOCYTES # BLD AUTO: 0.8 X10(3)/MCL (ref 0–0.04)
IMM GRANULOCYTES NFR BLD AUTO: 4.5 %
IMM GRANULOCYTES NFR BLD AUTO: 4.8 %
LYMPHOCYTES # BLD AUTO: 0.35 X10(3)/MCL (ref 0.6–4.6)
LYMPHOCYTES # BLD AUTO: 0.4 X10(3)/MCL (ref 0.6–4.6)
LYMPHOCYTES NFR BLD AUTO: 2.2 %
LYMPHOCYTES NFR BLD AUTO: 2.2 %
MCH RBC QN AUTO: 27.2 PG (ref 27–31)
MCH RBC QN AUTO: 27.6 PG (ref 27–31)
MCHC RBC AUTO-ENTMCNC: 31.6 G/DL (ref 33–36)
MCHC RBC AUTO-ENTMCNC: 31.7 G/DL (ref 33–36)
MCV RBC AUTO: 85.9 FL (ref 80–94)
MCV RBC AUTO: 87.1 FL (ref 80–94)
MONOCYTES # BLD AUTO: 0.35 X10(3)/MCL (ref 0.1–1.3)
MONOCYTES # BLD AUTO: 0.49 X10(3)/MCL (ref 0.1–1.3)
MONOCYTES NFR BLD AUTO: 2.2 %
MONOCYTES NFR BLD AUTO: 2.8 %
NEUTROPHILS # BLD AUTO: 14.38 X10(3)/MCL (ref 2.1–9.2)
NEUTROPHILS # BLD AUTO: 16.07 X10(3)/MCL (ref 2.1–9.2)
NEUTROPHILS NFR BLD AUTO: 90.2 %
NEUTROPHILS NFR BLD AUTO: 90.4 %
NRBC BLD AUTO-RTO: 0 %
NRBC BLD AUTO-RTO: 0.1 %
PLATELET # BLD AUTO: 265 X10(3)/MCL (ref 130–400)
PLATELET # BLD AUTO: 280 X10(3)/MCL (ref 130–400)
PMV BLD AUTO: 9.4 FL (ref 7.4–10.4)
PMV BLD AUTO: 9.5 FL (ref 7.4–10.4)
POTASSIUM SERPL-SCNC: 4.3 MMOL/L (ref 3.5–5.1)
PROCALCITONIN SERPL-MCNC: 1.56 NG/ML
PROT SERPL-MCNC: 4 GM/DL (ref 6.4–8.3)
RBC # BLD AUTO: 2.79 X10(6)/MCL (ref 4.7–6.1)
RBC # BLD AUTO: 2.98 X10(6)/MCL (ref 4.7–6.1)
RBCS: NORMAL
RBCS: NORMAL
SARS-COV-2 RNA RESP QL NAA+PROBE: NOT DETECTED
SODIUM SERPL-SCNC: 131 MMOL/L (ref 136–145)
UNIT NUMBER: NORMAL
WBC # SPEC AUTO: 15.91 X10(3)/MCL (ref 4.5–11.5)
WBC # SPEC AUTO: 17.81 X10(3)/MCL (ref 4.5–11.5)

## 2023-11-18 PROCEDURE — 84145 PROCALCITONIN (PCT): CPT | Performed by: INTERNAL MEDICINE

## 2023-11-18 PROCEDURE — 25000003 PHARM REV CODE 250: Performed by: COLON & RECTAL SURGERY

## 2023-11-18 PROCEDURE — 63600175 PHARM REV CODE 636 W HCPCS: Performed by: COLON & RECTAL SURGERY

## 2023-11-18 PROCEDURE — 63600175 PHARM REV CODE 636 W HCPCS: Performed by: STUDENT IN AN ORGANIZED HEALTH CARE EDUCATION/TRAINING PROGRAM

## 2023-11-18 PROCEDURE — 85025 COMPLETE CBC W/AUTO DIFF WBC: CPT | Performed by: INTERNAL MEDICINE

## 2023-11-18 PROCEDURE — 0240U COVID/FLU A&B PCR: CPT | Performed by: INTERNAL MEDICINE

## 2023-11-18 PROCEDURE — 25000003 PHARM REV CODE 250: Performed by: STUDENT IN AN ORGANIZED HEALTH CARE EDUCATION/TRAINING PROGRAM

## 2023-11-18 PROCEDURE — 80053 COMPREHEN METABOLIC PANEL: CPT | Performed by: INTERNAL MEDICINE

## 2023-11-18 PROCEDURE — 25000003 PHARM REV CODE 250: Performed by: INTERNAL MEDICINE

## 2023-11-18 PROCEDURE — 87040 BLOOD CULTURE FOR BACTERIA: CPT | Performed by: INTERNAL MEDICINE

## 2023-11-18 PROCEDURE — 11000001 HC ACUTE MED/SURG PRIVATE ROOM

## 2023-11-18 RX ORDER — KETOROLAC TROMETHAMINE 30 MG/ML
15 INJECTION, SOLUTION INTRAMUSCULAR; INTRAVENOUS EVERY 6 HOURS
Status: DISCONTINUED | OUTPATIENT
Start: 2023-11-18 | End: 2023-11-20

## 2023-11-18 RX ADMIN — HYDROMORPHONE HYDROCHLORIDE 2 MG: 2 INJECTION INTRAMUSCULAR; INTRAVENOUS; SUBCUTANEOUS at 04:11

## 2023-11-18 RX ADMIN — HYDROMORPHONE HYDROCHLORIDE 2 MG: 2 INJECTION INTRAMUSCULAR; INTRAVENOUS; SUBCUTANEOUS at 08:11

## 2023-11-18 RX ADMIN — OXYCODONE HYDROCHLORIDE 10 MG: 10 TABLET ORAL at 05:11

## 2023-11-18 RX ADMIN — KETOROLAC TROMETHAMINE 15 MG: 30 INJECTION, SOLUTION INTRAMUSCULAR at 12:11

## 2023-11-18 RX ADMIN — KETOROLAC TROMETHAMINE 15 MG: 30 INJECTION, SOLUTION INTRAMUSCULAR at 06:11

## 2023-11-18 RX ADMIN — PIPERACILLIN SODIUM AND TAZOBACTAM SODIUM 4.5 G: 4; .5 INJECTION, POWDER, FOR SOLUTION INTRAVENOUS at 09:11

## 2023-11-18 RX ADMIN — MULTIPLE VITAMINS W/ MINERALS TAB 1 TABLET: TAB at 10:11

## 2023-11-18 RX ADMIN — HYDROMORPHONE HYDROCHLORIDE 2 MG: 2 INJECTION INTRAMUSCULAR; INTRAVENOUS; SUBCUTANEOUS at 03:11

## 2023-11-18 RX ADMIN — OXYCODONE HYDROCHLORIDE 10 MG: 10 TABLET ORAL at 10:11

## 2023-11-18 RX ADMIN — PIPERACILLIN SODIUM AND TAZOBACTAM SODIUM 4.5 G: 4; .5 INJECTION, POWDER, FOR SOLUTION INTRAVENOUS at 05:11

## 2023-11-18 RX ADMIN — OXYCODONE HYDROCHLORIDE 10 MG: 10 TABLET ORAL at 02:11

## 2023-11-18 RX ADMIN — SODIUM CHLORIDE, POTASSIUM CHLORIDE, SODIUM LACTATE AND CALCIUM CHLORIDE: 600; 310; 30; 20 INJECTION, SOLUTION INTRAVENOUS at 06:11

## 2023-11-18 RX ADMIN — KETOROLAC TROMETHAMINE 15 MG: 30 INJECTION, SOLUTION INTRAMUSCULAR at 05:11

## 2023-11-18 RX ADMIN — HYDROMORPHONE HYDROCHLORIDE 2 MG: 2 INJECTION INTRAMUSCULAR; INTRAVENOUS; SUBCUTANEOUS at 12:11

## 2023-11-18 RX ADMIN — OXYCODONE HYDROCHLORIDE 10 MG: 10 TABLET ORAL at 06:11

## 2023-11-18 RX ADMIN — OXYCODONE HYDROCHLORIDE 10 MG: 10 TABLET ORAL at 11:11

## 2023-11-18 RX ADMIN — OXYCODONE HYDROCHLORIDE 10 MG: 10 TABLET ORAL at 01:11

## 2023-11-18 RX ADMIN — PIPERACILLIN SODIUM AND TAZOBACTAM SODIUM 4.5 G: 4; .5 INJECTION, POWDER, FOR SOLUTION INTRAVENOUS at 12:11

## 2023-11-18 NOTE — PROGRESS NOTES
Colorectal Surgery Progress Note  Admit Date: 11/6/2023  Hospital Day: 12  Procedure: 3 Days Post-Op     S:  AF HDS  +Ostomy output  Rebecca Reg diet w/out N/V    O:  Vitals:   Temp:  [98.2 °F (36.8 °C)-99.8 °F (37.7 °C)]   Pulse:  [87-99]   Resp:  [15-19]   BP: ()/(62-75)   SpO2:  [95 %-99 %]     Diet low fiber/residue   Intake/Output Summary (Last 24 hours) at 11/18/2023 1033  Last data filed at 11/18/2023 0638  Gross per 24 hour   Intake 3337.36 ml   Output 3000 ml   Net 337.36 ml            Physical Exam:  Gen: NAD, AAOx3  HEENT: EOMI, NCAT  CV: RR   Resp: no shortness of breath, normal WOB  Abd: soft, non-distended; ostomy pink and patent  Ext: no edema      Labs:  Recent Labs     11/16/23  0453 11/16/23  1811 11/17/23  0514 11/18/23  0533   WBC 12.47  12.47* 18.88* 17.21  17.21* 15.91*   HGB 6.2* 9.0* 8.7* 7.7*   HCT 20.2* 27.6* 27.2* 24.3*    277 284 265   *  --  132* 131*   K 4.8  --  4.5 4.3   CO2 32*  --  33* 29   BUN 17.4  --  13.4 13.2   CREATININE 0.75  --  0.72* 0.72*   BILITOT 0.5  --  0.7 0.7   AST 17  --  29 38*   ALT 34  --  32 40   ALKPHOS 57  --  117 112   CALCIUM 6.9*  --  8.0* 7.5*   ALBUMIN 1.6*  --  1.5* 1.4*   MG 2.20  --  2.10  --      Scheduled Meds: ketorolac, 15 mg, Q6H  multivitamin, 1 tablet, Daily  piperacillin-tazobactam (Zosyn) IV (PEDS and ADULTS) (extended infusion is not appropriate), 4.5 g, Q8H        A/P:  41 yo M with IBD s/p TAC with end ileostomy. Doing well from post operative standpoint.     -Hg drifting down, low concern for surgical bleeding. Continue daily labs  -Wound care teaching for ostomy  -Day 3/4 Naveen Raygoza MD  [employer] General Surgery, [facility]

## 2023-11-18 NOTE — PROGRESS NOTES
[facility] - 8th Floor [facility] Surg  \[facility]\"" MEDICINE ~ PROGRESS NOTE        CHIEF COMPLAINT   Hospital follow up    HOSPITAL COURSE   Lionel León is a 42 y.o. White male with a past medical history of diverticulitis status post colon resection and ulcerative colitis. Patient had recent admission to hospital medicine services at [facility] on 10/21/2023 to 10/24/2023 for sepsis secondary to pancolitis. He was treated with antibiotics and GI was consulted. Colonoscopy was performed on 10/23/2023 with findings concerning for ulcerative colitis and biopsies of the cecum, ascending, transverse, ascending colon and rectum positive for active chronic colitis consistent with inflammatory bowel disorder. No transfusion was required for rectal bleeding. Patient was discharged with prednisone taper and GI follow up appointment on 11/17/2023. The patient presented to [facility] on 11/6/2023 with a primary complaint of bright red rectal bleeding and lower abdominal pain.  Patient reports rectal bleeding has been ongoing since discharge.  Other associated symptoms include nausea, rectal pain, subjective fevers, weakness, fatigue and a 30 40 lb weight loss over last 3 weeks.  Patient states this morning he was walking to the bathroom when he felt flushed and vision went black. He passed out and hit his head on the wooden floor. Approximately hour and a half later when he went to get up he passed out again.  Second syncopal episode was witnessed by patient's wife who is at bedside reports loss of consciousness lasts for approximately 30 seconds.    Upon presentation to the ED, temperature 98.3F, heart rate 109, blood pressure 96/51, respiratory rate 17, spO2 99%. Labs with H&H 7.3/23.6 (10.6/33.2 on 10/24/2023), BUN 21.9, creatinine 0.88, calcium 7.7, .6, ESR 86. EKG sinus tachycardia with a ventricular rate of 108 and age undetermined possible inferior infarct. In the ED patient received Dilaudid, Zofran, Hydrocortisone  and IVF. He was typed and crossmatched for transfusion of 2 units of packed RBC. Patient is admitted to hospital medicine services for further medical management.       Patient was seen by GI and started on IV steroids, Solu-Medrol 30 mg IV b.I.d. for active ulcerative colitis flare up  Plus Anusol suppository per rectum b.I.d..  Patient is status post 2 units PRBC for symptomatic anemia.  CT abdomen and pelvis was also ordered to assess extent. CT of the abdomen and pelvis was pertinent for findings consistent with colitis involving the ascending colon, portions of the descending colon.  Underlying gastritis also can not be excluded secondary to slightly thickened gastric wall.     May complain continues to be rectal pain but refers that suppositories are helping.  Patient is tolerating p.o. intake    According to GI notes plans is for patient to start Humira/imura once approved by patient's insurance.  November 11th he started reporting rectal pain with a bump, on exam he had cellulitis like changes.  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  Taken to the operating room same day by General surgery found to have necrotic tissue extending to the muscle encircling his anus.  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  With ongoing infection immunosuppression with steroids have been tapered off and no further plans for Humira.  Infectious Disease was also consulted and recommended continuing Zosyn.  Wound Care Clinic was also consulted and started on hyperbaric therapy.    Today  Seen walking the parks this morning.  Feeling better.  Afebrile hemodynamically stable over the last 24 hours.        OBJECTIVE/PHYSICAL EXAM     VITAL SIGNS (MOST RECENT):  Temp: 99.1 °F (37.3 °C) (11/18/23 0700)  Pulse: 96 (11/18/23 0700)  Resp: 18 (11/18/23 0844)  BP: 97/65 (11/18/23 0700)  SpO2: 96 % (11/18/23 0700) VITAL SIGNS (24 HOUR  RANGE):  Temp:  [98.2 °F (36.8 °C)-99.8 °F (37.7 °C)] 99.1 °F (37.3 °C)  Pulse:  [87-99] 96  Resp:  [15-19] 18  SpO2:  [95 %-99 %] 96 %  BP: ()/(62-75) 97/65   GENERAL: In no acute distress, afebrile  HEENT:  CHEST: Clear to auscultation bilaterally  HEART: S1, S2, no appreciable murmur  ABDOMEN: Soft, nontender, BS +, ileostomy  MSK: Warm, no lower extremity edema, no clubbing or cyanosis  NEUROLOGIC: Alert and oriented x4, moving all extremities with good strength   INTEGUMENTARY:  Refer to images in the chart  PSYCHIATRY:        ASSESSMENT/PLAN   Severe ulcerative colitis acute flare-status post total colectomy and end ileostomy November 15  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  Continue Zosyn, follow cultures.    Surgery signed off.  Wound care clinic following.  Continue hyperbaric therapy.    GI signed off.  Off of immunosuppression.  Plastic surgery following.  Discontinue IV fluids, continue keep Toradol as planned.  Continue protein supplement, add Jed and multivitamin.    DVT prophylaxis:  Ambulatory    Anticipated discharge and disposition:   __________________________________________________________________________    LABS/MICRO/MEDS/DIAGNOSTICS       LABS  Recent Labs     11/18/23  0533   *   K 4.3   CHLORIDE 100   CO2 29   BUN 13.2   CREATININE 0.72*   GLUCOSE 89   CALCIUM 7.5*   ALKPHOS 112   AST 38*   ALT 40   ALBUMIN 1.4*     Recent Labs     11/18/23  0533   WBC 15.91*   RBC 2.79*   HCT 24.3*   MCV 87.1          MICROBIOLOGY  Microbiology Results (last 7 days)       Procedure Component Value Units Date/Time    Wound Culture [[id number]]  (Abnormal)  (Susceptibility) Collected: 11/11/23 4701    Order Status: Completed Specimen: Abscess from Rectum Updated: 11/17/23 1513     Wound Culture Many Escherichia coli      Many Streptococcus constellatus    Blood Culture [[id number]]  (Normal)  Collected: 11/10/23 2048    Order Status: Completed Specimen: Blood from Antecubital, Left Updated: 11/15/23 2301     CULTURE, BLOOD ([facility]) No Growth at 5 days    MARLENI Prep [[id number]] Collected: 11/11/23 1851    Order Status: Completed Specimen: Abscess Updated: 11/13/23 1219     KOH Prep No fungal elements seen    Fungal Culture [[id number]] Collected: 11/11/23 1851    Order Status: Sent Specimen: Abscess Updated: 11/13/23 1122    AFB Smear [[id number]] Collected: 11/11/23 1851    Order Status: Completed Specimen: Abscess from Rectum Updated: 11/13/23 0754     AFB Smear No AFB seen (Direct smear only) - Concentration to follow    Mycobacteria and Nocardia Culture [[id number]] Collected: 11/11/23 1851    Order Status: Sent Specimen: Abscess from Rectum Updated: 11/12/23 1404    Gram Stain [[id number]] Collected: 11/11/23 1851    Order Status: Completed Specimen: Abscess from Rectum Updated: 11/12/23 1129     GRAM STAIN Many WBC observed      Many Gram positive cocci      Many Gram Negative Rods      Many Gram Positive Rods    Fungal Culture [[id number]] Collected: 11/11/23 1851    Order Status: Sent Specimen: Abscess from Rectum Updated: 11/11/23 1907               MEDICATIONS   ketorolac  15 mg Intravenous Q6H    piperacillin-tazobactam (Zosyn) IV (PEDS and ADULTS) (extended infusion is not appropriate)  4.5 g Intravenous Q8H         INFUSIONS         DIAGNOSTIC TESTS  CT Abdomen Pelvis W Wo Contrast   Final Result      X-Ray Chest PA And Lateral   Final Result      No acute abnormality.         Electronically signed by: Quinton Hernandez MD   Date:    11/11/2023   Time:    08:49      CT Abdomen Pelvis W Wo Contrast   Final Result      Findings seen consistent with colitis involving the ascending colon and portions of the descending colon.  Follow-up is recommended to resolution as underlying mass cannot be completely excluded.      The gastric wall appears to be slightly thickened.  Underlying gastritis should be  "excluded.         Electronically signed by: John Gale   Date:    11/06/2023   Time:    12:02           No results found for: "EF"       NUTRITION STATUS  Patient meets ASPEN criteria for moderate malnutrition of acute illness or injury per RD assessment as evidenced by:  Energy Intake (Malnutrition): less than 75% for greater than 7 days  Weight Loss (Malnutrition): greater than 2% in 1 week  Subcutaneous Fat (Malnutrition): moderate depletion  Muscle Mass (Malnutrition): moderate depletion  Fluid Accumulation (Malnutrition): other (see comments) (does not meet criteria)  Hand  Strength, Left (Malnutrition): unable to evaluate  Hand  Strength, Right (Malnutrition): unable to evaluate  A minimum of two characteristics is recommended for diagnosis of either severe or non-severe malnutrition.       Case related differential diagnoses have been reviewed; assessment and plan has been documented. I have personally reviewed the labs and test results that are currently available; I have reviewed the patients medication list. I have reviewed the consulting providers recommendations. I have reviewed or attempted to review medical records based upon their availability.  All of the patient's and/or family's questions have been addressed and answered to the best of my ability.  I will continue to monitor closely and make adjustments to medical management as needed.  This document was created using M*Modal Fluency Direct.  Transcription errors may have been made.  Please contact me if any questions may rise regarding documentation to clarify transcription.        Abdoulaye Garcia MD   Internal Medicine  Department of Hospital Medicine  [facility] - 8th Floor Med Surg               "

## 2023-11-19 LAB
ALBUMIN SERPL-MCNC: 1.3 G/DL (ref 3.5–5)
ALBUMIN/GLOB SERPL: 0.5 RATIO (ref 1.1–2)
ALP SERPL-CCNC: 125 UNIT/L (ref 40–150)
ALT SERPL-CCNC: 49 UNIT/L (ref 0–55)
AST SERPL-CCNC: 52 UNIT/L (ref 5–34)
BASOPHILS # BLD AUTO: 0.01 X10(3)/MCL
BASOPHILS NFR BLD AUTO: 0.1 %
BILIRUB SERPL-MCNC: 0.9 MG/DL
BUN SERPL-MCNC: 13.1 MG/DL (ref 8.9–20.6)
CALCIUM SERPL-MCNC: 7.6 MG/DL (ref 8.4–10.2)
CHLORIDE SERPL-SCNC: 99 MMOL/L (ref 98–107)
CO2 SERPL-SCNC: 26 MMOL/L (ref 22–29)
CREAT SERPL-MCNC: 0.67 MG/DL (ref 0.73–1.18)
EOSINOPHIL # BLD AUTO: 0.03 X10(3)/MCL (ref 0–0.9)
EOSINOPHIL NFR BLD AUTO: 0.2 %
ERYTHROCYTE [DISTWIDTH] IN BLOOD BY AUTOMATED COUNT: 16.2 % (ref 11.5–17)
GFR SERPLBLD CREATININE-BSD FMLA CKD-EPI: >60 MLS/MIN/1.73/M2
GLOBULIN SER-MCNC: 2.8 GM/DL (ref 2.4–3.5)
GLUCOSE SERPL-MCNC: 97 MG/DL (ref 74–100)
HCT VFR BLD AUTO: 23.4 % (ref 42–52)
HGB BLD-MCNC: 7.5 G/DL (ref 14–18)
IMM GRANULOCYTES # BLD AUTO: 0.57 X10(3)/MCL (ref 0–0.04)
IMM GRANULOCYTES NFR BLD AUTO: 2.9 %
LYMPHOCYTES # BLD AUTO: 0.39 X10(3)/MCL (ref 0.6–4.6)
LYMPHOCYTES NFR BLD AUTO: 2 %
MCH RBC QN AUTO: 27.3 PG (ref 27–31)
MCHC RBC AUTO-ENTMCNC: 32.1 G/DL (ref 33–36)
MCV RBC AUTO: 85.1 FL (ref 80–94)
MONOCYTES # BLD AUTO: 0.59 X10(3)/MCL (ref 0.1–1.3)
MONOCYTES NFR BLD AUTO: 3 %
NEUTROPHILS # BLD AUTO: 17.8 X10(3)/MCL (ref 2.1–9.2)
NEUTROPHILS NFR BLD AUTO: 91.8 %
NRBC BLD AUTO-RTO: 0 %
PLATELET # BLD AUTO: 309 X10(3)/MCL (ref 130–400)
PMV BLD AUTO: 9.8 FL (ref 7.4–10.4)
POTASSIUM SERPL-SCNC: 4.3 MMOL/L (ref 3.5–5.1)
PROT SERPL-MCNC: 4.1 GM/DL (ref 6.4–8.3)
RBC # BLD AUTO: 2.75 X10(6)/MCL (ref 4.7–6.1)
SODIUM SERPL-SCNC: 130 MMOL/L (ref 136–145)
WBC # SPEC AUTO: 19.39 X10(3)/MCL (ref 4.5–11.5)

## 2023-11-19 PROCEDURE — 11000001 HC ACUTE MED/SURG PRIVATE ROOM

## 2023-11-19 PROCEDURE — 85025 COMPLETE CBC W/AUTO DIFF WBC: CPT | Performed by: NURSE PRACTITIONER

## 2023-11-19 PROCEDURE — 63600175 PHARM REV CODE 636 W HCPCS: Performed by: INTERNAL MEDICINE

## 2023-11-19 PROCEDURE — 63600175 PHARM REV CODE 636 W HCPCS: Performed by: PHYSICIAN ASSISTANT

## 2023-11-19 PROCEDURE — 25000003 PHARM REV CODE 250: Performed by: INTERNAL MEDICINE

## 2023-11-19 PROCEDURE — 80053 COMPREHEN METABOLIC PANEL: CPT | Performed by: NURSE PRACTITIONER

## 2023-11-19 PROCEDURE — 63600175 PHARM REV CODE 636 W HCPCS: Performed by: STUDENT IN AN ORGANIZED HEALTH CARE EDUCATION/TRAINING PROGRAM

## 2023-11-19 PROCEDURE — 25500020 PHARM REV CODE 255: Performed by: INTERNAL MEDICINE

## 2023-11-19 PROCEDURE — 25000003 PHARM REV CODE 250: Performed by: STUDENT IN AN ORGANIZED HEALTH CARE EDUCATION/TRAINING PROGRAM

## 2023-11-19 PROCEDURE — 63600175 PHARM REV CODE 636 W HCPCS: Performed by: COLON & RECTAL SURGERY

## 2023-11-19 PROCEDURE — 25000003 PHARM REV CODE 250: Performed by: COLON & RECTAL SURGERY

## 2023-11-19 PROCEDURE — 25000003 PHARM REV CODE 250: Performed by: PHYSICIAN ASSISTANT

## 2023-11-19 RX ORDER — CYCLOBENZAPRINE HCL 5 MG
5 TABLET ORAL 3 TIMES DAILY PRN
Status: DISCONTINUED | OUTPATIENT
Start: 2023-11-19 | End: 2023-11-30 | Stop reason: HOSPADM

## 2023-11-19 RX ORDER — PANTOPRAZOLE SODIUM 40 MG/1
40 TABLET, DELAYED RELEASE ORAL DAILY
Status: DISCONTINUED | OUTPATIENT
Start: 2023-11-19 | End: 2023-11-20

## 2023-11-19 RX ORDER — AMOXICILLIN 250 MG
2 CAPSULE ORAL 2 TIMES DAILY
Status: DISCONTINUED | OUTPATIENT
Start: 2023-11-19 | End: 2023-11-23

## 2023-11-19 RX ADMIN — OXYCODONE HYDROCHLORIDE 10 MG: 10 TABLET ORAL at 03:11

## 2023-11-19 RX ADMIN — VANCOMYCIN HYDROCHLORIDE 1250 MG: 1.25 INJECTION, POWDER, LYOPHILIZED, FOR SOLUTION INTRAVENOUS at 12:11

## 2023-11-19 RX ADMIN — KETOROLAC TROMETHAMINE 15 MG: 30 INJECTION, SOLUTION INTRAMUSCULAR at 12:11

## 2023-11-19 RX ADMIN — PIPERACILLIN SODIUM AND TAZOBACTAM SODIUM 4.5 G: 4; .5 INJECTION, POWDER, FOR SOLUTION INTRAVENOUS at 08:11

## 2023-11-19 RX ADMIN — OXYCODONE HYDROCHLORIDE 10 MG: 10 TABLET ORAL at 09:11

## 2023-11-19 RX ADMIN — ONDANSETRON 4 MG: 2 INJECTION INTRAMUSCULAR; INTRAVENOUS at 07:11

## 2023-11-19 RX ADMIN — ACETAMINOPHEN 650 MG: 325 TABLET, FILM COATED ORAL at 09:11

## 2023-11-19 RX ADMIN — VANCOMYCIN HYDROCHLORIDE 2000 MG: 500 INJECTION, POWDER, LYOPHILIZED, FOR SOLUTION INTRAVENOUS at 01:11

## 2023-11-19 RX ADMIN — PIPERACILLIN SODIUM AND TAZOBACTAM SODIUM 4.5 G: 4; .5 INJECTION, POWDER, FOR SOLUTION INTRAVENOUS at 04:11

## 2023-11-19 RX ADMIN — PIPERACILLIN SODIUM AND TAZOBACTAM SODIUM 4.5 G: 4; .5 INJECTION, POWDER, FOR SOLUTION INTRAVENOUS at 12:11

## 2023-11-19 RX ADMIN — HYDROMORPHONE HYDROCHLORIDE 2 MG: 2 INJECTION INTRAMUSCULAR; INTRAVENOUS; SUBCUTANEOUS at 01:11

## 2023-11-19 RX ADMIN — PANTOPRAZOLE SODIUM 40 MG: 40 TABLET, DELAYED RELEASE ORAL at 06:11

## 2023-11-19 RX ADMIN — IOPAMIDOL 100 ML: 755 INJECTION, SOLUTION INTRAVENOUS at 12:11

## 2023-11-19 RX ADMIN — MULTIPLE VITAMINS W/ MINERALS TAB 1 TABLET: TAB at 09:11

## 2023-11-19 RX ADMIN — SENNOSIDES AND DOCUSATE SODIUM 2 TABLET: 8.6; 5 TABLET ORAL at 09:11

## 2023-11-19 RX ADMIN — ACETAMINOPHEN 650 MG: 325 TABLET, FILM COATED ORAL at 03:11

## 2023-11-19 RX ADMIN — CYCLOBENZAPRINE HYDROCHLORIDE 5 MG: 5 TABLET, FILM COATED ORAL at 05:11

## 2023-11-19 RX ADMIN — KETOROLAC TROMETHAMINE 15 MG: 30 INJECTION, SOLUTION INTRAMUSCULAR at 05:11

## 2023-11-19 RX ADMIN — MORPHINE SULFATE 2 MG: 10 INJECTION, SOLUTION INTRAMUSCULAR; INTRAVENOUS at 06:11

## 2023-11-19 NOTE — PROGRESS NOTES
Colon & Rectal Surgery Progress Note    Post Op Day 4     Subjective:  Febrile yesterday  CT abdomen/pelvis reviewed  Denies abdominal pain, N/V  Ileostomy working  Ambulating    Objective:  Temp:  [98.4 °F (36.9 °C)-100.8 °F (38.2 °C)]   Pulse:  []   Resp:  [16-20]   BP: (102-124)/(60-70)   SpO2:  [92 %-97 %]     Physical Exam:  NAD  Regular rate and rhythm  Non-labored respirations  Abdomen soft, NT, appropriate, ileostomy pink and working  SCDs in place      Intake/Output Summary (Last 24 hours) at 11/19/2023 1012  Last data filed at 11/19/2023 0500  Gross per 24 hour   Intake 1400.01 ml   Output 2275 ml   Net -874.99 ml       Recent Labs     11/17/23  0514 11/18/23  0533 11/19/23  0634   WBC 17.21  17.21*   < > 19.39*   HGB 8.7*   < > 7.5*   HCT 27.2*   < > 23.4*      < > 309   *   < > 130*   K 4.5   < > 4.3   CO2 33*   < > 26   BUN 13.4   < > 13.1   CREATININE 0.72*   < > 0.67*   BILITOT 0.7   < > 0.9   AST 29   < > 52*   ALT 32   < > 49   ALKPHOS 117   < > 125   CALCIUM 8.0*   < > 7.6*   ALBUMIN 1.5*   < > 1.3*   MG 2.10  --   --     < > = values in this interval not displayed.       Assessment/Plan  Clinically he does not have an ileus/obstruction, so will resume diet.  Unsure what to make of free air and fluid as it is expected this soon after surgery, but continue IV antibiotics as he is at high risk for postop infection.      Fredi Shields MD  Colon and Rectal Surgery

## 2023-11-19 NOTE — CARE UPDATE
CT Abd/pelvis with contrast shows findings suggestive of small bowel obstruction vs severe adynamic ileus. Spoke with his nurse, Tianna. Reports CT was ordered due to persistent fever. Max temp overnight 100.8. She reports patient is having no abdominal pain and tolerating a diet. No nausea or vomiting. Ostomy output has been consistent and unchanged. Will keep him NPO for now and f/u on surgery recommendations.

## 2023-11-19 NOTE — PROGRESS NOTES
Pharmacokinetic Initial Assessment: IV Vancomycin    Assessment/Plan:  Initiate intravenous vancomycin with loading dose of 2000 mg once followed by a maintenance dose of vancomycin 1250 mg IV every 12 hours  Desired empiric serum trough concentration is 10 to 20 mcg/mL  Draw vancomycin trough level 60 min prior to fourth dose on 11/20 at approximately 1000  Pharmacy will continue to follow and monitor vancomycin.      Please contact pharmacy at extension [phone] with any questions regarding this assessment.     Thank you for the consult,   Maria Del Carmen Finnegan, ElaineD       Patient brief summary:  Lionel León is a 42 y.o. male initiated on antimicrobial therapy with IV Vancomycin for treatment of suspected skin & soft tissue infection    Drug Allergies:   Review of patient's allergies indicates:  No Known Allergies    Actual Body Weight:   74.8 kg    Renal Function:   Estimated Creatinine Clearance: 138 mL/min (A) (based on SCr of 0.72 mg/dL (L)).,     Dialysis Method (if applicable):  N/A    CBC (last 72 hours):  Recent Labs   Lab Result Units 11/16/23 0453 11/16/23  1811 11/17/23 0514 11/18/23  0533 11/18/23  1706   WBC x10(3)/mcL 12.47  12.47* 18.88* 17.21  17.21* 15.91* 17.81*   Hgb g/dL 6.2* 9.0* 8.7* 7.7* 8.1*   Hct % 20.2* 27.6* 27.2* 24.3* 25.6*   Platelet x10(3)/mcL 278 277 284 265 280   Mono % %  --   --   --  2.2 2.8   Monocytes % % 1  --   --   --   --    Eosinophils % %  --   --  1  --   --    Eos % %  --   --   --  0.3 0.1   Basophil % %  --   --   --  0.1 0.2       Metabolic Panel (last 72 hours):  Recent Labs   Lab Result Units 11/16/23 0453 11/17/23  0514 11/18/23  0533   Sodium Level mmol/L 131* 132* 131*   Potassium Level mmol/L 4.8 4.5 4.3   Chloride mmol/L 97* 96* 100   Carbon Dioxide mmol/L 32* 33* 29   Glucose Level mg/dL 182* 105* 89   Blood Urea Nitrogen mg/dL 17.4 13.4 13.2   Creatinine mg/dL 0.75 0.72* 0.72*   Albumin Level g/dL 1.6* 1.5* 1.4*   Bilirubin Total mg/dL 0.5 0.7 0.7   Alkaline  "Phosphatase unit/L 57 117 112   Aspartate Aminotransferase unit/L 17 29 38*   Alanine Aminotransferase unit/L 34 32 40   Magnesium Level mg/dL 2.20 2.10  --        Drug levels (last 3 results):  No results for input(s): "VANCOMYCINRA", "VANCORANDOM", "VANCOMYCINPE", "VANCOPEAK", "VANCOMYCINTR", "VANCOTROUGH" in the last 72 hours.    Microbiologic Results:  Microbiology Results (last 7 days)       Procedure Component Value Units Date/Time    Blood Culture [[id number]] Collected: 11/18/23 1706    Order Status: Resulted Specimen: Blood Updated: 11/18/23 1727    Blood Culture [[id number]] Collected: 11/18/23 1706    Order Status: Resulted Specimen: Blood Updated: 11/18/23 1727    Wound Culture [[id number]]  (Abnormal)  (Susceptibility) Collected: 11/11/23 1851    Order Status: Completed Specimen: Abscess from Rectum Updated: 11/17/23 1513     Wound Culture Many Escherichia coli      Many Streptococcus constellatus    Blood Culture [[id number]]  (Normal) Collected: 11/10/23 2048    Order Status: Completed Specimen: Blood from Antecubital, Left Updated: 11/15/23 2301     CULTURE, BLOOD ([facility]) No Growth at 5 days    MARLENI Prep [[id number]] Collected: 11/11/23 1851    Order Status: Completed Specimen: Abscess Updated: 11/13/23 1219     KOH Prep No fungal elements seen    Fungal Culture [[id number]] Collected: 11/11/23 1851    Order Status: Sent Specimen: Abscess Updated: 11/13/23 1122    AFB Smear [[id number]] Collected: 11/11/23 1851    Order Status: Completed Specimen: Abscess from Rectum Updated: 11/13/23 0754     AFB Smear No AFB seen (Direct smear only) - Concentration to follow    Mycobacteria and Nocardia Culture [[id number]] Collected: 11/11/23 1851    Order Status: Sent Specimen: Abscess from Rectum Updated: 11/12/23 1404    Gram Stain [[id number]] Collected: 11/11/23 1851    Order Status: Completed Specimen: Abscess from Rectum Updated: 11/12/23 1129     GRAM STAIN Many WBC observed      Many Gram positive cocci      " Many Gram Negative Rods      Many Gram Positive Rods

## 2023-11-19 NOTE — PROGRESS NOTES
[facility] - 8th Floor [facility]  \[facility]\"" MEDICINE ~ PROGRESS NOTE        CHIEF COMPLAINT   Hospital follow up    HOSPITAL COURSE   Lionel León is a 42 y.o. White male with a past medical history of diverticulitis status post colon resection and ulcerative colitis. Patient had recent admission to hospital medicine services at [facility] on 10/21/2023 to 10/24/2023 for sepsis secondary to pancolitis. He was treated with antibiotics and GI was consulted. Colonoscopy was performed on 10/23/2023 with findings concerning for ulcerative colitis and biopsies of the cecum, ascending, transverse, ascending colon and rectum positive for active chronic colitis consistent with inflammatory bowel disorder. No transfusion was required for rectal bleeding. Patient was discharged with prednisone taper and GI follow up appointment on 11/17/2023. The patient presented to [facility] on 11/6/2023 with a primary complaint of bright red rectal bleeding and lower abdominal pain.  Patient reports rectal bleeding has been ongoing since discharge.  Other associated symptoms include nausea, rectal pain, subjective fevers, weakness, fatigue and a 30 40 lb weight loss over last 3 weeks.  Patient states this morning he was walking to the bathroom when he felt flushed and vision went black. He passed out and hit his head on the wooden floor. Approximately hour and a half later when he went to get up he passed out again.  Second syncopal episode was witnessed by patient's wife who is at bedside reports loss of consciousness lasts for approximately 30 seconds.    Upon presentation to the ED, temperature 98.3F, heart rate 109, blood pressure 96/51, respiratory rate 17, spO2 99%. Labs with H&H 7.3/23.6 (10.6/33.2 on 10/24/2023), BUN 21.9, creatinine 0.88, calcium 7.7, .6, ESR 86. EKG sinus tachycardia with a ventricular rate of 108 and age undetermined possible inferior infarct. In the ED patient received Dilaudid, Zofran, Hydrocortisone  and IVF. He was typed and crossmatched for transfusion of 2 units of packed RBC. Patient is admitted to hospital medicine services for further medical management.       Patient was seen by GI and started on IV steroids, Solu-Medrol 30 mg IV b.I.d. for active ulcerative colitis flare up  Plus Anusol suppository per rectum b.I.d..  Patient is status post 2 units PRBC for symptomatic anemia.  CT abdomen and pelvis was also ordered to assess extent. CT of the abdomen and pelvis was pertinent for findings consistent with colitis involving the ascending colon, portions of the descending colon.  Underlying gastritis also can not be excluded secondary to slightly thickened gastric wall.     May complain continues to be rectal pain but refers that suppositories are helping.  Patient is tolerating p.o. intake    According to GI notes plans is for patient to start Humira/imura once approved by patient's insurance.  November 11th he started reporting rectal pain with a bump, on exam he had cellulitis like changes.  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  Taken to the operating room same day by General surgery found to have necrotic tissue extending to the muscle encircling his anus.  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  With ongoing infection immunosuppression with steroids have been tapered off and no further plans for Humira.  Infectious Disease was also consulted and recommended continuing Zosyn.  Wound Care Clinic was also consulted and started on hyperbaric therapy.    Today  Started having fever yesterday night, blood cultures ordered, vancomycin added, CT abdomen pelvis ordered and results noted.  Saw patient this morning and he actually is feeling well and stated that he was feeling better since yesterday.  Having more output from the ostomy as well.          OBJECTIVE/PHYSICAL EXAM     VITAL SIGNS (MOST  RECENT):  Temp: 99.3 °F (37.4 °C) (11/19/23 0806)  Pulse: 99 (11/19/23 0806)  Resp: 20 (11/19/23 0806)  BP: 116/63 (11/19/23 0806)  SpO2: 96 % (11/19/23 0806) VITAL SIGNS (24 HOUR RANGE):  Temp:  [98.4 °F (36.9 °C)-100.8 °F (38.2 °C)] 99.3 °F (37.4 °C)  Pulse:  [] 99  Resp:  [16-20] 20  SpO2:  [92 %-97 %] 96 %  BP: (102-124)/(60-70) 116/63   GENERAL: In no acute distress, afebrile  HEENT:  CHEST: Clear to auscultation bilaterally  HEART: S1, S2, no appreciable murmur  ABDOMEN: Soft, nontender, BS +, ileostomy  MSK: Warm, no lower extremity edema, no clubbing or cyanosis  NEUROLOGIC: Alert and oriented x4, moving all extremities with good strength   INTEGUMENTARY:  Refer to images in the chart  PSYCHIATRY:        ASSESSMENT/PLAN   Severe ulcerative colitis acute flare-status post total colectomy and end ileostomy November 15  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  Continue Zosyn, follow cultures.  Vancomycin added November 18.  Colorectal surgery following.  Pending evaluation today given recurrence of fever and CT scan results.  Wound care clinic following.  Continue hyperbaric therapy.    GI signed off.  Off of immunosuppression.  Plastic surgery following.  Continue protein supplement, add Jed and multivitamin.  Discontinue Dilaudid, limit narcotics, add senna S2 tabs twice daily.      DVT prophylaxis:  Ambulatory    Anticipated discharge and disposition:   __________________________________________________________________________    LABS/MICRO/MEDS/DIAGNOSTICS       LABS  Recent Labs     11/19/23  0634   *   K 4.3   CHLORIDE 99   CO2 26   BUN 13.1   CREATININE 0.67*   GLUCOSE 97   CALCIUM 7.6*   ALKPHOS 125   AST 52*   ALT 49   ALBUMIN 1.3*       Recent Labs     11/19/23  0634   WBC 19.39*   RBC 2.75*   HCT 23.4*   MCV 85.1            MICROBIOLOGY  Microbiology Results (last 7 days)        Procedure Component Value Units Date/Time    Blood Culture [[id number]] Collected: 11/18/23 1706    Order Status: Resulted Specimen: Blood Updated: 11/18/23 1727    Blood Culture [[id number]] Collected: 11/18/23 1706    Order Status: Resulted Specimen: Blood Updated: 11/18/23 1727    Wound Culture [[id number]]  (Abnormal)  (Susceptibility) Collected: 11/11/23 1851    Order Status: Completed Specimen: Abscess from Rectum Updated: 11/17/23 1513     Wound Culture Many Escherichia coli      Many Streptococcus constellatus    Blood Culture [[id number]]  (Normal) Collected: 11/10/23 2048    Order Status: Completed Specimen: Blood from Antecubital, Left Updated: 11/15/23 2301     CULTURE, BLOOD ([facility]) No Growth at 5 days    MARLENI Prep [[id number]] Collected: 11/11/23 1851    Order Status: Completed Specimen: Abscess Updated: 11/13/23 1219     KOH Prep No fungal elements seen    Fungal Culture [[id number]] Collected: 11/11/23 1851    Order Status: Sent Specimen: Abscess Updated: 11/13/23 1122    AFB Smear [[id number]] Collected: 11/11/23 1851    Order Status: Completed Specimen: Abscess from Rectum Updated: 11/13/23 0754     AFB Smear No AFB seen (Direct smear only) - Concentration to follow    Mycobacteria and Nocardia Culture [[id number]] Collected: 11/11/23 1851    Order Status: Sent Specimen: Abscess from Rectum Updated: 11/12/23 1404    Gram Stain [[id number]] Collected: 11/11/23 1851    Order Status: Completed Specimen: Abscess from Rectum Updated: 11/12/23 1129     GRAM STAIN Many WBC observed      Many Gram positive cocci      Many Gram Negative Rods      Many Gram Positive Rods               MEDICATIONS   ketorolac  15 mg Intravenous Q6H    multivitamin  1 tablet Oral Daily    piperacillin-tazobactam (Zosyn) IV (PEDS and ADULTS) (extended infusion is not appropriate)  4.5 g Intravenous Q8H    senna-docusate 8.6-50 mg  2 tablet Oral BID    vancomycin (VANCOCIN) IV (PEDS and ADULTS)  1,250 mg Intravenous Q12H      "    INFUSIONS         DIAGNOSTIC TESTS  CT Abdomen Pelvis With IV Contrast         X-Ray Chest 1 View   Final Result      Suspect some bibasilar atelectasis.         Electronically signed by: Germain Cedeno   Date:    11/18/2023   Time:    17:19      CT Abdomen Pelvis W Wo Contrast   Final Result      X-Ray Chest PA And Lateral   Final Result      No acute abnormality.         Electronically signed by: Quinton Hernandez MD   Date:    11/11/2023   Time:    08:49      CT Abdomen Pelvis W Wo Contrast   Final Result      Findings seen consistent with colitis involving the ascending colon and portions of the descending colon.  Follow-up is recommended to resolution as underlying mass cannot be completely excluded.      The gastric wall appears to be slightly thickened.  Underlying gastritis should be excluded.         Electronically signed by: John Gale   Date:    11/06/2023   Time:    12:02           No results found for: "EF"       NUTRITION STATUS  Patient meets ASPEN criteria for moderate malnutrition of acute illness or injury per RD assessment as evidenced by:  Energy Intake (Malnutrition): less than 75% for greater than 7 days  Weight Loss (Malnutrition): greater than 2% in 1 week  Subcutaneous Fat (Malnutrition): moderate depletion  Muscle Mass (Malnutrition): moderate depletion  Fluid Accumulation (Malnutrition): other (see comments) (does not meet criteria)  Hand  Strength, Left (Malnutrition): unable to evaluate  Hand  Strength, Right (Malnutrition): unable to evaluate  A minimum of two characteristics is recommended for diagnosis of either severe or non-severe malnutrition.       Case related differential diagnoses have been reviewed; assessment and plan has been documented. I have personally reviewed the labs and test results that are currently available; I have reviewed the patients medication list. I have reviewed the consulting providers recommendations. I have reviewed or attempted to review " medical records based upon their availability.  All of the patient's and/or family's questions have been addressed and answered to the best of my ability.  I will continue to monitor closely and make adjustments to medical management as needed.  This document was created using M*Modal Fluency Direct.  Transcription errors may have been made.  Please contact me if any questions may rise regarding documentation to clarify transcription.        Abdoulaye Garcia MD   Internal Medicine  Department of [facility] Medicine  [facility] - 8th Floor Med Surg

## 2023-11-20 ENCOUNTER — ANESTHESIA EVENT (OUTPATIENT)
Dept: SURGERY | Facility: HOSPITAL | Age: 43
DRG: 853 | End: 2023-11-20
Payer: COMMERCIAL

## 2023-11-20 ENCOUNTER — ANESTHESIA (OUTPATIENT)
Dept: SURGERY | Facility: HOSPITAL | Age: 43
DRG: 853 | End: 2023-11-20
Payer: COMMERCIAL

## 2023-11-20 LAB
ABS NEUT (OLG): 47.38 X10(3)/MCL (ref 2.1–9.2)
ALBUMIN SERPL-MCNC: 1.3 G/DL (ref 3.5–5)
ALBUMIN/GLOB SERPL: 0.4 RATIO (ref 1.1–2)
ALP SERPL-CCNC: 114 UNIT/L (ref 40–150)
ALT SERPL-CCNC: 46 UNIT/L (ref 0–55)
AST SERPL-CCNC: 28 UNIT/L (ref 5–34)
BILIRUB SERPL-MCNC: 1.2 MG/DL
BUN SERPL-MCNC: 22.8 MG/DL (ref 8.9–20.6)
CALCIUM SERPL-MCNC: 7.5 MG/DL (ref 8.4–10.2)
CHLORIDE SERPL-SCNC: 94 MMOL/L (ref 98–107)
CO2 SERPL-SCNC: 24 MMOL/L (ref 22–29)
CREAT SERPL-MCNC: 1.04 MG/DL (ref 0.73–1.18)
ERYTHROCYTE [DISTWIDTH] IN BLOOD BY AUTOMATED COUNT: 16.2 % (ref 11.5–17)
GFR SERPLBLD CREATININE-BSD FMLA CKD-EPI: >60 MLS/MIN/1.73/M2
GLOBULIN SER-MCNC: 3.1 GM/DL (ref 2.4–3.5)
GLUCOSE SERPL-MCNC: 130 MG/DL (ref 74–100)
GROUP & RH: NORMAL
HCT VFR BLD AUTO: 25 % (ref 42–52)
HGB BLD-MCNC: 8 G/DL (ref 14–18)
INDIRECT COOMBS GEL: NORMAL
INSTRUMENT WBC (OLG): 48.35 X10(3)/MCL
LACTATE SERPL-SCNC: 1.1 MMOL/L (ref 0.5–2.2)
LACTATE SERPL-SCNC: 4.2 MMOL/L (ref 0.5–2.2)
MAGNESIUM SERPL-MCNC: 1.7 MG/DL (ref 1.6–2.6)
MCH RBC QN AUTO: 26.9 PG (ref 27–31)
MCHC RBC AUTO-ENTMCNC: 32 G/DL (ref 33–36)
MCV RBC AUTO: 84.2 FL (ref 80–94)
METAMYELOCYTES NFR BLD MANUAL: 1 %
MONOCYTES NFR BLD MANUAL: 0.48 X10(3)/MCL (ref 0.1–1.3)
MONOCYTES NFR BLD MANUAL: 1 %
NEUTROPHILS NFR BLD MANUAL: 98 %
NRBC BLD AUTO-RTO: 0 %
NRBC BLD MANUAL-RTO: 1 %
PLATELET # BLD AUTO: 356 X10(3)/MCL (ref 130–400)
PLATELET # BLD EST: ADEQUATE 10*3/UL
PMV BLD AUTO: 9.8 FL (ref 7.4–10.4)
POCT GLUCOSE: 138 MG/DL (ref 70–110)
POTASSIUM SERPL-SCNC: 4.1 MMOL/L (ref 3.5–5.1)
PROT SERPL-MCNC: 4.4 GM/DL (ref 6.4–8.3)
RBC # BLD AUTO: 2.97 X10(6)/MCL (ref 4.7–6.1)
RBC MORPH BLD: NORMAL
SODIUM SERPL-SCNC: 132 MMOL/L (ref 136–145)
SPECIMEN OUTDATE: NORMAL
TROPONIN I SERPL-MCNC: <0.01 NG/ML (ref 0–0.04)
VANCOMYCIN TROUGH SERPL-MCNC: 14.5 UG/ML (ref 15–20)
WBC # SPEC AUTO: 48.52 X10(3)/MCL (ref 4.5–11.5)

## 2023-11-20 PROCEDURE — 63600175 PHARM REV CODE 636 W HCPCS: Performed by: NURSE PRACTITIONER

## 2023-11-20 PROCEDURE — 63600175 PHARM REV CODE 636 W HCPCS: Performed by: COLON & RECTAL SURGERY

## 2023-11-20 PROCEDURE — 99233 SBSQ HOSP IP/OBS HIGH 50: CPT | Mod: ,,, | Performed by: GENERAL PRACTICE

## 2023-11-20 PROCEDURE — 63600175 PHARM REV CODE 636 W HCPCS: Performed by: STUDENT IN AN ORGANIZED HEALTH CARE EDUCATION/TRAINING PROGRAM

## 2023-11-20 PROCEDURE — 84484 ASSAY OF TROPONIN QUANT: CPT | Performed by: NURSE PRACTITIONER

## 2023-11-20 PROCEDURE — 63600175 PHARM REV CODE 636 W HCPCS

## 2023-11-20 PROCEDURE — 93010 EKG 12-LEAD: ICD-10-PCS | Mod: ,,, | Performed by: INTERNAL MEDICINE

## 2023-11-20 PROCEDURE — 99233 PR SUBSEQUENT HOSPITAL CARE,LEVL III: ICD-10-PCS | Mod: ,,, | Performed by: GENERAL PRACTICE

## 2023-11-20 PROCEDURE — 44180 LAP ENTEROLYSIS: CPT | Mod: 58,,, | Performed by: COLON & RECTAL SURGERY

## 2023-11-20 PROCEDURE — 87070 CULTURE OTHR SPECIMN AEROBIC: CPT | Performed by: SURGERY

## 2023-11-20 PROCEDURE — 25000003 PHARM REV CODE 250: Performed by: NURSE ANESTHETIST, CERTIFIED REGISTERED

## 2023-11-20 PROCEDURE — 25000003 PHARM REV CODE 250: Performed by: STUDENT IN AN ORGANIZED HEALTH CARE EDUCATION/TRAINING PROGRAM

## 2023-11-20 PROCEDURE — P9047 ALBUMIN (HUMAN), 25%, 50ML: HCPCS | Mod: JZ,JG | Performed by: NURSE ANESTHETIST, CERTIFIED REGISTERED

## 2023-11-20 PROCEDURE — 83605 ASSAY OF LACTIC ACID: CPT | Performed by: COLON & RECTAL SURGERY

## 2023-11-20 PROCEDURE — 87205 SMEAR GRAM STAIN: CPT | Performed by: SURGERY

## 2023-11-20 PROCEDURE — 36000708 HC OR TIME LEV III 1ST 15 MIN: Performed by: SURGERY

## 2023-11-20 PROCEDURE — 11000001 HC ACUTE MED/SURG PRIVATE ROOM

## 2023-11-20 PROCEDURE — 37000009 HC ANESTHESIA EA ADD 15 MINS: Performed by: SURGERY

## 2023-11-20 PROCEDURE — 27201423 OPTIME MED/SURG SUP & DEVICES STERILE SUPPLY: Performed by: SURGERY

## 2023-11-20 PROCEDURE — D9220A PRA ANESTHESIA: ICD-10-PCS | Mod: ANES,,, | Performed by: ANESTHESIOLOGY

## 2023-11-20 PROCEDURE — 86923 COMPATIBILITY TEST ELECTRIC: CPT | Performed by: ANESTHESIOLOGY

## 2023-11-20 PROCEDURE — 37000008 HC ANESTHESIA 1ST 15 MINUTES: Performed by: SURGERY

## 2023-11-20 PROCEDURE — 71000033 HC RECOVERY, INTIAL HOUR: Performed by: SURGERY

## 2023-11-20 PROCEDURE — 80053 COMPREHEN METABOLIC PANEL: CPT | Performed by: NURSE PRACTITIONER

## 2023-11-20 PROCEDURE — D9220A PRA ANESTHESIA: ICD-10-PCS | Mod: CRNA,,, | Performed by: NURSE ANESTHETIST, CERTIFIED REGISTERED

## 2023-11-20 PROCEDURE — 10061 I&D ABSCESS COMP/MULTIPLE: CPT | Mod: ,,, | Performed by: SURGERY

## 2023-11-20 PROCEDURE — 10061 PR DRAIN SKIN ABSCESS COMPLIC: ICD-10-PCS | Mod: ,,, | Performed by: SURGERY

## 2023-11-20 PROCEDURE — 93005 ELECTROCARDIOGRAM TRACING: CPT

## 2023-11-20 PROCEDURE — 25000003 PHARM REV CODE 250: Performed by: COLON & RECTAL SURGERY

## 2023-11-20 PROCEDURE — D9220A PRA ANESTHESIA: Mod: CRNA,,, | Performed by: NURSE ANESTHETIST, CERTIFIED REGISTERED

## 2023-11-20 PROCEDURE — 80202 ASSAY OF VANCOMYCIN: CPT | Performed by: INTERNAL MEDICINE

## 2023-11-20 PROCEDURE — 83735 ASSAY OF MAGNESIUM: CPT | Performed by: NURSE PRACTITIONER

## 2023-11-20 PROCEDURE — 36000709 HC OR TIME LEV III EA ADD 15 MIN: Performed by: SURGERY

## 2023-11-20 PROCEDURE — 63600175 PHARM REV CODE 636 W HCPCS: Performed by: INTERNAL MEDICINE

## 2023-11-20 PROCEDURE — 87075 CULTR BACTERIA EXCEPT BLOOD: CPT | Performed by: SURGERY

## 2023-11-20 PROCEDURE — C9113 INJ PANTOPRAZOLE SODIUM, VIA: HCPCS

## 2023-11-20 PROCEDURE — 63600175 PHARM REV CODE 636 W HCPCS: Performed by: NURSE ANESTHETIST, CERTIFIED REGISTERED

## 2023-11-20 PROCEDURE — 25000003 PHARM REV CODE 250: Performed by: INTERNAL MEDICINE

## 2023-11-20 PROCEDURE — 85027 COMPLETE CBC AUTOMATED: CPT | Performed by: NURSE PRACTITIONER

## 2023-11-20 PROCEDURE — 25000003 PHARM REV CODE 250

## 2023-11-20 PROCEDURE — 86900 BLOOD TYPING SEROLOGIC ABO: CPT | Performed by: SURGERY

## 2023-11-20 PROCEDURE — 93010 ELECTROCARDIOGRAM REPORT: CPT | Mod: ,,, | Performed by: INTERNAL MEDICINE

## 2023-11-20 PROCEDURE — D9220A PRA ANESTHESIA: Mod: ANES,,, | Performed by: ANESTHESIOLOGY

## 2023-11-20 PROCEDURE — 44180 PR LAP, SURG ENTEROLYSIS: ICD-10-PCS | Mod: 58,,, | Performed by: COLON & RECTAL SURGERY

## 2023-11-20 PROCEDURE — 63600175 PHARM REV CODE 636 W HCPCS: Performed by: ANESTHESIOLOGY

## 2023-11-20 PROCEDURE — 87102 FUNGUS ISOLATION CULTURE: CPT | Performed by: SURGERY

## 2023-11-20 RX ORDER — ALBUMIN HUMAN 250 G/1000ML
SOLUTION INTRAVENOUS
Status: DISCONTINUED | OUTPATIENT
Start: 2023-11-20 | End: 2023-11-20

## 2023-11-20 RX ORDER — SODIUM CHLORIDE, SODIUM LACTATE, POTASSIUM CHLORIDE, CALCIUM CHLORIDE 600; 310; 30; 20 MG/100ML; MG/100ML; MG/100ML; MG/100ML
INJECTION, SOLUTION INTRAVENOUS
Status: DISCONTINUED | OUTPATIENT
Start: 2023-11-20 | End: 2023-11-30 | Stop reason: HOSPADM

## 2023-11-20 RX ORDER — MIDAZOLAM HYDROCHLORIDE 1 MG/ML
INJECTION INTRAMUSCULAR; INTRAVENOUS
Status: DISCONTINUED | OUTPATIENT
Start: 2023-11-20 | End: 2023-11-20

## 2023-11-20 RX ORDER — SODIUM CHLORIDE, SODIUM LACTATE, POTASSIUM CHLORIDE, CALCIUM CHLORIDE 600; 310; 30; 20 MG/100ML; MG/100ML; MG/100ML; MG/100ML
INJECTION, SOLUTION INTRAVENOUS CONTINUOUS
Status: DISCONTINUED | OUTPATIENT
Start: 2023-11-20 | End: 2023-11-21

## 2023-11-20 RX ORDER — PHENYLEPHRINE HYDROCHLORIDE 10 MG/ML
INJECTION INTRAVENOUS
Status: DISCONTINUED | OUTPATIENT
Start: 2023-11-20 | End: 2023-11-20

## 2023-11-20 RX ORDER — KETAMINE HCL IN 0.9 % NACL 50 MG/5 ML
SYRINGE (ML) INTRAVENOUS
Status: DISCONTINUED | OUTPATIENT
Start: 2023-11-20 | End: 2023-11-20

## 2023-11-20 RX ORDER — MIDAZOLAM HYDROCHLORIDE 1 MG/ML
2 INJECTION INTRAMUSCULAR; INTRAVENOUS ONCE AS NEEDED
Status: DISCONTINUED | OUTPATIENT
Start: 2023-11-20 | End: 2023-11-20 | Stop reason: HOSPADM

## 2023-11-20 RX ORDER — MAG HYDROX/ALUMINUM HYD/SIMETH 200-200-20
30 SUSPENSION, ORAL (FINAL DOSE FORM) ORAL EVERY 6 HOURS PRN
Status: DISCONTINUED | OUTPATIENT
Start: 2023-11-20 | End: 2023-11-30 | Stop reason: HOSPADM

## 2023-11-20 RX ORDER — ONDANSETRON 2 MG/ML
INJECTION INTRAMUSCULAR; INTRAVENOUS
Status: DISCONTINUED | OUTPATIENT
Start: 2023-11-20 | End: 2023-11-20

## 2023-11-20 RX ORDER — ROCURONIUM BROMIDE 10 MG/ML
INJECTION, SOLUTION INTRAVENOUS
Status: DISCONTINUED | OUTPATIENT
Start: 2023-11-20 | End: 2023-11-20

## 2023-11-20 RX ORDER — FENTANYL CITRATE 50 UG/ML
INJECTION, SOLUTION INTRAMUSCULAR; INTRAVENOUS
Status: DISCONTINUED | OUTPATIENT
Start: 2023-11-20 | End: 2023-11-20

## 2023-11-20 RX ORDER — SUCRALFATE 1 G/1
1 TABLET ORAL
Status: DISCONTINUED | OUTPATIENT
Start: 2023-11-20 | End: 2023-11-22

## 2023-11-20 RX ORDER — LIDOCAINE HYDROCHLORIDE 20 MG/ML
INJECTION, SOLUTION EPIDURAL; INFILTRATION; INTRACAUDAL; PERINEURAL
Status: DISCONTINUED | OUTPATIENT
Start: 2023-11-20 | End: 2023-11-20

## 2023-11-20 RX ORDER — MEPERIDINE HYDROCHLORIDE 25 MG/ML
6.25 INJECTION INTRAMUSCULAR; INTRAVENOUS; SUBCUTANEOUS ONCE AS NEEDED
Status: DISCONTINUED | OUTPATIENT
Start: 2023-11-20 | End: 2023-11-20 | Stop reason: HOSPADM

## 2023-11-20 RX ORDER — OXYCODONE HYDROCHLORIDE 5 MG/1
5 TABLET ORAL EVERY 4 HOURS PRN
Status: CANCELLED | OUTPATIENT
Start: 2023-11-20

## 2023-11-20 RX ORDER — CALCIUM CHLORIDE INJECTION 100 MG/ML
INJECTION, SOLUTION INTRAVENOUS
Status: DISCONTINUED | OUTPATIENT
Start: 2023-11-20 | End: 2023-11-20

## 2023-11-20 RX ORDER — SUCCINYLCHOLINE CHLORIDE 20 MG/ML
INJECTION INTRAMUSCULAR; INTRAVENOUS
Status: DISCONTINUED | OUTPATIENT
Start: 2023-11-20 | End: 2023-11-20

## 2023-11-20 RX ORDER — PANTOPRAZOLE SODIUM 40 MG/10ML
40 INJECTION, POWDER, LYOPHILIZED, FOR SOLUTION INTRAVENOUS
Status: DISCONTINUED | OUTPATIENT
Start: 2023-11-20 | End: 2023-11-22

## 2023-11-20 RX ORDER — DEXAMETHASONE SODIUM PHOSPHATE 4 MG/ML
INJECTION, SOLUTION INTRA-ARTICULAR; INTRALESIONAL; INTRAMUSCULAR; INTRAVENOUS; SOFT TISSUE
Status: DISCONTINUED | OUTPATIENT
Start: 2023-11-20 | End: 2023-11-20

## 2023-11-20 RX ORDER — SODIUM CHLORIDE, SODIUM GLUCONATE, SODIUM ACETATE, POTASSIUM CHLORIDE AND MAGNESIUM CHLORIDE 30; 37; 368; 526; 502 MG/100ML; MG/100ML; MG/100ML; MG/100ML; MG/100ML
INJECTION, SOLUTION INTRAVENOUS CONTINUOUS
Status: DISCONTINUED | OUTPATIENT
Start: 2023-11-20 | End: 2023-11-21

## 2023-11-20 RX ORDER — HYDROCODONE BITARTRATE AND ACETAMINOPHEN 5; 325 MG/1; MG/1
1 TABLET ORAL
Status: CANCELLED | OUTPATIENT
Start: 2023-11-20

## 2023-11-20 RX ORDER — PROCHLORPERAZINE EDISYLATE 5 MG/ML
5 INJECTION INTRAMUSCULAR; INTRAVENOUS EVERY 30 MIN PRN
Status: DISCONTINUED | OUTPATIENT
Start: 2023-11-20 | End: 2023-11-20 | Stop reason: HOSPADM

## 2023-11-20 RX ORDER — HYDROMORPHONE HYDROCHLORIDE 2 MG/ML
INJECTION, SOLUTION INTRAMUSCULAR; INTRAVENOUS; SUBCUTANEOUS
Status: DISCONTINUED | OUTPATIENT
Start: 2023-11-20 | End: 2023-11-20

## 2023-11-20 RX ORDER — DEXMEDETOMIDINE HYDROCHLORIDE 100 UG/ML
INJECTION, SOLUTION INTRAVENOUS
Status: DISCONTINUED | OUTPATIENT
Start: 2023-11-20 | End: 2023-11-20

## 2023-11-20 RX ORDER — PROPOFOL 10 MG/ML
VIAL (ML) INTRAVENOUS
Status: DISCONTINUED | OUTPATIENT
Start: 2023-11-20 | End: 2023-11-20

## 2023-11-20 RX ORDER — EPHEDRINE SULFATE 50 MG/ML
INJECTION, SOLUTION INTRAVENOUS
Status: DISCONTINUED | OUTPATIENT
Start: 2023-11-20 | End: 2023-11-20

## 2023-11-20 RX ORDER — HYDROMORPHONE HYDROCHLORIDE 2 MG/ML
0.4 INJECTION, SOLUTION INTRAMUSCULAR; INTRAVENOUS; SUBCUTANEOUS EVERY 5 MIN PRN
Status: DISCONTINUED | OUTPATIENT
Start: 2023-11-20 | End: 2023-11-20 | Stop reason: HOSPADM

## 2023-11-20 RX ORDER — ONDANSETRON 2 MG/ML
4 INJECTION INTRAMUSCULAR; INTRAVENOUS DAILY PRN
Status: DISCONTINUED | OUTPATIENT
Start: 2023-11-20 | End: 2023-11-20 | Stop reason: HOSPADM

## 2023-11-20 RX ORDER — SODIUM CHLORIDE, SODIUM LACTATE, POTASSIUM CHLORIDE, CALCIUM CHLORIDE 600; 310; 30; 20 MG/100ML; MG/100ML; MG/100ML; MG/100ML
INJECTION, SOLUTION INTRAVENOUS CONTINUOUS
Status: DISCONTINUED | OUTPATIENT
Start: 2023-11-20 | End: 2023-11-23

## 2023-11-20 RX ADMIN — SODIUM CHLORIDE, POTASSIUM CHLORIDE, SODIUM LACTATE AND CALCIUM CHLORIDE: 600; 310; 30; 20 INJECTION, SOLUTION INTRAVENOUS at 02:11

## 2023-11-20 RX ADMIN — FENTANYL CITRATE 50 MCG: 50 INJECTION, SOLUTION INTRAMUSCULAR; INTRAVENOUS at 02:11

## 2023-11-20 RX ADMIN — ONDANSETRON 4 MG: 2 INJECTION INTRAMUSCULAR; INTRAVENOUS at 02:11

## 2023-11-20 RX ADMIN — SODIUM CHLORIDE, POTASSIUM CHLORIDE, SODIUM LACTATE AND CALCIUM CHLORIDE: 600; 310; 30; 20 INJECTION, SOLUTION INTRAVENOUS at 11:11

## 2023-11-20 RX ADMIN — MORPHINE SULFATE 2 MG: 10 INJECTION, SOLUTION INTRAMUSCULAR; INTRAVENOUS at 04:11

## 2023-11-20 RX ADMIN — KETOROLAC TROMETHAMINE 15 MG: 30 INJECTION, SOLUTION INTRAMUSCULAR at 12:11

## 2023-11-20 RX ADMIN — PHENYLEPHRINE HYDROCHLORIDE 200 MCG: 10 INJECTION INTRAVENOUS at 11:11

## 2023-11-20 RX ADMIN — SODIUM CHLORIDE, POTASSIUM CHLORIDE, SODIUM LACTATE AND CALCIUM CHLORIDE: 600; 310; 30; 20 INJECTION, SOLUTION INTRAVENOUS at 05:11

## 2023-11-20 RX ADMIN — SUCCINYLCHOLINE CHLORIDE 120 MG: 20 INJECTION, SOLUTION INTRAMUSCULAR; INTRAVENOUS at 11:11

## 2023-11-20 RX ADMIN — LIDOCAINE HYDROCHLORIDE 2 ML: 20 INJECTION, SOLUTION EPIDURAL; INFILTRATION; INTRACAUDAL; PERINEURAL at 11:11

## 2023-11-20 RX ADMIN — MORPHINE SULFATE 2 MG: 10 INJECTION, SOLUTION INTRAMUSCULAR; INTRAVENOUS at 10:11

## 2023-11-20 RX ADMIN — DEXMEDETOMIDINE 6 MCG: 200 INJECTION, SOLUTION INTRAVENOUS at 02:11

## 2023-11-20 RX ADMIN — PHENYLEPHRINE HYDROCHLORIDE 200 MCG: 10 INJECTION INTRAVENOUS at 01:11

## 2023-11-20 RX ADMIN — EPHEDRINE SULFATE 20 MG: 50 INJECTION INTRAVENOUS at 12:11

## 2023-11-20 RX ADMIN — CALCIUM CHLORIDE INJECTION 300 MG: 100 INJECTION, SOLUTION INTRAVENOUS at 12:11

## 2023-11-20 RX ADMIN — PROCHLORPERAZINE EDISYLATE 5 MG: 5 INJECTION INTRAMUSCULAR; INTRAVENOUS at 12:11

## 2023-11-20 RX ADMIN — SODIUM CHLORIDE, SODIUM GLUCONATE, SODIUM ACETATE, POTASSIUM CHLORIDE AND MAGNESIUM CHLORIDE: 526; 502; 368; 37; 30 INJECTION, SOLUTION INTRAVENOUS at 11:11

## 2023-11-20 RX ADMIN — FENTANYL CITRATE 50 MCG: 50 INJECTION, SOLUTION INTRAMUSCULAR; INTRAVENOUS at 12:11

## 2023-11-20 RX ADMIN — MIDAZOLAM HYDROCHLORIDE 2.5 MG: 1 INJECTION, SOLUTION INTRAMUSCULAR; INTRAVENOUS at 11:11

## 2023-11-20 RX ADMIN — PIPERACILLIN SODIUM AND TAZOBACTAM SODIUM 4.5 G: 4; .5 INJECTION, POWDER, FOR SOLUTION INTRAVENOUS at 01:11

## 2023-11-20 RX ADMIN — PIPERACILLIN SODIUM AND TAZOBACTAM SODIUM 4.5 G: 4; .5 INJECTION, POWDER, FOR SOLUTION INTRAVENOUS at 05:11

## 2023-11-20 RX ADMIN — Medication 10 MG: at 02:11

## 2023-11-20 RX ADMIN — PHENYLEPHRINE HYDROCHLORIDE 200 MCG: 10 INJECTION INTRAVENOUS at 12:11

## 2023-11-20 RX ADMIN — ALBUMIN (HUMAN) 100 ML: 12.5 SOLUTION INTRAVENOUS at 12:11

## 2023-11-20 RX ADMIN — OXYCODONE HYDROCHLORIDE 10 MG: 10 TABLET ORAL at 01:11

## 2023-11-20 RX ADMIN — VANCOMYCIN HYDROCHLORIDE 1250 MG: 1.25 INJECTION, POWDER, LYOPHILIZED, FOR SOLUTION INTRAVENOUS at 01:11

## 2023-11-20 RX ADMIN — HYDROMORPHONE HYDROCHLORIDE 0.4 MG: 2 INJECTION INTRAMUSCULAR; INTRAVENOUS; SUBCUTANEOUS at 03:11

## 2023-11-20 RX ADMIN — PANTOPRAZOLE SODIUM 40 MG: 40 INJECTION, POWDER, FOR SOLUTION INTRAVENOUS at 05:11

## 2023-11-20 RX ADMIN — PIPERACILLIN SODIUM AND TAZOBACTAM SODIUM 4.5 G: 4; .5 INJECTION, POWDER, FOR SOLUTION INTRAVENOUS at 08:11

## 2023-11-20 RX ADMIN — PROPOFOL 50 MG: 10 INJECTION, EMULSION INTRAVENOUS at 02:11

## 2023-11-20 RX ADMIN — DEXAMETHASONE SODIUM PHOSPHATE 4 MG: 4 INJECTION, SOLUTION INTRA-ARTICULAR; INTRALESIONAL; INTRAMUSCULAR; INTRAVENOUS; SOFT TISSUE at 12:11

## 2023-11-20 RX ADMIN — EPHEDRINE SULFATE 10 MG: 50 INJECTION INTRAVENOUS at 12:11

## 2023-11-20 RX ADMIN — CALCIUM CHLORIDE INJECTION 200 MG: 100 INJECTION, SOLUTION INTRAVENOUS at 11:11

## 2023-11-20 RX ADMIN — ROCURONIUM BROMIDE 20 MG: 10 SOLUTION INTRAVENOUS at 12:11

## 2023-11-20 RX ADMIN — SUGAMMADEX 200 MG: 100 INJECTION, SOLUTION INTRAVENOUS at 02:11

## 2023-11-20 RX ADMIN — KETOROLAC TROMETHAMINE 15 MG: 30 INJECTION, SOLUTION INTRAMUSCULAR at 06:11

## 2023-11-20 RX ADMIN — CALCIUM CHLORIDE INJECTION 500 MG: 100 INJECTION, SOLUTION INTRAVENOUS at 12:11

## 2023-11-20 RX ADMIN — Medication 40 MG: at 02:11

## 2023-11-20 RX ADMIN — HYDROMORPHONE HYDROCHLORIDE 0.4 MG: 2 INJECTION, SOLUTION INTRAMUSCULAR; INTRAVENOUS; SUBCUTANEOUS at 02:11

## 2023-11-20 RX ADMIN — ROCURONIUM BROMIDE 5 MG: 10 SOLUTION INTRAVENOUS at 11:11

## 2023-11-20 RX ADMIN — DEXMEDETOMIDINE 8 MCG: 200 INJECTION, SOLUTION INTRAVENOUS at 02:11

## 2023-11-20 RX ADMIN — ROCURONIUM BROMIDE 45 MG: 10 SOLUTION INTRAVENOUS at 11:11

## 2023-11-20 RX ADMIN — FENTANYL CITRATE 100 MCG: 50 INJECTION, SOLUTION INTRAMUSCULAR; INTRAVENOUS at 11:11

## 2023-11-20 RX ADMIN — ROCURONIUM BROMIDE 30 MG: 10 SOLUTION INTRAVENOUS at 01:11

## 2023-11-20 RX ADMIN — HYDROMORPHONE HYDROCHLORIDE 1 MG: 2 INJECTION, SOLUTION INTRAMUSCULAR; INTRAVENOUS; SUBCUTANEOUS at 03:11

## 2023-11-20 RX ADMIN — HYDROMORPHONE HYDROCHLORIDE 0.6 MG: 2 INJECTION, SOLUTION INTRAMUSCULAR; INTRAVENOUS; SUBCUTANEOUS at 02:11

## 2023-11-20 RX ADMIN — PROPOFOL 150 MG: 10 INJECTION, EMULSION INTRAVENOUS at 11:11

## 2023-11-20 NOTE — PROGRESS NOTES
Inpatient Nutrition Assessment    Admit Date: 11/6/2023   Total duration of encounter: 14 days   Patient Age: 42 y.o.    Nutrition Recommendation/Prescription     -Advance diet as tolerated per MD. Goal Diet: Low Residue Diet.   -Trial Boost Plus TID for additional nourishment while on full liquids; provides an additional 360 kcal and 14 gm protein per container.    -If unable to advance diet within the next 1-2 days, consider PPN. Recommend Clinimix E 4.25/5 @ 75mL/hr with IVPB IL 20% 250 mL 2x/week.   -Monitor wt, labs, and intake.    Communication of Recommendations: reviewed with patient and reviewed with family    Nutrition Assessment     Malnutrition Assessment/Nutrition-Focused Physical Exam    Malnutrition Context: acute illness or injury (11/07/23 1313)  Malnutrition Level: moderate (11/07/23 1313)  Energy Intake (Malnutrition): less than 75% for greater than 7 days (11/07/23 1313)  Weight Loss (Malnutrition): greater than 2% in 1 week (11/07/23 1313)  Subcutaneous Fat (Malnutrition): moderate depletion (11/07/23 1313)  Orbital Region (Subcutaneous Fat Loss): moderate depletion        Muscle Mass (Malnutrition): moderate depletion (11/07/23 1313)  [place] Region (Muscle Loss): moderate depletion                       Fluid Accumulation (Malnutrition): other (see comments) (does not meet criteria) (11/07/23 1313)  Hand  Strength, Left (Malnutrition): unable to evaluate (11/07/23 1313)  Hand  Strength, Right (Malnutrition): unable to evaluate (11/07/23 1313)  A minimum of two characteristics is recommended for diagnosis of either severe or non-severe malnutrition.    Chart Review    Reason Seen: malnutrition screening tool (MST) and follow-up    Malnutrition Screening Tool Results   Have you recently lost weight without trying?: Yes: 34 lbs or more  Have you been eating poorly because of a decreased appetite?: Yes   MST Score: 5   Diagnosis:  Acute severe ulcerative colitis   Orthostatic syncope  "  Symptomatic anemia requiring blood transfusion  Acute blood loss anemia    Relevant Medical History: none noted    Nutrition-Related Medications: LR, MVI, Protonix  Calorie Containing IV Medications: no significant kcals from medications at this time    Nutrition-Related Labs:  23: Na 133, Glu 161, GFR>60  23: Na 130, Glu 164, GFR>60, CRP 95.4  23: Na 133, Glu 165, GFR>60  23: Na 131, Glu 182, GFR>60  23: Na 132, Glu 130, GFR>60    Nutrition Orders:   Diet NPO      Appetite/Oral Intake: NPO/NPO    Factors Affecting Nutritional Intake: abdominal distension, altered gastrointestinal function, and NPO    Food/Holiness/Cultural Preferences: none reported    Food Allergies: no known food allergies    Wound(s):   none noted    Last Bowel Movement: 23    Comments    23: Pt reports good appetite/intake while in hospital; reports that his pain and diarrhea are better managed here than it is at home. Pt reports significant wt loss and frequent watery and bloody diarrhea for the last 3 weeks.     23: Per pt and pt's wife, pt is eating well, most of meals; denies n/v.     23: Pt tolerating liquids per RN.     23: Per pt's wife, pt is tolerating full liquids well.     23: Pt NPO; NG tube was placed this morning for worsening abdominal pain and distention; pt also to go to OR for wound exploration and debridement and possible wound vac placement.     Anthropometrics    Height: 5' 10" (177.8 cm) Height Method: Stated  Last Weight: 74.8 kg (165 lb) (23 1312) Weight Method: Bed Scale  BMI (Calculated): 23.7  BMI Classification: normal (BMI 18.5-24.9)        Ideal Body Weight (IBW), Male: 166 lb     % Ideal Body Weight, Male (lb): 99.4 %                 Usual Body Weight (UBW), k.18 kg  % Usual Body Weight: 80.49  % Weight Change From Usual Weight: -19.68 %  Usual Weight Provided By: patient    Wt Readings from Last 5 Encounters:   23 74.8 kg (165 lb) "   10/21/23 86.4 kg (190 lb 7.6 oz)     Weight Change(s) Since Admission:   Wt Readings from Last 1 Encounters:   23 1312 74.8 kg (165 lb)   23 1835 74.8 kg (165 lb)   23 0328 74.8 kg (165 lb)   Admit Weight: 74.8 kg (165 lb) (23 0328), Weight Method: Stated  23-23: no new wt noted    Estimated Needs    Weight Used For Calorie Calculations: 74.8 kg (164 lb 14.5 oz)  Energy Calorie Requirements (kcal): 9442-6335 kcal (30-35 kcal/kg)  Energy Need Method: Kcal/kg  Weight Used For Protein Calculations: 74.8 kg (164 lb 14.5 oz)  Protein Requirements: 112 gm (1.5g/kg)  Fluid Requirements (mL): 2244 mL  Temp (24hrs), Av.5 °F (36.9 °C), Min:97.7 °F (36.5 °C), Max:100.4 °F (38 °C)       Enteral Nutrition    Patient not receiving enteral nutrition at this time.    Parenteral Nutrition    Patient not receiving parenteral nutrition support at this time.    Evaluation of Received Nutrient Intake    Calories: not meeting estimated needs  Protein: not meeting estimated needs    Patient Education    Not applicable.    Nutrition Diagnosis     PES: Malnutrition related to altered GI function as evidenced by >2% wt loss x 1 week, <75% est energy requirements met >7 days, physical evidence of moderate fat and muscle wasting. (active)    Interventions/Goals     Intervention(s): modified composition of meals/snacks, multivitamin/mineral supplement therapy, prescription medication, and collaboration with other providers    Goal: Maintain weight throughout hospitalization. (goal not met)    Monitoring & Evaluation     Dietitian will monitor energy intake and weight change.    Nutrition Risk/Follow-Up: high (follow-up in 1-4 days)   Please consult if re-assessment needed sooner.

## 2023-11-20 NOTE — BRIEF OP NOTE
[facility] - Peri Services  General Surgery  Brief Operative Note    Date of Procedure: 11/20/2023     Procedure: Scrotal wound debridement     Surgeon(s) and Role:  Staff: Dr. Chao MD   Resident(s): Michelle Wang, PGY-2    Pre-Operative Diagnosis: Allison's gangrene s/p debridement x2 (11/11, 11/12)    Post-Operative Diagnosis: Same    Anesthesia: GETA    Operative Findings:   - Purulent fluid expressed from anterior scrotal region  - Swabs taken of fluid and sent to lab  - Wound was sharply debrided anteriorly by 3-4 cm  - Copious irrigation with Experiens  - Wound was packed with Betadine-soaked Kerlix and dressed with ABD pads and Mediport tape     Description of Technical Procedures: Refer to full dictated operative report    Estimated Blood Loss (EBL): minimal           Implants: none    Drains: none    Specimens: fluid cultures    Complications: None            Condition: none    Disposition: extubated and transferred to PACU in stable condition     Michelle Wang  [employer] General Surgery, PGY2  11/20/2023 2:51 PM

## 2023-11-20 NOTE — TRANSFER OF CARE
"Anesthesia Transfer of Care Note    Patient: Lionel León    Procedure(s) Performed: Procedure(s) (LRB):  DEBRIDEMENT, WOUND, SACRUM (N/A)  LAPAROSCOPY, DIAGNOSTIC (N/A)    Patient location: PACU    Anesthesia Type: general    Transport from OR: Transported from OR on room air with adequate spontaneous ventilation    Post pain: adequate analgesia    Post assessment: no apparent anesthetic complications    Post vital signs: stable    Level of consciousness: awake    Nausea/Vomiting: no nausea/vomiting    Complications: none    Transfer of care protocol was followed      Last vitals: Visit Vitals  BP 97/60   Pulse 83   Temp 36.5 °C (97.7 °F) (Oral)   Resp 18   Ht 5' 10" (1.778 m)   Wt 74.8 kg (165 lb)   SpO2 99%   BMI 23.68 kg/m²     "

## 2023-11-20 NOTE — NURSING
Discussed status and plan of care with assigned nurse Yu DENNIS, who reports patient to OR this am, will follow as appropriate.

## 2023-11-20 NOTE — CARE UPDATE
Patient vomited approximately 1,500ml and also reported a previous bout of emesis (same amount according to wife) that the nurse did not witness. Became hypotensive when he tried to get OOB, SBP 70s - 80s with . Had shaking chills but was afebrile. B/P responded well to a 500 ml LR bolus. Stat labs included WBC 48.52, Hgb 8.0, Hct 25, Lactic acid 4.2. Made NPO. Evaluated by surgeon recommended NG tube placement. Per nurse they may take him to the OR. Patient examined at bedside. B/P stable. Reports excessive belching but no current nausea or any further vomiting.

## 2023-11-20 NOTE — ANESTHESIA PROCEDURE NOTES
Intubation    Date/Time: 11/20/2023 11:40 AM    Performed by: Roger Garcia CRNA  Authorized by: Gio Curtis MD    Intubation:     Induction:  Rapid sequence induction    Intubated:  Postinduction    Mask Ventilation:  Not attempted    Attempts:  1    Attempted By:  CRNA    Method of Intubation:  Video laryngoscopy    Blade:  [place] 3    Laryngeal View Grade: Grade I - full view of cords      Difficult Airway Encountered?: No      Complications:  None    Airway Device:  Oral endotracheal tube    Airway Device Size:  7.5    Style/Cuff Inflation:  Cuffed (inflated to minimal occlusive pressure)    Tube secured:  21    Secured at:  The lips    Placement Verified By:  Capnometry    Complicating Factors:  None    Findings Post-Intubation:  BS equal bilateral and atraumatic/condition of teeth unchanged

## 2023-11-20 NOTE — PROGRESS NOTES
[facility] - 8th Floor [facility] Surg  [facility] MEDICINE ~ PROGRESS NOTE        CHIEF COMPLAINT   Hospital follow up    HOSPITAL COURSE   Lionel León is a 42 y.o. White male with a past medical history of diverticulitis status post colon resection and ulcerative colitis. Patient had recent admission to hospital medicine services at [facility] on 10/21/2023 to 10/24/2023 for sepsis secondary to pancolitis. He was treated with antibiotics and GI was consulted. Colonoscopy was performed on 10/23/2023 with findings concerning for ulcerative colitis and biopsies of the cecum, ascending, transverse, ascending colon and rectum positive for active chronic colitis consistent with inflammatory bowel disorder. No transfusion was required for rectal bleeding. Patient was discharged with prednisone taper and GI follow up appointment on 11/17/2023. The patient presented to [facility] on 11/6/2023 with a primary complaint of bright red rectal bleeding and lower abdominal pain.  Patient reports rectal bleeding has been ongoing since discharge.  Other associated symptoms include nausea, rectal pain, subjective fevers, weakness, fatigue and a 30 40 lb weight loss over last 3 weeks.  Patient states this morning he was walking to the bathroom when he felt flushed and vision went black. He passed out and hit his head on the wooden floor. Approximately hour and a half later when he went to get up he passed out again.  Second syncopal episode was witnessed by patient's wife who is at bedside reports loss of consciousness lasts for approximately 30 seconds.    Upon presentation to the ED, temperature 98.3F, heart rate 109, blood pressure 96/51, respiratory rate 17, spO2 99%. Labs with H&H 7.3/23.6 (10.6/33.2 on 10/24/2023), BUN 21.9, creatinine 0.88, calcium 7.7, .6, ESR 86. EKG sinus tachycardia with a ventricular rate of 108 and age undetermined possible inferior infarct. In the ED patient received Dilaudid, Zofran, Hydrocortisone  and IVF. He was typed and crossmatched for transfusion of 2 units of packed RBC. Patient is admitted to hospital medicine services for further medical management.       Patient was seen by GI and started on IV steroids, Solu-Medrol 30 mg IV b.I.d. for active ulcerative colitis flare up  Plus Anusol suppository per rectum b.I.d..  Patient is status post 2 units PRBC for symptomatic anemia.  CT abdomen and pelvis was also ordered to assess extent. CT of the abdomen and pelvis was pertinent for findings consistent with colitis involving the ascending colon, portions of the descending colon.  Underlying gastritis also can not be excluded secondary to slightly thickened gastric wall.     May complain continues to be rectal pain but refers that suppositories are helping.  Patient is tolerating p.o. intake    According to GI notes plans is for patient to start Humira/imura once approved by patient's insurance.  November 11th he started reporting rectal pain with a bump, on exam he had cellulitis like changes.  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  Taken to the operating room same day by General surgery found to have necrotic tissue extending to the muscle encircling his anus.  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  With ongoing infection immunosuppression with steroids have been tapered off and no further plans for Humira.  Infectious Disease was also consulted and recommended continuing Zosyn.  Wound Care Clinic was also consulted and started on hyperbaric therapy.    Today  Had chills and hypotension overnight.  Also vomiting significantly.  Seen by surgery planning to take to the OR today.  Seems like there main focus is the sacrum however the sacrum really does not look bad, I did discuss with Dr. Shields a colorectal surgeon and he will discuss with the team further.  I have also asked GI to take a look in the  esophagus since he does have complaints of odynophagia and has some aphthous ulcers.  Does not really have much of complaints this morning and has been this way which has been making it harder to really find a source on him.        OBJECTIVE/PHYSICAL EXAM     VITAL SIGNS (MOST RECENT):  Temp: 98.1 °F (36.7 °C) (11/20/23 0502)  Pulse: 96 (11/20/23 0502)  Resp: 20 (11/20/23 0502)  BP: 101/67 (11/20/23 0452)  SpO2: 97 % (11/20/23 0502) VITAL SIGNS (24 HOUR RANGE):  Temp:  [98.1 °F (36.7 °C)-100.4 °F (38 °C)] 98.1 °F (36.7 °C)  Pulse:  [] 96  Resp:  [18-20] 20  SpO2:  [94 %-99 %] 97 %  BP: ()/(48-67) 101/67   GENERAL: In no acute distress, afebrile  HEENT:  NG tube with bile  CHEST: Clear to auscultation bilaterally  HEART: S1, S2, no appreciable murmur  ABDOMEN: Soft, appropriately tender postop, BS +, ileostomy with orange yellow liquid stool  MSK: Warm, no lower extremity edema, no clubbing or cyanosis  NEUROLOGIC: Alert and oriented x4, moving all extremities with good strength   INTEGUMENTARY:  Refer to images in the chart, reexamined wound today, had some fibrinous slough but no foul smell  PSYCHIATRY:        ASSESSMENT/PLAN   Severe ulcerative colitis acute flare-status post total colectomy and end ileostomy November 15  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  Continue Zosyn, follow cultures.  Vancomycin added November 18.  Colorectal surgery following.  Discuss case with attending today.    General surgery following.  Planning to take to the OR for debridement of the sacrum today.  Wound care clinic following.  Continue hyperbaric therapy.    GI signed off.  Off of immunosuppression.  Plastic surgery following.  Continue protein supplement, Jed and multivitamin.  Consulted GI for EGD evaluation did take a look for Crohn's given the extent of the disease seems that may be the more likely  diagnosis rather than ulcerative colitis.  Also has aphthous ulcers and some odynophagia.    DVT prophylaxis:  Ambulatory    Critical care diagnosis:  Ongoing sepsis with multiple possible sources  Critical care time spent:  40 minutes    Anticipated discharge and disposition:   __________________________________________________________________________    LABS/MICRO/MEDS/DIAGNOSTICS       LABS  Recent Labs     11/20/23  0250   *   K 4.1   CHLORIDE 94*   CO2 24   BUN 22.8*   CREATININE 1.04   GLUCOSE 130*   CALCIUM 7.5*   ALKPHOS 114   AST 28   ALT 46   ALBUMIN 1.3*       Recent Labs     11/20/23  0250   WBC 48.35  48.52*   RBC 2.97*   HCT 25.0*   MCV 84.2            MICROBIOLOGY  Microbiology Results (last 7 days)       Procedure Component Value Units Date/Time    Blood Culture [[id number]]  (Normal) Collected: 11/18/23 1706    Order Status: Completed Specimen: Blood Updated: 11/19/23 1800     CULTURE, BLOOD ([facility]) No Growth At 24 Hours    Blood Culture [[id number]]  (Normal) Collected: 11/18/23 1706    Order Status: Completed Specimen: Blood Updated: 11/19/23 1800     CULTURE, BLOOD ([facility]) No Growth At 24 Hours    Wound Culture [[id number]]  (Abnormal)  (Susceptibility) Collected: 11/11/23 1851    Order Status: Completed Specimen: Abscess from Rectum Updated: 11/17/23 1513     Wound Culture Many Escherichia coli      Many Streptococcus constellatus    Blood Culture [[id number]]  (Normal) Collected: 11/10/23 2048    Order Status: Completed Specimen: Blood from Antecubital, Left Updated: 11/15/23 2301     CULTURE, BLOOD ([facility]) No Growth at 5 days    MARLENI Prep [[id number]] Collected: 11/11/23 1851    Order Status: Completed Specimen: Abscess Updated: 11/13/23 1219     KOH Prep No fungal elements seen    Fungal Culture [[id number]] Collected: 11/11/23 1851    Order Status: Sent Specimen: Abscess Updated: 11/13/23 1122               MEDICATIONS   multivitamin  1 tablet Oral Daily    pantoprazole  40 mg Oral  Daily    piperacillin-tazobactam (Zosyn) IV (PEDS and ADULTS) (extended infusion is not appropriate)  4.5 g Intravenous Q8H    senna-docusate 8.6-50 mg  2 tablet Oral BID    vancomycin (VANCOCIN) IV (PEDS and ADULTS)  1,250 mg Intravenous Q12H         INFUSIONS   lactated ringers 125 mL/hr at 11/20/23 0523          DIAGNOSTIC TESTS  XR Gastric tube check, non-radiologist performed   Final Result      As above.         Electronically signed by: Joseph Carvajal   Date:    11/20/2023   Time:    06:15      CT Abdomen Pelvis With IV Contrast   Final Result   Impression:      1. There is extensive pneumoperitoneum. This is new since the prior examination and consistent with recent surgery. There is mild ascites. This is new since the prior examination. Correlate clinically and recommend continued serial interval follow-up to full resolution to confirm postoperative nature.      2. There is interval near-total colectomy with ileostomy in the right ventral mid abdominal wall. There are numerous moderately distended small bowel loops in the mid and lower abdomen which demonstrate air-fluid levels. Surrounding mesenteric fat stranding is noted. There is apparent transition at the level of the distal ileum. These findings are new since the prior examination and are suggestive of small bowel obstruction versus severe adynamic ileus. Correlate clinically as regards further evaluation and followup.      3. There is interval development of a defect / wound in the cutaneous / subcutaneous soft tissues of the gluteal cleft and the perineal region with soft tissue emphysema consistent with recent debridement.      4. Details and other findings as discussed above.      No significant discrepancy with overnight report         Electronically signed by: Chip Duran   Date:    11/19/2023   Time:    10:33      X-Ray Chest 1 View   Final Result      Suspect some bibasilar atelectasis.         Electronically signed by: Germain Cedeno  "  Date:    11/18/2023   Time:    17:19      CT Abdomen Pelvis W Wo Contrast   Final Result      X-Ray Chest PA And Lateral   Final Result      No acute abnormality.         Electronically signed by: Quinton Hernandez MD   Date:    11/11/2023   Time:    08:49      CT Abdomen Pelvis W Wo Contrast   Final Result      Findings seen consistent with colitis involving the ascending colon and portions of the descending colon.  Follow-up is recommended to resolution as underlying mass cannot be completely excluded.      The gastric wall appears to be slightly thickened.  Underlying gastritis should be excluded.         Electronically signed by: John Gale   Date:    11/06/2023   Time:    12:02           No results found for: "EF"       NUTRITION STATUS  Patient meets ASPEN criteria for moderate malnutrition of acute illness or injury per RD assessment as evidenced by:  Energy Intake (Malnutrition): less than 75% for greater than 7 days  Weight Loss (Malnutrition): greater than 2% in 1 week  Subcutaneous Fat (Malnutrition): moderate depletion  Muscle Mass (Malnutrition): moderate depletion  Fluid Accumulation (Malnutrition): other (see comments) (does not meet criteria)  Hand  Strength, Left (Malnutrition): unable to evaluate  Hand  Strength, Right (Malnutrition): unable to evaluate  A minimum of two characteristics is recommended for diagnosis of either severe or non-severe malnutrition.       Case related differential diagnoses have been reviewed; assessment and plan has been documented. I have personally reviewed the labs and test results that are currently available; I have reviewed the patients medication list. I have reviewed the consulting providers recommendations. I have reviewed or attempted to review medical records based upon their availability.  All of the patient's and/or family's questions have been addressed and answered to the best of my ability.  I will continue to monitor closely and make " adjustments to medical management as needed.  This document was created using M*Modal Fluency Direct.  Transcription errors may have been made.  Please contact me if any questions may rise regarding documentation to clarify transcription.        Abdoulaye Garcia MD   Internal Medicine  Department of Hospital Medicine [facility] - 8th Floor Med Surg

## 2023-11-20 NOTE — OP NOTE
[facility]                       [address] [place] [address]    PATIENT NAME:      MARQUIS DODGE   YOB: 1980  CSN:               [id number]  MRN:               [id number]  ADMIT DATE:        11/06/2023 03:31:00  PHYSICIAN:         Chandana Guidry MD                          OPERATIVE REPORT      DATE OF SURGERY:    11/20/2023 00:00:00    SURGEON:  Chandana Guidry MD    PROCEDURE:  Sharp excisional debridement of scrotum and perineum for Allison's   gangrene and scrotal abscess.    PREOPERATIVE DIAGNOSES:  Allison's gangrene and scrotal abscess.    POSTOPERATIVE DIAGNOSES:  Allison's gangrene and scrotal abscess.    ANESTHESIA:  General.    COMPLICATIONS:  None.    ESTIMATED BLOOD LOSS:  50 mL.    CONDITION:  Good.    SPECIMEN:  Scrotal tissue and culture sent for abscess.    SURGEON:  Chandana Guidry MD.    ASSISTANT:  Michelle Padilla.    INDICATION FOR PROCEDURE:  This is a 42-year-old male, who has had a debridement   of his perineum last week.  He also had a total abdominal colectomy following   that and now, he presents with fluid in the abdomen and signs of tracking   abscess into the scrotum.  We will take him to the operating room for   laparoscopy and debridement of the scrotum and drainage of the abscess.  Please   note that Dr. Shields with Colorectal surgery did the abdominal portion of the   procedure.    FINDINGS:  Scrotal abscess tracking up both bilateral inguinal areas.  We got   back to healthy tissue.  We did send some of the pus for culture.  There was not   as much significant tracking fasciitis noted as there was abscess.    PROCEDURE IN DETAIL:  The patient was brought to the operating room.  He had   already had his laparoscopy and drainage of fluid in the abdomen.  Besides   intraabdominal fluid, there were no significant findings according to Dr. Shields and the [facility] residents.  We  prepped and draped in lithotomy from my   portion of the procedure.  A time-out was called.  There was obvious pus   draining down from the open wound on the scrotal side.  We send a culture from   this and then we extended the skin incision into the scrotum and both bilateral   inguinal regions and sent that skin and tissue and subcutaneous fascia off for   permanent hemostasis was obtained.  The area was irrigated with Xperience.    There was no other irregular looking tissue.  Lap, sponge, needle, instrument   counts correct.  Betadine-soaked gauze was used to pack the area.  The patient   tolerated procedure well, was awoken without difficulty and brought to the   recovery room without further event.        ______________________________  MD DAVIDA Mcknight/HAWA  DD:  11/20/2023  Time:  02:33PM  DT:  11/20/2023  Time:  04:56PM  Job #:  [id number]      OPERATIVE REPORT

## 2023-11-20 NOTE — PROGRESS NOTES
Pharmacokinetic Assessment Follow Up: IV Vancomycin    Vancomycin serum concentration assessment(s):    The trough level was drawn correctly and can be used to guide therapy at this time. The measurement is within the desired definitive target range of 10 to 15 mcg/mL.    Vancomycin Regimen Plan:    Continue regimen to Vancomycin 1250 mg IV every 12 hours with next serum trough concentration measured at 00:30 prior to 01:30 dose on 11/22/2023    Drug levels (last 3 results):  Recent Labs   Lab Result Units 11/20/23  1200   Vancomycin Trough ug/ml 14.5*       Pharmacy will continue to follow and monitor vancomycin.    Please contact pharmacy at extension [phone] for questions regarding this assessment.    Thank you for the consult,   Ariella Lawrence       Patient brief summary:  Lionel León is a 42 y.o. male initiated on antimicrobial therapy with IV Vancomycin for treatment of skin & soft tissue infection    The patient's current regimen is vancomycin IV 1250 mg every 12 hours.    Drug Allergies:   Review of patient's allergies indicates:  No Known Allergies    Actual Body Weight:   74.8 kg    Renal Function:   Estimated Creatinine Clearance: 95.5 mL/min (based on SCr of 1.04 mg/dL).,     Dialysis Method (if applicable):  N/A    CBC (last 72 hours):  Recent Labs   Lab Result Units 11/18/23  0533 11/18/23  1706 11/19/23  0634 11/20/23  0250   WBC x10(3)/mcL 15.91* 17.81* 19.39* 48.35  48.52*   Hgb g/dL 7.7* 8.1* 7.5* 8.0*   Hct % 24.3* 25.6* 23.4* 25.0*   Platelet x10(3)/mcL 265 280 309 356   Mono % % 2.2 2.8 3.0  --    Monocytes % %  --   --   --  1   Eos % % 0.3 0.1 0.2  --    Basophil % % 0.1 0.2 0.1  --        Metabolic Panel (last 72 hours):  Recent Labs   Lab Result Units 11/18/23  0533 11/19/23  0634 11/20/23  0250   Sodium Level mmol/L 131* 130* 132*   Potassium Level mmol/L 4.3 4.3 4.1   Chloride mmol/L 100 99 94*   Carbon Dioxide mmol/L 29 26 24   Glucose Level mg/dL 89 97 130*   Blood Urea Nitrogen mg/dL 13.2  13.1 22.8*   Creatinine mg/dL 0.72* 0.67* 1.04   Albumin Level g/dL 1.4* 1.3* 1.3*   Bilirubin Total mg/dL 0.7 0.9 1.2   Alkaline Phosphatase unit/L 112 125 114   Aspartate Aminotransferase unit/L 38* 52* 28   Alanine Aminotransferase unit/L 40 49 46   Magnesium Level mg/dL  --   --  1.70       Vancomycin Administrations:  vancomycin given in the last 96 hours                     vancomycin 1.25 g in dextrose 5% 250 mL IVPB (ready to mix) (mg) 1,250 mg New Bag 11/20/23 0135     1,250 mg New Bag 11/19/23 1230    vancomycin 2 g in dextrose 5 % 500 mL IVPB (mg) 2,000 mg New Bag 11/19/23 0146                    Microbiologic Results:  Microbiology Results (last 7 days)       Procedure Component Value Units Date/Time    Blood Culture [[id number]]  (Normal) Collected: 11/18/23 1706    Order Status: Completed Specimen: Blood Updated: 11/19/23 1800     CULTURE, BLOOD ([facility]) No Growth At 24 Hours    Blood Culture [[id number]]  (Normal) Collected: 11/18/23 1706    Order Status: Completed Specimen: Blood Updated: 11/19/23 1800     CULTURE, BLOOD ([facility]) No Growth At 24 Hours    Wound Culture [[id number]]  (Abnormal)  (Susceptibility) Collected: 11/11/23 1851    Order Status: Completed Specimen: Abscess from Rectum Updated: 11/17/23 1513     Wound Culture Many Escherichia coli      Many Streptococcus constellatus    Blood Culture [[id number]]  (Normal) Collected: 11/10/23 2048    Order Status: Completed Specimen: Blood from Antecubital, Left Updated: 11/15/23 2301     CULTURE, BLOOD ([facility]) No Growth at 5 days

## 2023-11-20 NOTE — PROGRESS NOTES
Gastroenterology Progress Note    Subjective/Interval History:  GI called back for n/v, odynophagia, aphthous ulcers of the mouth - concern for Crohn's disease    CT abd/pel with IV 11/19/23: There is extensive pneumoperitoneum. This is new since the prior examination and consistent with recent surgery. There is mild ascites. This is new since the prior examination. There is interval near-total colectomy with ileostomy in the right ventral mid abdominal wall. There are numerous moderately distended small bowel loops in the mid and lower abdomen which demonstrate air-fluid levels. Surrounding mesenteric fat stranding is noted. There is apparent transition at the level of the distal ileum. These findings are new since the prior examination and are suggestive of small bowel obstruction versus severe adynamic ileus.     Spoke with hospitalist regarding patient. Patient had some abd pain with n/v overnight - reports of an episode of emesis with 1500ml. Some hypotension with systolic in 70-80s and tachycardia with HR 120s. Surgery recommended NGT to LIWS and return to OR for sacral wound debridement. WBC with significant increase to 48.52. He had Tmax of 100.8 over the weekend, afebrile this a.m. On vanc and zosyn.    Spoke with nurse regarding patient. Patient will be going to OR soon. He had NGT placed with a canister and a half of nonbloody output immediately.     Patient resting in bed, family at bedside. Per wife he is c/o odynophagia which he attributes to being intubated for recent surgeries, she states he was not complaining prior to surgery. Patient states he is feeling better since NGT placed. Ostomy output light brown.    ROS:  Review of Systems   Constitutional:  Positive for malaise/fatigue.   Respiratory:  Negative for cough and shortness of breath.    Cardiovascular:  Negative for chest pain.   Gastrointestinal:  Negative for abdominal pain, blood in stool, constipation, melena, nausea and vomiting.  "  Neurological:  Positive for weakness.       Vital Signs:  BP 97/60   Pulse 83   Temp 97.7 °F (36.5 °C) (Oral)   Resp 18   Ht 5' 10" (1.778 m)   Wt 74.8 kg (165 lb)   SpO2 99%   BMI 23.68 kg/m²   Body mass index is 23.68 kg/m².    Physical Exam:  Physical Exam  Constitutional:       General: He is not in acute distress.     Appearance: He is normal weight. He is ill-appearing.   HENT:      Head: Normocephalic and atraumatic.      Right Ear: External ear normal.      Left Ear: External ear normal.      Nose:      Comments: NGT to LIWS     Mouth/Throat:      Pharynx: Oropharynx is clear.   Eyes:      Conjunctiva/sclera: Conjunctivae normal.   Pulmonary:      Effort: Pulmonary effort is normal. No respiratory distress.   Abdominal:      General: There is no distension.      Palpations: Abdomen is soft.      Tenderness: There is no abdominal tenderness. There is no guarding.      Comments: Ostomy in RLQ with light brown stool   Musculoskeletal:         General: Normal range of motion.      Cervical back: Normal range of motion.   Skin:     General: Skin is warm and dry.      Coloration: Skin is pale.   Neurological:      Mental Status: He is alert. Mental status is at baseline.   Psychiatric:         Mood and Affect: Mood normal.         Behavior: Behavior normal.         Labs:  Recent Results (from the past 24 hour(s))   POCT glucose    Collection Time: 11/20/23  2:09 AM   Result Value Ref Range    POCT Glucose 138 (H) 70 - 110 mg/dL   Troponin I    Collection Time: 11/20/23  2:50 AM   Result Value Ref Range    Troponin-I <0.010 0.000 - 0.045 ng/mL   Comprehensive Metabolic Panel    Collection Time: 11/20/23  2:50 AM   Result Value Ref Range    Sodium Level 132 (L) 136 - 145 mmol/L    Potassium Level 4.1 3.5 - 5.1 mmol/L    Chloride 94 (L) 98 - 107 mmol/L    Carbon Dioxide 24 22 - 29 mmol/L    Glucose Level 130 (H) 74 - 100 mg/dL    Blood Urea Nitrogen 22.8 (H) 8.9 - 20.6 mg/dL    Creatinine 1.04 0.73 - 1.18 " mg/dL    Calcium Level Total 7.5 (L) 8.4 - 10.2 mg/dL    Protein Total 4.4 (L) 6.4 - 8.3 gm/dL    Albumin Level 1.3 (L) 3.5 - 5.0 g/dL    Globulin 3.1 2.4 - 3.5 gm/dL    Albumin/Globulin Ratio 0.4 (L) 1.1 - 2.0 ratio    Bilirubin Total 1.2 <=1.5 mg/dL    Alkaline Phosphatase 114 40 - 150 unit/L    Alanine Aminotransferase 46 0 - 55 unit/L    Aspartate Aminotransferase 28 5 - 34 unit/L    eGFR >60 mls/min/1.73/m2   Magnesium    Collection Time: 11/20/23  2:50 AM   Result Value Ref Range    Magnesium Level 1.70 1.60 - 2.60 mg/dL   CBC with Differential    Collection Time: 11/20/23  2:50 AM   Result Value Ref Range    WBC 48.52 (H) 4.50 - 11.50 x10(3)/mcL    RBC 2.97 (L) 4.70 - 6.10 x10(6)/mcL    Hgb 8.0 (L) 14.0 - 18.0 g/dL    Hct 25.0 (L) 42.0 - 52.0 %    MCV 84.2 80.0 - 94.0 fL    MCH 26.9 (L) 27.0 - 31.0 pg    MCHC 32.0 (L) 33.0 - 36.0 g/dL    RDW 16.2 11.5 - 17.0 %    Platelet 356 130 - 400 x10(3)/mcL    MPV 9.8 7.4 - 10.4 fL    NRBC% 0.0 %   Lactic Acid, Plasma    Collection Time: 11/20/23  2:50 AM   Result Value Ref Range    Lactic Acid Level 4.2 (HH) 0.5 - 2.2 mmol/L   Manual Differential    Collection Time: 11/20/23  2:50 AM   Result Value Ref Range    WBC 48.35 x10(3)/mcL    Neutrophils % 98 %    Monocytes % 1 %    Metamyelocytes % 1 (H) <=0 %    nRBC % 1 %    Neutrophils Abs 47.383 (H) 2.1 - 9.2 x10(3)/mcL    Monocytes Abs 0.4835 0.1 - 1.3 x10(3)/mcL    Platelets Adequate Normal, Adequate    RBC Morph Normal Normal   Lactic Acid, Plasma    Collection Time: 11/20/23  5:21 AM   Result Value Ref Range    Lactic Acid Level 1.1 0.5 - 2.2 mmol/L   Type & Screen    Collection Time: 11/20/23  7:53 AM   Result Value Ref Range    Group & Rh O POS     Indirect Arthur GEL NEG     Specimen Outdate 11/23/2023 23:59          Assessment/Plan:  42-year-old male known to Dr. He with past medical history of diverticulitis s/p colon resection and recent new diagnosis of ulcerative colitis.  He presented to the ED on 11/6  with complaints of bright red blood per rectum and lower abdominal pain associated with nausea, rectal pain, weakness and 30-40 lb weight loss over the past 3 weeks with syncopal episode x2- 2nd episode with LOC.    Attempt made to start patient on Humira samples inpatient; however patient became febrile and imaging revealed xochitl's gangrene. He is s/p multiple I&D of perineum. CRS was consulted due to ongoing perineal sepsis and inability to use immunosuppressants, total colectomy with end ileostomy performed 11/15/23. Patient had improvement in symptoms post op and GI signed off 11/17/23 with plans for outpatient follow up and surveillance.  GI called back 11/20/23 for n/v, odynophagia, aphthous ulcers with concern for upper GI Crohn's.    N/v 2/2 ileus vs SBO  Odynophagia  Aphthous ulcers of the mouth  - supportive care: IVF, antiemetics  - ex lap today  - continue NGT to LIWS  - PPI BID  - carafate QID swish and spit for ulcers  - will need eventual EGD to assess for the presence of Crohn's disease given symptoms; will discuss with Dr. Mujica to determine timing    Severe IBD s/p total colectomy and end ileostomy  - Colonoscopy 10/23/2023 with findings c/w IBD.  He was discharged on prednisone taper with outpatient follow up appointment scheduled 11/17/2023, but ended up presenting here on 11/6  - outpatient appt in our office was scheduled for 11/17 with plans to start humira and imuran- Patient did obtain Humira samples and was planning on having loading dose; however patient became febrile and CT revealed xochitl's gangrene  - s/p total colectomy with end ileostomy 11/15/23 - path pending    Xochitl's gangrene s/p multiple I&D  - S/p I&D of perineum 11/11/23 and 11/12/23  - return to OR 11/20/23 for ex lap given profound leukocytosis and abnormal imaging revealing significant air and fluid in abd     WESTON BurnsC  Gastroenterology  [facility]

## 2023-11-20 NOTE — PROGRESS NOTES
Colon and Rectal Surgery Progress Note  Admit Date: 11/6/2023  Hospital Day: 14  Procedure: 5 Days Post-Op     S:  Febrile 100.4F yesterday.  Episode of orthostasis overnight, intermittent chills as well.   Threw up multiple times. Belching this morning.  Ostomy with stool in bag.   Voiding spontaneously  Denies significant abdominal pain    O:  Vitals:   Temp:  [98.1 °F (36.7 °C)-100.4 °F (38 °C)]   Pulse:  []   Resp:  [18-20]   BP: ()/(48-67)   SpO2:  [94 %-99 %]     Diet NPO   Intake/Output Summary (Last 24 hours) at 11/20/2023 0616  Last data filed at 11/20/2023 0500  Gross per 24 hour   Intake 1180 ml   Output 1575 ml   Net -395 ml            Physical Exam:  Gen: Ill appearing, AAOx3  HEENT: EOMI, NCAT  CV: RR  Resp: no shortness of breath, normal WOB  Abd: soft, moderately distended. Incision c/d/I. Ostomy pink and productive.  Ext: no edema  : perineal wound with purulence at base of scrotum, pocket of pus unable to fully probe bedside 2/2 pain      Labs:  Recent Labs     11/18/23  0533 11/18/23  1706 11/19/23  0634 11/20/23  0250   WBC 15.91* 17.81* 19.39* 48.35  48.52*   HGB 7.7* 8.1* 7.5* 8.0*   HCT 24.3* 25.6* 23.4* 25.0*    280 309 356   *  --  130* 132*   K 4.3  --  4.3 4.1   CO2 29  --  26 24   BUN 13.2  --  13.1 22.8*   CREATININE 0.72*  --  0.67* 1.04   BILITOT 0.7  --  0.9 1.2   AST 38*  --  52* 28   ALT 40  --  49 46   ALKPHOS 112  --  125 114   CALCIUM 7.5*  --  7.6* 7.5*   ALBUMIN 1.4*  --  1.3* 1.3*   MG  --   --   --  1.70     Scheduled Meds: ketorolac, 15 mg, Q6H  multivitamin, 1 tablet, Daily  pantoprazole, 40 mg, Daily  piperacillin-tazobactam (Zosyn) IV (PEDS and ADULTS) (extended infusion is not appropriate), 4.5 g, Q8H  senna-docusate 8.6-50 mg, 2 tablet, BID  vancomycin (VANCOCIN) IV (PEDS and ADULTS), 1,250 mg, Q12H     lactated ringers 125 mL/hr at 11/20/23 0523       A/P:  43 yo M with IBD s/p TAC with end ileostomy     - Net negative, agree with bolus and  start mIVF as now NPO  - Recommend NGT placement for decompression with LIWS  - Continue antibiotics  - Discussed findings on exam with general surgery residents who plan to explore wound today    Adriana Raygoza MD  [employer] General Surgery, [facility]

## 2023-11-20 NOTE — PROGRESS NOTES
Acute Care Surgery   Progress Note  Admit Date: 11/6/2023  HD#14  POD#5 Days Post-Op    Subjective:   Interval history:  Febrile to 100.4 yesterday morning, afebrile since.   Became orthostatic last night, states he passed out.   Had worsening abdominal pain, distention and gas overnight. NGT placed this morning with immediate return of 650 cc bilious output. States abdomen feels better since NGT was placed.   Sacral/perineal wound examined at bedside last night with purulence noted near testicle. WBC elevated to 48.52 and lactic acid to 4.2 on am labs.       Home Meds:  Current Outpatient Medications   Medication Instructions    dicyclomine (BENTYL) 20 mg, Oral, 4 times daily PRN    predniSONE (DELTASONE) 10 MG tablet Take 4 tablets (40 mg total) by mouth once daily for 7 days, THEN 3 tablets (30 mg total) once daily for 7 days, THEN 2 tablets (20 mg total) once daily for 7 days, THEN 1 tablet (10 mg total) once daily for 7 days, THEN 0.5 tablets (5 mg total) once daily for 7 days.      Scheduled Meds:   ketorolac  15 mg Intravenous Q6H    multivitamin  1 tablet Oral Daily    pantoprazole  40 mg Oral Daily    piperacillin-tazobactam (Zosyn) IV (PEDS and ADULTS) (extended infusion is not appropriate)  4.5 g Intravenous Q8H    senna-docusate 8.6-50 mg  2 tablet Oral BID    vancomycin (VANCOCIN) IV (PEDS and ADULTS)  1,250 mg Intravenous Q12H     Continuous Infusions:   lactated ringers 125 mL/hr at 11/20/23 0523     PRN Meds:0.9%  NaCl infusion (for blood administration), 0.9%  NaCl infusion (for blood administration), 0.9%  NaCl infusion (for blood administration), 0.9%  NaCl infusion (for blood administration), acetaminophen, acetaminophen, aluminum-magnesium hydroxide-simethicone, cyclobenzaprine, dextrose 10%, dextrose 10%, dicyclomine, glucagon (human recombinant), glucose, glucose, lactated ringers, morphine, ondansetron, oxyCODONE, prochlorperazine, sodium chloride 0.9%, vancomycin - pharmacy to dose    "  Objective:     VITAL SIGNS: 24 HR MIN & MAX LAST   Temp  Min: 98.1 °F (36.7 °C)  Max: 100.4 °F (38 °C)  98.1 °F (36.7 °C)   BP  Min: 81/48  Max: 116/63  101/67    Pulse  Min: 80  Max: 128  96    Resp  Min: 18  Max: 20  20    SpO2  Min: 94 %  Max: 99 %  97 %      HT: 5' 10" (177.8 cm)  WT: 74.8 kg (165 lb)  BMI: 23.7     Intake/output:  Intake/Output - Last 3 Shifts         11/18 0700 11/19 0659 11/19 0700 11/20 0659 11/20 0700 11/21 0659    P.O. 1200 1180     I.V. (mL/kg)       IV Piggyback 200      Total Intake(mL/kg) 1400 (18.7) 1180 (15.8)     Urine (mL/kg/hr) 1775 (1) 1175 (0.7)     Stool 500 400     Total Output 2275 1575     Net -875 -395                    Intake/Output Summary (Last 24 hours) at 11/20/2023 0706  Last data filed at 11/20/2023 0500  Gross per 24 hour   Intake 1180 ml   Output 1575 ml   Net -395 ml             Lines/drains/airway:       Peripheral IV - Single Lumen 18 G Right Hand (Active)   Site Assessment Clean;Dry;Intact 11/12/23 0000   Extremity Assessment Distal to IV No abnormal discoloration;No redness;No swelling 11/11/23 2010   Line Status Saline locked 11/12/23 0000   Dressing Status Clean;Dry;Intact 11/12/23 0000   Dressing Intervention Integrity maintained 11/11/23 2010   Number of days:             Rectal Tube 11/11/23 1944 fecal management system (Active)   Stool Color Brown;Red 11/11/23 2100   Number of days: 0            Urethral Catheter 11/11/23 1954 Double-lumen;Silicone;Non-latex 16 Fr. (Active)   Site Assessment Clean;Intact 11/11/23 2100   Collection Container Urimeter 11/11/23 2100   Securement Method secured to top of thigh w/ adhesive device 11/11/23 2100   Catheter Care Performed yes 11/11/23 2100   Reason for Continuing Urinary Catheterization Post operative 11/11/23 2100   CAUTI Prevention Bundle Securement Device in place with 1" slack;Intact seal between catheter & drainage tubing;Drainage bag/urimeter off the floor;Sheeting clip in use;No dependent loops or " "kinks;Drainage bag/urimeter not overfilled (<2/3 full);Drainage bag/urimeter below bladder 11/11/23 2100   Output (mL) 700 mL 11/12/23 0548   Number of days: 0       Physical examination:  Gen: NAD, AAOx3, answering questions appropriately  HEENT: PERRLA  CV: RR  Resp: NWOB  Abd: Soft, non-distended. Ostomy to LLQ; mucosa is healthy. Air and stool in bag. Abdomen appropriately tender around midline and ostomy.   Perineum:  Large wounds to bilateral buttocks dressing is in place - deferred taking down dressings due to OR plans this morning   Ext: moving all extremities spontaneously and purposefully      Labs:  Renal:  Recent Labs     11/18/23  0533 11/19/23  0634 11/20/23  0250   BUN 13.2 13.1 22.8*   CREATININE 0.72* 0.67* 1.04       Recent Labs     11/20/23  0250 11/20/23  0521   LACTIC 4.2* 1.1     FENGI:  Recent Labs     11/18/23  0533 11/19/23  0634 11/20/23  0250   * 130* 132*   K 4.3 4.3 4.1   CO2 29 26 24   CALCIUM 7.5* 7.6* 7.5*   MG  --   --  1.70   ALBUMIN 1.4* 1.3* 1.3*   BILITOT 0.7 0.9 1.2   AST 38* 52* 28   ALKPHOS 112 125 114   ALT 40 49 46       Heme:  Recent Labs     11/18/23  0533 11/18/23  1706 11/19/23  0634 11/20/23  0250   HGB 7.7* 8.1* 7.5* 8.0*   HCT 24.3* 25.6* 23.4* 25.0*    280 309 356       ID:  Recent Labs     11/18/23  1706 11/19/23  0634 11/20/23  0250   WBC 17.81* 19.39* 48.35  48.52*       CBG:  Recent Labs     11/18/23  0533 11/19/23  0634 11/20/23  0250   GLUCOSE 89 97 130*        Cardiovascular:  Recent Labs   Lab 11/20/23  0250   TROPONINI <0.010     ABG:  No results for input(s): "PH", "PO2", "PCO2", "HCO3", "BE" in the last 168 hours.   I have reviewed all pertinent lab results within the past 24 hours.    Imaging:  XR Gastric tube check, non-radiologist performed   Final Result      As above.         Electronically signed by: Joseph Carvajal   Date:    11/20/2023   Time:    06:15      CT Abdomen Pelvis With IV Contrast   Final Result   Impression:      1. There " is extensive pneumoperitoneum. This is new since the prior examination and consistent with recent surgery. There is mild ascites. This is new since the prior examination. Correlate clinically and recommend continued serial interval follow-up to full resolution to confirm postoperative nature.      2. There is interval near-total colectomy with ileostomy in the right ventral mid abdominal wall. There are numerous moderately distended small bowel loops in the mid and lower abdomen which demonstrate air-fluid levels. Surrounding mesenteric fat stranding is noted. There is apparent transition at the level of the distal ileum. These findings are new since the prior examination and are suggestive of small bowel obstruction versus severe adynamic ileus. Correlate clinically as regards further evaluation and followup.      3. There is interval development of a defect / wound in the cutaneous / subcutaneous soft tissues of the gluteal cleft and the perineal region with soft tissue emphysema consistent with recent debridement.      4. Details and other findings as discussed above.      No significant discrepancy with overnight report         Electronically signed by: Chip Duran   Date:    11/19/2023   Time:    10:33      X-Ray Chest 1 View   Final Result      Suspect some bibasilar atelectasis.         Electronically signed by: Germain Cedeno   Date:    11/18/2023   Time:    17:19      CT Abdomen Pelvis W Wo Contrast   Final Result      X-Ray Chest PA And Lateral   Final Result      No acute abnormality.         Electronically signed by: Quinton Hernandez MD   Date:    11/11/2023   Time:    08:49      CT Abdomen Pelvis W Wo Contrast   Final Result      Findings seen consistent with colitis involving the ascending colon and portions of the descending colon.  Follow-up is recommended to resolution as underlying mass cannot be completely excluded.      The gastric wall appears to be slightly thickened.  Underlying gastritis should  be excluded.         Electronically signed by: John Gale   Date:    11/06/2023   Time:    12:02         I have reviewed all pertinent imaging results/findings within the past 24 hours.    Micro/Path/Other:  Microbiology Results (last 7 days)       Procedure Component Value Units Date/Time    Blood Culture [[id number]]  (Normal) Collected: 11/18/23 1706    Order Status: Completed Specimen: Blood Updated: 11/19/23 1800     CULTURE, BLOOD ([facility]) No Growth At 24 Hours    Blood Culture [[id number]]  (Normal) Collected: 11/18/23 1706    Order Status: Completed Specimen: Blood Updated: 11/19/23 1800     CULTURE, BLOOD ([facility]) No Growth At 24 Hours    Wound Culture [[id number]]  (Abnormal)  (Susceptibility) Collected: 11/11/23 1851    Order Status: Completed Specimen: Abscess from Rectum Updated: 11/17/23 1513     Wound Culture Many Escherichia coli      Many Streptococcus constellatus    Blood Culture [[id number]]  (Normal) Collected: 11/10/23 2048    Order Status: Completed Specimen: Blood from Antecubital, Left Updated: 11/15/23 2301     CULTURE, BLOOD ([facility]) No Growth at 5 days    MARLENI Prep [[id number]] Collected: 11/11/23 1851    Order Status: Completed Specimen: Abscess Updated: 11/13/23 1219     KOH Prep No fungal elements seen    Fungal Culture [[id number]] Collected: 11/11/23 1851    Order Status: Sent Specimen: Abscess Updated: 11/13/23 1122    AFB Smear [[id number]] Collected: 11/11/23 1851    Order Status: Completed Specimen: Abscess from Rectum Updated: 11/13/23 0754     AFB Smear No AFB seen (Direct smear only) - Concentration to follow           Specimen (168h ago, onward)       Start     Ordered    11/15/23 1503  Specimen to Pathology  RELEASE UPON ORDERING        References:    Click here for ordering Quick Tip   Question:  Release to patient  Answer:  Immediate    11/15/23 1503                     Assessment & Plan:   42-year-old male with a past medical history of ulcerative colitis recently diagnosed  in October who presented to the hospital with necrotizing fasciitis of his perineum status post wide excisional debridement was peritoneum on 11/11 and on 11/12. Now s/p laparoscopic total colectomy with end ileostomy placement 11/15.      - to OR this morning for wound exploration, debridement, possible wound vac placement and all indicated procedures  - consent obtained, uploaded into chart; all questions answered   - continue venkat Guido MD  [employer] General Surgery, PGY-2

## 2023-11-20 NOTE — OP NOTE
[facility] - Per[facility] Services  Operative Note      Date of Procedure: 11/20/2023     Procedure: Diagnostic laparoscopy     Surgeon(s) and Role:     * Fredi Shields MD - Primary    Assisting Surgeon: Michelle Padilla MD PGY 2    Pre-Operative Diagnosis: Abdominal pain    Post-Operative Diagnosis: Same    Anesthesia: General    Estimated Blood Loss (EBL): 10 mL           Specimens: None     Description of Technical Procedures:  After informed consent was obtained, patient was brought to the operating room and placed in the supine position.  Next general endotracheal anesthesia was administered by member of the anesthesia team.  The abdomen was prepped and draped in sterile surgical fashion.  The left upper quadrant 5 mm trocar incision was reopened and access to the peritoneal cavity was achieved with a 5 mm Optiview trocar and a 5 mm 0 degree laparoscope.  Pneumoperitoneum was achieved.  Two additional 5 mm working trocars were placed through previous incisions.  Slightly murky serous fluid was noted in the bilateral pericolic gutters but no succus or stool was seen.  Approximately 1250 mL of fluid was suctioned throughout the abdominal cavity and pelvis.  Some thin gelatinous/fibrinous adhesions were noted and lysed with gentle blunt dissection.  Once all of the fluid was suctioned, loops of bowel were inspected and there was no evidence of obstruction in the distal ileum.  The ostomy was properly oriented without twisting.  The pelvis was irrigated and suctioned as well and the rectal stump appeared intact.  The abdominal cavity was irrigated with several L of warm saline and hemostasis was ensured.  Pneumoperitoneum was released and working trocars were removed.  Incisions were repaired with interrupted 4-0 Monocryl suture in a subcuticular fashion.  Incisions were cleaned and sterile bandages applied.  He tolerated this portion of the procedure well and there were no complications.  He remained in  the operating room for perineal wound exploration.  Please see Dr. Guidry' note for full details.

## 2023-11-20 NOTE — ANESTHESIA PREPROCEDURE EVALUATION
"                                                                                                             11/20/2023  Lionel León is a 42 y.o., male with ulcertive colitis s/p numerous sacral wound debridements in the last couple of weeks - most recent surgery 5 days ago was a diverting colostomy.  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  Returning to OR today for diverting colectomy and ileostomy.     "Chief Complaint/Reason for Admission: Ulcerative colitis with complication     History of Present Illness:  42-year-old white male with past surgical history of sigmoid colectomy for diverticulitis and recent diagnosis of ulcerative colitis presented to the emergency room 11/06/2023 complaining of abdominal pain, rectal pain, nausea, and weakness.  Laboratory demonstrated symptomatic anemia with an elevated CRP to 192.  CT scan abdomen/pelvis showed diffuse wall thickening of the ascending colon and descending colon with inflammatory changes.  He was admitted, transfused 2 units of packed red blood cells, and started on IV steroids.  Repeat CT scan abdomen/pelvis 11/11/2023 for rectal pain demonstrated interval development of extensive subcutaneous edema and inflammatory fat stranding through the perineum and posterior medial buttocks with the associated subcutaneous gas concerning for Xochitl gangrene.  Surgical hospitalist were consulted and performed emergent debridement that day and the following day.  He currently has a bowel management system in place to prevent further soiling of his large perirectal/perineal wound.  Due to the persistent sepsis, his IBD colitis cannot be properly treated with immunosuppressant medications so surgery was consulted.  He complains of ongoing " "abdominal pain and 50 lbs weight loss..."     Pre-op Assessment    I have reviewed the NPO Status.      Review of Systems  Hepatic/GI:   PUD,        Peptic Ulcer Disease             Latest Reference Range & Units 11/20/23 02:50 11/20/23 05:21   WBC 4.50 - 11.50 x10(3)/mcL 48.52 (H)    Hemoglobin 14.0 - 18.0 g/dL 8.0 (L)    Hematocrit 42.0 - 52.0 % 25.0 (L)    Platelet Count 130 - 400 x10(3)/mcL 356    Sodium 136 - 145 mmol/L 132 (L)    Potassium 3.5 - 5.1 mmol/L 4.1    Chloride 98 - 107 mmol/L 94 (L)    CO2 22 - 29 mmol/L 24    BUN 8.9 - 20.6 mg/dL 22.8 (H)    Creatinine 0.73 - 1.18 mg/dL 1.04    eGFR mls/min/1.73/m2 >60    Glucose 74 - 100 mg/dL 130 (H)    Calcium 8.4 - 10.2 mg/dL 7.5 (L)    Lactate, [place] 0.5 - 2.2 mmol/L 4.2 (HH) 1.1   (HH): Data is critically high  (H): Data is abnormally high  (L): Data is abnormally low    Physical Exam  General: Well nourished, Cooperative, Alert, Oriented and Cachexia  Pt has NG tube to Low interm suction with 1 LITER OF THICK GREEN BILIOUS output just in the first 10 or so minutes of being in holding.  Pt is not nauseated, no acute distress, appears chronically ill  Airway:  Mallampati: II   Mouth Opening: Normal  TM Distance: Normal  Tongue: Normal  Neck ROM: Normal ROM    Dental:  Intact    Chest/Lungs:  Clear to auscultation, Normal Respiratory Rate    Heart:  Rate: Normal  Rhythm: Regular Rhythm        Anesthesia Plan  Type of Anesthesia, risks & benefits discussed:    Anesthesia Type: Gen ETT  Intra-op Monitoring Plan: Standard ASA Monitors  Post Op Pain Control Plan: IV/PO Opioids PRN and multimodal analgesia  Induction:  IV and rapid sequence  Airway Plan: Direct  Informed Consent: Informed consent signed with the Patient and all parties understand the risks and agree with anesthesia plan.  All questions answered. Patient consented to blood products? No  ASA Score: 3  Day of Surgery Review of History & Physical: H&P Update referred to the surgeon/provider.  Anesthesia " Plan Notes: Premedication with Midazolam  Technique: GETA with RSI after NG Tube has been suctioned immediately prior to induction - he is at high risk of aspiration given the high output of green thick bilious drainage from NG tube  PONV Prophylaxis   PACU Postop       Ready For Surgery From Anesthesia Perspective.   Patient may require further blood transfusions - will need to check to ensure current type and screen is on file  .

## 2023-11-20 NOTE — ANESTHESIA POSTPROCEDURE EVALUATION
Anesthesia Post Evaluation    Patient: Beau Mau    Procedure(s) Performed: Procedure(s) (LRB):  DEBRIDEMENT, WOUND, SACRUM (N/A)  LAPAROSCOPY, DIAGNOSTIC (N/A)    Final Anesthesia Type: general      Patient location during evaluation: PACU  Patient participation: Yes- Able to Participate  Level of consciousness: awake and alert  Post-procedure vital signs: reviewed and stable  Pain management: adequate  Airway patency: patent      Anesthetic complications: no      Cardiovascular status: blood pressure returned to baseline  Respiratory status: unassisted  Hydration status: euvolemic  Follow-up not needed.          Vitals Value Taken Time   /63 11/20/23 1606   Temp 36.3 °C (97.4 °F) 11/20/23 1612   Pulse 90 11/20/23 1612   Resp 18 11/20/23 1612   SpO2 96 % 11/20/23 1612         Event Time   Out of Recovery 11/20/2023 15:57:59         Pain/Leonard Score: Pain Rating Prior to Med Admin: 5 (11/20/2023  3:48 PM)  Pain Rating Post Med Admin: 0 (11/20/2023  4:12 PM)  Leonard Score: 9 (11/20/2023  3:49 PM)

## 2023-11-21 PROBLEM — K62.5 RECTAL BLEEDING: Status: ACTIVE | Noted: 2023-11-21

## 2023-11-21 PROBLEM — A41.9 SEPSIS: Status: ACTIVE | Noted: 2023-11-21

## 2023-11-21 PROBLEM — K92.2 LOWER GI BLEED: Status: ACTIVE | Noted: 2023-11-21

## 2023-11-21 PROBLEM — K51.919 ULCERATIVE COLITIS WITH COMPLICATION: Status: ACTIVE | Noted: 2023-11-21

## 2023-11-21 PROBLEM — N49.3 FOURNIER'S GANGRENE: Status: ACTIVE | Noted: 2023-11-21

## 2023-11-21 LAB
ABO + RH BLD: NORMAL
ALBUMIN SERPL-MCNC: 1.9 G/DL (ref 3.5–5)
ALBUMIN/GLOB SERPL: 0.7 RATIO (ref 1.1–2)
ALP SERPL-CCNC: 85 UNIT/L (ref 40–150)
ALT SERPL-CCNC: 33 UNIT/L (ref 0–55)
AST SERPL-CCNC: 21 UNIT/L (ref 5–34)
BASOPHILS # BLD AUTO: 0.02 X10(3)/MCL
BASOPHILS NFR BLD AUTO: 0.2 %
BILIRUB SERPL-MCNC: 0.7 MG/DL
BLD PROD TYP BPU: NORMAL
BLOOD UNIT EXPIRATION DATE: NORMAL
BLOOD UNIT TYPE CODE: 5100
BUN SERPL-MCNC: 15.6 MG/DL (ref 8.9–20.6)
CALCIUM SERPL-MCNC: 7.2 MG/DL (ref 8.4–10.2)
CHLORIDE SERPL-SCNC: 95 MMOL/L (ref 98–107)
CO2 SERPL-SCNC: 31 MMOL/L (ref 22–29)
CREAT SERPL-MCNC: 0.73 MG/DL (ref 0.73–1.18)
CROSSMATCH INTERPRETATION: NORMAL
DISPENSE STATUS: NORMAL
EOSINOPHIL # BLD AUTO: 0.01 X10(3)/MCL (ref 0–0.9)
EOSINOPHIL NFR BLD AUTO: 0.1 %
ERYTHROCYTE [DISTWIDTH] IN BLOOD BY AUTOMATED COUNT: 16.3 % (ref 11.5–17)
GFR SERPLBLD CREATININE-BSD FMLA CKD-EPI: >60 MLS/MIN/1.73/M2
GLOBULIN SER-MCNC: 2.7 GM/DL (ref 2.4–3.5)
GLUCOSE SERPL-MCNC: 95 MG/DL (ref 74–100)
GRAM STN SPEC: NORMAL
GRAM STN SPEC: NORMAL
HCT VFR BLD AUTO: 22.2 % (ref 42–52)
HGB BLD-MCNC: 6.9 G/DL (ref 14–18)
IMM GRANULOCYTES # BLD AUTO: 0.07 X10(3)/MCL (ref 0–0.04)
IMM GRANULOCYTES NFR BLD AUTO: 0.9 %
LYMPHOCYTES # BLD AUTO: 0.33 X10(3)/MCL (ref 0.6–4.6)
LYMPHOCYTES NFR BLD AUTO: 4 %
MAGNESIUM SERPL-MCNC: 2 MG/DL (ref 1.6–2.6)
MCH RBC QN AUTO: 27 PG (ref 27–31)
MCHC RBC AUTO-ENTMCNC: 31.1 G/DL (ref 33–36)
MCV RBC AUTO: 86.7 FL (ref 80–94)
MONOCYTES # BLD AUTO: 0.4 X10(3)/MCL (ref 0.1–1.3)
MONOCYTES NFR BLD AUTO: 4.9 %
NEUTROPHILS # BLD AUTO: 7.33 X10(3)/MCL (ref 2.1–9.2)
NEUTROPHILS NFR BLD AUTO: 89.9 %
NRBC BLD AUTO-RTO: 0 %
PHOSPHATE SERPL-MCNC: 2.6 MG/DL (ref 2.3–4.7)
PLATELET # BLD AUTO: 281 X10(3)/MCL (ref 130–400)
PMV BLD AUTO: 9.8 FL (ref 7.4–10.4)
POTASSIUM SERPL-SCNC: 3.7 MMOL/L (ref 3.5–5.1)
PROT SERPL-MCNC: 4.6 GM/DL (ref 6.4–8.3)
PSYCHE PATHOLOGY RESULT: NORMAL
RBC # BLD AUTO: 2.56 X10(6)/MCL (ref 4.7–6.1)
SODIUM SERPL-SCNC: 135 MMOL/L (ref 136–145)
UNIT NUMBER: NORMAL
WBC # SPEC AUTO: 8.16 X10(3)/MCL (ref 4.5–11.5)

## 2023-11-21 PROCEDURE — G0277 HBOT, FULL BODY CHAMBER, 30M: HCPCS

## 2023-11-21 PROCEDURE — 25000003 PHARM REV CODE 250

## 2023-11-21 PROCEDURE — 99233 PR SUBSEQUENT HOSPITAL CARE,LEVL III: ICD-10-PCS | Mod: ,,, | Performed by: GENERAL PRACTICE

## 2023-11-21 PROCEDURE — 80053 COMPREHEN METABOLIC PANEL: CPT | Performed by: INTERNAL MEDICINE

## 2023-11-21 PROCEDURE — 63600175 PHARM REV CODE 636 W HCPCS: Performed by: INTERNAL MEDICINE

## 2023-11-21 PROCEDURE — 63600175 PHARM REV CODE 636 W HCPCS: Performed by: NURSE PRACTITIONER

## 2023-11-21 PROCEDURE — P9016 RBC LEUKOCYTES REDUCED: HCPCS | Performed by: NURSE PRACTITIONER

## 2023-11-21 PROCEDURE — 25000003 PHARM REV CODE 250: Performed by: NURSE PRACTITIONER

## 2023-11-21 PROCEDURE — 63600175 PHARM REV CODE 636 W HCPCS: Performed by: STUDENT IN AN ORGANIZED HEALTH CARE EDUCATION/TRAINING PROGRAM

## 2023-11-21 PROCEDURE — 86923 COMPATIBILITY TEST ELECTRIC: CPT | Performed by: NURSE PRACTITIONER

## 2023-11-21 PROCEDURE — 84100 ASSAY OF PHOSPHORUS: CPT | Performed by: INTERNAL MEDICINE

## 2023-11-21 PROCEDURE — 83735 ASSAY OF MAGNESIUM: CPT | Performed by: INTERNAL MEDICINE

## 2023-11-21 PROCEDURE — 85025 COMPLETE CBC W/AUTO DIFF WBC: CPT | Performed by: INTERNAL MEDICINE

## 2023-11-21 PROCEDURE — 99232 PR SUBSEQUENT HOSPITAL CARE,LEVL II: ICD-10-PCS | Mod: ,,, | Performed by: NURSE PRACTITIONER

## 2023-11-21 PROCEDURE — 63600175 PHARM REV CODE 636 W HCPCS

## 2023-11-21 PROCEDURE — 99232 SBSQ HOSP IP/OBS MODERATE 35: CPT | Mod: ,,, | Performed by: NURSE PRACTITIONER

## 2023-11-21 PROCEDURE — 99233 SBSQ HOSP IP/OBS HIGH 50: CPT | Mod: ,,, | Performed by: GENERAL PRACTICE

## 2023-11-21 PROCEDURE — 25000003 PHARM REV CODE 250: Performed by: INTERNAL MEDICINE

## 2023-11-21 PROCEDURE — 99183 HYPERBARIC OXYGEN THERAPY: CPT | Mod: ,,, | Performed by: EMERGENCY MEDICINE

## 2023-11-21 PROCEDURE — 99183 PR HYPERBARIC OXYGEN THERAPY ATTENDANCE/SUPERVISION, PER SESSION: ICD-10-PCS | Mod: ,,, | Performed by: EMERGENCY MEDICINE

## 2023-11-21 PROCEDURE — 25000003 PHARM REV CODE 250: Performed by: STUDENT IN AN ORGANIZED HEALTH CARE EDUCATION/TRAINING PROGRAM

## 2023-11-21 PROCEDURE — 11000001 HC ACUTE MED/SURG PRIVATE ROOM

## 2023-11-21 PROCEDURE — C9113 INJ PANTOPRAZOLE SODIUM, VIA: HCPCS

## 2023-11-21 RX ORDER — TALC
6 POWDER (GRAM) TOPICAL NIGHTLY PRN
Status: DISCONTINUED | OUTPATIENT
Start: 2023-11-21 | End: 2023-11-27

## 2023-11-21 RX ORDER — MORPHINE SULFATE 4 MG/ML
4 INJECTION, SOLUTION INTRAMUSCULAR; INTRAVENOUS EVERY 4 HOURS PRN
Status: DISCONTINUED | OUTPATIENT
Start: 2023-11-21 | End: 2023-11-30 | Stop reason: HOSPADM

## 2023-11-21 RX ORDER — HYDROCODONE BITARTRATE AND ACETAMINOPHEN 500; 5 MG/1; MG/1
TABLET ORAL
Status: DISCONTINUED | OUTPATIENT
Start: 2023-11-21 | End: 2023-11-21

## 2023-11-21 RX ADMIN — SUCRALFATE 1 G: 1 TABLET ORAL at 11:11

## 2023-11-21 RX ADMIN — SENNOSIDES AND DOCUSATE SODIUM 2 TABLET: 8.6; 5 TABLET ORAL at 09:11

## 2023-11-21 RX ADMIN — SUCRALFATE 1 G: 1 TABLET ORAL at 09:11

## 2023-11-21 RX ADMIN — PANTOPRAZOLE SODIUM 40 MG: 40 INJECTION, POWDER, FOR SOLUTION INTRAVENOUS at 06:11

## 2023-11-21 RX ADMIN — PIPERACILLIN SODIUM AND TAZOBACTAM SODIUM 4.5 G: 4; .5 INJECTION, POWDER, FOR SOLUTION INTRAVENOUS at 09:11

## 2023-11-21 RX ADMIN — SUCRALFATE 1 G: 1 TABLET ORAL at 04:11

## 2023-11-21 RX ADMIN — PANTOPRAZOLE SODIUM 40 MG: 40 INJECTION, POWDER, FOR SOLUTION INTRAVENOUS at 04:11

## 2023-11-21 RX ADMIN — VANCOMYCIN HYDROCHLORIDE 1250 MG: 1.25 INJECTION, POWDER, LYOPHILIZED, FOR SOLUTION INTRAVENOUS at 12:11

## 2023-11-21 RX ADMIN — VANCOMYCIN HYDROCHLORIDE 1250 MG: 1.25 INJECTION, POWDER, LYOPHILIZED, FOR SOLUTION INTRAVENOUS at 03:11

## 2023-11-21 RX ADMIN — Medication 6 MG: at 09:11

## 2023-11-21 RX ADMIN — PIPERACILLIN SODIUM AND TAZOBACTAM SODIUM 4.5 G: 4; .5 INJECTION, POWDER, FOR SOLUTION INTRAVENOUS at 03:11

## 2023-11-21 RX ADMIN — MULTIPLE VITAMINS W/ MINERALS TAB 1 TABLET: TAB at 09:11

## 2023-11-21 RX ADMIN — PIPERACILLIN SODIUM AND TAZOBACTAM SODIUM 4.5 G: 4; .5 INJECTION, POWDER, FOR SOLUTION INTRAVENOUS at 04:11

## 2023-11-21 RX ADMIN — MORPHINE SULFATE 4 MG: 4 INJECTION, SOLUTION INTRAMUSCULAR; INTRAVENOUS at 12:11

## 2023-11-21 NOTE — PROGRESS NOTES
Colon and Rectal Surgery Progress Note  Admit Date: 11/6/2023  Hospital Day: 15  Procedure: 1 Day Post-Op     S:  AF HDS  NGT output 1750ml/24 hrs  Feels better since surgery  Ostomy productive    O:  Vitals:   Temp:  [97.4 °F (36.3 °C)-98.5 °F (36.9 °C)]   Pulse:  []   Resp:  [15-22]   BP: ()/(58-73)   SpO2:  [87 %-100 %]     Diet NPO   Intake/Output Summary (Last 24 hours) at 11/21/2023 0722  Last data filed at 11/21/2023 0656  Gross per 24 hour   Intake 1500 ml   Output 4150 ml   Net -2650 ml            Physical Exam:  Gen: NAD, AAOx3  HEENT: EOMI, NCAT  CV: RR   Resp: no shortness of breath, normal WOB  Abd: soft, non-distended, ostomy pink with stool in bag  Ext: no edema      Labs:  Recent Labs     11/19/23  0634 11/20/23  0250 11/21/23  0443   WBC 19.39* 48.35  48.52* 8.16   HGB 7.5* 8.0* 6.9*   HCT 23.4* 25.0* 22.2*    356 281   * 132* 135*   K 4.3 4.1 3.7   CO2 26 24 31*   BUN 13.1 22.8* 15.6   CREATININE 0.67* 1.04 0.73   BILITOT 0.9 1.2 0.7   AST 52* 28 21   ALT 49 46 33   ALKPHOS 125 114 85   CALCIUM 7.6* 7.5* 7.2*   ALBUMIN 1.3* 1.3* 1.9*   MG  --  1.70 2.00   PHOS  --   --  2.6     Scheduled Meds: multivitamin, 1 tablet, Daily  pantoprazole, 40 mg, BID AC  piperacillin-tazobactam (Zosyn) IV (PEDS and ADULTS) (extended infusion is not appropriate), 4.5 g, Q8H  senna-docusate 8.6-50 mg, 2 tablet, BID  sucralfate, 1 g, QID (AC & HS)  vancomycin (VANCOCIN) IV (PEDS and ADULTS), 1,250 mg, Q12H     electrolyte-A      lactated ringers 125 mL/hr at 11/20/23 2358    lactated ringers           FINAL DIAGNOSIS      COLON, SMALL BOWEL, APPENDIX AND OMENTUM, TOTAL ABDOMINAL COLECTOMY:      SEVERE ACTIVE CHRONIC COLITIS, CONSISTENT WITH INFLAMMATORY BOWEL DISEASE WITH   SEVERE ACTIVITY.      SEROSITIS.      SITE OF PERFORATION.      BENIGN INCIDENTAL APPENDIX.     A/P:  41 yo M with IBD s/p TAC with end ileostomy taken back to OR on 11/20 for diagnostic laparoscopy which was negative for  intabdominal findings, perineal wound exploration with further debridement     - Remove NGT. Leave NPO except sips of water and medication  - Continue abx for perineal wound  - Wound care to groin per ACS      Adriana Raygoza MD  [employer] [facility], [facility]

## 2023-11-21 NOTE — PROGRESS NOTES
"   Acute Care Surgery   Progress Note  Admit Date: 11/6/2023  HD#15  POD#1 Day Post-Op    Subjective:   Interval history:  AF, VSS. Pain much more controlled this morning.   WBC WNL at 8.  NGT with 1550 cc bilious output in the last 24 hours.     Home Meds:  Current Outpatient Medications   Medication Instructions    dicyclomine (BENTYL) 20 mg, Oral, 4 times daily PRN    predniSONE (DELTASONE) 10 MG tablet Take 4 tablets (40 mg total) by mouth once daily for 7 days, THEN 3 tablets (30 mg total) once daily for 7 days, THEN 2 tablets (20 mg total) once daily for 7 days, THEN 1 tablet (10 mg total) once daily for 7 days, THEN 0.5 tablets (5 mg total) once daily for 7 days.      Scheduled Meds:   multivitamin  1 tablet Oral Daily    pantoprazole  40 mg Intravenous BID AC    piperacillin-tazobactam (Zosyn) IV (PEDS and ADULTS) (extended infusion is not appropriate)  4.5 g Intravenous Q8H    senna-docusate 8.6-50 mg  2 tablet Oral BID    sucralfate  1 g Oral QID (AC & HS)    vancomycin (VANCOCIN) IV (PEDS and ADULTS)  1,250 mg Intravenous Q12H     Continuous Infusions:   lactated ringers 50 mL/hr at 11/21/23 0926     PRN Meds:acetaminophen, acetaminophen, aluminum-magnesium hydroxide-simethicone, cyclobenzaprine, dextrose 10%, dextrose 10%, dicyclomine, glucagon (human recombinant), glucose, glucose, lactated ringers, morphine, ondansetron, oxyCODONE, sodium chloride 0.9%, vancomycin - pharmacy to dose     Objective:     VITAL SIGNS: 24 HR MIN & MAX LAST   Temp  Min: 98 °F (36.7 °C)  Max: 99 °F (37.2 °C)  98.1 °F (36.7 °C)   BP  Min: 96/60  Max: 117/68  109/67    Pulse  Min: 84  Max: 102  92    Resp  Min: 16  Max: 20  17    SpO2  Min: 96 %  Max: 100 %  97 %      HT: 5' 10" (177.8 cm)  WT: 74.8 kg (165 lb)  BMI: 23.7     Intake/output:  Intake/Output - Last 3 Shifts         11/19 0700  11/20 0659 11/20 0700 11/21 0659 11/21 0700 11/22 0659    P.O. 1180      I.V. (mL/kg)  906 (12.1)     Blood   324.2    IV Piggyback  " "2100.1     Total Intake(mL/kg) 1180 (15.8) 3006.1 (40.2) 324.2 (4.3)    Urine (mL/kg/hr) 1175 (0.7) 2400 (1.3)     Drains 1000 1750     Stool 400 300     Total Output 2575 4450     Net -1395 -1443.9 +324.2                   Intake/Output Summary (Last 24 hours) at 11/21/2023 1703  Last data filed at 11/21/2023 1050  Gross per 24 hour   Intake 324.17 ml   Output 2800 ml   Net -2475.83 ml             Lines/drains/airway:       Peripheral IV - Single Lumen 18 G Right Hand (Active)   Site Assessment Clean;Dry;Intact 11/12/23 0000   Extremity Assessment Distal to IV No abnormal discoloration;No redness;No swelling 11/11/23 2010   Line Status Saline locked 11/12/23 0000   Dressing Status Clean;Dry;Intact 11/12/23 0000   Dressing Intervention Integrity maintained 11/11/23 2010   Number of days:             Rectal Tube 11/11/23 1944 fecal management system (Active)   Stool Color Brown;Red 11/11/23 2100   Number of days: 0            Urethral Catheter 11/11/23 1954 Double-lumen;Silicone;Non-latex 16 Fr. (Active)   Site Assessment Clean;Intact 11/11/23 2100   Collection Container Urimeter 11/11/23 2100   Securement Method secured to top of thigh w/ adhesive device 11/11/23 2100   Catheter Care Performed yes 11/11/23 2100   Reason for Continuing Urinary Catheterization Post operative 11/11/23 2100   CAUTI Prevention Bundle Securement Device in place with 1" slack;Intact seal between catheter & drainage tubing;Drainage bag/urimeter off the floor;Sheeting clip in use;No dependent loops or kinks;Drainage bag/urimeter not overfilled (<2/3 full);Drainage bag/urimeter below bladder 11/11/23 2100   Output (mL) 700 mL 11/12/23 0548   Number of days: 0       Physical examination:  Gen: NAD, AAOx3, answering questions appropriately  HEENT: PERRLA  CV: RR  Resp: NWOB  Abd: Soft, non-distended. Ostomy to LLQ; mucosa is healthy. Air and stool in bag. Abdomen appropriately tender around midline and ostomy.   Perineum:  Large wounds to " "bilateral buttocks dressing is in place - deferred taking down dressings due to OR plans this morning   Ext: moving all extremities spontaneously and purposefully      Labs:  Renal:  Recent Labs     11/19/23  0634 11/20/23  0250 11/21/23  0443   BUN 13.1 22.8* 15.6   CREATININE 0.67* 1.04 0.73       Recent Labs     11/20/23  0250 11/20/23  0521   LACTIC 4.2* 1.1       FENGI:  Recent Labs     11/19/23  0634 11/20/23  0250 11/21/23  0443   * 132* 135*   K 4.3 4.1 3.7   CO2 26 24 31*   CALCIUM 7.6* 7.5* 7.2*   MG  --  1.70 2.00   PHOS  --   --  2.6   ALBUMIN 1.3* 1.3* 1.9*   BILITOT 0.9 1.2 0.7   AST 52* 28 21   ALKPHOS 125 114 85   ALT 49 46 33       Heme:  Recent Labs     11/18/23  1706 11/19/23  0634 11/20/23  0250 11/21/23  0443   HGB 8.1* 7.5* 8.0* 6.9*   HCT 25.6* 23.4* 25.0* 22.2*    309 356 281       ID:  Recent Labs     11/19/23  0634 11/20/23 0250 11/21/23 0443   WBC 19.39* 48.35  48.52* 8.16       CBG:  Recent Labs     11/19/23  0634 11/20/23 0250 11/21/23 0443   GLUCOSE 97 130* 95        Cardiovascular:  Recent Labs   Lab 11/20/23  0250   TROPONINI <0.010       ABG:  No results for input(s): "PH", "PO2", "PCO2", "HCO3", "BE" in the last 168 hours.   I have reviewed all pertinent lab results within the past 24 hours.    Imaging:  XR Gastric tube check, non-radiologist performed   Final Result      As above.         Electronically signed by: Joseph Carvajal   Date:    11/20/2023   Time:    06:15      CT Abdomen Pelvis With IV Contrast   Final Result   Impression:      1. There is extensive pneumoperitoneum. This is new since the prior examination and consistent with recent surgery. There is mild ascites. This is new since the prior examination. Correlate clinically and recommend continued serial interval follow-up to full resolution to confirm postoperative nature.      2. There is interval near-total colectomy with ileostomy in the right ventral mid abdominal wall. There are numerous " moderately distended small bowel loops in the mid and lower abdomen which demonstrate air-fluid levels. Surrounding mesenteric fat stranding is noted. There is apparent transition at the level of the distal ileum. These findings are new since the prior examination and are suggestive of small bowel obstruction versus severe adynamic ileus. Correlate clinically as regards further evaluation and followup.      3. There is interval development of a defect / wound in the cutaneous / subcutaneous soft tissues of the gluteal cleft and the perineal region with soft tissue emphysema consistent with recent debridement.      4. Details and other findings as discussed above.      No significant discrepancy with overnight report         Electronically signed by: Chip Duran   Date:    11/19/2023   Time:    10:33      X-Ray Chest 1 View   Final Result      Suspect some bibasilar atelectasis.         Electronically signed by: Germain Cedeno   Date:    11/18/2023   Time:    17:19      CT Abdomen Pelvis W Wo Contrast   Final Result      X-Ray Chest PA And Lateral   Final Result      No acute abnormality.         Electronically signed by: Quinton Hernandez MD   Date:    11/11/2023   Time:    08:49      CT Abdomen Pelvis W Wo Contrast   Final Result      Findings seen consistent with colitis involving the ascending colon and portions of the descending colon.  Follow-up is recommended to resolution as underlying mass cannot be completely excluded.      The gastric wall appears to be slightly thickened.  Underlying gastritis should be excluded.         Electronically signed by: John Gale   Date:    11/06/2023   Time:    12:02         I have reviewed all pertinent imaging results/findings within the past 24 hours.    Micro/Path/Other:  Microbiology Results (last 7 days)       Procedure Component Value Units Date/Time    Gram Stain [[id number]] Collected: 11/20/23 1414    Order Status: Completed Specimen: Wound from Scrotum Updated:  11/21/23 0819     GRAM STAIN Few WBC observed      No bacteria seen    Wound Culture [[id number]] Collected: 11/20/23 1414    Order Status: Completed Specimen: Wound from Scrotum Updated: 11/21/23 0641     Wound Culture No Growth At 24 Hours    Blood Culture [[id number]]  (Normal) Collected: 11/18/23 1706    Order Status: Completed Specimen: Blood Updated: 11/20/23 1800     CULTURE, BLOOD ([facility]) No Growth At 48 Hours    Blood Culture [[id number]]  (Normal) Collected: 11/18/23 1706    Order Status: Completed Specimen: Blood Updated: 11/20/23 1800     CULTURE, BLOOD ([facility]) No Growth At 48 Hours    Anaerobic Culture [[id number]] Collected: 11/20/23 1414    Order Status: Sent Specimen: Wound from Scrotum Updated: 11/20/23 1519    Fungal Culture [[id number]] Collected: 11/20/23 1414    Order Status: Sent Specimen: Wound from Scrotum Updated: 11/20/23 1519    Wound Culture [[id number]]  (Abnormal)  (Susceptibility) Collected: 11/11/23 1851    Order Status: Completed Specimen: Abscess from Rectum Updated: 11/17/23 1513     Wound Culture Many Escherichia coli      Many Streptococcus constellatus    Blood Culture [[id number]]  (Normal) Collected: 11/10/23 2048    Order Status: Completed Specimen: Blood from Antecubital, Left Updated: 11/15/23 2301     CULTURE, BLOOD ([facility]) No Growth at 5 days           Specimen (168h ago, onward)       Start     Ordered    11/20/23 1432  Specimen to Pathology  RELEASE UPON ORDERING        References:    Click here for ordering Quick Tip   Question:  Release to patient  Answer:  Immediate    11/20/23 1432    11/15/23 1503  Specimen to Pathology  RELEASE UPON ORDERING        References:    Click here for ordering Quick Tip   Question:  Release to patient  Answer:  Immediate    11/15/23 1503                     Assessment & Plan:   42-year-old male with a past medical history of ulcerative colitis recently diagnosed in October who presented to the hospital with necrotizing fasciitis of his  perineum status post wide excisional debridement was peritoneum on 11/11 and on 11/12, s/p laparoscopic total colectomy with end ileostomy placement 11/15. Now s/p wound debridement as well as diagnostic laparoscopy 11/20      - continue local wound care with hyperbarics therapy   - change dressings at bedside 1-2 times a day  - continue IV vanc, damonn      Michelle Wang MD  [employer] [facility], PGY-2

## 2023-11-21 NOTE — PROGRESS NOTES
[facility] - 8th Floor Med Surg  Plastic Surgery  Progress Note    Subjective:       HPI:  42 y.o. White male with a past medical history of diverticulitis status post colon resection and ulcerative colitis. Patient had recent admission to hospital medicine services at [facility] on 10/21/2023 to 10/24/2023 for sepsis secondary to pancolitis. Patient was discharged with prednisone taper and GI follow up appointment on 11/17/2023. The patient presented to [facility] on 11/6/2023 with a primary complaint of bright red rectal bleeding and lower abdominal pain.  Patient reports rectal bleeding has been ongoing since discharge.  Other associated symptoms include nausea, rectal pain, subjective fevers, weakness, fatigue and a 30-40 lb weight loss over last 3 weeks. Patient is admitted to hospital medicine services for further medical management.       Patient was seen by GI and started on IV steroids, Solu-Medrol 30 mg IV b.I.d. for active ulcerative colitis flare up.  Plus Anusol suppository per rectum b.I.d..  Patient is status post 2 units PRBC for symptomatic anemia.  CT abdomen and pelvis was also ordered to assess extent. CT of the abdomen and pelvis was pertinent for findings consistent with colitis involving the ascending colon, portions of the descending colon.  November 11th he started reporting rectal pain with a bump, on exam he had cellulitis like changes.  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  Taken to the operating room same day by General surgery found to have necrotic tissue extending to the muscle encircling his anus.  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  With ongoing infection immunosuppression with steroids have been tapered off and no further plans for Humira.  Infectious Disease was also consulted and recommended continuing Zosyn.  Wound Care Clinic was also consulted and started on  hyperbaric therapy. Patient once again started having fevers and developed concern for worsening sepsis, was taken to the operating room by a surgery November 20th and underwent diagnostic laparoscopy which showed slightly murky serous fluid bilateral pericolic gutters but no succus or stools, also underwent debridement of the scrotum/perineum and found to have some pus draining which was debrided. PRS consulted for reconstructive options.     Today: patient seen today sitting in chair, denies complaints, receiving blood transfusion. He was taken back to surgery yesterday with washout/debridement of scrotum with some purulence. He is planned for HBOT today.     Post-Op Info:  Procedure(s) (LRB):  DEBRIDEMENT, WOUND, SACRUM (N/A)  LAPAROSCOPY, DIAGNOSTIC (N/A)   1 Day Post-Op         Medications:  Continuous Infusions:   lactated ringers 50 mL/hr at 11/21/23 0926     Scheduled Meds:   multivitamin  1 tablet Oral Daily    pantoprazole  40 mg Intravenous BID AC    piperacillin-tazobactam (Zosyn) IV (PEDS and ADULTS) (extended infusion is not appropriate)  4.5 g Intravenous Q8H    senna-docusate 8.6-50 mg  2 tablet Oral BID    sucralfate  1 g Oral QID (AC & HS)    vancomycin (VANCOCIN) IV (PEDS and ADULTS)  1,250 mg Intravenous Q12H     PRN Meds:acetaminophen, acetaminophen, aluminum-magnesium hydroxide-simethicone, cyclobenzaprine, dextrose 10%, dextrose 10%, dicyclomine, glucagon (human recombinant), glucose, glucose, lactated ringers, morphine, ondansetron, oxyCODONE, sodium chloride 0.9%, vancomycin - pharmacy to dose     Review of patient's allergies indicates:  No Known Allergies  Objective:     Vital Signs (Most Recent):  Temp: 98.9 °F (37.2 °C) (11/21/23 0800)  Pulse: 102 (11/21/23 0800)  Resp: 16 (11/21/23 0800)  BP: 96/60 (11/21/23 0800)  SpO2: 100 % (11/21/23 0800) Vital Signs (24h Range):  Temp:  [97.4 °F (36.3 °C)-99 °F (37.2 °C)] 98.9 °F (37.2 °C)  Pulse:  [] 102  Resp:  [15-22] 16  SpO2:  [87 %-100  %] 100 %  BP: ()/(58-73) 96/60     Weight: 74.8 kg (165 lb)  Body mass index is 23.68 kg/m².    Intake/Output - Last 3 Shifts         11/19 0700 11/20 0659 11/20 0700  11/21 0659 11/21 0700 11/22 0659    P.O. 1180      I.V. (mL/kg)  906 (12.1)     IV Piggyback  2100.1     Total Intake(mL/kg) 1180 (15.8) 3006.1 (40.2)     Urine (mL/kg/hr) 1175 (0.7) 2400 (1.3)     Drains 1000 1750     Stool 400 300     Total Output 2575 4450     Net -1395 -1443.9                     Physical Exam  Constitutional:       Appearance: Normal appearance.      Comments: Receiving blood transfusion in chair   HENT:      Head: Normocephalic and atraumatic.   Eyes:      Extraocular Movements: Extraocular movements intact.   Pulmonary:      Effort: Pulmonary effort is normal.   Genitourinary:     Comments: gray  Musculoskeletal:         General: Normal range of motion.      Cervical back: Normal range of motion.   Skin:     General: Skin is warm and dry.   Neurological:      General: No focal deficit present.      Mental Status: He is alert and oriented to person, place, and time.            Significant Labs:  CBC:   Recent Labs   Lab 11/21/23  0443   WBC 8.16   RBC 2.56*   HGB 6.9*   HCT 22.2*      MCV 86.7   MCH 27.0   MCHC 31.1*     BMP:   Recent Labs   Lab 11/21/23  0443   *   K 3.7   CO2 31*   BUN 15.6   CREATININE 0.73   CALCIUM 7.2*   MG 2.00       Significant Diagnostics:  I have reviewed all pertinent imaging results/findings within the past 24 hours.    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 He is undergoing HBOT. Did not remove patient's surgical dressing today as he was in the chair receiving a blood transfusion - will  see if nursing can upload pictures to review later today with dressing change.   Encourage nutrition, continue wound care.   It will likely be a while before wound is ready for any reconstruction.  Will continue to follow, discussed with patient and wife bedside.         SHERLYN Almeida  Plastic Surgery  [facility] - 8th Floor Med Surg

## 2023-11-21 NOTE — PROGRESS NOTES
[facility]  Infectious Diseases Progress Note        ASSESSMENT & PLAN:   He is a 42-year-old male with a past medical history of diverticulitis, status post colon resection, and new diagnosis of ulcerative colitis for which he was hospitalized towards the end of October.  He was evaluated by GI and underwent a colonoscopy at that time and was discharged on a steroid taper.  He was also noted to have hemorrhoids which was treated with hydrocortisone suppository.  He then presented to the emergency room on 11/06/2023 with complaints of bright red blood per rectum and lower abdominal pain as well as subjective fevers and syncopal episode at home.  He was initiated on IV steroids per GI for suspected ulcerative colitis flare.  CT of the abdomen and pelvis showed findings consistent with colitis and possible underlying gastritis.  Over the weekend, patient had a fever of 102.5° F and some hypotension.  He also reported worsening pain near the perineum and was noted to have extensive erythema and induration.  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  He was initiated on empiric antimicrobials with vancomycin, Zosyn, and clindamycin.  He was taken to the operating room by General surgery on 11/11 for an I&D - Gram stain with GPCs, GNRs, GPRs - culture with relatively sensitive E. coli thus far.  Blood cultures are negative thus far.  We have been consulted for assistance with antimicrobials.     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   -Continue Vancomycin dosing per pharmacy  -Follow intraoperative cultures  -if recurrent concerns of sepsis, would repeat blood cultures and consider addition of antifungal agent however patient seems to be responding and doing well post operatively   -Noted indeterminate Quant on 11/10 - will need repeat TB testing in the future if plans to start biologics, which for now are on hold  -Discussed with patient and wife at bedside    SUBJECTIVE:     Weekend events noted, underwent OR again today with details as above. Now feeling improved.       MEDICATIONS:   Reviewed in EMR    REVIEW OF SYSTEMS:   Except as documented, all other systems reviewed and negative     PHYSICAL EXAM:   T 97.4 °F (36.3 °C)   /66   P 94   RR 18   O2 96 %  GENERAL: NAD; does not appear toxic  SKIN: no rash  HEENT: sclera non-icteric; PERRL   NECK: supple; no LAD  CHEST: CTA; nonlabored, equal expansion; no adventitious BS  CARDIOVASCULAR: RRR, S1S2; no murmur; strong, equal peripheral pulses; no edema  ABDOMEN:  active bowel sounds; abdomen soft, nondistended, appropriately TTP  GENITOURINARY: Devi in place  EXTREMITIES: no cyanosis or clubbing  NEURO: AAO x4; CN II-XII grossly intact  PSYCH: Mentation and affect appropriate     LABS AND IMAGING:     Recent Labs     11/19/23  0634 11/20/23  0250   WBC 19.39* 48.35  48.52*   RBC 2.75* 2.97*   HGB 7.5* 8.0*   HCT 23.4* 25.0*   MCV 85.1 84.2   MCH 27.3 26.9*   MCHC 32.1* 32.0*   RDW 16.2 16.2    356     Recent Labs     11/20/23  0250 11/20/23  0521   LACTIC 4.2* 1.1     No results for input(s): "INR", "APTT", "D-DIMER" in the last 72 hours.  No results for input(s): "HGBA1C", "CHOL", "TRIG", "LDL", "VLDL", "HDL" in the last 72 hours.   Recent Labs     11/19/23  0634 11/20/23  0250   * 132*   K 4.3 4.1   CHLORIDE 99 94*   CO2 26 24   BUN 13.1 22.8* "   CREATININE 0.67* 1.04   GLUCOSE 97 130*   CALCIUM 7.6* 7.5*   MG  --  1.70   ALBUMIN 1.3* 1.3*   GLOBULIN 2.8 3.1   ALKPHOS 125 114   ALT 49 46   AST 52* 28   BILITOT 0.9 1.2     Recent Labs     11/20/23  0250   TROPONINI <0.010          XR Gastric tube check, non-radiologist performed  Narrative: EXAMINATION:  XR GASTRIC TUBE CHECK, NON-RADIOLOGIST PERFORMED    CLINICAL HISTORY:  NGT placement;    TECHNIQUE:  Single view of the abdomen    COMPARISON:  None    FINDINGS:  NG tube projects over the left upper quadrant.  This is the expected location of the stomach.  Gas-filled loops of bowel remain throughout.  Impression: As above.    Electronically signed by: Joseph Carvajal  Date:    11/20/2023  Time:    06:15    Jer Hampton MD  Infectious Disease  [facility]

## 2023-11-21 NOTE — PROGRESS NOTES
[facility] - 8th Floor Med Surg  Hyperbaric Medicine    Patient Name: Lionel León  MRN: [id number]  Date: 11/21/2023  Diagnosis:   Active Problem List with Overview Notes    Diagnosis Date Noted    Allison gangrene 11/17/2023    Pancolitis 10/24/2023          Subjective:     Patient ID: Lionel León is a 42 y.o. male.Pt states that he was d/c'd from hospital on 10/24/2023. He states that he was prescribed prednisone and felt good until he finished the prescribed duration. He said then he started to have diarrhea again, hot flashes, sore throat, abdominal pain, and ractal bleeding. He said he had two episodes of syncope prior to returning to ED. On 11/11/23 pt found to have soft tissue infection to perineum- was taken to OR emergently. Pt has had numerous debridements in the OR. Pt starting 11/17/2023 today for soft tissue necrotizing infection. Pt was taken back to OR yesterday for additional debridement.        Objective     Visit Information  Arrival Condition: Stable  Ambulatory Status: Stretcher  Patient Identification Verified: Yes  Secondary Verification Process Completed: Yes  Patient is in Isolation Precaution?: No  Is patient oriented to person, place and time?: Yes  History Since Last Visit  All ordered tests and consults were completed.: Yes  Experienced change in activities of daily living that may affect risk of falls: No  Experienced any changes in pain level or management: No  Signs of symptoms of abuse and/or neglect since last visit?: No  Patient is alert and oriented times three.   BP: 126/74  Pulse: 74  Resp: 16  Temp: 97.6 °F (36.4 °C)  Pain Level: 0  Left Vision Change: vision change denied  Right Vision Change: vision change denied  Left Hearing Change: hearing change denied  Right Hearing Change: hearing change denied  Nose Symptoms: smell intact, no tenderness, no drainage, breathes through nostrils, symmetrical  Respiratory WDL: WDL  Breath Sounds: All Fields  All Lung  Fields Breath Sounds: equal bilaterally, clear  Heart Sounds: S1, S2    Hyperbaric Pre inspection/Safety checklist:     Hyperbaric Pre-Inspection  Mattress cleaned prior to treatment: Yes  2 Patient Identifiers Verified: Yes  For Inpatients: Verify correct ID band/correct chart: Yes  Consent Obtained: Yes  Cotton Gown: Yes  Patient voided: Yes  Drainage Bags Emptied: Yes  Drainage tubes secured: Yes  Patient Pregnant: Not applicable  Glasses, Jewelry, Makeup, etc. Removed: Yes  Hard contacts removed: Yes  Dentures, Hearing Aid, Prosthetic Devices Removed: Not applicable  Touch hair to check for hair spray: Yes  Cold/Flu Symptoms: No  Diabetic Patient Eaten: Not applicable  Medications Given: Yes  For Inpatients: Medication sheet sent with patient: Yes  Fears and apprehensions verbalized: Yes  Ground Wire Secured: Yes    Hyperbaric Treatment Course:      Hyperbaric Treatment Order:  2.00 BEAR x90 mins with 100% oxygen and no air breaks with an ascent rate of 1.5 psi/min and a descent rate of 2.0 psi/min    HBO Treatment Course Details  Treatment Course Number: 1  Total Treatments Ordered: 10  Ordering Provider: Dr. Lagos  Indications: Progressive necrotizing infections  HBO Treatment Start Date: 11/17/23    HBO Treatment Details  Treatment Number: 2  Inpatient Visit: Yes  Total Treatment Length Calc (minutes): 110  Adjusted Treatment Length (minutes): 120  Chamber Type: Monoplace  Chamber #: 3  HBO Dive Log #: [id number]  Treatment Protocol: 2.0 BEAR x 90 minutes w/100% oxygen and no air break    Treatment Details  Dive Rate Down: 1.5 psi/minute  Dive Rate Up: 1.5 psi/minute  Compress Begins: 1.10  Clock Time: 1230  Tx Pressure Reached: 2.00  Clock Time: 1240  Decompress Begins: 2.00  Clock Time: 1410  Decompress Ends: 1.10  Clock Time: 1420    Treatment Details/Response:     Pt completed treatment without complaints/complications. TEED grade 0 pre and post tx. If pt still here will bring down Monday for tx. Wound care  done following tx with hyperbaric physician. Pt tolerated well.    Post HBO Vital Signs  BP: 127/76  Pulse: 77  Resp: 16  Temp: 97.1 °F (36.2 °C)  Pain Level: 0    Ear Evaluation  Left Teed Scale Pre: Grade 0  Left Teed Scale Post: Grade 0  Right Teed Scale Pre: Grade 0  Right Teed Scale Post: Grade 0     Visit Discharge Information  Discharge Condition: Stable  Ambulatory Status: Stretcher  Discharge Destination: Other (back to room)  Transportation: Other (patient transport)    Physician Supervision: The hyperbaric physician provided direct supervision and was immediately available to furnish assistance and direction throughout the performance of the procedure.A trained emergency response team and emergency services were available throughout procedure.     Sheilaail Oswalt, LPN [facility] - 8th Floor Med Surg  Hyperbaric Medicine

## 2023-11-21 NOTE — PROGRESS NOTES
"Gastroenterology Progress Note    Subjective/Interval History:  S/p scrotal wound debridement, lysis of adhesions, drainage of 1250ml of free fluid in abd cavity    NGT removed. Patient remains NPO except for water and meds.     WBC down to 8.16 today. Continues on zosyn and vanc.     H&H 6.9/22.2 - to receive 1u PRBC today.    Patient resting in recliner, wife at bedside. He feels much better today, more alert. RLQ ostomy with brown output. He states since NGT has been removed his odynophagia has improved but still wants to pursue EGD.    ROS:  Review of Systems   Constitutional:  Positive for malaise/fatigue.   Respiratory:  Negative for cough and shortness of breath.    Cardiovascular:  Negative for chest pain.   Gastrointestinal:  Negative for abdominal pain, blood in stool, constipation, melena, nausea and vomiting.   Neurological:  Negative for weakness.       Vital Signs:  BP 96/60   Pulse 102   Temp 98.9 °F (37.2 °C) (Oral)   Resp 16   Ht 5' 10" (1.778 m)   Wt 74.8 kg (165 lb)   SpO2 100%   BMI 23.68 kg/m²   Body mass index is 23.68 kg/m².    Physical Exam:  Physical Exam  Constitutional:       General: He is not in acute distress.     Appearance: He is normal weight. He is ill-appearing.   HENT:      Head: Normocephalic and atraumatic.      Right Ear: External ear normal.      Left Ear: External ear normal.      Nose: Nose normal.      Mouth/Throat:      Pharynx: Oropharynx is clear.   Eyes:      Conjunctiva/sclera: Conjunctivae normal.   Pulmonary:      Effort: Pulmonary effort is normal. No respiratory distress.   Abdominal:      General: There is no distension.      Palpations: Abdomen is soft.      Tenderness: There is no abdominal tenderness. There is no guarding.      Comments: Ostomy in RLQ with light brown stool   Musculoskeletal:         General: Normal range of motion.      Cervical back: Normal range of motion.   Skin:     General: Skin is warm and dry.      Coloration: Skin is pale. "   Neurological:      Mental Status: He is alert. Mental status is at baseline.   Psychiatric:         Mood and Affect: Mood normal.         Behavior: Behavior normal.         Labs:  Recent Results (from the past 24 hour(s))   VANCOMYCIN, TROUGH    Collection Time: 11/20/23 12:00 PM   Result Value Ref Range    Vancomycin Trough 14.5 (L) 15.0 - 20.0 ug/ml   Gram Stain    Collection Time: 11/20/23  2:14 PM    Specimen: Scrotum; Wound   Result Value Ref Range    GRAM STAIN Few WBC observed     GRAM STAIN No bacteria seen    Wound Culture    Collection Time: 11/20/23  2:14 PM    Specimen: Scrotum; Wound   Result Value Ref Range    Wound Culture No Growth At 24 Hours    Comprehensive Metabolic Panel    Collection Time: 11/21/23  4:43 AM   Result Value Ref Range    Sodium Level 135 (L) 136 - 145 mmol/L    Potassium Level 3.7 3.5 - 5.1 mmol/L    Chloride 95 (L) 98 - 107 mmol/L    Carbon Dioxide 31 (H) 22 - 29 mmol/L    Glucose Level 95 74 - 100 mg/dL    Blood Urea Nitrogen 15.6 8.9 - 20.6 mg/dL    Creatinine 0.73 0.73 - 1.18 mg/dL    Calcium Level Total 7.2 (L) 8.4 - 10.2 mg/dL    Protein Total 4.6 (L) 6.4 - 8.3 gm/dL    Albumin Level 1.9 (L) 3.5 - 5.0 g/dL    Globulin 2.7 2.4 - 3.5 gm/dL    Albumin/Globulin Ratio 0.7 (L) 1.1 - 2.0 ratio    Bilirubin Total 0.7 <=1.5 mg/dL    Alkaline Phosphatase 85 40 - 150 unit/L    Alanine Aminotransferase 33 0 - 55 unit/L    Aspartate Aminotransferase 21 5 - 34 unit/L    eGFR >60 mls/min/1.73/m2   Magnesium    Collection Time: 11/21/23  4:43 AM   Result Value Ref Range    Magnesium Level 2.00 1.60 - 2.60 mg/dL   Phosphorus    Collection Time: 11/21/23  4:43 AM   Result Value Ref Range    Phosphorus Level 2.6 2.3 - 4.7 mg/dL   CBC with Differential    Collection Time: 11/21/23  4:43 AM   Result Value Ref Range    WBC 8.16 4.50 - 11.50 x10(3)/mcL    RBC 2.56 (L) 4.70 - 6.10 x10(6)/mcL    Hgb 6.9 (L) 14.0 - 18.0 g/dL    Hct 22.2 (L) 42.0 - 52.0 %    MCV 86.7 80.0 - 94.0 fL    MCH 27.0 27.0  - 31.0 pg    MCHC 31.1 (L) 33.0 - 36.0 g/dL    RDW 16.3 11.5 - 17.0 %    Platelet 281 130 - 400 x10(3)/mcL    MPV 9.8 7.4 - 10.4 fL    Neut % 89.9 %    Lymph % 4.0 %    Mono % 4.9 %    Eos % 0.1 %    Basophil % 0.2 %    Lymph # 0.33 (L) 0.6 - 4.6 x10(3)/mcL    Neut # 7.33 2.1 - 9.2 x10(3)/mcL    Mono # 0.40 0.1 - 1.3 x10(3)/mcL    Eos # 0.01 0 - 0.9 x10(3)/mcL    Baso # 0.02 <=0.2 x10(3)/mcL    IG# 0.07 (H) 0 - 0.04 x10(3)/mcL    IG% 0.9 %    NRBC% 0.0 %         Assessment/Plan:  42-year-old male known to Dr. He with past medical history of diverticulitis s/p colon resection and recent new diagnosis of ulcerative colitis.  He presented to the ED on 11/6 with complaints of bright red blood per rectum and lower abdominal pain associated with nausea, rectal pain, weakness and 30-40 lb weight loss over the past 3 weeks with syncopal episode x2- 2nd episode with LOC.    Attempt made to start patient on Humira samples inpatient; however patient became febrile and imaging revealed xochitl's gangrene. He is s/p multiple I&D of perineum. CRS was consulted due to ongoing perineal sepsis and inability to use immunosuppressants, total colectomy with end ileostomy performed 11/15/23. Patient had improvement in symptoms post op and GI signed off 11/17/23 with plans for outpatient follow up and surveillance.  GI called back 11/20/23 for n/v, odynophagia, aphthous ulcers with concern for upper GI Crohn's.    N/v 2/2 ileus vs SBO - improved  Odynophagia - improved  Aphthous ulcers of the mouth  - supportive care: IVF, antiemetics  - PPI BID  - carafate QID swish and spit for ulcers  - NPO at midnight  - EGD in a.m.    Severe IBD s/p total colectomy and end ileostomy  - Colonoscopy 10/23/2023 with findings c/w IBD thought to favor UC.  He was discharged on prednisone taper with outpatient follow up appointment scheduled 11/17/2023, but ended up presenting here on 11/6  - outpatient appt in our office was scheduled for 11/17  with plans to start humira and imuran- Patient did obtain Humira samples and was planning on having loading dose; however patient became febrile and CT revealed xochitl's gangrene  - s/p total colectomy with end ileostomy 11/15/23 - path: severe active chronic colitis c/w IBD with severe activity, serositis  - now with upper GI symptoms which could reflect Crohn's disease - EGD in a.m. for further evaluation    Xochitl's gangrene s/p multiple I&D  - S/p I&D of perineum 11/11/23 and 11/12/23  - returned to OR 11/20/23 for I&D given profound leukocytosis and abnormal imaging revealing significant air and fluid in abd     Jeanine Pacheco PA-C  Gastroenterology  [facility]   Yes Yes Yes Yes Yes Yes Yes

## 2023-11-21 NOTE — SUBJECTIVE & OBJECTIVE
Medications:  Continuous Infusions:   lactated ringers 50 mL/hr at 11/21/23 0926     Scheduled Meds:   multivitamin  1 tablet Oral Daily    pantoprazole  40 mg Intravenous BID AC    piperacillin-tazobactam (Zosyn) IV (PEDS and ADULTS) (extended infusion is not appropriate)  4.5 g Intravenous Q8H    senna-docusate 8.6-50 mg  2 tablet Oral BID    sucralfate  1 g Oral QID (AC & HS)    vancomycin (VANCOCIN) IV (PEDS and ADULTS)  1,250 mg Intravenous Q12H     PRN Meds:acetaminophen, acetaminophen, aluminum-magnesium hydroxide-simethicone, cyclobenzaprine, dextrose 10%, dextrose 10%, dicyclomine, glucagon (human recombinant), glucose, glucose, lactated ringers, morphine, ondansetron, oxyCODONE, sodium chloride 0.9%, vancomycin - pharmacy to dose     Review of patient's allergies indicates:  No Known Allergies  Objective:     Vital Signs (Most Recent):  Temp: 98.9 °F (37.2 °C) (11/21/23 0800)  Pulse: 102 (11/21/23 0800)  Resp: 16 (11/21/23 0800)  BP: 96/60 (11/21/23 0800)  SpO2: 100 % (11/21/23 0800) Vital Signs (24h Range):  Temp:  [97.4 °F (36.3 °C)-99 °F (37.2 °C)] 98.9 °F (37.2 °C)  Pulse:  [] 102  Resp:  [15-22] 16  SpO2:  [87 %-100 %] 100 %  BP: ()/(58-73) 96/60     Weight: 74.8 kg (165 lb)  Body mass index is 23.68 kg/m².    Intake/Output - Last 3 Shifts         11/19 0700 11/20 0659 11/20 0700 11/21 0659 11/21 0700 11/22 0659    P.O. 1180      I.V. (mL/kg)  906 (12.1)     IV Piggyback  2100.1     Total Intake(mL/kg) 1180 (15.8) 3006.1 (40.2)     Urine (mL/kg/hr) 1175 (0.7) 2400 (1.3)     Drains 1000 1750     Stool 400 300     Total Output 2575 4450     Net -1395 -6683.9                     Physical Exam  Constitutional:       Appearance: Normal appearance.      Comments: Receiving blood transfusion in chair   HENT:      Head: Normocephalic and atraumatic.   Eyes:      Extraocular Movements: Extraocular movements intact.   Pulmonary:      Effort: Pulmonary effort is normal.   Genitourinary:      Comments: gray  Musculoskeletal:         General: Normal range of motion.      Cervical back: Normal range of motion.   Skin:     General: Skin is warm and dry.   Neurological:      General: No focal deficit present.      Mental Status: He is alert and oriented to person, place, and time.            Significant Labs:  CBC:   Recent Labs   Lab 11/21/23  0443   WBC 8.16   RBC 2.56*   HGB 6.9*   HCT 22.2*      MCV 86.7   MCH 27.0   MCHC 31.1*     BMP:   Recent Labs   Lab 11/21/23  0443   *   K 3.7   CO2 31*   BUN 15.6   CREATININE 0.73   CALCIUM 7.2*   MG 2.00       Significant Diagnostics:  I have reviewed all pertinent imaging results/findings within the past 24 hours.    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 He is undergoing HBOT. Did not remove patient's surgical dressing today as he was in the chair receiving a blood transfusion - will see if nursing can upload pictures to review later today with dressing change.   Encourage nutrition, continue wound care.   It will likely be a while before wound is ready for any reconstruction.   Will continue to follow, discussed with patient and wife bedside.

## 2023-11-21 NOTE — PROGRESS NOTES
[facility] - 8th Floor Med Surg  Wound Care  Progress Note    Patient Name: Lionel León  MRN: [id number]  Admission Date: 11/6/2023  Hospital Length of Stay: 15 days  Attending Physician: Sloan Alvarez MD  Primary Care Provider: Manoj, Provider     Subjective:     HPI:  HBO    42-year-old WM in normal health until fairly recent diagnosis of  ulcerative colitis [facility]. He was readmitted 2 weeks later on 11/6/23 with complaint of rectal pain, bleeding and severe anemia.  He was subsequently diagnosed with necrotizing fasciitis of the bilateral gluteal areas and to have 2 operative debridements by the General surgery team on 11/11/23 and 11/15/23 and also s/p Laparoscopic total colectomy with end ileostomy . WOCN appropriately suggested the use of the adjuvant hyperbaric oxygen therapy.  He tolerated a session very well on Friday 11/17/23. Yesterday on Monday 11/20/23 he could not come down because actually had to go back to surgery for a diagnostic laparoscopy with dr Shields : abdomen washed out.  Comes down today on 11/21/23 for 2nd session. He reports he is feeling better.    Hospital Course:   No notes on file      Follow-up For: Procedure(s) (LRB):  DEBRIDEMENT, WOUND, SACRUM (N/A)  LAPAROSCOPY, DIAGNOSTIC (N/A)    Post-Operative Day: 1 Day Post-Op    Scheduled Meds:   multivitamin  1 tablet Oral Daily    pantoprazole  40 mg Intravenous BID AC    piperacillin-tazobactam (Zosyn) IV (PEDS and ADULTS) (extended infusion is not appropriate)  4.5 g Intravenous Q8H    senna-docusate 8.6-50 mg  2 tablet Oral BID    sucralfate  1 g Oral QID (AC & HS)    vancomycin (VANCOCIN) IV (PEDS and ADULTS)  1,250 mg Intravenous Q12H     Continuous Infusions:   lactated ringers 50 mL/hr at 11/21/23 0926     PRN Meds:acetaminophen, acetaminophen, aluminum-magnesium hydroxide-simethicone, cyclobenzaprine, dextrose 10%, dextrose 10%, dicyclomine, glucagon  (human recombinant), glucose, glucose, lactated ringers, morphine, ondansetron, oxyCODONE, sodium chloride 0.9%, vancomycin - pharmacy to dose    Review of Systems  Objective:     Vital Signs (Most Recent):  Temp: 98.1 °F (36.7 °C) (11/21/23 1153)  Pulse: 92 (11/21/23 1153)  Resp: 17 (11/21/23 1202)  BP: 109/67 (11/21/23 1153)  SpO2: 97 % (11/21/23 1153) Vital Signs (24h Range):  Temp:  [97.4 °F (36.3 °C)-99 °F (37.2 °C)] 98.1 °F (36.7 °C)  Pulse:  [] 92  Resp:  [15-22] 17  SpO2:  [87 %-100 %] 97 %  BP: ()/(58-73) 109/67     Weight: 74.8 kg (165 lb)  Body mass index is 23.68 kg/m².     Physical Exam  Constitutional:           Comments: thin   Abdominal:      Comments: Ileostomy Right abdomen   Neurological:      General: No focal deficit present.      Mental Status: He is alert.                Laboratory:  CBC:   Recent Labs   Lab 11/21/23  0443   WBC 8.16   RBC 2.56*   HGB 6.9*   HCT 22.2*      MCV 86.7   MCH 27.0   MCHC 31.1*     CMP:   Recent Labs   Lab 11/21/23  0443   CALCIUM 7.2*   ALBUMIN 1.9*   *   K 3.7   CO2 31*   BUN 15.6   CREATININE 0.73   ALKPHOS 85   ALT 33   AST 21   BILITOT 0.7     Had blood transfusion this morning after above lab     Latest Reference Range & Units 11/17/23 05:14   Prealbumin 18.0 - 45.0 mg/dL 10.6 (L)   (L): Data is abnormally low  Assessment/Plan:          Necrotizing fasciitis status post operative debridement  Positive cultures for E coli and Streptococcus, on IV Zosyn  Ulcerative colitis, new diagnosis October 2023  Now status post total colectomy and end ileostomy on 11/15/23 and diagnostic laproscopy on 11/20/23 for washout  Anemia, requiring blood transfusion  Low prealbumin         The principal treatment for progressive necrotizing infection such as necrotizing fasciitis is surgical debridement and systemic antibiotics which has been done.  HBO therapy is an adjunct to help reduce the extensive inflammation and necrosis of the subcutaneous fat,  fascia and muscle. Hyperbaric oxygen will increase the  concentration of dissolved oxygen in the blood which can augment oxygenation to all parts of the body,   stimulates the formation of a collagen matrix , promotes angiogenesis and can be bactericidal.  He had 90 minute session at 2.0 BEAR today and tolerated well     Physician Supervision: I provided direct supervision and was immediately available to furnish assistance and direction throughout the performance of the procedure.A trained emergency response team and emergency services were available throughout procedure. Pt had no complaints: no chest pain or sob or earache or sinus pressure    I feel pt most likely will need to go to an LTACH for ongoing dressings changes bid, close monitoring by nurses and doctors, IV meds for pain control for dressings changes, etc        Madeleine Lagos MD  Wound Care  [facility] - 8th Floor Med Surg

## 2023-11-21 NOTE — NURSING
Brief follow up visit, patient currently seated up in chair , ileostomy benign and patent , spouse reports planned HBO treatment today. No other complaints voiced. Encouraged off loading of wound at all times. Will discuss same with assigned nurse .

## 2023-11-21 NOTE — HPI
HPI:  42 y.o. White male with a past medical history of diverticulitis status post colon resection and ulcerative colitis. Patient had recent admission to hospital medicine services at [facility] on 10/21/2023 to 10/24/2023 for sepsis secondary to pancolitis. Patient was discharged with prednisone taper and GI follow up appointment on 11/17/2023. The patient presented to [facility] on 11/6/2023 with a primary complaint of bright red rectal bleeding and lower abdominal pain.  Patient reports rectal bleeding has been ongoing since discharge.  Other associated symptoms include nausea, rectal pain, subjective fevers, weakness, fatigue and a 30-40 lb weight loss over last 3 weeks. Patient is admitted to hospital medicine services for further medical management.       Patient was seen by GI and started on IV steroids, Solu-Medrol 30 mg IV b.I.d. for active ulcerative colitis flare up.  Plus Anusol suppository per rectum b.I.d..  Patient is status post 2 units PRBC for symptomatic anemia.  CT abdomen and pelvis was also ordered to assess extent. CT of the abdomen and pelvis was pertinent for findings consistent with colitis involving the ascending colon, portions of the descending colon.  November 11th he started reporting rectal pain with a bump, on exam he had cellulitis like changes.  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  Taken to the operating room same day by General surgery found to have necrotic tissue extending to the muscle encircling his anus.  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  With ongoing infection immunosuppression with steroids have been tapered off and no further plans for Humira.  Infectious Disease was also consulted and recommended continuing Zosyn.  Wound Care Clinic was also consulted and started on hyperbaric therapy. Patient once again started having fevers and developed concern for worsening  sepsis, was taken to the operating room by a surgery November 20th and underwent diagnostic laparoscopy which showed slightly murky serous fluid bilateral pericolic gutters but no succus or stools, also underwent debridement of the scrotum/perineum and found to have some pus draining which was debrided. PRS consulted for reconstructive options.     Today: patient doing well, laying in bed, wife bedside. Reviewed patient's wound pictures on wife's phone from yesterday's wound care.

## 2023-11-21 NOTE — SUBJECTIVE & OBJECTIVE
Subjective:     HPI:  HBO    42-year-old WM in normal health until fairly recent diagnosis of  ulcerative colitis during a recent admission to [facility] from 10/21/2023 - 10/24/2023. He was readmitted 2 weeks later on 11/6/23 with complaint of rectal pain, bleeding and severe anemia.  He was subsequently diagnosed with necrotizing fasciitis of the bilateral gluteal areas and to have 2 operative debridements by the General surgery team on 11/11/23 and 11/15/23 and also s/p Laparoscopic total colectomy with end ileostomy . WOCN appropriately suggested the use of the adjuvant hyperbaric oxygen therapy.  He tolerated a session very well on Friday 11/17/23. Yesterday on Monday 11/20/23 he could not come down because actually had to go back to surgery for a diagnostic laparoscopy with dr Shields : abdomen washed out.  Comes down today on 11/21/23 for 2nd session. He reports he is feeling better.    Hospital Course:   No notes on file      Follow-up For: Procedure(s) (LRB):  DEBRIDEMENT, WOUND, SACRUM (N/A)  LAPAROSCOPY, DIAGNOSTIC (N/A)    Post-Operative Day: 1 Day Post-Op    Scheduled Meds:   multivitamin  1 tablet Oral Daily    pantoprazole  40 mg Intravenous BID AC    piperacillin-tazobactam (Zosyn) IV (PEDS and ADULTS) (extended infusion is not appropriate)  4.5 g Intravenous Q8H    senna-docusate 8.6-50 mg  2 tablet Oral BID    sucralfate  1 g Oral QID (AC & HS)    vancomycin (VANCOCIN) IV (PEDS and ADULTS)  1,250 mg Intravenous Q12H     Continuous Infusions:   lactated ringers 50 mL/hr at 11/21/23 0926     PRN Meds:acetaminophen, acetaminophen, aluminum-magnesium hydroxide-simethicone, cyclobenzaprine, dextrose 10%, dextrose 10%, dicyclomine, glucagon (human recombinant), glucose, glucose, lactated ringers, morphine, ondansetron, oxyCODONE, sodium chloride 0.9%, vancomycin - pharmacy to dose    Review of Systems  Objective:     Vital Signs (Most Recent):  Temp: 98.1 °F (36.7 °C) (11/21/23 1153)  Pulse: 92 (11/21/23  1153)  Resp: 17 (11/21/23 1202)  BP: 109/67 (11/21/23 1153)  SpO2: 97 % (11/21/23 1153) Vital Signs (24h Range):  Temp:  [97.4 °F (36.3 °C)-99 °F (37.2 °C)] 98.1 °F (36.7 °C)  Pulse:  [] 92  Resp:  [15-22] 17  SpO2:  [87 %-100 %] 97 %  BP: ()/(58-73) 109/67     Weight: 74.8 kg (165 lb)  Body mass index is 23.68 kg/m².     Physical Exam  Constitutional:           Comments: thin   Abdominal:      Comments: Ileostomy Right abdomen   Neurological:      General: No focal deficit present.      Mental Status: He is alert.                Laboratory:  CBC:   Recent Labs   Lab 11/21/23  0443   WBC 8.16   RBC 2.56*   HGB 6.9*   HCT 22.2*      MCV 86.7   MCH 27.0   MCHC 31.1*     CMP:   Recent Labs   Lab 11/21/23  0443   CALCIUM 7.2*   ALBUMIN 1.9*   *   K 3.7   CO2 31*   BUN 15.6   CREATININE 0.73   ALKPHOS 85   ALT 33   AST 21   BILITOT 0.7     Had blood transfusion this morning after above lab

## 2023-11-21 NOTE — PROGRESS NOTES
[facility] - 8th Floor [facility] Surg  [facility] MEDICINE ~ PROGRESS NOTE        CHIEF COMPLAINT   Hospital follow up    HOSPITAL COURSE   Lionel León is a 42 y.o. White male with a past medical history of diverticulitis status post colon resection and ulcerative colitis. [facility] 10/24/2023 for sepsis secondary to pancolitis. He was treated with antibiotics and GI was consulted. Colonoscopy was performed on 10/23/2023 with findings concerning for ulcerative colitis and biopsies of the cecum, ascending, transverse, ascending colon and rectum positive for active chronic colitis consistent with inflammatory bowel disorder. No transfusion was required for rectal bleeding. Patient was discharged with prednisone taper and GI follow up appointment on 11/17/2023. The patient presented to [facility] on 11/6/2023 with a primary complaint of bright red rectal bleeding and lower abdominal pain.  Patient reports rectal bleeding has been ongoing since discharge.  Other associated symptoms include nausea, rectal pain, subjective fevers, weakness, fatigue and a 30 40 lb weight loss over last 3 weeks.  Patient states this morning he was walking to the bathroom when he felt flushed and vision went black. He passed out and hit his head on the wooden floor. Approximately hour and a half later when he went to get up he passed out again.  Second syncopal episode was witnessed by patient's wife who is at bedside reports loss of consciousness lasts for approximately 30 seconds.    Upon presentation to the ED, temperature 98.3F, heart rate 109, blood pressure 96/51, respiratory rate 17, spO2 99%. Labs with H&H 7.3/23.6 (10.6/33.2 on 10/24/2023), BUN 21.9, creatinine 0.88, calcium 7.7, .6, ESR 86. EKG sinus tachycardia with a ventricular rate of 108 and age undetermined possible inferior infarct. In the ED patient received Dilaudid, Zofran, Hydrocortisone  and IVF. He was typed and crossmatched for transfusion of 2 units of packed RBC. Patient is admitted to hospital medicine services for further medical management.       Patient was seen by GI and started on IV steroids, Solu-Medrol 30 mg IV b.I.d. for active ulcerative colitis flare up  Plus Anusol suppository per rectum b.I.d..  Patient is status post 2 units PRBC for symptomatic anemia.  CT abdomen and pelvis was also ordered to assess extent. CT of the abdomen and pelvis was pertinent for findings consistent with colitis involving the ascending colon, portions of the descending colon.  Underlying gastritis also can not be excluded secondary to slightly thickened gastric wall.     May complain continues to be rectal pain but refers that suppositories are helping.  Patient is tolerating p.o. intake    According to GI notes plans is for patient to start Humira/imura once approved by patient's insurance.  November 11th he started reporting rectal pain with a bump, on exam he had cellulitis like changes.  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  Taken to the operating room same day by General surgery found to have necrotic tissue extending to the muscle encircling his anus.  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  With ongoing infection immunosuppression with steroids have been tapered off and no further plans for Humira.  Infectious Disease was also consulted and recommended continuing Zosyn.  Wound Care Clinic was also consulted and started on hyperbaric therapy.  Patient once again started having fevers and developed concern for worsening sepsis, was taken to the operating room by a surgery November 20th and underwent diagnostic laparoscopy which showed slightly murky serous fluid bilateral pericolic gutters but no succus or stools, also underwent debridement of the scrotum/perineum and found to have some pus  draining which was debrided.    Today  Feeling much better today, leukocytosis resolved.  Seems like there was a source they found in the scrotum area and sent for cultures.  I discussed with Infectious Disease attending Dr. Hampton yesterday morning as well.        OBJECTIVE/PHYSICAL EXAM     VITAL SIGNS (MOST RECENT):  Temp: 98.9 °F (37.2 °C) (11/21/23 0800)  Pulse: 102 (11/21/23 0800)  Resp: 16 (11/21/23 0800)  BP: 96/60 (11/21/23 0800)  SpO2: 100 % (11/21/23 0800) VITAL SIGNS (24 HOUR RANGE):  Temp:  [97.4 °F (36.3 °C)-99 °F (37.2 °C)] 98.9 °F (37.2 °C)  Pulse:  [] 102  Resp:  [15-22] 16  SpO2:  [87 %-100 %] 100 %  BP: ()/(58-73) 96/60   GENERAL: In no acute distress, afebrile  HEENT:  NG tube  CHEST: Clear to auscultation bilaterally  HEART: S1, S2, no appreciable murmur  ABDOMEN: Soft, appropriately tender postop, BS +, ileostomy with orange yellow liquid stool  MSK: Warm, no lower extremity edema, no clubbing or cyanosis  NEUROLOGIC: Alert and oriented x4, moving all extremities with good strength   INTEGUMENTARY:  Wound exam deferred to surgery  PSYCHIATRY:        ASSESSMENT/PLAN   Severe ulcerative colitis acute flare-status post total colectomy and end ileostomy November 15  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  Continue vancomycin and Zosyn.  Follow cultures.  Colorectal surgery following.    General surgery following.  Can probably advance diet to clears if okay with surgery.  Wound care clinic following.  Continue hyperbaric therapy.    GI signed off.  Off of immunosuppression.  Plastic surgery following.  Continue protein supplement, Jed and multivitamin.  GI following.  Planning for EGD tomorrow to look for possible Crohn's given extensive disease and aphthous ulcers as well as oropharyngeal complaints.  Transfuse 1 unit of blood  today    DVT prophylaxis:  Ambulatory    Anticipated discharge and disposition:   __________________________________________________________________________    LABS/MICRO/MEDS/DIAGNOSTICS       LABS  Recent Labs     11/21/23  0443   *   K 3.7   CHLORIDE 95*   CO2 31*   BUN 15.6   CREATININE 0.73   GLUCOSE 95   CALCIUM 7.2*   ALKPHOS 85   AST 21   ALT 33   ALBUMIN 1.9*       Recent Labs     11/21/23  0443   WBC 8.16   RBC 2.56*   HCT 22.2*   MCV 86.7            MICROBIOLOGY  Microbiology Results (last 7 days)       Procedure Component Value Units Date/Time    Gram Stain [[id number]] Collected: 11/20/23 1414    Order Status: Completed Specimen: Wound from Scrotum Updated: 11/21/23 0819     GRAM STAIN Few WBC observed      No bacteria seen    Wound Culture [[id number]] Collected: 11/20/23 1414    Order Status: Completed Specimen: Wound from Scrotum Updated: 11/21/23 0641     Wound Culture No Growth At 24 Hours    Blood Culture [[id number]]  (Normal) Collected: 11/18/23 1706    Order Status: Completed Specimen: Blood Updated: 11/20/23 1800     CULTURE, BLOOD ([facility]) No Growth At 48 Hours    Blood Culture [[id number]]  (Normal) Collected: 11/18/23 1706    Order Status: Completed Specimen: Blood Updated: 11/20/23 1800     CULTURE, BLOOD ([facility]) No Growth At 48 Hours    Anaerobic Culture [[id number]] Collected: 11/20/23 1414    Order Status: Sent Specimen: Wound from Scrotum Updated: 11/20/23 1519    Fungal Culture [[id number]] Collected: 11/20/23 1414    Order Status: Sent Specimen: Wound from Scrotum Updated: 11/20/23 1519    Wound Culture [[id number]]  (Abnormal)  (Susceptibility) Collected: 11/11/23 1851    Order Status: Completed Specimen: Abscess from Rectum Updated: 11/17/23 1513     Wound Culture Many Escherichia coli      Many Streptococcus constellatus    Blood Culture [[id number]]  (Normal) Collected: 11/10/23 2048    Order Status: Completed Specimen: Blood from Antecubital, Left Updated: 11/15/23 5366      CULTURE, BLOOD ([facility]) No Growth at 5 days               MEDICATIONS   multivitamin  1 tablet Oral Daily    pantoprazole  40 mg Intravenous BID AC    piperacillin-tazobactam (Zosyn) IV (PEDS and ADULTS) (extended infusion is not appropriate)  4.5 g Intravenous Q8H    senna-docusate 8.6-50 mg  2 tablet Oral BID    sucralfate  1 g Oral QID (AC & HS)    vancomycin (VANCOCIN) IV (PEDS and ADULTS)  1,250 mg Intravenous Q12H         INFUSIONS   electrolyte-A      lactated ringers 125 mL/hr at 11/20/23 2358    lactated ringers            DIAGNOSTIC TESTS  XR Gastric tube check, non-radiologist performed   Final Result      As above.         Electronically signed by: Joseph Carvajal   Date:    11/20/2023   Time:    06:15      CT Abdomen Pelvis With IV Contrast   Final Result   Impression:      1. There is extensive pneumoperitoneum. This is new since the prior examination and consistent with recent surgery. There is mild ascites. This is new since the prior examination. Correlate clinically and recommend continued serial interval follow-up to full resolution to confirm postoperative nature.      2. There is interval near-total colectomy with ileostomy in the right ventral mid abdominal wall. There are numerous moderately distended small bowel loops in the mid and lower abdomen which demonstrate air-fluid levels. Surrounding mesenteric fat stranding is noted. There is apparent transition at the level of the distal ileum. These findings are new since the prior examination and are suggestive of small bowel obstruction versus severe adynamic ileus. Correlate clinically as regards further evaluation and followup.      3. There is interval development of a defect / wound in the cutaneous / subcutaneous soft tissues of the gluteal cleft and the perineal region with soft tissue emphysema consistent with recent debridement.      4. Details and other findings as discussed above.      No significant discrepancy with overnight report  "        Electronically signed by: Chip Duran   Date:    11/19/2023   Time:    10:33      X-Ray Chest 1 View   Final Result      Suspect some bibasilar atelectasis.         Electronically signed by: Germain Cedeno   Date:    11/18/2023   Time:    17:19      CT Abdomen Pelvis W Wo Contrast   Final Result      X-Ray Chest PA And Lateral   Final Result      No acute abnormality.         Electronically signed by: Quinton Hernandez MD   Date:    11/11/2023   Time:    08:49      CT Abdomen Pelvis W Wo Contrast   Final Result      Findings seen consistent with colitis involving the ascending colon and portions of the descending colon.  Follow-up is recommended to resolution as underlying mass cannot be completely excluded.      The gastric wall appears to be slightly thickened.  Underlying gastritis should be excluded.         Electronically signed by: John Gale   Date:    11/06/2023   Time:    12:02           No results found for: "EF"       NUTRITION STATUS  Patient meets ASPEN criteria for moderate malnutrition of acute illness or injury per RD assessment as evidenced by:  Energy Intake (Malnutrition): less than 75% for greater than 7 days  Weight Loss (Malnutrition): greater than 2% in 1 week  Subcutaneous Fat (Malnutrition): moderate depletion  Muscle Mass (Malnutrition): moderate depletion  Fluid Accumulation (Malnutrition): other (see comments) (does not meet criteria)  Hand  Strength, Left (Malnutrition): unable to evaluate  Hand  Strength, Right (Malnutrition): unable to evaluate  A minimum of two characteristics is recommended for diagnosis of either severe or non-severe malnutrition.       Case related differential diagnoses have been reviewed; assessment and plan has been documented. I have personally reviewed the labs and test results that are currently available; I have reviewed the patients medication list. I have reviewed the consulting providers recommendations. I have reviewed or attempted to " review medical records based upon their availability.  All of the patient's and/or family's questions have been addressed and answered to the best of my ability.  I will continue to monitor closely and make adjustments to medical management as needed.  This document was created using M*Modal Fluency Direct.  Transcription errors may have been made.  Please contact me if any questions may rise regarding documentation to clarify transcription.        Abdoulaye Garcia MD   Internal Medicine  Department of Hospital Medicine  [facility] - 8th Floor Med Surg

## 2023-11-22 ENCOUNTER — ANESTHESIA EVENT (OUTPATIENT)
Dept: ENDOSCOPY | Facility: HOSPITAL | Age: 43
DRG: 853 | End: 2023-11-22
Payer: COMMERCIAL

## 2023-11-22 ENCOUNTER — ANESTHESIA (OUTPATIENT)
Dept: ENDOSCOPY | Facility: HOSPITAL | Age: 43
DRG: 853 | End: 2023-11-22
Payer: COMMERCIAL

## 2023-11-22 LAB
ALBUMIN SERPL-MCNC: 1.8 G/DL (ref 3.5–5)
ALBUMIN SERPL-MCNC: 1.9 G/DL (ref 3.5–5)
ALBUMIN/GLOB SERPL: 0.6 RATIO (ref 1.1–2)
ALBUMIN/GLOB SERPL: 0.7 RATIO (ref 1.1–2)
ALP SERPL-CCNC: 76 UNIT/L (ref 40–150)
ALP SERPL-CCNC: 80 UNIT/L (ref 40–150)
ALT SERPL-CCNC: 29 UNIT/L (ref 0–55)
ALT SERPL-CCNC: 29 UNIT/L (ref 0–55)
AST SERPL-CCNC: 19 UNIT/L (ref 5–34)
AST SERPL-CCNC: 21 UNIT/L (ref 5–34)
BASOPHILS # BLD AUTO: 0.01 X10(3)/MCL
BASOPHILS # BLD AUTO: 0.01 X10(3)/MCL
BASOPHILS NFR BLD AUTO: 0.1 %
BASOPHILS NFR BLD AUTO: 0.1 %
BILIRUB SERPL-MCNC: 0.6 MG/DL
BILIRUB SERPL-MCNC: 0.8 MG/DL
BUN SERPL-MCNC: 12.6 MG/DL (ref 8.9–20.6)
BUN SERPL-MCNC: 13.1 MG/DL (ref 8.9–20.6)
CALCIUM SERPL-MCNC: 7.4 MG/DL (ref 8.4–10.2)
CALCIUM SERPL-MCNC: 7.4 MG/DL (ref 8.4–10.2)
CHLORIDE SERPL-SCNC: 97 MMOL/L (ref 98–107)
CHLORIDE SERPL-SCNC: 99 MMOL/L (ref 98–107)
CO2 SERPL-SCNC: 26 MMOL/L (ref 22–29)
CO2 SERPL-SCNC: 28 MMOL/L (ref 22–29)
CREAT SERPL-MCNC: 0.82 MG/DL (ref 0.73–1.18)
CREAT SERPL-MCNC: 0.84 MG/DL (ref 0.73–1.18)
EOSINOPHIL # BLD AUTO: 0.01 X10(3)/MCL (ref 0–0.9)
EOSINOPHIL # BLD AUTO: 0.02 X10(3)/MCL (ref 0–0.9)
EOSINOPHIL NFR BLD AUTO: 0.1 %
EOSINOPHIL NFR BLD AUTO: 0.2 %
ERYTHROCYTE [DISTWIDTH] IN BLOOD BY AUTOMATED COUNT: 15.9 % (ref 11.5–17)
ERYTHROCYTE [DISTWIDTH] IN BLOOD BY AUTOMATED COUNT: 16 % (ref 11.5–17)
GFR SERPLBLD CREATININE-BSD FMLA CKD-EPI: >60 MLS/MIN/1.73/M2
GFR SERPLBLD CREATININE-BSD FMLA CKD-EPI: >60 MLS/MIN/1.73/M2
GLOBULIN SER-MCNC: 2.7 GM/DL (ref 2.4–3.5)
GLOBULIN SER-MCNC: 2.8 GM/DL (ref 2.4–3.5)
GLUCOSE SERPL-MCNC: 100 MG/DL (ref 74–100)
GLUCOSE SERPL-MCNC: 108 MG/DL (ref 74–100)
HCT VFR BLD AUTO: 23.1 % (ref 42–52)
HCT VFR BLD AUTO: 23.5 % (ref 42–52)
HGB BLD-MCNC: 7.5 G/DL (ref 14–18)
HGB BLD-MCNC: 7.6 G/DL (ref 14–18)
IMM GRANULOCYTES # BLD AUTO: 0.07 X10(3)/MCL (ref 0–0.04)
IMM GRANULOCYTES # BLD AUTO: 0.07 X10(3)/MCL (ref 0–0.04)
IMM GRANULOCYTES NFR BLD AUTO: 0.7 %
IMM GRANULOCYTES NFR BLD AUTO: 0.8 %
LYMPHOCYTES # BLD AUTO: 0.34 X10(3)/MCL (ref 0.6–4.6)
LYMPHOCYTES # BLD AUTO: 0.38 X10(3)/MCL (ref 0.6–4.6)
LYMPHOCYTES NFR BLD AUTO: 3.8 %
LYMPHOCYTES NFR BLD AUTO: 4 %
MAGNESIUM SERPL-MCNC: 2.1 MG/DL (ref 1.6–2.6)
MCH RBC QN AUTO: 27 PG (ref 27–31)
MCH RBC QN AUTO: 27.4 PG (ref 27–31)
MCHC RBC AUTO-ENTMCNC: 32.3 G/DL (ref 33–36)
MCHC RBC AUTO-ENTMCNC: 32.5 G/DL (ref 33–36)
MCV RBC AUTO: 83.6 FL (ref 80–94)
MCV RBC AUTO: 84.3 FL (ref 80–94)
MONOCYTES # BLD AUTO: 0.46 X10(3)/MCL (ref 0.1–1.3)
MONOCYTES # BLD AUTO: 0.49 X10(3)/MCL (ref 0.1–1.3)
MONOCYTES NFR BLD AUTO: 5.1 %
MONOCYTES NFR BLD AUTO: 5.2 %
NEUTROPHILS # BLD AUTO: 8.01 X10(3)/MCL (ref 2.1–9.2)
NEUTROPHILS # BLD AUTO: 8.57 X10(3)/MCL (ref 2.1–9.2)
NEUTROPHILS NFR BLD AUTO: 89.9 %
NEUTROPHILS NFR BLD AUTO: 90 %
NRBC BLD AUTO-RTO: 0 %
NRBC BLD AUTO-RTO: 0 %
PHOSPHATE SERPL-MCNC: 2.6 MG/DL (ref 2.3–4.7)
PLATELET # BLD AUTO: 270 X10(3)/MCL (ref 130–400)
PLATELET # BLD AUTO: 305 X10(3)/MCL (ref 130–400)
PMV BLD AUTO: 10.1 FL (ref 7.4–10.4)
PMV BLD AUTO: 9.4 FL (ref 7.4–10.4)
POCT GLUCOSE: 101 MG/DL (ref 70–110)
POTASSIUM SERPL-SCNC: 3.5 MMOL/L (ref 3.5–5.1)
POTASSIUM SERPL-SCNC: 3.6 MMOL/L (ref 3.5–5.1)
PROT SERPL-MCNC: 4.6 GM/DL (ref 6.4–8.3)
PROT SERPL-MCNC: 4.6 GM/DL (ref 6.4–8.3)
PSYCHE PATHOLOGY RESULT: NORMAL
RBC # BLD AUTO: 2.74 X10(6)/MCL (ref 4.7–6.1)
RBC # BLD AUTO: 2.81 X10(6)/MCL (ref 4.7–6.1)
SODIUM SERPL-SCNC: 134 MMOL/L (ref 136–145)
SODIUM SERPL-SCNC: 135 MMOL/L (ref 136–145)
VANCOMYCIN TROUGH SERPL-MCNC: 15.8 UG/ML (ref 15–20)
WBC # SPEC AUTO: 8.91 X10(3)/MCL (ref 4.5–11.5)
WBC # SPEC AUTO: 9.53 X10(3)/MCL (ref 4.5–11.5)

## 2023-11-22 PROCEDURE — 25000003 PHARM REV CODE 250: Performed by: STUDENT IN AN ORGANIZED HEALTH CARE EDUCATION/TRAINING PROGRAM

## 2023-11-22 PROCEDURE — 11000001 HC ACUTE MED/SURG PRIVATE ROOM

## 2023-11-22 PROCEDURE — 25000003 PHARM REV CODE 250: Performed by: NURSE PRACTITIONER

## 2023-11-22 PROCEDURE — D9220A PRA ANESTHESIA: Mod: ANES,,, | Performed by: ANESTHESIOLOGY

## 2023-11-22 PROCEDURE — 80202 ASSAY OF VANCOMYCIN: CPT | Performed by: INTERNAL MEDICINE

## 2023-11-22 PROCEDURE — C9113 INJ PANTOPRAZOLE SODIUM, VIA: HCPCS

## 2023-11-22 PROCEDURE — 85025 COMPLETE CBC W/AUTO DIFF WBC: CPT | Performed by: STUDENT IN AN ORGANIZED HEALTH CARE EDUCATION/TRAINING PROGRAM

## 2023-11-22 PROCEDURE — 25000003 PHARM REV CODE 250: Performed by: INTERNAL MEDICINE

## 2023-11-22 PROCEDURE — 25000003 PHARM REV CODE 250: Performed by: NURSE ANESTHETIST, CERTIFIED REGISTERED

## 2023-11-22 PROCEDURE — 27201423 OPTIME MED/SURG SUP & DEVICES STERILE SUPPLY: Performed by: INTERNAL MEDICINE

## 2023-11-22 PROCEDURE — 43239 EGD BIOPSY SINGLE/MULTIPLE: CPT | Performed by: INTERNAL MEDICINE

## 2023-11-22 PROCEDURE — 37000008 HC ANESTHESIA 1ST 15 MINUTES: Performed by: INTERNAL MEDICINE

## 2023-11-22 PROCEDURE — 63600175 PHARM REV CODE 636 W HCPCS

## 2023-11-22 PROCEDURE — 37000009 HC ANESTHESIA EA ADD 15 MINS: Performed by: INTERNAL MEDICINE

## 2023-11-22 PROCEDURE — 63600175 PHARM REV CODE 636 W HCPCS: Performed by: INTERNAL MEDICINE

## 2023-11-22 PROCEDURE — D9220A PRA ANESTHESIA: ICD-10-PCS | Mod: ANES,,, | Performed by: ANESTHESIOLOGY

## 2023-11-22 PROCEDURE — 63700000 PHARM REV CODE 250 ALT 637 W/O HCPCS

## 2023-11-22 PROCEDURE — 80053 COMPREHEN METABOLIC PANEL: CPT | Performed by: STUDENT IN AN ORGANIZED HEALTH CARE EDUCATION/TRAINING PROGRAM

## 2023-11-22 PROCEDURE — 25000003 PHARM REV CODE 250: Performed by: ANESTHESIOLOGY

## 2023-11-22 PROCEDURE — D9220A PRA ANESTHESIA: ICD-10-PCS | Mod: CRNA,,, | Performed by: NURSE ANESTHETIST, CERTIFIED REGISTERED

## 2023-11-22 PROCEDURE — 83735 ASSAY OF MAGNESIUM: CPT | Performed by: STUDENT IN AN ORGANIZED HEALTH CARE EDUCATION/TRAINING PROGRAM

## 2023-11-22 PROCEDURE — 63600175 PHARM REV CODE 636 W HCPCS: Performed by: STUDENT IN AN ORGANIZED HEALTH CARE EDUCATION/TRAINING PROGRAM

## 2023-11-22 PROCEDURE — 84100 ASSAY OF PHOSPHORUS: CPT | Performed by: STUDENT IN AN ORGANIZED HEALTH CARE EDUCATION/TRAINING PROGRAM

## 2023-11-22 PROCEDURE — 63600175 PHARM REV CODE 636 W HCPCS: Performed by: NURSE ANESTHETIST, CERTIFIED REGISTERED

## 2023-11-22 PROCEDURE — D9220A PRA ANESTHESIA: Mod: CRNA,,, | Performed by: NURSE ANESTHETIST, CERTIFIED REGISTERED

## 2023-11-22 RX ORDER — ONDANSETRON 2 MG/ML
4 INJECTION INTRAMUSCULAR; INTRAVENOUS DAILY PRN
Status: CANCELLED | OUTPATIENT
Start: 2023-11-22

## 2023-11-22 RX ORDER — SODIUM CHLORIDE, SODIUM GLUCONATE, SODIUM ACETATE, POTASSIUM CHLORIDE AND MAGNESIUM CHLORIDE 30; 37; 368; 526; 502 MG/100ML; MG/100ML; MG/100ML; MG/100ML; MG/100ML
INJECTION, SOLUTION INTRAVENOUS CONTINUOUS
Status: CANCELLED | OUTPATIENT
Start: 2023-11-22 | End: 2023-12-22

## 2023-11-22 RX ORDER — PROPOFOL 10 MG/ML
VIAL (ML) INTRAVENOUS
Status: DISCONTINUED | OUTPATIENT
Start: 2023-11-22 | End: 2023-11-22

## 2023-11-22 RX ORDER — LIDOCAINE HYDROCHLORIDE 20 MG/ML
INJECTION INTRAVENOUS
Status: DISCONTINUED | OUTPATIENT
Start: 2023-11-22 | End: 2023-11-22

## 2023-11-22 RX ORDER — LIDOCAINE HYDROCHLORIDE 20 MG/ML
INJECTION, SOLUTION EPIDURAL; INFILTRATION; INTRACAUDAL; PERINEURAL
Status: DISPENSED
Start: 2023-11-22 | End: 2023-11-22

## 2023-11-22 RX ORDER — FLUCONAZOLE 100 MG/1
100 TABLET ORAL DAILY
Status: DISCONTINUED | OUTPATIENT
Start: 2023-11-22 | End: 2023-11-27

## 2023-11-22 RX ORDER — PROPOFOL 10 MG/ML
VIAL (ML) INTRAVENOUS
Status: DISPENSED
Start: 2023-11-22 | End: 2023-11-22

## 2023-11-22 RX ORDER — SODIUM CHLORIDE, SODIUM LACTATE, POTASSIUM CHLORIDE, CALCIUM CHLORIDE 600; 310; 30; 20 MG/100ML; MG/100ML; MG/100ML; MG/100ML
INJECTION, SOLUTION INTRAVENOUS CONTINUOUS
Status: CANCELLED | OUTPATIENT
Start: 2023-11-22

## 2023-11-22 RX ORDER — SODIUM CHLORIDE, SODIUM GLUCONATE, SODIUM ACETATE, POTASSIUM CHLORIDE AND MAGNESIUM CHLORIDE 30; 37; 368; 526; 502 MG/100ML; MG/100ML; MG/100ML; MG/100ML; MG/100ML
INJECTION, SOLUTION INTRAVENOUS CONTINUOUS
Status: DISCONTINUED | OUTPATIENT
Start: 2023-11-22 | End: 2023-11-22

## 2023-11-22 RX ORDER — SODIUM CHLORIDE, SODIUM GLUCONATE, SODIUM ACETATE, POTASSIUM CHLORIDE AND MAGNESIUM CHLORIDE 30; 37; 368; 526; 502 MG/100ML; MG/100ML; MG/100ML; MG/100ML; MG/100ML
INJECTION, SOLUTION INTRAVENOUS CONTINUOUS
Status: DISCONTINUED | OUTPATIENT
Start: 2023-11-22 | End: 2023-11-23

## 2023-11-22 RX ADMIN — MULTIPLE VITAMINS W/ MINERALS TAB 1 TABLET: TAB at 10:11

## 2023-11-22 RX ADMIN — LIDOCAINE HYDROCHLORIDE 100 MG: 20 INJECTION INTRAVENOUS at 07:11

## 2023-11-22 RX ADMIN — ALUMINUM HYDROXIDE, MAGNESIUM HYDROXIDE, AND SIMETHICONE 30 ML: 200; 200; 20 SUSPENSION ORAL at 08:11

## 2023-11-22 RX ADMIN — VANCOMYCIN HYDROCHLORIDE 1250 MG: 1.25 INJECTION, POWDER, LYOPHILIZED, FOR SOLUTION INTRAVENOUS at 03:11

## 2023-11-22 RX ADMIN — SODIUM CHLORIDE, SODIUM GLUCONATE, SODIUM ACETATE, POTASSIUM CHLORIDE AND MAGNESIUM CHLORIDE: 526; 502; 368; 37; 30 INJECTION, SOLUTION INTRAVENOUS at 07:11

## 2023-11-22 RX ADMIN — PROPOFOL 30 MG: 10 INJECTION, EMULSION INTRAVENOUS at 07:11

## 2023-11-22 RX ADMIN — PIPERACILLIN SODIUM AND TAZOBACTAM SODIUM 4.5 G: 4; .5 INJECTION, POWDER, FOR SOLUTION INTRAVENOUS at 01:11

## 2023-11-22 RX ADMIN — SENNOSIDES AND DOCUSATE SODIUM 2 TABLET: 8.6; 5 TABLET ORAL at 10:11

## 2023-11-22 RX ADMIN — PIPERACILLIN SODIUM AND TAZOBACTAM SODIUM 4.5 G: 4; .5 INJECTION, POWDER, FOR SOLUTION INTRAVENOUS at 08:11

## 2023-11-22 RX ADMIN — PROPOFOL 70 MG: 10 INJECTION, EMULSION INTRAVENOUS at 07:11

## 2023-11-22 RX ADMIN — PROPOFOL 40 MG: 10 INJECTION, EMULSION INTRAVENOUS at 07:11

## 2023-11-22 RX ADMIN — PIPERACILLIN SODIUM AND TAZOBACTAM SODIUM 4.5 G: 4; .5 INJECTION, POWDER, FOR SOLUTION INTRAVENOUS at 05:11

## 2023-11-22 RX ADMIN — SODIUM CHLORIDE, SODIUM GLUCONATE, SODIUM ACETATE, POTASSIUM CHLORIDE AND MAGNESIUM CHLORIDE: 526; 502; 368; 37; 30 INJECTION, SOLUTION INTRAVENOUS at 06:11

## 2023-11-22 RX ADMIN — PANTOPRAZOLE SODIUM 40 MG: 40 INJECTION, POWDER, FOR SOLUTION INTRAVENOUS at 05:11

## 2023-11-22 RX ADMIN — Medication 6 MG: at 08:11

## 2023-11-22 RX ADMIN — FLUCONAZOLE 100 MG: 100 TABLET ORAL at 10:11

## 2023-11-22 RX ADMIN — SODIUM CHLORIDE, POTASSIUM CHLORIDE, SODIUM LACTATE AND CALCIUM CHLORIDE: 600; 310; 30; 20 INJECTION, SOLUTION INTRAVENOUS at 12:11

## 2023-11-22 NOTE — ANESTHESIA PREPROCEDURE EVALUATION
11/22/2023  Lionel León is a 43 y.o., male with ----------------------------  Diverticulitis  Ulcerative colitis    And ----------------------------  Colonoscopy, with 1 or more biopsies      Comment:  Procedure: COLON;  Surgeon: Dragan Underwood MD;               Location: [facility] ENDOSCOPY;  Service:                Gastroenterology;  Laterality: N/A;  Debridement of sacral wound      Comment:  Procedure: DEBRIDEMENT, WOUND, SACRUM;  Surgeon: Chandana Guidry MD;  Location: [facility];  Service: General;                 Laterality: N/A;  perineum/sacrum// high lithotomy  Debridement, external genitalia, perineum, and abdominal wall, for   necrotizing soft tissue infection      Comment:  Procedure: DEBRIDEMENT, EXTERNAL GENITALIA/BUTTOCKS FOR                NECROTIZING SOFT TISSUE INFECTION;  Surgeon: Vladimir Vick MD;  Location: [facility];  Service: General;                 Laterality: Bilateral;  Prone positioning.  Debridement, external genitalia, perineum, and abdominal wall, for   necrotizing soft tissue infection      Comment:  Procedure: DEBRIDEMENT, EXTERNAL GENITALIA, PERINEUM,                AND ABDOMINAL WALL, FOR NECROTIZING SOFT TISSUE                INFECTION;  Surgeon: Vladimir Vick MD;  Location: [facility];  Service: General;  Laterality: N/A;  Place patient                in dorsal lithotomy positioning.  Diagnostic laparoscopy      Comment:  Procedure: LAPAROSCOPY, DIAGNOSTIC;  Surgeon: Fredi Shields MD;  Location: [facility];  Service: General;                 Laterality: N/A;  Laparoscopic ileostomy      Comment:  Procedure: CREATION, ILEOSTOMY, LAPAROSCOPIC;  Surgeon:                Fredi Shields MD;  Location: [facility];  Service:                Colon and Rectal;  Laterality: N/A;  Laparoscopic total colectomy       Comment:  Procedure: COLECTOMY, TOTAL, LAPAROSCOPIC;  Surgeon:                Fredi Shields MD;  Location: [facility] OR;  Service:                Colon and Rectal;  Laterality: N/A;  LAP TOTAL COLECTOMY                WITH ILEOSTOMY    Presents for odynophagia.  I cared for this patient two days ago for diagnostic lap and sacral wound debridement    NG tube was in  place prior to Monday's surgery but is no longer in place - pt looks better today  No nausea No pain      Pre-op Assessment    I have reviewed the NPO Status.      Review of Systems  Hepatic/GI:   PUD,        Peptic Ulcer Disease              Physical Exam  General: Well nourished, Cooperative, Alert and Oriented    Airway:  Mallampati: II   Mouth Opening: Normal  TM Distance: Normal  Tongue: Normal  Neck ROM: Normal ROM  Bearded face  Dental:  Intact    Chest/Lungs:  Clear to auscultation, Normal Respiratory Rate    Heart:  Rate: Normal  Rhythm: Regular Rhythm        Anesthesia Plan  Type of Anesthesia, risks & benefits discussed:    Anesthesia Type: Gen Natural Airway  Intra-op Monitoring Plan: Standard ASA Monitors  Post Op Pain Control Plan: IV/PO Opioids PRN  Induction:  IV  Airway Plan: Direct  Informed Consent: Informed consent signed with the Patient and all parties understand the risks and agree with anesthesia plan.  All questions answered. Patient consented to blood products? No  ASA Score: 3  Day of Surgery Review of History & Physical: H&P Update referred to the surgeon/provider.  Anesthesia Plan Notes: Nasal cannula vs facemask supplemental oxygenation   For patients with TABBY/obesity, may consider SuperNoval Nasal CPAP    Paper consent signed because electronic consent was at maximum limitations      Ready For Surgery From Anesthesia Perspective.     .

## 2023-11-22 NOTE — PROGRESS NOTES
[facility]  Infectious Diseases Progress Note        ASSESSMENT & PLAN:   He is a 42-year-old male with a past medical history of diverticulitis, status post colon resection, and new diagnosis of ulcerative colitis for which he was hospitalized towards the end of October.  He was evaluated by GI and underwent a colonoscopy at that time and was discharged on a steroid taper.  He was also noted to have hemorrhoids which was treated with hydrocortisone suppository.  He then presented to the emergency room on 11/06/2023 with complaints of bright red blood per rectum and lower abdominal pain as well as subjective fevers and syncopal episode at home.  He was initiated on IV steroids per GI for suspected ulcerative colitis flare.  CT of the abdomen and pelvis showed findings consistent with colitis and possible underlying gastritis.  Over the weekend, patient had a fever of 102.5° F and some hypotension.  He also reported worsening pain near the perineum and was noted to have extensive erythema and induration.  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  He was initiated on empiric antimicrobials with vancomycin, Zosyn, and clindamycin.  He was taken to the operating room by General surgery on 11/11 for an I&D - Gram stain with GPCs, GNRs, GPRs - culture with relatively sensitive E. coli thus far.  Blood cultures are negative thus far.  We have been consulted for assistance with antimicrobials.     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, 11/12, 11/20  Sepsis s/t above - resolved  Ulcerative colitis w/concern for acute flare, new diagnosis - on Solumedrol (recently on prednisone taper), s/p total colectomy 11/15  H/o diverticulitis, s/p colon resection  Atrial intracardiac shunt per TTE     -Improving leukocytosis  -Clinically improving as well and tolerating PO intake   -Cultures from sacral debridement with no growth so far    Plan:   -Continue Piperacillin/Tazobactam 4.5g IV q8h.   -Continue  "Vancomycin dosing per pharmacy for goal trough 15-20  -Follow intraoperative cultures  -Will aim to de-escalate Vancomycin in the upcoming days pending culture data  -Noted indeterminate Quant on 11/10 - will need repeat TB testing in the future if plans to start biologics, which for now are on hold  -Discussed with patient and wife at bedside    SUBJECTIVE:     Feeling well, no new concerns, tolerating some PO intake well      MEDICATIONS:   Reviewed in EMR    REVIEW OF SYSTEMS:   Except as documented, all other systems reviewed and negative     PHYSICAL EXAM:   T 98.1 °F (36.7 °C)   /60   P 96   RR 18   O2 96 %  GENERAL: NAD; does not appear toxic  SKIN: no rash  HEENT: sclera non-icteric; PERRL   NECK: supple; no LAD  CHEST: CTA; nonlabored, equal expansion; no adventitious BS  CARDIOVASCULAR: RRR, S1S2; no murmur; strong, equal peripheral pulses; no edema  ABDOMEN:  active bowel sounds; abdomen soft, nondistended, appropriately TTP  GENITOURINARY: Devi in place  EXTREMITIES: no cyanosis or clubbing  NEURO: AAO x4; CN II-XII grossly intact  PSYCH: Mentation and affect appropriate     LABS AND IMAGING:     Recent Labs     11/20/23 0250 11/21/23 0443   WBC 48.35  48.52* 8.16   RBC 2.97* 2.56*   HGB 8.0* 6.9*   HCT 25.0* 22.2*   MCV 84.2 86.7   MCH 26.9* 27.0   MCHC 32.0* 31.1*   RDW 16.2 16.3    281     Recent Labs     11/20/23 0250 11/20/23  0521   LACTIC 4.2* 1.1     No results for input(s): "INR", "APTT", "D-DIMER" in the last 72 hours.  No results for input(s): "HGBA1C", "CHOL", "TRIG", "LDL", "VLDL", "HDL" in the last 72 hours.   Recent Labs     11/20/23 0250 11/21/23 0443   * 135*   K 4.1 3.7   CHLORIDE 94* 95*   CO2 24 31*   BUN 22.8* 15.6   CREATININE 1.04 0.73   GLUCOSE 130* 95   CALCIUM 7.5* 7.2*   MG 1.70 2.00   PHOS  --  2.6   ALBUMIN 1.3* 1.9*   GLOBULIN 3.1 2.7   ALKPHOS 114 85   ALT 46 33   AST 28 21   BILITOT 1.2 0.7     Recent Labs     11/20/23  0250   TROPONINI <0.010 "          XR Gastric tube check, non-radiologist performed  Narrative: EXAMINATION:  XR GASTRIC TUBE CHECK, NON-RADIOLOGIST PERFORMED    CLINICAL HISTORY:  NGT placement;    TECHNIQUE:  Single view of the abdomen    COMPARISON:  None    FINDINGS:  NG tube projects over the left upper quadrant.  This is the expected location of the stomach.  Gas-filled loops of bowel remain throughout.  Impression: As above.    Electronically signed by: Joseph Carvajal  Date:    11/20/2023  Time:    06:15    Jer Hampton MD  Infectious Disease  [facility]

## 2023-11-22 NOTE — TRANSFER OF CARE
"Anesthesia Transfer of Care Note    Patient: Beau Mau    Procedure(s) Performed: Procedure(s) (LRB):  EGD, WITH CLOSED BIOPSY (N/A)    Patient location: GI    Anesthesia Type: general    Transport from OR: Transported from OR on room air with adequate spontaneous ventilation    Post pain: adequate analgesia    Post assessment: tolerated procedure well and no apparent anesthetic complications    Post vital signs: stable    Level of consciousness: sedated    Nausea/Vomiting: no nausea/vomiting    Complications: none    Transfer of care protocol was followed      Last vitals: Visit Vitals  BP (!) 91/51   Pulse 83   Temp 36.5 °C (97.7 °F) (Tympanic)   Resp 17   Ht 5' 10" (1.778 m)   Wt 74.8 kg (165 lb)   SpO2 (!) 94%   BMI 23.68 kg/m²     "

## 2023-11-22 NOTE — PLAN OF CARE
Problem: Adult Inpatient Plan of Care  Goal: Plan of Care Review  Outcome: Ongoing, Progressing  Goal: Patient-Specific Goal (Individualized)  Outcome: Ongoing, Progressing  Goal: Absence of Hospital-Acquired Illness or Injury  Outcome: Ongoing, Progressing  Goal: Optimal Comfort and Wellbeing  Outcome: Ongoing, Progressing  Goal: Readiness for Transition of Care  Outcome: Ongoing, Progressing     Problem: Infection  Goal: Absence of Infection Signs and Symptoms  Outcome: Ongoing, Progressing     Problem: Pain Acute  Goal: Acceptable Pain Control and Functional Ability  Outcome: Ongoing, Progressing     Problem: Fall Injury Risk  Goal: Absence of Fall and Fall-Related Injury  Outcome: Ongoing, Progressing     Problem: Skin Injury Risk Increased  Goal: Skin Health and Integrity  Outcome: Ongoing, Progressing     Problem: Adjustment to Illness (Sepsis/Septic Shock)  Goal: Optimal Coping  Outcome: Ongoing, Progressing     Problem: Bleeding (Sepsis/Septic Shock)  Goal: Absence of Bleeding  Outcome: Ongoing, Progressing     Problem: Glycemic Control Impaired (Sepsis/Septic Shock)  Goal: Blood Glucose Level Within Desired Range  Outcome: Ongoing, Progressing     Problem: Infection Progression (Sepsis/Septic Shock)  Goal: Absence of Infection Signs and Symptoms  Outcome: Ongoing, Progressing     Problem: Nutrition Impaired (Sepsis/Septic Shock)  Goal: Optimal Nutrition Intake  Outcome: Ongoing, Progressing

## 2023-11-22 NOTE — PROVATION PATIENT INSTRUCTIONS
Discharge Summary/Instructions after an Endoscopic Procedure  Patient Name: Lionel León  Patient MRN: [id number]  Patient YOB: 1980 Wednesday, November 22, 2023  Blayne Mujica MD  Dear patient,  As a result of recent federal legislation (The Federal Cures Act), you may   receive lab or pathology results from your procedure in your [facility]   account before your physician is able to contact you. Your physician or   their representative will relay the results to you with their   recommendations at their soonest availability.  Thank you,  RESTRICTIONS:  During your procedure today, you received medications for sedation.  These   medications may affect your judgment, balance and coordination.  Therefore,   for 24 hours, you have the following restrictions:   - DO NOT drive a car, operate machinery, make legal/financial decisions,   sign important papers or drink alcohol.    ACTIVITY:  Today: no heavy lifting, straining or running due to procedural   sedation/anesthesia.  The following day: return to full activity including work.  DIET:  Eat and drink normally unless instructed otherwise.     TREATMENT FOR COMMON SIDE EFFECTS:  - Mild abdominal pain, nausea, belching, bloating or excessive gas:  rest,   eat lightly and use a heating pad.  - Sore Throat: treat with throat lozenges and/or gargle with warm salt   water.  - Because air was used during the procedure, expelling large amounts of air   from your rectum or belching is normal.  - If a bowel prep was taken, you may not have a bowel movement for 1-3 days.    This is normal.  SYMPTOMS TO WATCH FOR AND REPORT TO YOUR PHYSICIAN:  1. Abdominal pain or bloating, other than gas cramps.  2. Chest pain.  3. Back pain.  4. Signs of infection such as: chills or fever occurring within 24 hours   after the procedure.  5. Rectal bleeding, which would show as bright red, maroon, or black stools.   (A tablespoon of blood from the rectum is not serious,  especially if   hemorrhoids are present.)  6. Vomiting.  7. Weakness or dizziness.  GO DIRECTLY TO THE NEAREST EMERGENCY ROOM IF YOU HAVE ANY OF THE FOLLOWING:      Difficulty breathing              Chills and/or fever over 101 F   Persistent vomiting and/or vomiting blood   Severe abdominal pain   Severe chest pain   Black, tarry stools   Bleeding- more than one tablespoon   Any other symptom or condition that you feel may need urgent attention  Your doctor recommends these additional instructions:  If any biopsies were taken, your doctors clinic will contact you in 1 to 2   weeks with any results.  - Return patient to [facility] for ongoing care.   - Resume previous diet.   - Diflucan (fluconazole) 100 mg PO daily for 2 weeks.   - Await pathology results.  - will sign off now, follow up gi clinic 2 weeks post dc from hospital  For questions, problems or results please call your physician - Blayne Mujica MD at Work:  [phone].  [facility], EMERGENCY ROOM PHONE NUMBER: [phone]  IF A COMPLICATION OR EMERGENCY SITUATION ARISES AND YOU ARE UNABLE TO REACH   YOUR PHYSICIAN - GO DIRECTLY TO THE EMERGENCY ROOM.  MD Blayne Alonso MD  11/22/2023 7:48:43 AM  This report has been verified and signed electronically.  Dear patient,  As a result of recent federal legislation (The Federal Cures Act), you may   receive lab or pathology results from your procedure in your [facility]   account before your physician is able to contact you. Your physician or   their representative will relay the results to you with their   recommendations at their soonest availability.  Thank you,  PROVATION

## 2023-11-22 NOTE — ANESTHESIA POSTPROCEDURE EVALUATION
Anesthesia Post Evaluation    Patient: Beau Mau    Procedure(s) Performed: Procedure(s) (LRB):  EGD, WITH CLOSED BIOPSY (N/A)    Final Anesthesia Type: general      Patient location during evaluation: PACU  Patient participation: Yes- Able to Participate  Level of consciousness: awake and alert  Post-procedure vital signs: reviewed and stable  Pain management: adequate  Airway patency: patent    PONV status at discharge: No PONV  Anesthetic complications: no      Cardiovascular status: hemodynamically stable  Respiratory status: unassisted  Hydration status: euvolemic  Follow-up not needed.          Vitals Value Taken Time   BP 93/66 11/22/23 0745   Temp  11/22/23 0752   Pulse 84 11/22/23 0745   Resp 16 11/22/23 0745   SpO2 99 % 11/22/23 0745         No case tracking events are documented in the log.      Pain/Leonard Score: Pain Rating Prior to Med Admin: 5 (11/21/2023 12:02 PM)  Leonard Score: 9 (11/22/2023  7:50 AM)

## 2023-11-22 NOTE — PROGRESS NOTES
"   Acute Care Surgery   Progress Note  Admit Date: 11/6/2023  HD#16  POD#Day of Surgery    Subjective:   Interval history:  AF, VSS. Pain much more controlled this morning.   Tolerated hyperbaric therapy yesterday.       Home Meds:  Current Outpatient Medications   Medication Instructions    dicyclomine (BENTYL) 20 mg, Oral, 4 times daily PRN    predniSONE (DELTASONE) 10 MG tablet Take 4 tablets (40 mg total) by mouth once daily for 7 days, THEN 3 tablets (30 mg total) once daily for 7 days, THEN 2 tablets (20 mg total) once daily for 7 days, THEN 1 tablet (10 mg total) once daily for 7 days, THEN 0.5 tablets (5 mg total) once daily for 7 days.      Scheduled Meds:   fluconazole  100 mg Oral Daily    LIDOcaine (PF) 20 mg/mL (2%)        multivitamin  1 tablet Oral Daily    piperacillin-tazobactam (Zosyn) IV (PEDS and ADULTS) (extended infusion is not appropriate)  4.5 g Intravenous Q8H    propofol        senna-docusate 8.6-50 mg  2 tablet Oral BID    vancomycin (VANCOCIN) IV (PEDS and ADULTS)  1,250 mg Intravenous Q12H     Continuous Infusions:   electrolyte-A 50 mL/hr at 11/22/23 0629    lactated ringers 50 mL/hr at 11/21/23 0926     PRN Meds:acetaminophen, acetaminophen, aluminum-magnesium hydroxide-simethicone, cyclobenzaprine, dextrose 10%, dextrose 10%, dicyclomine, glucagon (human recombinant), glucose, glucose, lactated ringers, LIDOcaine (PF) 20 mg/mL (2%), melatonin, morphine, ondansetron, oxyCODONE, propofol, sodium chloride 0.9%, vancomycin - pharmacy to dose     Objective:     VITAL SIGNS: 24 HR MIN & MAX LAST   Temp  Min: 97.7 °F (36.5 °C)  Max: 98.3 °F (36.8 °C)  97.7 °F (36.5 °C)   BP  Min: 91/51  Max: 112/64  104/60    Pulse  Min: 79  Max: 96  79    Resp  Min: 15  Max: 22  20    SpO2  Min: 94 %  Max: 99 %  98 %      HT: 5' 10" (177.8 cm)  WT: 74.8 kg (165 lb)  BMI: 23.7     Intake/output:  Intake/Output - Last 3 Shifts         11/20 0700  11/21 0659 11/21 0700 11/22 0659 11/22 0700 11/23 0659    " "P.O.  930     I.V. (mL/kg) 906 (12.1)      Blood  324.2     IV Piggyback 2100.1  150    Total Intake(mL/kg) 3006.1 (40.2) 1254.2 (16.8) 150 (2)    Urine (mL/kg/hr) 2400 (1.3) 1300 (0.7)     Drains 1750      Stool 300 2225     Total Output 4450 3525     Net -1443.9 -2270.8 +150                   Intake/Output Summary (Last 24 hours) at 11/22/2023 1055  Last data filed at 11/22/2023 0743  Gross per 24 hour   Intake 1080 ml   Output 3400 ml   Net -2320 ml             Lines/drains/airway:       Peripheral IV - Single Lumen 18 G Right Hand (Active)   Site Assessment Clean;Dry;Intact 11/12/23 0000   Extremity Assessment Distal to IV No abnormal discoloration;No redness;No swelling 11/11/23 2010   Line Status Saline locked 11/12/23 0000   Dressing Status Clean;Dry;Intact 11/12/23 0000   Dressing Intervention Integrity maintained 11/11/23 2010   Number of days:             Rectal Tube 11/11/23 1944 fecal management system (Active)   Stool Color Brown;Red 11/11/23 2100   Number of days: 0            Urethral Catheter 11/11/23 1954 Double-lumen;Silicone;Non-latex 16 Fr. (Active)   Site Assessment Clean;Intact 11/11/23 2100   Collection Container Urimeter 11/11/23 2100   Securement Method secured to top of thigh w/ adhesive device 11/11/23 2100   Catheter Care Performed yes 11/11/23 2100   Reason for Continuing Urinary Catheterization Post operative 11/11/23 2100   CAUTI Prevention Bundle Securement Device in place with 1" slack;Intact seal between catheter & drainage tubing;Drainage bag/urimeter off the floor;Sheeting clip in use;No dependent loops or kinks;Drainage bag/urimeter not overfilled (<2/3 full);Drainage bag/urimeter below bladder 11/11/23 2100   Output (mL) 700 mL 11/12/23 0548   Number of days: 0       Physical examination:  Gen: NAD, AAOx3, answering questions appropriately  HEENT: PERRLA  CV: RR  Resp: NWOB  Abd: Soft, non-distended. Ostomy to LLQ; mucosa is healthy. Air and stool in bag. Abdomen appropriately " "tender around midline and ostomy.   Perineum:  Large wounds to bilateral buttocks dressing is in place - deferred taking down dressings due to OR plans this morning   Ext: moving all extremities spontaneously and purposefully      Labs:  Renal:  Recent Labs     11/20/23 0250 11/21/23 0443 11/21/23 2359 11/22/23  0842   BUN 22.8* 15.6 13.1 12.6   CREATININE 1.04 0.73 0.84 0.82       Recent Labs     11/20/23 0250 11/20/23  0521   LACTIC 4.2* 1.1       FENGI:  Recent Labs     11/20/23 0250 11/21/23 0443 11/21/23 2359 11/22/23  0842   * 135* 134* 135*   K 4.1 3.7 3.5 3.6   CO2 24 31* 28 26   CALCIUM 7.5* 7.2* 7.4* 7.4*   MG 1.70 2.00  --  2.10   PHOS  --  2.6  --  2.6   ALBUMIN 1.3* 1.9* 1.8* 1.9*   BILITOT 1.2 0.7 0.8 0.6   AST 28 21 19 21   ALKPHOS 114 85 80 76   ALT 46 33 29 29       Heme:  Recent Labs     11/20/23 0250 11/21/23 0443 11/21/23 2359 11/22/23  0842   HGB 8.0* 6.9* 7.6* 7.5*   HCT 25.0* 22.2* 23.5* 23.1*    281 270 305       ID:  Recent Labs     11/20/23 0250 11/21/23 0443 11/21/23 2359 11/22/23  0842   WBC 48.35  48.52* 8.16 8.91 9.53       CBG:  Recent Labs     11/20/23 0250 11/21/23 0443 11/21/23 2359 11/22/23  0842   GLUCOSE 130* 95 108* 100        Cardiovascular:  Recent Labs   Lab 11/20/23 0250   TROPONINI <0.010       ABG:  No results for input(s): "PH", "PO2", "PCO2", "HCO3", "BE" in the last 168 hours.   I have reviewed all pertinent lab results within the past 24 hours.    Imaging:  XR Gastric tube check, non-radiologist performed   Final Result      As above.         Electronically signed by: Joseph Carvajal   Date:    11/20/2023   Time:    06:15      CT Abdomen Pelvis With IV Contrast   Final Result   Impression:      1. There is extensive pneumoperitoneum. This is new since the prior examination and consistent with recent surgery. There is mild ascites. This is new since the prior examination. Correlate clinically and recommend continued serial interval " follow-up to full resolution to confirm postoperative nature.      2. There is interval near-total colectomy with ileostomy in the right ventral mid abdominal wall. There are numerous moderately distended small bowel loops in the mid and lower abdomen which demonstrate air-fluid levels. Surrounding mesenteric fat stranding is noted. There is apparent transition at the level of the distal ileum. These findings are new since the prior examination and are suggestive of small bowel obstruction versus severe adynamic ileus. Correlate clinically as regards further evaluation and followup.      3. There is interval development of a defect / wound in the cutaneous / subcutaneous soft tissues of the gluteal cleft and the perineal region with soft tissue emphysema consistent with recent debridement.      4. Details and other findings as discussed above.      No significant discrepancy with overnight report         Electronically signed by: Chip Duran   Date:    11/19/2023   Time:    10:33      X-Ray Chest 1 View   Final Result      Suspect some bibasilar atelectasis.         Electronically signed by: Germain Cedeno   Date:    11/18/2023   Time:    17:19      CT Abdomen Pelvis W Wo Contrast   Final Result      X-Ray Chest PA And Lateral   Final Result      No acute abnormality.         Electronically signed by: Quinton Hernandez MD   Date:    11/11/2023   Time:    08:49      CT Abdomen Pelvis W Wo Contrast   Final Result      Findings seen consistent with colitis involving the ascending colon and portions of the descending colon.  Follow-up is recommended to resolution as underlying mass cannot be completely excluded.      The gastric wall appears to be slightly thickened.  Underlying gastritis should be excluded.         Electronically signed by: John Gale   Date:    11/06/2023   Time:    12:02         I have reviewed all pertinent imaging results/findings within the past 24 hours.    Micro/Path/Other:  Microbiology  Results (last 7 days)       Procedure Component Value Units Date/Time    Wound Culture [[id number]] Collected: 11/20/23 1414    Order Status: Completed Specimen: Wound from Scrotum Updated: 11/22/23 0836     Wound Culture No Growth At 48 Hours    Anaerobic Culture [[id number]] Collected: 11/20/23 1414    Order Status: Completed Specimen: Wound from Scrotum Updated: 11/22/23 0730     Anaerobe Culture No Anaerobes Isolated    Blood Culture [[id number]]  (Normal) Collected: 11/18/23 1706    Order Status: Completed Specimen: Blood Updated: 11/21/23 1800     CULTURE, BLOOD ([facility]) No Growth At 72 Hours    Blood Culture [[id number]]  (Normal) Collected: 11/18/23 1706    Order Status: Completed Specimen: Blood Updated: 11/21/23 1800     CULTURE, BLOOD ([facility]) No Growth At 72 Hours    Gram Stain [[id number]] Collected: 11/20/23 1414    Order Status: Completed Specimen: Wound from Scrotum Updated: 11/21/23 0819     GRAM STAIN Few WBC observed      No bacteria seen    Fungal Culture [[id number]] Collected: 11/20/23 1414    Order Status: Sent Specimen: Wound from Scrotum Updated: 11/20/23 1519    Wound Culture [[id number]]  (Abnormal)  (Susceptibility) Collected: 11/11/23 1851    Order Status: Completed Specimen: Abscess from Rectum Updated: 11/17/23 1513     Wound Culture Many Escherichia coli      Many Streptococcus constellatus    Blood Culture [[id number]]  (Normal) Collected: 11/10/23 2048    Order Status: Completed Specimen: Blood from Antecubital, Left Updated: 11/15/23 2301     CULTURE, BLOOD ([facility]) No Growth at 5 days           Specimen (168h ago, onward)       Start     Ordered    11/22/23 0740  Specimen to Pathology  RELEASE UPON ORDERING        References:    Click here for ordering Quick Tip   Question:  Release to patient  Answer:  Immediate    11/22/23 0740    11/20/23 1432  Specimen to Pathology  RELEASE UPON ORDERING        References:    Click here for ordering Quick Tip   Question:  Release to patient  Answer:   Immediate    11/20/23 1432    11/15/23 1503  Specimen to Pathology  RELEASE UPON ORDERING        References:    Click here for ordering Quick Tip   Question:  Release to patient  Answer:  Immediate    11/15/23 0036                     Assessment & Plan:   42-year-old male with a past medical history of ulcerative colitis recently diagnosed in October who presented to the hospital with necrotizing fasciitis of his perineum status post wide excisional debridement was peritoneum on 11/11 and on 11/12, s/p laparoscopic total colectomy with end ileostomy placement 11/15. Now s/p wound debridement as well as diagnostic laparoscopy 11/20      - continue local wound care with hyperbarics therapy   - change dressings at bedside 1-2 times a day  - continue IV vanc, zosyn per primary  - please call with questions or concerns       Michelle Wang MD  [employer] General Surgery, PGY-2

## 2023-11-22 NOTE — PROGRESS NOTES
Pharmacokinetic Assessment Follow Up: IV Vancomycin    Vancomycin serum concentration assessment(s):  The trough level was drawn correctly and can be used to guide therapy at this time. The measurement is within the desired definitive target range of 15 to 20 mcg/mL.    Vancomycin Regimen Plan:  Continue regimen to Vancomycin 1250 mg IV every 12 hours with next serum trough concentration measured at 1400 on 11/24    Scheduled Administration Times  0300  1500      Drug levels (last 3 results):  Recent Labs   Lab Result Units 11/20/23  1200 11/22/23  1331   Vancomycin Trough ug/ml 14.5* 15.8       Vancomycin Administrations:  vancomycin given in the last 96 hours                     vancomycin 1.25 g in dextrose 5% 250 mL IVPB (ready to mix) (mg) 1,250 mg New Bag 11/22/23 0324     1,250 mg New Bag 11/21/23 1512     1,250 mg New Bag  0058     1,250 mg Bolus 11/20/23 1331     1,250 mg New Bag  0135     1,250 mg New Bag 11/19/23 1230    vancomycin 2 g in dextrose 5 % 500 mL IVPB (mg) 2,000 mg New Bag 11/19/23 0146                    Pharmacy will continue to follow and monitor vancomycin.    Please contact pharmacy at extension [phone] for questions regarding this assessment.    Thank you for the consult,   Maria Del Carmen Finnegan, ElaineD       Patient brief summary:  Lionel León is a 43 y.o. male initiated on antimicrobial therapy with IV Vancomycin for treatment of skin & soft tissue infection    The patient's current regimen is 1250 mg IVPB Q12H    Drug Allergies:   Review of patient's allergies indicates:  No Known Allergies    Actual Body Weight:   74.8 kg    Renal Function:   Estimated Creatinine Clearance: 119.9 mL/min (based on SCr of 0.82 mg/dL).,     Dialysis Method (if applicable):  N/A    CBC (last 72 hours):  Recent Labs   Lab Result Units 11/20/23  0250 11/21/23  0443 11/21/23  2359 11/22/23  0842   WBC x10(3)/mcL 48.35  48.52* 8.16 8.91 9.53   Hgb g/dL 8.0* 6.9* 7.6* 7.5*   Hct % 25.0* 22.2* 23.5* 23.1*   Platelet  x10(3)/mcL 356 281 270 305   Mono % %  --  4.9 5.2 5.1   Monocytes % % 1  --   --   --    Eos % %  --  0.1 0.2 0.1   Basophil % %  --  0.2 0.1 0.1       Metabolic Panel (last 72 hours):  Recent Labs   Lab Result Units 11/20/23  0250 11/21/23  0443 11/21/23  2359 11/22/23  0842   Sodium Level mmol/L 132* 135* 134* 135*   Potassium Level mmol/L 4.1 3.7 3.5 3.6   Chloride mmol/L 94* 95* 97* 99   Carbon Dioxide mmol/L 24 31* 28 26   Glucose Level mg/dL 130* 95 108* 100   Blood Urea Nitrogen mg/dL 22.8* 15.6 13.1 12.6   Creatinine mg/dL 1.04 0.73 0.84 0.82   Albumin Level g/dL 1.3* 1.9* 1.8* 1.9*   Bilirubin Total mg/dL 1.2 0.7 0.8 0.6   Alkaline Phosphatase unit/L 114 85 80 76   Aspartate Aminotransferase unit/L 28 21 19 21   Alanine Aminotransferase unit/L 46 33 29 29   Magnesium Level mg/dL 1.70 2.00  --  2.10   Phosphorus Level mg/dL  --  2.6  --  2.6       Microbiologic Results:  Microbiology Results (last 7 days)       Procedure Component Value Units Date/Time    Wound Culture [[id number]] Collected: 11/20/23 1414    Order Status: Completed Specimen: Wound from Scrotum Updated: 11/22/23 0836     Wound Culture No Growth At 48 Hours    Anaerobic Culture [[id number]] Collected: 11/20/23 1414    Order Status: Completed Specimen: Wound from Scrotum Updated: 11/22/23 0730     Anaerobe Culture No Anaerobes Isolated    Blood Culture [[id number]]  (Normal) Collected: 11/18/23 1706    Order Status: Completed Specimen: Blood Updated: 11/21/23 1800     CULTURE, BLOOD ([facility]) No Growth At 72 Hours    Blood Culture [[id number]]  (Normal) Collected: 11/18/23 1706    Order Status: Completed Specimen: Blood Updated: 11/21/23 1800     CULTURE, BLOOD ([facility]) No Growth At 72 Hours    Gram Stain [[id number]] Collected: 11/20/23 1414    Order Status: Completed Specimen: Wound from Scrotum Updated: 11/21/23 0819     GRAM STAIN Few WBC observed      No bacteria seen    Fungal Culture [[id number]] Collected: 11/20/23 1414    Order Status: Sent  Specimen: Wound from Scrotum Updated: 11/20/23 1519    Wound Culture [[id number]]  (Abnormal)  (Susceptibility) Collected: 11/11/23 1851    Order Status: Completed Specimen: Abscess from Rectum Updated: 11/17/23 1513     Wound Culture Many Escherichia coli      Many Streptococcus constellatus    Blood Culture [[id number]]  (Normal) Collected: 11/10/23 2048    Order Status: Completed Specimen: Blood from Antecubital, Left Updated: 11/15/23 2301     CULTURE, BLOOD ([facility]) No Growth at 5 days

## 2023-11-22 NOTE — PROGRESS NOTES
PRS     Reviewed wound pictures from 11/21/23. Scrotum/perineum/buttock wound appears clean with some early granulation tissue. Wound is not ready for reconstruction, continue wound care. Will continue to follow and discuss options with patient. Spoke to patients wife Lul over the phone bedside to update after review of pictures.

## 2023-11-22 NOTE — PROGRESS NOTES
Colon & Rectal Surgery Progress Note    Subjective:  Standing up eating full liquids  Pain well controlled  Denies N/V  Significant ileostomy output  No fevers  Good UOP    Objective:  Temp:  [97.7 °F (36.5 °C)-98.5 °F (36.9 °C)]   Pulse:  [79-96]   Resp:  [15-22]   BP: ()/(37-66)   SpO2:  [94 %-99 %]     Physical Exam:  NAD  Regular rate and rhythm  Non-labored respirations  Abdomen soft, appropriate   SCDs in place      Intake/Output Summary (Last 24 hours) at 11/22/2023 1200  Last data filed at 11/22/2023 0801  Gross per 24 hour   Intake 1080 ml   Output 3850 ml   Net -2770 ml       Recent Labs     11/22/23  0842   WBC 9.53   HGB 7.5*   HCT 23.1*      *   K 3.6   CO2 26   BUN 12.6   CREATININE 0.82   BILITOT 0.6   AST 21   ALT 29   ALKPHOS 76   CALCIUM 7.4*   ALBUMIN 1.9*   MG 2.10   PHOS 2.6       Assessment/Plan  - d/c Devi  - continue full liquids  - ambulate  - continue IV antibiotics and wound care      Fredi Shields MD  Colon and Rectal Surgery

## 2023-11-23 LAB
BACTERIA BLD CULT: NORMAL
BACTERIA BLD CULT: NORMAL
BACTERIA SPEC ANAEROBE CULT: NORMAL
BACTERIA WND CULT: NORMAL

## 2023-11-23 PROCEDURE — 25000003 PHARM REV CODE 250: Performed by: STUDENT IN AN ORGANIZED HEALTH CARE EDUCATION/TRAINING PROGRAM

## 2023-11-23 PROCEDURE — 25000003 PHARM REV CODE 250: Performed by: INTERNAL MEDICINE

## 2023-11-23 PROCEDURE — 11000001 HC ACUTE MED/SURG PRIVATE ROOM

## 2023-11-23 PROCEDURE — 63600175 PHARM REV CODE 636 W HCPCS: Performed by: STUDENT IN AN ORGANIZED HEALTH CARE EDUCATION/TRAINING PROGRAM

## 2023-11-23 PROCEDURE — 25000003 PHARM REV CODE 250: Performed by: NURSE PRACTITIONER

## 2023-11-23 PROCEDURE — 63700000 PHARM REV CODE 250 ALT 637 W/O HCPCS

## 2023-11-23 PROCEDURE — 63600175 PHARM REV CODE 636 W HCPCS: Performed by: NURSE PRACTITIONER

## 2023-11-23 PROCEDURE — 63600175 PHARM REV CODE 636 W HCPCS: Performed by: INTERNAL MEDICINE

## 2023-11-23 RX ADMIN — VANCOMYCIN HYDROCHLORIDE 1250 MG: 1.25 INJECTION, POWDER, LYOPHILIZED, FOR SOLUTION INTRAVENOUS at 04:11

## 2023-11-23 RX ADMIN — PIPERACILLIN SODIUM AND TAZOBACTAM SODIUM 4.5 G: 4; .5 INJECTION, POWDER, FOR SOLUTION INTRAVENOUS at 12:11

## 2023-11-23 RX ADMIN — PIPERACILLIN SODIUM AND TAZOBACTAM SODIUM 4.5 G: 4; .5 INJECTION, POWDER, FOR SOLUTION INTRAVENOUS at 09:11

## 2023-11-23 RX ADMIN — SODIUM CHLORIDE, POTASSIUM CHLORIDE, SODIUM LACTATE AND CALCIUM CHLORIDE: 600; 310; 30; 20 INJECTION, SOLUTION INTRAVENOUS at 09:11

## 2023-11-23 RX ADMIN — PIPERACILLIN SODIUM AND TAZOBACTAM SODIUM 4.5 G: 4; .5 INJECTION, POWDER, FOR SOLUTION INTRAVENOUS at 05:11

## 2023-11-23 RX ADMIN — VANCOMYCIN HYDROCHLORIDE 1250 MG: 1.25 INJECTION, POWDER, LYOPHILIZED, FOR SOLUTION INTRAVENOUS at 03:11

## 2023-11-23 RX ADMIN — FLUCONAZOLE 100 MG: 100 TABLET ORAL at 09:11

## 2023-11-23 RX ADMIN — MULTIPLE VITAMINS W/ MINERALS TAB 1 TABLET: TAB at 09:11

## 2023-11-23 RX ADMIN — Medication 6 MG: at 08:11

## 2023-11-23 RX ADMIN — CYCLOBENZAPRINE HYDROCHLORIDE 5 MG: 5 TABLET, FILM COATED ORAL at 10:11

## 2023-11-23 NOTE — PROGRESS NOTES
Colon & Rectal Surgery Progress Note    Subjective:  No complaints  Reports feels better  Tolerating liquids  Ambulating the halls  Ileostomy working well  Urinating well    Objective:  Temp:  [98.3 °F (36.8 °C)-99.1 °F (37.3 °C)]   Pulse:  [83-88]   Resp:  [16]   BP: (100-104)/(61-64)   SpO2:  [96 %-99 %]     Physical Exam:  NAD  Regular rate and rhythm  Non-labored respirations  Abdomen soft, appropriate, ileostomy pink  SCDs in place      Intake/Output Summary (Last 24 hours) at 11/23/2023 0917  Last data filed at 11/23/2023 0535  Gross per 24 hour   Intake 5867.44 ml   Output 3350 ml   Net 2517.44 ml       Recent Labs     11/22/23  0842   WBC 9.53   HGB 7.5*   HCT 23.1*      *   K 3.6   CO2 26   BUN 12.6   CREATININE 0.82   BILITOT 0.6   AST 21   ALT 29   ALKPHOS 76   CALCIUM 7.4*   ALBUMIN 1.9*   MG 2.10   PHOS 2.6       Assessment/Plan  - full liquids  - continue IV antibiotics & wound care  - ambulate      Fredi Shields MD  Colon and Rectal Surgery

## 2023-11-23 NOTE — PROGRESS NOTES
[facility] - 8th Floor [facility] Surg  [facility] MEDICINE ~ PROGRESS NOTE        CHIEF COMPLAINT   Hospital follow up    HOSPITAL COURSE   Lionel León is a 42 y.o. White male with a past medical history of diverticulitis status post colon resection and ulcerative colitis. Patient had recent admission to hospital medicine services at [facility] on 10/21/2023 to 10/24/2023 for sepsis secondary to pancolitis. He was treated with antibiotics and GI was consulted. Colonoscopy was performed on 10/23/2023 with findings concerning for ulcerative colitis and biopsies of the cecum, ascending, transverse, ascending colon and rectum positive for active chronic colitis consistent with inflammatory bowel disorder. No transfusion was required for rectal bleeding. Patient was discharged with prednisone taper and GI follow up appointment on 11/17/2023. The patient presented to [facility] on 11/6/2023 with a primary complaint of bright red rectal bleeding and lower abdominal pain.  Patient reports rectal bleeding has been ongoing since discharge.  Other associated symptoms include nausea, rectal pain, subjective fevers, weakness, fatigue and a 30 40 lb weight loss over last 3 weeks.  Patient states this morning he was walking to the bathroom when he felt flushed and vision went black. He passed out and hit his head on the wooden floor. Approximately hour and a half later when he went to get up he passed out again.  Second syncopal episode was witnessed by patient's wife who is at bedside reports loss of consciousness lasts for approximately 30 seconds.    Upon presentation to the ED, temperature 98.3F, heart rate 109, blood pressure 96/51, respiratory rate 17, spO2 99%. Labs with H&H 7.3/23.6 (10.6/33.2 on 10/24/2023), BUN 21.9, creatinine 0.88, calcium 7.7, .6, ESR 86. EKG sinus tachycardia with a ventricular rate of 108 and age undetermined possible inferior infarct. In the ED patient received Dilaudid, Zofran, Hydrocortisone  and IVF. He was typed and crossmatched for transfusion of 2 units of packed RBC. Patient is admitted to hospital medicine services for further medical management.       Patient was seen by GI and started on IV steroids, Solu-Medrol 30 mg IV b.I.d. for active ulcerative colitis flare up  Plus Anusol suppository per rectum b.I.d..  Patient is status post 2 units PRBC for symptomatic anemia.  CT abdomen and pelvis was also ordered to assess extent. CT of the abdomen and pelvis was pertinent for findings consistent with colitis involving the ascending colon, portions of the descending colon.  Underlying gastritis also can not be excluded secondary to slightly thickened gastric wall.     May complain continues to be rectal pain but refers that suppositories are helping.  Patient is tolerating p.o. intake    According to GI notes plans is for patient to start Humira/imura once approved by patient's insurance.  November 11th he started reporting rectal pain with a bump, on exam he had cellulitis like changes.  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  Taken to the operating room same day by General surgery found to have necrotic tissue extending to the muscle encircling his anus.  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  With ongoing infection immunosuppression with steroids have been tapered off and no further plans for Humira.  Infectious Disease was also consulted and recommended continuing Zosyn.  Wound Care Clinic was also consulted and started on hyperbaric therapy.  Patient once again started having fevers and developed concern for worsening sepsis, was taken to the operating room by a surgery November 20th and underwent diagnostic laparoscopy which showed slightly murky serous fluid bilateral pericolic gutters but no succus or stools, also underwent debridement of the scrotum/perineum and found to have some pus  draining which was debrided.    Today  Feels good, good energy and states he has been offloading.  Good liquid output from the ostomy.        OBJECTIVE/PHYSICAL EXAM     VITAL SIGNS (MOST RECENT):  Temp: 98.9 °F (37.2 °C) (11/23/23 0732)  Pulse: 88 (11/23/23 0732)  Resp: 16 (11/22/23 1930)  BP: 102/61 (11/23/23 0732)  SpO2: 98 % (11/23/23 0732) VITAL SIGNS (24 HOUR RANGE):  Temp:  [98.3 °F (36.8 °C)-99.1 °F (37.3 °C)] 98.9 °F (37.2 °C)  Pulse:  [83-88] 88  Resp:  [16] 16  SpO2:  [96 %-99 %] 98 %  BP: (100-104)/(61-64) 102/61   GENERAL: In no acute distress, afebrile  HEENT:    CHEST: Clear to auscultation bilaterally  HEART: S1, S2, no appreciable murmur  ABDOMEN: Soft, appropriately tender postop, BS +, ileostomy with orange yellow liquid stool  MSK: Warm, no lower extremity edema, no clubbing or cyanosis  NEUROLOGIC: Alert and oriented x4, moving all extremities with good strength   INTEGUMENTARY:  Refer to images in the chart  PSYCHIATRY:        ASSESSMENT/PLAN   Severe ulcerative colitis acute flare-status post total colectomy and end ileostomy November 15  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  Continue vancomycin and Zosyn.  Follow cultures.  Fluconazole.  Colorectal surgery following.    Wound care clinic following.  Continue hyperbaric therapy.    GI signed off.  Off of immunosuppression.  Plastic surgery following.  Continue protein supplement, Jed and multivitamin.  GI signed off.  We will follow up on pathology.  Outpatient follow-up.  Discontinue IV fluids    DVT prophylaxis:  Ambulatory    Anticipated discharge and disposition:   __________________________________________________________________________    LABS/MICRO/MEDS/DIAGNOSTICS       LABS  Recent Labs     11/22/23  0842   *   K 3.6   CHLORIDE 99   CO2 26   BUN 12.6    CREATININE 0.82   GLUCOSE 100   CALCIUM 7.4*   ALKPHOS 76   AST 21   ALT 29   ALBUMIN 1.9*       Recent Labs     11/22/23  0842   WBC 9.53   RBC 2.74*   HCT 23.1*   MCV 84.3            MICROBIOLOGY  Microbiology Results (last 7 days)       Procedure Component Value Units Date/Time    Wound Culture [[id number]] Collected: 11/20/23 1414    Order Status: Completed Specimen: Wound from Scrotum Updated: 11/23/23 0758     Wound Culture Rare normal skin tyrel, no further workup.    Blood Culture [[id number]]  (Normal) Collected: 11/18/23 1706    Order Status: Completed Specimen: Blood Updated: 11/22/23 1800     CULTURE, BLOOD ([facility]) No Growth At 96 Hours    Blood Culture [[id number]]  (Normal) Collected: 11/18/23 1706    Order Status: Completed Specimen: Blood Updated: 11/22/23 1800     CULTURE, BLOOD ([facility]) No Growth At 96 Hours    Anaerobic Culture [[id number]] Collected: 11/20/23 1414    Order Status: Completed Specimen: Wound from Scrotum Updated: 11/22/23 0730     Anaerobe Culture No Anaerobes Isolated    Gram Stain [[id number]] Collected: 11/20/23 1414    Order Status: Completed Specimen: Wound from Scrotum Updated: 11/21/23 0819     GRAM STAIN Few WBC observed      No bacteria seen    Fungal Culture [[id number]] Collected: 11/20/23 1414    Order Status: Sent Specimen: Wound from Scrotum Updated: 11/20/23 1519    Wound Culture [[id number]]  (Abnormal)  (Susceptibility) Collected: 11/11/23 1851    Order Status: Completed Specimen: Abscess from Rectum Updated: 11/17/23 1513     Wound Culture Many Escherichia coli      Many Streptococcus constellatus               MEDICATIONS   fluconazole  100 mg Oral Daily    multivitamin  1 tablet Oral Daily    piperacillin-tazobactam (Zosyn) IV (PEDS and ADULTS) (extended infusion is not appropriate)  4.5 g Intravenous Q8H    vancomycin (VANCOCIN) IV (PEDS and ADULTS)  1,250 mg Intravenous Q12H         INFUSIONS   electrolyte-A 50 mL/hr at 11/22/23 0629    lactated ringers 50  mL/hr at 11/22/23 1825          DIAGNOSTIC TESTS  XR Gastric tube check, non-radiologist performed   Final Result      As above.         Electronically signed by: Joseph Carvajal   Date:    11/20/2023   Time:    06:15      CT Abdomen Pelvis With IV Contrast   Final Result   Impression:      1. There is extensive pneumoperitoneum. This is new since the prior examination and consistent with recent surgery. There is mild ascites. This is new since the prior examination. Correlate clinically and recommend continued serial interval follow-up to full resolution to confirm postoperative nature.      2. There is interval near-total colectomy with ileostomy in the right ventral mid abdominal wall. There are numerous moderately distended small bowel loops in the mid and lower abdomen which demonstrate air-fluid levels. Surrounding mesenteric fat stranding is noted. There is apparent transition at the level of the distal ileum. These findings are new since the prior examination and are suggestive of small bowel obstruction versus severe adynamic ileus. Correlate clinically as regards further evaluation and followup.      3. There is interval development of a defect / wound in the cutaneous / subcutaneous soft tissues of the gluteal cleft and the perineal region with soft tissue emphysema consistent with recent debridement.      4. Details and other findings as discussed above.      No significant discrepancy with overnight report         Electronically signed by: Chip Duran   Date:    11/19/2023   Time:    10:33      X-Ray Chest 1 View   Final Result      Suspect some bibasilar atelectasis.         Electronically signed by: Germain Cedeno   Date:    11/18/2023   Time:    17:19      CT Abdomen Pelvis W Wo Contrast   Final Result      X-Ray Chest PA And Lateral   Final Result      No acute abnormality.         Electronically signed by: Quinton Hernandez MD   Date:    11/11/2023   Time:    08:49      CT Abdomen Pelvis W Wo Contrast  "  Final Result      Findings seen consistent with colitis involving the ascending colon and portions of the descending colon.  Follow-up is recommended to resolution as underlying mass cannot be completely excluded.      The gastric wall appears to be slightly thickened.  Underlying gastritis should be excluded.         Electronically signed by: John Gale   Date:    11/06/2023   Time:    12:02           No results found for: "EF"       NUTRITION STATUS  Patient meets ASPEN criteria for moderate malnutrition of acute illness or injury per RD assessment as evidenced by:  Energy Intake (Malnutrition): less than 75% for greater than 7 days  Weight Loss (Malnutrition): greater than 2% in 1 week  Subcutaneous Fat (Malnutrition): moderate depletion  Muscle Mass (Malnutrition): moderate depletion  Fluid Accumulation (Malnutrition): other (see comments) (does not meet criteria)  Hand  Strength, Left (Malnutrition): unable to evaluate  Hand  Strength, Right (Malnutrition): unable to evaluate  A minimum of two characteristics is recommended for diagnosis of either severe or non-severe malnutrition.       Case related differential diagnoses have been reviewed; assessment and plan has been documented. I have personally reviewed the labs and test results that are currently available; I have reviewed the patients medication list. I have reviewed the consulting providers recommendations. I have reviewed or attempted to review medical records based upon their availability.  All of the patient's and/or family's questions have been addressed and answered to the best of my ability.  I will continue to monitor closely and make adjustments to medical management as needed.  This document was created using [employer] Fluency Direct.  Transcription errors may have been made.  Please contact me if any questions may rise regarding documentation to clarify transcription.        Abdoulaye Garcia MD   Internal Medicine  Department of " [facility] Medicine  [facility] - 8th Floor Med Surg

## 2023-11-24 LAB
ABO + RH BLD: NORMAL
ABO + RH BLD: NORMAL
ALBUMIN SERPL-MCNC: 2.1 G/DL (ref 3.5–5)
ALBUMIN/GLOB SERPL: 0.7 RATIO (ref 1.1–2)
ALP SERPL-CCNC: 85 UNIT/L (ref 40–150)
ALT SERPL-CCNC: 29 UNIT/L (ref 0–55)
APPEARANCE UR: CLEAR
AST SERPL-CCNC: 18 UNIT/L (ref 5–34)
BACTERIA #/AREA URNS AUTO: ABNORMAL /HPF
BASOPHILS # BLD AUTO: 0.03 X10(3)/MCL
BASOPHILS NFR BLD AUTO: 0.3 %
BILIRUB SERPL-MCNC: 0.5 MG/DL
BILIRUB UR QL STRIP.AUTO: NEGATIVE
BLD PROD TYP BPU: NORMAL
BLD PROD TYP BPU: NORMAL
BLOOD UNIT EXPIRATION DATE: NORMAL
BLOOD UNIT EXPIRATION DATE: NORMAL
BLOOD UNIT TYPE CODE: 5100
BLOOD UNIT TYPE CODE: 5100
BUN SERPL-MCNC: 15.9 MG/DL (ref 8.9–20.6)
CALCIUM SERPL-MCNC: 7.7 MG/DL (ref 8.4–10.2)
CHLORIDE SERPL-SCNC: 95 MMOL/L (ref 98–107)
CO2 SERPL-SCNC: 28 MMOL/L (ref 22–29)
COLOR UR AUTO: ABNORMAL
CREAT SERPL-MCNC: 1.5 MG/DL (ref 0.73–1.18)
CREAT UR-MCNC: 88.8 MG/DL (ref 63–166)
CROSSMATCH INTERPRETATION: NORMAL
CROSSMATCH INTERPRETATION: NORMAL
DISPENSE STATUS: NORMAL
DISPENSE STATUS: NORMAL
EOSINOPHIL # BLD AUTO: 0.03 X10(3)/MCL (ref 0–0.9)
EOSINOPHIL NFR BLD AUTO: 0.3 %
EOSINOPHIL SPEC QL WRIGHT STN: NORMAL
ERYTHROCYTE [DISTWIDTH] IN BLOOD BY AUTOMATED COUNT: 15.7 % (ref 11.5–17)
GFR SERPLBLD CREATININE-BSD FMLA CKD-EPI: 59 MLS/MIN/1.73/M2
GLOBULIN SER-MCNC: 3.1 GM/DL (ref 2.4–3.5)
GLUCOSE SERPL-MCNC: 124 MG/DL (ref 74–100)
GLUCOSE UR QL STRIP.AUTO: NORMAL
HCT VFR BLD AUTO: 28.2 % (ref 42–52)
HGB BLD-MCNC: 9 G/DL (ref 14–18)
IMM GRANULOCYTES # BLD AUTO: 0.1 X10(3)/MCL (ref 0–0.04)
IMM GRANULOCYTES NFR BLD AUTO: 1.1 %
KETONES UR QL STRIP.AUTO: NEGATIVE
LEUKOCYTE ESTERASE UR QL STRIP.AUTO: NEGATIVE
LYMPHOCYTES # BLD AUTO: 0.59 X10(3)/MCL (ref 0.6–4.6)
LYMPHOCYTES NFR BLD AUTO: 6.3 %
MCH RBC QN AUTO: 26.9 PG (ref 27–31)
MCHC RBC AUTO-ENTMCNC: 31.9 G/DL (ref 33–36)
MCV RBC AUTO: 84.4 FL (ref 80–94)
MONOCYTES # BLD AUTO: 0.52 X10(3)/MCL (ref 0.1–1.3)
MONOCYTES NFR BLD AUTO: 5.5 %
MUCOUS THREADS URNS QL MICRO: ABNORMAL /LPF
NEUTROPHILS # BLD AUTO: 8.15 X10(3)/MCL (ref 2.1–9.2)
NEUTROPHILS NFR BLD AUTO: 86.5 %
NITRITE UR QL STRIP.AUTO: NEGATIVE
NRBC BLD AUTO-RTO: 0 %
PH UR STRIP.AUTO: 5.5 [PH]
PLATELET # BLD AUTO: 369 X10(3)/MCL (ref 130–400)
PMV BLD AUTO: 9.7 FL (ref 7.4–10.4)
POTASSIUM SERPL-SCNC: 3.1 MMOL/L (ref 3.5–5.1)
PROT SERPL-MCNC: 5.2 GM/DL (ref 6.4–8.3)
PROT UR QL STRIP.AUTO: ABNORMAL
PROT UR STRIP-MCNC: 48.3 MG/DL
RBC # BLD AUTO: 3.34 X10(6)/MCL (ref 4.7–6.1)
RBC #/AREA URNS AUTO: ABNORMAL /HPF
RBC UR QL AUTO: ABNORMAL
SODIUM SERPL-SCNC: 132 MMOL/L (ref 136–145)
SODIUM UR-SCNC: <20 MMOL/L
SP GR UR STRIP.AUTO: 1.01 (ref 1–1.03)
SQUAMOUS #/AREA URNS LPF: ABNORMAL /HPF
UNIT NUMBER: NORMAL
UNIT NUMBER: NORMAL
URINE PROTEIN/CREATININE RATIO (OHS): 0.5
UROBILINOGEN UR STRIP-ACNC: NORMAL
VANCOMYCIN TROUGH SERPL-MCNC: 27 UG/ML (ref 15–20)
WBC # SPEC AUTO: 9.42 X10(3)/MCL (ref 4.5–11.5)
WBC #/AREA URNS AUTO: ABNORMAL /HPF

## 2023-11-24 PROCEDURE — 63600175 PHARM REV CODE 636 W HCPCS: Performed by: STUDENT IN AN ORGANIZED HEALTH CARE EDUCATION/TRAINING PROGRAM

## 2023-11-24 PROCEDURE — 63700000 PHARM REV CODE 250 ALT 637 W/O HCPCS

## 2023-11-24 PROCEDURE — 25000003 PHARM REV CODE 250: Performed by: COLON & RECTAL SURGERY

## 2023-11-24 PROCEDURE — 63600175 PHARM REV CODE 636 W HCPCS: Performed by: INTERNAL MEDICINE

## 2023-11-24 PROCEDURE — 25000003 PHARM REV CODE 250: Performed by: STUDENT IN AN ORGANIZED HEALTH CARE EDUCATION/TRAINING PROGRAM

## 2023-11-24 PROCEDURE — 84300 ASSAY OF URINE SODIUM: CPT | Performed by: INTERNAL MEDICINE

## 2023-11-24 PROCEDURE — 89050 BODY FLUID CELL COUNT: CPT | Performed by: INTERNAL MEDICINE

## 2023-11-24 PROCEDURE — 82570 ASSAY OF URINE CREATININE: CPT | Performed by: INTERNAL MEDICINE

## 2023-11-24 PROCEDURE — 25000003 PHARM REV CODE 250: Performed by: INTERNAL MEDICINE

## 2023-11-24 PROCEDURE — 85025 COMPLETE CBC W/AUTO DIFF WBC: CPT | Performed by: INTERNAL MEDICINE

## 2023-11-24 PROCEDURE — 11000001 HC ACUTE MED/SURG PRIVATE ROOM

## 2023-11-24 PROCEDURE — 80053 COMPREHEN METABOLIC PANEL: CPT | Performed by: INTERNAL MEDICINE

## 2023-11-24 PROCEDURE — 25000003 PHARM REV CODE 250: Performed by: NURSE PRACTITIONER

## 2023-11-24 PROCEDURE — 81001 URINALYSIS AUTO W/SCOPE: CPT | Performed by: INTERNAL MEDICINE

## 2023-11-24 PROCEDURE — 63600175 PHARM REV CODE 636 W HCPCS: Performed by: COLON & RECTAL SURGERY

## 2023-11-24 PROCEDURE — 80202 ASSAY OF VANCOMYCIN: CPT | Performed by: GENERAL PRACTICE

## 2023-11-24 RX ORDER — VANCOMYCIN HCL IN 5 % DEXTROSE 1G/250ML
1000 PLASTIC BAG, INJECTION (ML) INTRAVENOUS
Status: DISCONTINUED | OUTPATIENT
Start: 2023-11-24 | End: 2023-11-26

## 2023-11-24 RX ORDER — POTASSIUM CHLORIDE 20 MEQ/1
40 TABLET, EXTENDED RELEASE ORAL ONCE
Status: DISCONTINUED | OUTPATIENT
Start: 2023-11-24 | End: 2023-11-24

## 2023-11-24 RX ADMIN — Medication 6 MG: at 10:11

## 2023-11-24 RX ADMIN — CYCLOBENZAPRINE HYDROCHLORIDE 5 MG: 5 TABLET, FILM COATED ORAL at 10:11

## 2023-11-24 RX ADMIN — MULTIPLE VITAMINS W/ MINERALS TAB 1 TABLET: TAB at 09:11

## 2023-11-24 RX ADMIN — POTASSIUM CHLORIDE AND SODIUM CHLORIDE 75 ML/HR: 450; 150 INJECTION, SOLUTION INTRAVENOUS at 12:11

## 2023-11-24 RX ADMIN — FLUCONAZOLE 100 MG: 100 TABLET ORAL at 09:11

## 2023-11-24 RX ADMIN — VANCOMYCIN HYDROCHLORIDE 1250 MG: 1.25 INJECTION, POWDER, LYOPHILIZED, FOR SOLUTION INTRAVENOUS at 03:11

## 2023-11-24 RX ADMIN — PIPERACILLIN SODIUM AND TAZOBACTAM SODIUM 4.5 G: 4; .5 INJECTION, POWDER, FOR SOLUTION INTRAVENOUS at 04:11

## 2023-11-24 RX ADMIN — VANCOMYCIN HYDROCHLORIDE 1000 MG: 1 INJECTION, POWDER, LYOPHILIZED, FOR SOLUTION INTRAVENOUS at 04:11

## 2023-11-24 RX ADMIN — SODIUM CHLORIDE 500 ML: 9 INJECTION, SOLUTION INTRAVENOUS at 09:11

## 2023-11-24 RX ADMIN — PIPERACILLIN SODIUM AND TAZOBACTAM SODIUM 4.5 G: 4; .5 INJECTION, POWDER, FOR SOLUTION INTRAVENOUS at 12:11

## 2023-11-24 RX ADMIN — PIPERACILLIN SODIUM AND TAZOBACTAM SODIUM 4.5 G: 4; .5 INJECTION, POWDER, FOR SOLUTION INTRAVENOUS at 08:11

## 2023-11-24 NOTE — PROGRESS NOTES
Pharmacokinetic Assessment Follow Up: IV Vancomycin    Vancomycin serum concentration assessment(s):    The trough level was drawn correctly and can be used to guide therapy at this time. The measurement is above the desired definitive target range of 15 to 20 mcg/mL.    Vancomycin Regimen Plan:    Recent vancomycin trough came back supratherapeutic at 27 mcg/mL.   Change regimen to Vancomycin 1000 mg IV every 12 hours with next serum trough concentration measures on 11/26/23 at 0300 prior to 4th dose. Based on predicted kinetics parameters in this patient, the trough is anticipated to be around 19.5 mcg/mL.       Scheduled Administration Times    0400  1600    Drug levels (last 3 results):  Recent Labs   Lab Result Units 11/22/23  1331 11/24/23  1352   Vancomycin Trough ug/ml 15.8 27.0*       Vancomycin Administrations:  vancomycin given in the last 96 hours                     vancomycin 1.25 g in dextrose 5% 250 mL IVPB (ready to mix) (mg) 1,250 mg New Bag 11/24/23 0321     1,250 mg New Bag 11/23/23 1601     1,250 mg New Bag  0316      Restarted 11/22/23 1615     1,250 mg New Bag  1544     1,250 mg New Bag  0324     1,250 mg New Bag 11/21/23 1512     1,250 mg New Bag  0058                    Pharmacy will continue to follow and monitor vancomycin.    Please contact pharmacy at extension [phone] for questions regarding this assessment.    Thank you for the consult,   Naye Simmons       Patient brief summary:  Lionel León is a 43 y.o. male initiated on antimicrobial therapy with IV Vancomycin for treatment of skin & soft tissue infection    The patient's current regimen is vancomycin IV 1000 mg every 12 hours    Drug Allergies:   Review of patient's allergies indicates:  No Known Allergies    Actual Body Weight:   74.8 kg    Renal Function:   Estimated Creatinine Clearance: 65.6 mL/min (A) (based on SCr of 1.5 mg/dL (H)).,     Dialysis Method (if applicable):  N/A    CBC (last 72 hours):  Recent Labs   Lab Result  Units 11/21/23 2359 11/22/23  0842 11/24/23  0437   WBC x10(3)/mcL 8.91 9.53 9.42   Hgb g/dL 7.6* 7.5* 9.0*   Hct % 23.5* 23.1* 28.2*   Platelet x10(3)/mcL 270 305 369   Mono % % 5.2 5.1 5.5   Eos % % 0.2 0.1 0.3   Basophil % % 0.1 0.1 0.3       Metabolic Panel (last 72 hours):  Recent Labs   Lab Result Units 11/21/23 2359 11/22/23  0842 11/24/23  0438 11/24/23  0916   Sodium Level mmol/L 134* 135* 132*  --    Urine Sodium mmol/L  --   --   --  <20.0   Potassium Level mmol/L 3.5 3.6 3.1*  --    Chloride mmol/L 97* 99 95*  --    Carbon Dioxide mmol/L 28 26 28  --    Glucose Level mg/dL 108* 100 124*  --    Glucose, UA   --   --   --  Normal   Blood Urea Nitrogen mg/dL 13.1 12.6 15.9  --    Creatinine mg/dL 0.84 0.82 1.50*  --    Urine Creatinine mg/dL  --   --   --  88.8   Albumin Level g/dL 1.8* 1.9* 2.1*  --    Bilirubin Total mg/dL 0.8 0.6 0.5  --    Alkaline Phosphatase unit/L 80 76 85  --    Aspartate Aminotransferase unit/L 19 21 18  --    Alanine Aminotransferase unit/L 29 29 29  --    Magnesium Level mg/dL  --  2.10  --   --    Phosphorus Level mg/dL  --  2.6  --   --        Microbiologic Results:  Microbiology Results (last 7 days)       Procedure Component Value Units Date/Time    Blood Culture [[id number]]  (Normal) Collected: 11/18/23 1706    Order Status: Completed Specimen: Blood Updated: 11/23/23 1800     CULTURE, BLOOD ([facility]) No Growth at 5 days    Blood Culture [[id number]]  (Normal) Collected: 11/18/23 1706    Order Status: Completed Specimen: Blood Updated: 11/23/23 1800     CULTURE, BLOOD ([facility]) No Growth at 5 days    Anaerobic Culture [[id number]] Collected: 11/20/23 1414    Order Status: Completed Specimen: Wound from Scrotum Updated: 11/23/23 1006     Anaerobe Culture No Anaerobes Isolated    Wound Culture [[id number]] Collected: 11/20/23 1414    Order Status: Completed Specimen: Wound from Scrotum Updated: 11/23/23 0758     Wound Culture Rare normal skin tyrel, no further workup.    Gram Stain  [[id number]] Collected: 11/20/23 1414    Order Status: Completed Specimen: Wound from Scrotum Updated: 11/21/23 0819     GRAM STAIN Few WBC observed      No bacteria seen    Fungal Culture [[id number]] Collected: 11/20/23 1414    Order Status: Sent Specimen: Wound from Scrotum Updated: 11/20/23 1519    Wound Culture [[id number]]  (Abnormal)  (Susceptibility) Collected: 11/11/23 1851    Order Status: Completed Specimen: Abscess from Rectum Updated: 11/17/23 1513     Wound Culture Many Escherichia coli      Many Streptococcus constellatus

## 2023-11-24 NOTE — PROGRESS NOTES
Colon & Rectal Surgery Progress Note    Subjective:  Continues to feel better  Tolerating liquids  Denies abdominal pain, N/V  Ileostomy working  Ambulating the halls    Objective:  Temp:  [98.3 °F (36.8 °C)-99 °F (37.2 °C)]   Pulse:  [79-90]   Resp:  [16-20]   BP: ()/(60-62)   SpO2:  [97 %-98 %]     Physical Exam:  NAD  Regular rate and rhythm  Non-labored respirations  Abdomen soft, appropriate, stoma pink  SCDs in place      Intake/Output Summary (Last 24 hours) at 11/24/2023 1046  Last data filed at 11/24/2023 0532  Gross per 24 hour   Intake 3478.48 ml   Output 3500 ml   Net -21.52 ml       Recent Labs     11/22/23  0842 11/24/23  0437 11/24/23  0438   WBC 9.53 9.42  --    HGB 7.5* 9.0*  --    HCT 23.1* 28.2*  --     369  --    *  --  132*   K 3.6  --  3.1*   CO2 26  --  28   BUN 12.6  --  15.9   CREATININE 0.82  --  1.50*   BILITOT 0.6  --  0.5   AST 21  --  18   ALT 29  --  29   ALKPHOS 76  --  85   CALCIUM 7.4*  --  7.7*   ALBUMIN 1.9*  --  2.1*   MG 2.10  --   --    PHOS 2.6  --   --        Assessment/Plan  - advance diet  - bolus crystalloid d/t high output ileostomy  - continue ET education      Fredi Shields MD  Colon and Rectal Surgery

## 2023-11-24 NOTE — PROGRESS NOTES
[facility] - 8th Floor [facility]  [facility] [place] MEDICINE ~ PROGRESS NOTE        CHIEF COMPLAINT   Hospital follow up    HOSPITAL COURSE   Lionel León is a 42 y.o. White male with a past medical history of diverticulitis status post colon resection and ulcerative colitis. Patient had recent admission to hospital medicine services at [facility] on 10/21/2023 to 10/24/2023 for sepsis secondary to pancolitis. He was treated with antibiotics and GI was consulted. Colonoscopy was performed on 10/23/2023 with findings concerning for ulcerative colitis and biopsies of the cecum, ascending, transverse, ascending colon and rectum positive for active chronic colitis consistent with inflammatory bowel disorder. No transfusion was required for rectal bleeding. Patient was discharged with prednisone taper and GI follow up appointment on 11/17/2023. The patient presented to [facility] on 11/6/2023 with a primary complaint of bright red rectal bleeding and lower abdominal pain.  Patient reports rectal bleeding has been ongoing since discharge.  Other associated symptoms include nausea, rectal pain, subjective fevers, weakness, fatigue and a 30 40 lb weight loss over last 3 weeks.  Patient states this morning he was walking to the bathroom when he felt flushed and vision went black. He passed out and hit his head on the wooden floor. Approximately hour and a half later when he went to get up he passed out again.  Second syncopal episode was witnessed by patient's wife who is at bedside reports loss of consciousness lasts for approximately 30 seconds.    Upon presentation to the ED, temperature 98.3F, heart rate 109, blood pressure 96/51, respiratory rate 17, spO2 99%. Labs with H&H 7.3/23.6 (10.6/33.2 on 10/24/2023), BUN 21.9, creatinine 0.88, calcium 7.7, .6, ESR 86. EKG sinus tachycardia with a ventricular rate of 108 and age undetermined possible inferior infarct. In the ED patient received Dilaudid, Zofran, Hydrocortisone  and IVF. He was typed and crossmatched for transfusion of 2 units of packed RBC. Patient is admitted to hospital medicine services for further medical management.       Patient was seen by GI and started on IV steroids, Solu-Medrol 30 mg IV b.I.d. for active ulcerative colitis flare up  Plus Anusol suppository per rectum b.I.d..  Patient is status post 2 units PRBC for symptomatic anemia.  CT abdomen and pelvis was also ordered to assess extent. CT of the abdomen and pelvis was pertinent for findings consistent with colitis involving the ascending colon, portions of the descending colon.  Underlying gastritis also can not be excluded secondary to slightly thickened gastric wall.     May complain continues to be rectal pain but refers that suppositories are helping.  Patient is tolerating p.o. intake    According to GI notes plans is for patient to start Humira/imura once approved by patient's insurance.  November 11th he started reporting rectal pain with a bump, on exam he had cellulitis like changes.  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  Taken to the operating room same day by General surgery found to have necrotic tissue extending to the muscle encircling his anus.  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  With ongoing infection immunosuppression with steroids have been tapered off and no further plans for Humira.  Infectious Disease was also consulted and recommended continuing Zosyn.  Wound Care Clinic was also consulted and started on hyperbaric therapy.  Patient once again started having fevers and developed concern for worsening sepsis, was taken to the operating room by a surgery November 20th and underwent diagnostic laparoscopy which showed slightly murky serous fluid bilateral pericolic gutters but no succus or stools, also underwent debridement of the scrotum/perineum and found to have some pus  draining which was debrided.    Today  Feels good doing good ostomy output well, tolerated full diet this morning.  Creatinine 1.5.  We will get some urine studies and colorectal surgeon has already started some half-normal saline IV fluids.        OBJECTIVE/PHYSICAL EXAM     VITAL SIGNS (MOST RECENT):  Temp: 98.3 °F (36.8 °C) (11/24/23 0726)  Pulse: 87 (11/24/23 0726)  Resp: 20 (11/24/23 0726)  BP: 94/61 (11/24/23 0726)  SpO2: 98 % (11/24/23 0726) VITAL SIGNS (24 HOUR RANGE):  Temp:  [98.3 °F (36.8 °C)-99 °F (37.2 °C)] 98.3 °F (36.8 °C)  Pulse:  [79-90] 87  Resp:  [16-20] 20  SpO2:  [97 %-98 %] 98 %  BP: ()/(60-62) 94/61   GENERAL: In no acute distress, afebrile  HEENT:    CHEST: Clear to auscultation bilaterally  HEART: S1, S2, no appreciable murmur  ABDOMEN: Soft, appropriately tender postop, BS +, ileostomy with orange yellow liquid stool  MSK: Warm, no lower extremity edema, no clubbing or cyanosis  NEUROLOGIC: Alert and oriented x4, moving all extremities with good strength   INTEGUMENTARY:  Refer to images in the chart  PSYCHIATRY:        ASSESSMENT/PLAN   Severe ulcerative colitis acute flare-status post total colectomy and end ileostomy November 15  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  Continue vancomycin and Zosyn.  Follow cultures.  Fluconazole.  Colorectal surgery following.    Wound care clinic following.  Continue hyperbaric therapy.    GI signed off.  Off of immunosuppression.  Plastic surgery following.  Continue protein supplement, Jed and multivitamin.  GI signed off.  We will follow up on pathology.  Outpatient follow-up.  Started on half-normal saline per CRC with a 500 cc bolus.  I will order for some urine studies.  Possibly vancomycin kidney injury, maybe plan to discontinue vanc today?    DVT  prophylaxis:  Ambulatory    Anticipated discharge and disposition:   __________________________________________________________________________    LABS/MICRO/MEDS/DIAGNOSTICS       LABS  Recent Labs     11/24/23  0438   *   K 3.1*   CHLORIDE 95*   CO2 28   BUN 15.9   CREATININE 1.50*   GLUCOSE 124*   CALCIUM 7.7*   ALKPHOS 85   AST 18   ALT 29   ALBUMIN 2.1*       Recent Labs     11/24/23  0437   WBC 9.42   RBC 3.34*   HCT 28.2*   MCV 84.4            MICROBIOLOGY  Microbiology Results (last 7 days)       Procedure Component Value Units Date/Time    Blood Culture [[id number]]  (Normal) Collected: 11/18/23 1706    Order Status: Completed Specimen: Blood Updated: 11/23/23 1800     CULTURE, BLOOD ([facility]) No Growth at 5 days    Blood Culture [[id number]]  (Normal) Collected: 11/18/23 1706    Order Status: Completed Specimen: Blood Updated: 11/23/23 1800     CULTURE, BLOOD ([facility]) No Growth at 5 days    Anaerobic Culture [[id number]] Collected: 11/20/23 1414    Order Status: Completed Specimen: Wound from Scrotum Updated: 11/23/23 1006     Anaerobe Culture No Anaerobes Isolated    Wound Culture [[id number]] Collected: 11/20/23 1414    Order Status: Completed Specimen: Wound from Scrotum Updated: 11/23/23 0758     Wound Culture Rare normal skin tyrel, no further workup.    Gram Stain [[id number]] Collected: 11/20/23 1414    Order Status: Completed Specimen: Wound from Scrotum Updated: 11/21/23 0819     GRAM STAIN Few WBC observed      No bacteria seen    Fungal Culture [[id number]] Collected: 11/20/23 1414    Order Status: Sent Specimen: Wound from Scrotum Updated: 11/20/23 1519    Wound Culture [[id number]]  (Abnormal)  (Susceptibility) Collected: 11/11/23 1851    Order Status: Completed Specimen: Abscess from Rectum Updated: 11/17/23 1513     Wound Culture Many Escherichia coli      Many Streptococcus constellatus               MEDICATIONS   fluconazole  100 mg Oral Daily    multivitamin  1 tablet Oral Daily     piperacillin-tazobactam (Zosyn) IV (PEDS and ADULTS) (extended infusion is not appropriate)  4.5 g Intravenous Q8H    sodium chloride 0.9%  500 mL Intravenous Once    vancomycin (VANCOCIN) IV (PEDS and ADULTS)  1,250 mg Intravenous Q12H         INFUSIONS   0.45% NaCl with KCl 20 mEq infusion            DIAGNOSTIC TESTS  XR Gastric tube check, non-radiologist performed   Final Result      As above.         Electronically signed by: Joseph Carvajal   Date:    11/20/2023   Time:    06:15      CT Abdomen Pelvis With IV Contrast   Final Result   Impression:      1. There is extensive pneumoperitoneum. This is new since the prior examination and consistent with recent surgery. There is mild ascites. This is new since the prior examination. Correlate clinically and recommend continued serial interval follow-up to full resolution to confirm postoperative nature.      2. There is interval near-total colectomy with ileostomy in the right ventral mid abdominal wall. There are numerous moderately distended small bowel loops in the mid and lower abdomen which demonstrate air-fluid levels. Surrounding mesenteric fat stranding is noted. There is apparent transition at the level of the distal ileum. These findings are new since the prior examination and are suggestive of small bowel obstruction versus severe adynamic ileus. Correlate clinically as regards further evaluation and followup.      3. There is interval development of a defect / wound in the cutaneous / subcutaneous soft tissues of the gluteal cleft and the perineal region with soft tissue emphysema consistent with recent debridement.      4. Details and other findings as discussed above.      No significant discrepancy with overnight report         Electronically signed by: Chip Duran   Date:    11/19/2023   Time:    10:33      X-Ray Chest 1 View   Final Result      Suspect some bibasilar atelectasis.         Electronically signed by: Germain Cedeno   Date:    11/18/2023  "  Time:    17:19      CT Abdomen Pelvis W Wo Contrast   Final Result      X-Ray Chest PA And Lateral   Final Result      No acute abnormality.         Electronically signed by: Quinton Hernandez MD   Date:    11/11/2023   Time:    08:49      CT Abdomen Pelvis W Wo Contrast   Final Result      Findings seen consistent with colitis involving the ascending colon and portions of the descending colon.  Follow-up is recommended to resolution as underlying mass cannot be completely excluded.      The gastric wall appears to be slightly thickened.  Underlying gastritis should be excluded.         Electronically signed by: John Gale   Date:    11/06/2023   Time:    12:02           No results found for: "EF"       NUTRITION STATUS  Patient meets ASPEN criteria for moderate malnutrition of acute illness or injury per RD assessment as evidenced by:  Energy Intake (Malnutrition): less than 75% for greater than 7 days  Weight Loss (Malnutrition): greater than 2% in 1 week  Subcutaneous Fat (Malnutrition): moderate depletion  Muscle Mass (Malnutrition): moderate depletion  Fluid Accumulation (Malnutrition): other (see comments) (does not meet criteria)  Hand  Strength, Left (Malnutrition): unable to evaluate  Hand  Strength, Right (Malnutrition): unable to evaluate  A minimum of two characteristics is recommended for diagnosis of either severe or non-severe malnutrition.       Case related differential diagnoses have been reviewed; assessment and plan has been documented. I have personally reviewed the labs and test results that are currently available; I have reviewed the patients medication list. I have reviewed the consulting providers recommendations. I have reviewed or attempted to review medical records based upon their availability.  All of the patient's and/or family's questions have been addressed and answered to the best of my ability.  I will continue to monitor closely and make adjustments to medical " management as needed.  This document was created using M*Modal Fluency Direct.  Transcription errors may have been made.  Please contact me if any questions may rise regarding documentation to clarify transcription.        Abdoulaye Garcia MD   Internal Medicine  Department of [facility]  [facility] - 8th Floor Med Surg

## 2023-11-25 LAB
ANION GAP SERPL CALC-SCNC: 10 MEQ/L
BUN SERPL-MCNC: 24.1 MG/DL (ref 8.9–20.6)
CALCIUM SERPL-MCNC: 8.1 MG/DL (ref 8.4–10.2)
CHLORIDE SERPL-SCNC: 96 MMOL/L (ref 98–107)
CO2 SERPL-SCNC: 27 MMOL/L (ref 22–29)
CREAT SERPL-MCNC: 1.63 MG/DL (ref 0.73–1.18)
CREAT/UREA NIT SERPL: 15
GFR SERPLBLD CREATININE-BSD FMLA CKD-EPI: 53 MLS/MIN/1.73/M2
GLUCOSE SERPL-MCNC: 109 MG/DL (ref 74–100)
POTASSIUM SERPL-SCNC: 3.3 MMOL/L (ref 3.5–5.1)
SODIUM SERPL-SCNC: 133 MMOL/L (ref 136–145)

## 2023-11-25 PROCEDURE — 80048 BASIC METABOLIC PNL TOTAL CA: CPT | Performed by: INTERNAL MEDICINE

## 2023-11-25 PROCEDURE — 25000003 PHARM REV CODE 250: Performed by: STUDENT IN AN ORGANIZED HEALTH CARE EDUCATION/TRAINING PROGRAM

## 2023-11-25 PROCEDURE — 25000003 PHARM REV CODE 250: Performed by: NURSE PRACTITIONER

## 2023-11-25 PROCEDURE — 63600175 PHARM REV CODE 636 W HCPCS: Performed by: STUDENT IN AN ORGANIZED HEALTH CARE EDUCATION/TRAINING PROGRAM

## 2023-11-25 PROCEDURE — 25000003 PHARM REV CODE 250: Performed by: INTERNAL MEDICINE

## 2023-11-25 PROCEDURE — 11000001 HC ACUTE MED/SURG PRIVATE ROOM

## 2023-11-25 PROCEDURE — 63700000 PHARM REV CODE 250 ALT 637 W/O HCPCS

## 2023-11-25 PROCEDURE — 63600175 PHARM REV CODE 636 W HCPCS: Performed by: INTERNAL MEDICINE

## 2023-11-25 PROCEDURE — 63600175 PHARM REV CODE 636 W HCPCS: Performed by: COLON & RECTAL SURGERY

## 2023-11-25 RX ORDER — SODIUM CHLORIDE 9 MG/ML
INJECTION, SOLUTION INTRAVENOUS CONTINUOUS
Status: DISCONTINUED | OUTPATIENT
Start: 2023-11-25 | End: 2023-11-28

## 2023-11-25 RX ADMIN — Medication 6 MG: at 10:11

## 2023-11-25 RX ADMIN — CYCLOBENZAPRINE HYDROCHLORIDE 5 MG: 5 TABLET, FILM COATED ORAL at 11:11

## 2023-11-25 RX ADMIN — PIPERACILLIN SODIUM AND TAZOBACTAM SODIUM 4.5 G: 4; .5 INJECTION, POWDER, FOR SOLUTION INTRAVENOUS at 01:11

## 2023-11-25 RX ADMIN — VANCOMYCIN HYDROCHLORIDE 1000 MG: 1 INJECTION, POWDER, LYOPHILIZED, FOR SOLUTION INTRAVENOUS at 03:11

## 2023-11-25 RX ADMIN — PIPERACILLIN SODIUM AND TAZOBACTAM SODIUM 4.5 G: 4; .5 INJECTION, POWDER, FOR SOLUTION INTRAVENOUS at 05:11

## 2023-11-25 RX ADMIN — PIPERACILLIN SODIUM AND TAZOBACTAM SODIUM 4.5 G: 4; .5 INJECTION, POWDER, FOR SOLUTION INTRAVENOUS at 08:11

## 2023-11-25 RX ADMIN — FLUCONAZOLE 100 MG: 100 TABLET ORAL at 10:11

## 2023-11-25 RX ADMIN — VANCOMYCIN HYDROCHLORIDE 1000 MG: 1 INJECTION, POWDER, LYOPHILIZED, FOR SOLUTION INTRAVENOUS at 04:11

## 2023-11-25 RX ADMIN — POTASSIUM CHLORIDE AND SODIUM CHLORIDE 75 ML/HR: 450; 150 INJECTION, SOLUTION INTRAVENOUS at 01:11

## 2023-11-25 RX ADMIN — SODIUM CHLORIDE: 9 INJECTION, SOLUTION INTRAVENOUS at 10:11

## 2023-11-25 RX ADMIN — SODIUM CHLORIDE: 9 INJECTION, SOLUTION INTRAVENOUS at 11:11

## 2023-11-25 RX ADMIN — MULTIPLE VITAMINS W/ MINERALS TAB 1 TABLET: TAB at 10:11

## 2023-11-25 NOTE — PROGRESS NOTES
"Colon & Rectal Surgery Progress Note    Subjective:  Tolerating diet  Ileostomy working, output reasonable  Denies N/V, abdominal pain  Ambulating  "Urinating a lot and clearer"    Objective:  Temp:  [98.2 °F (36.8 °C)-99.4 °F (37.4 °C)]   Pulse:  [79-88]   Resp:  [16-20]   BP: ()/(46-63)   SpO2:  [94 %-100 %]     Physical Exam:  NAD  Regular rate and rhythm  Non-labored respirations  Abdomen soft, appropriate, ileostomy pink  SCDs in place      Intake/Output Summary (Last 24 hours) at 11/25/2023 0834  Last data filed at 11/25/2023 0353  Gross per 24 hour   Intake 1470.06 ml   Output 2300 ml   Net -829.94 ml       Recent Labs     11/22/23  0842 11/24/23  0437 11/24/23  0438 11/25/23  0405   WBC 9.53 9.42  --   --    HGB 7.5* 9.0*  --   --    HCT 23.1* 28.2*  --   --     369  --   --    *  --  132* 133*   K 3.6  --  3.1* 3.3*   CO2 26  --  28 27   BUN 12.6  --  15.9 24.1*   CREATININE 0.82  --  1.50* 1.63*   BILITOT 0.6  --  0.5  --    AST 21  --  18  --    ALT 29  --  29  --    ALKPHOS 76  --  85  --    CALCIUM 7.4*  --  7.7* 8.1*   ALBUMIN 1.9*  --  2.1*  --    MG 2.10  --   --   --    PHOS 2.6  --   --   --        Assessment/Plan  - continue gentle hydration  - monitor renal function  - ET education      Fredi Shields MD  Colon and Rectal Surgery  "

## 2023-11-25 NOTE — PROGRESS NOTES
[facility] - 8th Floor [facility] Surg  [facility] MEDICINE ~ PROGRESS NOTE        CHIEF COMPLAINT   Hospital follow up    HOSPITAL COURSE   Lionel León is a 42 y.o. White male with a past medical history of diverticulitis status post colon resection and ulcerative colitis. Patient had recent admission to hospital medicine services at [facility] on 10/21/2023 to 10/24/2023 for sepsis secondary to pancolitis. He was treated with antibiotics and GI was consulted. Colonoscopy was performed on 10/23/2023 with findings concerning for ulcerative colitis and biopsies of the cecum, ascending, transverse, ascending colon and rectum positive for active chronic colitis consistent with inflammatory bowel disorder. No transfusion was required for rectal bleeding. Patient was discharged with prednisone taper and GI follow up appointment on 11/17/2023. The patient presented to [facility] on 11/6/2023 with a primary complaint of bright red rectal bleeding and lower abdominal pain.  Patient reports rectal bleeding has been ongoing since discharge.  Other associated symptoms include nausea, rectal pain, subjective fevers, weakness, fatigue and a 30 40 lb weight loss over last 3 weeks.  Patient states this morning he was walking to the bathroom when he felt flushed and vision went black. He passed out and hit his head on the wooden floor. Approximately hour and a half later when he went to get up he passed out again.  Second syncopal episode was witnessed by patient's wife who is at bedside reports loss of consciousness lasts for approximately 30 seconds.    Upon presentation to the ED, temperature 98.3F, heart rate 109, blood pressure 96/51, respiratory rate 17, spO2 99%. Labs with H&H 7.3/23.6 (10.6/33.2 on 10/24/2023), BUN 21.9, creatinine 0.88, calcium 7.7, .6, ESR 86. EKG sinus tachycardia with a ventricular rate of 108 and age undetermined possible inferior infarct. In the ED patient received Dilaudid, Zofran, Hydrocortisone  and IVF. He was typed and crossmatched for transfusion of 2 units of packed RBC. Patient is admitted to hospital medicine services for further medical management.       Patient was seen by GI and started on IV steroids, Solu-Medrol 30 mg IV b.I.d. for active ulcerative colitis flare up  Plus Anusol suppository per rectum b.I.d..  Patient is status post 2 units PRBC for symptomatic anemia.  CT abdomen and pelvis was also ordered to assess extent. CT of the abdomen and pelvis was pertinent for findings consistent with colitis involving the ascending colon, portions of the descending colon.  Underlying gastritis also can not be excluded secondary to slightly thickened gastric wall.     May complain continues to be rectal pain but refers that suppositories are helping.  Patient is tolerating p.o. intake    According to GI notes plans is for patient to start Humira/imura once approved by patient's insurance.  November 11th he started reporting rectal pain with a bump, on exam he had cellulitis like changes.  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  Taken to the operating room same day by General surgery found to have necrotic tissue extending to the muscle encircling his anus.  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  With ongoing infection immunosuppression with steroids have been tapered off and no further plans for Humira.  Infectious Disease was also consulted and recommended continuing Zosyn.  Wound Care Clinic was also consulted and started on hyperbaric therapy.  Patient once again started having fevers and developed concern for worsening sepsis, was taken to the operating room by a surgery November 20th and underwent diagnostic laparoscopy which showed slightly murky serous fluid bilateral pericolic gutters but no succus or stools, also underwent debridement of the scrotum/perineum and found to have some pus  draining which was debrided.    Today  Ostomy output has improved, urine output also good with more clear urine now.  I will change him to more isotonic fluids with normal saline at 100 an hour.  Continues to complain of odynophagia posterior pharynx.        OBJECTIVE/PHYSICAL EXAM     VITAL SIGNS (MOST RECENT):  Temp: 98.7 °F (37.1 °C) (11/25/23 0732)  Pulse: 79 (11/25/23 0732)  Resp: 18 (11/25/23 0523)  BP: (!) 92/46 (11/25/23 0732)  SpO2: 98 % (11/25/23 0732) VITAL SIGNS (24 HOUR RANGE):  Temp:  [98.2 °F (36.8 °C)-99.4 °F (37.4 °C)] 98.7 °F (37.1 °C)  Pulse:  [79-88] 79  Resp:  [16-20] 18  SpO2:  [94 %-100 %] 98 %  BP: ()/(46-63) 92/46   GENERAL: In no acute distress, afebrile  HEENT:    CHEST: Clear to auscultation bilaterally  HEART: S1, S2, no appreciable murmur  ABDOMEN: Soft, appropriately tender postop, BS +, ileostomy   MSK: Warm, no lower extremity edema, no clubbing or cyanosis  NEUROLOGIC: Alert and oriented x4, moving all extremities with good strength   INTEGUMENTARY:  Refer to images in the chart  PSYCHIATRY:        ASSESSMENT/PLAN   Severe ulcerative colitis acute flare-status post total colectomy and end ileostomy November 15  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  Continue vancomycin and Zosyn.  Follow cultures.  Fluconazole.  Colorectal surgery following.    Wound care clinic following.  Continue hyperbaric therapy.    GI signed off.  Off of immunosuppression.  Plastic surgery following.  Continue protein supplement, Jed and multivitamin.  GI signed off.  We will follow up on pathology.  Outpatient follow-up.  Change to normal saline 100 per hour.  Fractional excretion of sodium shows pre renal disease possibly from high-output ileostomy which has now improved.  Hope to discontinue vancomycin if  okay from ID standpoint.    DVT prophylaxis:  Ambulatory    Anticipated discharge and disposition:   __________________________________________________________________________    LABS/MICRO/MEDS/DIAGNOSTICS       LABS  Recent Labs     11/24/23 0438 11/25/23  0405   * 133*   K 3.1* 3.3*   CHLORIDE 95* 96*   CO2 28 27   BUN 15.9 24.1*   CREATININE 1.50* 1.63*   GLUCOSE 124* 109*   CALCIUM 7.7* 8.1*   ALKPHOS 85  --    AST 18  --    ALT 29  --    ALBUMIN 2.1*  --        Recent Labs     11/24/23 0437   WBC 9.42   RBC 3.34*   HCT 28.2*   MCV 84.4            MICROBIOLOGY  Microbiology Results (last 7 days)       Procedure Component Value Units Date/Time    Blood Culture [[id number]]  (Normal) Collected: 11/18/23 1706    Order Status: Completed Specimen: Blood Updated: 11/23/23 1800     CULTURE, BLOOD ([facility]) No Growth at 5 days    Blood Culture [[id number]]  (Normal) Collected: 11/18/23 1706    Order Status: Completed Specimen: Blood Updated: 11/23/23 1800     CULTURE, BLOOD ([facility]) No Growth at 5 days    Anaerobic Culture [[id number]] Collected: 11/20/23 1414    Order Status: Completed Specimen: Wound from Scrotum Updated: 11/23/23 1006     Anaerobe Culture No Anaerobes Isolated    Wound Culture [[id number]] Collected: 11/20/23 1414    Order Status: Completed Specimen: Wound from Scrotum Updated: 11/23/23 0758     Wound Culture Rare normal skin tyrel, no further workup.    Gram Stain [[id number]] Collected: 11/20/23 1414    Order Status: Completed Specimen: Wound from Scrotum Updated: 11/21/23 0819     GRAM STAIN Few WBC observed      No bacteria seen    Fungal Culture [[id number]] Collected: 11/20/23 1414    Order Status: Sent Specimen: Wound from Scrotum Updated: 11/20/23 1519               MEDICATIONS   fluconazole  100 mg Oral Daily    multivitamin  1 tablet Oral Daily    piperacillin-tazobactam (Zosyn) IV (PEDS and ADULTS) (extended infusion is not appropriate)  4.5 g Intravenous Q8H    vancomycin  (VANCOCIN) IV (PEDS and ADULTS)  1,000 mg Intravenous Q12H         INFUSIONS   sodium chloride 0.9%            DIAGNOSTIC TESTS  XR Gastric tube check, non-radiologist performed   Final Result      As above.         Electronically signed by: Joseph Carvajal   Date:    11/20/2023   Time:    06:15      CT Abdomen Pelvis With IV Contrast   Final Result   Impression:      1. There is extensive pneumoperitoneum. This is new since the prior examination and consistent with recent surgery. There is mild ascites. This is new since the prior examination. Correlate clinically and recommend continued serial interval follow-up to full resolution to confirm postoperative nature.      2. There is interval near-total colectomy with ileostomy in the right ventral mid abdominal wall. There are numerous moderately distended small bowel loops in the mid and lower abdomen which demonstrate air-fluid levels. Surrounding mesenteric fat stranding is noted. There is apparent transition at the level of the distal ileum. These findings are new since the prior examination and are suggestive of small bowel obstruction versus severe adynamic ileus. Correlate clinically as regards further evaluation and followup.      3. There is interval development of a defect / wound in the cutaneous / subcutaneous soft tissues of the gluteal cleft and the perineal region with soft tissue emphysema consistent with recent debridement.      4. Details and other findings as discussed above.      No significant discrepancy with overnight report         Electronically signed by: Chip Duran   Date:    11/19/2023   Time:    10:33      X-Ray Chest 1 View   Final Result      Suspect some bibasilar atelectasis.         Electronically signed by: Germain Cedeno   Date:    11/18/2023   Time:    17:19      CT Abdomen Pelvis W Wo Contrast   Final Result      X-Ray Chest PA And Lateral   Final Result      No acute abnormality.         Electronically signed by: Quinton Hernandez MD  "  Date:    11/11/2023   Time:    08:49      CT Abdomen Pelvis W Wo Contrast   Final Result      Findings seen consistent with colitis involving the ascending colon and portions of the descending colon.  Follow-up is recommended to resolution as underlying mass cannot be completely excluded.      The gastric wall appears to be slightly thickened.  Underlying gastritis should be excluded.         Electronically signed by: John Gale   Date:    11/06/2023   Time:    12:02           No results found for: "EF"       NUTRITION STATUS  Patient meets ASPEN criteria for moderate malnutrition of acute illness or injury per RD assessment as evidenced by:  Energy Intake (Malnutrition): less than 75% for greater than 7 days  Weight Loss (Malnutrition): greater than 2% in 1 week  Subcutaneous Fat (Malnutrition): moderate depletion  Muscle Mass (Malnutrition): moderate depletion  Fluid Accumulation (Malnutrition): other (see comments) (does not meet criteria)  Hand  Strength, Left (Malnutrition): unable to evaluate  Hand  Strength, Right (Malnutrition): unable to evaluate  A minimum of two characteristics is recommended for diagnosis of either severe or non-severe malnutrition.       Case related differential diagnoses have been reviewed; assessment and plan has been documented. I have personally reviewed the labs and test results that are currently available; I have reviewed the patients medication list. I have reviewed the consulting providers recommendations. I have reviewed or attempted to review medical records based upon their availability.  All of the patient's and/or family's questions have been addressed and answered to the best of my ability.  I will continue to monitor closely and make adjustments to medical management as needed.  This document was created using M*Modal Fluency Direct.  Transcription errors may have been made.  Please contact me if any questions may rise regarding documentation to " clarify transcription.        Abdoulaye Garcia MD   Internal Medicine  Department of Hospital Medicine  [facility] - 8th Floor Med Surg

## 2023-11-25 NOTE — PROGRESS NOTES
Inpatient Nutrition Assessment    Admit Date: 11/6/2023   Total duration of encounter: 18 days   Patient Age: 43 y.o.    Nutrition Recommendation/Prescription     -Continue Low Residue Diet as tolerated.   -Trial Boost Plus TID for additional nourishment; provides an additional 360 kcal and 14 gm protein per container.    -If pt has further issues tolerating diet, consider PPN. Recommend Clinimix E 4.25/5 @ 75mL/hr with IVPB IL 20% 250 mL 2x/week.   -Monitor wt, labs, and intake.    Communication of Recommendations: reviewed with patient and reviewed with family    Nutrition Assessment     Malnutrition Assessment/Nutrition-Focused Physical Exam    Malnutrition Context: acute illness or injury (11/07/23 1313)  Malnutrition Level: moderate (11/07/23 1313)  Energy Intake (Malnutrition): less than 75% for greater than 7 days (11/07/23 1313)  Weight Loss (Malnutrition): greater than 2% in 1 week (11/07/23 1313)  Subcutaneous Fat (Malnutrition): moderate depletion (11/07/23 1313)  Orbital Region (Subcutaneous Fat Loss): moderate depletion        Muscle Mass (Malnutrition): moderate depletion (11/07/23 1313)  Christian Region (Muscle Loss): moderate depletion                       Fluid Accumulation (Malnutrition): other (see comments) (does not meet criteria) (11/07/23 1313)  Hand  Strength, Left (Malnutrition): unable to evaluate (11/07/23 1313)  Hand  Strength, Right (Malnutrition): unable to evaluate (11/07/23 1313)  A minimum of two characteristics is recommended for diagnosis of either severe or non-severe malnutrition.    Chart Review    Reason Seen: malnutrition screening tool (MST) and follow-up    Malnutrition Screening Tool Results   Have you recently lost weight without trying?: Yes: 34 lbs or more  Have you been eating poorly because of a decreased appetite?: Yes   MST Score: 5   Diagnosis:  Acute severe ulcerative colitis   Orthostatic syncope   Symptomatic anemia requiring blood transfusion  Acute blood  "loss anemia    Relevant Medical History: none noted    Nutrition-Related Medications: LR, MVI, Protonix  Calorie Containing IV Medications: no significant kcals from medications at this time    Nutrition-Related Labs:  11/7/23: Na 133, Glu 161, GFR>60  11/9/23: Na 130, Glu 164, GFR>60, CRP 95.4  11/13/23: Na 133, Glu 165, GFR>60  11/16/23: Na 131, Glu 182, GFR>60  11/20/23: Na 132, Glu 130, GFR>60  11/24/23: Na 132, K 3.1, Cl 95, Cr 1.50, Glu 124, Ca 7.7, Alb 2.1       No data to display                43 y.o.    Needs review if Total Development score is :  Below 13 (12 month old)  Below 14 (13 month old)  Below 15 (14 month old)     Nutrition Orders:   Diet low fiber/residue      Appetite/Oral Intake: fair/50-75% of meals    Factors Affecting Nutritional Intake: altered gastrointestinal function    Food/Lutheran/Cultural Preferences: none reported    Food Allergies: no known food allergies    Wound(s):   none noted    Last Bowel Movement: 11/23/23    Comments    11/7/23: Pt reports good appetite/intake while in hospital; reports that his pain and diarrhea are better managed here than it is at home. Pt reports significant wt loss and frequent watery and bloody diarrhea for the last 3 weeks.     11/9/23: Per pt and pt's wife, pt is eating well, most of meals; denies n/v.     11/13/23: Pt tolerating liquids per RN.     11/16/23: Per pt's wife, pt is tolerating full liquids well.     11/20/23: Pt NPO; NG tube was placed this morning for worsening abdominal pain and distention; pt also to go to OR for wound exploration and debridement and possible wound vac placement.     11/24/23: Pt reports tolerating diet w/ fair appetite, no GI complaints. Ileostomy functioning properly        Anthropometrics    Height: 5' 10" (177.8 cm) Height Method: Stated  Last Weight: 74.8 kg (165 lb) (11/07/23 1312) Weight Method: Bed Scale  BMI (Calculated): 23.7  BMI Classification: normal (BMI 18.5-24.9)        Ideal Body Weight (IBW), " Male: 166 lb     % Ideal Body Weight, Male (lb): 99.4 %                 Usual Body Weight (UBW), k.18 kg  % Usual Body Weight: 80.49  % Weight Change From Usual Weight: -19.68 %  Usual Weight Provided By: patient    Wt Readings from Last 5 Encounters:   23 74.8 kg (165 lb)   10/21/23 86.4 kg (190 lb 7.6 oz)     Weight Change(s) Since Admission:   Wt Readings from Last 1 Encounters:   23 1312 74.8 kg (165 lb)   23 1835 74.8 kg (165 lb)   23 032 74.8 kg (165 lb)   Admit Weight: 74.8 kg (165 lb) (23 0328), Weight Method: Stated  23-23: no new wt noted    Estimated Needs    Weight Used For Calorie Calculations: 74.8 kg (164 lb 14.5 oz)  Energy Calorie Requirements (kcal): 2897-6771 kcal (30-35 kcal/kg)  Energy Need Method: Kcal/kg  Weight Used For Protein Calculations: 74.8 kg (164 lb 14.5 oz)  Protein Requirements: 112 gm (1.5g/kg)  Fluid Requirements (mL): 2244 mL  Temp (24hrs), Av.6 °F (37 °C), Min:98.3 °F (36.8 °C), Max:99 °F (37.2 °C)       Enteral Nutrition    Patient not receiving enteral nutrition at this time.    Parenteral Nutrition    Patient not receiving parenteral nutrition support at this time.    Evaluation of Received Nutrient Intake    Calories: meeting estimated needs  Protein: meeting estimated needs    Patient Education    Not applicable.    Nutrition Diagnosis     PES: Malnutrition related to altered GI function as evidenced by >2% wt loss x 1 week, <75% est energy requirements met >7 days, physical evidence of moderate fat and muscle wasting. (active)    Interventions/Goals     Intervention(s): modified composition of meals/snacks, multivitamin/mineral supplement therapy, prescription medication, and collaboration with other providers    Goal: Maintain weight throughout hospitalization. (goal not met)    Monitoring & Evaluation     Dietitian will monitor energy intake and weight change.    Nutrition Risk/Follow-Up: high (follow-up in 1-4 days)    Please consult if re-assessment needed sooner.

## 2023-11-26 LAB
ANION GAP SERPL CALC-SCNC: 9 MEQ/L
BASOPHILS # BLD AUTO: 0.01 X10(3)/MCL
BASOPHILS NFR BLD AUTO: 0.2 %
BUN SERPL-MCNC: 25.3 MG/DL (ref 8.9–20.6)
CALCIUM SERPL-MCNC: 7.4 MG/DL (ref 8.4–10.2)
CHLORIDE SERPL-SCNC: 106 MMOL/L (ref 98–107)
CO2 SERPL-SCNC: 23 MMOL/L (ref 22–29)
CREAT SERPL-MCNC: 1.6 MG/DL (ref 0.73–1.18)
CREAT/UREA NIT SERPL: 16
EOSINOPHIL # BLD AUTO: 0.07 X10(3)/MCL (ref 0–0.9)
EOSINOPHIL NFR BLD AUTO: 1.1 %
ERYTHROCYTE [DISTWIDTH] IN BLOOD BY AUTOMATED COUNT: 16.1 % (ref 11.5–17)
GFR SERPLBLD CREATININE-BSD FMLA CKD-EPI: 54 MLS/MIN/1.73/M2
GLUCOSE SERPL-MCNC: 91 MG/DL (ref 74–100)
HCT VFR BLD AUTO: 23.9 % (ref 42–52)
HGB BLD-MCNC: 7.5 G/DL (ref 14–18)
IMM GRANULOCYTES # BLD AUTO: 0.09 X10(3)/MCL (ref 0–0.04)
IMM GRANULOCYTES NFR BLD AUTO: 1.5 %
LYMPHOCYTES # BLD AUTO: 0.43 X10(3)/MCL (ref 0.6–4.6)
LYMPHOCYTES NFR BLD AUTO: 6.9 %
MCH RBC QN AUTO: 27.3 PG (ref 27–31)
MCHC RBC AUTO-ENTMCNC: 31.4 G/DL (ref 33–36)
MCV RBC AUTO: 86.9 FL (ref 80–94)
MONOCYTES # BLD AUTO: 0.43 X10(3)/MCL (ref 0.1–1.3)
MONOCYTES NFR BLD AUTO: 6.9 %
NEUTROPHILS # BLD AUTO: 5.16 X10(3)/MCL (ref 2.1–9.2)
NEUTROPHILS NFR BLD AUTO: 83.4 %
NRBC BLD AUTO-RTO: 0 %
PLATELET # BLD AUTO: 301 X10(3)/MCL (ref 130–400)
PMV BLD AUTO: 9.6 FL (ref 7.4–10.4)
POTASSIUM SERPL-SCNC: 3.5 MMOL/L (ref 3.5–5.1)
RBC # BLD AUTO: 2.75 X10(6)/MCL (ref 4.7–6.1)
SODIUM SERPL-SCNC: 138 MMOL/L (ref 136–145)
VANCOMYCIN TROUGH SERPL-MCNC: 22.3 UG/ML (ref 15–20)
WBC # SPEC AUTO: 6.19 X10(3)/MCL (ref 4.5–11.5)

## 2023-11-26 PROCEDURE — 25000003 PHARM REV CODE 250: Performed by: INTERNAL MEDICINE

## 2023-11-26 PROCEDURE — 85025 COMPLETE CBC W/AUTO DIFF WBC: CPT | Performed by: INTERNAL MEDICINE

## 2023-11-26 PROCEDURE — 63700000 PHARM REV CODE 250 ALT 637 W/O HCPCS

## 2023-11-26 PROCEDURE — 80048 BASIC METABOLIC PNL TOTAL CA: CPT | Performed by: INTERNAL MEDICINE

## 2023-11-26 PROCEDURE — 63600175 PHARM REV CODE 636 W HCPCS: Performed by: INTERNAL MEDICINE

## 2023-11-26 PROCEDURE — 80202 ASSAY OF VANCOMYCIN: CPT | Performed by: INTERNAL MEDICINE

## 2023-11-26 PROCEDURE — 11000001 HC ACUTE MED/SURG PRIVATE ROOM

## 2023-11-26 PROCEDURE — 25000003 PHARM REV CODE 250: Performed by: NURSE PRACTITIONER

## 2023-11-26 PROCEDURE — 63600175 PHARM REV CODE 636 W HCPCS: Performed by: STUDENT IN AN ORGANIZED HEALTH CARE EDUCATION/TRAINING PROGRAM

## 2023-11-26 PROCEDURE — 25000003 PHARM REV CODE 250: Performed by: STUDENT IN AN ORGANIZED HEALTH CARE EDUCATION/TRAINING PROGRAM

## 2023-11-26 RX ADMIN — SODIUM CHLORIDE: 9 INJECTION, SOLUTION INTRAVENOUS at 11:11

## 2023-11-26 RX ADMIN — CYCLOBENZAPRINE HYDROCHLORIDE 5 MG: 5 TABLET, FILM COATED ORAL at 10:11

## 2023-11-26 RX ADMIN — FLUCONAZOLE 100 MG: 100 TABLET ORAL at 09:11

## 2023-11-26 RX ADMIN — VANCOMYCIN HYDROCHLORIDE 1000 MG: 1 INJECTION, POWDER, LYOPHILIZED, FOR SOLUTION INTRAVENOUS at 07:11

## 2023-11-26 RX ADMIN — PIPERACILLIN SODIUM AND TAZOBACTAM SODIUM 4.5 G: 4; .5 INJECTION, POWDER, FOR SOLUTION INTRAVENOUS at 04:11

## 2023-11-26 RX ADMIN — VANCOMYCIN HYDROCHLORIDE 750 MG: 750 INJECTION, POWDER, LYOPHILIZED, FOR SOLUTION INTRAVENOUS at 11:11

## 2023-11-26 RX ADMIN — PIPERACILLIN SODIUM AND TAZOBACTAM SODIUM 4.5 G: 4; .5 INJECTION, POWDER, FOR SOLUTION INTRAVENOUS at 12:11

## 2023-11-26 RX ADMIN — Medication 6 MG: at 09:11

## 2023-11-26 RX ADMIN — MULTIPLE VITAMINS W/ MINERALS TAB 1 TABLET: TAB at 09:11

## 2023-11-26 RX ADMIN — PIPERACILLIN SODIUM AND TAZOBACTAM SODIUM 4.5 G: 4; .5 INJECTION, POWDER, FOR SOLUTION INTRAVENOUS at 08:11

## 2023-11-26 NOTE — PLAN OF CARE
Problem: Adult Inpatient Plan of Care  Goal: Plan of Care Review  Outcome: Ongoing, Progressing  Goal: Readiness for Transition of Care  Outcome: Ongoing, Progressing     Problem: Infection  Goal: Absence of Infection Signs and Symptoms  Outcome: Ongoing, Progressing     Problem: Pain Acute  Goal: Acceptable Pain Control and Functional Ability  Outcome: Ongoing, Progressing     Problem: Fall Injury Risk  Goal: Absence of Fall and Fall-Related Injury  Outcome: Ongoing, Progressing

## 2023-11-26 NOTE — PROGRESS NOTES
"Colon & Rectal Surgery Progress Note    Subjective:  Sitting in the chair  "Feeling better. More energy."  Tolerating PO  Ileostomy working    Objective:  Temp:  [97.9 °F (36.6 °C)-99.2 °F (37.3 °C)]   Pulse:  [77-82]   BP: (101-105)/(62-64)   SpO2:  [96 %-98 %]     Physical Exam:  NAD  Regular rate and rhythm  Non-labored respirations  Abdomen soft, appropriate, ileostomy pink  SCDs in place      Intake/Output Summary (Last 24 hours) at 11/26/2023 0834  Last data filed at 11/25/2023 1941  Gross per 24 hour   Intake 2160 ml   Output 2700 ml   Net -540 ml       Recent Labs     11/24/23  0438 11/25/23  0405 11/26/23  0515   WBC  --   --  6.19   HGB  --   --  7.5*   HCT  --   --  23.9*   PLT  --   --  301   *   < > 138   K 3.1*   < > 3.5   CO2 28   < > 23   BUN 15.9   < > 25.3*   CREATININE 1.50*   < > 1.60*   BILITOT 0.5  --   --    AST 18  --   --    ALT 29  --   --    ALKPHOS 85  --   --    CALCIUM 7.7*   < > 7.4*   ALBUMIN 2.1*  --   --     < > = values in this interval not displayed.       Assessment/Plan  - ET education tomorrow      Fredi Shields MD  Colon and Rectal Surgery  "

## 2023-11-26 NOTE — PROGRESS NOTES
Pharmacokinetic Assessment Follow Up: IV Vancomycin    Vancomycin serum concentration assessment(s):    The trough level was drawn correctly and can be used to guide therapy at this time. The measurement is above the desired definitive target range of 15 to 20 mcg/mL.    Vancomycin Regimen Plan:  Patient was on Vancomycin 1000 mg IV every 12 hours  Change regimen to Vancomycin 750 mg IV every 12 hours with next serum trough concentration measured at 1100 prior to 4th dose on 11/28      Drug levels (last 3 results):  Recent Labs   Lab Result Units 11/24/23  1352 11/26/23  0515   Vancomycin Trough ug/ml 27.0* 22.3*       Vancomycin Administrations:  vancomycin given in the last 96 hours                     vancomycin in dextrose 5 % 1 gram/250 mL IVPB 1,000 mg (mg) 1,000 mg New Bag 11/26/23 0732     1,000 mg New Bag 11/25/23 1657     1,000 mg New Bag  0336     1,000 mg New Bag 11/24/23 1649    vancomycin 1.25 g in dextrose 5% 250 mL IVPB (ready to mix) (mg) 1,250 mg New Bag 11/24/23 0321     1,250 mg New Bag 11/23/23 1601     1,250 mg New Bag  0316      Restarted 11/22/23 1615     1,250 mg New Bag  1544                    Pharmacy will continue to follow and monitor vancomycin.    Please contact pharmacy at extension [phone] for questions regarding this assessment.    Thank you for the consult,   Chandana Victoria       Patient brief summary:  Lionel León is a 43 y.o. male initiated on antimicrobial therapy with IV Vancomycin for treatment of sepsis    The patient's current regimen is 750 mg IV every 12 hours    Drug Allergies:   Review of patient's allergies indicates:  No Known Allergies    Actual Body Weight:   74.8 kg    Renal Function:   Estimated Creatinine Clearance: 61.5 mL/min (A) (based on SCr of 1.6 mg/dL (H)).,     Dialysis Method (if applicable):  N/A    CBC (last 72 hours):  Recent Labs   Lab Result Units 11/24/23  0437 11/26/23  0515   WBC x10(3)/mcL 9.42 6.19   Hgb g/dL 9.0* 7.5*   Hct % 28.2* 23.9*    Platelet x10(3)/mcL 369 301   Mono % % 5.5 6.9   Eos % % 0.3 1.1   Basophil % % 0.3 0.2       Metabolic Panel (last 72 hours):  Recent Labs   Lab Result Units 11/24/23  0438 11/24/23  0916 11/25/23  0405 11/26/23  0515   Sodium Level mmol/L 132*  --  133* 138   Urine Sodium mmol/L  --  <20.0  --   --    Potassium Level mmol/L 3.1*  --  3.3* 3.5   Chloride mmol/L 95*  --  96* 106   Carbon Dioxide mmol/L 28  --  27 23   Glucose Level mg/dL 124*  --  109* 91   Glucose, UA   --  Normal  --   --    Blood Urea Nitrogen mg/dL 15.9  --  24.1* 25.3*   Creatinine mg/dL 1.50*  --  1.63* 1.60*   Urine Creatinine mg/dL  --  88.8  --   --    Albumin Level g/dL 2.1*  --   --   --    Bilirubin Total mg/dL 0.5  --   --   --    Alkaline Phosphatase unit/L 85  --   --   --    Aspartate Aminotransferase unit/L 18  --   --   --    Alanine Aminotransferase unit/L 29  --   --   --        Microbiologic Results:  Microbiology Results (last 7 days)       Procedure Component Value Units Date/Time    Fungal Culture [[id number]]  (Normal) Collected: 11/11/23 1851    Order Status: Completed Specimen: Abscess from Rectum Updated: 11/25/23 2000     Fungal Culture No Fungus Isolated at 2 Weeks    Blood Culture [[id number]]  (Normal) Collected: 11/18/23 1706    Order Status: Completed Specimen: Blood Updated: 11/23/23 1800     CULTURE, BLOOD ([facility]) No Growth at 5 days    Blood Culture [[id number]]  (Normal) Collected: 11/18/23 1706    Order Status: Completed Specimen: Blood Updated: 11/23/23 1800     CULTURE, BLOOD ([facility]) No Growth at 5 days    Anaerobic Culture [[id number]] Collected: 11/20/23 1414    Order Status: Completed Specimen: Wound from Scrotum Updated: 11/23/23 1006     Anaerobe Culture No Anaerobes Isolated    Wound Culture [[id number]] Collected: 11/20/23 1414    Order Status: Completed Specimen: Wound from Scrotum Updated: 11/23/23 9649     Wound Culture Rare normal skin tyrel, no further workup.    Gram Stain [[id number]] Collected:  11/20/23 1414    Order Status: Completed Specimen: Wound from Scrotum Updated: 11/21/23 0819     GRAM STAIN Few WBC observed      No bacteria seen    Fungal Culture [[id number]] Collected: 11/20/23 1414    Order Status: Sent Specimen: Wound from Scrotum Updated: 11/20/23 1519

## 2023-11-26 NOTE — PROGRESS NOTES
[facility] - 8th Floor [facility] Surg  [facility] MEDICINE ~ PROGRESS NOTE        CHIEF COMPLAINT   Hospital follow up    HOSPITAL COURSE   Lionel León is a 42 y.o. White male with a past medical history of diverticulitis status post colon resection and ulcerative colitis. Patient had recent admission to hospital medicine services at [facility] on 10/21/2023 to 10/24/2023 for sepsis secondary to pancolitis. He was treated with antibiotics and GI was consulted. Colonoscopy was performed on 10/23/2023 with findings concerning for ulcerative colitis and biopsies of the cecum, ascending, transverse, ascending colon and rectum positive for active chronic colitis consistent with inflammatory bowel disorder. No transfusion was required for rectal bleeding. Patient was discharged with prednisone taper and GI follow up appointment on 11/17/2023. The patient presented to [facility] on 11/6/2023 with a primary complaint of bright red rectal bleeding and lower abdominal pain.  Patient reports rectal bleeding has been ongoing since discharge.  Other associated symptoms include nausea, rectal pain, subjective fevers, weakness, fatigue and a 30 40 lb weight loss over last 3 weeks.  Patient states this morning he was walking to the bathroom when he felt flushed and vision went black. He passed out and hit his head on the wooden floor. Approximately hour and a half later when he went to get up he passed out again.  Second syncopal episode was witnessed by patient's wife who is at bedside reports loss of consciousness lasts for approximately 30 seconds.    Upon presentation to the ED, temperature 98.3F, heart rate 109, blood pressure 96/51, respiratory rate 17, spO2 99%. Labs with H&H 7.3/23.6 (10.6/33.2 on 10/24/2023), BUN 21.9, creatinine 0.88, calcium 7.7, .6, ESR 86. EKG sinus tachycardia with a ventricular rate of 108 and age undetermined possible inferior infarct. In the ED patient received Dilaudid, Zofran, Hydrocortisone  and IVF. He was typed and crossmatched for transfusion of 2 units of packed RBC. Patient is admitted to hospital medicine services for further medical management.       Patient was seen by GI and started on IV steroids, Solu-Medrol 30 mg IV b.I.d. for active ulcerative colitis flare up  Plus Anusol suppository per rectum b.I.d..  Patient is status post 2 units PRBC for symptomatic anemia.  CT abdomen and pelvis was also ordered to assess extent. CT of the abdomen and pelvis was pertinent for findings consistent with colitis involving the ascending colon, portions of the descending colon.  Underlying gastritis also can not be excluded secondary to slightly thickened gastric wall.     May complain continues to be rectal pain but refers that suppositories are helping.  Patient is tolerating p.o. intake    According to GI notes plans is for patient to start Humira/imura once approved by patient's insurance.  November 11th he started reporting rectal pain with a bump, on exam he had cellulitis like changes.  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  Taken to the operating room same day by General surgery found to have necrotic tissue extending to the muscle encircling his anus.  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  With ongoing infection immunosuppression with steroids have been tapered off and no further plans for Humira.  Infectious Disease was also consulted and recommended continuing Zosyn.  Wound Care Clinic was also consulted and started on hyperbaric therapy.  Patient once again started having fevers and developed concern for worsening sepsis, was taken to the operating room by a surgery November 20th and underwent diagnostic laparoscopy which showed slightly murky serous fluid bilateral pericolic gutters but no succus or stools, also underwent debridement of the scrotum/perineum and found to have some pus  draining which was debrided.    Today  Continues to improve, doing well sitting in the chair.        OBJECTIVE/PHYSICAL EXAM     VITAL SIGNS (MOST RECENT):  Temp: 97.9 °F (36.6 °C) (11/26/23 0801)  Pulse: 77 (11/26/23 0801)  Resp: 18 (11/25/23 0523)  BP: 101/64 (11/26/23 0801)  SpO2: 96 % (11/26/23 0801) VITAL SIGNS (24 HOUR RANGE):  Temp:  [97.9 °F (36.6 °C)-99.2 °F (37.3 °C)] 97.9 °F (36.6 °C)  Pulse:  [77-82] 77  SpO2:  [96 %-98 %] 96 %  BP: (101-105)/(62-64) 101/64   GENERAL: In no acute distress, afebrile  HEENT:    CHEST: Clear to auscultation bilaterally  HEART: S1, S2, no appreciable murmur  ABDOMEN: Soft, appropriately tender postop, BS +, ileostomy   MSK: Warm, no lower extremity edema, no clubbing or cyanosis  NEUROLOGIC: Alert and oriented x4, moving all extremities with good strength   INTEGUMENTARY:  Refer to images in the chart  PSYCHIATRY:        ASSESSMENT/PLAN   Severe ulcerative colitis acute flare-status post total colectomy and end ileostomy November 15  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  Continue vancomycin and Zosyn.  Follow cultures.  Fluconazole.  Colorectal surgery following.    Wound care clinic following.  Continue hyperbaric therapy.  Nutritional supplements.  GI signed off.  Off of immunosuppression.  Plastic surgery following.  Pending plans to make dispo plans.  GI signed off.  We will follow up on pathology.  Outpatient follow-up.  Change to normal saline 75 per hour.  Fractional excretion of sodium shows pre renal disease possibly from high-output ileostomy which has now improved.  Hope to discontinue vancomycin if okay from ID standpoint.    DVT prophylaxis:  Ambulatory    Anticipated discharge and disposition:  Plan for discharge once we have plans from Infectious Disease and Plastic  surgery and of course if creatinine is better  __________________________________________________________________________    LABS/MICRO/MEDS/DIAGNOSTICS       LABS  Recent Labs     11/24/23  0438 11/25/23  0405 11/26/23  0515   *   < > 138   K 3.1*   < > 3.5   CHLORIDE 95*   < > 106   CO2 28   < > 23   BUN 15.9   < > 25.3*   CREATININE 1.50*   < > 1.60*   GLUCOSE 124*   < > 91   CALCIUM 7.7*   < > 7.4*   ALKPHOS 85  --   --    AST 18  --   --    ALT 29  --   --    ALBUMIN 2.1*  --   --     < > = values in this interval not displayed.       Recent Labs     11/26/23  0515   WBC 6.19   RBC 2.75*   HCT 23.9*   MCV 86.9            MICROBIOLOGY  Microbiology Results (last 7 days)       Procedure Component Value Units Date/Time    Fungal Culture [[id number]]  (Normal) Collected: 11/11/23 1851    Order Status: Completed Specimen: Abscess from Rectum Updated: 11/25/23 2000     Fungal Culture No Fungus Isolated at 2 Weeks    Blood Culture [[id number]]  (Normal) Collected: 11/18/23 1706    Order Status: Completed Specimen: Blood Updated: 11/23/23 1800     CULTURE, BLOOD ([facility]) No Growth at 5 days    Blood Culture [[id number]]  (Normal) Collected: 11/18/23 1706    Order Status: Completed Specimen: Blood Updated: 11/23/23 1800     CULTURE, BLOOD ([facility]) No Growth at 5 days    Anaerobic Culture [[id number]] Collected: 11/20/23 1414    Order Status: Completed Specimen: Wound from Scrotum Updated: 11/23/23 1006     Anaerobe Culture No Anaerobes Isolated    Wound Culture [[id number]] Collected: 11/20/23 1414    Order Status: Completed Specimen: Wound from Scrotum Updated: 11/23/23 0758     Wound Culture Rare normal skin tyrel, no further workup.    Gram Stain [[id number]] Collected: 11/20/23 1414    Order Status: Completed Specimen: Wound from Scrotum Updated: 11/21/23 0819     GRAM STAIN Few WBC observed      No bacteria seen    Fungal Culture [[id number]] Collected: 11/20/23 1414    Order Status: Sent Specimen: Wound  from Scrotum Updated: 11/20/23 1519               MEDICATIONS   fluconazole  100 mg Oral Daily    multivitamin  1 tablet Oral Daily    piperacillin-tazobactam (Zosyn) IV (PEDS and ADULTS) (extended infusion is not appropriate)  4.5 g Intravenous Q8H    [START ON 11/27/2023] vancomycin (VANCOCIN) IV (PEDS and ADULTS)  750 mg Intravenous Q12H         INFUSIONS   sodium chloride 0.9% 100 mL/hr at 11/25/23 2319          DIAGNOSTIC TESTS  XR Gastric tube check, non-radiologist performed   Final Result      As above.         Electronically signed by: Joseph Carvajal   Date:    11/20/2023   Time:    06:15      CT Abdomen Pelvis With IV Contrast   Final Result   Impression:      1. There is extensive pneumoperitoneum. This is new since the prior examination and consistent with recent surgery. There is mild ascites. This is new since the prior examination. Correlate clinically and recommend continued serial interval follow-up to full resolution to confirm postoperative nature.      2. There is interval near-total colectomy with ileostomy in the right ventral mid abdominal wall. There are numerous moderately distended small bowel loops in the mid and lower abdomen which demonstrate air-fluid levels. Surrounding mesenteric fat stranding is noted. There is apparent transition at the level of the distal ileum. These findings are new since the prior examination and are suggestive of small bowel obstruction versus severe adynamic ileus. Correlate clinically as regards further evaluation and followup.      3. There is interval development of a defect / wound in the cutaneous / subcutaneous soft tissues of the gluteal cleft and the perineal region with soft tissue emphysema consistent with recent debridement.      4. Details and other findings as discussed above.      No significant discrepancy with overnight report         Electronically signed by: Chip Duran   Date:    11/19/2023   Time:    10:33      X-Ray Chest 1 View   Final  "Result      Suspect some bibasilar atelectasis.         Electronically signed by: Germain Cedeno   Date:    11/18/2023   Time:    17:19      CT Abdomen Pelvis W Wo Contrast   Final Result      X-Ray Chest PA And Lateral   Final Result      No acute abnormality.         Electronically signed by: Quinton Hernandez MD   Date:    11/11/2023   Time:    08:49      CT Abdomen Pelvis W Wo Contrast   Final Result      Findings seen consistent with colitis involving the ascending colon and portions of the descending colon.  Follow-up is recommended to resolution as underlying mass cannot be completely excluded.      The gastric wall appears to be slightly thickened.  Underlying gastritis should be excluded.         Electronically signed by: John Gale   Date:    11/06/2023   Time:    12:02           No results found for: "EF"       NUTRITION STATUS  Patient meets ASPEN criteria for moderate malnutrition of acute illness or injury per RD assessment as evidenced by:  Energy Intake (Malnutrition): less than 75% for greater than 7 days  Weight Loss (Malnutrition): greater than 2% in 1 week  Subcutaneous Fat (Malnutrition): moderate depletion  Muscle Mass (Malnutrition): moderate depletion  Fluid Accumulation (Malnutrition): other (see comments) (does not meet criteria)  Hand  Strength, Left (Malnutrition): unable to evaluate  Hand  Strength, Right (Malnutrition): unable to evaluate  A minimum of two characteristics is recommended for diagnosis of either severe or non-severe malnutrition.       Case related differential diagnoses have been reviewed; assessment and plan has been documented. I have personally reviewed the labs and test results that are currently available; I have reviewed the patients medication list. I have reviewed the consulting providers recommendations. I have reviewed or attempted to review medical records based upon their availability.  All of the patient's and/or family's questions have been " addressed and answered to the best of my ability.  I will continue to monitor closely and make adjustments to medical management as needed.  This document was created using M*Modal Fluency Direct.  Transcription errors may have been made.  Please contact me if any questions may rise regarding documentation to clarify transcription.        Abdoulaye Garcia MD   Internal Medicine  Department of Hospital Medicine  [facility] - 8th Floor Med Surg

## 2023-11-27 LAB
ANION GAP SERPL CALC-SCNC: 6 MEQ/L
BASOPHILS # BLD AUTO: 0.02 X10(3)/MCL
BASOPHILS NFR BLD AUTO: 0.4 %
BUN SERPL-MCNC: 24.2 MG/DL (ref 8.9–20.6)
CALCIUM SERPL-MCNC: 7.4 MG/DL (ref 8.4–10.2)
CHLORIDE SERPL-SCNC: 108 MMOL/L (ref 98–107)
CO2 SERPL-SCNC: 24 MMOL/L (ref 22–29)
CREAT SERPL-MCNC: 1.42 MG/DL (ref 0.73–1.18)
CREAT/UREA NIT SERPL: 17
EOSINOPHIL # BLD AUTO: 0.09 X10(3)/MCL (ref 0–0.9)
EOSINOPHIL NFR BLD AUTO: 1.7 %
ERYTHROCYTE [DISTWIDTH] IN BLOOD BY AUTOMATED COUNT: 16.1 % (ref 11.5–17)
GFR SERPLBLD CREATININE-BSD FMLA CKD-EPI: >60 MLS/MIN/1.73/M2
GLUCOSE SERPL-MCNC: 92 MG/DL (ref 74–100)
HCT VFR BLD AUTO: 23.8 % (ref 42–52)
HGB BLD-MCNC: 7.5 G/DL (ref 14–18)
IMM GRANULOCYTES # BLD AUTO: 0.06 X10(3)/MCL (ref 0–0.04)
IMM GRANULOCYTES NFR BLD AUTO: 1.1 %
LYMPHOCYTES # BLD AUTO: 0.45 X10(3)/MCL (ref 0.6–4.6)
LYMPHOCYTES NFR BLD AUTO: 8.3 %
MCH RBC QN AUTO: 27.5 PG (ref 27–31)
MCHC RBC AUTO-ENTMCNC: 31.5 G/DL (ref 33–36)
MCV RBC AUTO: 87.2 FL (ref 80–94)
MONOCYTES # BLD AUTO: 0.37 X10(3)/MCL (ref 0.1–1.3)
MONOCYTES NFR BLD AUTO: 6.8 %
NEUTROPHILS # BLD AUTO: 4.44 X10(3)/MCL (ref 2.1–9.2)
NEUTROPHILS NFR BLD AUTO: 81.7 %
NRBC BLD AUTO-RTO: 0 %
PLATELET # BLD AUTO: 303 X10(3)/MCL (ref 130–400)
PMV BLD AUTO: 9.3 FL (ref 7.4–10.4)
POTASSIUM SERPL-SCNC: 3.3 MMOL/L (ref 3.5–5.1)
RBC # BLD AUTO: 2.73 X10(6)/MCL (ref 4.7–6.1)
SODIUM SERPL-SCNC: 138 MMOL/L (ref 136–145)
WBC # SPEC AUTO: 5.43 X10(3)/MCL (ref 4.5–11.5)

## 2023-11-27 PROCEDURE — 63600175 PHARM REV CODE 636 W HCPCS: Performed by: STUDENT IN AN ORGANIZED HEALTH CARE EDUCATION/TRAINING PROGRAM

## 2023-11-27 PROCEDURE — 63600175 PHARM REV CODE 636 W HCPCS: Performed by: INTERNAL MEDICINE

## 2023-11-27 PROCEDURE — 25000003 PHARM REV CODE 250: Performed by: STUDENT IN AN ORGANIZED HEALTH CARE EDUCATION/TRAINING PROGRAM

## 2023-11-27 PROCEDURE — 99233 SBSQ HOSP IP/OBS HIGH 50: CPT | Mod: ,,, | Performed by: GENERAL PRACTICE

## 2023-11-27 PROCEDURE — G0277 HBOT, FULL BODY CHAMBER, 30M: HCPCS

## 2023-11-27 PROCEDURE — 63700000 PHARM REV CODE 250 ALT 637 W/O HCPCS

## 2023-11-27 PROCEDURE — 99233 PR SUBSEQUENT HOSPITAL CARE,LEVL III: ICD-10-PCS | Mod: ,,, | Performed by: GENERAL PRACTICE

## 2023-11-27 PROCEDURE — 85025 COMPLETE CBC W/AUTO DIFF WBC: CPT | Performed by: INTERNAL MEDICINE

## 2023-11-27 PROCEDURE — 80048 BASIC METABOLIC PNL TOTAL CA: CPT | Performed by: INTERNAL MEDICINE

## 2023-11-27 PROCEDURE — 25000003 PHARM REV CODE 250: Performed by: NURSE PRACTITIONER

## 2023-11-27 PROCEDURE — 25000003 PHARM REV CODE 250: Performed by: INTERNAL MEDICINE

## 2023-11-27 PROCEDURE — 11000001 HC ACUTE MED/SURG PRIVATE ROOM

## 2023-11-27 RX ORDER — TALC
9 POWDER (GRAM) TOPICAL NIGHTLY PRN
Status: DISCONTINUED | OUTPATIENT
Start: 2023-11-27 | End: 2023-11-30 | Stop reason: HOSPADM

## 2023-11-27 RX ADMIN — FLUCONAZOLE 100 MG: 100 TABLET ORAL at 08:11

## 2023-11-27 RX ADMIN — PIPERACILLIN SODIUM AND TAZOBACTAM SODIUM 4.5 G: 4; .5 INJECTION, POWDER, FOR SOLUTION INTRAVENOUS at 05:11

## 2023-11-27 RX ADMIN — CYCLOBENZAPRINE HYDROCHLORIDE 5 MG: 5 TABLET, FILM COATED ORAL at 09:11

## 2023-11-27 RX ADMIN — Medication 9 MG: at 09:11

## 2023-11-27 RX ADMIN — MULTIPLE VITAMINS W/ MINERALS TAB 1 TABLET: TAB at 08:11

## 2023-11-27 NOTE — PROGRESS NOTES
[facility] - 8th Floor Med Surg  Wound Care  Progress Note    Patient Name: Lionel León  MRN: [id number]  Admission Date: 11/6/2023  Hospital Length of Stay: 21 days  Attending Physician: Abdoulaye Garcia MD  Primary Care Provider: Manoj Provider     Subjective:     HPI:  HBO    42-year-old WM in normal health until fairly recent diagnosis of  ulcerative colitis during a recent admission to [facility] from 10/21/2023 - 10/24/2023. He was readmitted 2 weeks later on 11/6/23 with complaint of rectal pain, bleeding and severe anemia.  He was subsequently diagnosed with necrotizing fasciitis of the bilateral gluteal areas and underwent operative debridements as well as laparoscopic total colectomy with end ileostomy  . We are using adjuvant HBOT sessions while hospitalized    Hospital Course:   No notes on file      Follow-up For: Procedure(s) (LRB):  EGD, WITH CLOSED BIOPSY (N/A)    Post-Operative Day: 5 Days Post-Op    Scheduled Meds:   multivitamin  1 tablet Oral Daily     Continuous Infusions:   sodium chloride 0.9% 75 mL/hr at 11/26/23 1826     PRN Meds:acetaminophen, acetaminophen, aluminum-magnesium hydroxide-simethicone, cyclobenzaprine, dextrose 10%, dextrose 10%, dicyclomine, glucagon (human recombinant), glucose, glucose, lactated ringers, melatonin, morphine, ondansetron, oxyCODONE, sodium chloride 0.9%    Review of Systems  Objective:     Vital Signs (Most Recent):  Temp: 98.6 °F (37 °C) (11/27/23 0748)  Pulse: 90 (11/27/23 0748)  Resp: 18 (11/27/23 0748)  BP: 96/62 (11/27/23 0748)  SpO2: 98 % (11/27/23 0748) Vital Signs (24h Range):  Temp:  [98 °F (36.7 °C)-99.1 °F (37.3 °C)] 98.6 °F (37 °C)  Pulse:  [70-90] 90  Resp:  [18-20] 18  SpO2:  [96 %-100 %] 98 %  BP: ()/(57-63) 96/62     Weight: 74.8 kg (165 lb)  Body mass index is 23.68 kg/m².     Physical Exam  Vitals reviewed.   Pulmonary:      Effort: Pulmonary effort is normal.   Skin:     Capillary Refill: Capillary refill takes less than  2 seconds.   Neurological:      General: No focal deficit present.      Mental Status: He is alert and oriented to person, place, and time. Mental status is at baseline.     Wounds dressed     Laboratory:  CBC:   Recent Labs   Lab 11/27/23  0432   WBC 5.43   RBC 2.73*   HGB 7.5*   HCT 23.8*      MCV 87.2   MCH 27.5   MCHC 31.5*     CMP:   Recent Labs   Lab 11/24/23  0438 11/25/23  0405 11/27/23  0432   CALCIUM 7.7*   < > 7.4*   ALBUMIN 2.1*  --   --    *   < > 138   K 3.1*   < > 3.3*   CO2 28   < > 24   BUN 15.9   < > 24.2*   CREATININE 1.50*   < > 1.42*   ALKPHOS 85  --   --    ALT 29  --   --    AST 18  --   --    BILITOT 0.5  --   --     < > = values in this interval not displayed.      Latest Reference Range & Units 11/17/23 05:14   Prealbumin 18.0 - 45.0 mg/dL 10.6 (L)   (L): Data is abnormally low  Assessment/Plan:     Necrotizing fasciitis status post operative debridements  Positive cultures for E coli and Streptococcus treated with  IV Zosyn  Ulcerative colitis, new diagnosis October 2023  Now status post total colectomy and end ileostomy on 11/15/23 and diagnostic laproscopy on 11/20/23 for washout  Anemia, requiring blood transfusion  Low prealbumin         The principal treatment for progressive necrotizing infection such as necrotizing fasciitis is surgical debridement and systemic antibiotics which has been done.  HBO therapy is an adjunct to help reduce the extensive inflammation and necrosis of the subcutaneous fat, fascia and muscle. Hyperbaric oxygen will increase the  concentration of dissolved oxygen in the blood which can augment oxygenation to all parts of the body,   stimulates the formation of a collagen matrix , promotes angiogenesis and can be bactericidal.  He had 90 minute session at 2.0 BEAR today and tolerated well.  Will continue while he is hospitalized.     Physician Supervision: I provided direct supervision and was immediately available to furnish assistance and  direction throughout the performance of the procedure.A trained emergency response team and emergency services were available throughout procedure. Pt had no complaints: no chest pain or sob or earache or sinus pressure         Madeleine Lagos MD  Wound Care  [facility] - 8th Floor Med Surg

## 2023-11-27 NOTE — PROGRESS NOTES
Colon and Rectal Surgery Progress Note  Admit Date: 11/6/2023  Hospital Day: 21  Procedure: 5 Days Post-Op     S:  AF HDS  Pain controlled  Ostomy output decreasing  Rebecca PO  Wound care going well. Wife showed image from yesterday which had healthy granulation tissue.    O:  Vitals:   Temp:  [97.9 °F (36.6 °C)-99.1 °F (37.3 °C)]   Pulse:  [70-82]   Resp:  [18-20]   BP: ()/(57-64)   SpO2:  [96 %-100 %]     Diet low fiber/residue   Intake/Output Summary (Last 24 hours) at 11/27/2023 0706  Last data filed at 11/27/2023 0600  Gross per 24 hour   Intake 5469.91 ml   Output 3300 ml   Net 2169.91 ml            Physical Exam:  Gen: NAD, AAOx3  HEENT: EOMI, NCAT  CV: RR   Resp: no shortness of breath, normal WOB  Abd: soft, non-distended, ostomy pink and productive  Ext: no edema      Labs:  Recent Labs     11/25/23  0405 11/26/23  0515 11/27/23  0432   WBC  --  6.19 5.43   HGB  --  7.5* 7.5*   HCT  --  23.9* 23.8*   PLT  --  301 303   * 138 138   K 3.3* 3.5 3.3*   CO2 27 23 24   BUN 24.1* 25.3* 24.2*   CREATININE 1.63* 1.60* 1.42*   CALCIUM 8.1* 7.4* 7.4*     Scheduled Meds: fluconazole, 100 mg, Daily  multivitamin, 1 tablet, Daily  piperacillin-tazobactam (Zosyn) IV (PEDS and ADULTS) (extended infusion is not appropriate), 4.5 g, Q8H  vancomycin (VANCOCIN) IV (PEDS and ADULTS), 750 mg, Q12H     sodium chloride 0.9% 75 mL/hr at 11/26/23 1826       A/P:    43 yo M with IBD s/p TAC with end ileostomy taken back to OR on 11/20 for diagnostic laparoscopy which was negative for intabdominal findings, perineal wound exploration with further debridement     -Wife doing well with perineal wound care  -JUAN PABLO likely 2/2 high ileostomy output, now down trending.   -Ostomy education  -Can likely stop antibiotics     Adriana Raygoza MD  [employer] General Surgery, [facility]

## 2023-11-27 NOTE — PROGRESS NOTES
[facility] - 8th Floor Med Surg  Hyperbaric Medicine    Patient Name: Lionel León  MRN: [id number]  Date: 11/27/2023  Diagnosis:   Active Problem List with Overview Notes    Diagnosis Date Noted    Lower GI bleed 11/21/2023    Rectal bleeding 11/21/2023    Sepsis 11/21/2023    Ulcerative colitis with complication 11/21/2023    Allison's gangrene 11/21/2023    Allison gangrene 11/17/2023    Pancolitis 10/24/2023          Subjective:     Patient ID: Lionel León is a 43 y.o. male.Pt states that he was d/c'd from hospital on 10/24/2023. He states that he was prescribed prednisone and felt good until he finished the prescribed duration. He said then he started to have diarrhea again, hot flashes, sore throat, abdominal pain, and ractal bleeding. He said he had two episodes of syncope prior to returning to ED. On 11/11/23 pt found to have soft tissue infection to perineum- was taken to OR emergently. Pt has had numerous debridements in the OR. Pt starting 11/17/2023 today for soft tissue necrotizing infection. Pt was taken back to OR 11/20/2023 for additional debridement.        Objective     Visit Information  Arrival Condition: Stable  Ambulatory Status: Stretcher  Patient Identification Verified: Yes  Secondary Verification Process Completed: Yes  Patient is in Isolation Precaution?: No  Is patient oriented to person, place and time?: Yes  History Since Last Visit  All ordered tests and consults were completed.: Yes  Experienced change in activities of daily living that may affect risk of falls: No  Experienced any changes in pain level or management: No  Signs of symptoms of abuse and/or neglect since last visit?: No  Patient is alert and oriented times three.   BP: 110/74  Pulse: 80  Resp: 16  Temp: 98 °F (36.7 °C)  Pain Level: 0  Left Vision Change: vision change denied  Right Vision Change: vision change denied  Left Hearing Change: hearing change denied  Right Hearing Change: hearing change  denied  Nose Symptoms: smell intact, no tenderness, no drainage, breathes through nostrils, symmetrical  Respiratory WDL: WDL  Breath Sounds: All Fields  All Lung Fields Breath Sounds: equal bilaterally, clear  Heart Sounds: S1, S2    Hyperbaric Pre inspection/Safety checklist:     Hyperbaric Pre-Inspection  Mattress cleaned prior to treatment: Yes  2 Patient Identifiers Verified: Yes  For Inpatients: Verify correct ID band/correct chart: Yes  Consent Obtained: Yes  Cotton Gown: Yes  Patient voided: Yes  Drainage Bags Emptied: Yes  Drainage tubes secured: Yes  Patient Pregnant: Not applicable  Glasses, Jewelry, Makeup, etc. Removed: Yes  Hard contacts removed: Yes  Dentures, Hearing Aid, Prosthetic Devices Removed: Not applicable  Touch hair to check for hair spray: Yes  Cold/Flu Symptoms: No  Diabetic Patient Eaten: Not applicable  Medications Given: Yes  For Inpatients: Medication sheet sent with patient: Yes  Fears and apprehensions verbalized: Yes  Ground Wire Secured: Yes    Hyperbaric Treatment Course:      Hyperbaric Treatment Order:  2.40 BEAR x90 mins with 100% oxygen and no air breaks with an ascent rate of 1.5 psi/min and a descent rate of 2.0 psi/min    HBO Treatment Course Details  Treatment Course Number: 1  Total Treatments Ordered: 10  Ordering Provider: Dr. Lagos  Indications: Progressive necrotizing infections  HBO Treatment Start Date: 11/17/23    HBO Treatment Details  Treatment Number: 3  Inpatient Visit: Yes  Total Treatment Length Calc (minutes): 110  Adjusted Treatment Length (minutes): 120  Chamber Type: Monoplace  Chamber #: 2  HBO Dive Log #: [id number]  Treatment Protocol: Other Treatment Protocol (enter in treatment notes) (2.40 BEAR x90 mins with 100% oxygen and no air breaks)    Treatment Details  Dive Rate Down: 1.5 psi/minute  Dive Rate Up: 2.0 psi/minute  Compress Begins: 1.10  Clock Time: 1045  Tx Pressure Reached: 2.00  Clock Time: 1055  Decompress Begins: 2.40  Clock Time:  1225  Decompress Ends: 1.10  Clock Time: 1235    Treatment Details/Response:     Pt completed treatment without complaints/complications. TEED grade 0 pre and post tx. If pt still here will bring down tmw for tx. Wound care done following tx. Pt tolerated well.    Post HBO Vital Signs  BP: (!) 147/79  Pulse: 80  Resp: 16  Temp: 98 °F (36.7 °C)  Pain Level: 0    Ear Evaluation  Left Teed Scale Pre: Grade 0  Left Teed Scale Post: Grade 0  Right Teed Scale Pre: Grade 0  Right Teed Scale Post: Grade 0     Visit Discharge Information  Discharge Condition: Stable  Ambulatory Status: Ambulatory  Discharge Destination: Other (back to inpt room)  Transportation: Other (patient transport)    Physician Supervision: The hyperbaric physician provided direct supervision and was immediately available to furnish assistance and direction throughout the performance of the procedure.A trained emergency response team and emergency services were available throughout procedure.     Sheilaail Oswalt, LPN [facility] - 8th Floor Med Surg  Hyperbaric Medicine

## 2023-11-27 NOTE — SUBJECTIVE & OBJECTIVE
Subjective:     HPI:  HBO    42-year-old WM in normal health until fairly recent diagnosis of  ulcerative colitis during a recent admission to [facility] from 10/21/2023 - 10/24/2023. He was readmitted 2 weeks later on 11/6/23 with complaint of rectal pain, bleeding and severe anemia.  He was subsequently diagnosed with necrotizing fasciitis of the bilateral gluteal areas and underwent operative debridements as well as laparoscopic total colectomy with end ileostomy  . We are using adjuvant HBOT sessions while hospitalized    Hospital Course:   No notes on file      Follow-up For: Procedure(s) (LRB):  EGD, WITH CLOSED BIOPSY (N/A)    Post-Operative Day: 5 Days Post-Op    Scheduled Meds:   multivitamin  1 tablet Oral Daily     Continuous Infusions:   sodium chloride 0.9% 75 mL/hr at 11/26/23 1826     PRN Meds:acetaminophen, acetaminophen, aluminum-magnesium hydroxide-simethicone, cyclobenzaprine, dextrose 10%, dextrose 10%, dicyclomine, glucagon (human recombinant), glucose, glucose, lactated ringers, melatonin, morphine, ondansetron, oxyCODONE, sodium chloride 0.9%    Review of Systems  Objective:     Vital Signs (Most Recent):  Temp: 98.6 °F (37 °C) (11/27/23 0748)  Pulse: 90 (11/27/23 0748)  Resp: 18 (11/27/23 0748)  BP: 96/62 (11/27/23 0748)  SpO2: 98 % (11/27/23 0748) Vital Signs (24h Range):  Temp:  [98 °F (36.7 °C)-99.1 °F (37.3 °C)] 98.6 °F (37 °C)  Pulse:  [70-90] 90  Resp:  [18-20] 18  SpO2:  [96 %-100 %] 98 %  BP: ()/(57-63) 96/62     Weight: 74.8 kg (165 lb)  Body mass index is 23.68 kg/m².     Physical Exam  Vitals reviewed.   Pulmonary:      Effort: Pulmonary effort is normal.   Skin:     Capillary Refill: Capillary refill takes less than 2 seconds.   Neurological:      General: No focal deficit present.      Mental Status: He is alert and oriented to person, place, and time. Mental status is at baseline.     Wounds dressed     Laboratory:  CBC:   Recent Labs   Lab 11/27/23  0432   WBC 5.43   RBC  2.73*   HGB 7.5*   HCT 23.8*      MCV 87.2   MCH 27.5   MCHC 31.5*     CMP:   Recent Labs   Lab 11/24/23 0438 11/25/23 0405 11/27/23 0432   CALCIUM 7.7*   < > 7.4*   ALBUMIN 2.1*  --   --    *   < > 138   K 3.1*   < > 3.3*   CO2 28   < > 24   BUN 15.9   < > 24.2*   CREATININE 1.50*   < > 1.42*   ALKPHOS 85  --   --    ALT 29  --   --    AST 18  --   --    BILITOT 0.5  --   --     < > = values in this interval not displayed.      Latest Reference Range & Units 11/17/23 05:14   Prealbumin 18.0 - 45.0 mg/dL 10.6 (L)   (L): Data is abnormally low

## 2023-11-27 NOTE — PROGRESS NOTES
[facility] - 8th Floor Med Surg  Wound Care    Patient Name:  Lionel León   MRN:  [id number]  Date: 11/27/2023  Diagnosis: Pancolitis    History:     Past Medical History:   Diagnosis Date    Diverticulitis     Ulcerative colitis        Social History     Socioeconomic History    Marital status:    Tobacco Use    Smoking status: Never    Smokeless tobacco: Never   Substance and Sexual Activity    Alcohol use: Yes     Comment: Social    Drug use: Never     Social Determinants of Health     Food Insecurity: Unknown (11/7/2023)    Hunger Vital Sign     Worried About Running Out of Food in the Last Year: Never true   Transportation Needs: Unknown (11/7/2023)    PRAPARE - Transportation     Lack of Transportation (Medical): No   Housing Stability: Unknown (11/7/2023)    Housing Stability Vital Sign     Unable to Pay for Housing in the Last Year: No       Precautions:     Allergies as of 11/06/2023    (No Known Allergies)       WO Assessment Details/Treatment     Follow up visit , patient resting on a low air loss mattress and reports is awaiting transport to wound clinic for HBO therapy , spouse at bedside reports is confident in her ability to provide wound and ostomy care for patient upon discharge to home. Reviewed how to access supplies and support for living with an ileostomy.  Patient and spouse verbalize understanding and confidence in ability to provide and obtain.        11/27/23 0930        Ileostomy 11/15/23 1730 RUQ   Placement Date/Time: 11/15/23 1730   Inserted by: MD  Location: RUQ   Stoma Appearance round;moist;pink   Appliance 2-piece;no leakage   Stoma Function stool;flatus   Tolerance no signs/symptoms of discomfort  (spouse reports has changed provided complete appliance change and is confident in her ability to provide support for patient living with an ileostomy.)         Recommendations made to primary team for monitoring ileostomy and providing wound care   . Orders placed.      11/27/2023

## 2023-11-27 NOTE — PROGRESS NOTES
[facility] Hospital Medicine Progress Note        Chief Complaint: Inpatient Follow-up for     HPI:   Lionel León is a 42 y.o. White male with a past medical history of diverticulitis status post colon resection and ulcerative colitis. Patient had recent admission to hospital medicine services at [facility] on 10/21/2023 to 10/24/2023 for sepsis secondary to pancolitis. He was treated with antibiotics and GI was consulted. Colonoscopy was performed on 10/23/2023 with findings concerning for ulcerative colitis and biopsies of the cecum, ascending, transverse, ascending colon and rectum positive for active chronic colitis consistent with inflammatory bowel disorder. No transfusion was required for rectal bleeding. Patient was discharged with prednisone taper and GI follow up appointment on 11/17/2023. The patient presented to [facility] on 11/6/2023 with a primary complaint of bright red rectal bleeding and lower abdominal pain.  Patient reports rectal bleeding has been ongoing since discharge.  Other associated symptoms include nausea, rectal pain, subjective fevers, weakness, fatigue and a 30 40 lb weight loss over last 3 weeks.  Patient states this morning he was walking to the bathroom when he felt flushed and vision went black. He passed out and hit his head on the wooden floor. Approximately hour and a half later when he went to get up he passed out again.  Second syncopal episode was witnessed by patient's wife who is at bedside reports loss of consciousness lasts for approximately 30 seconds.    Upon presentation to the ED, temperature 98.3F, heart rate 109, blood pressure 96/51, respiratory rate 17, spO2 99%. Labs with H&H 7.3/23.6 (10.6/33.2 on 10/24/2023), BUN 21.9, creatinine 0.88, calcium 7.7, .6, ESR 86. EKG sinus tachycardia with a ventricular rate of 108 and age undetermined possible inferior infarct. In the ED patient received Dilaudid, Zofran, Hydrocortisone and IVF. He  was typed and crossmatched for transfusion of 2 units of packed RBC. Patient is admitted to hospital medicine services for further medical management.       Patient was seen by GI and started on IV steroids, Solu-Medrol 30 mg IV b.I.d. for active ulcerative colitis flare up  Plus Anusol suppository per rectum b.I.d..  Patient is status post 2 units PRBC for symptomatic anemia.  CT abdomen and pelvis was also ordered to assess extent. CT of the abdomen and pelvis was pertinent for findings consistent with colitis involving the ascending colon, portions of the descending colon.  Underlying gastritis also can not be excluded secondary to slightly thickened gastric wall.     May complain continues to be rectal pain but refers that suppositories are helping.  Patient is tolerating p.o. intake    According to GI notes plans is for patient to start Humira/imura once approved by patient's insurance.  November 11th he started reporting rectal pain with a bump, on exam he had cellulitis like changes.  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  Taken to the operating room same day by General surgery found to have necrotic tissue extending to the muscle encircling his anus.  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  With ongoing infection immunosuppression with steroids have been tapered off and no further plans for Humira.  Infectious Disease was also consulted and recommended continuing Zosyn.  Wound Care Clinic was also consulted and started on hyperbaric therapy.  Patient once again started having fevers and developed concern for worsening sepsis, was taken to the operating room by a surgery November 20th and underwent diagnostic laparoscopy which showed slightly murky serous fluid bilateral pericolic gutters but no succus or stools, also underwent debridement of the scrotum/perineum and found to have some pus draining  which was debrided.    Interval Hx:   Patient seen and examined by bedside.  Wife by bedside.  No acute overnight events.  No acute concerns at this time.  Wife has a lot of case management questions.  Discussed plans of deescalating antibiotics    Case was discussed with patient's nurse and  on the floor.    Objective/physical exam:  General: In no acute distress, afebrile  Chest: Clear to auscultation bilaterally  Heart: RRR, +S1, S2, no appreciable murmur  Abdomen: Soft, nontender, BS + ileostomy  MSK: Warm, no lower extremity edema, no clubbing or cyanosis  Neurologic: Alert and oriented x4, Cranial nerve II-XII intact, Strength 5/5 in all 4 extremities    VITAL SIGNS: 24 HRS MIN & MAX LAST   Temp  Min: 98 °F (36.7 °C)  Max: 99.1 °F (37.3 °C) 98.6 °F (37 °C)   BP  Min: 95/57  Max: 106/62 96/62   Pulse  Min: 70  Max: 90  90   Resp  Min: 18  Max: 20 18   SpO2  Min: 96 %  Max: 100 % 98 %     I have reviewed the following labs:  Recent Labs   Lab 11/24/23  0437 11/26/23  0515 11/27/23  0432   WBC 9.42 6.19 5.43   RBC 3.34* 2.75* 2.73*   HGB 9.0* 7.5* 7.5*   HCT 28.2* 23.9* 23.8*   MCV 84.4 86.9 87.2   MCH 26.9* 27.3 27.5   MCHC 31.9* 31.4* 31.5*   RDW 15.7 16.1 16.1    301 303   MPV 9.7 9.6 9.3     Recent Labs   Lab 11/21/23  0443 11/21/23  2359 11/22/23  0842 11/24/23  0438 11/25/23  0405 11/26/23  0515 11/27/23  0432   * 134* 135* 132* 133* 138 138   K 3.7 3.5 3.6 3.1* 3.3* 3.5 3.3*   CO2 31* 28 26 28 27 23 24   BUN 15.6 13.1 12.6 15.9 24.1* 25.3* 24.2*   CREATININE 0.73 0.84 0.82 1.50* 1.63* 1.60* 1.42*   CALCIUM 7.2* 7.4* 7.4* 7.7* 8.1* 7.4* 7.4*   MG 2.00  --  2.10  --   --   --   --    ALBUMIN 1.9* 1.8* 1.9* 2.1*  --   --   --    ALKPHOS 85 80 76 85  --   --   --    ALT 33 29 29 29  --   --   --    AST 21 19 21 18  --   --   --    BILITOT 0.7 0.8 0.6 0.5  --   --   --      Microbiology Results (last 7 days)       Procedure Component Value Units Date/Time    Fungal Culture [[id number]]   (Normal) Collected: 11/11/23 1851    Order Status: Completed Specimen: Abscess Updated: 11/27/23 1201     Fungal Culture No Fungus Isolated at 2 Weeks    Fungal Culture [[id number]]  (Normal) Collected: 11/11/23 1851    Order Status: Completed Specimen: Abscess from Rectum Updated: 11/25/23 2000     Fungal Culture No Fungus Isolated at 2 Weeks    Blood Culture [[id number]]  (Normal) Collected: 11/18/23 1706    Order Status: Completed Specimen: Blood Updated: 11/23/23 1800     CULTURE, BLOOD ([facility]) No Growth at 5 days    Blood Culture [[id number]]  (Normal) Collected: 11/18/23 1706    Order Status: Completed Specimen: Blood Updated: 11/23/23 1800     CULTURE, BLOOD ([facility]) No Growth at 5 days    Anaerobic Culture [[id number]] Collected: 11/20/23 1414    Order Status: Completed Specimen: Wound from Scrotum Updated: 11/23/23 1006     Anaerobe Culture No Anaerobes Isolated    Wound Culture [[id number]] Collected: 11/20/23 1414    Order Status: Completed Specimen: Wound from Scrotum Updated: 11/23/23 0758     Wound Culture Rare normal skin tyrel, no further workup.    Gram Stain [[id number]] Collected: 11/20/23 1414    Order Status: Completed Specimen: Wound from Scrotum Updated: 11/21/23 0819     GRAM STAIN Few WBC observed      No bacteria seen    Fungal Culture [[id number]] Collected: 11/20/23 1414    Order Status: Sent Specimen: Wound from Scrotum Updated: 11/20/23 1519             See below for Radiology    Scheduled Med:   multivitamin  1 tablet Oral Daily      Continuous Infusions:   sodium chloride 0.9% 75 mL/hr at 11/26/23 1826      PRN Meds:  acetaminophen, acetaminophen, aluminum-magnesium hydroxide-simethicone, cyclobenzaprine, dextrose 10%, dextrose 10%, dicyclomine, glucagon (human recombinant), glucose, glucose, lactated ringers, melatonin, morphine, ondansetron, oxyCODONE, sodium chloride 0.9%     Assessment/Plan:  Severe ulcerative colitis acute flare-status post total colectomy and end ileostomy November  15  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  Antibiotics de-escalated today  Colorectal surgery following.    Wound care clinic following.  Continue hyperbaric therapy.  Nutritional supplements.  GI signed off.  Off of immunosuppression.  Plastic surgery following.  Pending plans to make dispo plans.  GI signed off.  We will follow up on pathology.  Outpatient follow-up.  Continue normal saline for 1 more day  JUAN PABLO improving with fluids,   Labs in a.m.  Further recs based on clinical course    VTE prophylaxis:  Ambulatory    Patient condition:  Stable    Anticipated discharge and Disposition:   To be determined      All diagnosis and differential diagnosis have been reviewed; assessment and plan has been documented; I have personally reviewed the labs and test results that are presently available; I have reviewed the patients medication list; I have reviewed the consulting providers response and recommendations. I have reviewed or attempted to review medical records based upon their availability    All of the patient's questions have been  addressed and answered. Patient's is agreeable to the above stated plan. I will continue to monitor closely and make adjustments to medical management as needed.  _____________________________________________________________________    Nutrition Status:    Radiology:  I have personally reviewed the following imaging and agree with the radiologist.     XR Gastric tube check, non-radiologist performed  Narrative: EXAMINATION:  XR GASTRIC TUBE CHECK, NON-RADIOLOGIST PERFORMED    CLINICAL HISTORY:  NGT placement;    TECHNIQUE:  Single view of the abdomen    COMPARISON:  None    FINDINGS:  NG tube projects over the left upper quadrant.  This is  the expected location of the stomach.  Gas-filled loops of bowel remain throughout.  Impression: As above.    Electronically signed by: Joseph Carvajal  Date:    11/20/2023  Time:    06:15    Radha Gibbs DO  Department of Hospital Medicine  [place]  11/27/2023

## 2023-11-28 LAB — PSYCHE PATHOLOGY RESULT: NORMAL

## 2023-11-28 PROCEDURE — 11000001 HC ACUTE MED/SURG PRIVATE ROOM

## 2023-11-28 PROCEDURE — 25000003 PHARM REV CODE 250: Performed by: INTERNAL MEDICINE

## 2023-11-28 PROCEDURE — 63600175 PHARM REV CODE 636 W HCPCS: Performed by: INTERNAL MEDICINE

## 2023-11-28 PROCEDURE — 25000003 PHARM REV CODE 250: Performed by: NURSE PRACTITIONER

## 2023-11-28 RX ORDER — POTASSIUM CHLORIDE 14.9 MG/ML
40 INJECTION INTRAVENOUS ONCE
Status: COMPLETED | OUTPATIENT
Start: 2023-11-28 | End: 2023-11-28

## 2023-11-28 RX ADMIN — Medication 9 MG: at 09:11

## 2023-11-28 RX ADMIN — MULTIPLE VITAMINS W/ MINERALS TAB 1 TABLET: TAB at 09:11

## 2023-11-28 RX ADMIN — POTASSIUM CHLORIDE 40 MEQ: 14.9 INJECTION, SOLUTION INTRAVENOUS at 05:11

## 2023-11-28 RX ADMIN — CYCLOBENZAPRINE HYDROCHLORIDE 5 MG: 5 TABLET, FILM COATED ORAL at 10:11

## 2023-11-28 NOTE — PROGRESS NOTES
[facility] Hospital Medicine Progress Note        Chief Complaint: Inpatient Follow-up for     HPI:   Lionel León is a 42 y.o. White male with a past medical history of diverticulitis status post colon resection and ulcerative colitis. Patient had recent admission to hospital medicine services at [facility] on 10/21/2023 to 10/24/2023 for sepsis secondary to pancolitis. He was treated with antibiotics and GI was consulted. Colonoscopy was performed on 10/23/2023 with findings concerning for ulcerative colitis and biopsies of the cecum, ascending, transverse, ascending colon and rectum positive for active chronic colitis consistent with inflammatory bowel disorder. No transfusion was required for rectal bleeding. Patient was discharged with prednisone taper and GI follow up appointment on 11/17/2023. The patient presented to [facility] on 11/6/2023 with a primary complaint of bright red rectal bleeding and lower abdominal pain.  Patient reports rectal bleeding has been ongoing since discharge.  Other associated symptoms include nausea, rectal pain, subjective fevers, weakness, fatigue and a 30 40 lb weight loss over last 3 weeks.  Patient states this morning he was walking to the bathroom when he felt flushed and vision went black. He passed out and hit his head on the wooden floor. Approximately hour and a half later when he went to get up he passed out again.  Second syncopal episode was witnessed by patient's wife who is at bedside reports loss of consciousness lasts for approximately 30 seconds.    Upon presentation to the ED, temperature 98.3F, heart rate 109, blood pressure 96/51, respiratory rate 17, spO2 99%. Labs with H&H 7.3/23.6 (10.6/33.2 on 10/24/2023), BUN 21.9, creatinine 0.88, calcium 7.7, .6, ESR 86. EKG sinus tachycardia with a ventricular rate of 108 and age undetermined possible inferior infarct. In the ED patient received Dilaudid, Zofran, Hydrocortisone and IVF. He  was typed and crossmatched for transfusion of 2 units of packed RBC. Patient is admitted to hospital medicine services for further medical management.       Patient was seen by GI and started on IV steroids, Solu-Medrol 30 mg IV b.I.d. for active ulcerative colitis flare up  Plus Anusol suppository per rectum b.I.d..  Patient is status post 2 units PRBC for symptomatic anemia.  CT abdomen and pelvis was also ordered to assess extent. CT of the abdomen and pelvis was pertinent for findings consistent with colitis involving the ascending colon, portions of the descending colon.  Underlying gastritis also can not be excluded secondary to slightly thickened gastric wall.     May complain continues to be rectal pain but refers that suppositories are helping.  Patient is tolerating p.o. intake    According to GI notes plans is for patient to start Humira/imura once approved by patient's insurance.  November 11th he started reporting rectal pain with a bump, on exam he had cellulitis like changes.  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  Taken to the operating room same day by General surgery found to have necrotic tissue extending to the muscle encircling his anus.  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  With ongoing infection immunosuppression with steroids have been tapered off and no further plans for Humira.  Infectious Disease was also consulted and recommended continuing Zosyn.  Wound Care Clinic was also consulted and started on hyperbaric therapy.  Patient once again started having fevers and developed concern for worsening sepsis, was taken to the operating room by a surgery November 20th and underwent diagnostic laparoscopy which showed slightly murky serous fluid bilateral pericolic gutters but no succus or stools, also underwent debridement of the scrotum/perineum and found to have some pus draining  which was debrided.    Interval Hx:   11/27/2023-Patient seen and examined by bedside.  Wife by bedside.  No acute overnight events.  No acute concerns at this time.  Wife has a lot of case management questions.  Discussed plans of deescalating antibiotics    11/28/2023 Dr. Alvarez-chart reviewed patient examined.  Latest pictures from wounds shown to me by wife.  Patient has been seen by Colorectal surgery/plastics/ID.  Patient is off antibiotics.  Kidney function improving    Case was discussed with patient's nurse and  on the floor.    Objective/physical exam:  General: In no acute distress, afebrile  Chest: Clear to auscultation bilaterally  Heart: RRR, +S1, S2, no appreciable murmur  Abdomen: Soft, nontender, BS + ileostomy  MSK: Warm, no lower extremity edema, no clubbing or cyanosis  Neurologic: Alert and oriented x4, Cranial nerve II-XII intact, Strength 5/5 in all 4 extremities  Perineum/ sacrum - pictures reviewed. Shown to me by wife    VITAL SIGNS: 24 HRS MIN & MAX LAST   Temp  Min: 98.1 °F (36.7 °C)  Max: 99 °F (37.2 °C) 98.1 °F (36.7 °C)   BP  Min: 106/64  Max: 110/72 109/69   Pulse  Min: 72  Max: 96  78   Resp  Min: 14  Max: 20 18   SpO2  Min: 97 %  Max: 99 % 98 %     I have reviewed the following labs:  Recent Labs   Lab 11/24/23  0437 11/26/23  0515 11/27/23  0432   WBC 9.42 6.19 5.43   RBC 3.34* 2.75* 2.73*   HGB 9.0* 7.5* 7.5*   HCT 28.2* 23.9* 23.8*   MCV 84.4 86.9 87.2   MCH 26.9* 27.3 27.5   MCHC 31.9* 31.4* 31.5*   RDW 15.7 16.1 16.1    301 303   MPV 9.7 9.6 9.3       Recent Labs   Lab 11/21/23  2359 11/22/23  0842 11/24/23  0438 11/25/23  0405 11/26/23  0515 11/27/23  0432   * 135* 132* 133* 138 138   K 3.5 3.6 3.1* 3.3* 3.5 3.3*   CO2 28 26 28 27 23 24   BUN 13.1 12.6 15.9 24.1* 25.3* 24.2*   CREATININE 0.84 0.82 1.50* 1.63* 1.60* 1.42*   CALCIUM 7.4* 7.4* 7.7* 8.1* 7.4* 7.4*   MG  --  2.10  --   --   --   --    ALBUMIN 1.8* 1.9* 2.1*  --   --   --    ALKPHOS 80 76 85   --   --   --    ALT 29 29 29  --   --   --    AST 19 21 18  --   --   --    BILITOT 0.8 0.6 0.5  --   --   --        Microbiology Results (last 7 days)       Procedure Component Value Units Date/Time    Fungal Culture [[id number]]  (Normal) Collected: 11/11/23 1851    Order Status: Completed Specimen: Abscess Updated: 11/27/23 1201     Fungal Culture No Fungus Isolated at 2 Weeks    Fungal Culture [[id number]]  (Normal) Collected: 11/11/23 1851    Order Status: Completed Specimen: Abscess from Rectum Updated: 11/25/23 2000     Fungal Culture No Fungus Isolated at 2 Weeks    Blood Culture [[id number]]  (Normal) Collected: 11/18/23 1706    Order Status: Completed Specimen: Blood Updated: 11/23/23 1800     CULTURE, BLOOD ([facility]) No Growth at 5 days    Blood Culture [[id number]]  (Normal) Collected: 11/18/23 1706    Order Status: Completed Specimen: Blood Updated: 11/23/23 1800     CULTURE, BLOOD ([facility]) No Growth at 5 days    Anaerobic Culture [[id number]] Collected: 11/20/23 1414    Order Status: Completed Specimen: Wound from Scrotum Updated: 11/23/23 1006     Anaerobe Culture No Anaerobes Isolated    Wound Culture [[id number]] Collected: 11/20/23 1414    Order Status: Completed Specimen: Wound from Scrotum Updated: 11/23/23 0758     Wound Culture Rare normal skin tyrel, no further workup.             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  Off abx's  Colorectal surgery following.    Wound care clinic following.  Continue hyperbaric therapy.  Nutritional supplements.  GI signed off.  Off of immunosuppression.  Plastic surgery following.  Pending plans to make dispo plans.  GI signed off.  We will follow up on pathology.  Outpatient follow-up.  Replace K+  JUAN PABLO improving with fluids,   Labs in a.m.  Further recs based on clinical course    VTE prophylaxis:  Ambulatory    Patient condition:  Stable    Anticipated discharge and Disposition:   To be determined      All diagnosis and differential diagnosis have been reviewed; assessment and plan has been documented; I have personally reviewed the labs and test results that are presently available; I have reviewed the patients medication list; I have reviewed the consulting providers response and recommendations. I have reviewed or attempted to review medical records based upon their availability    All of the patient's questions have been  addressed and answered. Patient's is agreeable to the above stated plan. I will continue to monitor closely and make adjustments to medical management as needed.  _____________________________________________________________________    Nutrition Status:    Radiology:  I have personally reviewed the following imaging and agree with the radiologist.     XR Gastric tube check, non-radiologist performed  Narrative: EXAMINATION:  XR GASTRIC TUBE CHECK, NON-RADIOLOGIST PERFORMED    CLINICAL HISTORY:  NGT placement;    TECHNIQUE:  Single view of the abdomen    COMPARISON:  None    FINDINGS:  NG tube projects over the left upper quadrant.  This is the expected location of the stomach.  Gas-filled loops of bowel remain throughout.  Impression: As  above.    Electronically signed by: Joseph Carvajal  Date:    11/20/2023  Time:    06:15    J LUIS Alvarez MD  Department of Hospital Medicine  [facility]  11/28/2023

## 2023-11-28 NOTE — PLAN OF CARE
Pt wife has been in contact with [facility]. Wife asked that I send facesheet and clinical notes to [facility]. Clinical notes sent to  per patient wife request. LVM at [phone].

## 2023-11-28 NOTE — PLAN OF CARE
Sunita with [facility]  [phone] called to check on patient. No plans for discharge at this time. She said she would follow up with me and I will let her know of discharge needs.

## 2023-11-28 NOTE — PROGRESS NOTES
[facility]  Infectious Diseases Progress Note        ASSESSMENT & PLAN:   He is a 42-year-old male with a past medical history of diverticulitis, status post colon resection, and new diagnosis of ulcerative colitis for which he was hospitalized towards the end of October.  He was evaluated by GI and underwent a colonoscopy at that time and was discharged on a steroid taper.  He was also noted to have hemorrhoids which was treated with hydrocortisone suppository.  He then presented to the emergency room on 11/06/2023 with complaints of bright red blood per rectum and lower abdominal pain as well as subjective fevers and syncopal episode at home.  He was initiated on IV steroids per GI for suspected ulcerative colitis flare.  CT of the abdomen and pelvis showed findings consistent with colitis and possible underlying gastritis.  Over the weekend, patient had a fever of 102.5° F and some hypotension.  He also reported worsening pain near the perineum and was noted to have extensive erythema and induration.  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  He was initiated on empiric antimicrobials with vancomycin, Zosyn, and clindamycin.  He was taken to the operating room by General surgery on 11/11 for an I&D - Gram stain with GPCs, GNRs, GPRs - culture with relatively sensitive E. coli thus far.  Blood cultures are negative thus far.  We have been consulted for assistance with antimicrobials.     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, 11/12, 11/20  Sepsis s/t above - resolved  Ulcerative colitis w/concern for acute flare, new diagnosis - on Solumedrol (recently on prednisone taper), s/p total colectomy 11/15  H/o diverticulitis, s/p colon resection  Atrial intracardiac shunt per TTE     -Resolved leukocytosis  -Had some worsening renal function as well as significant output through the ostomy  -intraoperative cultures 11/20 with no significant growth  -Otherwise his clinical status is  "stabilized    Plan:   -Patient has completed a course of 14 days since initial extensive debridement and 1 week of broad coverage since most recent debridement. He has clinically stabilized and likely JUAN PABLO is related to significant losses through the ostomy, his wound is recovering well  -Discontinue antibiotics and monitor off  -Discussed with patient and family at bedside  -Noted indeterminate Quant on 11/10 - will need repeat TB testing in the future if plans to start biologics, which for now are on hold    ID will continue following. Please call with any questions or concerns.       SUBJECTIVE:     Had some JUAN PABLO with increased ostomy output, stabilizing now. Otherwise, clinically stable      MEDICATIONS:   Reviewed in EMR    REVIEW OF SYSTEMS:   Except as documented, all other systems reviewed and negative     PHYSICAL EXAM:   T 98.4 °F (36.9 °C)   /66   P 79   RR 20   O2 100 %  GENERAL: NAD; does not appear toxic  SKIN: no rash  HEENT: sclera non-icteric; PERRL   NECK: supple; no LAD  CHEST: CTA; nonlabored, equal expansion; no adventitious BS  CARDIOVASCULAR: RRR, S1S2; no murmur; strong, equal peripheral pulses; no edema  ABDOMEN:  active bowel sounds; abdomen soft, nondistended, ostomy in place. Wound dressed  GENITOURINARY: Devi in place  EXTREMITIES: no cyanosis or clubbing  NEURO: AAO x4; CN II-XII grossly intact  PSYCH: Mentation and affect appropriate     LABS AND IMAGING:     Recent Labs     11/26/23 0515 11/27/23 0432   WBC 6.19 5.43   RBC 2.75* 2.73*   HGB 7.5* 7.5*   HCT 23.9* 23.8*   MCV 86.9 87.2   MCH 27.3 27.5   MCHC 31.4* 31.5*   RDW 16.1 16.1    303     No results for input(s): "LACTIC" in the last 72 hours.    No results for input(s): "INR", "APTT", "D-DIMER" in the last 72 hours.  No results for input(s): "HGBA1C", "CHOL", "TRIG", "LDL", "VLDL", "HDL" in the last 72 hours.   Recent Labs     11/26/23 0515 11/27/23 0432    138   K 3.5 3.3*   CHLORIDE 106 108*   CO2 23 24 " "  BUN 25.3* 24.2*   CREATININE 1.60* 1.42*   GLUCOSE 91 92   CALCIUM 7.4* 7.4*     No results for input(s): "BNP", "CPK", "TROPONINI" in the last 72 hours.         XR Gastric tube check, non-radiologist performed  Narrative: EXAMINATION:  XR GASTRIC TUBE CHECK, NON-RADIOLOGIST PERFORMED    CLINICAL HISTORY:  NGT placement;    TECHNIQUE:  Single view of the abdomen    COMPARISON:  None    FINDINGS:  NG tube projects over the left upper quadrant.  This is the expected location of the stomach.  Gas-filled loops of bowel remain throughout.  Impression: As above.    Electronically signed by: Joseph Carvajal  Date:    11/20/2023  Time:    06:15    Jer Hampton MD  Infectious Disease  [facility]  "

## 2023-11-28 NOTE — PROGRESS NOTES
[facility]  Infectious Diseases Progress Note        ASSESSMENT & PLAN:   He is a 42-year-old male with a past medical history of diverticulitis, status post colon resection, and new diagnosis of ulcerative colitis for which he was hospitalized towards the end of October.  He was evaluated by GI and underwent a colonoscopy at that time and was discharged on a steroid taper.  He was also noted to have hemorrhoids which was treated with hydrocortisone suppository.  He then presented to the emergency room on 11/06/2023 with complaints of bright red blood per rectum and lower abdominal pain as well as subjective fevers and syncopal episode at home.  He was initiated on IV steroids per GI for suspected ulcerative colitis flare.  CT of the abdomen and pelvis showed findings consistent with colitis and possible underlying gastritis.  Over the weekend, patient had a fever of 102.5° F and some hypotension.  He also reported worsening pain near the perineum and was noted to have extensive erythema and induration.  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  He was initiated on empiric antimicrobials with vancomycin, Zosyn, and clindamycin.  He was taken to the operating room by General surgery on 11/11 for an I&D - Gram stain with GPCs, GNRs, GPRs - culture with relatively sensitive E. coli thus far.  Blood cultures are negative thus far.  We have been consulted for assistance with antimicrobials.     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  Improved ostomy output with diet change.   Will need repeat TB testing in the future if plans for biologics.   Maintain off abx and monitor.   Wound care as ordered.   Discussed with  "patient and wife.       SUBJECTIVE:   Improved ostomy output today.  AF, VSS.  Still decreased appetite although improving.       MEDICATIONS:   Reviewed in EMR    REVIEW OF SYSTEMS:   Except as documented, all other systems reviewed and negative     PHYSICAL EXAM:   T 98.1 °F (36.7 °C)   /69   P 78   RR 18   O2 98 %  GENERAL: NAD; does not appear toxic  SKIN: no rash  HEENT: sclera non-icteric; PERRL   NECK: supple; no LAD  CHEST: CTA; nonlabored, equal expansion; no adventitious BS  CARDIOVASCULAR: RRR, S1S2; no murmur; strong, equal peripheral pulses; no edema  ABDOMEN:  active bowel sounds; abdomen soft, nondistended, ostomy in place. Wound dressed  GENITOURINARY: Devi in place  EXTREMITIES: no cyanosis or clubbing  NEURO: AAO x4; CN II-XII grossly intact  PSYCH: Mentation and affect appropriate     LABS AND IMAGING:     Recent Labs     11/26/23 0515 11/27/23 0432   WBC 6.19 5.43   RBC 2.75* 2.73*   HGB 7.5* 7.5*   HCT 23.9* 23.8*   MCV 86.9 87.2   MCH 27.3 27.5   MCHC 31.4* 31.5*   RDW 16.1 16.1    303       No results for input(s): "LACTIC" in the last 72 hours.    No results for input(s): "INR", "APTT", "D-DIMER" in the last 72 hours.  No results for input(s): "HGBA1C", "CHOL", "TRIG", "LDL", "VLDL", "HDL" in the last 72 hours.   Recent Labs     11/26/23 0515 11/27/23 0432    138   K 3.5 3.3*   CHLORIDE 106 108*   CO2 23 24   BUN 25.3* 24.2*   CREATININE 1.60* 1.42*   GLUCOSE 91 92   CALCIUM 7.4* 7.4*       No results for input(s): "BNP", "CPK", "TROPONINI" in the last 72 hours.         XR Gastric tube check, non-radiologist performed  Narrative: EXAMINATION:  XR GASTRIC TUBE CHECK, NON-RADIOLOGIST PERFORMED    CLINICAL HISTORY:  NGT placement;    TECHNIQUE:  Single view of the abdomen    COMPARISON:  None    FINDINGS:  NG tube projects over the left upper quadrant.  This is the expected location of the stomach.  Gas-filled loops of bowel remain throughout.  Impression: As " above.    Electronically signed by: Joseph Carvajal  Date:    11/20/2023  Time:    06:15    Jer Hampton MD  Infectious Disease  [facility]

## 2023-11-28 NOTE — PROGRESS NOTES
[facility] - 8th Floor Med Surg  Wound Care  Progress Note    Patient Name: Lionel León  MRN: [id number]  Admission Date: 11/6/2023  Hospital Length of Stay: 22 days  Attending Physician: Sloan Alvarez MD  Primary Care Provider: Manoj Provider     Subjective:     HPI:  HBO    42-year-old WM in normal health until fairly recent diagnosis of  ulcerative colitis during a recent admission to [facility] from 10/21/2023 - 10/24/2023. He was readmitted 2 weeks later on 11/6/23 with complaint of rectal pain, bleeding and severe anemia.  He was subsequently diagnosed with necrotizing fasciitis of the  perineal/bilateral gluteal areas and underwent operative debridements as well as laparoscopic total colectomy with end ileostomy  . We are using adjuvant HBOT sessions while hospitalized and he is tolerating well    Hospital Course:   No notes on file      Follow-up For: Procedure(s) (LRB):  EGD, WITH CLOSED BIOPSY (N/A)    Post-Operative Day: 6 Days Post-Op    Scheduled Meds:   multivitamin  1 tablet Oral Daily     Continuous Infusions:   sodium chloride 0.9% 75 mL/hr at 11/26/23 1826     PRN Meds:acetaminophen, acetaminophen, aluminum-magnesium hydroxide-simethicone, cyclobenzaprine, dextrose 10%, dextrose 10%, dicyclomine, glucagon (human recombinant), glucose, glucose, lactated ringers, melatonin, morphine, ondansetron, oxyCODONE, sodium chloride 0.9%    Review of Systems   Constitutional: Negative.      Objective:     Vital Signs (Most Recent):  Temp: 98.3 °F (36.8 °C) (11/28/23 0700)  Pulse: 96 (11/28/23 0700)  Resp: 20 (11/28/23 0700)  BP: 110/72 (11/28/23 0700)  SpO2: 99 % (11/28/23 0700) Vital Signs (24h Range):  Temp:  [98.3 °F (36.8 °C)-99 °F (37.2 °C)] 98.3 °F (36.8 °C)  Pulse:  [72-96] 96  Resp:  [14-20] 20  SpO2:  [97 %-100 %] 99 %  BP: (106-110)/(62-72) 110/72     Weight: 74.8 kg (165 lb)  Body mass index is 23.68 kg/m².     Physical Exam  Vitals reviewed.   Cardiovascular:      Pulses: Normal  pulses.   Neurological:      General: No focal deficit present.      Mental Status: He is alert.          Assessment/Plan:     Necrotizing fasciitis status post operative debridements  Positive cultures for E coli and Streptococcus treated with  IV Zosyn  Ulcerative colitis, new diagnosis October 2023  Now status post total colectomy and end ileostomy on 11/15/23 and diagnostic laproscopy on 11/20/23 for washout  Anemia, requiring blood transfusion  Low prealbumin         The principal treatment for progressive necrotizing infection such as necrotizing fasciitis is surgical debridement and systemic antibiotics which has been done.  HBO therapy is an adjunct to help reduce the extensive inflammation and necrosis of the subcutaneous fat, fascia and muscle. Hyperbaric oxygen will increase the  concentration of dissolved oxygen in the blood which can augment oxygenation to all parts of the body,   stimulates the formation of a collagen matrix , promotes angiogenesis and can be bactericidal.  He had 90 minute session at 2.4 BEAR today and tolerated well.  Will continue while he is hospitalized.     Physician Supervision: I provided direct supervision and was immediately available to furnish assistance and direction throughout the performance of the procedure.A trained emergency response team and emergency services were available throughout procedure. Pt had no complaints: no chest pain or sob or earache or sinus pressure           Madeleine Lagos MD  Wound Care  [facility] - 8th Floor Med Surg        Madeleine Lagos MD  Wound Care  [facility] - 8th Floor Med Surg

## 2023-11-28 NOTE — PROGRESS NOTES
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 male.Pt states that he was d/c'd from hospital on 10/24/2023. He states that he was prescribed prednisone and felt good until he finished the prescribed duration. He said then he started to have diarrhea again, hot flashes, sore throat, abdominal pain, and rectal bleeding. He said he had two episodes of syncope prior to returning to ED. On 11/11/23 pt found to have soft tissue infection to perineum- was taken to OR emergently. Pt has had numerous debridements in the OR. Pt starting 11/17/2023 today for soft tissue necrotizing infection. Pt was taken back to OR 11/20/2023 for additional debridement.        Objective     Visit Information  Arrival Condition: Stable  Ambulatory Status: Stretcher  Patient Identification Verified: Yes  Secondary Verification Process Completed: Yes  Patient is in Isolation Precaution?: No  Is patient oriented to person, place and time?: Yes  History Since Last Visit  All ordered tests and consults were completed.: Yes  Experienced change in activities of daily living that may affect risk of falls: No  Experienced any changes in pain level or management: No  Signs of symptoms of abuse and/or neglect since last visit?: No  Patient is alert and oriented times three.   BP: 115/77  Pulse: 77  Resp: 16  Temp: 97.3 °F (36.3 °C)  Pain Level: 0  Left Vision Change: vision change denied  Right Vision Change: vision change denied  Left Hearing Change: hearing change denied  Right Hearing Change: hearing change  denied  Nose Symptoms: smell intact, no tenderness, no drainage, breathes through nostrils, symmetrical  Respiratory WDL: WDL  Breath Sounds: All Fields  All Lung Fields Breath Sounds: equal bilaterally, clear  Heart Sounds: S1, S2    Hyperbaric Pre inspection/Safety checklist:     Hyperbaric Pre-Inspection  Mattress cleaned prior to treatment: Yes  2 Patient Identifiers Verified: Yes  For Inpatients: Verify correct ID band/correct chart: Yes  Consent Obtained: Yes  Cotton Gown: Yes  Patient voided: Yes  Drainage Bags Emptied: Yes  Drainage tubes secured: Yes  Patient Pregnant: Not applicable  Glasses, Jewelry, Makeup, etc. Removed: Yes  Hard contacts removed: Yes  Dentures, Hearing Aid, Prosthetic Devices Removed: Not applicable  Touch hair to check for hair spray: Yes  Cold/Flu Symptoms: No  Diabetic Patient Eaten: Not applicable  Medications Given: Yes  For Inpatients: Medication sheet sent with patient: Yes  Fears and apprehensions verbalized: Yes  Ground Wire Secured: Yes    Hyperbaric Treatment Course:      Hyperbaric Treatment Order:  2.40 BEAR x90 mins with 100% oxygen and no air breaks with an ascent rate of 1.5 psi/min and a descent rate of 2.0 psi/min    HBO Treatment Course Details  Treatment Course Number: 1  Total Treatments Ordered: 10  Ordering Provider: Dr. Lagos  Indications: Progressive necrotizing infections  HBO Treatment Start Date: 11/17/23    HBO Treatment Details  Treatment Number: 4  Inpatient Visit: Yes  Total Treatment Length Calc (minutes): 110  Adjusted Treatment Length (minutes): 120  Chamber Type: Monoplace  Chamber #: 2  HBO Dive Log #: [id number]  Treatment Protocol: Other Treatment Protocol (enter in treatment notes) (2.40 BEAR w/ 100% oxygen and no air breaks x90 mins)    Treatment Details  Dive Rate Down: 1.5 psi/minute  Dive Rate Up: 1.5 psi/minute  Compress Begins: 1.10  Clock Time: 1145  Tx Pressure Reached: 2.40  Clock Time: 1155  Decompress Begins: 2.40  Clock Time:  1325  Decompress Ends: 1.10  Clock Time: 1335    Treatment Details/Response:     Pt completed treatment without complaints/complications. TEED grade 0 pre and post tx. If pt still here will bring down tmw for tx.     Post HBO Vital Signs  BP: 115/74  Pulse: 79  Resp: 16  Temp: 98.3 °F (36.8 °C)  Pain Level: 0    Ear Evaluation  Left Teed Scale Pre: Grade 0  Left Teed Scale Post: Grade 0  Right Teed Scale Pre: Grade 0  Right Teed Scale Post: Grade 0     Visit Discharge Information  Discharge Condition: Stable  Ambulatory Status: Stretcher  Discharge Destination: Other (back to room)  Transportation: Other (pt transport)    Physician Supervision: The hyperbaric physician provided direct supervision and was immediately available to furnish assistance and direction throughout the performance of the procedure.A trained emergency response team and emergency services were available throughout procedure.     Abbigail Oswalt, LPN [facility] - 8th Floor Med Surg  Hyperbaric Medicine

## 2023-11-28 NOTE — PROGRESS NOTES
Inpatient Nutrition Assessment    Admit Date: 11/6/2023   Total duration of encounter: 22 days   Patient Age: 43 y.o.    Nutrition Recommendation/Prescription     -Continue Low Residue Diet as tolerated.   -Continue Boost Plus and Jed for additional nourishment.   -Monitor wt, labs, and intake.    Communication of Recommendations: reviewed with patient and reviewed with family    Nutrition Assessment     Malnutrition Assessment/Nutrition-Focused Physical Exam    Malnutrition Context: acute illness or injury (11/07/23 1313)  Malnutrition Level: moderate (11/07/23 1313)  Energy Intake (Malnutrition): less than 75% for greater than 7 days (11/07/23 1313)  Weight Loss (Malnutrition): greater than 2% in 1 week (11/07/23 1313)  Subcutaneous Fat (Malnutrition): moderate depletion (11/07/23 1313)  Orbital Region (Subcutaneous Fat Loss): moderate depletion        Muscle Mass (Malnutrition): moderate depletion (11/07/23 1313)  Anglican Region (Muscle Loss): moderate depletion                       Fluid Accumulation (Malnutrition): other (see comments) (does not meet criteria) (11/07/23 1313)  Hand  Strength, Left (Malnutrition): unable to evaluate (11/07/23 1313)  Hand  Strength, Right (Malnutrition): unable to evaluate (11/07/23 1313)  A minimum of two characteristics is recommended for diagnosis of either severe or non-severe malnutrition.    Chart Review    Reason Seen: malnutrition screening tool (MST) and follow-up    Malnutrition Screening Tool Results   Have you recently lost weight without trying?: Yes: 34 lbs or more  Have you been eating poorly because of a decreased appetite?: Yes   MST Score: 5   Diagnosis:  Acute severe ulcerative colitis   Orthostatic syncope   Symptomatic anemia requiring blood transfusion  Acute blood loss anemia    Relevant Medical History: none noted    Nutrition-Related Medications: LR, MVI, Protonix  Calorie Containing IV Medications: no significant kcals from medications at this  time    Nutrition-Related Labs:  11/7/23: Na 133, Glu 161, GFR>60  11/9/23: Na 130, Glu 164, GFR>60, CRP 95.4  11/13/23: Na 133, Glu 165, GFR>60  11/16/23: Na 131, Glu 182, GFR>60  11/20/23: Na 132, Glu 130, GFR>60  11/24/23: Na 132, K 3.1, Cl 95, Cr 1.50, Glu 124, Ca 7.7, Alb 2.1  11/27/23: K 3.3, Glu 92, GFR>60    Nutrition Orders:   Dietary Orders (From admission, onward)       Start     Ordered    11/23/23 1830  Diet low fiber/residue  Diet effective now         11/23/23 1829    11/18/23 0917  Dietary nutrition supplements Jed - Orange; All Meals  Continuous        Question Answer Comment   Select PO Supplement: Jed - Orange    Frequency: All Meals        11/18/23 0916    11/16/23 0946  Dietary nutrition supplements Boost Plus Vanilla; TID  Continuous        Question Answer Comment   Select PO Supplement: Boost Plus Vanilla    Frequency: TID        11/16/23 0945                     Appetite/Oral Intake: good/% of meals    Factors Affecting Nutritional Intake: none identified    Food/Congregation/Cultural Preferences: none reported    Food Allergies: no known food allergies    Wound(s):   none noted    Last Bowel Movement: 11/28/23    Comments    11/7/23: Pt reports good appetite/intake while in hospital; reports that his pain and diarrhea are better managed here than it is at home. Pt reports significant wt loss and frequent watery and bloody diarrhea for the last 3 weeks.     11/9/23: Per pt and pt's wife, pt is eating well, most of meals; denies n/v.     11/13/23: Pt tolerating liquids per RN.     11/16/23: Per pt's wife, pt is tolerating full liquids well.     11/20/23: Pt NPO; NG tube was placed this morning for worsening abdominal pain and distention; pt also to go to OR for wound exploration and debridement and possible wound vac placement.     11/24/23: Pt reports tolerating diet w/ fair appetite, no GI complaints. Ileostomy functioning properly    11/28/23: Pt's wife reports that pt is eating very  "well and taking all of oral supplements; denies n/v.       Anthropometrics    Height: 5' 10" (177.8 cm) Height Method: Stated  Last Weight: 74.8 kg (165 lb) (23 1312) Weight Method: Bed Scale  BMI (Calculated): 23.7  BMI Classification: normal (BMI 18.5-24.9)        Ideal Body Weight (IBW), Male: 166 lb     % Ideal Body Weight, Male (lb): 99.4 %                 Usual Body Weight (UBW), k.18 kg  % Usual Body Weight: 80.49  % Weight Change From Usual Weight: -19.68 %  Usual Weight Provided By: patient    Wt Readings from Last 5 Encounters:   23 74.8 kg (165 lb)   10/21/23 86.4 kg (190 lb 7.6 oz)     Weight Change(s) Since Admission:   Wt Readings from Last 1 Encounters:   23 1312 74.8 kg (165 lb)   23 1835 74.8 kg (165 lb)   23 0328 74.8 kg (165 lb)   Admit Weight: 74.8 kg (165 lb) (23 0328), Weight Method: Stated  23-23: no new wt noted    Estimated Needs    Weight Used For Calorie Calculations: 74.8 kg (164 lb 14.5 oz)  Energy Calorie Requirements (kcal): 9416-4267 kcal (30-35 kcal/kg)  Energy Need Method: Kcal/kg  Weight Used For Protein Calculations: 74.8 kg (164 lb 14.5 oz)  Protein Requirements: 112 gm (1.5g/kg)  Fluid Requirements (mL): 2244 mL  Temp (24hrs), Av.6 °F (37 °C), Min:98.3 °F (36.8 °C), Max:99 °F (37.2 °C)       Enteral Nutrition    Patient not receiving enteral nutrition at this time.    Parenteral Nutrition    Patient not receiving parenteral nutrition support at this time.    Evaluation of Received Nutrient Intake    Calories: meeting estimated needs  Protein: meeting estimated needs    Patient Education    Not applicable.    Nutrition Diagnosis     PES: Malnutrition related to altered GI function as evidenced by >2% wt loss x 1 week, <75% est energy requirements met >7 days, physical evidence of moderate fat and muscle wasting. (active)    Interventions/Goals     Intervention(s): modified composition of meals/snacks, multivitamin/mineral " supplement therapy, prescription medication, and collaboration with other providers    Goal: Maintain weight throughout hospitalization. (goal progressing)    Monitoring & Evaluation     Dietitian will monitor energy intake and weight change.    Nutrition Risk/Follow-Up: moderate (follow-up in 3-5 days)   Please consult if re-assessment needed sooner.

## 2023-11-28 NOTE — PROGRESS NOTES
Colon and Rectal Surgery Progress Note  Admit Date: 11/6/2023  Hospital Day: 22  Procedure: 6 Days Post-Op     S:  AF HDS  Doing very well  Ostomy output appropriate    O:  Vitals:   Temp:  [98.4 °F (36.9 °C)-99 °F (37.2 °C)]   Pulse:  [72-91]   Resp:  [14-20]   BP: ()/(62-67)   SpO2:  [97 %-100 %]     Diet low fiber/residue   Intake/Output Summary (Last 24 hours) at 11/28/2023 0734  Last data filed at 11/28/2023 0420  Gross per 24 hour   Intake --   Output 3140 ml   Net -3140 ml            Physical Exam:  Gen: NAD, AAOx3  HEENT: EOMI, NCAT  CV: RR   Resp: no shortness of breath, normal WOB  Abd: soft, non-distended  Ext: no edema      Labs:  Recent Labs     11/26/23  0515 11/27/23  0432   WBC 6.19 5.43   HGB 7.5* 7.5*   HCT 23.9* 23.8*    303    138   K 3.5 3.3*   CO2 23 24   BUN 25.3* 24.2*   CREATININE 1.60* 1.42*   CALCIUM 7.4* 7.4*     Scheduled Meds: multivitamin, 1 tablet, Daily     sodium chloride 0.9% 75 mL/hr at 11/26/23 1826       A/P:    43 yo M with IBD s/p TAC with end ileostomy taken back to OR on 11/20 for diagnostic laparoscopy which was negative for intabdominal findings, perineal wound exploration with further debridement      -Wife doing well with perineal wound care  -Ostomy education    Adriana Raygoza MD  [employer] General Surgery, hospitals

## 2023-11-29 LAB
ALBUMIN SERPL-MCNC: 2.2 G/DL (ref 3.5–5)
ALBUMIN/GLOB SERPL: 0.8 RATIO (ref 1.1–2)
ALP SERPL-CCNC: 77 UNIT/L (ref 40–150)
ALT SERPL-CCNC: 23 UNIT/L (ref 0–55)
AST SERPL-CCNC: 19 UNIT/L (ref 5–34)
BASOPHILS # BLD AUTO: 0.02 X10(3)/MCL
BASOPHILS NFR BLD AUTO: 0.4 %
BILIRUB SERPL-MCNC: 0.3 MG/DL
BUN SERPL-MCNC: 28 MG/DL (ref 8.9–20.6)
CALCIUM SERPL-MCNC: 8 MG/DL (ref 8.4–10.2)
CHLORIDE SERPL-SCNC: 110 MMOL/L (ref 98–107)
CO2 SERPL-SCNC: 24 MMOL/L (ref 22–29)
CREAT SERPL-MCNC: 1.16 MG/DL (ref 0.73–1.18)
EOSINOPHIL # BLD AUTO: 0.11 X10(3)/MCL (ref 0–0.9)
EOSINOPHIL NFR BLD AUTO: 2.3 %
ERYTHROCYTE [DISTWIDTH] IN BLOOD BY AUTOMATED COUNT: 16.3 % (ref 11.5–17)
GFR SERPLBLD CREATININE-BSD FMLA CKD-EPI: >60 MLS/MIN/1.73/M2
GLOBULIN SER-MCNC: 2.9 GM/DL (ref 2.4–3.5)
GLUCOSE SERPL-MCNC: 90 MG/DL (ref 74–100)
HCT VFR BLD AUTO: 25.9 % (ref 42–52)
HGB BLD-MCNC: 8 G/DL (ref 14–18)
IMM GRANULOCYTES # BLD AUTO: 0.05 X10(3)/MCL (ref 0–0.04)
IMM GRANULOCYTES NFR BLD AUTO: 1.1 %
LYMPHOCYTES # BLD AUTO: 0.48 X10(3)/MCL (ref 0.6–4.6)
LYMPHOCYTES NFR BLD AUTO: 10.2 %
MAGNESIUM SERPL-MCNC: 1.9 MG/DL (ref 1.6–2.6)
MCH RBC QN AUTO: 27.2 PG (ref 27–31)
MCHC RBC AUTO-ENTMCNC: 30.9 G/DL (ref 33–36)
MCV RBC AUTO: 88.1 FL (ref 80–94)
MONOCYTES # BLD AUTO: 0.47 X10(3)/MCL (ref 0.1–1.3)
MONOCYTES NFR BLD AUTO: 10 %
NEUTROPHILS # BLD AUTO: 3.57 X10(3)/MCL (ref 2.1–9.2)
NEUTROPHILS NFR BLD AUTO: 76 %
NRBC BLD AUTO-RTO: 0 %
PLATELET # BLD AUTO: 328 X10(3)/MCL (ref 130–400)
PMV BLD AUTO: 9.5 FL (ref 7.4–10.4)
POTASSIUM SERPL-SCNC: 4.1 MMOL/L (ref 3.5–5.1)
PROT SERPL-MCNC: 5.1 GM/DL (ref 6.4–8.3)
RBC # BLD AUTO: 2.94 X10(6)/MCL (ref 4.7–6.1)
SODIUM SERPL-SCNC: 140 MMOL/L (ref 136–145)
WBC # SPEC AUTO: 4.7 X10(3)/MCL (ref 4.5–11.5)

## 2023-11-29 PROCEDURE — 11000001 HC ACUTE MED/SURG PRIVATE ROOM

## 2023-11-29 PROCEDURE — 25000003 PHARM REV CODE 250: Performed by: INTERNAL MEDICINE

## 2023-11-29 PROCEDURE — 99232 PR SUBSEQUENT HOSPITAL CARE,LEVL II: ICD-10-PCS | Mod: FS,,, | Performed by: GENERAL PRACTICE

## 2023-11-29 PROCEDURE — 99232 SBSQ HOSP IP/OBS MODERATE 35: CPT | Mod: FS,,, | Performed by: GENERAL PRACTICE

## 2023-11-29 PROCEDURE — G0277 HBOT, FULL BODY CHAMBER, 30M: HCPCS

## 2023-11-29 PROCEDURE — 80053 COMPREHEN METABOLIC PANEL: CPT | Performed by: INTERNAL MEDICINE

## 2023-11-29 PROCEDURE — 85025 COMPLETE CBC W/AUTO DIFF WBC: CPT | Performed by: INTERNAL MEDICINE

## 2023-11-29 PROCEDURE — 25000003 PHARM REV CODE 250: Performed by: NURSE PRACTITIONER

## 2023-11-29 PROCEDURE — 83735 ASSAY OF MAGNESIUM: CPT | Performed by: INTERNAL MEDICINE

## 2023-11-29 RX ADMIN — Medication 9 MG: at 08:11

## 2023-11-29 RX ADMIN — CYCLOBENZAPRINE HYDROCHLORIDE 5 MG: 5 TABLET, FILM COATED ORAL at 08:11

## 2023-11-29 RX ADMIN — MULTIPLE VITAMINS W/ MINERALS TAB 1 TABLET: TAB at 08:11

## 2023-11-29 NOTE — PROGRESS NOTES
Colon & Rectal Surgery Progress Note    Subjective:  Doing well  No complaints  Tolerating diet  Ileostomy output manageable  Urinating well    Objective:  Temp:  [97.6 °F (36.4 °C)-99.2 °F (37.3 °C)]   Pulse:  [71-88]   Resp:  [16-20]   BP: ()/(61-75)   SpO2:  [97 %-100 %]     Physical Exam:  NAD  Regular rate and rhythm  Non-labored respirations  Abdomen soft, appropriate, ileostomy pink  SCDs in place      Intake/Output Summary (Last 24 hours) at 11/29/2023 0924  Last data filed at 11/29/2023 0500  Gross per 24 hour   Intake 4700.7 ml   Output 4025 ml   Net 675.7 ml       Recent Labs     11/29/23  0451   WBC 4.70   HGB 8.0*   HCT 25.9*         K 4.1   CO2 24   BUN 28.0*   CREATININE 1.16   BILITOT 0.3   AST 19   ALT 23   ALKPHOS 77   CALCIUM 8.0*   ALBUMIN 2.2*   MG 1.90       Assessment/Plan  - ok to d/c from my standpoint      Fredi Shields MD  Colon and Rectal Surgery

## 2023-11-29 NOTE — SUBJECTIVE & OBJECTIVE
Subjective:     HPI:  HBO    42-year-old WM with recent admit on 11/6/23 for necrotizing fascitis of his perineal /gluteal areas s/p extensive surgical debridement along with total colectomy with end ileostomy.  He has hx of recently being diagnosed with  Ulcerative Colitis during an earlier admission to [facility] from 10/21/2023 - 10/24/2023. He was given high doses of steroids he tells me durin this initial treatment/diagnosis timeframe. He went home and apparently became much sicker with rectal bleeding and pain as well as syncopal episode for which he returned to the hospital.   We are using adjuvant HBOT sessions while hospitalized and he is tolerating  the treatments well. Today is treatment 7#.    Hospital Course:   He is receiving Iv fluids, vitamins, offloading as well as local wound care. His iliostomy is working well. He appears frail and weak however but is not complaining of anything.      Follow-up For: Procedure(s) (LRB):  EGD, WITH CLOSED BIOPSY (N/A)    Post-Operative Day: 7 Days Post-Op    Scheduled Meds:   multivitamin  1 tablet Oral Daily     Continuous Infusions:  PRN Meds:acetaminophen, acetaminophen, aluminum-magnesium hydroxide-simethicone, cyclobenzaprine, dextrose 10%, dextrose 10%, dicyclomine, glucagon (human recombinant), glucose, glucose, lactated ringers, melatonin, morphine, ondansetron, oxyCODONE, sodium chloride 0.9%    Review of Systems   Constitutional:  Positive for activity change and appetite change.   HENT: Negative.     Respiratory: Negative.     Cardiovascular: Negative.    Gastrointestinal: Negative.         Iliostomy RLQ   Skin:  Positive for wound.   Neurological:  Positive for weakness.   All other systems reviewed and are negative.    Objective:     Vital Signs (Most Recent):  Temp: 98.5 °F (36.9 °C) (11/29/23 0807)  Pulse: 88 (11/29/23 0807)  Resp: 18 (11/29/23 0807)  BP: 97/61 (11/29/23 0807)  SpO2: 100 % (11/29/23 0807) Vital Signs (24h Range):  Temp:  [97.6 °F (36.4  "°C)-99.2 °F (37.3 °C)] 98.5 °F (36.9 °C)  Pulse:  [71-88] 88  Resp:  [16-20] 18  SpO2:  [97 %-100 %] 100 %  BP: ()/(61-75) 97/61     Weight: 74.8 kg (165 lb)  Body mass index is 23.68 kg/m².     Physical Exam  Vitals and nursing note reviewed.   Constitutional:       Comments: Thin, frail in appearance   HENT:      Head: Normocephalic and atraumatic.      Nose: Nose normal.   Eyes:      Extraocular Movements: Extraocular movements intact.   Cardiovascular:      Rate and Rhythm: Normal rate and regular rhythm.      Pulses: Normal pulses.   Pulmonary:      Effort: Pulmonary effort is normal.      Breath sounds: Normal breath sounds.   Abdominal:      Palpations: Abdomen is soft.   Musculoskeletal:         General: Normal range of motion.   Skin:     Findings: Lesion (large dressed wound sacrum) present.   Neurological:      Mental Status: He is alert and oriented to person, place, and time. Mental status is at baseline.   Psychiatric:         Mood and Affect: Mood normal.          Laboratory:  A1C: No results for input(s): "HGBA1C" in the last 4320 hours.  CBC:   Recent Labs   Lab 11/29/23  0451   WBC 4.70   RBC 2.94*   HGB 8.0*   HCT 25.9*      MCV 88.1   MCH 27.2   MCHC 30.9*     CMP:   Recent Labs   Lab 11/29/23  0451   CALCIUM 8.0*   ALBUMIN 2.2*      K 4.1   CO2 24   BUN 28.0*   CREATININE 1.16   ALKPHOS 77   ALT 23   AST 19   BILITOT 0.3   Prealbumin  = 10.7  Order: [id number]  Status: Final result       Visible to patient: Yes (not seen)       Next appt: None    0 Result Notes       Component Ref Range & Units 12 d ago   Prealbumin 18.0 - 45.0 mg/dL 10.6 Low    Resulting [facility] LAB              Specimen Collected: 11/17/23 05:14 Last Resulted: 11/17/23 06:26             Diagnostic Results:  None    "

## 2023-11-29 NOTE — PROGRESS NOTES
[facility] Hospital Medicine Progress Note        Chief Complaint: Inpatient Follow-up for     HPI:   Lionel León is a 42 y.o. White male with a past medical history of diverticulitis status post colon resection and ulcerative colitis. Patient had recent admission to hospital medicine services at [facility] on 10/21/2023 to 10/24/2023 for sepsis secondary to pancolitis. He was treated with antibiotics and GI was consulted. Colonoscopy was performed on 10/23/2023 with findings concerning for ulcerative colitis and biopsies of the cecum, ascending, transverse, ascending colon and rectum positive for active chronic colitis consistent with inflammatory bowel disorder. No transfusion was required for rectal bleeding. Patient was discharged with prednisone taper and GI follow up appointment on 11/17/2023. The patient presented to [facility] on 11/6/2023 with a primary complaint of bright red rectal bleeding and lower abdominal pain.  Patient reports rectal bleeding has been ongoing since discharge.  Other associated symptoms include nausea, rectal pain, subjective fevers, weakness, fatigue and a 30 40 lb weight loss over last 3 weeks.  Patient states this morning he was walking to the bathroom when he felt flushed and vision went black. He passed out and hit his head on the wooden floor. Approximately hour and a half later when he went to get up he passed out again.  Second syncopal episode was witnessed by patient's wife who is at bedside reports loss of consciousness lasts for approximately 30 seconds.    Upon presentation to the ED, temperature 98.3F, heart rate 109, blood pressure 96/51, respiratory rate 17, spO2 99%. Labs with H&H 7.3/23.6 (10.6/33.2 on 10/24/2023), BUN 21.9, creatinine 0.88, calcium 7.7, .6, ESR 86. EKG sinus tachycardia with a ventricular rate of 108 and age undetermined possible inferior infarct. In the ED patient received Dilaudid, Zofran, Hydrocortisone and IVF. He  was typed and crossmatched for transfusion of 2 units of packed RBC. Patient is admitted to hospital medicine services for further medical management.       Patient was seen by GI and started on IV steroids, Solu-Medrol 30 mg IV b.I.d. for active ulcerative colitis flare up  Plus Anusol suppository per rectum b.I.d..  Patient is status post 2 units PRBC for symptomatic anemia.  CT abdomen and pelvis was also ordered to assess extent. CT of the abdomen and pelvis was pertinent for findings consistent with colitis involving the ascending colon, portions of the descending colon.  Underlying gastritis also can not be excluded secondary to slightly thickened gastric wall.     May complain continues to be rectal pain but refers that suppositories are helping.  Patient is tolerating p.o. intake    According to GI notes plans is for patient to start Humira/imura once approved by patient's insurance.  November 11th he started reporting rectal pain with a bump, on exam he had cellulitis like changes.  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  Taken to the operating room same day by General surgery found to have necrotic tissue extending to the muscle encircling his anus.  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  With ongoing infection immunosuppression with steroids have been tapered off and no further plans for Humira.  Infectious Disease was also consulted and recommended continuing Zosyn.  Wound Care Clinic was also consulted and started on hyperbaric therapy.  Patient once again started having fevers and developed concern for worsening sepsis, was taken to the operating room by a surgery November 20th and underwent diagnostic laparoscopy which showed slightly murky serous fluid bilateral pericolic gutters but no succus or stools, also underwent debridement of the scrotum/perineum and found to have some pus draining  which was debrided.    Interval Hx:   11/27/2023-Patient seen and examined by bedside.  Wife by bedside.  No acute overnight events.  No acute concerns at this time.  Wife has a lot of case management questions.  Discussed plans of deescalating antibiotics    11/28/2023 Dr. Alvarez-chart reviewed patient examined.  Latest pictures from wounds shown to me by wife.  Patient has been seen by Colorectal surgery/plastics/ID.  Patient is off antibiotics.  Kidney function improving    11-29-23 Dr. Alvarez-sridhar reviewed patient in hyperbarics.  Long conversation with patient's wife.  She will get help from [facility] wound care on wheels and will set him  up with [facility]. Will make plans for discharge tomorrow with follow up w all consultants. Pt is off abx's/ tolerating  diet w good ostomy output    Case was discussed with patient's nurse and  on the floor.    Objective/physical exam:  General: In no acute distress, afebrile  Chest: Clear to auscultation bilaterally  Heart: RRR, +S1, S2, no appreciable murmur  Abdomen: Soft, nontender, BS + ileostomy  MSK: Warm, no lower extremity edema, no clubbing or cyanosis  Neurologic: Alert and oriented x4, Cranial nerve II-XII intact, Strength 5/5 in all 4 extremities  Perineum/ sacrum - pictures reviewed. Shown to me by wife    VITAL SIGNS: 24 HRS MIN & MAX LAST   Temp  Min: 97.6 °F (36.4 °C)  Max: 99.2 °F (37.3 °C) 98.5 °F (36.9 °C)   BP  Min: 97/61  Max: 119/69 97/61   Pulse  Min: 71  Max: 88  88   Resp  Min: 16  Max: 20 18   SpO2  Min: 97 %  Max: 100 % 100 %     I have reviewed the following labs:  Recent Labs   Lab 11/26/23  0515 11/27/23  0432 11/29/23  0451   WBC 6.19 5.43 4.70   RBC 2.75* 2.73* 2.94*   HGB 7.5* 7.5* 8.0*   HCT 23.9* 23.8* 25.9*   MCV 86.9 87.2 88.1   MCH 27.3 27.5 27.2   MCHC 31.4* 31.5* 30.9*   RDW 16.1 16.1 16.3    303 328   MPV 9.6 9.3 9.5       Recent Labs   Lab 11/24/23  0438 11/25/23  0405 11/26/23  0515 11/27/23  0432  11/29/23  0451   *   < > 138 138 140   K 3.1*   < > 3.5 3.3* 4.1   CO2 28   < > 23 24 24   BUN 15.9   < > 25.3* 24.2* 28.0*   CREATININE 1.50*   < > 1.60* 1.42* 1.16   CALCIUM 7.7*   < > 7.4* 7.4* 8.0*   MG  --   --   --   --  1.90   ALBUMIN 2.1*  --   --   --  2.2*   ALKPHOS 85  --   --   --  77   ALT 29  --   --   --  23   AST 18  --   --   --  19   BILITOT 0.5  --   --   --  0.3    < > = values in this interval not displayed.       Microbiology Results (last 7 days)       Procedure Component Value Units Date/Time    Fungal Culture [[id number]]  (Normal) Collected: 11/11/23 1851    Order Status: Completed Specimen: Abscess Updated: 11/27/23 1201     Fungal Culture No Fungus Isolated at 2 Weeks    Fungal Culture [[id number]]  (Normal) Collected: 11/11/23 1851    Order Status: Completed Specimen: Abscess from Rectum Updated: 11/25/23 2000     Fungal Culture No Fungus Isolated at 2 Weeks    Blood Culture [[id number]]  (Normal) Collected: 11/18/23 1706    Order Status: Completed Specimen: Blood Updated: 11/23/23 1800     CULTURE, BLOOD ([facility]) No Growth at 5 days    Blood Culture [[id number]]  (Normal) Collected: 11/18/23 1706    Order Status: Completed Specimen: Blood Updated: 11/23/23 1800     CULTURE, BLOOD ([facility]) No Growth at 5 days    Anaerobic Culture [[id number]] Collected: 11/20/23 1414    Order Status: Completed Specimen: Wound from Scrotum Updated: 11/23/23 1006     Anaerobe Culture No Anaerobes Isolated    Wound Culture [[id number]] Collected: 11/20/23 1414    Order Status: Completed Specimen: Wound from Scrotum Updated: 11/23/23 0758     Wound Culture Rare normal skin tyrel, no further workup.             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  Off abx's  Colorectal surgery following.    Wound care clinic following.  completed hyperbaric therapy.  Nutritional supplements.  GI signed off.  Off of immunosuppression.  Plastic surgery following/plans for follow up as outpt   GI signed off.  follow up on pathology on  Outpatient follow-up.    AKIresolvng    Home tomorrow w HH/ wound care     VTE prophylaxis:  Ambulatory    Patient condition:  Stable    Anticipated discharge and Disposition:   home w HH and wound care       All diagnosis and differential diagnosis have been reviewed; assessment and plan has been documented; I have personally reviewed the labs and test results that are presently available; I have reviewed the patients medication list; I have reviewed the consulting providers response and recommendations. I have reviewed or attempted to review medical records based upon their availability    All of the patient's questions have been  addressed and answered. Patient's is agreeable to the above stated plan. I will continue to monitor closely and make adjustments to medical management as needed.  _____________________________________________________________________    Nutrition Status:    Radiology:  I have personally reviewed the following imaging and agree with the radiologist.     XR Gastric tube check, non-radiologist performed  Narrative: EXAMINATION:  XR GASTRIC TUBE CHECK, NON-RADIOLOGIST PERFORMED    CLINICAL HISTORY:  NGT  placement;    TECHNIQUE:  Single view of the abdomen    COMPARISON:  None    FINDINGS:  NG tube projects over the left upper quadrant.  This is the expected location of the stomach.  Gas-filled loops of bowel remain throughout.  Impression: As above.    Electronically signed by: Joseph Carvajal  Date:    11/20/2023  Time:    06:15    J LUIS Alvarez MD  Department of Hospital Medicine  [facility]  11/29/2023

## 2023-11-29 NOTE — PROGRESS NOTES
[facility]  Infectious Diseases Progress Note        ASSESSMENT & PLAN:   He is a 42-year-old male with a past medical history of diverticulitis, status post colon resection, and new diagnosis of ulcerative colitis for which he was hospitalized towards the end of October.  He was evaluated by GI and underwent a colonoscopy at that time and was discharged on a steroid taper.  He was also noted to have hemorrhoids which was treated with hydrocortisone suppository.  He then presented to the emergency room on 11/06/2023 with complaints of bright red blood per rectum and lower abdominal pain as well as subjective fevers and syncopal episode at home.  He was initiated on IV steroids per GI for suspected ulcerative colitis flare.  CT of the abdomen and pelvis showed findings consistent with colitis and possible underlying gastritis.  Over the weekend, patient had a fever of 102.5° F and some hypotension.  He also reported worsening pain near the perineum and was noted to have extensive erythema and induration.  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  He was initiated on empiric antimicrobials with vancomycin, Zosyn, and clindamycin.  He was taken to the operating room by General surgery on 11/11 for an I&D - Gram stain with GPCs, GNRs, GPRs - culture with relatively sensitive E. coli thus far.  Blood cultures are negative thus far.  We have been consulted for assistance with antimicrobials.     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  Will need repeat TB testing in the future if plans for biologics.   Wound care as ordered.   Will sign off.  Please call if need arises.   Discussed with patient and wife.  "      SUBJECTIVE:   Continues to improve.  AF, VSS.  No complaints.  Just returned from hyperbarics.       MEDICATIONS:   Reviewed in EMR    REVIEW OF SYSTEMS:   Except as documented, all other systems reviewed and negative     PHYSICAL EXAM:   T 98.7 °F (37.1 °C)   /65   P 83   RR 16   O2 100 %  GENERAL: NAD; does not appear toxic  SKIN: no rash  HEENT: sclera non-icteric; PERRL   NECK: supple; no LAD  CHEST: CTA; nonlabored, equal expansion; no adventitious BS  CARDIOVASCULAR: RRR, S1S2; no murmur; strong, equal peripheral pulses; no edema  ABDOMEN:  active bowel sounds; abdomen soft, nondistended, ostomy in place. Wound dressed  GENITOURINARY: Devi in place  EXTREMITIES: no cyanosis or clubbing  NEURO: AAO x4; CN II-XII grossly intact  PSYCH: Mentation and affect appropriate     LABS AND IMAGING:     Recent Labs     11/27/23 0432 11/29/23 0451   WBC 5.43 4.70   RBC 2.73* 2.94*   HGB 7.5* 8.0*   HCT 23.8* 25.9*   MCV 87.2 88.1   MCH 27.5 27.2   MCHC 31.5* 30.9*   RDW 16.1 16.3    328       No results for input(s): "LACTIC" in the last 72 hours.    No results for input(s): "INR", "APTT", "D-DIMER" in the last 72 hours.  No results for input(s): "HGBA1C", "CHOL", "TRIG", "LDL", "VLDL", "HDL" in the last 72 hours.   Recent Labs     11/27/23 0432 11/29/23 0451    140   K 3.3* 4.1   CHLORIDE 108* 110*   CO2 24 24   BUN 24.2* 28.0*   CREATININE 1.42* 1.16   GLUCOSE 92 90   CALCIUM 7.4* 8.0*   MG  --  1.90   ALBUMIN  --  2.2*   GLOBULIN  --  2.9   ALKPHOS  --  77   ALT  --  23   AST  --  19   BILITOT  --  0.3       No results for input(s): "BNP", "CPK", "TROPONINI" in the last 72 hours.         XR Gastric tube check, non-radiologist performed  Narrative: EXAMINATION:  XR GASTRIC TUBE CHECK, NON-RADIOLOGIST PERFORMED    CLINICAL HISTORY:  NGT placement;    TECHNIQUE:  Single view of the abdomen    COMPARISON:  None    FINDINGS:  NG tube projects over the left upper quadrant.  This is the expected " location of the stomach.  Gas-filled loops of bowel remain throughout.  Impression: As above.    Electronically signed by: Joseph Carvajal  Date:    11/20/2023  Time:    06:15    Jer Hampton MD  Infectious Disease  [facility]

## 2023-11-29 NOTE — PROGRESS NOTES
[facility] - 8th Floor Med Surg  Hyperbaric Medicine    Patient Name: Lionel León  MRN: [id number]  Date: 11/29/2023  Diagnosis:   Active Problem List with Overview Notes    Diagnosis Date Noted    Lower GI bleed 11/21/2023    Rectal bleeding 11/21/2023    Sepsis 11/21/2023    Ulcerative colitis with complication 11/21/2023    Allison's gangrene 11/21/2023    Allison gangrene 11/17/2023    Pancolitis 10/24/2023          Subjective:     Patient ID: Lionel León is a 43 y.o. male.Pt states that he was d/c'd from hospital on 10/24/2023. He states that he was prescribed prednisone and felt good until he finished the prescribed duration. He said then he started to have diarrhea again, hot flashes, sore throat, abdominal pain, and rectal bleeding. He said he had two episodes of syncope prior to returning to ED. On 11/11/23 pt found to have soft tissue infection to perineum- was taken to OR emergently. Pt has had numerous debridements in the OR. Pt starting 11/17/2023 today for soft tissue necrotizing infection. Pt was taken back to OR 11/20/2023 for additional debridement.        Objective     Visit Information  Arrival Condition: Stable  Ambulatory Status: Ambulatory  Patient Identification Verified: Yes  Secondary Verification Process Completed: Yes  Patient is in Isolation Precaution?: No  Is patient oriented to person, place and time?: Yes  History Since Last Visit  All ordered tests and consults were completed.: Yes  Experienced change in activities of daily living that may affect risk of falls: No  Experienced any changes in pain level or management: No  Signs of symptoms of abuse and/or neglect since last visit?: No  Patient is alert and oriented times three.   BP: 127/74  Pulse: 80  Resp: 16  Temp: 98.3 °F (36.8 °C)  Pain Level: 0  Left Vision Change: vision change denied  Right Vision Change: vision change denied  Left Hearing Change: hearing change denied  Right Hearing Change: hearing change  denied  Nose Symptoms: smell intact, no tenderness, no drainage, breathes through nostrils, symmetrical  Respiratory WDL: WDL  Breath Sounds: All Fields  All Lung Fields Breath Sounds: equal bilaterally, clear  Heart Sounds: S1, S2    Hyperbaric Pre inspection/Safety checklist:     Hyperbaric Pre-Inspection  Mattress cleaned prior to treatment: Yes  2 Patient Identifiers Verified: Yes  For Inpatients: Verify correct ID band/correct chart: Yes  Consent Obtained: Yes  Cotton Gown: Yes  Patient voided: Yes  Drainage Bags Emptied: Yes  Drainage tubes secured: Yes  Patient Pregnant: Not applicable  Glasses, Jewelry, Makeup, etc. Removed: Yes  Hard contacts removed: Yes  Dentures, Hearing Aid, Prosthetic Devices Removed: Not applicable  Touch hair to check for hair spray: Yes  Cold/Flu Symptoms: No  Diabetic Patient Eaten: Not applicable  Medications Given: Yes  For Inpatients: Medication sheet sent with patient: Yes  Fears and apprehensions verbalized: Yes  Ground Wire Secured: Yes    Hyperbaric Treatment Course:      Hyperbaric Treatment Order:  2.40 BEAR x90 mins with 100% oxygen and no air breaks with an ascent rate of 1.5 psi/min and a descent rate of 2.0 psi/min    HBO Treatment Course Details  Treatment Course Number: 1  Total Treatments Ordered: 10  Ordering Provider: Dr. Dodge  Indications: Progressive necrotizing infections  HBO Treatment Start Date: 11/17/23    HBO Treatment Details  Treatment Number: 5  Inpatient Visit: Yes  Total Treatment Length Calc (minutes): 113  Adjusted Treatment Length (minutes): 120  Chamber Type: Monoplace  Chamber #: 2  HBO Dive Log #: [id number]  Treatment Protocol: Other Treatment Protocol (enter in treatment notes) (2.40 BEAR x90 mins w/ 100% oxygen and no air breaks)    Treatment Details  Dive Rate Down: 1.5 psi/minute  Dive Rate Up: 2.0 psi/minute  Compress Begins: 1.10  Clock Time: 1030  Tx Pressure Reached: 2.40  Clock Time: 1043  Decompress Begins: 2.40  Clock Time: 1213  Decompress  Ends: 1.10  Clock Time: 1223    Treatment Details/Response:     Pt completed treatment without complaints/complications. TEED grade 0 pre and post tx. If pt still here will bring down tmw for tx.     Post HBO Vital Signs  BP: 127/77  Pulse: 82  Resp: 16  Temp: 98.3 °F (36.8 °C)  Pain Level: 0    Ear Evaluation  Left Teed Scale Pre: Grade 0  Left Teed Scale Post: Grade 0  Right Teed Scale Pre: Grade 0  Right Teed Scale Post: Grade 0     Visit Discharge Information  Discharge Condition: Stable  Ambulatory Status: Ambulatory  Discharge Destination: Other (back to room)  Transportation: Other (pt transport)    Physician Supervision: The hyperbaric physician provided direct supervision and was immediately available to furnish assistance and direction throughout the performance of the procedure.A trained emergency response team and emergency services were available throughout procedure.     Abbigail Oswalt, LPN [facility] - 8th Floor Med Surg  Hyperbaric Medicine

## 2023-11-29 NOTE — PROGRESS NOTES
[facility] Med Surg  Plastic Surgery  Progress Note      HPI:  42 y.o. White male with a past medical history of diverticulitis status post colon resection and ulcerative colitis. Patient had recent admission to hospital medicine services at [facility] on 10/21/2023 to 10/24/2023 for sepsis secondary to pancolitis. Patient was discharged with prednisone taper and GI follow up appointment on 11/17/2023. The patient presented to [facility] on 11/6/2023 with a primary complaint of bright red rectal bleeding and lower abdominal pain.  Patient reports rectal bleeding has been ongoing since discharge.  Other associated symptoms include nausea, rectal pain, subjective fevers, weakness, fatigue and a 30-40 lb weight loss over last 3 weeks. Patient is admitted to hospital medicine services for further medical management.       Patient was seen by GI and started on IV steroids, Solu-Medrol 30 mg IV b.I.d. for active ulcerative colitis flare up.  Plus Anusol suppository per rectum b.I.d..  Patient is status post 2 units PRBC for symptomatic anemia.  CT abdomen and pelvis was also ordered to assess extent. CT of the abdomen and pelvis was pertinent for findings consistent with colitis involving the ascending colon, portions of the descending colon.  November 11th he started reporting rectal pain with a bump, on exam he had cellulitis like changes.  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  Taken to the operating room same day by General surgery found to have necrotic tissue extending to the muscle encircling his anus.  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  With ongoing infection immunosuppression with steroids have been tapered off and no further plans for Humira.  Infectious Disease was also consulted and recommended continuing Zosyn.  Wound Care Clinic was also consulted and started on hyperbaric therapy.  Patient once again started having fevers and developed concern for worsening sepsis, was taken to the operating room by a surgery November 20th and underwent diagnostic laparoscopy which showed slightly murky serous fluid bilateral pericolic gutters but no succus or stools, also underwent debridement of the scrotum/perineum and found to have some pus draining which was debrided. PRS consulted for reconstructive options.     Today: patient doing well, laying in bed, wife bedside. Reviewed patient's wound pictures on wife's phone from yesterday's wound care.     Post-Op Info:  Procedure(s) (LRB):  EGD, WITH CLOSED BIOPSY (N/A)   7 Days Post-Op         Medications:  Continuous Infusions:  Scheduled Meds:   multivitamin  1 tablet Oral Daily     PRN Meds:acetaminophen, acetaminophen, aluminum-magnesium hydroxide-simethicone, cyclobenzaprine, dextrose 10%, dextrose 10%, dicyclomine, glucagon (human recombinant), glucose, glucose, lactated ringers, melatonin, morphine, ondansetron, oxyCODONE, sodium chloride 0.9%     Review of patient's allergies indicates:  No Known Allergies    Objective:     Vital Signs (Most Recent):  Temp: 98.5 °F (36.9 °C) (11/29/23 0807)  Pulse: 88 (11/29/23 0807)  Resp: 18 (11/29/23 0807)  BP: 97/61 (11/29/23 0807)  SpO2: 100 % (11/29/23 0807) Vital Signs (24h Range):  Temp:  [97.6 °F (36.4 °C)-99.2 °F (37.3 °C)] 98.5 °F (36.9 °C)  Pulse:  [71-88] 88  Resp:  [16-20] 18  SpO2:  [97 %-100 %] 100 %  BP: ()/(61-75) 97/61     Weight: 74.8 kg (165 lb)  Body mass index is 23.68 kg/m².    Intake/Output - Last 3 Shifts         11/27 0700 11/28 0659 11/28 0700 11/29 0659 11/29 0700 11/30 0659    P.O.  2220     I.V. (mL/kg)  2480.7 (33.2)     IV Piggyback       Total Intake(mL/kg)  4700.7 (62.8)     Urine (mL/kg/hr) 2615 (1.5) 2775 (1.5)     Stool 525 1250     Total Output 3140 4025     Net -3140 +675.7                     Physical Exam            [facility] - 8th Floor Med  Surg  Plastic Surgery  Progress Note     Subjective:         HPI:  42 y.o. White male with a past medical history of diverticulitis status post colon resection and ulcerative colitis. Patient had recent admission to hospital medicine services at [facility] on 10/21/2023 to 10/24/2023 for sepsis secondary to pancolitis. Patient was discharged with prednisone taper and GI follow up appointment on 11/17/2023. The patient presented to [facility] on 11/6/2023 with a primary complaint of bright red rectal bleeding and lower abdominal pain.  Patient reports rectal bleeding has been ongoing since discharge.  Other associated symptoms include nausea, rectal pain, subjective fevers, weakness, fatigue and a 30-40 lb weight loss over last 3 weeks. Patient is admitted to hospital medicine services for further medical management.       Patient was seen by GI and started on IV steroids, Solu-Medrol 30 mg IV b.I.d. for active ulcerative colitis flare up.  Plus Anusol suppository per rectum b.I.d..  Patient is status post 2 units PRBC for symptomatic anemia.  CT abdomen and pelvis was also ordered to assess extent. CT of the abdomen and pelvis was pertinent for findings consistent with colitis involving the ascending colon, portions of the descending colon.  November 11th he started reporting rectal pain with a bump, on exam he had cellulitis like changes.  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  Taken to the operating room same day by General surgery found to have necrotic tissue extending to the muscle encircling his anus.  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  With ongoing infection immunosuppression with steroids have been tapered off and no further plans for Humira.  Infectious Disease was also consulted and recommended continuing Zosyn.  Wound Care Clinic was also consulted and started on hyperbaric therapy. Patient once again  started having fevers and developed concern for worsening sepsis, was taken to the operating room by a surgery November 20th and underwent diagnostic laparoscopy which showed slightly murky serous fluid bilateral pericolic gutters but no succus or stools, also underwent debridement of the scrotum/perineum and found to have some pus draining which was debrided. PRS consulted for reconstructive options.      Today: patient seen today sitting in chair, denies complaints, receiving blood transfusion. He was taken back to surgery yesterday with washout/debridement of scrotum with some purulence. He is planned for HBOT today.      Post-Op Info:  Procedure(s) (LRB):  DEBRIDEMENT, WOUND, SACRUM (N/A)  LAPAROSCOPY, DIAGNOSTIC (N/A)   1 Day Post-Op            Medications:  Continuous Infusions:   lactated ringers 50 mL/hr at 11/21/23 0926      Scheduled Meds:   multivitamin  1 tablet Oral Daily    pantoprazole  40 mg Intravenous BID AC    piperacillin-tazobactam (Zosyn) IV (PEDS and ADULTS) (extended infusion is not appropriate)  4.5 g Intravenous Q8H    senna-docusate 8.6-50 mg  2 tablet Oral BID    sucralfate  1 g Oral QID (AC & HS)    vancomycin (VANCOCIN) IV (PEDS and ADULTS)  1,250 mg Intravenous Q12H      PRN Meds:acetaminophen, acetaminophen, aluminum-magnesium hydroxide-simethicone, cyclobenzaprine, dextrose 10%, dextrose 10%, dicyclomine, glucagon (human recombinant), glucose, glucose, lactated ringers, morphine, ondansetron, oxyCODONE, sodium chloride 0.9%, vancomycin - pharmacy to dose      Review of patient's allergies indicates:  No Known Allergies  Objective:      Vital Signs (Most Recent):  Temp: 98.9 °F (37.2 °C) (11/21/23 0800)  Pulse: 102 (11/21/23 0800)  Resp: 16 (11/21/23 0800)  BP: 96/60 (11/21/23 0800)  SpO2: 100 % (11/21/23 0800) Vital Signs (24h Range):  Temp:  [97.4 °F (36.3 °C)-99 °F (37.2 °C)] 98.9 °F (37.2 °C)  Pulse:  [] 102  Resp:  [15-22] 16  SpO2:  [87 %-100 %] 100 %  BP: ()/(58-73)  96/60      Weight: 74.8 kg (165 lb)  Body mass index is 23.68 kg/m².     Intake/Output - Last 3 Shifts           11/19 0700  11/20 0659 11/20 0700 11/21 0659 11/21 0700 11/22 0659     P.O. 1180         I.V. (mL/kg)   906 (12.1)       IV Piggyback   2100.1       Total Intake(mL/kg) 1180 (15.8) 3006.1 (40.2)       Urine (mL/kg/hr) 1175 (0.7) 2400 (1.3)       Drains 1000 1750       Stool 400 300       Total Output 2575 4450       Net -1395 -1443.9                              Physical Exam  Constitutional:       Appearance: Normal appearance.      HENT:      Head: Normocephalic and atraumatic.   Eyes:      Extraocular Movements: Extraocular movements intact.   Pulmonary:      Effort: Pulmonary effort is normal.   Musculoskeletal:         General: Normal range of motion.      Cervical back: Normal range of motion.   Skin:     General: Skin is warm and dry.   Neurological:      General: No focal deficit present.      Mental Status: He is alert and oriented to person, place, and time.            Significant Labs:  CBC:   Recent Labs   Lab 11/29/23  0451   WBC 4.70   RBC 2.94*   HGB 8.0*   HCT 25.9*      MCV 88.1   MCH 27.2   MCHC 30.9*     BMP:   Recent Labs   Lab 11/29/23  0451      K 4.1   CO2 24   BUN 28.0*   CREATININE 1.16   CALCIUM 8.0*   MG 1.90       Significant Diagnostics:  I have reviewed all pertinent imaging results/findings within the past 24 hours.    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 Wound is improving and starting to granulate but not ready for any reconstruction at this time.   Discussed reconstruction v. healing by secondary intention - will re-evaluation and discuss options  with patient later down the road.   Continue wound care and aggressive protein supplementation.   Patient's wife reports he may be discharging home soon with wound care services by Alicia Lundberg NP.   If discharged home, patient can follow up in clinic in 2 weeks.          SHERLYN Almeida  Plastic Surgery  [facility] - 8th Floor Med Surg

## 2023-11-29 NOTE — PROGRESS NOTES
[facility] Hospital Medicine Progress Note        Chief Complaint: Inpatient Follow-up for     HPI:   Lionel León is a 42 y.o. White male with a past medical history of diverticulitis status post colon resection and ulcerative colitis. Patient had recent admission to hospital medicine services at [facility] on 10/21/2023 to 10/24/2023 for sepsis secondary to pancolitis. He was treated with antibiotics and GI was consulted. Colonoscopy was performed on 10/23/2023 with findings concerning for ulcerative colitis and biopsies of the cecum, ascending, transverse, ascending colon and rectum positive for active chronic colitis consistent with inflammatory bowel disorder. No transfusion was required for rectal bleeding. Patient was discharged with prednisone taper and GI follow up appointment on 11/17/2023. The patient presented to [facility] on 11/6/2023 with a primary complaint of bright red rectal bleeding and lower abdominal pain.  Patient reports rectal bleeding has been ongoing since discharge.  Other associated symptoms include nausea, rectal pain, subjective fevers, weakness, fatigue and a 30 40 lb weight loss over last 3 weeks.  Patient states this morning he was walking to the bathroom when he felt flushed and vision went black. He passed out and hit his head on the wooden floor. Approximately hour and a half later when he went to get up he passed out again.  Second syncopal episode was witnessed by patient's wife who is at bedside reports loss of consciousness lasts for approximately 30 seconds.    Upon presentation to the ED, temperature 98.3F, heart rate 109, blood pressure 96/51, respiratory rate 17, spO2 99%. Labs with H&H 7.3/23.6 (10.6/33.2 on 10/24/2023), BUN 21.9, creatinine 0.88, calcium 7.7, .6, ESR 86. EKG sinus tachycardia with a ventricular rate of 108 and age undetermined possible inferior infarct. In the ED patient received Dilaudid, Zofran, Hydrocortisone and IVF. He  was typed and crossmatched for transfusion of 2 units of packed RBC. Patient is admitted to hospital medicine services for further medical management.       Patient was seen by GI and started on IV steroids, Solu-Medrol 30 mg IV b.I.d. for active ulcerative colitis flare up  Plus Anusol suppository per rectum b.I.d..  Patient is status post 2 units PRBC for symptomatic anemia.  CT abdomen and pelvis was also ordered to assess extent. CT of the abdomen and pelvis was pertinent for findings consistent with colitis involving the ascending colon, portions of the descending colon.  Underlying gastritis also can not be excluded secondary to slightly thickened gastric wall.     May complain continues to be rectal pain but refers that suppositories are helping.  Patient is tolerating p.o. intake    According to GI notes plans is for patient to start Humira/imura once approved by patient's insurance.  November 11th he started reporting rectal pain with a bump, on exam he had cellulitis like changes.  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  Taken to the operating room same day by General surgery found to have necrotic tissue extending to the muscle encircling his anus.  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  With ongoing infection immunosuppression with steroids have been tapered off and no further plans for Humira.  Infectious Disease was also consulted and recommended continuing Zosyn.  Wound Care Clinic was also consulted and started on hyperbaric therapy.  Patient once again started having fevers and developed concern for worsening sepsis, was taken to the operating room by a surgery November 20th and underwent diagnostic laparoscopy which showed slightly murky serous fluid bilateral pericolic gutters but no succus or stools, also underwent debridement of the scrotum/perineum and found to have some pus draining  which was debrided.    Interval Hx:   11/27/2023-Patient seen and examined by bedside.  Wife by bedside.  No acute overnight events.  No acute concerns at this time.  Wife has a lot of case management questions.  Discussed plans of deescalating antibiotics    11/28/2023 Dr. Alvarez-chart reviewed patient examined.  Latest pictures from wounds shown to me by wife.  Patient has been seen by Colorectal surgery/plastics/ID.  Patient is off antibiotics.  Kidney function improving    -29-23 Dr. Alvarez-sridhar reviewed patient in hyperbarics.  Long conversation with patient's wife.  She will get help from [facility] wound care on wheels and will set him  up with [facility]. Will make plans for discharge tomorrow with follow up w all consultants. Pt is off abx's/ tolerating  diet w good ostomy output    Case was discussed with patient's nurse and  on the floor.    Objective/physical exam:  General: In no acute distress, afebrile  Chest: Clear to auscultation bilaterally  Heart: RRR, +S1, S2, no appreciable murmur  Abdomen: Soft, nontender, BS + ileostomy  MSK: Warm, no lower extremity edema, no clubbing or cyanosis  Neurologic: Alert and oriented x4, Cranial nerve II-XII intact, Strength 5/5 in all 4 extremities  Perineum/ sacrum - pictures reviewed. Shown to me by wife    VITAL SIGNS: 24 HRS MIN & MAX LAST   Temp  Min: 97.6 °F (36.4 °C)  Max: 99.2 °F (37.3 °C) 98.5 °F (36.9 °C)   BP  Min: 97/61  Max: 119/69 97/61   Pulse  Min: 71  Max: 88  88   Resp  Min: 16  Max: 20 18   SpO2  Min: 97 %  Max: 100 % 100 %     I have reviewed the following labs:  Recent Labs   Lab 11/26/23  0515 11/27/23  0432 11/29/23  0451   WBC 6.19 5.43 4.70   RBC 2.75* 2.73* 2.94*   HGB 7.5* 7.5* 8.0*   HCT 23.9* 23.8* 25.9*   MCV 86.9 87.2 88.1   MCH 27.3 27.5 27.2   MCHC 31.4* 31.5* 30.9*   RDW 16.1 16.1 16.3    303 328   MPV 9.6 9.3 9.5       Recent Labs   Lab 11/24/23  0438 11/25/23  0405 11/26/23  0515 11/27/23  0432  11/29/23  0451   *   < > 138 138 140   K 3.1*   < > 3.5 3.3* 4.1   CO2 28   < > 23 24 24   BUN 15.9   < > 25.3* 24.2* 28.0*   CREATININE 1.50*   < > 1.60* 1.42* 1.16   CALCIUM 7.7*   < > 7.4* 7.4* 8.0*   MG  --   --   --   --  1.90   ALBUMIN 2.1*  --   --   --  2.2*   ALKPHOS 85  --   --   --  77   ALT 29  --   --   --  23   AST 18  --   --   --  19   BILITOT 0.5  --   --   --  0.3    < > = values in this interval not displayed.       Microbiology Results (last 7 days)       Procedure Component Value Units Date/Time    Fungal Culture [[id number]]  (Normal) Collected: 11/11/23 1851    Order Status: Completed Specimen: Abscess Updated: 11/27/23 1201     Fungal Culture No Fungus Isolated at 2 Weeks    Fungal Culture [[id number]]  (Normal) Collected: 11/11/23 1851    Order Status: Completed Specimen: Abscess from Rectum Updated: 11/25/23 2000     Fungal Culture No Fungus Isolated at 2 Weeks    Blood Culture [[id number]]  (Normal) Collected: 11/18/23 1706    Order Status: Completed Specimen: Blood Updated: 11/23/23 1800     CULTURE, BLOOD ([facility]) No Growth at 5 days    Blood Culture [[id number]]  (Normal) Collected: 11/18/23 1706    Order Status: Completed Specimen: Blood Updated: 11/23/23 1800     CULTURE, BLOOD ([facility]) No Growth at 5 days    Anaerobic Culture [[id number]] Collected: 11/20/23 1414    Order Status: Completed Specimen: Wound from Scrotum Updated: 11/23/23 1006     Anaerobe Culture No Anaerobes Isolated    Wound Culture [[id number]] Collected: 11/20/23 1414    Order Status: Completed Specimen: Wound from Scrotum Updated: 11/23/23 0758     Wound Culture Rare normal skin tyrel, no further workup.             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  Off abx's  Colorectal surgery following.    Wound care clinic following.  completed hyperbaric therapy.  Nutritional supplements.  GI signed off.  Off of immunosuppression.  Plastic surgery following/plans for follow up as outpt   GI signed off.  follow up on pathology on  Outpatient follow-up.    AKIresolvng    Home tomorrow w HH/ wound care     VTE prophylaxis:  Ambulatory    Patient condition:  Stable    Anticipated discharge and Disposition:   home w HH and wound care       All diagnosis and differential diagnosis have been reviewed; assessment and plan has been documented; I have personally reviewed the labs and test results that are presently available; I have reviewed the patients medication list; I have reviewed the consulting providers response and recommendations. I have reviewed or attempted to review medical records based upon their availability    All of the patient's questions have been  addressed and answered. Patient's is agreeable to the above stated plan. I will continue to monitor closely and make adjustments to medical management as needed.  _____________________________________________________________________    Nutrition Status:    Radiology:  I have personally reviewed the following imaging and agree with the radiologist.     XR Gastric tube check, non-radiologist performed  Narrative: EXAMINATION:  XR GASTRIC TUBE CHECK, NON-RADIOLOGIST PERFORMED    CLINICAL HISTORY:  NGT  placement;    TECHNIQUE:  Single view of the abdomen    COMPARISON:  None    FINDINGS:  NG tube projects over the left upper quadrant.  This is the expected location of the stomach.  Gas-filled loops of bowel remain throughout.  Impression: As above.    Electronically signed by: Joseph Carvajal  Date:    11/20/2023  Time:    06:15    J LUIS Alvarez MD  Department of Hospital Medicine  [facility]  11/29/2023

## 2023-11-29 NOTE — PROGRESS NOTES
[facility] - 8th Floor Med Surg  Wound Care  Progress Note    Patient Name: Lionel León  MRN: [id number]  Admission Date: 11/6/2023  Hospital Length of Stay: 23 days  Attending Physician: Sloan Alvarez MD  Primary Care Provider: Manoj Provider     Subjective:     HPI:  HBO    42-year-old WM with recent admit on 11/6/23 for necrotizing fascitis of his perineal /gluteal areas s/p extensive surgical debridement along with total colectomy with end ileostomy.  He has hx of recently being diagnosed with  Ulcerative Colitis during an earlier admission to [facility] from 10/21/2023 - 10/24/2023. He was given high doses of steroids he tells me durin this initial treatment/diagnosis timeframe. He went home and apparently became much sicker with rectal bleeding and pain as well as syncopal episode for which he returned to the hospital.   We are using adjuvant HBOT sessions while hospitalized and he is tolerating  the treatments well. Today is treatment 7#.    Hospital Course:   He is receiving Iv fluids, vitamins, offloading as well as local wound care. His iliostomy is working well. He appears frail and weak however but is not complaining of anything.      Follow-up For: Procedure(s) (LRB):  EGD, WITH CLOSED BIOPSY (N/A)    Post-Operative Day: 7 Days Post-Op    Scheduled Meds:   multivitamin  1 tablet Oral Daily     Continuous Infusions:  PRN Meds:acetaminophen, acetaminophen, aluminum-magnesium hydroxide-simethicone, cyclobenzaprine, dextrose 10%, dextrose 10%, dicyclomine, glucagon (human recombinant), glucose, glucose, lactated ringers, melatonin, morphine, ondansetron, oxyCODONE, sodium chloride 0.9%    Review of Systems   Constitutional:  Positive for activity change and appetite change.   HENT: Negative.     Respiratory: Negative.     Cardiovascular: Negative.    Gastrointestinal: Negative.         Iliostomy RLQ   Skin:  Positive for wound.   Neurological:  Positive for weakness.   All other systems  "reviewed and are negative.    Objective:     Vital Signs (Most Recent):  Temp: 98.5 °F (36.9 °C) (11/29/23 0807)  Pulse: 88 (11/29/23 0807)  Resp: 18 (11/29/23 0807)  BP: 97/61 (11/29/23 0807)  SpO2: 100 % (11/29/23 0807) Vital Signs (24h Range):  Temp:  [97.6 °F (36.4 °C)-99.2 °F (37.3 °C)] 98.5 °F (36.9 °C)  Pulse:  [71-88] 88  Resp:  [16-20] 18  SpO2:  [97 %-100 %] 100 %  BP: ()/(61-75) 97/61     Weight: 74.8 kg (165 lb)  Body mass index is 23.68 kg/m².     Physical Exam  Vitals and nursing note reviewed.   Constitutional:       Comments: Thin, frail in appearance   HENT:      Head: Normocephalic and atraumatic.      Nose: Nose normal.   Eyes:      Extraocular Movements: Extraocular movements intact.   Cardiovascular:      Rate and Rhythm: Normal rate and regular rhythm.      Pulses: Normal pulses.   Pulmonary:      Effort: Pulmonary effort is normal.      Breath sounds: Normal breath sounds.   Abdominal:      Palpations: Abdomen is soft.   Musculoskeletal:         General: Normal range of motion.   Skin:     Findings: Lesion (large dressed wound sacrum) present.   Neurological:      Mental Status: He is alert and oriented to person, place, and time. Mental status is at baseline.   Psychiatric:         Mood and Affect: Mood normal.          Laboratory:  A1C: No results for input(s): "HGBA1C" in the last 4320 hours.  CBC:   Recent Labs   Lab 11/29/23 0451   WBC 4.70   RBC 2.94*   HGB 8.0*   HCT 25.9*      MCV 88.1   MCH 27.2   MCHC 30.9*     CMP:   Recent Labs   Lab 11/29/23 0451   CALCIUM 8.0*   ALBUMIN 2.2*      K 4.1   CO2 24   BUN 28.0*   CREATININE 1.16   ALKPHOS 77   ALT 23   AST 19   BILITOT 0.3   Prealbumin  = 10.7  Order: [id number]  Status: Final result       Visible to patient: Yes (not seen)       Next appt: None    0 Result Notes       Component Ref Range & Units 12 d ago   Prealbumin 18.0 - 45.0 mg/dL 10.6 Low    Resulting Agency  [facility]              Specimen Collected: " 11/17/23 05:14 Last Resulted: 11/17/23 06:26             Diagnostic Results:  None    Assessment/Plan:     Renal/  Allison's gangrene  Surgical debridment  Wound are locally   Offloading   Hyperbarics   Optimize nutrition    Plan:  Hyperbaric oxygen service for primary functions: Increases concentration of dissolved oxygen in the blood which can augment oxygenation to all parts of the body, replaces inert gas in the blood stream with oxygen which is then metabolized by the body, stimulates the formation of a collagen matrix and angiogenesis and can be bactericidal for susceptible bacteria.  The principal treatment for progressive necrotizing infection such as necrotizing fasciitis is surgical debridement and systemic antibiotics which the patient is currently having done.  HBO therapy is an adjunct to help reduce the extensive inflammation and necrosis of the subcutaneous fat, fascia and muscle.  Recommendations for 90 minutes at 2.5 BEAR while patient is hospitalized.    I certify I was in department during all times of patient's treatment for an emergency. An Emergency trained physician was available during his treatment in event of decompensation in department. HE did well and denied any complaints on completion of treatment.     Juany Dodge, DO  Wound Care  [facility] - 8th Floor Med Surg

## 2023-11-29 NOTE — HOSPITAL COURSE
He is receiving Iv fluids, vitamins, offloading as well as local wound care. His iliostomy is working well. He appears frail and weak however but is not complaining of anything.

## 2023-11-29 NOTE — PLAN OF CARE
Pt wife states [facility] received documentation. Wife is requesting [facility] for home health. Referral sent to [facility] via care port.

## 2023-11-29 NOTE — SUBJECTIVE & OBJECTIVE
Medications:  Continuous Infusions:  Scheduled Meds:   multivitamin  1 tablet Oral Daily     PRN Meds:acetaminophen, acetaminophen, aluminum-magnesium hydroxide-simethicone, cyclobenzaprine, dextrose 10%, dextrose 10%, dicyclomine, glucagon (human recombinant), glucose, glucose, lactated ringers, melatonin, morphine, ondansetron, oxyCODONE, sodium chloride 0.9%     Review of patient's allergies indicates:  No Known Allergies    Objective:     Vital Signs (Most Recent):  Temp: 98.5 °F (36.9 °C) (11/29/23 0807)  Pulse: 88 (11/29/23 0807)  Resp: 18 (11/29/23 0807)  BP: 97/61 (11/29/23 0807)  SpO2: 100 % (11/29/23 0807) Vital Signs (24h Range):  Temp:  [97.6 °F (36.4 °C)-99.2 °F (37.3 °C)] 98.5 °F (36.9 °C)  Pulse:  [71-88] 88  Resp:  [16-20] 18  SpO2:  [97 %-100 %] 100 %  BP: ()/(61-75) 97/61     Weight: 74.8 kg (165 lb)  Body mass index is 23.68 kg/m².    Intake/Output - Last 3 Shifts         11/27 0700  11/28 0659 11/28 0700  11/29 0659 11/29 0700  11/30 0659    P.O.  2220     I.V. (mL/kg)  2480.7 (33.2)     IV Piggyback       Total Intake(mL/kg)  4700.7 (62.8)     Urine (mL/kg/hr) 2615 (1.5) 2775 (1.5)     Stool 525 1250     Total Output 3140 4025     Net -3140 +675.7                     Physical Exam            [facility] - 8th Floor Med Surg  Plastic Surgery  Progress Note     Subjective:         HPI:  42 y.o. White male with a past medical history of diverticulitis status post colon resection and ulcerative colitis. Patient had recent admission to hospital medicine services at [facility] on 10/21/2023 to 10/24/2023 for sepsis secondary to pancolitis. Patient was discharged with prednisone taper and GI follow up appointment on 11/17/2023. The patient presented to [facility] on 11/6/2023 with a primary complaint of bright red rectal bleeding and lower abdominal pain.  Patient reports rectal bleeding has been ongoing since discharge.  Other associated symptoms include nausea, rectal pain, subjective  fevers, weakness, fatigue and a 30-40 lb weight loss over last 3 weeks. Patient is admitted to hospital medicine services for further medical management.       Patient was seen by GI and started on IV steroids, Solu-Medrol 30 mg IV b.I.d. for active ulcerative colitis flare up.  Plus Anusol suppository per rectum b.I.d..  Patient is status post 2 units PRBC for symptomatic anemia.  CT abdomen and pelvis was also ordered to assess extent. CT of the abdomen and pelvis was pertinent for findings consistent with colitis involving the ascending colon, portions of the descending colon.  November 11th he started reporting rectal pain with a bump, on exam he had cellulitis like changes.  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  Taken to the operating room same day by General surgery found to have necrotic tissue extending to the muscle encircling his anus.  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  With ongoing infection immunosuppression with steroids have been tapered off and no further plans for Humira.  Infectious Disease was also consulted and recommended continuing Zosyn.  Wound Care Clinic was also consulted and started on hyperbaric therapy. Patient once again started having fevers and developed concern for worsening sepsis, was taken to the operating room by a surgery November 20th and underwent diagnostic laparoscopy which showed slightly murky serous fluid bilateral pericolic gutters but no succus or stools, also underwent debridement of the scrotum/perineum and found to have some pus draining which was debrided. PRS consulted for reconstructive options.      Today: patient seen today sitting in chair, denies complaints, receiving blood transfusion. He was taken back to surgery yesterday with washout/debridement of scrotum with some purulence. He is planned for HBOT today.      Post-Op Info:  Procedure(s)  (LRB):  DEBRIDEMENT, WOUND, SACRUM (N/A)  LAPAROSCOPY, DIAGNOSTIC (N/A)   1 Day Post-Op            Medications:  Continuous Infusions:   lactated ringers 50 mL/hr at 11/21/23 0926      Scheduled Meds:   multivitamin  1 tablet Oral Daily    pantoprazole  40 mg Intravenous BID AC    piperacillin-tazobactam (Zosyn) IV (PEDS and ADULTS) (extended infusion is not appropriate)  4.5 g Intravenous Q8H    senna-docusate 8.6-50 mg  2 tablet Oral BID    sucralfate  1 g Oral QID (AC & HS)    vancomycin (VANCOCIN) IV (PEDS and ADULTS)  1,250 mg Intravenous Q12H      PRN Meds:acetaminophen, acetaminophen, aluminum-magnesium hydroxide-simethicone, cyclobenzaprine, dextrose 10%, dextrose 10%, dicyclomine, glucagon (human recombinant), glucose, glucose, lactated ringers, morphine, ondansetron, oxyCODONE, sodium chloride 0.9%, vancomycin - pharmacy to dose      Review of patient's allergies indicates:  No Known Allergies  Objective:      Vital Signs (Most Recent):  Temp: 98.9 °F (37.2 °C) (11/21/23 0800)  Pulse: 102 (11/21/23 0800)  Resp: 16 (11/21/23 0800)  BP: 96/60 (11/21/23 0800)  SpO2: 100 % (11/21/23 0800) Vital Signs (24h Range):  Temp:  [97.4 °F (36.3 °C)-99 °F (37.2 °C)] 98.9 °F (37.2 °C)  Pulse:  [] 102  Resp:  [15-22] 16  SpO2:  [87 %-100 %] 100 %  BP: ()/(58-73) 96/60      Weight: 74.8 kg (165 lb)  Body mass index is 23.68 kg/m².     Intake/Output - Last 3 Shifts           11/19 0700  11/20 0659 11/20 0700 11/21 0659 11/21 0700 11/22 0659     P.O. 1180         I.V. (mL/kg)   906 (12.1)       IV Piggyback   2100.1       Total Intake(mL/kg) 1180 (15.8) 3006.1 (40.2)       Urine (mL/kg/hr) 1175 (0.7) 2400 (1.3)       Drains 1000 1750       Stool 400 300       Total Output 2575 4450       Net -1395 -1443.9                              Physical Exam  Constitutional:       Appearance: Normal appearance.      HENT:      Head: Normocephalic and atraumatic.   Eyes:      Extraocular Movements: Extraocular movements  intact.   Pulmonary:      Effort: Pulmonary effort is normal.   Musculoskeletal:         General: Normal range of motion.      Cervical back: Normal range of motion.   Skin:     General: Skin is warm and dry.   Neurological:      General: No focal deficit present.      Mental Status: He is alert and oriented to person, place, and time.            Significant Labs:  CBC:   Recent Labs   Lab 11/29/23  0451   WBC 4.70   RBC 2.94*   HGB 8.0*   HCT 25.9*      MCV 88.1   MCH 27.2   MCHC 30.9*     BMP:   Recent Labs   Lab 11/29/23  0451      K 4.1   CO2 24   BUN 28.0*   CREATININE 1.16   CALCIUM 8.0*   MG 1.90       Significant Diagnostics:  I have reviewed all pertinent imaging results/findings within the past 24 hours.    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 Wound is improving and starting to granulate but not ready for any reconstruction at this time.   Discussed reconstruction v. healing by secondary intention - will re-evaluation and discuss options with patient later down the road.   Continue wound care and aggressive protein supplementation.   Patient's wife reports he may be discharging home soon with wound care services by Alicia Lundberg NP.   If discharged home, patient can follow up in clinic in 2 weeks.

## 2023-11-30 VITALS
TEMPERATURE: 98 F | HEIGHT: 70 IN | WEIGHT: 165 LBS | BODY MASS INDEX: 23.62 KG/M2 | OXYGEN SATURATION: 100 % | SYSTOLIC BLOOD PRESSURE: 104 MMHG | RESPIRATION RATE: 16 BRPM | DIASTOLIC BLOOD PRESSURE: 64 MMHG | HEART RATE: 83 BPM

## 2023-11-30 PROBLEM — K92.2 LOWER GI BLEED: Status: RESOLVED | Noted: 2023-11-21 | Resolved: 2023-11-30

## 2023-11-30 PROBLEM — K51.00 PANCOLITIS: Status: RESOLVED | Noted: 2023-10-24 | Resolved: 2023-11-30

## 2023-11-30 PROBLEM — A41.9 SEPSIS: Status: RESOLVED | Noted: 2023-11-21 | Resolved: 2023-11-30

## 2023-11-30 PROBLEM — K62.5 RECTAL BLEEDING: Status: RESOLVED | Noted: 2023-11-21 | Resolved: 2023-11-30

## 2023-11-30 PROCEDURE — 25000003 PHARM REV CODE 250: Performed by: INTERNAL MEDICINE

## 2023-11-30 RX ORDER — OXYCODONE AND ACETAMINOPHEN 10; 325 MG/1; MG/1
1 TABLET ORAL EVERY 6 HOURS PRN
Status: DISCONTINUED | OUTPATIENT
Start: 2023-11-30 | End: 2023-11-30 | Stop reason: HOSPADM

## 2023-11-30 RX ORDER — OXYCODONE AND ACETAMINOPHEN 10; 325 MG/1; MG/1
1 TABLET ORAL EVERY 6 HOURS PRN
Qty: 30 TABLET | Refills: 0 | Status: SHIPPED | OUTPATIENT
Start: 2023-11-30 | End: 2023-12-20 | Stop reason: ALTCHOICE

## 2023-11-30 RX ADMIN — MULTIPLE VITAMINS W/ MINERALS TAB 1 TABLET: TAB at 09:11

## 2023-11-30 NOTE — PHYSICIAN QUERY
PT Name: Lionel León  MR #: [id number]    DOCUMENTATION CLARIFICATION     CDS: Bernice ABREU,RN        Contact information:[email or web address]    This form is a permanent document in the medical record.     Query Date: November 29, 2023    By submitting this query, we are merely seeking further clarification of documentation.. Please utilize your independent clinical judgment when addressing the question(s) below.    The medical record contains the following:   Indicators  Supporting Clinical Findings Location in Medical Record   x Registered Dietician Diagnosis Malnutrition Context: acute illness or injury (11/07/23 1313)  Malnutrition Level: moderate (11/07/23 1313)    11/13 Nutrition PN   x Energy Intake Energy Intake (Malnutrition): less than 75% for greater than 7 days (11/07/23 1313)  11/13 Nutrition PN   x Weight Loss Other associated symptoms include nausea, rectal pain, subjective fevers, weakness, fatigue and a 30 40 lb weight loss over last 3 weeks.     Weight Loss (Malnutrition): greater than 2% in 1 week (11/07/23 1313)  11/9 HM PN         11/13 Nutrition PN   x Fat Loss Subcutaneous Fat (Malnutrition): moderate depletion (11/07/23 1313)   Orbital Region (Subcutaneous Fat Loss): moderate depletion      11/13 Nutrition PN   x Muscle Loss Muscle Mass (Malnutrition): moderate depletion (11/07/23 1313)  [place] Region (Muscle Loss): moderate depletion 11/13 Nutrition PN   x Edema/Fluid Accumulation Fluid Accumulation (Malnutrition): other (see comments) (does not meet criteria) (11/07/23 1313)  11/13 Nutrition PN   x Reduced  Strength (by dynamometer) Hand  Strength, Left (Malnutrition): unable to evaluate (11/07/23 1313)  Hand  Strength, Right (Malnutrition): unable to evaluate (11/07/23 1313) 11/13 Nutrition PN   x Weight, BMI, Usual Body Weight WT: 165 lb  BMI:23.7 11/13 Nutrition PN    Delayed Wound Healing     x Acute or Chronic Illness Severe protein calorie malnutrition  "    Patient meets ASPEN criteria for moderate malnutrition of acute illness or injury per RD assessment as evidenced by:  Energy Intake (Malnutrition): less than 75% for greater than 7 days  Weight Loss (Malnutrition): greater than 2% in 1 week    Acute severe ulcerative colitis, orthostatic syncope, symptomatic anemia requiring blood transfusion, acute blood loss anemia, diverticulitis          11/9 HM PN    Social or Environmental Circumstances     x Treatment Advance diet as tolerated per MD. Goal Diet: Low Residue Diet.   If unable to advance diet in the next 24 hrs, consider PPN. Recommend Clinimix E 4.25/5 @ 75 mL/hr with IVPB IL 20% 250mL 2x/week.   Consider a MVI with minerals as medically feasible.   Monitor wt, labs, and intake.      11/13 Nutrition PN    Other       Academy of Nutrition and Dietetics (Academy) and the American Society for Parenteral and Enteral Nutrition (A.S.P.E.N.) Clinical Characteristics to support Malnutrition      Criteria for mild malnutrition is defined as 1 characteristic outlined above within the established moderate or severe parameters.  A minimum of 2 out of the 6 characteristics noted above are recommended for a diagnosis of moderate or severe malnutrition.  Chronic illness/injury is a disease/condition lasting 3 months or longer.    The noted clinical guidelines are only system guidelines and do not replace the providers clinical judgment.    Provider,  due to the conflicting documentation regarding "malnutrition",please clarify the severity that is associated with above clinical findings.    [  ] Moderate Malnutrition - a minimum of 2 of the 6 moderate malnutrition characteristics noted above    [ x ] Severe Malnutrition - a minimum of 2 of the 6 severe malnutrition characteristics noted above    [  ] Other Nutritional Diagnosis (please specify): _______     Please document in your progress notes daily for the duration of treatment until resolved and  include in your " discharge summary.      References:    KAMERON Palacios, & VITALY Rojo (2022, April). Assessment and management of anorexia and cachexia in palliative care. Retrieved May 23, 2022, from https://www.Webupo/contents/assessment-and-management-of-anorexia-and-cachexia-in-palliative-care?lfuwvBfu=8742&source=see_link     J LUIS Landin, PhD, RD, STEVIE, JEFFRY Liriano, PhD, RN, KATTY Juarez MD, PhD, Angelica KISER A., MS, RD, [facility], NEW Dave, MS, RD, The Academy Malnutrition Work Group, The A.S.P.E.N. Board of Directors. (2012). Consensus Statement: Academy of Nutrition and Dietetics and American Society for Parenteral and Enteral Nutrition: Characteristics Recommended for the Identification and Documentation of Adult Malnutrition (Undernutrition). Journal of Parenteral and Enteral Nutrition, 36(3), 275-283. doi:10.1177/0555867309241880     Form No. 87404

## 2023-11-30 NOTE — PLAN OF CARE
Sent AVS and wound care order to [facility] via care port. Also sent AVS and wound care order to [facility] at [phone]. Spoke to Cristy [phone] to notify of patient discharge. They will follow up with patient.

## 2023-11-30 NOTE — DISCHARGE INSTRUCTIONS
1- please follow up with gastroenterology as scheduled  2- please follow up with dr bernadine martinez plastic surgery as scheduled  3- please follow up w colorectal surgery dr nicole as scheduled  4- please follow up with your primary care MD  5- please follow up with dr banerjee with general surgery as scheduled

## 2023-11-30 NOTE — NURSING
Pt D/C in stable condition. All follow up appointments, home meds and discharge instructions discussed. Home wound care supplies given to pt and wife. Pt to go home with home health, wife states they already called her and will be there tomorrow. Ileostomy care and wound care discussed. All questions answered. Iv taken out.

## 2023-12-01 PROCEDURE — G0180 MD CERTIFICATION HHA PATIENT: HCPCS | Mod: ,,, | Performed by: COLON & RECTAL SURGERY

## 2023-12-02 ENCOUNTER — HOSPITAL ENCOUNTER (INPATIENT)
Facility: HOSPITAL | Age: 43
LOS: 6 days | Discharge: HOME HEALTH | DRG: 372 | End: 2023-12-09
Attending: STUDENT IN AN ORGANIZED HEALTH CARE EDUCATION/TRAINING PROGRAM | Admitting: INTERNAL MEDICINE
Payer: COMMERCIAL

## 2023-12-02 DIAGNOSIS — R07.9 CHEST PAIN: ICD-10-CM

## 2023-12-02 DIAGNOSIS — R18.8 INTRAABDOMINAL FLUID COLLECTION: Primary | ICD-10-CM

## 2023-12-02 DIAGNOSIS — R50.9 ACUTE FEBRILE ILLNESS: ICD-10-CM

## 2023-12-02 LAB
BASOPHILS # BLD AUTO: 0.02 X10(3)/MCL
BASOPHILS NFR BLD AUTO: 0.3 %
EOSINOPHIL # BLD AUTO: 0.28 X10(3)/MCL (ref 0–0.9)
EOSINOPHIL NFR BLD AUTO: 4.1 %
ERYTHROCYTE [DISTWIDTH] IN BLOOD BY AUTOMATED COUNT: 16.1 % (ref 11.5–17)
HCT VFR BLD AUTO: 24.1 % (ref 42–52)
HGB BLD-MCNC: 7.5 G/DL (ref 14–18)
IMM GRANULOCYTES # BLD AUTO: 0.08 X10(3)/MCL (ref 0–0.04)
IMM GRANULOCYTES NFR BLD AUTO: 1.2 %
LYMPHOCYTES # BLD AUTO: 0.41 X10(3)/MCL (ref 0.6–4.6)
LYMPHOCYTES NFR BLD AUTO: 6 %
MCH RBC QN AUTO: 26.9 PG (ref 27–31)
MCHC RBC AUTO-ENTMCNC: 31.1 G/DL (ref 33–36)
MCV RBC AUTO: 86.4 FL (ref 80–94)
MONOCYTES # BLD AUTO: 0.69 X10(3)/MCL (ref 0.1–1.3)
MONOCYTES NFR BLD AUTO: 10.1 %
NEUTROPHILS # BLD AUTO: 5.38 X10(3)/MCL (ref 2.1–9.2)
NEUTROPHILS NFR BLD AUTO: 78.3 %
NRBC BLD AUTO-RTO: 0 %
PLATELET # BLD AUTO: 361 X10(3)/MCL (ref 130–400)
PMV BLD AUTO: 9.6 FL (ref 7.4–10.4)
RBC # BLD AUTO: 2.79 X10(6)/MCL (ref 4.7–6.1)
WBC # SPEC AUTO: 6.86 X10(3)/MCL (ref 4.5–11.5)

## 2023-12-02 PROCEDURE — 83690 ASSAY OF LIPASE: CPT | Performed by: STUDENT IN AN ORGANIZED HEALTH CARE EDUCATION/TRAINING PROGRAM

## 2023-12-02 PROCEDURE — 83605 ASSAY OF LACTIC ACID: CPT | Performed by: STUDENT IN AN ORGANIZED HEALTH CARE EDUCATION/TRAINING PROGRAM

## 2023-12-02 PROCEDURE — 83735 ASSAY OF MAGNESIUM: CPT | Performed by: STUDENT IN AN ORGANIZED HEALTH CARE EDUCATION/TRAINING PROGRAM

## 2023-12-02 PROCEDURE — 99285 EMERGENCY DEPT VISIT HI MDM: CPT | Mod: 25

## 2023-12-02 PROCEDURE — 96361 HYDRATE IV INFUSION ADD-ON: CPT

## 2023-12-02 PROCEDURE — 80053 COMPREHEN METABOLIC PANEL: CPT | Performed by: STUDENT IN AN ORGANIZED HEALTH CARE EDUCATION/TRAINING PROGRAM

## 2023-12-02 PROCEDURE — 83880 ASSAY OF NATRIURETIC PEPTIDE: CPT | Performed by: STUDENT IN AN ORGANIZED HEALTH CARE EDUCATION/TRAINING PROGRAM

## 2023-12-02 PROCEDURE — 85610 PROTHROMBIN TIME: CPT | Performed by: STUDENT IN AN ORGANIZED HEALTH CARE EDUCATION/TRAINING PROGRAM

## 2023-12-02 PROCEDURE — 87040 BLOOD CULTURE FOR BACTERIA: CPT | Performed by: STUDENT IN AN ORGANIZED HEALTH CARE EDUCATION/TRAINING PROGRAM

## 2023-12-02 PROCEDURE — 84484 ASSAY OF TROPONIN QUANT: CPT | Performed by: STUDENT IN AN ORGANIZED HEALTH CARE EDUCATION/TRAINING PROGRAM

## 2023-12-02 PROCEDURE — 63600175 PHARM REV CODE 636 W HCPCS: Performed by: STUDENT IN AN ORGANIZED HEALTH CARE EDUCATION/TRAINING PROGRAM

## 2023-12-02 PROCEDURE — 85025 COMPLETE CBC W/AUTO DIFF WBC: CPT | Performed by: STUDENT IN AN ORGANIZED HEALTH CARE EDUCATION/TRAINING PROGRAM

## 2023-12-02 RX ADMIN — SODIUM CHLORIDE, POTASSIUM CHLORIDE, SODIUM LACTATE AND CALCIUM CHLORIDE 1974 ML: 600; 310; 30; 20 INJECTION, SOLUTION INTRAVENOUS at 11:12

## 2023-12-02 RX ADMIN — IOPAMIDOL 100 ML: 755 INJECTION, SOLUTION INTRAVENOUS at 11:12

## 2023-12-03 LAB
ALBUMIN SERPL-MCNC: 2.4 G/DL (ref 3.5–5)
ALBUMIN/GLOB SERPL: 0.7 RATIO (ref 1.1–2)
ALP SERPL-CCNC: 64 UNIT/L (ref 40–150)
ALT SERPL-CCNC: 19 UNIT/L (ref 0–55)
APPEARANCE UR: CLEAR
AST SERPL-CCNC: 14 UNIT/L (ref 5–34)
BACTERIA #/AREA URNS AUTO: ABNORMAL /HPF
BASOPHILS # BLD AUTO: 0.02 X10(3)/MCL
BASOPHILS NFR BLD AUTO: 0.3 %
BILIRUB SERPL-MCNC: 0.5 MG/DL
BILIRUB UR QL STRIP.AUTO: NEGATIVE
BNP BLD-MCNC: 24.9 PG/ML
BUN SERPL-MCNC: 37.2 MG/DL (ref 8.9–20.6)
CALCIUM SERPL-MCNC: 8.1 MG/DL (ref 8.4–10.2)
CHLORIDE SERPL-SCNC: 102 MMOL/L (ref 98–107)
CO2 SERPL-SCNC: 22 MMOL/L (ref 22–29)
COLOR UR AUTO: COLORLESS
CREAT SERPL-MCNC: 1.79 MG/DL (ref 0.73–1.18)
CRP SERPL-MCNC: 98.6 MG/L
EOSINOPHIL # BLD AUTO: 0.31 X10(3)/MCL (ref 0–0.9)
EOSINOPHIL NFR BLD AUTO: 4.6 %
ERYTHROCYTE [DISTWIDTH] IN BLOOD BY AUTOMATED COUNT: 16.2 % (ref 11.5–17)
ERYTHROCYTE [SEDIMENTATION RATE] IN BLOOD: 89 MM/HR (ref 0–15)
FERRITIN SERPL-MCNC: 761.96 NG/ML (ref 21.81–274.66)
FLUAV AG UPPER RESP QL IA.RAPID: NOT DETECTED
FLUBV AG UPPER RESP QL IA.RAPID: NOT DETECTED
FOLATE SERPL-MCNC: 12.3 NG/ML (ref 7–31.4)
GFR SERPLBLD CREATININE-BSD FMLA CKD-EPI: 48 MLS/MIN/1.73/M2
GLOBULIN SER-MCNC: 3.3 GM/DL (ref 2.4–3.5)
GLUCOSE SERPL-MCNC: 117 MG/DL (ref 74–100)
GLUCOSE UR QL STRIP.AUTO: NORMAL
GROUP & RH: NORMAL
HCT VFR BLD AUTO: 21.7 % (ref 42–52)
HGB BLD-MCNC: 6.8 G/DL (ref 14–18)
IMM GRANULOCYTES # BLD AUTO: 0.05 X10(3)/MCL (ref 0–0.04)
IMM GRANULOCYTES NFR BLD AUTO: 0.7 %
INDIRECT COOMBS GEL: NORMAL
INR PPP: 1.3
IRON SATN MFR SERPL: 5 % (ref 20–50)
IRON SERPL-MCNC: 8 UG/DL (ref 65–175)
KETONES UR QL STRIP.AUTO: NEGATIVE
LACTATE SERPL-SCNC: 0.6 MMOL/L (ref 0.5–2.2)
LEUKOCYTE ESTERASE UR QL STRIP.AUTO: NEGATIVE
LIPASE SERPL-CCNC: 150 U/L
LYMPHOCYTES # BLD AUTO: 0.14 X10(3)/MCL (ref 0.6–4.6)
LYMPHOCYTES NFR BLD AUTO: 2.1 %
MAGNESIUM SERPL-MCNC: 1.6 MG/DL (ref 1.6–2.6)
MAGNESIUM SERPL-MCNC: 1.7 MG/DL (ref 1.6–2.6)
MCH RBC QN AUTO: 26.7 PG (ref 27–31)
MCHC RBC AUTO-ENTMCNC: 31.3 G/DL (ref 33–36)
MCV RBC AUTO: 85.1 FL (ref 80–94)
MONOCYTES # BLD AUTO: 0.54 X10(3)/MCL (ref 0.1–1.3)
MONOCYTES NFR BLD AUTO: 8 %
NEUTROPHILS # BLD AUTO: 5.7 X10(3)/MCL (ref 2.1–9.2)
NEUTROPHILS NFR BLD AUTO: 84.3 %
NITRITE UR QL STRIP.AUTO: NEGATIVE
NRBC BLD AUTO-RTO: 0 %
PH UR STRIP.AUTO: 5.5 [PH]
PHOSPHATE SERPL-MCNC: 3.7 MG/DL (ref 2.3–4.7)
PLATELET # BLD AUTO: 307 X10(3)/MCL (ref 130–400)
PMV BLD AUTO: 9.3 FL (ref 7.4–10.4)
POTASSIUM SERPL-SCNC: 4.7 MMOL/L (ref 3.5–5.1)
PROT SERPL-MCNC: 5.7 GM/DL (ref 6.4–8.3)
PROT UR QL STRIP.AUTO: ABNORMAL
PROTHROMBIN TIME: 15.9 SECONDS (ref 12.5–14.5)
RBC # BLD AUTO: 2.55 X10(6)/MCL (ref 4.7–6.1)
RBC #/AREA URNS AUTO: ABNORMAL /HPF
RBC UR QL AUTO: NEGATIVE
SARS-COV-2 RNA RESP QL NAA+PROBE: NOT DETECTED
SODIUM SERPL-SCNC: 132 MMOL/L (ref 136–145)
SP GR UR STRIP.AUTO: 1.03 (ref 1–1.03)
SPECIMEN OUTDATE: NORMAL
SQUAMOUS #/AREA URNS LPF: ABNORMAL /HPF
TIBC SERPL-MCNC: 155 UG/DL (ref 69–240)
TIBC SERPL-MCNC: 163 UG/DL (ref 250–450)
TRANSFERRIN SERPL-MCNC: 148 MG/DL (ref 174–364)
TROPONIN I SERPL-MCNC: <0.01 NG/ML (ref 0–0.04)
UROBILINOGEN UR STRIP-ACNC: NORMAL
VIT B12 SERPL-MCNC: 526 PG/ML (ref 213–816)
WBC # SPEC AUTO: 6.76 X10(3)/MCL (ref 4.5–11.5)
WBC #/AREA URNS AUTO: ABNORMAL /HPF

## 2023-12-03 PROCEDURE — 85025 COMPLETE CBC W/AUTO DIFF WBC: CPT | Performed by: INTERNAL MEDICINE

## 2023-12-03 PROCEDURE — 86140 C-REACTIVE PROTEIN: CPT | Performed by: INTERNAL MEDICINE

## 2023-12-03 PROCEDURE — 11000001 HC ACUTE MED/SURG PRIVATE ROOM

## 2023-12-03 PROCEDURE — 83735 ASSAY OF MAGNESIUM: CPT | Performed by: INTERNAL MEDICINE

## 2023-12-03 PROCEDURE — 25000003 PHARM REV CODE 250: Performed by: INTERNAL MEDICINE

## 2023-12-03 PROCEDURE — 86923 COMPATIBILITY TEST ELECTRIC: CPT | Performed by: INTERNAL MEDICINE

## 2023-12-03 PROCEDURE — 82728 ASSAY OF FERRITIN: CPT | Performed by: INTERNAL MEDICINE

## 2023-12-03 PROCEDURE — 83540 ASSAY OF IRON: CPT | Performed by: INTERNAL MEDICINE

## 2023-12-03 PROCEDURE — 93010 EKG 12-LEAD: ICD-10-PCS | Mod: ,,, | Performed by: INTERNAL MEDICINE

## 2023-12-03 PROCEDURE — P9016 RBC LEUKOCYTES REDUCED: HCPCS | Performed by: INTERNAL MEDICINE

## 2023-12-03 PROCEDURE — 93005 ELECTROCARDIOGRAM TRACING: CPT

## 2023-12-03 PROCEDURE — 63600175 PHARM REV CODE 636 W HCPCS: Performed by: INTERNAL MEDICINE

## 2023-12-03 PROCEDURE — 0240U COVID/FLU A&B PCR: CPT | Performed by: INTERNAL MEDICINE

## 2023-12-03 PROCEDURE — 81001 URINALYSIS AUTO W/SCOPE: CPT | Performed by: STUDENT IN AN ORGANIZED HEALTH CARE EDUCATION/TRAINING PROGRAM

## 2023-12-03 PROCEDURE — 25000003 PHARM REV CODE 250: Performed by: STUDENT IN AN ORGANIZED HEALTH CARE EDUCATION/TRAINING PROGRAM

## 2023-12-03 PROCEDURE — 63600175 PHARM REV CODE 636 W HCPCS: Performed by: STUDENT IN AN ORGANIZED HEALTH CARE EDUCATION/TRAINING PROGRAM

## 2023-12-03 PROCEDURE — 96365 THER/PROPH/DIAG IV INF INIT: CPT

## 2023-12-03 PROCEDURE — 21400001 HC TELEMETRY ROOM

## 2023-12-03 PROCEDURE — 82607 VITAMIN B-12: CPT | Performed by: INTERNAL MEDICINE

## 2023-12-03 PROCEDURE — 36430 TRANSFUSION BLD/BLD COMPNT: CPT

## 2023-12-03 PROCEDURE — 84100 ASSAY OF PHOSPHORUS: CPT | Performed by: INTERNAL MEDICINE

## 2023-12-03 PROCEDURE — 86850 RBC ANTIBODY SCREEN: CPT | Performed by: INTERNAL MEDICINE

## 2023-12-03 PROCEDURE — 82746 ASSAY OF FOLIC ACID SERUM: CPT | Performed by: INTERNAL MEDICINE

## 2023-12-03 PROCEDURE — 93010 ELECTROCARDIOGRAM REPORT: CPT | Mod: ,,, | Performed by: INTERNAL MEDICINE

## 2023-12-03 PROCEDURE — 25500020 PHARM REV CODE 255: Performed by: STUDENT IN AN ORGANIZED HEALTH CARE EDUCATION/TRAINING PROGRAM

## 2023-12-03 PROCEDURE — 85652 RBC SED RATE AUTOMATED: CPT | Performed by: INTERNAL MEDICINE

## 2023-12-03 RX ORDER — ENOXAPARIN SODIUM 100 MG/ML
40 INJECTION SUBCUTANEOUS EVERY 24 HOURS
Status: DISCONTINUED | OUTPATIENT
Start: 2023-12-03 | End: 2023-12-09 | Stop reason: HOSPADM

## 2023-12-03 RX ORDER — AMOXICILLIN 250 MG
2 CAPSULE ORAL 2 TIMES DAILY PRN
Status: DISCONTINUED | OUTPATIENT
Start: 2023-12-03 | End: 2023-12-09 | Stop reason: HOSPADM

## 2023-12-03 RX ORDER — ACETAMINOPHEN 500 MG
1000 TABLET ORAL EVERY 6 HOURS PRN
Status: DISCONTINUED | OUTPATIENT
Start: 2023-12-03 | End: 2023-12-09 | Stop reason: HOSPADM

## 2023-12-03 RX ORDER — PROCHLORPERAZINE EDISYLATE 5 MG/ML
5 INJECTION INTRAMUSCULAR; INTRAVENOUS EVERY 6 HOURS PRN
Status: DISCONTINUED | OUTPATIENT
Start: 2023-12-03 | End: 2023-12-09 | Stop reason: HOSPADM

## 2023-12-03 RX ORDER — SODIUM CHLORIDE 0.9 % (FLUSH) 0.9 %
10 SYRINGE (ML) INJECTION
Status: DISCONTINUED | OUTPATIENT
Start: 2023-12-03 | End: 2023-12-09 | Stop reason: HOSPADM

## 2023-12-03 RX ORDER — MAG HYDROX/ALUMINUM HYD/SIMETH 200-200-20
30 SUSPENSION, ORAL (FINAL DOSE FORM) ORAL 4 TIMES DAILY PRN
Status: DISCONTINUED | OUTPATIENT
Start: 2023-12-03 | End: 2023-12-09 | Stop reason: HOSPADM

## 2023-12-03 RX ORDER — MAGNESIUM SULFATE HEPTAHYDRATE 40 MG/ML
2 INJECTION, SOLUTION INTRAVENOUS ONCE
Status: COMPLETED | OUTPATIENT
Start: 2023-12-03 | End: 2023-12-03

## 2023-12-03 RX ORDER — ONDANSETRON 2 MG/ML
4 INJECTION INTRAMUSCULAR; INTRAVENOUS EVERY 4 HOURS PRN
Status: DISCONTINUED | OUTPATIENT
Start: 2023-12-03 | End: 2023-12-09 | Stop reason: HOSPADM

## 2023-12-03 RX ORDER — HYDROCODONE BITARTRATE AND ACETAMINOPHEN 500; 5 MG/1; MG/1
TABLET ORAL
Status: DISCONTINUED | OUTPATIENT
Start: 2023-12-03 | End: 2023-12-09 | Stop reason: HOSPADM

## 2023-12-03 RX ORDER — SODIUM CHLORIDE, SODIUM LACTATE, POTASSIUM CHLORIDE, CALCIUM CHLORIDE 600; 310; 30; 20 MG/100ML; MG/100ML; MG/100ML; MG/100ML
INJECTION, SOLUTION INTRAVENOUS CONTINUOUS
Status: DISCONTINUED | OUTPATIENT
Start: 2023-12-03 | End: 2023-12-08

## 2023-12-03 RX ORDER — OXYCODONE AND ACETAMINOPHEN 10; 325 MG/1; MG/1
1 TABLET ORAL EVERY 6 HOURS PRN
Status: DISCONTINUED | OUTPATIENT
Start: 2023-12-03 | End: 2023-12-09 | Stop reason: HOSPADM

## 2023-12-03 RX ORDER — ACETAMINOPHEN 325 MG/1
650 TABLET ORAL EVERY 4 HOURS PRN
Status: DISCONTINUED | OUTPATIENT
Start: 2023-12-03 | End: 2023-12-09 | Stop reason: HOSPADM

## 2023-12-03 RX ORDER — TALC
6 POWDER (GRAM) TOPICAL NIGHTLY PRN
Status: DISCONTINUED | OUTPATIENT
Start: 2023-12-03 | End: 2023-12-09 | Stop reason: HOSPADM

## 2023-12-03 RX ORDER — POLYETHYLENE GLYCOL 3350 17 G/17G
17 POWDER, FOR SOLUTION ORAL 2 TIMES DAILY PRN
Status: DISCONTINUED | OUTPATIENT
Start: 2023-12-03 | End: 2023-12-09 | Stop reason: HOSPADM

## 2023-12-03 RX ORDER — ACYCLOVIR 400 MG/1
400 TABLET ORAL
Status: ON HOLD | COMMUNITY
End: 2023-12-09 | Stop reason: HOSPADM

## 2023-12-03 RX ADMIN — ACETAMINOPHEN 650 MG: 325 TABLET, FILM COATED ORAL at 07:12

## 2023-12-03 RX ADMIN — PIPERACILLIN AND TAZOBACTAM 4.5 G: 4; .5 INJECTION, POWDER, FOR SOLUTION INTRAVENOUS at 09:12

## 2023-12-03 RX ADMIN — SODIUM CHLORIDE, POTASSIUM CHLORIDE, SODIUM LACTATE AND CALCIUM CHLORIDE: 600; 310; 30; 20 INJECTION, SOLUTION INTRAVENOUS at 06:12

## 2023-12-03 RX ADMIN — ENOXAPARIN SODIUM 40 MG: 40 INJECTION SUBCUTANEOUS at 10:12

## 2023-12-03 RX ADMIN — MAGNESIUM SULFATE HEPTAHYDRATE 2 G: 40 INJECTION, SOLUTION INTRAVENOUS at 06:12

## 2023-12-03 RX ADMIN — ACETAMINOPHEN 650 MG: 325 TABLET, FILM COATED ORAL at 11:12

## 2023-12-03 RX ADMIN — PIPERACILLIN AND TAZOBACTAM 4.5 G: 4; .5 INJECTION, POWDER, LYOPHILIZED, FOR SOLUTION INTRAVENOUS; PARENTERAL at 12:12

## 2023-12-03 RX ADMIN — PIPERACILLIN AND TAZOBACTAM 4.5 G: 4; .5 INJECTION, POWDER, FOR SOLUTION INTRAVENOUS at 06:12

## 2023-12-03 RX ADMIN — SODIUM CHLORIDE, POTASSIUM CHLORIDE, SODIUM LACTATE AND CALCIUM CHLORIDE: 600; 310; 30; 20 INJECTION, SOLUTION INTRAVENOUS at 03:12

## 2023-12-03 RX ADMIN — VANCOMYCIN HYDROCHLORIDE 750 MG: 750 INJECTION, POWDER, LYOPHILIZED, FOR SOLUTION INTRAVENOUS at 10:12

## 2023-12-03 RX ADMIN — ACETAMINOPHEN 650 MG: 325 TABLET, FILM COATED ORAL at 03:12

## 2023-12-03 NOTE — OP NOTE
Operative Note  Lionel León  MRN:  [id number]  : 1980  Surgery Date: 2023  [facility] OR  Surgeon(s):  Vladimir Vick MD   General     Procedure: Procedures:    * DEBRIDEMENT, EXTERNAL GENITALIA/BUTTOCKS FOR NECROTIZING SOFT TISSUE INFECTION     Pre-op Dx: Necrotizing Soft Tissue Infection Gluteus     Post-op Dx: Necrotizing Soft Tissue Infection Gluteus      Staff: Dr. Nava MD     Anesthesia: General        Indication for Procedure:   42-year-old male with a past medical history of ulcerative colitis recently diagnosed in 2023 who was found to have a necrotizing soft tissue infection of his perineum with high fevers and leukocytosis.  CT scan demonstrated large amount of soft tissue gas in his perineal region.  Taken to OR 2023 for excisional debridement, returning today for 2nd look.            Procedure Details   See full op note for details.     Findings:  2nd look with sharp excisional debridement of necrotic tissue from perineum.    Estimated Blood Loss:  minimal      Drains:  Rectal tube.           Specimens:   Specimens (From admission, onward)        None                      Implants: * No implants in log *     Complications:  None           Disposition: PACU - hemodynamically stable.           Condition: stable    Operative Technique:  Risks and benefits were discussed with patient and family at bedside, informed consent signed.  Patient was taken to the OR and placed supine, endotracheal intubation was successful.  Patient was then repositioned in lithotomy.  The perineum was then prepped and draped in sterile fashion.  A time out was completed to confirm correct patient, procedure, and all staff was in agreement.      The wound was inspected.  Any necrotic appearing tissue was sharply excised which included some skin, subcutaneous tissue and some fascia.  Wound was then irrigated.  Hemostasis was achieved.  Wound was then packed and dressed with sterile dressing.  Patient  tolerated procedure, was extubated, and transferred to recovery in stable condition.      SURGEON:  Vladimir Vick MD    Date: 11/12/2023  Time: 03:04 PM

## 2023-12-03 NOTE — ED NOTES
Wife's number [phone] please call for any updates right now we can't let anyone inside we are under restrictions please call her to update or please send a nurse to come get her if you need to

## 2023-12-03 NOTE — H&P
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  He completed antibiotic course and was discharged home with home health on 11/30/2023.    Present to the ED tonight reporting a fever of 102.  Report mild nonproductive cough denies chest pain or shortness breath.  Report mild cramping right lower quadrant abdominal pain.  Did not notice any increase in ileostomy output.  Wife report she been changing perineal wound  dressing and did not notice any foul-smelling drainage or necrotic changes.    On arrival to ED he was afebrile and hemodynamically stable.  Labs notable for hemoglobin 7.5, WBC 6.86, creatinine 1.79, BUN 37, COVID 19 and flu negative, urinalysis unremarkable.  Chest x-ray on my review show no airspace disease.  CT abdomen pelvis with IV contrast noted for interval development of peripherally enhancing fluid collection in the right lower abdomen measuring 5.3 x 4.2 x 9 cm and demonstrates a few tiny gas locule concerning for an abscess, also noted small subcutaneous fluid collection with gas locules in the infraumbilical region measuring 2.5 x 2.3 x 4.8 cm likely postoperative though evolving abscess cannot be excluded.    Surgery consulted and recommended IR  drainage.  Referred to hospital medicine service for further evaluation and management.    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  Ulcerative colitis recently diagnosed in October 2023 after presenting with hematochezia and pancolitis s/p laparoscopic total colectomy and end ileostomy 11/15/2023  Atrial shunt/PFO  Remote history of diverticulitis status post segmental resection    Past Surgical History     Past Surgical History:   Procedure Laterality Date    COLONOSCOPY, WITH 1 OR MORE BIOPSIES N/A 10/23/2023    Procedure: COLON;  Surgeon: Dragan Underwood MD;  Location: [facility] ENDOSCOPY;  Service: Gastroenterology;  Laterality: N/A;    DEBRIDEMENT OF SACRAL WOUND N/A 11/20/2023    Procedure: DEBRIDEMENT, WOUND, SACRUM;  Surgeon: Chandana Guidry MD;  Location: [facility] OR;  Service: General;  Laterality: N/A;  perineum/sacrum// high lithotomy    DEBRIDEMENT, EXTERNAL GENITALIA, PERINEUM, AND ABDOMINAL WALL, FOR NECROTIZING SOFT TISSUE INFECTION Bilateral 11/11/2023    Procedure: DEBRIDEMENT, EXTERNAL GENITALIA/BUTTOCKS FOR NECROTIZING SOFT TISSUE INFECTION;  Surgeon: Vladimir Vick MD;  Location: [facility];  Service: General;  Laterality: Bilateral;  Prone positioning.    DEBRIDEMENT, EXTERNAL GENITALIA, PERINEUM, AND ABDOMINAL WALL, FOR NECROTIZING SOFT TISSUE INFECTION N/A 11/12/2023    Procedure: DEBRIDEMENT, EXTERNAL GENITALIA, PERINEUM, AND ABDOMINAL WALL, FOR NECROTIZING SOFT TISSUE INFECTION;  Surgeon: Vladimir Vick MD;  Location: [facility];  Service: General;  Laterality: N/A;  Place patient in dorsal lithotomy positioning.    DIAGNOSTIC LAPAROSCOPY N/A 11/20/2023    Procedure: LAPAROSCOPY, DIAGNOSTIC;  Surgeon: Fredi Shields MD;  Location: [facility];  Service: General;  Laterality: N/A;    EGD, WITH CLOSED BIOPSY N/A 11/22/2023    Procedure: EGD, WITH CLOSED BIOPSY;  Surgeon:  Blayne Mujica MD;  Location: [facility] ENDOSCOPY;  Service: Gastroenterology;  Laterality: N/A;    LAPAROSCOPIC ILEOSTOMY N/A 11/15/2023    Procedure: CREATION, ILEOSTOMY, LAPAROSCOPIC;  Surgeon: Fredi Shields MD;  Location: [facility] OR;  Service: Colon and Rectal;  Laterality: N/A;    LAPAROSCOPIC TOTAL COLECTOMY N/A 11/15/2023    Procedure: COLECTOMY, TOTAL, LAPAROSCOPIC;  Surgeon: Fredi Shields MD;  Location: [facility] OR;  Service: Colon and Rectal;  Laterality: N/A;  LAP TOTAL COLECTOMY WITH ILEOSTOMY       Social History     Social History     Tobacco Use    Smoking status: Never    Smokeless tobacco: Never   Substance Use Topics    Alcohol use: Yes     Comment: Social        Family History   Reviewed and negative    Allergies   Patient has no known allergies.    Home Medications     Prior to Admission medications    Medication Sig Start Date End Date Taking? Authorizing Provider   oxyCODONE-acetaminophen (PERCOCET)  mg per tablet Take 1 tablet by mouth every 6 (six) hours as needed for Pain. 11/30/23   Sloan Alvarez MD        Physical Exam   Vital Signs  Temp:  [98.5 °F (36.9 °C)]   Pulse:  [91]   Resp:  [18]   BP: (91)/(56)   SpO2:  [98 %]    General: Appears comfortable  HEENT: NC/AT, dry skin and oral mucosal  Neck:  No JVD  Chest: CTABL  CVS: Regular rhythm. Normal S1/S2.  Abdomen: nondistended, normoactive BS, soft and non-tender, ileostomy in place  :  Perianal/perineal wound appear healthy with granulation tissue with no foul-smelling drainage or pus  MSK: No obvious deformity or joint swelling  Skin: Warm and dry  Neuro: AAOx3, no focal neurological deficit  Psych: Cooperative    Labs     Recent Labs     12/02/23 2327   WBC 6.86   RBC 2.79*   HGB 7.5*   HCT 24.1*   MCV 86.4   MCH 26.9*   MCHC 31.1*   RDW 16.1        Recent Labs     12/02/23 2327   PROTIME 15.9*   INR 1.3      Recent Labs     12/02/23 2327   *   K 4.7   CHLORIDE 102   CO2 22   BUN 37.2*    CREATININE 1.79*   EGFRNORACEVR 48   GLUCOSE 117*   CALCIUM 8.1*   MG 1.70   ALBUMIN 2.4*   GLOBULIN 3.3   ALKPHOS 64   ALT 19   AST 14   BILITOT 0.5   LIPASE 150*   BNP 24.9     Recent Labs     12/02/23 2327   LACTIC 0.6     Recent Labs     12/02/23 2327   TROPONINI <0.010        Microbiology Results (last 7 days)       Procedure Component Value Units Date/Time    Blood culture x two cultures. Draw prior to antibiotics. [[id number]] Collected: 12/02/23 2327    Order Status: Resulted Specimen: Blood from Antecubital, Left Updated: 12/02/23 2329    Blood culture x two cultures. Draw prior to antibiotics. [[id number]] Collected: 12/02/23 2327    Order Status: Resulted Specimen: Blood from Antecubital, Right Updated: 12/02/23 2329           Imaging     CT Abdomen Pelvis With IV Contrast NO Oral Contrast  START OF REPORT:  Technique: CT of the abdomen and pelvis was performed with axial images as well as sagittal and coronal reconstruction images with intravenous contrast.    Comparison: Comparison is with study [id number] 00:34:56.    Clinical History: Reports fever of 102, last tylenol dose given at 2000. Dizziness with standing. Pt discharge home 11/30 after multiple sx and blood transfusion. Told to come if any fever noted.    Dosage Information: Automated Exposure Control was utilized 458.08 mGy.cm.    Findings:  Thorax:  Lungs: Streaky opacities are present in the lower lobes bilaterally, which may reflect subsegmental atelectasis and / or parenchymal scarring.  Pleura: No effusions or thickening are seen.  Heart: The heart size is within normal limits.  Abdomen:  Abdominal Wall: There is interval resolution of previously noted soft tissue emphysema in the left ventral mid and lower abdominal wall. Again noted are a few small subcutaneous gas locules in the right ventral mid abdomen. There is interval development of a small subcutaneous fluid collection with gas locule in the infraumbilical region (series  2; image 88), which measures 2.5 x 2.3 x 4.8 cm (AP x T x CC), likely postoperative. An evolving abscess cannot be excluded.  Liver: A subcentimeter hypodensity is again noted in the left lobe of the liver (series 2; image 30), which is too small to further characterize. The liver otherwise appears unremarkable.  Biliary System: No intrahepatic or extrahepatic biliary duct dilatation is seen.  Gallbladder: The gallbladder is non-distended and appears otherwise unremarkable.  Pancreas: The pancreas appears unremarkable.  Spleen: A small focal calcification is again seen along the splenic capsule. The spleen otherwise appears unremarkable.  Adrenals: The adrenal glands appear unremarkable.  Kidneys: Nonspecific perinephric fat stranding is noted bilaterally. Both kidneys appear mildly enlarged in size in AP dimension, new since the prior examination. No hypoperfused areas are seen within the renal cortices to suggest acute pyelonephritis on the submitted images. A subcentimeter hypodensity is again noted in the lower pole of the right kidney (series 2; image 66), which may reflect a cyst. There is subtle mucosal wall enhancement of the renal pelves bilaterally (series 4; images 64-90).  Aorta: There is mild calcification of the abdominal aorta and its branches.  IVC: Unremarkable.  Bowel: Again noted is near total colectomy with an ileostomy in the right lower abdomen. There are multiple moderately distended small bowel loops in the mid and lower abdomen. The caliber of the bowel loops has slightly decreased since the prior examination.  Peritoneum: Again noted is pneumoperitoneum in the right subdiaphragmatic region (series 2; image 19-35), significantly decreased since the prior examination. There is interval resolution of previously noted moderate ascites. Interval development of a peripherally enhancing fluid collection in the right lower abdomen and pelvis (series 2; images , series 5; image 95 and series  4; images 41-60). The collection measures approximately 5.3 x 4.2 x 9 cm and demonstrates a few tiny gas locules. This is of concern for an abscess.  Retroperitoneal lymph nodes: Again noted are subcentimeter paraaortic and paracaval lymph nodes.    Pelvis:  Bladder: The bladder appears unremarkable.  Male:  Prostate gland: The prostate gland appears unremarkable.    Bony structures:  Dorsal Spine: The visualized dorsal spine appears unremarkable.  Bony Pelvis: The visualized bony structures of the pelvis appear unremarkable.    Impression:  1. Both kidneys appear mildly enlarged in size in AP dimension, new since the prior examination. No hypoperfused areas are seen within the renal cortices to suggest acute pyelonephritis on the submitted images. There is subtle mucosal wall enhancement of the renal pelves bilaterally (series 4; images 64-90). An element of pyeloureteritis cannot be excluded. Correlate with clinical and laboratory findings.  2. Again noted is pneumoperitoneum in the right subdiaphragmatic region (series 2; image 19-35), significantly decreased since the prior examination. There is interval resolution of previously noted moderate ascites. Interval development of a peripherally enhancing fluid collection in the right lower abdomen and pelvis (series 2; images , series 5; image 95 and series 4; images 41-60). The collection measures approximately 5.3 x 4.2 x 9 cm and demonstrates a few tiny gas locules. This is of concern for an abscess. Correlate clinically as regards further evaluation and followup.  3. There is interval resolution of previously noted soft tissue emphysema in the left ventral mid and lower abdominal wall. Again noted are a few small subcutaneous gas locules in the right ventral mid abdomen. There is interval development of a small subcutaneous fluid collection with gas locule in the infraumbilical region (series 2; image 88), which measures 2.5 x 2.3 x 4.8 cm (AP x T x CC),  likely postoperative. An evolving abscess cannot be excluded.  4. Again noted is near total colectomy with an ileostomy in the right lower abdomen. There are multiple moderately distended small bowel loops in the mid and lower abdomen. The caliber of the bowel loops has slightly decreased since the prior examination. This may reflect an ileus versus partial small bowel obstruction.  5. Details and other findings as discussed above. Follow-up as clinically indicated.    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

## 2023-12-03 NOTE — ED PROVIDER NOTES
Encounter Date: 12/2/2023    SCRIBE #1 NOTE: I, Gus Melendez, am scribing for, and in the presence of,  Fredi Garcia MD. I have scribed the following portions of the note - Other sections scribed: HPI, ROS, PE.       History     Chief Complaint   Patient presents with    Fever     Reports fever of 102, last tylenol dose given at 2000. Dizziness with standing. Pt discharge home 11/30 after multiple sx and blood transfusion. Told to come if any fever noted.      43 year old male with pmhx of diverticulitis and ulcerative colitis presents to ED for a fever onset earlier tonight. Wife reports that tmax was 102. Wife reports that tylenol brought fever down. Wife denies drainage or pain from surgical sites. Pt reports that he is not currently taking antibiotics. Pt reports additional symptom of throat tightness, report that he has been intubated multiple times and his throat feels irritated. Pt reports that he was discharged home on 11/30 after multiple abdominal surgeries.     The history is provided by the patient. No  was used.     Review of patient's allergies indicates:  No Known Allergies  Past Medical History:   Diagnosis Date    Diverticulitis     Ulcerative colitis      Past Surgical History:   Procedure Laterality Date    COLONOSCOPY, WITH 1 OR MORE BIOPSIES N/A 10/23/2023    Procedure: COLON;  Surgeon: Dragan Underwood MD;  Location: [facility] ENDOSCOPY;  Service: Gastroenterology;  Laterality: N/A;    DEBRIDEMENT OF SACRAL WOUND N/A 11/20/2023    Procedure: DEBRIDEMENT, WOUND, SACRUM;  Surgeon: Chandana Guidry MD;  Location: [facility];  Service: General;  Laterality: N/A;  perineum/sacrum// high lithotomy    DEBRIDEMENT, EXTERNAL GENITALIA, PERINEUM, AND ABDOMINAL WALL, FOR NECROTIZING SOFT TISSUE INFECTION Bilateral 11/11/2023    Procedure: DEBRIDEMENT, EXTERNAL GENITALIA/BUTTOCKS FOR NECROTIZING SOFT TISSUE INFECTION;  Surgeon: Vladimir Vick MD;  Location: [facility];   Service: General;  Laterality: Bilateral;  Prone positioning.    DEBRIDEMENT, EXTERNAL GENITALIA, PERINEUM, AND ABDOMINAL WALL, FOR NECROTIZING SOFT TISSUE INFECTION N/A 11/12/2023    Procedure: DEBRIDEMENT, EXTERNAL GENITALIA, PERINEUM, AND ABDOMINAL WALL, FOR NECROTIZING SOFT TISSUE INFECTION;  Surgeon: Vladimir Vick MD;  Location: [facility] OR;  Service: General;  Laterality: N/A;  Place patient in dorsal lithotomy positioning.    DIAGNOSTIC LAPAROSCOPY N/A 11/20/2023    Procedure: LAPAROSCOPY, DIAGNOSTIC;  Surgeon: Fredi Shields MD;  Location: [facility] OR;  Service: General;  Laterality: N/A;    EGD, WITH CLOSED BIOPSY N/A 11/22/2023    Procedure: EGD, WITH CLOSED BIOPSY;  Surgeon: Blayne Mujica MD;  Location: [facility] ENDOSCOPY;  Service: Gastroenterology;  Laterality: N/A;    LAPAROSCOPIC ILEOSTOMY N/A 11/15/2023    Procedure: CREATION, ILEOSTOMY, LAPAROSCOPIC;  Surgeon: Fredi Shields MD;  Location: [facility] OR;  Service: Colon and Rectal;  Laterality: N/A;    LAPAROSCOPIC TOTAL COLECTOMY N/A 11/15/2023    Procedure: COLECTOMY, TOTAL, LAPAROSCOPIC;  Surgeon: Fredi Shields MD;  Location: [facility] OR;  Service: Colon and Rectal;  Laterality: N/A;  LAP TOTAL COLECTOMY WITH ILEOSTOMY     No family history on file.  Social History     Tobacco Use    Smoking status: Never    Smokeless tobacco: Never   Substance Use Topics    Alcohol use: Yes     Comment: Social    Drug use: Never     Review of Systems   Constitutional:  Positive for fever. Negative for chills.       Physical Exam     Initial Vitals [12/02/23 2203]   BP Pulse Resp Temp SpO2   (!) 91/56 91 18 98.5 °F (36.9 °C) 98 %      MAP       --         Physical Exam    Nursing note and vitals reviewed.  Constitutional: He appears well-developed. He is not diaphoretic. No distress.   Well apearing   HENT:   Head: Normocephalic and atraumatic.   Nose: Nose normal.   Mouth/Throat: Oropharynx is clear and moist.   Eyes: Conjunctivae and EOM are  normal. Pupils are equal, round, and reactive to light.   Cardiovascular:  Normal rate and regular rhythm.           No murmur heard.  Pulmonary/Chest: Breath sounds normal. No respiratory distress. He has no wheezes. He has no rales.   Abdominal: Abdomen is soft. He exhibits no distension. There is no abdominal tenderness.   Ostomy to RLQ without bleeding, drainage or dehiscence, bowel is pink, Laparoscopic surgical sites to abdomen well healed   Genitourinary:    Genitourinary Comments: Multiple surgical scars to perineum        Neurological: He is alert and oriented to person, place, and time. He has normal strength. No cranial nerve deficit.   Skin: Skin is warm. Capillary refill takes less than 2 seconds. No rash noted.         ED Course   Critical Care    Date/Time: 12/3/2023 2:54 AM    Performed by: Fredi Garcia MD  Authorized by: Fredi Garcia MD  Direct patient critical care time: 40 minutes  Total critical care time (exclusive of procedural time) : 40 minutes  Critical care time was exclusive of separately billable procedures and treating other patients.  Critical care was necessary to treat or prevent imminent or life-threatening deterioration of the following conditions: sepsis.  Critical care was time spent personally by me on the following activities: development of treatment plan with patient or surrogate, discussions with consultants, evaluation of patient's response to treatment, ordering and performing treatments and interventions, ordering and review of laboratory studies, ordering and review of radiographic studies, review of old charts, pulse oximetry and re-evaluation of patient's condition.        Labs Reviewed   COMPREHENSIVE METABOLIC PANEL - Abnormal; Notable for the following components:       Result Value    Sodium Level 132 (*)     Glucose Level 117 (*)     Blood Urea Nitrogen 37.2 (*)     Creatinine 1.79 (*)     Calcium Level Total 8.1 (*)     Protein Total 5.7 (*)      Albumin Level 2.4 (*)     Albumin/Globulin Ratio 0.7 (*)     All other components within normal limits   LIPASE - Abnormal; Notable for the following components:    Lipase Level 150 (*)     All other components within normal limits   PROTIME-INR - Abnormal; Notable for the following components:    PT 15.9 (*)     All other components within normal limits   CBC WITH DIFFERENTIAL - Abnormal; Notable for the following components:    RBC 2.79 (*)     Hgb 7.5 (*)     Hct 24.1 (*)     MCH 26.9 (*)     MCHC 31.1 (*)     Lymph # 0.41 (*)     IG# 0.08 (*)     All other components within normal limits   LACTIC ACID, PLASMA - Normal   B-TYPE NATRIURETIC PEPTIDE - Normal   TROPONIN I - Normal   MAGNESIUM - Normal   BLOOD CULTURE OLG   BLOOD CULTURE OLG   CBC W/ AUTO DIFFERENTIAL    Narrative:     The following orders were created for panel order CBC auto differential.  Procedure                               Abnormality         Status                     ---------                               -----------         ------                     CBC with Differential[[id number]]       Abnormal            Final result                 Please view results for these tests on the individual orders.   URINALYSIS, REFLEX TO URINE CULTURE          Imaging Results              CT Abdomen Pelvis With IV Contrast NO Oral Contrast (Preliminary result)  Result time 12/03/23 00:26:34      Preliminary result by Nikos Smalls MD (12/03/23 00:26:34)                   Narrative:    START OF REPORT:  Technique: CT of the abdomen and pelvis was performed with axial images as well as sagittal and coronal reconstruction images with intravenous contrast.    Comparison: Comparison is with study dated2023-11-19 00:34:56.    Clinical History: Reports fever of 102, last tylenol dose given at 2000. Dizziness with standing. Pt discharge home 11/30 after multiple sx and blood transfusion. Told to come if any fever noted.    Dosage Information: Automated  Exposure Control was utilized 458.08 mGy.cm.    Findings:  Thorax:  Lungs: Streaky opacities are present in the lower lobes bilaterally, which may reflect subsegmental atelectasis and / or parenchymal scarring.  Pleura: No effusions or thickening are seen.  Heart: The heart size is within normal limits.  Abdomen:  Abdominal Wall: There is interval resolution of previously noted soft tissue emphysema in the left ventral mid and lower abdominal wall. Again noted are a few small subcutaneous gas locules in the right ventral mid abdomen. There is interval development of a small subcutaneous fluid collection with gas locule in the infraumbilical region (series 2; image 88), which measures 2.5 x 2.3 x 4.8 cm (AP x T x CC), likely postoperative. An evolving abscess cannot be excluded.  Liver: A subcentimeter hypodensity is again noted in the left lobe of the liver (series 2; image 30), which is too small to further characterize. The liver otherwise appears unremarkable.  Biliary System: No intrahepatic or extrahepatic biliary duct dilatation is seen.  Gallbladder: The gallbladder is non-distended and appears otherwise unremarkable.  Pancreas: The pancreas appears unremarkable.  Spleen: A small focal calcification is again seen along the splenic capsule. The spleen otherwise appears unremarkable.  Adrenals: The adrenal glands appear unremarkable.  Kidneys: Nonspecific perinephric fat stranding is noted bilaterally. Both kidneys appear mildly enlarged in size in AP dimension, new since the prior examination. No hypoperfused areas are seen within the renal cortices to suggest acute pyelonephritis on the submitted images. A subcentimeter hypodensity is again noted in the lower pole of the right kidney (series 2; image 66), which may reflect a cyst. There is subtle mucosal wall enhancement of the renal pelves bilaterally (series 4; images 64-90).  Aorta: There is mild calcification of the abdominal aorta and its branches.  IVC:  Unremarkable.  Bowel: Again noted is near total colectomy with an ileostomy in the right lower abdomen. There are multiple moderately distended small bowel loops in the mid and lower abdomen. The caliber of the bowel loops has slightly decreased since the prior examination.  Peritoneum: Again noted is pneumoperitoneum in the right subdiaphragmatic region (series 2; image 19-35), significantly decreased since the prior examination. There is interval resolution of previously noted moderate ascites. Interval development of a peripherally enhancing fluid collection in the right lower abdomen and pelvis (series 2; images , series 5; image 95 and series 4; images 41-60). The collection measures approximately 5.3 x 4.2 x 9 cm and demonstrates a few tiny gas locules. This is of concern for an abscess.  Retroperitoneal lymph nodes: Again noted are subcentimeter paraaortic and paracaval lymph nodes.    Pelvis:  Bladder: The bladder appears unremarkable.  Male:  Prostate gland: The prostate gland appears unremarkable.    Bony structures:  Dorsal Spine: The visualized dorsal spine appears unremarkable.  Bony Pelvis: The visualized bony structures of the pelvis appear unremarkable.      Impression:  1. Both kidneys appear mildly enlarged in size in AP dimension, new since the prior examination. No hypoperfused areas are seen within the renal cortices to suggest acute pyelonephritis on the submitted images. There is subtle mucosal wall enhancement of the renal pelves bilaterally (series 4; images 64-90). An element of pyeloureteritis cannot be excluded. Correlate with clinical and laboratory findings.  2. Again noted is pneumoperitoneum in the right subdiaphragmatic region (series 2; image 19-35), significantly decreased since the prior examination. There is interval resolution of previously noted moderate ascites. Interval development of a peripherally enhancing fluid collection in the right lower abdomen and pelvis  (series 2; images , series 5; image 95 and series 4; images 41-60). The collection measures approximately 5.3 x 4.2 x 9 cm and demonstrates a few tiny gas locules. This is of concern for an abscess. Correlate clinically as regards further evaluation and followup.  3. There is interval resolution of previously noted soft tissue emphysema in the left ventral mid and lower abdominal wall. Again noted are a few small subcutaneous gas locules in the right ventral mid abdomen. There is interval development of a small subcutaneous fluid collection with gas locule in the infraumbilical region (series 2; image 88), which measures 2.5 x 2.3 x 4.8 cm (AP x T x CC), likely postoperative. An evolving abscess cannot be excluded.  4. Again noted is near total colectomy with an ileostomy in the right lower abdomen. There are multiple moderately distended small bowel loops in the mid and lower abdomen. The caliber of the bowel loops has slightly decreased since the prior examination. This may reflect an ileus versus partial small bowel obstruction.  5. Details and other findings as discussed above. Follow-up as clinically indicated.                                         X-Ray Chest AP Portable (In process)                      Medications   piperacillin-tazobactam (ZOSYN) 4.5 g in dextrose 5 % in water (D5W) 100 mL IVPB (MB+) (has no administration in time range)   vancomycin 1.5 g in dextrose 5 % 250 mL IVPB (ready to mix) (has no administration in time range)   lactated ringers bolus 1,974 mL (1,974 mLs Intravenous New Bag 12/2/23 0770)   iopamidoL (ISOVUE-370) injection 100 mL (100 mLs Intravenous Given 12/2/23 8354)     Medical Decision Making  Problems Addressed:  Acute febrile illness: acute illness or injury  Intraabdominal fluid collection: acute illness or injury    Amount and/or Complexity of Data Reviewed  Labs: ordered.  Radiology: ordered.    Risk  Prescription drug management.  Decision regarding  "hospitalization.                Differential diagnosis (includes but is not limited to):   Intra-abdominal fluid collection, abscess, sepsis, infection, wound infection, dehydration, kidney injury, electrolyte abnormalities    MDM Narrative  43-year-old male with a complex past medical history including a recent over 1 month hospital stay for necrotizing fasciitis requiring multiple incisions and debridements while in the hospital presents for fever at home.  Patient was discharged home on 11/30 with strict return precautions to return if he develops fever.  Patient reports a T-max of 102° at home.  Patient denies any abdominal pain.  He states his wounds have been well taking care of any denies any increased drainage or bleeding.  Labs are pending.  Infectious workup including blood cultures and lactic acid pending.  30 cc/kilos IV fluid bolus ordered.  CT of the abdomen and pelvis pending to evaluate for acute intra-abdominal pathology.  Pain and nausea control as needed, narcotic pain medications as needed.  Telemetry monitoring independently reviewed and shows a sinus rhythm with heart rate in the 90s.    Update:  Labs reviewed and overall appear stable from his previous set prior to discharge.  CT scan does show what appears to be interval development of a fluid collection in the right lower abdomen.  Broad-spectrum antibiotics initiated.  General surgery consulted and will evaluate the films, recommends hospital Medicine admission and evaluation by Interventional Radiology for drainage.  Case discussed with hospitalist who will admit.  Patient and family members who are present at bedside have been updated on results and plan of care, all are in agreement with admission, no questions at this time.    Dispo: Admit    My independent radiology interpretation: as above  Point of care US (independently performed and interpreted):   Decision rules/clinical scoring:     Sepsis Perfusion Assessment: "I attest a sepsis " "perfusion exam was performed within 6 hours of sepsis, severe sepsis, or septic shock presentation, following fluid resuscitation."     Amount and/or Complexity of Data Reviewed  Independent historian: spouse    Summary of history:  History corroborated by the patient's wife who is present at the bedside  External data reviewed: notes from previous admissions, notes from previous ED visits, and notes from clinic visits  Summary of data reviewed: Prior records reviewed  Risk and benefits of testing: discussed   Labs: ordered and reviewed  Radiology: ordered and independent interpretation performed (see above or ED course)  ECG/medicine tests: ordered and independent interpretation performed (see above or ED course)  Discussion of management or test interpretation with external provider(s): discussed with hospitalist physician and discussed with surgery consultant   Summary of discussion: as above    Risk  Parenteral controlled substances   Drug therapy requiring intense monitoring for toxicity   Decision regarding hospitalization  Shared decision making     Critical Care  30-74 minutes     Data Reviewed/Counseling: I have personally reviewed the patient's vital signs, nursing notes, and other relevant tests, information, and imaging. I had a detailed discussion regarding the historical points, exam findings, and any diagnostic results supporting the discharge diagnosis. I personally performed the history, PE, MDM and procedures as documented above and agree with the scribe's documentation.    Portions of this note were dictated using voice recognition software. Although it was reviewed for accuracy, some inherent voice recognition errors may have occurred and may be present in this document.     Scribe Attestation:   Scribe #1: I performed the above scribed service and the documentation accurately describes the services I performed. I attest to the accuracy of the note.    Attending Attestation:           Physician " Attestation for Scribe:  Physician Attestation Statement for Scribe #1: I, Fredi Garcia MD, reviewed documentation, as scribed by Gus Melendez in my presence, and it is both accurate and complete.             ED Course as of 12/03/23 0300   Sun Dec 03, 2023   0032 General surgery consulted. [MC]   0259 X-Ray Chest AP Portable  Independently visualized/reviewed by me during the ED visit.  - No lobar consolidation, no PTX [MC]      ED Course User Index  [MC] Fredi Garcia MD                           Clinical Impression:  Final diagnoses:  [R50.9] Acute febrile illness  [R18.8] Intraabdominal fluid collection          ED Disposition Condition    Admit Stable                Fredi Garcia MD  12/03/23 0300

## 2023-12-03 NOTE — OP NOTE
Operative Note  Lionel León  MRN:  [id number]  : 1980  Date of surgery: 2023  [facility] OR  Surgeon(s):  Vladimir Vick MD   General        Procedure: Procedures:    * DEBRIDEMENT, EXTERNAL GENITALIA/BUTTOCKS FOR NECROTIZING SOFT TISSUE INFECTION     Pre-op Dx: Necrotizing Soft Tissue Infection Gluteus     Post-op Dx: Necrotizing Soft Tissue Infection Gluteus      Staff: Dr. Nava MD     Resident Surgeon: Ruddy Edmonds MD     Anesthesia: General        Indication for Procedure:   42-year-old male with a past medical history of ulcerative colitis recently diagnosed in 2023 who was found to have a necrotizing soft tissue infection of his perineum with high fevers and leukocytosis.  CT scan demonstrated large amount of soft tissue gas in his perineal region.     Findings:  Sharp excisional debridement of perineum.  He had necrotic tissue extending down to the muscle.  The necrotizing soft tissue infection encircled his anus.  The wound was copiously irrigated and packed with antimicrobial moistened Kerlix gauze, gauze, ABD, and tape.  A flexi Seal was placed at the conclusion of the case.       Estimated Blood Loss:  50 cc         Drains:  Rectal tube.           Specimens:   Wound culture x2    Implants: * No implants in log *     Complications:  None           Disposition: PACU - hemodynamically stable.           Condition: stable    Operative Technique:  Risks and benefits were discussed with patient and family at bedside, informed consent signed.  Patient was taken to the OR and placed supine, endotracheal intubation was successful.  Patient was then repositioned prone.  The perineum was then prepped and draped in sterile fashion.  A time out was completed to confirm correct patient, procedure, and all staff was in agreement.      Using a 10 blade an incision was made over the area of concern.  This was carried down through the subcutaneous tissue with bovie electro cautery.  The  fascial plane was encountered which had foul smelling greyish fluid expressed.  Culture swabs were taken x2.  The skin incision was extended to allow for adequate exposure.  Nathan necrotic tissue was sharply excised which included skin, subcutaneous tissue and necrotic fascia.  Muscle appeared healthy and fasciculated when touched with the bovie.  The wound was then irrigated copiously.  Hemostasis was achieved.  The wound measured approximately 15 cm.  The wound was then packed with kerlex, dressed with sterile dressing.  Patient tolerated procedure well, was extubated, and transferred to recovery in stable condition.      SURGEON:  Vladimir Vick MD    Date: 11/11/2023  Time: 08:04 PM

## 2023-12-03 NOTE — PROGRESS NOTES
"Pharmacokinetic Initial Assessment: IV Vancomycin    Assessment/Plan:    Initiate intravenous vancomycin with loading dose of 1500 mg once followed by a maintenance dose of vancomycin 750 mg IV every 12 hours  Desired empiric serum trough concentration is 15 to 20 mcg/mL  Draw vancomycin trough level 60 min prior to fourth dose on 12/04 at approximately 1600  Pharmacy will continue to follow and monitor vancomycin.      Please contact pharmacy at extension [phone] with any questions regarding this assessment.     Thank you for the consult,   Robinson Gaming, PharmD       Patient brief summary:  Lionel León is a 43 y.o. male initiated on antimicrobial therapy with IV Vancomycin for treatment of suspected intra-abdominal infection    Drug Allergies:   Review of patient's allergies indicates:  No Known Allergies    Actual Body Weight:   65.8 kg    Renal Function:   Estimated Creatinine Clearance: 49.5 mL/min (A) (based on SCr of 1.79 mg/dL (H)).,     Dialysis Method (if applicable):  N/A    CBC (last 72 hours):  Recent Labs   Lab Result Units 12/02/23  2327   WBC x10(3)/mcL 6.86   Hgb g/dL 7.5*   Hct % 24.1*   Platelet x10(3)/mcL 361   Mono % % 10.1   Eos % % 4.1   Basophil % % 0.3       Metabolic Panel (last 72 hours):  Recent Labs   Lab Result Units 12/02/23  2327   Sodium Level mmol/L 132*   Potassium Level mmol/L 4.7   Chloride mmol/L 102   Carbon Dioxide mmol/L 22   Glucose Level mg/dL 117*   Blood Urea Nitrogen mg/dL 37.2*   Creatinine mg/dL 1.79*   Albumin Level g/dL 2.4*   Bilirubin Total mg/dL 0.5   Alkaline Phosphatase unit/L 64   Aspartate Aminotransferase unit/L 14   Alanine Aminotransferase unit/L 19   Magnesium Level mg/dL 1.70       Drug levels (last 3 results):  No results for input(s): "VANCOMYCINRA", "VANCORANDOM", "VANCOMYCINPE", "VANCOPEAK", "VANCOMYCINTR", "VANCOTROUGH" in the last 72 hours.    Microbiologic Results:  Microbiology Results (last 7 days)       Procedure Component Value Units Date/Time    " Blood culture x two cultures. Draw prior to antibiotics. [[id number]] Collected: 12/02/23 2327    Order Status: Resulted Specimen: Blood from Antecubital, Left Updated: 12/02/23 2329    Blood culture x two cultures. Draw prior to antibiotics. [[id number]] Collected: 12/02/23 2327    Order Status: Resulted Specimen: Blood from Antecubital, Right Updated: 12/02/23 2329

## 2023-12-03 NOTE — PROGRESS NOTES
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  Patient is status post colectomy, end ileostomy for ulcerative colitis    I saw him at bedside today morning  BP low normal, otherwise hemodynamics stable   He is having low-grade temperature spikes  Labs repeated today morning   Hemoglobin has trended down to 6.8  Severely iron deficient  Iron level is 8  Magnesium is low    General surgery consulted, await recommendations  IR and Infectious Disease also consulted, await recommendations  For now continue broad-spectrum antimicrobials-Zosyn, vancomycin   Mag replaced   2 units PRBC transfusion   Hold off on IV iron given active infection  Keep Ringer lactate at 100 cc/hour, hold while PRBCs running  Await recommendations from specialists   On Lovenox for DVT prophylaxis which will be cautiously continued  Recheck labs a.m.    Critical care time-35 minutes  Critical care diagnosis-acute on chronic normocytic anemia/acute blood loss anemia requiring PRBC transfusion  Critical care interventions: Hands-on evaluation, review of labs/radiographs/records and discussions with patient

## 2023-12-03 NOTE — CONSULTS
Acute Care Surgery   History and Physical    Patient Name: Lionel León  YOB: 1980  Date: 12/03/2023 12:09 PM  Date of Admission: 12/2/2023  HD#0  POD#* No surgery found *    PRESENTING HISTORY   Chief Complaint/Reason for Admission: Intraabdominal fluid collection    History of Present Illness:  Per IM note, concur with summary:  42-year-old male with medical history of recently diagnosed ulcerative colitis in October 2023 at which time started on prednisone taper and 3 weeks later readmitted with rectal pain and bleeding and initiated on IV steroid and hospital course complicated by Allison gangrene required multiple I&D on 11/11/2023, 11/12/2023 and 11/20/2023 of almost entire perineum, history steroid tapered off and underwent laparoscopic total colectomy and end ileostomy on 11/15/2023.     He presents the the ED today with symptoms of fevers and what he described as abdominal pain only associated with coughing or deep inspiration. He reports no generalized abdominal pain. PO intake has been adequate and his end ileostomy continues to have output (not high output) and alternates between liquid and solid depending on diet that day. His scrotum and perineum are healing well with excellent granulation tissue free of infection, pts wife is doing an extraordinary job with local wound care BID. 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    Per CT he has a  enhancing fluid collection in the right lower abdomen measuring 5.3 x 4.2 x 9 cm.    Review of Systems:  12 point ROS negative except as stated in HPI    PAST HISTORY:   Past medical history:  Past Medical History:   Diagnosis Date    Diverticulitis     Ulcerative colitis        Past surgical history:  Past Surgical History:   Procedure Laterality Date    COLONOSCOPY, WITH 1 OR MORE BIOPSIES N/A 10/23/2023    Procedure: COLON;  Surgeon: Dragan Underwood MD;  Location: [facility]  ENDOSCOPY;  Service: Gastroenterology;  Laterality: N/A;    DEBRIDEMENT OF SACRAL WOUND N/A 11/20/2023    Procedure: DEBRIDEMENT, WOUND, SACRUM;  Surgeon: Chandana Guidry MD;  Location: [facility];  Service: General;  Laterality: N/A;  perineum/sacrum// high lithotomy    DEBRIDEMENT, EXTERNAL GENITALIA, PERINEUM, AND ABDOMINAL WALL, FOR NECROTIZING SOFT TISSUE INFECTION Bilateral 11/11/2023    Procedure: DEBRIDEMENT, EXTERNAL GENITALIA/BUTTOCKS FOR NECROTIZING SOFT TISSUE INFECTION;  Surgeon: Vladimir Vick MD;  Location: [facility] OR;  Service: General;  Laterality: Bilateral;  Prone positioning.    DEBRIDEMENT, EXTERNAL GENITALIA, PERINEUM, AND ABDOMINAL WALL, FOR NECROTIZING SOFT TISSUE INFECTION N/A 11/12/2023    Procedure: DEBRIDEMENT, EXTERNAL GENITALIA, PERINEUM, AND ABDOMINAL WALL, FOR NECROTIZING SOFT TISSUE INFECTION;  Surgeon: Vladimir Vick MD;  Location: [facility];  Service: General;  Laterality: N/A;  Place patient in dorsal lithotomy positioning.    DIAGNOSTIC LAPAROSCOPY N/A 11/20/2023    Procedure: LAPAROSCOPY, DIAGNOSTIC;  Surgeon: Fredi Shields MD;  Location: [facility];  Service: General;  Laterality: N/A;    EGD, WITH CLOSED BIOPSY N/A 11/22/2023    Procedure: EGD, WITH CLOSED BIOPSY;  Surgeon: Blayne Mujica MD;  Location: [facility] ENDOSCOPY;  Service: Gastroenterology;  Laterality: N/A;    LAPAROSCOPIC ILEOSTOMY N/A 11/15/2023    Procedure: CREATION, ILEOSTOMY, LAPAROSCOPIC;  Surgeon: Fredi Shields MD;  Location: [facility];  Service: Colon and Rectal;  Laterality: N/A;    LAPAROSCOPIC TOTAL COLECTOMY N/A 11/15/2023    Procedure: COLECTOMY, TOTAL, LAPAROSCOPIC;  Surgeon: Fredi Shields MD;  Location: [facility];  Service: Colon and Rectal;  Laterality: N/A;  LAP TOTAL COLECTOMY WITH ILEOSTOMY       Family history:  No family history on file.    Social history:  Social History     Socioeconomic History    Marital status:    Tobacco Use    Smoking status: Never    Smokeless  tobacco: Never   Substance and Sexual Activity    Alcohol use: Yes     Comment: Social    Drug use: Never     Social Determinants of Health     Food Insecurity: Unknown (11/7/2023)    Hunger Vital Sign     Worried About Running Out of Food in the Last Year: Never true   Transportation Needs: Unknown (11/7/2023)    PRAPARE - Transportation     Lack of Transportation (Medical): No   Housing Stability: Unknown (11/7/2023)    Housing Stability Vital Sign     Unable to Pay for Housing in the Last Year: No     Social History     Tobacco Use   Smoking Status Never   Smokeless Tobacco Never      Social History     Substance and Sexual Activity   Alcohol Use Yes    Comment: Social        MEDICATIONS & ALLERGIES:     No current facility-administered medications on file prior to encounter.     Current Outpatient Medications on File Prior to Encounter   Medication Sig    oxyCODONE-acetaminophen (PERCOCET)  mg per tablet Take 1 tablet by mouth every 6 (six) hours as needed for Pain.       Allergies: Review of patient's allergies indicates:  No Known Allergies    Scheduled Meds:   enoxparin  40 mg Subcutaneous Q24H (prophylaxis, 1700)    piperacillin-tazobactam (Zosyn) IV (PEDS and ADULTS) (extended infusion is not appropriate)  4.5 g Intravenous Q8H    vancomycin (VANCOCIN) IV (PEDS and ADULTS)  20 mg/kg Intravenous ED 1 Time    vancomycin (VANCOCIN) IV (PEDS and ADULTS)  750 mg Intravenous Q12H       Continuous Infusions:   lactated ringers 75 mL/hr at 12/03/23 0640       PRN Meds:acetaminophen, acetaminophen, aluminum-magnesium hydroxide-simethicone, melatonin, ondansetron, oxyCODONE-acetaminophen, polyethylene glycol, prochlorperazine, senna-docusate 8.6-50 mg, sodium chloride 0.9%, Pharmacy to dose Vancomycin consult **AND** vancomycin - pharmacy to dose    OBJECTIVE:   Vital Signs:  VITAL SIGNS: 24 HR MIN & MAX LAST   Temp  Min: 98.5 °F (36.9 °C)  Max: 99.9 °F (37.7 °C)  99.9 °F (37.7 °C)   BP  Min: 91/56  Max: 91/56   "(!) 91/56    Pulse  Min: 91  Max: 91  91    Resp  Min: 18  Max: 18  18    SpO2  Min: 98 %  Max: 98 %  98 %      HT: 5' 10" (177.8 cm)  WT: 65.8 kg (145 lb)  BMI: 20.8     Intake/output:  Intake/Output - Last 3 Shifts       None          No intake or output data in the 24 hours ending 12/03/23 1209      Physical Exam:  General: no acute distress, but flush, appears feverish  HEENT: Normocephalic, atraumatic, PERRL  CV: RR  Resp: NWOB  GI:  Abdomen soft, non-tender, non-distended, no guarding, no rebound, normoactive bowel sounds, no masses, end ileostomy pink and productive of liquid stool, laparoscopic incisions healing  :  scrotum and perineum are healing well with excellent granulation tissue free of infection  MSK: No muscle atrophy, cyanosis, peripheral edema, moving all extremities spontaneously  Skin/wounds:  No rashes, ulcers, erythema  Neuro:  CNII-XII grossly intact, alert and oriented to person, place, and time    Labs:  Troponin:  Recent Labs     12/02/23 2327   TROPONINI <0.010     CBC:  Recent Labs     12/02/23 2327 12/03/23  0918   WBC 6.86 6.76   RBC 2.79* 2.55*   HGB 7.5* 6.8*   HCT 24.1* 21.7*    307   MCV 86.4 85.1   MCH 26.9* 26.7*   MCHC 31.1* 31.3*     CMP:  Recent Labs     12/02/23 2327   CALCIUM 8.1*   ALBUMIN 2.4*   *   K 4.7   CO2 22   BUN 37.2*   CREATININE 1.79*   ALKPHOS 64   ALT 19   AST 14   BILITOT 0.5     Lactic Acid:  No results for input(s): "LACTATE" in the last 72 hours.  ETOH:  No results for input(s): "ETHANOL" in the last 72 hours.   Urine Drug Screen:  No results for input(s): "COCAINE", "OPIATE", "BARBITURATE", "AMPHETAMINE", "FENTANYL", "CANNABINOIDS", "MDMA" in the last 72 hours.    Invalid input(s): "BENZODIAZEPINE", "PHENCYCLIDINE"   ABG:  No results for input(s): "PH", "PO2", "PCO2", "HCO3", "BE" in the last 168 hours.     Diagnostic Results:  CT Abdomen Pelvis With IV Contrast NO Oral Contrast   Final Result   Impression:      1. Both kidneys appear " mildly enlarged in size in AP dimension, new since the prior examination. No hypoperfused areas are seen within the renal cortices to suggest acute pyelonephritis on the submitted images. There is subtle mucosal wall enhancement of the renal pelves bilaterally (series 4; images 64-90). An element of pyeloureteritis cannot be excluded. Correlate with clinical and laboratory findings.      2. Again noted is pneumoperitoneum in the right subdiaphragmatic region (series 2; image 19-35), significantly decreased since the prior examination. There is interval resolution of previously noted moderate ascites. Interval development of a peripherally enhancing fluid collection in the right lower abdomen and pelvis (series 2; images , series 5; image 95 and series 4; images 41-60). The collection measures approximately 5.3 x 4.2 x 9 cm and demonstrates a few tiny gas locules. This is of concern for an abscess. Correlate clinically as regards further evaluation and followup.      3. There is interval resolution of previously noted soft tissue emphysema in the left ventral mid and lower abdominal wall. Again noted are a few small subcutaneous gas locules in the right ventral mid abdomen. There is interval development of a small subcutaneous fluid collection with gas locule in the infraumbilical region (series 2; image 88), which measures 2.5 x 2.3 x 4.8 cm (AP x T x CC), likely postoperative. An evolving abscess cannot be excluded.      4. Again noted is near total colectomy with an ileostomy in the right lower abdomen. There are multiple moderately distended small bowel loops in the mid and lower abdomen. The caliber of the bowel loops has slightly decreased since the prior examination. This may reflect an ileus versus partial small bowel obstruction.      5. Details and other findings as discussed above. Follow-up as clinically indicated.      No significant discrepancy with overnight report         Electronically signed  by: Chip Duran   Date:    12/03/2023   Time:    11:02      X-Ray Chest AP Portable   Final Result      No acute findings.         Electronically signed by: Germain Cedeno   Date:    12/03/2023   Time:    10:30          ASSESSMENT & PLAN:    42 yo M with intraabdominal fluid collection as seen on CT.    - Agree with IR evaluation to assess for drainage  - Abx per primary  - Continue to follow blood cultures, pt does not appear septic at this time  - Would not be ideal to renter abdomen surgically given recent surgery, typically best to wait at least 2-6 weeks prior to reentering abdomen to prevent working in a hostile abdomen  - Will continue to follow  - Lov for DVT ppx  - Okay for regular diet from surgical standpoint, NPO at [place] for IR    Ayla Lynch MD  General Surgery PGY-1

## 2023-12-04 LAB
ABO + RH BLD: NORMAL
ABO + RH BLD: NORMAL
ALBUMIN SERPL-MCNC: 2 G/DL (ref 3.5–5)
ALBUMIN/GLOB SERPL: 0.6 RATIO (ref 1.1–2)
ALP SERPL-CCNC: 58 UNIT/L (ref 40–150)
ALT SERPL-CCNC: 19 UNIT/L (ref 0–55)
APTT PPP: 45.4 SECONDS (ref 23.2–33.7)
AST SERPL-CCNC: 15 UNIT/L (ref 5–34)
BILIRUB SERPL-MCNC: 0.6 MG/DL
BLD PROD TYP BPU: NORMAL
BLD PROD TYP BPU: NORMAL
BLOOD UNIT EXPIRATION DATE: NORMAL
BLOOD UNIT EXPIRATION DATE: NORMAL
BLOOD UNIT TYPE CODE: 5100
BLOOD UNIT TYPE CODE: 5100
BUN SERPL-MCNC: 19.8 MG/DL (ref 8.9–20.6)
CALCIUM SERPL-MCNC: 7.8 MG/DL (ref 8.4–10.2)
CHLORIDE SERPL-SCNC: 104 MMOL/L (ref 98–107)
CO2 SERPL-SCNC: 24 MMOL/L (ref 22–29)
CREAT SERPL-MCNC: 1.63 MG/DL (ref 0.73–1.18)
CROSSMATCH INTERPRETATION: NORMAL
CROSSMATCH INTERPRETATION: NORMAL
DISPENSE STATUS: NORMAL
DISPENSE STATUS: NORMAL
ERYTHROCYTE [DISTWIDTH] IN BLOOD BY AUTOMATED COUNT: 15.7 % (ref 11.5–17)
GFR SERPLBLD CREATININE-BSD FMLA CKD-EPI: 53 MLS/MIN/1.73/M2
GLOBULIN SER-MCNC: 3.1 GM/DL (ref 2.4–3.5)
GLUCOSE SERPL-MCNC: 104 MG/DL (ref 74–100)
GRAM STN SPEC: NORMAL
GRAM STN SPEC: NORMAL
HCT VFR BLD AUTO: 25.8 % (ref 42–52)
HGB BLD-MCNC: 8.2 G/DL (ref 14–18)
HIV 1+2 AB+HIV1 P24 AG SERPL QL IA: NONREACTIVE
INR PPP: 1.2
MAGNESIUM SERPL-MCNC: 1.9 MG/DL (ref 1.6–2.6)
MCH RBC QN AUTO: 27.2 PG (ref 27–31)
MCHC RBC AUTO-ENTMCNC: 31.8 G/DL (ref 33–36)
MCV RBC AUTO: 85.4 FL (ref 80–94)
NRBC BLD AUTO-RTO: 0 %
PLATELET # BLD AUTO: 267 X10(3)/MCL (ref 130–400)
PMV BLD AUTO: 9.3 FL (ref 7.4–10.4)
POTASSIUM SERPL-SCNC: 4.3 MMOL/L (ref 3.5–5.1)
PROT SERPL-MCNC: 5.1 GM/DL (ref 6.4–8.3)
PROTHROMBIN TIME: 15.2 SECONDS (ref 12.5–14.5)
RBC # BLD AUTO: 3.02 X10(6)/MCL (ref 4.7–6.1)
SODIUM SERPL-SCNC: 135 MMOL/L (ref 136–145)
T PALLIDUM AB SER QL: NONREACTIVE
UNIT NUMBER: NORMAL
UNIT NUMBER: NORMAL
VANCOMYCIN TROUGH SERPL-MCNC: 18.6 UG/ML (ref 15–20)
WBC # SPEC AUTO: 3.54 X10(3)/MCL (ref 4.5–11.5)

## 2023-12-04 PROCEDURE — 85610 PROTHROMBIN TIME: CPT | Performed by: INTERNAL MEDICINE

## 2023-12-04 PROCEDURE — 85730 THROMBOPLASTIN TIME PARTIAL: CPT | Performed by: INTERNAL MEDICINE

## 2023-12-04 PROCEDURE — 86706 HEP B SURFACE ANTIBODY: CPT | Performed by: GENERAL PRACTICE

## 2023-12-04 PROCEDURE — 25000003 PHARM REV CODE 250: Performed by: RADIOLOGY

## 2023-12-04 PROCEDURE — 63600175 PHARM REV CODE 636 W HCPCS: Performed by: INTERNAL MEDICINE

## 2023-12-04 PROCEDURE — 86704 HEP B CORE ANTIBODY TOTAL: CPT | Performed by: GENERAL PRACTICE

## 2023-12-04 PROCEDURE — 99223 1ST HOSP IP/OBS HIGH 75: CPT | Mod: FS,,, | Performed by: GENERAL PRACTICE

## 2023-12-04 PROCEDURE — 63600175 PHARM REV CODE 636 W HCPCS: Performed by: RADIOLOGY

## 2023-12-04 PROCEDURE — P9016 RBC LEUKOCYTES REDUCED: HCPCS | Performed by: INTERNAL MEDICINE

## 2023-12-04 PROCEDURE — 25000003 PHARM REV CODE 250: Performed by: INTERNAL MEDICINE

## 2023-12-04 PROCEDURE — 85027 COMPLETE CBC AUTOMATED: CPT | Performed by: INTERNAL MEDICINE

## 2023-12-04 PROCEDURE — 21400001 HC TELEMETRY ROOM

## 2023-12-04 PROCEDURE — 80202 ASSAY OF VANCOMYCIN: CPT | Performed by: INTERNAL MEDICINE

## 2023-12-04 PROCEDURE — 99223 PR INITIAL HOSPITAL CARE,LEVL III: ICD-10-PCS | Mod: FS,,, | Performed by: GENERAL PRACTICE

## 2023-12-04 PROCEDURE — 80053 COMPREHEN METABOLIC PANEL: CPT | Performed by: INTERNAL MEDICINE

## 2023-12-04 PROCEDURE — 86803 HEPATITIS C AB TEST: CPT | Performed by: GENERAL PRACTICE

## 2023-12-04 PROCEDURE — 86780 TREPONEMA PALLIDUM: CPT | Performed by: GENERAL PRACTICE

## 2023-12-04 PROCEDURE — 87075 CULTR BACTERIA EXCEPT BLOOD: CPT | Performed by: INTERNAL MEDICINE

## 2023-12-04 PROCEDURE — 87102 FUNGUS ISOLATION CULTURE: CPT | Performed by: INTERNAL MEDICINE

## 2023-12-04 PROCEDURE — 27000221 HC OXYGEN, UP TO 24 HOURS

## 2023-12-04 PROCEDURE — 86709 HEPATITIS A IGM ANTIBODY: CPT | Performed by: GENERAL PRACTICE

## 2023-12-04 PROCEDURE — 83735 ASSAY OF MAGNESIUM: CPT | Performed by: INTERNAL MEDICINE

## 2023-12-04 PROCEDURE — 87205 SMEAR GRAM STAIN: CPT | Performed by: INTERNAL MEDICINE

## 2023-12-04 PROCEDURE — 87340 HEPATITIS B SURFACE AG IA: CPT | Performed by: GENERAL PRACTICE

## 2023-12-04 PROCEDURE — 87389 HIV-1 AG W/HIV-1&-2 AB AG IA: CPT | Performed by: GENERAL PRACTICE

## 2023-12-04 PROCEDURE — 87070 CULTURE OTHR SPECIMN AEROBIC: CPT | Performed by: INTERNAL MEDICINE

## 2023-12-04 PROCEDURE — 25000003 PHARM REV CODE 250: Performed by: GENERAL PRACTICE

## 2023-12-04 RX ORDER — FENTANYL CITRATE 50 UG/ML
INJECTION, SOLUTION INTRAMUSCULAR; INTRAVENOUS
Status: DISPENSED
Start: 2023-12-04 | End: 2023-12-04

## 2023-12-04 RX ORDER — MIDAZOLAM HYDROCHLORIDE 1 MG/ML
INJECTION INTRAMUSCULAR; INTRAVENOUS
Status: DISPENSED
Start: 2023-12-04 | End: 2023-12-04

## 2023-12-04 RX ORDER — LIDOCAINE HYDROCHLORIDE 10 MG/ML
INJECTION INFILTRATION; PERINEURAL
Status: DISPENSED
Start: 2023-12-04 | End: 2023-12-04

## 2023-12-04 RX ORDER — MIDAZOLAM HYDROCHLORIDE 1 MG/ML
INJECTION INTRAMUSCULAR; INTRAVENOUS
Status: COMPLETED | OUTPATIENT
Start: 2023-12-04 | End: 2023-12-04

## 2023-12-04 RX ORDER — VALACYCLOVIR HYDROCHLORIDE 500 MG/1
1000 TABLET, FILM COATED ORAL 2 TIMES DAILY
Status: DISCONTINUED | OUTPATIENT
Start: 2023-12-04 | End: 2023-12-09 | Stop reason: HOSPADM

## 2023-12-04 RX ORDER — LIDOCAINE HYDROCHLORIDE 20 MG/ML
INJECTION, SOLUTION INFILTRATION; PERINEURAL
Status: COMPLETED | OUTPATIENT
Start: 2023-12-04 | End: 2023-12-04

## 2023-12-04 RX ORDER — FENTANYL CITRATE 50 UG/ML
INJECTION, SOLUTION INTRAMUSCULAR; INTRAVENOUS
Status: COMPLETED | OUTPATIENT
Start: 2023-12-04 | End: 2023-12-04

## 2023-12-04 RX ADMIN — OXYCODONE AND ACETAMINOPHEN 1 TABLET: 10; 325 TABLET ORAL at 09:12

## 2023-12-04 RX ADMIN — FENTANYL CITRATE 25 MCG: 50 INJECTION, SOLUTION INTRAMUSCULAR; INTRAVENOUS at 11:12

## 2023-12-04 RX ADMIN — PIPERACILLIN AND TAZOBACTAM 4.5 G: 4; .5 INJECTION, POWDER, FOR SOLUTION INTRAVENOUS at 01:12

## 2023-12-04 RX ADMIN — ACETAMINOPHEN 1000 MG: 500 TABLET ORAL at 08:12

## 2023-12-04 RX ADMIN — VALACYCLOVIR HYDROCHLORIDE 1000 MG: 500 TABLET, FILM COATED ORAL at 08:12

## 2023-12-04 RX ADMIN — ENOXAPARIN SODIUM 40 MG: 40 INJECTION SUBCUTANEOUS at 05:12

## 2023-12-04 RX ADMIN — PIPERACILLIN AND TAZOBACTAM 4.5 G: 4; .5 INJECTION, POWDER, FOR SOLUTION INTRAVENOUS at 05:12

## 2023-12-04 RX ADMIN — LIDOCAINE HYDROCHLORIDE 5 ML: 20 INJECTION, SOLUTION INFILTRATION; PERINEURAL at 11:12

## 2023-12-04 RX ADMIN — PIPERACILLIN AND TAZOBACTAM 4.5 G: 4; .5 INJECTION, POWDER, FOR SOLUTION INTRAVENOUS at 09:12

## 2023-12-04 RX ADMIN — ACETAMINOPHEN 650 MG: 325 TABLET, FILM COATED ORAL at 03:12

## 2023-12-04 RX ADMIN — VANCOMYCIN HYDROCHLORIDE 750 MG: 750 INJECTION, POWDER, LYOPHILIZED, FOR SOLUTION INTRAVENOUS at 05:12

## 2023-12-04 RX ADMIN — SODIUM CHLORIDE, POTASSIUM CHLORIDE, SODIUM LACTATE AND CALCIUM CHLORIDE: 600; 310; 30; 20 INJECTION, SOLUTION INTRAVENOUS at 05:12

## 2023-12-04 RX ADMIN — MIDAZOLAM HYDROCHLORIDE 1 MG: 1 INJECTION, SOLUTION INTRAMUSCULAR; INTRAVENOUS at 11:12

## 2023-12-04 RX ADMIN — OXYCODONE AND ACETAMINOPHEN 1 TABLET: 10; 325 TABLET ORAL at 01:12

## 2023-12-04 NOTE — PLAN OF CARE
12/04/23 1503   Discharge Assessment   Assessment Type Discharge Planning Assessment   Confirmed/corrected address, phone number and insurance Yes   Confirmed Demographics Correct on Facesheet   Source of Information patient   When was your last doctors appointment?   (Pt is seen by [facility])   Reason For Admission intra abdominal fluid collection   People in Home child(karl), dependent;spouse   Do you expect to return to your current living situation? Yes   Do you have help at home or someone to help you manage your care at home? Yes   Who are your caregiver(s) and their phone number(s)? Pt lives with wife Lul [phone]   Prior to hospitilization cognitive status: Alert/Oriented   Current cognitive status: Alert/Oriented   Equipment Currently Used at Home wound care supplies   Readmission within 30 days? Yes   Do you currently have service(s) that help you manage your care at home? Yes   Name and Contact number of agency [facility]   Is the pt/caregiver preference to resume services with current agency Yes   Do you take prescription medications? Yes   Do you have prescription coverage? Yes   Coverage [employer]   Do you have any problems affording any of your prescribed medications? No   Is the patient taking medications as prescribed? yes   Who is going to help you get home at discharge? Lul   How do you get to doctors appointments? car, drives self;family or friend will provide   Are you on dialysis? No   Do you take coumadin? No   Discharge Plan discussed with: Patient;Spouse/sig other   Name(s) and Number(s) Lul reyes [phone]   Transition of Care Barriers None   Discharge Plan A Home with family;Home Health   Discharge Plan B Home with family;Home Health     Patient states he lives in a single level home with 5 steps to enter. He lives with wife and 2 children ages 15 & 17. He was set up with [facility] upon recent discharge. He has also been set up with [facility]  [facility]. Will follow for d/c needs.

## 2023-12-04 NOTE — PLAN OF CARE
Problem: Adult Inpatient Plan of Care  Goal: Plan of Care Review  Outcome: Ongoing, Progressing  Goal: Patient-Specific Goal (Individualized)  Outcome: Ongoing, Progressing  Goal: Absence of Hospital-Acquired Illness or Injury  Outcome: Ongoing, Progressing  Goal: Optimal Comfort and Wellbeing  Outcome: Ongoing, Progressing  Goal: Readiness for Transition of Care  Outcome: Ongoing, Progressing      Admit/Transfer to Inpatient Psychiatry

## 2023-12-04 NOTE — NURSING
Nurses Note -- 4 Eyes      12/3/2023   6:29 PM      Skin assessed during: Admit      [] No Altered Skin Integrity Present    []Prevention Measures Documented      [x] Yes- Altered Skin Integrity Present or Discovered   [x] LDA Added if Not in Epic (Describe Wound)   [x] New Altered Skin Integrity was Present on Admit and Documented in LDA   [x] Wound Image Taken    Wound Care Consulted? Yes    Attending Nurse:  Yumiko Shelton RN/Staff Member:  Reyna

## 2023-12-04 NOTE — PLAN OF CARE
Pt is current with [facility]. Clinical notes sent to [facility] via care port. Wife also states that [facility]  have documents sent to them upon discharge.

## 2023-12-04 NOTE — CONSULTS
[facility]  Infectious Diseases Consult        ASSESSMENT & PLAN:     He is a 43-year-old male with a history of ulcerative colitis known to our service from recent hospitalization secondary to Allison's gangrene.  He underwent multiple debridements at that time as well as a total colectomy on 11/15.  Patient had previously been on Humira, however that has been held since recent hospitalization.  He completed a 14 day course of antimicrobials with Zosyn from date of last debridement, completed on 11/27.  He was discharged home however returned on 12/02 with complaints of fever and abdominal pain.  CT showed concern for abscess in the right abdomen, status post IR drainage earlier today.  He has had several fevers since admit, however no leukocytosis and has been hemodynamically stable.  Currently overall feeling better without complaints.  Blood cultures are negative thus far.  He is currently receiving empiric vancomycin and Zosyn.  Of note, patient reports being notified of the day of discharge recently that EGD results from 11/22 showed herpes esophagitis.  He has been taking prescribed oral acyclovir and reports improvement and throat pain, denies any dysphagia.  We have been consulted for further assistance with antimicrobials.    Intra-abdominal abscess, s/p IR drainage 12/4  HSV esophagitis per pathology from EGD on 11/22  UC - previously on Humira, s/p total colectomy on 11/15  Recent Allison's gangrene, s/p multiple debridements, completed 14d Zosyn from last debridement on 11/27  Atrial intracardiac shunt per TTE    PLAN:  F/u cultures.   BCx NGTD.  Continue Zosyn 4.5g IV q8h for now.   Resume treatment for HSV esophagitis with Valtrex 1g PO q12h.  Drain mgmt per surgery.   Discussed with patient, wife, and nursing.     HISTORY OF PRESENT ILLNESS:     He is a 43-year-old male with a history of ulcerative colitis known to our service from recent hospitalization secondary to Allison's gangrene.  He underwent  multiple debridements at that time as well as a total colectomy on 11/15.  Patient had previously been on Humira, however that has been held since recent hospitalization.  He completed a 14 day course of antimicrobials with Zosyn from date of last debridement, completed on 11/27.  He was discharged home however returned on 12/02 with complaints of fever and abdominal pain.  CT showed concern for abscess in the right abdomen, status post IR drainage earlier today.  He has had several fevers since admit, however no leukocytosis and has been hemodynamically stable.  Currently overall feeling better without complaints.  Blood cultures are negative thus far.  He is currently receiving empiric vancomycin and Zosyn.  Of note, patient reports being notified of the day of discharge recently that EGD results from 11/22 showed herpes esophagitis.  He has been taking prescribed oral acyclovir and reports improvement and throat pain, denies any dysphagia.  We have been consulted for further assistance with antimicrobials.      PAST MEDICAL HISTORY:     Past Medical History:   Diagnosis Date    Diverticulitis     Ulcerative colitis        PAST SURGICAL HISTORY:     Past Surgical History:   Procedure Laterality Date    COLONOSCOPY, WITH 1 OR MORE BIOPSIES N/A 10/23/2023    Procedure: COLON;  Surgeon: Dragan Underwood MD;  Location: [facility] ENDOSCOPY;  Service: Gastroenterology;  Laterality: N/A;    DEBRIDEMENT OF SACRAL WOUND N/A 11/20/2023    Procedure: DEBRIDEMENT, WOUND, SACRUM;  Surgeon: Chandana Guidry MD;  Location: [facility];  Service: General;  Laterality: N/A;  perineum/sacrum// high lithotomy    DEBRIDEMENT, EXTERNAL GENITALIA, PERINEUM, AND ABDOMINAL WALL, FOR NECROTIZING SOFT TISSUE INFECTION Bilateral 11/11/2023    Procedure: DEBRIDEMENT, EXTERNAL GENITALIA/BUTTOCKS FOR NECROTIZING SOFT TISSUE INFECTION;  Surgeon: Vladimir Vick MD;  Location: [facility] OR;  Service: General;  Laterality: Bilateral;  Prone  positioning.    DEBRIDEMENT, EXTERNAL GENITALIA, PERINEUM, AND ABDOMINAL WALL, FOR NECROTIZING SOFT TISSUE INFECTION N/A 11/12/2023    Procedure: DEBRIDEMENT, EXTERNAL GENITALIA, PERINEUM, AND ABDOMINAL WALL, FOR NECROTIZING SOFT TISSUE INFECTION;  Surgeon: Vladimir Vick MD;  Location: [facility] OR;  Service: General;  Laterality: N/A;  Place patient in dorsal lithotomy positioning.    DIAGNOSTIC LAPAROSCOPY N/A 11/20/2023    Procedure: LAPAROSCOPY, DIAGNOSTIC;  Surgeon: Fredi Shields MD;  Location: [facility] OR;  Service: General;  Laterality: N/A;    EGD, WITH CLOSED BIOPSY N/A 11/22/2023    Procedure: EGD, WITH CLOSED BIOPSY;  Surgeon: Blayne Mujica MD;  Location: [facility] ENDOSCOPY;  Service: Gastroenterology;  Laterality: N/A;    LAPAROSCOPIC ILEOSTOMY N/A 11/15/2023    Procedure: CREATION, ILEOSTOMY, LAPAROSCOPIC;  Surgeon: Fredi Shields MD;  Location: [facility] OR;  Service: Colon and Rectal;  Laterality: N/A;    LAPAROSCOPIC TOTAL COLECTOMY N/A 11/15/2023    Procedure: COLECTOMY, TOTAL, LAPAROSCOPIC;  Surgeon: Fredi Shields MD;  Location: [facility] OR;  Service: Colon and Rectal;  Laterality: N/A;  LAP TOTAL COLECTOMY WITH ILEOSTOMY       ALLERGIES:   Patient has no known allergies.    FAMILY HISTORY:   Reviewed and non-contributory     SOCIAL HISTORY:     Social History     Tobacco Use    Smoking status: Never    Smokeless tobacco: Never   Substance Use Topics    Alcohol use: Yes     Comment: Social        MEDICATIONS:   Reviewed in EMR    REVIEW OF SYSTEMS:   Except as documented, all other systems reviewed and negative     PHYSICAL EXAM:   T (!) 100.7 °F (38.2 °C)   /64   P 95   RR 20   O2 99 %  GENERAL: NAD; does not appear toxic  SKIN: no rash  HEENT: sclera non-icteric; PERRL; OP without erythema or exudates  NECK: supple; no LAD  CHEST: CTA; nonlabored, equal expansion; no adventitious BS  CARDIOVASCULAR: RRR, S1S2; no murmur; strong, equal peripheral pulses; no edema  ABDOMEN:   "active bowel sounds; abdomen soft, nondistended, nontender to palpation; incision healing well; CYNTHIA with purulent - sanguinous ouput   EXTREMITIES: no cyanosis or clubbing  NEURO: AAO x4; CN II-XII grossly intact  PSYCH: Mentation and affect appropriate     LABS AND IMAGING:     Recent Labs     12/03/23 0918 12/04/23 0518   WBC 6.76 3.54*   RBC 2.55* 3.02*   HGB 6.8* 8.2*   HCT 21.7* 25.8*   MCV 85.1 85.4   MCH 26.7* 27.2   MCHC 31.3* 31.8*   RDW 16.2 15.7    267     Recent Labs     12/02/23 2327   LACTIC 0.6     Recent Labs     12/04/23 0518   INR 1.2     No results for input(s): "HGBA1C", "CHOL", "TRIG", "LDL", "VLDL", "HDL" in the last 72 hours.   Recent Labs     12/02/23 2327 12/03/23 0918 12/04/23 0518   *  --  135*   K 4.7  --  4.3   CHLORIDE 102  --  104   CO2 22  --  24   BUN 37.2*  --  19.8   CREATININE 1.79*  --  1.63*   GLUCOSE 117*  --  104*   CALCIUM 8.1*  --  7.8*   MG 1.70 1.60 1.90   PHOS  --  3.7  --    ALBUMIN 2.4*  --  2.0*   GLOBULIN 3.3  --  3.1   ALKPHOS 64  --  58   ALT 19  --  19   AST 14  --  15   BILITOT 0.5  --  0.6   LIPASE 150*  --   --    CRP  --  98.60*  --    FERRITIN  --  761.96*  --    IRON  --  8*  --    TIBC  --  163*  --      Recent Labs     12/02/23 2327   BNP 24.9   TROPONINI <0.010          CT Guided Abscess Drainage With Catheter  Narrative: EXAMINATION:  CT GUIDED ABSCESS DRAINAGE WITH CATHETER    CLINICAL HISTORY:  intra abdominal abscess;    TECHNIQUE:  CT guided drainage of a right abdominal collection.    DLP: 559    COMPARISON:  CT dated 12/04/2023    FINDINGS:  The risks, benefits, and potential complications were discussed including bleeding, infection, end organ damage. Informed consent was obtained.    The patient was positioned in the left lateral decubitus position. The right abdominal collection was localized CT.  The skin was marked and the area was prepped and draped in a sterile fashion.    The site was anesthetized with 1% lidocaine " solution. A 19 gauge, 10 cm coaxial needle was advanced into the collection, and a guidewire was then placed through the needle. The track was then dilated and a 10 Swazi drain was placed. The drain was sutured to the skin and attached to a CYNTHIA bulb. There was 15 cc of reddish pus fluid drained.    The patient tolerated the procedure without difficulty. Post-procedure imaging demonstrated a well-positioned catheter.  There were no immediate post-procedure complications. The patient was discharged to his room in stable condition.  Impression: Successful placement of a 10 Swazi drain into the right abdominal collection.  Samples were collected and sent to the lab for analysis.    Electronically signed by: Joseph Carvajal  Date:    12/04/2023  Time:    11:37       SHERLYN Davis  Infectious Diseases

## 2023-12-04 NOTE — PROGRESS NOTES
Pharmacokinetic Assessment Follow Up: IV Vancomycin    Vancomycin serum concentration assessment(s):    The trough level was drawn correctly and can be used to guide therapy at this time. The measurement is within the desired definitive target range of 10 to 20 mcg/mL.    Vancomycin Regimen Plan:    Continue 750 mg q12h and check trough on 12/6/23.    Drug levels (last 3 results):  Recent Labs   Lab Result Units 12/04/23  1611   Vancomycin Trough ug/ml 18.6          Patient brief summary:  Lionel León is a 43 y.o. male initiated on antimicrobial therapy with IV Vancomycin for treatment of intra-abdominal infection      Actual Body Weight:   65.8 kg    Renal Function:   Estimated Creatinine Clearance: 54.4 mL/min (A) (based on SCr of 1.63 mg/dL (H)).,     Dialysis Method (if applicable):  N/A    CBC (last 72 hours):  Recent Labs   Lab Result Units 12/02/23 2327 12/03/23 0918 12/04/23  0518   WBC x10(3)/mcL 6.86 6.76 3.54*   Hgb g/dL 7.5* 6.8* 8.2*   Hct % 24.1* 21.7* 25.8*   Platelet x10(3)/mcL 361 307 267   Mono % % 10.1 8.0  --    Eos % % 4.1 4.6  --    Basophil % % 0.3 0.3  --        Metabolic Panel (last 72 hours):  Recent Labs   Lab Result Units 12/02/23 2327 12/03/23  0644 12/03/23  0918 12/04/23  0518   Sodium Level mmol/L 132*  --   --  135*   Potassium Level mmol/L 4.7  --   --  4.3   Chloride mmol/L 102  --   --  104   Carbon Dioxide mmol/L 22  --   --  24   Glucose Level mg/dL 117*  --   --  104*   Glucose, UA   --  Normal  --   --    Blood Urea Nitrogen mg/dL 37.2*  --   --  19.8   Creatinine mg/dL 1.79*  --   --  1.63*   Albumin Level g/dL 2.4*  --   --  2.0*   Bilirubin Total mg/dL 0.5  --   --  0.6   Alkaline Phosphatase unit/L 64  --   --  58   Aspartate Aminotransferase unit/L 14  --   --  15   Alanine Aminotransferase unit/L 19  --   --  19   Magnesium Level mg/dL 1.70  --  1.60 1.90   Phosphorus Level mg/dL  --   --  3.7  --        Vancomycin Administrations:  vancomycin given in the last 96  hours                     vancomycin 750 mg in dextrose 5 % 250 mL IVPB (ready to mix) (mg) 750 mg New Bag 12/04/23 0509     750 mg New Bag 12/03/23 2208                    Microbiologic Results:  Microbiology Results (last 7 days)       Procedure Component Value Units Date/Time    Gram Stain [[id number]] Collected: 12/04/23 1114    Order Status: Completed Specimen: Aspirate from Abdomen Updated: 12/04/23 1326     GRAM STAIN Many WBC observed      No bacteria seen    Anaerobic Culture [[id number]] Collected: 12/04/23 1114    Order Status: Sent Specimen: Aspirate from Abdomen Updated: 12/04/23 1148    Fungal Culture [[id number]] Collected: 12/04/23 1114    Order Status: Sent Specimen: Aspirate from Abdomen Updated: 12/04/23 1147    Body Fluid Culture [[id number]] Collected: 12/04/23 1114    Order Status: Resulted Specimen: Fine Needle Aspirate from Abdomen Updated: 12/04/23 1147    Blood culture x two cultures. Draw prior to antibiotics. [[id number]]  (Normal) Collected: 12/02/23 2327    Order Status: Completed Specimen: Blood from Antecubital, Right Updated: 12/04/23 0100     CULTURE, BLOOD ([facility]) No Growth At 24 Hours    Blood culture x two cultures. Draw prior to antibiotics. [[id number]]  (Normal) Collected: 12/02/23 2327    Order Status: Completed Specimen: Blood from Antecubital, Left Updated: 12/04/23 0000     CULTURE, BLOOD ([facility]) No Growth At 24 Hours

## 2023-12-04 NOTE — PLAN OF CARE
Sunita with [employer] phoned [phone] to get update on patient status. She states she is available to help with discharge needs.

## 2023-12-04 NOTE — PROGRESS NOTES
"   Acute Care Surgery   Progress Note  Admit Date: 12/2/2023  HD#1  POD#* No surgery found *    Subjective:   Interval history:  NAEON. Tmax 101.4. VSS.  Blood clx taken 12/2, NGTD x2 day.   ml  Ileostomy: 200 ml  Feeling better overall.  Plan for IR drainage today.    Home Meds:  Current Outpatient Medications   Medication Instructions    acyclovir (ZOVIRAX) 400 mg, Oral, 5 times daily, Pt wife states taking q2h?    oxyCODONE-acetaminophen (PERCOCET)  mg per tablet 1 tablet, Oral, Every 6 hours PRN      Scheduled Meds:   enoxparin  40 mg Subcutaneous Q24H (prophylaxis, 1700)    fentaNYL        LIDOcaine HCL 10 mg/ml (1%)        midazolam        piperacillin-tazobactam (Zosyn) IV (PEDS and ADULTS) (extended infusion is not appropriate)  4.5 g Intravenous Q8H    valACYclovir  1,000 mg Oral BID    vancomycin (VANCOCIN) IV (PEDS and ADULTS)  750 mg Intravenous Q12H     Continuous Infusions:   lactated ringers 100 mL/hr at 12/04/23 0506     PRN Meds:0.9%  NaCl infusion (for blood administration), acetaminophen, acetaminophen, aluminum-magnesium hydroxide-simethicone, fentaNYL, LIDOcaine HCL 10 mg/ml (1%), melatonin, midazolam, ondansetron, oxyCODONE-acetaminophen, polyethylene glycol, prochlorperazine, senna-docusate 8.6-50 mg, sodium chloride 0.9%, Pharmacy to dose Vancomycin consult **AND** vancomycin - pharmacy to dose     Objective:     VITAL SIGNS: 24 HR MIN & MAX LAST   Temp  Min: 97.5 °F (36.4 °C)  Max: 101.4 °F (38.6 °C)  99.1 °F (37.3 °C)   BP  Min: 96/57  Max: 113/71  105/66    Pulse  Min: 73  Max: 98  76    Resp  Min: 14  Max: 24  20    SpO2  Min: 96 %  Max: 100 %  100 %      HT: 5' 10" (177.8 cm)  WT: 65.8 kg (145 lb)  BMI: 20.8     Intake/output:  Intake/Output - Last 3 Shifts         12/02 0700 12/03 0659 12/03 0700 12/04 0659 12/04 0700 12/05 0659    P.O.   540    Blood  743.8     Total Intake(mL/kg)  743.8 (11.3) 540 (8.2)    Urine (mL/kg/hr)  1925 (1.2) 700 (1.4)    Drains   10    Stool  " 200 100    Total Output  2125 810    Net  -1381.3 -270                   Intake/Output Summary (Last 24 hours) at 12/4/2023 1445  Last data filed at 12/4/2023 1443  Gross per 24 hour   Intake 1283.75 ml   Output 2935 ml   Net -1651.25 ml           Lines/drains/airway:       Peripheral IV - Single Lumen 12/03/23 1043 20 G Left;Posterior Hand (Active)   Site Assessment Clean;Dry;Intact 12/04/23 0800   Extremity Assessment Distal to IV No abnormal discoloration 12/04/23 0400   Line Status Infusing 12/04/23 0800   Dressing Status Clean;Dry;Intact 12/04/23 0800   Dressing Intervention Integrity maintained 12/04/23 0800   Number of days: 1            Peripheral IV - Single Lumen 12/03/23 1953 20 G Anterior;Left;Proximal Forearm (Active)   Site Assessment Clean;Dry;Intact 12/04/23 0800   Extremity Assessment Distal to IV No abnormal discoloration 12/04/23 0400   Line Status Saline locked 12/04/23 0800   Dressing Status Clean;Dry;Intact 12/04/23 0800   Dressing Intervention Integrity maintained 12/04/23 0800   Number of days: 0            Closed/Suction Drain 12/04/23 1115 Tube - 1 Lateral RLQ Bulb 10 Fr. (Active)   Output (mL) 10 mL 12/04/23 1443   Number of days: 0            Ileostomy 11/15/23 1730 RUQ (Active)   Stoma Appearance round;moist;pink 12/03/23 2000   Appliance 2-piece;no leakage 12/03/23 2000   Stoma Function stool;flatus 12/03/23 2000   Output (mL) 100 mL 12/04/23 1443   Number of days: 18       Physical examination:  General: no acute distress, appears comfortable this AM  HEENT: Normocephalic, atraumatic, PERRL  CV: RR  Resp: NWOB  GI:  Abdomen soft, non-tender, non-distended, no guarding, no rebound, normoactive bowel sounds, no masses, end ileostomy pink and productive of liquid stool, laparoscopic incisions healing  :  scrotum and perineum are healing well with excellent granulation tissue free of infection  MSK: No muscle atrophy, cyanosis, peripheral edema, moving all extremities  "spontaneously  Skin/wounds:  No rashes, ulcers, erythema  Neuro:  CNII-XII grossly intact, alert and oriented to person, place, and time    Labs:  Renal:  Recent Labs     12/02/23 2327 12/04/23  0518   BUN 37.2* 19.8   CREATININE 1.79* 1.63*     Recent Labs     12/02/23 2327   LACTIC 0.6     FENGI:  Recent Labs     12/02/23 2327 12/03/23 0918 12/04/23  0518   *  --  135*   K 4.7  --  4.3   CO2 22  --  24   CALCIUM 8.1*  --  7.8*   MG 1.70 1.60 1.90   PHOS  --  3.7  --    ALBUMIN 2.4*  --  2.0*   BILITOT 0.5  --  0.6   AST 14  --  15   ALKPHOS 64  --  58   ALT 19  --  19     Heme:  Recent Labs     12/02/23 2327 12/03/23 0918 12/04/23 0518   HGB 7.5* 6.8* 8.2*   HCT 24.1* 21.7* 25.8*    307 267   PTT  --   --  45.4*   INR 1.3  --  1.2     ID:  Recent Labs     12/02/23 2327 12/03/23 0918 12/04/23  0518   WBC 6.86 6.76 3.54*     CBG:  Recent Labs     12/02/23 2327 12/04/23  0518   GLUCOSE 117* 104*      Cardiovascular:  Recent Labs   Lab 12/02/23 2327   TROPONINI <0.010   BNP 24.9     ABG:  No results for input(s): "PH", "PO2", "PCO2", "HCO3", "BE" in the last 168 hours.   I have reviewed all pertinent lab results within the past 24 hours.    Imaging:  CT Guided Abscess Drainage With Catheter   Final Result      Successful placement of a 10 Spanish drain into the right abdominal collection.  Samples were collected and sent to the lab for analysis.         Electronically signed by: Joseph Carvaajl   Date:    12/04/2023   Time:    11:37      CT Abdomen Pelvis With IV Contrast NO Oral Contrast   Final Result   Impression:      1. Both kidneys appear mildly enlarged in size in AP dimension, new since the prior examination. No hypoperfused areas are seen within the renal cortices to suggest acute pyelonephritis on the submitted images. There is subtle mucosal wall enhancement of the renal pelves bilaterally (series 4; images 64-90). An element of pyeloureteritis cannot be excluded. Correlate with " clinical and laboratory findings.      2. Again noted is pneumoperitoneum in the right subdiaphragmatic region (series 2; image 19-35), significantly decreased since the prior examination. There is interval resolution of previously noted moderate ascites. Interval development of a peripherally enhancing fluid collection in the right lower abdomen and pelvis (series 2; images , series 5; image 95 and series 4; images 41-60). The collection measures approximately 5.3 x 4.2 x 9 cm and demonstrates a few tiny gas locules. This is of concern for an abscess. Correlate clinically as regards further evaluation and followup.      3. There is interval resolution of previously noted soft tissue emphysema in the left ventral mid and lower abdominal wall. Again noted are a few small subcutaneous gas locules in the right ventral mid abdomen. There is interval development of a small subcutaneous fluid collection with gas locule in the infraumbilical region (series 2; image 88), which measures 2.5 x 2.3 x 4.8 cm (AP x T x CC), likely postoperative. An evolving abscess cannot be excluded.      4. Again noted is near total colectomy with an ileostomy in the right lower abdomen. There are multiple moderately distended small bowel loops in the mid and lower abdomen. The caliber of the bowel loops has slightly decreased since the prior examination. This may reflect an ileus versus partial small bowel obstruction.      5. Details and other findings as discussed above. Follow-up as clinically indicated.      No significant discrepancy with overnight report         Electronically signed by: Chip Duran   Date:    12/03/2023   Time:    11:02      X-Ray Chest AP Portable   Final Result      No acute findings.         Electronically signed by: Germain Cedeno   Date:    12/03/2023   Time:    10:30         I have reviewed all pertinent imaging results/findings within the past 24 hours.    Micro/Path/Other:  Microbiology Results (last 7  days)       Procedure Component Value Units Date/Time    Gram Stain [[id number]] Collected: 12/04/23 1114    Order Status: Completed Specimen: Aspirate from Abdomen Updated: 12/04/23 1326     GRAM STAIN Many WBC observed      No bacteria seen    Anaerobic Culture [[id number]] Collected: 12/04/23 1114    Order Status: Sent Specimen: Aspirate from Abdomen Updated: 12/04/23 1148    Fungal Culture [[id number]] Collected: 12/04/23 1114    Order Status: Sent Specimen: Aspirate from Abdomen Updated: 12/04/23 1147    Body Fluid Culture [[id number]] Collected: 12/04/23 1114    Order Status: Resulted Specimen: Fine Needle Aspirate from Abdomen Updated: 12/04/23 1147    Blood culture x two cultures. Draw prior to antibiotics. [[id number]]  (Normal) Collected: 12/02/23 2327    Order Status: Completed Specimen: Blood from Antecubital, Right Updated: 12/04/23 0100     CULTURE, BLOOD ([facility]) No Growth At 24 Hours    Blood culture x two cultures. Draw prior to antibiotics. [[id number]]  (Normal) Collected: 12/02/23 2327    Order Status: Completed Specimen: Blood from Antecubital, Left Updated: 12/04/23 0000     CULTURE, BLOOD ([facility]) No Growth At 24 Hours           Specimen (168h ago, onward)      None             Assessment & Plan:   42 yo M with intraabdominal fluid collection as seen on CT.     - Plan IR evaluation to assess for drainage today  - Follow up IR cultures  - Abx per primary  - Continue to follow blood cultures, pt does not appear septic at this time  - Would not be ideal to renter abdomen surgically given recent surgery, typically best to wait at least 2-6 weeks prior to reentering abdomen to prevent working in a hostile abdomen  - Will continue to follow to access quality of drain output and for serial abdominal exams  - Okay to restart Lov for DVT ppx after IR procedure  - Okay for regular diet after IR procedure  - will follow up with AM labs tomorrow     Ayla Lynch MD  General Surgery PGY-2

## 2023-12-04 NOTE — OP NOTE
Radiology Post-Procedure Note    Pre Op Diagnosis: Right Collection    Post Op Diagnosis:  Drain in place    Procedure:  Drain Placement    Procedure performed by: Joseph Carvajal M.D.    Written Informed Consent Obtained: Yes    Specimen Removed: YES     Estimated Blood Loss: Minimal    Findings:   Standard CT Drainage    Patient tolerated procedure well.    Joseph Carvajal MD

## 2023-12-04 NOTE — PROGRESS NOTES
Patient was recently dc'd on 11/30 & HH was arranged with [facility].  Patient returned to hospital on 12/2.

## 2023-12-05 LAB
ANION GAP SERPL CALC-SCNC: 7 MEQ/L
BASOPHILS # BLD AUTO: 0.02 X10(3)/MCL
BASOPHILS NFR BLD AUTO: 0.6 %
BUN SERPL-MCNC: 23.2 MG/DL (ref 8.9–20.6)
CALCIUM SERPL-MCNC: 7.6 MG/DL (ref 8.4–10.2)
CHLORIDE SERPL-SCNC: 107 MMOL/L (ref 98–107)
CO2 SERPL-SCNC: 24 MMOL/L (ref 22–29)
CREAT SERPL-MCNC: 1.69 MG/DL (ref 0.73–1.18)
CREAT/UREA NIT SERPL: 14
EOSINOPHIL # BLD AUTO: 0.32 X10(3)/MCL (ref 0–0.9)
EOSINOPHIL NFR BLD AUTO: 9.4 %
ERYTHROCYTE [DISTWIDTH] IN BLOOD BY AUTOMATED COUNT: 15.4 % (ref 11.5–17)
GFR SERPLBLD CREATININE-BSD FMLA CKD-EPI: 51 MLS/MIN/1.73/M2
GLUCOSE SERPL-MCNC: 98 MG/DL (ref 74–100)
HBV CORE AB SERPL QL IA: NONREACTIVE
HBV CORE IGM SERPL QL IA: NONREACTIVE
HBV SURFACE AB SER-ACNC: 4.21 MIU/ML
HBV SURFACE AB SERPL IA-ACNC: NONREACTIVE M[IU]/ML
HBV SURFACE AG SERPL QL IA: NONREACTIVE
HCT VFR BLD AUTO: 25.1 % (ref 42–52)
HCV AB SERPL QL IA: NONREACTIVE
HGB BLD-MCNC: 7.9 G/DL (ref 14–18)
IMM GRANULOCYTES # BLD AUTO: 0.03 X10(3)/MCL (ref 0–0.04)
IMM GRANULOCYTES NFR BLD AUTO: 0.9 %
LYMPHOCYTES # BLD AUTO: 0.3 X10(3)/MCL (ref 0.6–4.6)
LYMPHOCYTES NFR BLD AUTO: 8.8 %
MCH RBC QN AUTO: 27.1 PG (ref 27–31)
MCHC RBC AUTO-ENTMCNC: 31.5 G/DL (ref 33–36)
MCV RBC AUTO: 86.3 FL (ref 80–94)
MONOCYTES # BLD AUTO: 0.3 X10(3)/MCL (ref 0.1–1.3)
MONOCYTES NFR BLD AUTO: 8.8 %
NEUTROPHILS # BLD AUTO: 2.44 X10(3)/MCL (ref 2.1–9.2)
NEUTROPHILS NFR BLD AUTO: 71.5 %
NRBC BLD AUTO-RTO: 0 %
PLATELET # BLD AUTO: 252 X10(3)/MCL (ref 130–400)
PMV BLD AUTO: 9.2 FL (ref 7.4–10.4)
POTASSIUM SERPL-SCNC: 4.3 MMOL/L (ref 3.5–5.1)
RBC # BLD AUTO: 2.91 X10(6)/MCL (ref 4.7–6.1)
SODIUM SERPL-SCNC: 138 MMOL/L (ref 136–145)
WBC # SPEC AUTO: 3.41 X10(3)/MCL (ref 4.5–11.5)

## 2023-12-05 PROCEDURE — 85025 COMPLETE CBC W/AUTO DIFF WBC: CPT

## 2023-12-05 PROCEDURE — 80048 BASIC METABOLIC PNL TOTAL CA: CPT

## 2023-12-05 PROCEDURE — 99233 PR SUBSEQUENT HOSPITAL CARE,LEVL III: ICD-10-PCS | Mod: FS,,, | Performed by: GENERAL PRACTICE

## 2023-12-05 PROCEDURE — 63600175 PHARM REV CODE 636 W HCPCS: Performed by: INTERNAL MEDICINE

## 2023-12-05 PROCEDURE — 25000003 PHARM REV CODE 250: Performed by: INTERNAL MEDICINE

## 2023-12-05 PROCEDURE — 25000003 PHARM REV CODE 250: Performed by: GENERAL PRACTICE

## 2023-12-05 PROCEDURE — 21400001 HC TELEMETRY ROOM

## 2023-12-05 PROCEDURE — 99233 SBSQ HOSP IP/OBS HIGH 50: CPT | Mod: FS,,, | Performed by: GENERAL PRACTICE

## 2023-12-05 RX ADMIN — PIPERACILLIN AND TAZOBACTAM 4.5 G: 4; .5 INJECTION, POWDER, FOR SOLUTION INTRAVENOUS at 04:12

## 2023-12-05 RX ADMIN — SODIUM CHLORIDE, POTASSIUM CHLORIDE, SODIUM LACTATE AND CALCIUM CHLORIDE: 600; 310; 30; 20 INJECTION, SOLUTION INTRAVENOUS at 01:12

## 2023-12-05 RX ADMIN — VANCOMYCIN HYDROCHLORIDE 750 MG: 750 INJECTION, POWDER, LYOPHILIZED, FOR SOLUTION INTRAVENOUS at 04:12

## 2023-12-05 RX ADMIN — Medication 6 MG: at 08:12

## 2023-12-05 RX ADMIN — ACETAMINOPHEN 650 MG: 325 TABLET, FILM COATED ORAL at 11:12

## 2023-12-05 RX ADMIN — VALACYCLOVIR HYDROCHLORIDE 1000 MG: 500 TABLET, FILM COATED ORAL at 08:12

## 2023-12-05 RX ADMIN — PIPERACILLIN AND TAZOBACTAM 4.5 G: 4; .5 INJECTION, POWDER, FOR SOLUTION INTRAVENOUS at 12:12

## 2023-12-05 RX ADMIN — ENOXAPARIN SODIUM 40 MG: 40 INJECTION SUBCUTANEOUS at 04:12

## 2023-12-05 RX ADMIN — ACETAMINOPHEN 650 MG: 325 TABLET, FILM COATED ORAL at 02:12

## 2023-12-05 RX ADMIN — PIPERACILLIN AND TAZOBACTAM 4.5 G: 4; .5 INJECTION, POWDER, FOR SOLUTION INTRAVENOUS at 08:12

## 2023-12-05 RX ADMIN — SODIUM CHLORIDE, POTASSIUM CHLORIDE, SODIUM LACTATE AND CALCIUM CHLORIDE: 600; 310; 30; 20 INJECTION, SOLUTION INTRAVENOUS at 02:12

## 2023-12-05 NOTE — HPI
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  He completed antibiotic course and was discharged home with home health on 11/30/2023.     Present to the ED 12/2/23 reporting a fever of 102.  Report mild nonproductive cough denies chest pain or shortness breath.  Report mild cramping right lower quadrant abdominal pain.  Did not notice any increase in ileostomy output.  Wife report she been changing perineal wound  dressing and did not notice any foul-smelling drainage or necrotic changes.     On arrival to ED he was afebrile and hemodynamically stable.  Labs notable for hemoglobin 7.5, WBC 6.86, creatinine 1.79, BUN 37, COVID 19 and flu negative, urinalysis unremarkable.  Chest x-ray on my review show no airspace disease.  CT abdomen pelvis with IV contrast noted for interval development of peripherally enhancing fluid collection in the right lower abdomen measuring 5.3 x 4.2 x 9 cm and demonstrates a few tiny gas locule concerning for an abscess, also noted small subcutaneous fluid collection with gas locules in the infraumbilical region measuring 2.5 x 2.3 x 4.8 cm likely postoperative though evolving abscess cannot be excluded.     Surgery consulted and recommended IR drainage.  Referred to hospital medicine service for further evaluation and management. PRS consulted during last admission for evaluation of reconstruction of perineum/buttock wound - reconsulted this admission to re-evaluate.      Today: patient in bed with wife bedside, denies complaints. Wife has been performing dressing  changes to his wound BID.

## 2023-12-05 NOTE — PROGRESS NOTES
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  He completed antibiotic course and was discharged home with home health on 11/30/2023.     Present to the ED tonight reporting a fever of 102.  Report mild nonproductive cough denies chest pain or shortness breath.  Report mild cramping right lower quadrant abdominal pain.  Did not notice any increase in ileostomy output.  Wife report she been changing perineal wound  dressing and did not notice any foul-smelling drainage or necrotic changes.     On arrival to ED he was afebrile and hemodynamically stable.  Labs notable for hemoglobin 7.5, WBC 6.86, creatinine 1.79, BUN 37, COVID 19 and flu negative, urinalysis unremarkable.  Chest x-ray on my review show no airspace disease.  CT abdomen pelvis with IV contrast noted for interval development of peripherally enhancing fluid collection in the right lower abdomen measuring 5.3 x 4.2 x 9 cm and demonstrates a few tiny gas locule concerning for an abscess, also noted small subcutaneous fluid collection with gas locules in the infraumbilical region measuring 2.5 x 2.3 x 4.8 cm likely postoperative though evolving abscess cannot be excluded.     Surgery consulted and recommended IR drainage.  Referred to hospital medicine service for further evaluation and management.    Today  Reviewed the chart and he had to re-presented due to fevers at home.  Found to have  intra-abdominal abscess and IR drain placed.  He is feeling better and would like to ambulate today.        OBJECTIVE/PHYSICAL EXAM     VITAL SIGNS (MOST RECENT):  Temp: 99.5 °F (37.5 °C) (12/05/23 0717)  Pulse: 83 (12/05/23 0717)  Resp: 20 (12/05/23 0717)  BP: 133/76 (12/05/23 0717)  SpO2: 99 % (12/05/23 0717) VITAL SIGNS (24 HOUR RANGE):  Temp:  [98.5 °F (36.9 °C)-100.7 °F (38.2 °C)] 99.5 °F (37.5 °C)  Pulse:  [72-95] 83  Resp:  [14-24] 20  SpO2:  [96 %-100 %] 99 %  BP: ()/(54-76) 133/76   GENERAL: In no acute distress, afebrile  HEENT:  CHEST: Clear to auscultation bilaterally  HEART: S1, S2, no appreciable murmur  ABDOMEN: Soft, nontender, BS +, PATRICE drain  MSK: Warm, no lower extremity edema, no clubbing or cyanosis  NEUROLOGIC: Alert and oriented x4, moving all extremities with good strength   INTEGUMENTARY:  Refer to images  PSYCHIATRY:        ASSESSMENT/PLAN   RLQ Intra-abdominal abscess post  CT-guided drainage/patrice drain  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  Continue vancomycin, Zosyn, Valtrex.  Follow-up cultures from abscess.  Surgery following.  Recommended IR drainage.  Slow down IV fluids to 50 per hour  Will ask for Plastic surgery to re-evaluate since wound appears to be fully granulated    DVT prophylaxis:  Lovenox 40    Anticipated discharge and disposition:   __________________________________________________________________________    LABS/MICRO/MEDS/DIAGNOSTICS       LABS  Recent Labs     12/04/23  0518 12/05/23  0457   * 138   K 4.3 4.3   CHLORIDE 104 107   CO2 24 24   BUN 19.8 23.2*   CREATININE 1.63* 1.69*   GLUCOSE 104* 98   CALCIUM 7.8* 7.6*   ALKPHOS 58  --    AST 15  --    ALT 19  --    ALBUMIN  2.0*  --      Recent Labs     12/05/23  0457   WBC 3.41*   RBC 2.91*   HCT 25.1*   MCV 86.3          MICROBIOLOGY  Microbiology Results (last 7 days)       Procedure Component Value Units Date/Time    Body Fluid Culture [[id number]] Collected: 12/04/23 1114    Order Status: Completed Specimen: Fine Needle Aspirate from Abdomen Updated: 12/05/23 0655     Body Fluid Culture No Growth At 24 Hours    Blood culture x two cultures. Draw prior to antibiotics. [[id number]]  (Normal) Collected: 12/02/23 2327    Order Status: Completed Specimen: Blood from Antecubital, Left Updated: 12/05/23 0000     CULTURE, BLOOD ([facility]) No Growth At 48 Hours    Blood culture x two cultures. Draw prior to antibiotics. [[id number]]  (Normal) Collected: 12/02/23 2327    Order Status: Completed Specimen: Blood from Antecubital, Right Updated: 12/05/23 0000     CULTURE, BLOOD ([facility]) No Growth At 48 Hours    Gram Stain [[id number]] Collected: 12/04/23 1114    Order Status: Completed Specimen: Aspirate from Abdomen Updated: 12/04/23 1326     GRAM STAIN Many WBC observed      No bacteria seen    Anaerobic Culture [[id number]] Collected: 12/04/23 1114    Order Status: Sent Specimen: Aspirate from Abdomen Updated: 12/04/23 1148    Fungal Culture [[id number]] Collected: 12/04/23 1114    Order Status: Sent Specimen: Aspirate from Abdomen Updated: 12/04/23 1147               MEDICATIONS   enoxparin  40 mg Subcutaneous Q24H (prophylaxis, 1700)    piperacillin-tazobactam (Zosyn) IV (PEDS and ADULTS) (extended infusion is not appropriate)  4.5 g Intravenous Q8H    valACYclovir  1,000 mg Oral BID    vancomycin (VANCOCIN) IV (PEDS and ADULTS)  750 mg Intravenous Q12H         INFUSIONS   lactated ringers 100 mL/hr at 12/05/23 0127          DIAGNOSTIC TESTS  CT Guided Abscess Drainage With Catheter   Final Result      Successful placement of a 10 [place] drain into the right abdominal collection.  Samples were collected and sent to the lab for analysis.          Electronically signed by: Joesph Carvajal   Date:    12/04/2023   Time:    11:37      CT Abdomen Pelvis With IV Contrast NO Oral Contrast   Final Result   Impression:      1. Both kidneys appear mildly enlarged in size in AP dimension, new since the prior examination. No hypoperfused areas are seen within the renal cortices to suggest acute pyelonephritis on the submitted images. There is subtle mucosal wall enhancement of the renal pelves bilaterally (series 4; images 64-90). An element of pyeloureteritis cannot be excluded. Correlate with clinical and laboratory findings.      2. Again noted is pneumoperitoneum in the right subdiaphragmatic region (series 2; image 19-35), significantly decreased since the prior examination. There is interval resolution of previously noted moderate ascites. Interval development of a peripherally enhancing fluid collection in the right lower abdomen and pelvis (series 2; images , series 5; image 95 and series 4; images 41-60). The collection measures approximately 5.3 x 4.2 x 9 cm and demonstrates a few tiny gas locules. This is of concern for an abscess. Correlate clinically as regards further evaluation and followup.      3. There is interval resolution of previously noted soft tissue emphysema in the left ventral mid and lower abdominal wall. Again noted are a few small subcutaneous gas locules in the right ventral mid abdomen. There is interval development of a small subcutaneous fluid collection with gas locule in the infraumbilical region (series 2; image 88), which measures 2.5 x 2.3 x 4.8 cm (AP x T x CC), likely postoperative. An evolving abscess cannot be excluded.      4. Again noted is near total colectomy with an ileostomy in the right lower abdomen. There are multiple moderately distended small bowel loops in the mid and lower abdomen. The caliber of the bowel loops has slightly decreased since the prior examination. This may reflect an ileus versus partial  "small bowel obstruction.      5. Details and other findings as discussed above. Follow-up as clinically indicated.      No significant discrepancy with overnight report         Electronically signed by: Chip Duran   Date:    12/03/2023   Time:    11:02      X-Ray Chest AP Portable   Final Result      No acute findings.         Electronically signed by: Germain Cedeno   Date:    12/03/2023   Time:    10:30           No results found for: "EF"       NUTRITION STATUS  Patient meets ASPEN criteria for   malnutrition of   per RD assessment as evidenced by:                       A minimum of two characteristics is recommended for diagnosis of either severe or non-severe malnutrition.       Case related differential diagnoses have been reviewed; assessment and plan has been documented. I have personally reviewed the labs and test results that are currently available; I have reviewed the patients medication list. I have reviewed the consulting providers recommendations. I have reviewed or attempted to review medical records based upon their availability.  All of the patient's and/or family's questions have been addressed and answered to the best of my ability.  I will continue to monitor closely and make adjustments to medical management as needed.  This document was created using M*Modal Fluency Direct.  Transcription errors may have been made.  Please contact me if any questions may rise regarding documentation to clarify transcription.        Abdoulaye Garcia MD   Internal Medicine  Department of Hospital Medicine  [facility] - 8th Floor Med Surg               "

## 2023-12-05 NOTE — SUBJECTIVE & OBJECTIVE
No current facility-administered medications on file prior to encounter.     Current Outpatient Medications on File Prior to Encounter   Medication Sig    acyclovir (ZOVIRAX) 400 MG tablet Take 400 mg by mouth 5 (five) times daily. Pt wife states taking q2h?    oxyCODONE-acetaminophen (PERCOCET)  mg per tablet Take 1 tablet by mouth every 6 (six) hours as needed for Pain.       Review of patient's allergies indicates:  No Known Allergies    Past Medical History:   Diagnosis Date    Diverticulitis     Ulcerative colitis      Past Surgical History:   Procedure Laterality Date    COLONOSCOPY, WITH 1 OR MORE BIOPSIES N/A 10/23/2023    Procedure: COLON;  Surgeon: Dragan Underwood MD;  Location: [facility] ENDOSCOPY;  Service: Gastroenterology;  Laterality: N/A;    DEBRIDEMENT OF SACRAL WOUND N/A 11/20/2023    Procedure: DEBRIDEMENT, WOUND, SACRUM;  Surgeon: Chandana Guidry MD;  Location: [facility];  Service: General;  Laterality: N/A;  perineum/sacrum// high lithotomy    DEBRIDEMENT, EXTERNAL GENITALIA, PERINEUM, AND ABDOMINAL WALL, FOR NECROTIZING SOFT TISSUE INFECTION Bilateral 11/11/2023    Procedure: DEBRIDEMENT, EXTERNAL GENITALIA/BUTTOCKS FOR NECROTIZING SOFT TISSUE INFECTION;  Surgeon: Vladimir Vick MD;  Location: [facility];  Service: General;  Laterality: Bilateral;  Prone positioning.    DEBRIDEMENT, EXTERNAL GENITALIA, PERINEUM, AND ABDOMINAL WALL, FOR NECROTIZING SOFT TISSUE INFECTION N/A 11/12/2023    Procedure: DEBRIDEMENT, EXTERNAL GENITALIA, PERINEUM, AND ABDOMINAL WALL, FOR NECROTIZING SOFT TISSUE INFECTION;  Surgeon: Vladimir Vick MD;  Location: [facility];  Service: General;  Laterality: N/A;  Place patient in dorsal lithotomy positioning.    DIAGNOSTIC LAPAROSCOPY N/A 11/20/2023    Procedure: LAPAROSCOPY, DIAGNOSTIC;  Surgeon: Fredi Shields MD;  Location: [facility];  Service: General;  Laterality: N/A;    EGD, WITH CLOSED BIOPSY N/A 11/22/2023    Procedure: EGD, WITH CLOSED BIOPSY;   Surgeon: Blayne Mujica MD;  Location: [facility] ENDOSCOPY;  Service: Gastroenterology;  Laterality: N/A;    LAPAROSCOPIC ILEOSTOMY N/A 11/15/2023    Procedure: CREATION, ILEOSTOMY, LAPAROSCOPIC;  Surgeon: Fredi Shields MD;  Location: [facility] OR;  Service: Colon and Rectal;  Laterality: N/A;    LAPAROSCOPIC TOTAL COLECTOMY N/A 11/15/2023    Procedure: COLECTOMY, TOTAL, LAPAROSCOPIC;  Surgeon: Fredi Shields MD;  Location: [facility] OR;  Service: Colon and Rectal;  Laterality: N/A;  LAP TOTAL COLECTOMY WITH ILEOSTOMY     Family History    None       Tobacco Use    Smoking status: Never    Smokeless tobacco: Never   Substance and Sexual Activity    Alcohol use: Yes     Comment: Social    Drug use: Never    Sexual activity: Not on file     Review of Systems  Negative except as detailed in HPI, all other systems reviewed and negative.     Objective:     Vital Signs (Most Recent):  Temp: 99.2 °F (37.3 °C) (12/05/23 1245)  Pulse: 83 (12/05/23 0717)  Resp: 20 (12/05/23 0717)  BP: 133/76 (12/05/23 0717)  SpO2: 99 % (12/05/23 0717) Vital Signs (24h Range):  Temp:  [98.5 °F (36.9 °C)-100.7 °F (38.2 °C)] 99.2 °F (37.3 °C)  Pulse:  [72-95] 83  Resp:  [18-20] 20  SpO2:  [96 %-99 %] 99 %  BP: (100-133)/(60-76) 133/76     Weight: 65.8 kg (145 lb)  Body mass index is 20.81 kg/m².      Physical Exam  Constitutional:       Appearance: Normal appearance.   HENT:      Head: Normocephalic and atraumatic.   Eyes:      Extraocular Movements: Extraocular movements intact.   Pulmonary:      Effort: Pulmonary effort is normal.   Abdominal:      General: Abdomen is flat.   Musculoskeletal:         General: Normal range of motion.      Cervical back: Normal range of motion.   Skin:     General: Skin is warm and dry.      Comments: Wound to scrotum/perineum and buttocks with granulation tissue, some fibrinous exudate but overall very clean.    Neurological:      General: No focal deficit present.      Mental Status: He is alert  and oriented to person, place, and time.   Psychiatric:         Mood and Affect: Mood normal.              Significant Labs:  CBC:   Recent Labs   Lab 12/05/23  0457   WBC 3.41*   RBC 2.91*   HGB 7.9*   HCT 25.1*      MCV 86.3   MCH 27.1   MCHC 31.5*     BMP:   Recent Labs   Lab 12/04/23 0518 12/05/23 0457   * 138   K 4.3 4.3   CO2 24 24   BUN 19.8 23.2*   CREATININE 1.63* 1.69*   CALCIUM 7.8* 7.6*   MG 1.90  --        Significant Diagnostics:  I have reviewed all pertinent imaging results/findings within the past 24 hours.    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   Would like to give this wound a little more time since it is rapidly improving.   Certainly would hold off on any reconstruction given his abdominal abscess requiring drain placement.   Continue wound care and nutritional support.   If wound stalls or fails to improve then we could consider reconstruction at that time.   Will continue to follow.

## 2023-12-05 NOTE — CONSULTS
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  He completed antibiotic course and was discharged home with home health on 11/30/2023.     Present to the ED 12/2/23 reporting a fever of 102.  Report mild nonproductive cough denies chest pain or shortness breath.  Report mild cramping right lower quadrant abdominal pain.  Did not notice any increase in ileostomy output.  Wife report she been changing perineal wound  dressing and did not notice any foul-smelling drainage or necrotic changes.     On arrival to ED he was afebrile and hemodynamically stable.  Labs notable for hemoglobin 7.5, WBC 6.86, creatinine 1.79, BUN 37, COVID 19 and flu negative, urinalysis unremarkable.  Chest x-ray on my review show no airspace disease.  CT abdomen pelvis with IV contrast noted for interval development of peripherally enhancing fluid collection in the right lower abdomen measuring 5.3 x 4.2 x 9 cm and demonstrates a few tiny gas locule concerning for an abscess, also noted small subcutaneous fluid collection with gas locules in the infraumbilical region measuring 2.5 x 2.3 x 4.8 cm likely postoperative though evolving abscess cannot be excluded.     Surgery consulted and recommended IR  drainage.  Referred to hospital medicine service for further evaluation and management. PRS consulted during last admission for evaluation of reconstruction of perineum/buttock wound - reconsulted this admission to re-evaluate.      Today: patient in bed with wife bedside, denies complaints. Wife has been performing dressing changes to his wound BID.     No current facility-administered medications on file prior to encounter.     Current Outpatient Medications on File Prior to Encounter   Medication Sig    acyclovir (ZOVIRAX) 400 MG tablet Take 400 mg by mouth 5 (five) times daily. Pt wife states taking q2h?    oxyCODONE-acetaminophen (PERCOCET)  mg per tablet Take 1 tablet by mouth every 6 (six) hours as needed for Pain.       Review of patient's allergies indicates:  No Known Allergies    Past Medical History:   Diagnosis Date    Diverticulitis     Ulcerative colitis      Past Surgical History:   Procedure Laterality Date    COLONOSCOPY, WITH 1 OR MORE BIOPSIES N/A 10/23/2023    Procedure: COLON;  Surgeon: Dragan Underwood MD;  Location: [facility] ENDOSCOPY;  Service: Gastroenterology;  Laterality: N/A;    DEBRIDEMENT OF SACRAL WOUND N/A 11/20/2023    Procedure: DEBRIDEMENT, WOUND, SACRUM;  Surgeon: Chandana Guidry MD;  Location: [facility] OR;  Service: General;  Laterality: N/A;  perineum/sacrum// high lithotomy    DEBRIDEMENT, EXTERNAL GENITALIA, PERINEUM, AND ABDOMINAL WALL, FOR NECROTIZING SOFT TISSUE INFECTION Bilateral 11/11/2023    Procedure: DEBRIDEMENT, EXTERNAL GENITALIA/BUTTOCKS FOR NECROTIZING SOFT TISSUE INFECTION;  Surgeon: Vladimir Vick MD;  Location: [facility];  Service: General;  Laterality: Bilateral;  Prone positioning.    DEBRIDEMENT, EXTERNAL GENITALIA, PERINEUM, AND ABDOMINAL WALL, FOR NECROTIZING SOFT TISSUE INFECTION N/A 11/12/2023    Procedure: DEBRIDEMENT, EXTERNAL GENITALIA, PERINEUM, AND ABDOMINAL WALL, FOR NECROTIZING SOFT TISSUE INFECTION;  Surgeon: Vladimir Vick MD;   Location: [facility] OR;  Service: General;  Laterality: N/A;  Place patient in dorsal lithotomy positioning.    DIAGNOSTIC LAPAROSCOPY N/A 11/20/2023    Procedure: LAPAROSCOPY, DIAGNOSTIC;  Surgeon: Fredi Shields MD;  Location: [facility] OR;  Service: General;  Laterality: N/A;    EGD, WITH CLOSED BIOPSY N/A 11/22/2023    Procedure: EGD, WITH CLOSED BIOPSY;  Surgeon: Blayne Mujica MD;  Location: [facility] ENDOSCOPY;  Service: Gastroenterology;  Laterality: N/A;    LAPAROSCOPIC ILEOSTOMY N/A 11/15/2023    Procedure: CREATION, ILEOSTOMY, LAPAROSCOPIC;  Surgeon: Fredi Shielsd MD;  Location: [facility] OR;  Service: Colon and Rectal;  Laterality: N/A;    LAPAROSCOPIC TOTAL COLECTOMY N/A 11/15/2023    Procedure: COLECTOMY, TOTAL, LAPAROSCOPIC;  Surgeon: Fredi Shields MD;  Location: [facility] OR;  Service: Colon and Rectal;  Laterality: N/A;  LAP TOTAL COLECTOMY WITH ILEOSTOMY     Family History    None       Tobacco Use    Smoking status: Never    Smokeless tobacco: Never   Substance and Sexual Activity    Alcohol use: Yes     Comment: Social    Drug use: Never    Sexual activity: Not on file     Review of Systems  Negative except as detailed in HPI, all other systems reviewed and negative.     Objective:     Vital Signs (Most Recent):  Temp: 99.2 °F (37.3 °C) (12/05/23 1245)  Pulse: 83 (12/05/23 0717)  Resp: 20 (12/05/23 0717)  BP: 133/76 (12/05/23 0717)  SpO2: 99 % (12/05/23 0717) Vital Signs (24h Range):  Temp:  [98.5 °F (36.9 °C)-100.7 °F (38.2 °C)] 99.2 °F (37.3 °C)  Pulse:  [72-95] 83  Resp:  [18-20] 20  SpO2:  [96 %-99 %] 99 %  BP: (100-133)/(60-76) 133/76     Weight: 65.8 kg (145 lb)  Body mass index is 20.81 kg/m².      Physical Exam  Constitutional:       Appearance: Normal appearance.   HENT:      Head: Normocephalic and atraumatic.   Eyes:      Extraocular Movements: Extraocular movements intact.   Pulmonary:      Effort: Pulmonary effort is normal.   Abdominal:      General: Abdomen is flat.    Musculoskeletal:         General: Normal range of motion.      Cervical back: Normal range of motion.   Skin:     General: Skin is warm and dry.      Comments: Wound to scrotum/perineum and buttocks with granulation tissue, some fibrinous exudate but overall very clean.    Neurological:      General: No focal deficit present.      Mental Status: He is alert and oriented to person, place, and time.   Psychiatric:         Mood and Affect: Mood normal.              Significant Labs:  CBC:   Recent Labs   Lab 12/05/23  0457   WBC 3.41*   RBC 2.91*   HGB 7.9*   HCT 25.1*      MCV 86.3   MCH 27.1   MCHC 31.5*     BMP:   Recent Labs   Lab 12/04/23 0518 12/05/23  0457   * 138   K 4.3 4.3   CO2 24 24   BUN 19.8 23.2*   CREATININE 1.63* 1.69*   CALCIUM 7.8* 7.6*   MG 1.90  --        Significant Diagnostics:  I have reviewed all pertinent imaging results/findings within the past 24 hours.    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   Would like to give this wound a little more time since it is rapidly improving.   Certainly would hold off on any reconstruction given his abdominal abscess requiring drain placement.   Continue wound care and nutritional support.   If wound stalls or fails to improve then we could consider reconstruction at that time.   Will continue to follow.             Thank you for your consult.     SHERLYN Almeida  Plastic Surgery  [facility] - 8th Floor Med Surg

## 2023-12-05 NOTE — PROGRESS NOTES
[facility]  Hospital Medicine Progress Note        Chief Complaint: Inpatient Follow-up for Fever/ intra- abdominal abscess.    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  He completed antibiotic course and was discharged home with home health on 11/30/2023.     Present to the ED tonight reporting a fever of 102.  Report mild nonproductive cough denies chest pain or shortness breath.  Report mild cramping right lower quadrant abdominal pain.  Did not notice any increase in ileostomy output.  Wife report she been changing perineal wound  dressing and did not notice any foul-smelling drainage or necrotic changes.     On arrival to ED he was afebrile and hemodynamically stable.  Labs notable for hemoglobin 7.5, WBC 6.86, creatinine 1.79, BUN 37, COVID 19 and flu negative, urinalysis unremarkable.  Chest x-ray on my review show no airspace disease.  CT abdomen pelvis with IV contrast noted for interval development of peripherally enhancing fluid collection in the right lower abdomen measuring 5.3 x 4.2 x 9 cm and demonstrates a few tiny gas locule concerning for an abscess, also noted small subcutaneous fluid collection with gas locules in the infraumbilical region measuring 2.5 x 2.3 x 4.8 cm likely postoperative though evolving abscess cannot be excluded.     Surgery consulted and recommended IR drainage.  Referred to hospital medicine service for further evaluation and management.    Interval Hx:     12/4/23 dr umanzor - in good spirits /improved but spiking  temps, given acetaminophen               Case was discussed with patient's nurse and  on the floor.  Objective/physical exam:      VITAL SIGNS: 24 HRS MIN & MAX LAST   Temp  Min: 97.5 °F (36.4 °C)  Max: 101.4 °F (38.6 °C) 98.6 °F (37 °C)   BP  Min: 96/57  Max: 113/71 113/71   Pulse  Min: 73  Max: 96  85   Resp  Min: 14  Max: 24 20   SpO2  Min: 96 %  Max: 100 % 98 %     I have reviewed the following labs:  Recent Labs   Lab 12/02/23 2327 12/03/23 0918 12/04/23 0518   WBC 6.86 6.76 3.54*   RBC 2.79* 2.55* 3.02*   HGB 7.5* 6.8* 8.2*   HCT 24.1* 21.7* 25.8*   MCV 86.4 85.1 85.4   MCH 26.9* 26.7* 27.2   MCHC 31.1* 31.3* 31.8*   RDW 16.1 16.2 15.7    307 267   MPV 9.6 9.3 9.3     Recent Labs   Lab 11/29/23  0451 12/02/23 2327 12/03/23 0918 12/04/23 0518    132*  --  135*   K 4.1 4.7  --  4.3   CO2 24 22  --  24   BUN 28.0* 37.2*  --  19.8   CREATININE 1.16 1.79*  --  1.63*   CALCIUM 8.0* 8.1*  --  7.8*   MG 1.90 1.70 1.60 1.90   ALBUMIN 2.2* 2.4*  --  2.0*   ALKPHOS 77 64  --  58   ALT 23 19  --  19   AST 19 14  --  15   BILITOT 0.3 0.5  --  0.6     Microbiology Results (last 7 days)       Procedure Component Value Units Date/Time    Gram Stain [[id number]] Collected: 12/04/23 1114    Order Status: Completed Specimen: Aspirate from Abdomen Updated: 12/04/23 1326     GRAM STAIN Many WBC observed      No bacteria seen    Anaerobic Culture [[id number]] Collected: 12/04/23 1114    Order Status: Sent Specimen: Aspirate from Abdomen Updated: 12/04/23 1148    Fungal Culture [[id number]] Collected: 12/04/23 1114    Order Status: Sent Specimen: Aspirate from Abdomen Updated: 12/04/23 1147    Body Fluid Culture [[id number]] Collected: 12/04/23 1114    Order Status: Resulted Specimen: Fine Needle Aspirate from Abdomen Updated: 12/04/23 1147    Blood culture x two cultures. Draw prior to antibiotics. [[id number]]  (Normal) Collected: 12/02/23 2327    Order Status: Completed Specimen: Blood from  Antecubital, Right Updated: 12/04/23 0100     CULTURE, BLOOD ([facility]) No Growth At 24 Hours    Blood culture x two cultures. Draw prior to antibiotics. [[id number]]  (Normal) Collected: 12/02/23 2327    Order Status: Completed Specimen: Blood from Antecubital, Left Updated: 12/04/23 0000     CULTURE, BLOOD ([facility]) No Growth At 24 Hours             See below for Radiology    Scheduled Med:   enoxparin  40 mg Subcutaneous Q24H (prophylaxis, 1700)    fentaNYL        LIDOcaine HCL 10 mg/ml (1%)        midazolam        piperacillin-tazobactam (Zosyn) IV (PEDS and ADULTS) (extended infusion is not appropriate)  4.5 g Intravenous Q8H    valACYclovir  1,000 mg Oral BID    vancomycin (VANCOCIN) IV (PEDS and ADULTS)  750 mg Intravenous Q12H      Continuous Infusions:   lactated ringers 100 mL/hr at 12/04/23 0506      PRN Meds:  0.9%  NaCl infusion (for blood administration), acetaminophen, acetaminophen, aluminum-magnesium hydroxide-simethicone, fentaNYL, LIDOcaine HCL 10 mg/ml (1%), melatonin, midazolam, ondansetron, oxyCODONE-acetaminophen, polyethylene glycol, prochlorperazine, senna-docusate 8.6-50 mg, sodium chloride 0.9%, Pharmacy to dose Vancomycin consult **AND** vancomycin - pharmacy to dose     Assessment/Plan:  RLQ Intra-abdominal abscess post  CT-guided drainage/patrice drain  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, 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, 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 - open perineal wound  Ulcerative colitis s/p laparoscopic total colectomy and end ileostomy 11/15/2023  Chronic anemia due to recent blood loss and chronic inflammation - H&H stable  Acute kidney injury- improving   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   Iron deficiency anemia         Plan  Follow Blood cultures   IV vancomycin plus Zosyn  Continue valtrex po  Control fever   LR at 75 mL/hour  post  CT-guided drainage/patrice drain  Seen by Infectious disease  and General surgery  Am labs            VTE prophylaxis: Enoxaparin 40mg SC daily    Patient condition:  Stable    Anticipated  discharge and Disposition:  tbd       All diagnosis and differential diagnosis have been reviewed; assessment and plan has been documented; I have personally reviewed the labs and test results that are presently available; I have reviewed the patients medication list; I have reviewed the consulting providers response and recommendations. I have reviewed or attempted to review medical records based upon their availability    All of the patient's questions have been  addressed and answered. Patient's is agreeable to the above stated plan. I will continue to monitor closely and make adjustments to medical management as needed.  _____________________________________________________________________    Nutrition Status:    Radiology:  I have personally reviewed the following imaging and agree with the radiologist.     CT Guided Abscess Drainage With Catheter  Narrative: EXAMINATION:  CT GUIDED ABSCESS DRAINAGE WITH CATHETER    CLINICAL HISTORY:  intra abdominal abscess;    TECHNIQUE:  CT guided drainage of a right abdominal collection.    DLP: 559    COMPARISON:  CT dated 12/04/2023    FINDINGS:  The risks, benefits, and potential complications were discussed including bleeding, infection, end organ damage. Informed consent was obtained.    The patient was positioned in the left lateral decubitus position. The right abdominal collection was localized CT.  The skin was marked and the area was prepped and draped in a sterile fashion.    The site was anesthetized with 1% lidocaine solution. A 19 gauge, 10 cm coaxial needle was advanced into the collection, and a guidewire was then placed through the needle. The track was then dilated and a 10 french drain was placed. The drain was sutured to the skin and attached to a CYNTHIA bulb. There was 15 cc of reddish pus fluid drained.    The patient tolerated the procedure without difficulty. Post-procedure imaging demonstrated a well-positioned catheter.  There were no immediate  post-procedure complications. The patient was discharged to his room in stable condition.  Impression: Successful placement of a 10 Urdu drain into the right abdominal collection.  Samples were collected and sent to the lab for analysis.    Electronically signed by: Joseph Carvajal  Date:    12/04/2023  Time:    11:37      Sloan Alvarez MD   12/04/2023

## 2023-12-05 NOTE — PROGRESS NOTES
[facility]  Infectious Disease Progress Note            ASSESSMENT & PLAN:     He is a 43-year-old male with a history of ulcerative colitis known to our service from recent hospitalization secondary to Allison's gangrene.  He underwent multiple debridements at that time as well as a total colectomy on 11/15.  Patient had previously been on Humira, however that has been held since recent hospitalization.  He completed a 14 day course of antimicrobials with Zosyn from date of last debridement, completed on 11/27.  He was discharged home however returned on 12/02 with complaints of fever and abdominal pain.  CT showed concern for abscess in the right abdomen, status post IR drainage earlier today.  He has had several fevers since admit, however no leukocytosis and has been hemodynamically stable.  Currently overall feeling better without complaints.  Blood cultures are negative thus far.  He is currently receiving empiric vancomycin and Zosyn.  Of note, patient reports being notified of the day of discharge recently that EGD results from 11/22 showed herpes esophagitis.  He has been taking prescribed oral acyclovir and reports improvement and throat pain, denies any dysphagia.  We have been consulted for further assistance with antimicrobials.     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  Continue Zosyn 4.5g IV q8h for intra-abdominal abscess.   Continue Valtrex 1g PO q12h for HSV esophagitis.   PRS input noted - planning to monitor wound for now.         SUBJECTIVE:   AF, VSS.  No events overnight.  Speaking with PRS at present.     MEDICATIONS:   Reviewed in EMR    REVIEW OF SYSTEMS:   Except as documented, all other systems reviewed and negative  "    PHYSICAL EXAM:   T 99.2 °F (37.3 °C)   /74   P 95   RR 20   O2 98 %  GENERAL: NAD; does not appear toxic  SKIN: no rash  HEENT: sclera non-icteric; PERRL   NECK: supple; no LAD  CHEST: CTA; nonlabored, equal expansion; no adventitious BS  CARDIOVASCULAR: RRR, S1S2; no murmur; strong, equal peripheral pulses; no edema  ABDOMEN:  active bowel sounds; abdomen soft, nondistended, nontender to palpation; incision healing well; CYNTHIA with purulent - sanguinous ouput   EXTREMITIES: no cyanosis or clubbing  NEURO: AAO x4; CN II-XII grossly intact  PSYCH: Mentation and affect appropriate        LABS AND IMAGING:     Recent Labs     12/04/23 0518 12/05/23 0457   WBC 3.54* 3.41*   RBC 3.02* 2.91*   HGB 8.2* 7.9*   HCT 25.8* 25.1*   MCV 85.4 86.3   MCH 27.2 27.1   MCHC 31.8* 31.5*   RDW 15.7 15.4    252     Recent Labs     12/02/23 2327   LACTIC 0.6     Recent Labs     12/04/23 0518   INR 1.2     No results for input(s): "HGBA1C", "CHOL", "TRIG", "LDL", "VLDL", "HDL" in the last 72 hours.   Recent Labs     12/02/23 2327 12/03/23 0918 12/04/23 0518 12/05/23 0457   *  --  135* 138   K 4.7  --  4.3 4.3   CHLORIDE 102  --  104 107   CO2 22  --  24 24   BUN 37.2*  --  19.8 23.2*   CREATININE 1.79*  --  1.63* 1.69*   GLUCOSE 117*  --  104* 98   CALCIUM 8.1*  --  7.8* 7.6*   MG 1.70 1.60 1.90  --    PHOS  --  3.7  --   --    ALBUMIN 2.4*  --  2.0*  --    GLOBULIN 3.3  --  3.1  --    ALKPHOS 64  --  58  --    ALT 19  --  19  --    AST 14  --  15  --    BILITOT 0.5  --  0.6  --    LIPASE 150*  --   --   --    CRP  --  98.60*  --   --    FERRITIN  --  761.96*  --   --    IRON  --  8*  --   --    TIBC  --  163*  --   --      Recent Labs     12/02/23  2327   BNP 24.9   TROPONINI <0.010          CT Guided Abscess Drainage With Catheter  Narrative: EXAMINATION:  CT GUIDED ABSCESS DRAINAGE WITH CATHETER    CLINICAL HISTORY:  intra abdominal abscess;    TECHNIQUE:  CT guided drainage of a right abdominal " collection.    DLP: 559    COMPARISON:  CT dated 12/04/2023    FINDINGS:  The risks, benefits, and potential complications were discussed including bleeding, infection, end organ damage. Informed consent was obtained.    The patient was positioned in the left lateral decubitus position. The right abdominal collection was localized CT.  The skin was marked and the area was prepped and draped in a sterile fashion.    The site was anesthetized with 1% lidocaine solution. A 19 gauge, 10 cm coaxial needle was advanced into the collection, and a guidewire was then placed through the needle. The track was then dilated and a 10 Sri Lankan drain was placed. The drain was sutured to the skin and attached to a CYNTHIA bulb. There was 15 cc of reddish pus fluid drained.    The patient tolerated the procedure without difficulty. Post-procedure imaging demonstrated a well-positioned catheter.  There were no immediate post-procedure complications. The patient was discharged to his room in stable condition.  Impression: Successful placement of a 10 Sri Lankan drain into the right abdominal collection.  Samples were collected and sent to the lab for analysis.    Electronically signed by: Joseph Carvajal  Date:    12/04/2023  Time:    11:37          SHERLYN Davis  Infectious Disease

## 2023-12-06 PROCEDURE — 25500020 PHARM REV CODE 255: Performed by: INTERNAL MEDICINE

## 2023-12-06 PROCEDURE — 63600175 PHARM REV CODE 636 W HCPCS: Performed by: INTERNAL MEDICINE

## 2023-12-06 PROCEDURE — 99233 SBSQ HOSP IP/OBS HIGH 50: CPT | Mod: FS,,, | Performed by: GENERAL PRACTICE

## 2023-12-06 PROCEDURE — 25000003 PHARM REV CODE 250: Performed by: INTERNAL MEDICINE

## 2023-12-06 PROCEDURE — 21400001 HC TELEMETRY ROOM

## 2023-12-06 PROCEDURE — 99233 PR SUBSEQUENT HOSPITAL CARE,LEVL III: ICD-10-PCS | Mod: FS,,, | Performed by: GENERAL PRACTICE

## 2023-12-06 PROCEDURE — 25000003 PHARM REV CODE 250: Performed by: GENERAL PRACTICE

## 2023-12-06 RX ADMIN — PIPERACILLIN AND TAZOBACTAM 4.5 G: 4; .5 INJECTION, POWDER, FOR SOLUTION INTRAVENOUS at 09:12

## 2023-12-06 RX ADMIN — VALACYCLOVIR HYDROCHLORIDE 1000 MG: 500 TABLET, FILM COATED ORAL at 08:12

## 2023-12-06 RX ADMIN — SODIUM CHLORIDE, POTASSIUM CHLORIDE, SODIUM LACTATE AND CALCIUM CHLORIDE: 600; 310; 30; 20 INJECTION, SOLUTION INTRAVENOUS at 09:12

## 2023-12-06 RX ADMIN — PIPERACILLIN AND TAZOBACTAM 4.5 G: 4; .5 INJECTION, POWDER, FOR SOLUTION INTRAVENOUS at 12:12

## 2023-12-06 RX ADMIN — ENOXAPARIN SODIUM 40 MG: 40 INJECTION SUBCUTANEOUS at 06:12

## 2023-12-06 RX ADMIN — Medication 6 MG: at 09:12

## 2023-12-06 RX ADMIN — VANCOMYCIN HYDROCHLORIDE 750 MG: 750 INJECTION, POWDER, LYOPHILIZED, FOR SOLUTION INTRAVENOUS at 04:12

## 2023-12-06 RX ADMIN — IOPAMIDOL 100 ML: 755 INJECTION, SOLUTION INTRAVENOUS at 06:12

## 2023-12-06 RX ADMIN — PIPERACILLIN AND TAZOBACTAM 4.5 G: 4; .5 INJECTION, POWDER, FOR SOLUTION INTRAVENOUS at 06:12

## 2023-12-06 RX ADMIN — VALACYCLOVIR HYDROCHLORIDE 1000 MG: 500 TABLET, FILM COATED ORAL at 09:12

## 2023-12-06 NOTE — PROGRESS NOTES
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  He completed antibiotic course and was discharged home with home health on 11/30/2023.     Present to the ED tonight reporting a fever of 102.  Report mild nonproductive cough denies chest pain or shortness breath.  Report mild cramping right lower quadrant abdominal pain.  Did not notice any increase in ileostomy output.  Wife report she been changing perineal wound  dressing and did not notice any foul-smelling drainage or necrotic changes.     On arrival to ED he was afebrile and hemodynamically stable.  Labs notable for hemoglobin 7.5, WBC 6.86, creatinine 1.79, BUN 37, COVID 19 and flu negative, urinalysis unremarkable.  Chest x-ray on my review show no airspace disease.  CT abdomen pelvis with IV contrast noted for interval development of peripherally enhancing fluid collection in the right lower abdomen measuring 5.3 x 4.2 x 9 cm and demonstrates a few tiny gas locule concerning for an abscess, also noted small subcutaneous fluid collection with gas locules in the infraumbilical region measuring 2.5 x 2.3 x 4.8 cm likely postoperative though evolving abscess cannot be excluded.     Surgery consulted and recommended IR drainage.  Referred to hospital medicine service for further evaluation and management.    Today  US showed no fluid collection in the area noted on CT.  Exam is negative in the area.  He's  feeling better.  We may need to flush the drain since the output is not much considering the size of the abscess.  Will discuss with surgery team and also maybe need to get change from the bulb to a accordion.        OBJECTIVE/PHYSICAL EXAM     VITAL SIGNS (MOST RECENT):  Temp: 98.4 °F (36.9 °C) (12/06/23 0731)  Pulse: 103 (12/06/23 0731)  Resp: 20 (12/06/23 0731)  BP: 115/72 (12/06/23 0731)  SpO2: 98 % (12/06/23 0731) VITAL SIGNS (24 HOUR RANGE):  Temp:  [97.7 °F (36.5 °C)-100.6 °F (38.1 °C)] 98.4 °F (36.9 °C)  Pulse:  [] 103  Resp:  [16-20] 20  SpO2:  [98 %] 98 %  BP: (103-116)/(67-74) 115/72   GENERAL: In no acute distress, afebrile  HEENT:  CHEST: Clear to auscultation bilaterally  HEART: S1, S2, no appreciable murmur  ABDOMEN: Soft, nontender, BS +, PATRICE drain  MSK: Warm, no lower extremity edema, no clubbing or cyanosis  NEUROLOGIC: Alert and oriented x4, moving all extremities with good strength   INTEGUMENTARY:  Refer to images  PSYCHIATRY:        ASSESSMENT/PLAN   RLQ Intra-abdominal abscess post  CT-guided drainage/patrice drain  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  Continue vancomycin, Zosyn, Valtrex.  Follow-up cultures from abscess.  If renal functions do not improve may need to consider changing vancomycin  Surgery following.  Recommended IR drainage.  IV fluids to 50 per hour  Plastic surgery following.  Continue monitoring since it is already greatly improved, no reconstruction at this time.    DVT prophylaxis:  Lovenox 40    Anticipated discharge and disposition:   __________________________________________________________________________    LABS/MICRO/MEDS/DIAGNOSTICS       LABS  Recent Labs     12/04/23  0518  12/05/23 0457   * 138   K 4.3 4.3   CHLORIDE 104 107   CO2 24 24   BUN 19.8 23.2*   CREATININE 1.63* 1.69*   GLUCOSE 104* 98   CALCIUM 7.8* 7.6*   ALKPHOS 58  --    AST 15  --    ALT 19  --    ALBUMIN 2.0*  --        Recent Labs     12/05/23 0457   WBC 3.41*   RBC 2.91*   HCT 25.1*   MCV 86.3            MICROBIOLOGY  Microbiology Results (last 7 days)       Procedure Component Value Units Date/Time    Anaerobic Culture [[id number]] Collected: 12/04/23 1114    Order Status: Completed Specimen: Aspirate from Abdomen Updated: 12/06/23 0744     Anaerobe Culture No Anaerobes Isolated    Blood culture x two cultures. Draw prior to antibiotics. [[id number]]  (Normal) Collected: 12/02/23 2327    Order Status: Completed Specimen: Blood from Antecubital, Left Updated: 12/06/23 0002     CULTURE, BLOOD ([facility]) No Growth At 72 Hours    Blood culture x two cultures. Draw prior to antibiotics. [[id number]]  (Normal) Collected: 12/02/23 2327    Order Status: Completed Specimen: Blood from Antecubital, Right Updated: 12/06/23 0002     CULTURE, BLOOD ([facility]) No Growth At 72 Hours    Body Fluid Culture [[id number]] Collected: 12/04/23 1114    Order Status: Completed Specimen: Fine Needle Aspirate from Abdomen Updated: 12/05/23 0655     Body Fluid Culture No Growth At 24 Hours    Gram Stain [[id number]] Collected: 12/04/23 1114    Order Status: Completed Specimen: Aspirate from Abdomen Updated: 12/04/23 1326     GRAM STAIN Many WBC observed      No bacteria seen    Fungal Culture [[id number]] Collected: 12/04/23 1114    Order Status: Sent Specimen: Aspirate from Abdomen Updated: 12/04/23 1147               MEDICATIONS   enoxparin  40 mg Subcutaneous Q24H (prophylaxis, 1700)    piperacillin-tazobactam (Zosyn) IV (PEDS and ADULTS) (extended infusion is not appropriate)  4.5 g Intravenous Q8H    valACYclovir  1,000 mg Oral BID    vancomycin (VANCOCIN) IV (PEDS and ADULTS)  750 mg Intravenous Q12H         INFUSIONS   lactated  ringers 50 mL/hr at 12/06/23 0608          DIAGNOSTIC TESTS  US Soft Tissue Abdomen   Final Result      No abnormality in the area of palpable finding.         Electronically signed by: Ramirez Rivera MD   Date:    12/05/2023   Time:    21:17      CT Guided Abscess Drainage With Catheter   Final Result      Successful placement of a 10 Albanian drain into the right abdominal collection.  Samples were collected and sent to the lab for analysis.         Electronically signed by: Joseph Carvajal   Date:    12/04/2023   Time:    11:37      CT Abdomen Pelvis With IV Contrast NO Oral Contrast   Final Result   Impression:      1. Both kidneys appear mildly enlarged in size in AP dimension, new since the prior examination. No hypoperfused areas are seen within the renal cortices to suggest acute pyelonephritis on the submitted images. There is subtle mucosal wall enhancement of the renal pelves bilaterally (series 4; images 64-90). An element of pyeloureteritis cannot be excluded. Correlate with clinical and laboratory findings.      2. Again noted is pneumoperitoneum in the right subdiaphragmatic region (series 2; image 19-35), significantly decreased since the prior examination. There is interval resolution of previously noted moderate ascites. Interval development of a peripherally enhancing fluid collection in the right lower abdomen and pelvis (series 2; images , series 5; image 95 and series 4; images 41-60). The collection measures approximately 5.3 x 4.2 x 9 cm and demonstrates a few tiny gas locules. This is of concern for an abscess. Correlate clinically as regards further evaluation and followup.      3. There is interval resolution of previously noted soft tissue emphysema in the left ventral mid and lower abdominal wall. Again noted are a few small subcutaneous gas locules in the right ventral mid abdomen. There is interval development of a small subcutaneous fluid collection with gas locule in the  "infraumbilical region (series 2; image 88), which measures 2.5 x 2.3 x 4.8 cm (AP x T x CC), likely postoperative. An evolving abscess cannot be excluded.      4. Again noted is near total colectomy with an ileostomy in the right lower abdomen. There are multiple moderately distended small bowel loops in the mid and lower abdomen. The caliber of the bowel loops has slightly decreased since the prior examination. This may reflect an ileus versus partial small bowel obstruction.      5. Details and other findings as discussed above. Follow-up as clinically indicated.      No significant discrepancy with overnight report         Electronically signed by: Chip Duran   Date:    12/03/2023   Time:    11:02      X-Ray Chest AP Portable   Final Result      No acute findings.         Electronically signed by: Germain Cedeno   Date:    12/03/2023   Time:    10:30           No results found for: "EF"       NUTRITION STATUS  Patient meets ASPEN criteria for   malnutrition of   per RD assessment as evidenced by:                       A minimum of two characteristics is recommended for diagnosis of either severe or non-severe malnutrition.       Case related differential diagnoses have been reviewed; assessment and plan has been documented. I have personally reviewed the labs and test results that are currently available; I have reviewed the patients medication list. I have reviewed the consulting providers recommendations. I have reviewed or attempted to review medical records based upon their availability.  All of the patient's and/or family's questions have been addressed and answered to the best of my ability.  I will continue to monitor closely and make adjustments to medical management as needed.  This document was created using M*Modal Fluency Direct.  Transcription errors may have been made.  Please contact me if any questions may rise regarding documentation to clarify transcription.        Abdoulaye Garcia MD   Internal " Medicine  Department of Hospital Medicine  [facility] - 8th Floor Med Surg

## 2023-12-06 NOTE — PROGRESS NOTES
"   Acute Care Surgery   Progress Note  Admit Date: 12/2/2023  HD#2  POD#* No surgery found *    Subjective:   Interval history:  NAEON. Tmax 100.7. VSS.  S/p IR drain placement on 12/6. CYNTHIA output: 10 ml  Blood clx taken 12/2, NGTD x2 day.  UOP 1.1 L  Ileostomy: 100 ml  Feeling better overall.  Tolerating PO intake w/o N/V.    Home Meds:  Current Outpatient Medications   Medication Instructions    acyclovir (ZOVIRAX) 400 mg, Oral, 5 times daily, Pt wife states taking q2h?    oxyCODONE-acetaminophen (PERCOCET)  mg per tablet 1 tablet, Oral, Every 6 hours PRN      Scheduled Meds:   enoxparin  40 mg Subcutaneous Q24H (prophylaxis, 1700)    piperacillin-tazobactam (Zosyn) IV (PEDS and ADULTS) (extended infusion is not appropriate)  4.5 g Intravenous Q8H    valACYclovir  1,000 mg Oral BID    vancomycin (VANCOCIN) IV (PEDS and ADULTS)  750 mg Intravenous Q12H     Continuous Infusions:   lactated ringers 50 mL/hr at 12/05/23 1800     PRN Meds:0.9%  NaCl infusion (for blood administration), acetaminophen, acetaminophen, aluminum-magnesium hydroxide-simethicone, melatonin, ondansetron, oxyCODONE-acetaminophen, polyethylene glycol, prochlorperazine, senna-docusate 8.6-50 mg, sodium chloride 0.9%, Pharmacy to dose Vancomycin consult **AND** vancomycin - pharmacy to dose     Objective:     VITAL SIGNS: 24 HR MIN & MAX LAST   Temp  Min: 98.2 °F (36.8 °C)  Max: 100.6 °F (38.1 °C)  99.7 °F (37.6 °C)   BP  Min: 100/62  Max: 133/76  114/68    Pulse  Min: 69  Max: 95  92    Resp  Min: 16  Max: 20  16    SpO2  Min: 96 %  Max: 99 %  98 %      HT: 5' 10" (177.8 cm)  WT: 65.8 kg (145 lb)  BMI: 20.8     Intake/output:  Intake/Output - Last 3 Shifts         12/03 0700 12/04 0659 12/04 0700  12/05 0659 12/05 0700  12/06 0659    P.O.  1020 720    I.V. (mL/kg)   4367.3 (66.4)    Blood 743.8      IV Piggyback   1919.6    Total Intake(mL/kg) 743.8 (11.3) 1020 (15.5) 7006.8 (106.5)    Urine (mL/kg/hr) 1925 (1.2) 3125 (2) 1650 (1.8)    " Drains  10 10    Stool 200 550 100    Total Output 2125 3685 1760    Net -1381.3 -2665 +5246.8                   Intake/Output Summary (Last 24 hours) at 12/5/2023 2042  Last data filed at 12/5/2023 1800  Gross per 24 hour   Intake 7486.83 ml   Output 4635 ml   Net 2851.83 ml             Lines/drains/airway:       Peripheral IV - Single Lumen 12/03/23 1043 20 G Left;Posterior Hand (Active)   Site Assessment Clean;Dry;Intact 12/04/23 0800   Extremity Assessment Distal to IV No abnormal discoloration 12/04/23 0400   Line Status Infusing 12/04/23 0800   Dressing Status Clean;Dry;Intact 12/04/23 0800   Dressing Intervention Integrity maintained 12/04/23 0800   Number of days: 1            Peripheral IV - Single Lumen 12/03/23 1953 20 G Anterior;Left;Proximal Forearm (Active)   Site Assessment Clean;Dry;Intact 12/04/23 0800   Extremity Assessment Distal to IV No abnormal discoloration 12/04/23 0400   Line Status Saline locked 12/04/23 0800   Dressing Status Clean;Dry;Intact 12/04/23 0800   Dressing Intervention Integrity maintained 12/04/23 0800   Number of days: 0            Closed/Suction Drain 12/04/23 1115 Tube - 1 Lateral RLQ Bulb 10 Fr. (Active)   Output (mL) 10 mL 12/04/23 1443   Number of days: 0            Ileostomy 11/15/23 1730 RUQ (Active)   Stoma Appearance round;moist;pink 12/03/23 2000   Appliance 2-piece;no leakage 12/03/23 2000   Stoma Function stool;flatus 12/03/23 2000   Output (mL) 100 mL 12/04/23 1443   Number of days: 18       Physical examination:  General: no acute distress, appears comfortable this AM  HEENT: Normocephalic, atraumatic, PERRL  CV: RR  Resp: NWOB  GI:  Abdomen soft, non-tender, non-distended, no guarding, no rebound, IR drain in place,, no masses, end ileostomy pink and productive of liquid stool, laparoscopic incisions healing, no signs of erythema on the abdominal wall.  :  scrotum and perineum are healing well with excellent granulation tissue free of infection  MSK: No muscle  "atrophy, cyanosis, peripheral edema, moving all extremities spontaneously  Skin/wounds:  No rashes, ulcers, erythema  Neuro:  CNII-XII grossly intact, alert and oriented to person, place, and time    Labs:  Renal:  Recent Labs     12/02/23 2327 12/04/23 0518 12/05/23 0457   BUN 37.2* 19.8 23.2*   CREATININE 1.79* 1.63* 1.69*       Recent Labs     12/02/23 2327   LACTIC 0.6       FENGI:  Recent Labs     12/02/23 2327 12/03/23 0918 12/04/23 0518 12/05/23 0457   *  --  135* 138   K 4.7  --  4.3 4.3   CO2 22  --  24 24   CALCIUM 8.1*  --  7.8* 7.6*   MG 1.70 1.60 1.90  --    PHOS  --  3.7  --   --    ALBUMIN 2.4*  --  2.0*  --    BILITOT 0.5  --  0.6  --    AST 14  --  15  --    ALKPHOS 64  --  58  --    ALT 19  --  19  --        Heme:  Recent Labs     12/02/23 2327 12/03/23 0918 12/04/23 0518 12/05/23 0457   HGB 7.5* 6.8* 8.2* 7.9*   HCT 24.1* 21.7* 25.8* 25.1*    307 267 252   PTT  --   --  45.4*  --    INR 1.3  --  1.2  --        ID:  Recent Labs     12/02/23 2327 12/03/23 0918 12/04/23 0518 12/05/23 0457   WBC 6.86 6.76 3.54* 3.41*       CBG:  Recent Labs     12/02/23 2327 12/04/23 0518 12/05/23 0457   GLUCOSE 117* 104* 98        Cardiovascular:  Recent Labs   Lab 12/02/23 2327   TROPONINI <0.010   BNP 24.9       ABG:  No results for input(s): "PH", "PO2", "PCO2", "HCO3", "BE" in the last 168 hours.   I have reviewed all pertinent lab results within the past 24 hours.    Imaging:  CT Guided Abscess Drainage With Catheter   Final Result      Successful placement of a 10 Chinese drain into the right abdominal collection.  Samples were collected and sent to the lab for analysis.         Electronically signed by: Joseph Carvajal   Date:    12/04/2023   Time:    11:37      CT Abdomen Pelvis With IV Contrast NO Oral Contrast   Final Result   Impression:      1. Both kidneys appear mildly enlarged in size in AP dimension, new since the prior examination. No hypoperfused areas are seen within " the renal cortices to suggest acute pyelonephritis on the submitted images. There is subtle mucosal wall enhancement of the renal pelves bilaterally (series 4; images 64-90). An element of pyeloureteritis cannot be excluded. Correlate with clinical and laboratory findings.      2. Again noted is pneumoperitoneum in the right subdiaphragmatic region (series 2; image 19-35), significantly decreased since the prior examination. There is interval resolution of previously noted moderate ascites. Interval development of a peripherally enhancing fluid collection in the right lower abdomen and pelvis (series 2; images , series 5; image 95 and series 4; images 41-60). The collection measures approximately 5.3 x 4.2 x 9 cm and demonstrates a few tiny gas locules. This is of concern for an abscess. Correlate clinically as regards further evaluation and followup.      3. There is interval resolution of previously noted soft tissue emphysema in the left ventral mid and lower abdominal wall. Again noted are a few small subcutaneous gas locules in the right ventral mid abdomen. There is interval development of a small subcutaneous fluid collection with gas locule in the infraumbilical region (series 2; image 88), which measures 2.5 x 2.3 x 4.8 cm (AP x T x CC), likely postoperative. An evolving abscess cannot be excluded.      4. Again noted is near total colectomy with an ileostomy in the right lower abdomen. There are multiple moderately distended small bowel loops in the mid and lower abdomen. The caliber of the bowel loops has slightly decreased since the prior examination. This may reflect an ileus versus partial small bowel obstruction.      5. Details and other findings as discussed above. Follow-up as clinically indicated.      No significant discrepancy with overnight report         Electronically signed by: Chip Duran   Date:    12/03/2023   Time:    11:02      X-Ray Chest AP Portable   Final Result      No acute  findings.         Electronically signed by: Germain Cedeno   Date:    12/03/2023   Time:    10:30      US Abdomen Complete    (Results Pending)      I have reviewed all pertinent imaging results/findings within the past 24 hours.    Micro/Path/Other:  Microbiology Results (last 7 days)       Procedure Component Value Units Date/Time    Body Fluid Culture [[id number]] Collected: 12/04/23 1114    Order Status: Completed Specimen: Fine Needle Aspirate from Abdomen Updated: 12/05/23 0655     Body Fluid Culture No Growth At 24 Hours    Blood culture x two cultures. Draw prior to antibiotics. [[id number]]  (Normal) Collected: 12/02/23 2327    Order Status: Completed Specimen: Blood from Antecubital, Left Updated: 12/05/23 0000     CULTURE, BLOOD ([facility]) No Growth At 48 Hours    Blood culture x two cultures. Draw prior to antibiotics. [[id number]]  (Normal) Collected: 12/02/23 2327    Order Status: Completed Specimen: Blood from Antecubital, Right Updated: 12/05/23 0000     CULTURE, BLOOD ([facility]) No Growth At 48 Hours    Gram Stain [[id number]] Collected: 12/04/23 1114    Order Status: Completed Specimen: Aspirate from Abdomen Updated: 12/04/23 1326     GRAM STAIN Many WBC observed      No bacteria seen    Anaerobic Culture [[id number]] Collected: 12/04/23 1114    Order Status: Sent Specimen: Aspirate from Abdomen Updated: 12/04/23 1148    Fungal Culture [[id number]] Collected: 12/04/23 1114    Order Status: Sent Specimen: Aspirate from Abdomen Updated: 12/04/23 1147           Specimen (168h ago, onward)      None             Assessment & Plan:   42 yo M with intraabdominal fluid collection as seen on CT s/p IR drain placement 12/5.     - Follow up IR cultures and blood clx  - Abx per primary and ID  - Continue loacal wound care, agree with consult to PRS wound closure plans  - Primary called with concern about midline fluid collection seen on CT and persistent fevers, no cutaneous manifestations of infection seen on exam, will  order U/S of midline abdomen to see if collection if present, if so, will discuss with staff need for drainage, concern with proximity to ileostomy and if an incision would make pouching difficult, will discuss R/B.  - Will continue to follow to access quality of drain output and for serial abdominal exams  - Okay to restart Lov for DVT ppx after IR procedure  - Okay for luís Lynch MD  General Surgery PGY-2

## 2023-12-06 NOTE — PROGRESS NOTES
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  He underwent multiple debridements at that time as well as a total colectomy on 11/15.  Patient had previously been on Humira, however that has been held since recent hospitalization.  He completed a 14 day course of antimicrobials with Zosyn from date of last debridement, completed on 11/27.  He was discharged home however returned on 12/02 with complaints of fever and abdominal pain.  CT showed concern for abscess in the right abdomen, status post IR drainage earlier today.  He has had several fevers since admit, however no leukocytosis and has been hemodynamically stable.  Currently overall feeling better without complaints.  Blood cultures are negative thus far.  He is currently receiving empiric vancomycin and Zosyn.  Of note, patient reports being notified of the day of discharge recently that EGD results from 11/22 showed herpes esophagitis.  He has been taking prescribed oral acyclovir and reports improvement and throat pain, denies any dysphagia.  We have been consulted for further assistance with antimicrobials.     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  Repeat CT planned for today -- f/u findings.   Continue Zosyn 4.5g IV q8h for intra-abdominal abscess.   DC vancomycin.   Continue Valtrex 1g PO q12h for HSV esophagitis.   Labs in am.  Discussed with patient.       SUBJECTIVE:   AF, VSS. No issues overnight.     MEDICATIONS:   Reviewed in EMR    REVIEW OF SYSTEMS:   Except as documented, all other systems  "reviewed and negative     PHYSICAL EXAM:   T 98.7 °F (37.1 °C)   /64   P 102   RR 20   O2 99 %  GENERAL: NAD; does not appear toxic  SKIN: no rash  HEENT: sclera non-icteric; PERRL   NECK: supple; no LAD  CHEST: CTA; nonlabored, equal expansion; no adventitious BS  CARDIOVASCULAR: RRR, S1S2; no murmur; strong, equal peripheral pulses; no edema  ABDOMEN:  active bowel sounds; abdomen soft, nondistended, nontender to palpation; incision healing well; CYNTHIA with purulent - sanguinous ouput   EXTREMITIES: no cyanosis or clubbing  NEURO: AAO x4; CN II-XII grossly intact  PSYCH: Mentation and affect appropriate        LABS AND IMAGING:     Recent Labs     12/04/23 0518 12/05/23 0457   WBC 3.54* 3.41*   RBC 3.02* 2.91*   HGB 8.2* 7.9*   HCT 25.8* 25.1*   MCV 85.4 86.3   MCH 27.2 27.1   MCHC 31.8* 31.5*   RDW 15.7 15.4    252       No results for input(s): "LACTIC" in the last 72 hours.    Recent Labs     12/04/23 0518   INR 1.2       No results for input(s): "HGBA1C", "CHOL", "TRIG", "LDL", "VLDL", "HDL" in the last 72 hours.   Recent Labs     12/04/23 0518 12/05/23 0457   * 138   K 4.3 4.3   CHLORIDE 104 107   CO2 24 24   BUN 19.8 23.2*   CREATININE 1.63* 1.69*   GLUCOSE 104* 98   CALCIUM 7.8* 7.6*   MG 1.90  --    ALBUMIN 2.0*  --    GLOBULIN 3.1  --    ALKPHOS 58  --    ALT 19  --    AST 15  --    BILITOT 0.6  --        No results for input(s): "BNP", "CPK", "TROPONINI" in the last 72 hours.         US Soft Tissue Abdomen  Narrative: EXAMINATION:  US SOFT TISSUE, ABDOMEN    CLINICAL HISTORY:  please assess midline of abdomen to assess for drinable fluid collection just under skin on abdominal wall seen on CT;    TECHNIQUE:  Multiple sonographic images of the soft tissues were obtained by department sonographer.    COMPARISON:  None    FINDINGS:  In the area of palpable finding no abnormality is identified.  Impression: No abnormality in the area of palpable finding.    Electronically signed by: Ramirez" MD Miguel  Date:    12/05/2023  Time:    21:17          SHERLYN Davis  Infectious Disease

## 2023-12-07 LAB
ALBUMIN SERPL-MCNC: 1.9 G/DL (ref 3.5–5)
ALBUMIN/GLOB SERPL: 0.6 RATIO (ref 1.1–2)
ALP SERPL-CCNC: 88 UNIT/L (ref 40–150)
ALT SERPL-CCNC: 18 UNIT/L (ref 0–55)
AST SERPL-CCNC: 16 UNIT/L (ref 5–34)
BACTERIA SPEC ANAEROBE CULT: NORMAL
BASOPHILS # BLD AUTO: 0.03 X10(3)/MCL
BASOPHILS NFR BLD AUTO: 0.6 %
BILIRUB SERPL-MCNC: 0.2 MG/DL
BUN SERPL-MCNC: 18.3 MG/DL (ref 8.9–20.6)
CALCIUM SERPL-MCNC: 7.6 MG/DL (ref 8.4–10.2)
CHLORIDE SERPL-SCNC: 109 MMOL/L (ref 98–107)
CO2 SERPL-SCNC: 21 MMOL/L (ref 22–29)
CREAT SERPL-MCNC: 1.85 MG/DL (ref 0.73–1.18)
CREAT UR-MCNC: 32.5 MG/DL (ref 63–166)
CREAT UR-MCNC: 33.3 MG/DL (ref 63–166)
EOSINOPHIL # BLD AUTO: 0.33 X10(3)/MCL (ref 0–0.9)
EOSINOPHIL NFR BLD AUTO: 6.8 %
ERYTHROCYTE [DISTWIDTH] IN BLOOD BY AUTOMATED COUNT: 15.7 % (ref 11.5–17)
GFR SERPLBLD CREATININE-BSD FMLA CKD-EPI: 46 MLS/MIN/1.73/M2
GLOBULIN SER-MCNC: 3.1 GM/DL (ref 2.4–3.5)
GLUCOSE SERPL-MCNC: 93 MG/DL (ref 74–100)
HCT VFR BLD AUTO: 24.6 % (ref 42–52)
HGB BLD-MCNC: 7.7 G/DL (ref 14–18)
IMM GRANULOCYTES # BLD AUTO: 0.04 X10(3)/MCL (ref 0–0.04)
IMM GRANULOCYTES NFR BLD AUTO: 0.8 %
LYMPHOCYTES # BLD AUTO: 0.48 X10(3)/MCL (ref 0.6–4.6)
LYMPHOCYTES NFR BLD AUTO: 9.9 %
MCH RBC QN AUTO: 27.1 PG (ref 27–31)
MCHC RBC AUTO-ENTMCNC: 31.3 G/DL (ref 33–36)
MCV RBC AUTO: 86.6 FL (ref 80–94)
MONOCYTES # BLD AUTO: 0.41 X10(3)/MCL (ref 0.1–1.3)
MONOCYTES NFR BLD AUTO: 8.4 %
NEUTROPHILS # BLD AUTO: 3.57 X10(3)/MCL (ref 2.1–9.2)
NEUTROPHILS NFR BLD AUTO: 73.5 %
NRBC BLD AUTO-RTO: 0 %
PLATELET # BLD AUTO: 230 X10(3)/MCL (ref 130–400)
PMV BLD AUTO: 9.2 FL (ref 7.4–10.4)
POTASSIUM SERPL-SCNC: 3.6 MMOL/L (ref 3.5–5.1)
PROT SERPL-MCNC: 5 GM/DL (ref 6.4–8.3)
PROT UR STRIP-MCNC: 8.8 MG/DL
RBC # BLD AUTO: 2.84 X10(6)/MCL (ref 4.7–6.1)
SODIUM SERPL-SCNC: 139 MMOL/L (ref 136–145)
SODIUM UR-SCNC: 60 MMOL/L
URINE PROTEIN/CREATININE RATIO (OHS): 0.3
WBC # SPEC AUTO: 4.86 X10(3)/MCL (ref 4.5–11.5)

## 2023-12-07 PROCEDURE — 25000003 PHARM REV CODE 250: Performed by: INTERNAL MEDICINE

## 2023-12-07 PROCEDURE — 99233 SBSQ HOSP IP/OBS HIGH 50: CPT | Mod: FS,,, | Performed by: GENERAL PRACTICE

## 2023-12-07 PROCEDURE — 82570 ASSAY OF URINE CREATININE: CPT | Performed by: NURSE PRACTITIONER

## 2023-12-07 PROCEDURE — 85025 COMPLETE CBC W/AUTO DIFF WBC: CPT | Performed by: NURSE PRACTITIONER

## 2023-12-07 PROCEDURE — 63600175 PHARM REV CODE 636 W HCPCS: Performed by: INTERNAL MEDICINE

## 2023-12-07 PROCEDURE — 21400001 HC TELEMETRY ROOM

## 2023-12-07 PROCEDURE — 84300 ASSAY OF URINE SODIUM: CPT | Performed by: NURSE PRACTITIONER

## 2023-12-07 PROCEDURE — 25000003 PHARM REV CODE 250: Performed by: GENERAL PRACTICE

## 2023-12-07 PROCEDURE — 80053 COMPREHEN METABOLIC PANEL: CPT | Performed by: NURSE PRACTITIONER

## 2023-12-07 PROCEDURE — 99233 PR SUBSEQUENT HOSPITAL CARE,LEVL III: ICD-10-PCS | Mod: FS,,, | Performed by: GENERAL PRACTICE

## 2023-12-07 RX ORDER — CIPROFLOXACIN 500 MG/1
500 TABLET ORAL EVERY 12 HOURS
Status: DISCONTINUED | OUTPATIENT
Start: 2023-12-07 | End: 2023-12-09 | Stop reason: HOSPADM

## 2023-12-07 RX ORDER — METRONIDAZOLE 500 MG/1
500 TABLET ORAL EVERY 8 HOURS
Status: DISCONTINUED | OUTPATIENT
Start: 2023-12-07 | End: 2023-12-09 | Stop reason: HOSPADM

## 2023-12-07 RX ADMIN — VALACYCLOVIR HYDROCHLORIDE 1000 MG: 500 TABLET, FILM COATED ORAL at 08:12

## 2023-12-07 RX ADMIN — ENOXAPARIN SODIUM 40 MG: 40 INJECTION SUBCUTANEOUS at 05:12

## 2023-12-07 RX ADMIN — METRONIDAZOLE 500 MG: 500 TABLET ORAL at 09:12

## 2023-12-07 RX ADMIN — PIPERACILLIN AND TAZOBACTAM 4.5 G: 4; .5 INJECTION, POWDER, FOR SOLUTION INTRAVENOUS at 08:12

## 2023-12-07 RX ADMIN — PIPERACILLIN AND TAZOBACTAM 4.5 G: 4; .5 INJECTION, POWDER, FOR SOLUTION INTRAVENOUS at 01:12

## 2023-12-07 RX ADMIN — SODIUM CHLORIDE, POTASSIUM CHLORIDE, SODIUM LACTATE AND CALCIUM CHLORIDE: 600; 310; 30; 20 INJECTION, SOLUTION INTRAVENOUS at 05:12

## 2023-12-07 RX ADMIN — Medication 6 MG: at 09:12

## 2023-12-07 RX ADMIN — METRONIDAZOLE 500 MG: 500 TABLET ORAL at 02:12

## 2023-12-07 RX ADMIN — CIPROFLOXACIN HYDROCHLORIDE 500 MG: 500 TABLET, FILM COATED ORAL at 08:12

## 2023-12-07 NOTE — PLAN OF CARE
Problem: Adult Inpatient Plan of Care  Goal: Plan of Care Review  Outcome: Ongoing, Progressing  Flowsheets (Taken 12/7/2023 0200)  Plan of Care Reviewed With:   patient   spouse  Goal: Patient-Specific Goal (Individualized)  Outcome: Ongoing, Progressing  Goal: Absence of Hospital-Acquired Illness or Injury  Outcome: Ongoing, Progressing  Goal: Optimal Comfort and Wellbeing  Outcome: Ongoing, Progressing  Goal: Readiness for Transition of Care  Outcome: Ongoing, Progressing     Problem: Impaired Wound Healing  Goal: Optimal Wound Healing  Outcome: Ongoing, Progressing     Problem: Fall Injury Risk  Goal: Absence of Fall and Fall-Related Injury  Outcome: Ongoing, Progressing

## 2023-12-07 NOTE — PROGRESS NOTES
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  He completed antibiotic course and was discharged home with home health on 11/30/2023.     Present to the ED tonight reporting a fever of 102.  Report mild nonproductive cough denies chest pain or shortness breath.  Report mild cramping right lower quadrant abdominal pain.  Did not notice any increase in ileostomy output.  Wife report she been changing perineal wound  dressing and did not notice any foul-smelling drainage or necrotic changes.     On arrival to ED he was afebrile and hemodynamically stable.  Labs notable for hemoglobin 7.5, WBC 6.86, creatinine 1.79, BUN 37, COVID 19 and flu negative, urinalysis unremarkable.  Chest x-ray on my review show no airspace disease.  CT abdomen pelvis with IV contrast noted for interval development of peripherally enhancing fluid collection in the right lower abdomen measuring 5.3 x 4.2 x 9 cm and demonstrates a few tiny gas locule concerning for an abscess, also noted small subcutaneous fluid collection with gas locules in the infraumbilical region measuring 2.5 x 2.3 x 4.8 cm likely postoperative though evolving abscess cannot be excluded.     Surgery consulted and recommended IR drainage.  Referred to hospital medicine service for further evaluation and management.    Today  Fluid collection within the abdomen is significantly better post drainage.  Afebrile over the  last 24 hours.  Cultures so far negative.  Creatinine 1.8 this morning.  He is doing well and no complaints.        OBJECTIVE/PHYSICAL EXAM     VITAL SIGNS (MOST RECENT):  Temp: 98.3 °F (36.8 °C) (12/07/23 0753)  Pulse: 92 (12/07/23 0753)  Resp: 18 (12/07/23 0753)  BP: 90/62 (12/07/23 0753)  SpO2: 97 % (12/07/23 0753) VITAL SIGNS (24 HOUR RANGE):  Temp:  [98.1 °F (36.7 °C)-100.2 °F (37.9 °C)] 98.3 °F (36.8 °C)  Pulse:  [] 92  Resp:  [16-20] 18  SpO2:  [97 %-99 %] 97 %  BP: ()/(62-75) 90/62   GENERAL: In no acute distress, afebrile  HEENT:  CHEST: Clear to auscultation bilaterally  HEART: S1, S2, no appreciable murmur  ABDOMEN: Soft, nontender, BS +, PATRICE drain  MSK: Warm, no lower extremity edema, no clubbing or cyanosis  NEUROLOGIC: Alert and oriented x4, moving all extremities with good strength   INTEGUMENTARY:  Refer to images  PSYCHIATRY:        ASSESSMENT/PLAN   RLQ Intra-abdominal abscess post  CT-guided drainage/patrice drain  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  Continue Zosyn, Valtrex.  Follow-up cultures from abscess.  Nephrology consulted.  Creatinine not improving.  Surgery signed off.  Need to find out what the plans will be regarding the tube.  IV fluids to 50 per hour  Plastic surgery following.  Continue monitoring since it is already greatly improved, no reconstruction at this time.    DVT prophylaxis:  Lovenox 40    Anticipated discharge and disposition:   __________________________________________________________________________    LABS/MICRO/MEDS/DIAGNOSTICS       LABS  Recent Labs     12/07/23  0448      K 3.6   CHLORIDE 109*   CO2 21*   BUN 18.3   CREATININE 1.85*   GLUCOSE 93   CALCIUM 7.6*    ALKPHOS 88   AST 16   ALT 18   ALBUMIN 1.9*       Recent Labs     12/07/23  0448   WBC 4.86   RBC 2.84*   HCT 24.6*   MCV 86.6            MICROBIOLOGY  Microbiology Results (last 7 days)       Procedure Component Value Units Date/Time    Anaerobic Culture [[id number]] Collected: 12/04/23 1114    Order Status: Completed Specimen: Aspirate from Abdomen Updated: 12/07/23 0755     Anaerobe Culture No Anaerobes Isolated    Blood culture x two cultures. Draw prior to antibiotics. [[id number]]  (Normal) Collected: 12/02/23 2327    Order Status: Completed Specimen: Blood from Antecubital, Left Updated: 12/07/23 0002     CULTURE, BLOOD ([facility]) No Growth At 96 Hours    Blood culture x two cultures. Draw prior to antibiotics. [[id number]]  (Normal) Collected: 12/02/23 2327    Order Status: Completed Specimen: Blood from Antecubital, Right Updated: 12/07/23 0002     CULTURE, BLOOD ([facility]) No Growth At 96 Hours    Body Fluid Culture [[id number]] Collected: 12/04/23 1114    Order Status: Completed Specimen: Fine Needle Aspirate from Abdomen Updated: 12/06/23 0948     Body Fluid Culture No Growth At 48 Hours    Gram Stain [[id number]] Collected: 12/04/23 1114    Order Status: Completed Specimen: Aspirate from Abdomen Updated: 12/04/23 1326     GRAM STAIN Many WBC observed      No bacteria seen    Fungal Culture [[id number]] Collected: 12/04/23 1114    Order Status: Sent Specimen: Aspirate from Abdomen Updated: 12/04/23 1147               MEDICATIONS   enoxparin  40 mg Subcutaneous Q24H (prophylaxis, 1700)    piperacillin-tazobactam (Zosyn) IV (PEDS and ADULTS) (extended infusion is not appropriate)  4.5 g Intravenous Q8H    valACYclovir  1,000 mg Oral BID         INFUSIONS   lactated ringers 50 mL/hr at 12/06/23 2113          DIAGNOSTIC TESTS  CT Pelvis With IV Contrast Routine Oral Contrast   Final Result      Interval placement of a percutaneous pigtail drainage catheter within the rim enhancing fluid collection in the  lower abdomen/pelvis.  Significant interval improvement in the volume of this collection.  There is a thin residual collection.         Electronically signed by: Fernanda Robert   Date:    12/06/2023   Time:    18:19      US Soft Tissue Abdomen   Final Result      No abnormality in the area of palpable finding.         Electronically signed by: Ramirez Rivera MD   Date:    12/05/2023   Time:    21:17      CT Guided Abscess Drainage With Catheter   Final Result      Successful placement of a 10 Martiniquais drain into the right abdominal collection.  Samples were collected and sent to the lab for analysis.         Electronically signed by: Joseph Carvajal   Date:    12/04/2023   Time:    11:37      CT Abdomen Pelvis With IV Contrast NO Oral Contrast   Final Result   Impression:      1. Both kidneys appear mildly enlarged in size in AP dimension, new since the prior examination. No hypoperfused areas are seen within the renal cortices to suggest acute pyelonephritis on the submitted images. There is subtle mucosal wall enhancement of the renal pelves bilaterally (series 4; images 64-90). An element of pyeloureteritis cannot be excluded. Correlate with clinical and laboratory findings.      2. Again noted is pneumoperitoneum in the right subdiaphragmatic region (series 2; image 19-35), significantly decreased since the prior examination. There is interval resolution of previously noted moderate ascites. Interval development of a peripherally enhancing fluid collection in the right lower abdomen and pelvis (series 2; images , series 5; image 95 and series 4; images 41-60). The collection measures approximately 5.3 x 4.2 x 9 cm and demonstrates a few tiny gas locules. This is of concern for an abscess. Correlate clinically as regards further evaluation and followup.      3. There is interval resolution of previously noted soft tissue emphysema in the left ventral mid and lower abdominal wall. Again noted are a few small  "subcutaneous gas locules in the right ventral mid abdomen. There is interval development of a small subcutaneous fluid collection with gas locule in the infraumbilical region (series 2; image 88), which measures 2.5 x 2.3 x 4.8 cm (AP x T x CC), likely postoperative. An evolving abscess cannot be excluded.      4. Again noted is near total colectomy with an ileostomy in the right lower abdomen. There are multiple moderately distended small bowel loops in the mid and lower abdomen. The caliber of the bowel loops has slightly decreased since the prior examination. This may reflect an ileus versus partial small bowel obstruction.      5. Details and other findings as discussed above. Follow-up as clinically indicated.      No significant discrepancy with overnight report         Electronically signed by: Chip Duran   Date:    12/03/2023   Time:    11:02      X-Ray Chest AP Portable   Final Result      No acute findings.         Electronically signed by: Germain Cedeno   Date:    12/03/2023   Time:    10:30           No results found for: "EF"       NUTRITION STATUS  Patient meets ASPEN criteria for   malnutrition of   per RD assessment as evidenced by:                       A minimum of two characteristics is recommended for diagnosis of either severe or non-severe malnutrition.       Case related differential diagnoses have been reviewed; assessment and plan has been documented. I have personally reviewed the labs and test results that are currently available; I have reviewed the patients medication list. I have reviewed the consulting providers recommendations. I have reviewed or attempted to review medical records based upon their availability.  All of the patient's and/or family's questions have been addressed and answered to the best of my ability.  I will continue to monitor closely and make adjustments to medical management as needed.  This document was created using M*Modal Fluency Direct.  Transcription errors " may have been made.  Please contact me if any questions may rise regarding documentation to clarify transcription.        Abdoulaye Garcia MD   Internal Medicine  Department of Hospital Medicine  [facility] - 8th Floor Med Surg

## 2023-12-07 NOTE — CONSULTS
[facility] - 8th Floor Med Surg  Nephrology  Consult Note    Patient Name: Lionel León  MRN: [id number]  Admission Date: 12/2/2023  Hospital Length of Stay: 4 days  Attending Provider: Sloan Alvarez MD   Primary Care Physician: Sherry Aranda  Principal Problem:<principal problem not specified>    Consults  Subjective:     HPI:  This is a 43-year-old male diagnosed with ulcerative colitis in October 23 after a long bout of watery/bloody diarrhea.  For weeks post diagnosis he was admitted with rectal pain and bleeding with complicated hospital course diagnosed with Allison's gangrene requiring multiple I and D's.  On 11/15/2023 he underwent laparoscopic total colectomy and ileostomy.  He was treated with antibiotics and discharged in stable condition on 11/30.  Patient returned to ER on 12/02 with fever and was noted to have fluid collection right lower quadrant.  Drain was placed per IR.  Repeat CT with contrast on yesterday shows improvement in fluid collection.      Starting on 11/24 patient has demonstrated increased creatinine.  Prior to this baseline was less than 1.  Currently creatinine averages about 1.7 with today peak of 1.85.  Total for the past 24 hours he was 3525 cc of output combination between urine and stool.  Patient and wife endorse much larger volume of stool in the past 24 hours which they attribute to the contrast.  He did have a great job with drinking water have in his picture refilled at least 3 times per day.  No history of renal stones, hematuria, UTIs.  No history of nephrological evaluation.    Past Medical History:   Diagnosis Date    Diverticulitis     Ulcerative colitis        Past Surgical History:   Procedure Laterality Date    COLONOSCOPY, WITH 1 OR MORE BIOPSIES N/A 10/23/2023    Procedure: COLON;  Surgeon: Dragan Underwood MD;  Location: [facility] ENDOSCOPY;  Service: Gastroenterology;  Laterality: N/A;    DEBRIDEMENT OF SACRAL WOUND N/A  11/20/2023    Procedure: DEBRIDEMENT, WOUND, SACRUM;  Surgeon: Chandana Guidry MD;  Location: [facility] OR;  Service: General;  Laterality: N/A;  perineum/sacrum// high lithotomy    DEBRIDEMENT, EXTERNAL GENITALIA, PERINEUM, AND ABDOMINAL WALL, FOR NECROTIZING SOFT TISSUE INFECTION Bilateral 11/11/2023    Procedure: DEBRIDEMENT, EXTERNAL GENITALIA/BUTTOCKS FOR NECROTIZING SOFT TISSUE INFECTION;  Surgeon: Vladimir Vick MD;  Location: [facility] OR;  Service: General;  Laterality: Bilateral;  Prone positioning.    DEBRIDEMENT, EXTERNAL GENITALIA, PERINEUM, AND ABDOMINAL WALL, FOR NECROTIZING SOFT TISSUE INFECTION N/A 11/12/2023    Procedure: DEBRIDEMENT, EXTERNAL GENITALIA, PERINEUM, AND ABDOMINAL WALL, FOR NECROTIZING SOFT TISSUE INFECTION;  Surgeon: Vladimir Vick MD;  Location: [facility];  Service: General;  Laterality: N/A;  Place patient in dorsal lithotomy positioning.    DIAGNOSTIC LAPAROSCOPY N/A 11/20/2023    Procedure: LAPAROSCOPY, DIAGNOSTIC;  Surgeon: Fredi Shields MD;  Location: [facility] OR;  Service: General;  Laterality: N/A;    EGD, WITH CLOSED BIOPSY N/A 11/22/2023    Procedure: EGD, WITH CLOSED BIOPSY;  Surgeon: Blayne Mujica MD;  Location: [facility] ENDOSCOPY;  Service: Gastroenterology;  Laterality: N/A;    LAPAROSCOPIC ILEOSTOMY N/A 11/15/2023    Procedure: CREATION, ILEOSTOMY, LAPAROSCOPIC;  Surgeon: Fredi Shields MD;  Location: [facility] OR;  Service: Colon and Rectal;  Laterality: N/A;    LAPAROSCOPIC TOTAL COLECTOMY N/A 11/15/2023    Procedure: COLECTOMY, TOTAL, LAPAROSCOPIC;  Surgeon: Fredi Shields MD;  Location: [facility] OR;  Service: Colon and Rectal;  Laterality: N/A;  LAP TOTAL COLECTOMY WITH ILEOSTOMY       Review of patient's allergies indicates:  No Known Allergies  Current Facility-Administered Medications   Medication Frequency    0.9%  NaCl infusion (for blood administration) Q24H PRN    acetaminophen tablet 1,000 mg Q6H PRN    acetaminophen tablet 650 mg Q4H PRN     aluminum-magnesium hydroxide-simethicone 200-200-20 mg/5 mL suspension 30 mL QID PRN    enoxaparin injection 40 mg Q24H (prophylaxis, 1700)    lactated ringers infusion Continuous    melatonin tablet 6 mg Nightly PRN    ondansetron injection 4 mg Q4H PRN    oxyCODONE-acetaminophen  mg per tablet 1 tablet Q6H PRN    piperacillin-tazobactam (ZOSYN) 4.5 g in dextrose 5 % in water (D5W) 100 mL IVPB (MB+) Q8H    polyethylene glycol packet 17 g BID PRN    prochlorperazine injection Soln 5 mg Q6H PRN    senna-docusate 8.6-50 mg per tablet 2 tablet BID PRN    sodium chloride 0.9% flush 10 mL PRN    valACYclovir tablet 1,000 mg BID     Family History    None       Tobacco Use    Smoking status: Never    Smokeless tobacco: Never   Substance and Sexual Activity    Alcohol use: Yes     Comment: Social    Drug use: Never    Sexual activity: Not on file       Objective:     Vital Signs (Most Recent):  Temp: 98.3 °F (36.8 °C) (12/07/23 0753)  Pulse: 92 (12/07/23 0753)  Resp: 18 (12/07/23 0753)  BP: 90/62 (12/07/23 0753)  SpO2: 97 % (12/07/23 0753) Vital Signs (24h Range):  Temp:  [98.1 °F (36.7 °C)-100.2 °F (37.9 °C)] 98.3 °F (36.8 °C)  Pulse:  [] 92  Resp:  [16-20] 18  SpO2:  [97 %-99 %] 97 %  BP: ()/(62-75) 90/62     Weight: 65.8 kg (145 lb) (12/02/23 2203)  Body mass index is 20.81 kg/m².  Body surface area is 1.8 meters squared.    I/O last 3 completed shifts:  In: 2622.2 [P.O.:1580; I.V.:606.7; Other:25; IV Piggyback:410.5]  Out: 7320 [Urine:6225; Drains:20; Stool:1075]    Physical Exam  Constitutional:       General: He is not in acute distress.  HENT:      Head: Atraumatic.      Nose: Nose normal.      Mouth/Throat:      Mouth: Mucous membranes are moist.   Eyes:      Extraocular Movements: Extraocular movements intact.      Conjunctiva/sclera: Conjunctivae normal.   Cardiovascular:      Rate and Rhythm: Normal rate and regular rhythm.      Pulses: Normal pulses.   Pulmonary:      Effort: Pulmonary effort  is normal.      Breath sounds: Normal breath sounds.   Abdominal:      Palpations: Abdomen is soft.      Comments: Ileostomy noted with soft yellow/green stool, right-sided CYNTHIA drain noted   Musculoskeletal:         General: No swelling.      Cervical back: Neck supple.   Skin:     General: Skin is warm.   Neurological:      Mental Status: He is alert and oriented to person, place, and time.         Significant Labs:  BMP:   Recent Labs   Lab 12/04/23 0518 12/05/23 0457 12/07/23 0448   *   < > 139   K 4.3   < > 3.6   CO2 24   < > 21*   BUN 19.8   < > 18.3   CREATININE 1.63*   < > 1.85*   CALCIUM 7.8*   < > 7.6*   MG 1.90  --   --     < > = values in this interval not displayed.     CBC:   Recent Labs   Lab 12/07/23 0448   WBC 4.86   RBC 2.84*   HGB 7.7*   HCT 24.6*      MCV 86.6   MCH 27.1   MCHC 31.3*     Microbiology Results (last 7 days)       Procedure Component Value Units Date/Time    Body Fluid Culture [[id number]] Collected: 12/04/23 1114    Order Status: Completed Specimen: Fine Needle Aspirate from Abdomen Updated: 12/07/23 1000     Body Fluid Culture No Growth At 72 Hours    Anaerobic Culture [[id number]] Collected: 12/04/23 1114    Order Status: Completed Specimen: Aspirate from Abdomen Updated: 12/07/23 0755     Anaerobe Culture No Anaerobes Isolated    Blood culture x two cultures. Draw prior to antibiotics. [[id number]]  (Normal) Collected: 12/02/23 2327    Order Status: Completed Specimen: Blood from Antecubital, Left Updated: 12/07/23 0002     CULTURE, BLOOD ([facility]) No Growth At 96 Hours    Blood culture x two cultures. Draw prior to antibiotics. [[id number]]  (Normal) Collected: 12/02/23 2327    Order Status: Completed Specimen: Blood from Antecubital, Right Updated: 12/07/23 0002     CULTURE, BLOOD ([facility]) No Growth At 96 Hours    Gram Stain [[id number]] Collected: 12/04/23 1114    Order Status: Completed Specimen: Aspirate from Abdomen Updated: 12/04/23 1326     GRAM STAIN Many WBC  observed      No bacteria seen    Fungal Culture [[id number]] Collected: 12/04/23 1114    Order Status: Sent Specimen: Aspirate from Abdomen Updated: 12/04/23 1147          Specimen (24h ago, onward)      None          Recent Labs   Lab 12/03/23  0644   PROTEINUA Trace*   BACTERIA None Seen   LEUKOCYTESUR Negative   UROBILINOGEN Normal       Significant Imaging:  CT Pelvis With IV Contrast Routine Oral Contrast [[id number]] Resulted: 12/06/23 1819   Order Status: Completed Updated: 12/06/23 1821   Narrative:     EXAMINATION:  CT PELVIS WITH IV CONTRAST    CLINICAL HISTORY:  f/u abscess post drain, spoke with dr donohue;    TECHNIQUE:  Helically acquired axial images, sagittal and coronal reformations were obtained through the pelvis after the IV administration of contrast.    Automated tube current modulation, weight-based exposure dosing, and/or iterative reconstruction technique utilized to reach lowest reasonably achievable exposure rate.    DLP: 366 mGy*cm    COMPARISON:  CT abdomen pelvis 12/02/2023    FINDINGS:  GI TRACT/MESENTERY: Positive enteric contrast is seen within small bowel loops.  Distal small bowel and ostomy are not opacified with enteric contrast as yet.  Difficult to evaluate the bowel in this CT pelvis due to incomplete imaging of the bowel loops and inability to follow the bowel.  Question caliber change in the right lower quadrant.  There are postsurgical changes of colectomy with rectal stump.    PERITONEUM: There is a right lower quadrant percutaneous drain in place with significant interval decrease in volume of the rim enhancing fluid collection at the right lower quadrant.  There is trace residual fluid measuring less than 1 cm in thickness compared to 4 cm previously.    LYMPH NODES: Presumed reactive small lymph nodes.    VASCULATURE: Trace atherosclerosis.    BLADDER: Normal appearance given degree of distention.    REPRODUCTIVE ORGANS: There are small prostate calcifications similar to  previous.    SOFT TISSUES: Tiny collection containing fat, fluid and air near midline at the subcutaneous fat measures 2 cm (2, 19); previously 2.5 cm.    BONES: No acute osseous abnormality.   Impression:       Interval placement of a percutaneous pigtail drainage catheter within the rim enhancing fluid collection in the lower abdomen/pelvis.  Significant interval improvement in the volume of this collection.  There is a thin residual collection.      Electronically signed by: Fernanda Robert  Date: 12/06/2023  Time: 18:19       Assessment/Plan:     JUAN PABLO versus progression to CKD 3 status post multiple info per with a past 6 weeks.  JUAN PABLO likely prerenal versus ATN  Reason Allison's gangrene status post multiple debridements completed 14 day course of antibiotics we will discharge on 11/30.  Recurrent admission noting intra-abdominal abscess status post IR drainage on 12/04   Ulcerative colitis status post total colectomy 11/15  Anemia of iron-deficiency        Vancomycin discontinued yesterday per ID.  Agree with recommendation.    Discussed importance of matching fluid out take with water in with patient and spouse.  Verbalized understanding.  Seems to be able to drink at least 3 L in 1 day.  -initiate JUAN PABLO workup to include renal ultrasound, urine protein, sodium, urine creatinine  -iron-deficiency noted.  Would defer IV iron replacement until treatment of infection is completed    THEODORA Hidalgo  Nephrology  [facility] - 8th Floor Med Surg   [facility]

## 2023-12-07 NOTE — PROGRESS NOTES
"   Acute Care Surgery   Progress Note  Admit Date: 12/2/2023  HD#4  POD#* No surgery found *    Subjective:   Interval history:  NAEON. Tmax 100.6. VSS.  S/p IR drain placement on 12/6. CYNTHIA output: 20 ml  Blood clx taken 12/2, NGTD. IR cultures taken 12/6, NGTD.  UOP 2.7 L  Ileostomy: 100 ml (pt reports more than documents, but not more than 1L.  Feeling well overall.  Tolerating PO intake w/o N/V.  Ambulating independently.    Home Meds:  Current Outpatient Medications   Medication Instructions    acyclovir (ZOVIRAX) 400 mg, Oral, 5 times daily, Pt wife states taking q2h?    oxyCODONE-acetaminophen (PERCOCET)  mg per tablet 1 tablet, Oral, Every 6 hours PRN      Scheduled Meds:   enoxparin  40 mg Subcutaneous Q24H (prophylaxis, 1700)    piperacillin-tazobactam (Zosyn) IV (PEDS and ADULTS) (extended infusion is not appropriate)  4.5 g Intravenous Q8H    valACYclovir  1,000 mg Oral BID     Continuous Infusions:   lactated ringers 50 mL/hr at 12/06/23 2113     PRN Meds:0.9%  NaCl infusion (for blood administration), acetaminophen, acetaminophen, aluminum-magnesium hydroxide-simethicone, melatonin, ondansetron, oxyCODONE-acetaminophen, polyethylene glycol, prochlorperazine, senna-docusate 8.6-50 mg, sodium chloride 0.9%     Objective:     VITAL SIGNS: 24 HR MIN & MAX LAST   Temp  Min: 98.1 °F (36.7 °C)  Max: 100.2 °F (37.9 °C)  98.3 °F (36.8 °C)   BP  Min: 90/62  Max: 123/75  90/62    Pulse  Min: 70  Max: 102  92    Resp  Min: 16  Max: 20  18    SpO2  Min: 97 %  Max: 99 %  97 %      HT: 5' 10" (177.8 cm)  WT: 65.8 kg (145 lb)  BMI: 20.8     Intake/output:  Intake/Output - Last 3 Shifts         12/05 0700 12/06 0659 12/06 0700 12/07 0659 12/07 0700 12/08 0659    P.O. 720 980     I.V. (mL/kg) 4974 (75.6)      Other 25      IV Piggyback 2330.1      Total Intake(mL/kg) 8049 (122.3) 980 (14.9)     Urine (mL/kg/hr) 4525 (2.9) 2850 (1.8)     Drains 20 10     Stool 100 675     Total Output 4645 1895     Net +4639 -1057 "                    Intake/Output Summary (Last 24 hours) at 12/7/2023 1024  Last data filed at 12/7/2023 0643  Gross per 24 hour   Intake 980 ml   Output 2735 ml   Net -1755 ml             Lines/drains/airway:       Peripheral IV - Single Lumen 12/03/23 1043 20 G Left;Posterior Hand (Active)   Site Assessment Clean;Dry;Intact 12/04/23 0800   Extremity Assessment Distal to IV No abnormal discoloration 12/04/23 0400   Line Status Infusing 12/04/23 0800   Dressing Status Clean;Dry;Intact 12/04/23 0800   Dressing Intervention Integrity maintained 12/04/23 0800   Number of days: 1            Peripheral IV - Single Lumen 12/03/23 1953 20 G Anterior;Left;Proximal Forearm (Active)   Site Assessment Clean;Dry;Intact 12/04/23 0800   Extremity Assessment Distal to IV No abnormal discoloration 12/04/23 0400   Line Status Saline locked 12/04/23 0800   Dressing Status Clean;Dry;Intact 12/04/23 0800   Dressing Intervention Integrity maintained 12/04/23 0800   Number of days: 0            Closed/Suction Drain 12/04/23 1115 Tube - 1 Lateral RLQ Bulb 10 Fr. (Active)   Output (mL) 10 mL 12/04/23 1443   Number of days: 0            Ileostomy 11/15/23 1730 RUQ (Active)   Stoma Appearance round;moist;pink 12/03/23 2000   Appliance 2-piece;no leakage 12/03/23 2000   Stoma Function stool;flatus 12/03/23 2000   Output (mL) 100 mL 12/04/23 1443   Number of days: 18       Physical examination:  General: no acute distress, appears comfortable this AM  HEENT: Normocephalic, atraumatic, PERRL  CV: RR  Resp: NWOB  GI:  Abdomen soft, non-tender, non-distended, no guarding, no rebound, IR drain in place, no masses, end ileostomy pink and productive of liquid stool, laparoscopic incisions healing, no signs of erythema on the abdominal wall, no cutaneous manifestations of infection seen on exam, no area of fluctuance or erythema or tenderness  :  scrotum and perineum are healing well with excellent granulation tissue free of infection  MSK: No  "muscle atrophy, cyanosis, peripheral edema, moving all extremities spontaneously  Skin/wounds:  No rashes, ulcers, erythema  Neuro:  CNII-XII grossly intact, alert and oriented to person, place, and time    Labs:  Renal:  Recent Labs     12/05/23 0457 12/07/23 0448   BUN 23.2* 18.3   CREATININE 1.69* 1.85*       No results for input(s): "LACTIC" in the last 72 hours.    FENGI:  Recent Labs     12/05/23 0457 12/07/23 0448    139   K 4.3 3.6   CO2 24 21*   CALCIUM 7.6* 7.6*   ALBUMIN  --  1.9*   BILITOT  --  0.2   AST  --  16   ALKPHOS  --  88   ALT  --  18       Heme:  Recent Labs     12/05/23 0457 12/07/23 0448   HGB 7.9* 7.7*   HCT 25.1* 24.6*    230       ID:  Recent Labs     12/05/23 0457 12/07/23 0448   WBC 3.41* 4.86       CBG:  Recent Labs     12/05/23 0457 12/07/23 0448   GLUCOSE 98 93        Cardiovascular:  Recent Labs   Lab 12/02/23  2327   TROPONINI <0.010   BNP 24.9       ABG:  No results for input(s): "PH", "PO2", "PCO2", "HCO3", "BE" in the last 168 hours.   I have reviewed all pertinent lab results within the past 24 hours.    Imaging:  CT Pelvis With IV Contrast Routine Oral Contrast   Final Result      Interval placement of a percutaneous pigtail drainage catheter within the rim enhancing fluid collection in the lower abdomen/pelvis.  Significant interval improvement in the volume of this collection.  There is a thin residual collection.         Electronically signed by: Fernanda Robert   Date:    12/06/2023   Time:    18:19      US Soft Tissue Abdomen   Final Result      No abnormality in the area of palpable finding.         Electronically signed by: Ramirez Rivera MD   Date:    12/05/2023   Time:    21:17      CT Guided Abscess Drainage With Catheter   Final Result      Successful placement of a 10 Welsh drain into the right abdominal collection.  Samples were collected and sent to the lab for analysis.         Electronically signed by: Joseph Carvajal   Date:    12/04/2023 "   Time:    11:37      CT Abdomen Pelvis With IV Contrast NO Oral Contrast   Final Result   Impression:      1. Both kidneys appear mildly enlarged in size in AP dimension, new since the prior examination. No hypoperfused areas are seen within the renal cortices to suggest acute pyelonephritis on the submitted images. There is subtle mucosal wall enhancement of the renal pelves bilaterally (series 4; images 64-90). An element of pyeloureteritis cannot be excluded. Correlate with clinical and laboratory findings.      2. Again noted is pneumoperitoneum in the right subdiaphragmatic region (series 2; image 19-35), significantly decreased since the prior examination. There is interval resolution of previously noted moderate ascites. Interval development of a peripherally enhancing fluid collection in the right lower abdomen and pelvis (series 2; images , series 5; image 95 and series 4; images 41-60). The collection measures approximately 5.3 x 4.2 x 9 cm and demonstrates a few tiny gas locules. This is of concern for an abscess. Correlate clinically as regards further evaluation and followup.      3. There is interval resolution of previously noted soft tissue emphysema in the left ventral mid and lower abdominal wall. Again noted are a few small subcutaneous gas locules in the right ventral mid abdomen. There is interval development of a small subcutaneous fluid collection with gas locule in the infraumbilical region (series 2; image 88), which measures 2.5 x 2.3 x 4.8 cm (AP x T x CC), likely postoperative. An evolving abscess cannot be excluded.      4. Again noted is near total colectomy with an ileostomy in the right lower abdomen. There are multiple moderately distended small bowel loops in the mid and lower abdomen. The caliber of the bowel loops has slightly decreased since the prior examination. This may reflect an ileus versus partial small bowel obstruction.      5. Details and other findings as  discussed above. Follow-up as clinically indicated.      No significant discrepancy with overnight report         Electronically signed by: Chip Duran   Date:    12/03/2023   Time:    11:02      X-Ray Chest AP Portable   Final Result      No acute findings.         Electronically signed by: Germain Cedeno   Date:    12/03/2023   Time:    10:30         I have reviewed all pertinent imaging results/findings within the past 24 hours.    Micro/Path/Other:  Microbiology Results (last 7 days)       Procedure Component Value Units Date/Time    Body Fluid Culture [[id number]] Collected: 12/04/23 1114    Order Status: Completed Specimen: Fine Needle Aspirate from Abdomen Updated: 12/07/23 1000     Body Fluid Culture No Growth At 72 Hours    Anaerobic Culture [[id number]] Collected: 12/04/23 1114    Order Status: Completed Specimen: Aspirate from Abdomen Updated: 12/07/23 0755     Anaerobe Culture No Anaerobes Isolated    Blood culture x two cultures. Draw prior to antibiotics. [[id number]]  (Normal) Collected: 12/02/23 2327    Order Status: Completed Specimen: Blood from Antecubital, Left Updated: 12/07/23 0002     CULTURE, BLOOD ([facility]) No Growth At 96 Hours    Blood culture x two cultures. Draw prior to antibiotics. [[id number]]  (Normal) Collected: 12/02/23 2327    Order Status: Completed Specimen: Blood from Antecubital, Right Updated: 12/07/23 0002     CULTURE, BLOOD ([facility]) No Growth At 96 Hours    Gram Stain [[id number]] Collected: 12/04/23 1114    Order Status: Completed Specimen: Aspirate from Abdomen Updated: 12/04/23 1326     GRAM STAIN Many WBC observed      No bacteria seen    Fungal Culture [[id number]] Collected: 12/04/23 1114    Order Status: Sent Specimen: Aspirate from Abdomen Updated: 12/04/23 1147           Specimen (168h ago, onward)      None             Assessment & Plan:   44 yo M with intraabdominal fluid collection as seen on CT s/p IR drain placement 12/5.     - Follow up IR cultures and blood clx so  far NGTD  - Abx per primary and ID  - Continue loacal wound care  - Physical exam and ultrasound confirmed, there is no drinable fluid collection at midline of abdomen, aware of CT findings. No surgical intervention needed.  - Continue IR drain until output decrease to 0 persistently, okay to flush and aspirate qith 10 ml of sterile saline daily to maintain tube patency and encourage drainage.  - Lov for DVT ppx  - No further surgical needs, pt to follow up in surgery clinic for drain management and eventual removal on 12/19/2023.  - Surgery will sign off at this time.   - Rest of care per primary.     Ayla Lynch MD  General Surgery PGY-2

## 2023-12-07 NOTE — PROGRESS NOTES
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  He underwent multiple debridements at that time as well as a total colectomy on 11/15.  Patient had previously been on Humira, however that has been held since recent hospitalization.  He completed a 14 day course of antimicrobials with Zosyn from date of last debridement, completed on 11/27.  He was discharged home however returned on 12/02 with complaints of fever and abdominal pain.  CT showed concern for abscess in the right abdomen, status post IR drainage earlier today.  He has had several fevers since admit, however no leukocytosis and has been hemodynamically stable.  Currently overall feeling better without complaints.  Blood cultures are negative thus far.  He is currently receiving empiric vancomycin and Zosyn.  Of note, patient reports being notified of the day of discharge recently that EGD results from 11/22 showed herpes esophagitis.  He has been taking prescribed oral acyclovir and reports improvement and throat pain, denies any dysphagia.  We have been consulted for further assistance with antimicrobials.     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  Minimal residual abscess on repeat CT.  F/u surgery plans re: drain.   Change Zosyn to ciprofloxacin 500mg PO q12h and Flagyl 500mg PO q8h.  Continue Valtrex 1g PO q12h for HSV esophagitis.   Wound care as ordered.   Discussed with patient and wife.       SUBJECTIVE:   AF, VSS. No issues overnight. Feeling good, ready to go home.    MEDICATIONS:   Reviewed in  "EM[facility]    REVIEW OF SYSTEMS:   Except as documented, all other systems reviewed and negative     PHYSICAL EXAM:   T 99.5 °F (37.5 °C)   /79   P 95   RR 20   O2 98 %  GENERAL: NAD; does not appear toxic  SKIN: no rash  HEENT: sclera non-icteric; PERRL   NECK: supple; no LAD  CHEST: CTA; nonlabored, equal expansion; no adventitious BS  CARDIOVASCULAR: RRR, S1S2; no murmur; strong, equal peripheral pulses; no edema  ABDOMEN:  active bowel sounds; abdomen soft, nondistended, nontender to palpation; incision healing well; CYNTHIA with minimal output  EXTREMITIES: no cyanosis or clubbing  NEURO: AAO x4; CN II-XII grossly intact  PSYCH: Mentation and affect appropriate        LABS AND IMAGING:     Recent Labs     12/05/23 0457 12/07/23 0448   WBC 3.41* 4.86   RBC 2.91* 2.84*   HGB 7.9* 7.7*   HCT 25.1* 24.6*   MCV 86.3 86.6   MCH 27.1 27.1   MCHC 31.5* 31.3*   RDW 15.4 15.7    230       No results for input(s): "LACTIC" in the last 72 hours.    No results for input(s): "INR", "APTT", "D-DIMER" in the last 72 hours.    No results for input(s): "HGBA1C", "CHOL", "TRIG", "LDL", "VLDL", "HDL" in the last 72 hours.   Recent Labs     12/05/23 0457 12/07/23 0448    139   K 4.3 3.6   CHLORIDE 107 109*   CO2 24 21*   BUN 23.2* 18.3   CREATININE 1.69* 1.85*   GLUCOSE 98 93   CALCIUM 7.6* 7.6*   ALBUMIN  --  1.9*   GLOBULIN  --  3.1   ALKPHOS  --  88   ALT  --  18   AST  --  16   BILITOT  --  0.2       No results for input(s): "BNP", "CPK", "TROPONINI" in the last 72 hours.         US Retroperitoneal Complete  Narrative: EXAMINATION:  US RETROPERITONEAL COMPLETE    CLINICAL HISTORY:  JUAN PABLO;, .    TECHNIQUE:  Transverse and longitudinal images of the kidneys  and bladder were obtained.    COMPARISON:  None    FINDINGS:  Right Kidney:    Length: 13.3 x 7.9 x 7.9 cm    Appearance: Normal echogenicity.    Collecting system: No hydronephrosis    Stones: None    Cyst/Mass: None    Left Kidney:    Length: 13.4 x 7 x 6.8 " cm    Appearance: Normal echogenicity.    Collecting system: No hydronephrosis    Stones: None    Cyst/Mass: None    Bladder:    Normal    Vessels:    Visualized portions of the IVC and aorta have a normal grayscale appearance.  Impression: No significant abnormalities identified    Electronically signed by: Kemal Sanchez  Date:    12/07/2023  Time:    12:57          SHERLYN Davis  Infectious Disease

## 2023-12-08 PROBLEM — R18.8 INTRAABDOMINAL FLUID COLLECTION: Status: ACTIVE | Noted: 2023-12-08

## 2023-12-08 LAB
ANION GAP SERPL CALC-SCNC: 6 MEQ/L
BACTERIA BLD CULT: NORMAL
BACTERIA BLD CULT: NORMAL
BASOPHILS # BLD AUTO: 0.02 X10(3)/MCL
BASOPHILS NFR BLD AUTO: 0.4 %
BUN SERPL-MCNC: 20.7 MG/DL (ref 8.9–20.6)
CALCIUM SERPL-MCNC: 8 MG/DL (ref 8.4–10.2)
CHLORIDE SERPL-SCNC: 110 MMOL/L (ref 98–107)
CO2 SERPL-SCNC: 25 MMOL/L (ref 22–29)
CREAT SERPL-MCNC: 1.64 MG/DL (ref 0.73–1.18)
CREAT/UREA NIT SERPL: 13
EOSINOPHIL # BLD AUTO: 0.2 X10(3)/MCL (ref 0–0.9)
EOSINOPHIL NFR BLD AUTO: 4.4 %
ERYTHROCYTE [DISTWIDTH] IN BLOOD BY AUTOMATED COUNT: 15.7 % (ref 11.5–17)
GFR SERPLBLD CREATININE-BSD FMLA CKD-EPI: 53 MLS/MIN/1.73/M2
GLUCOSE SERPL-MCNC: 90 MG/DL (ref 74–100)
HCT VFR BLD AUTO: 25.4 % (ref 42–52)
HGB BLD-MCNC: 7.9 G/DL (ref 14–18)
IMM GRANULOCYTES # BLD AUTO: 0.03 X10(3)/MCL (ref 0–0.04)
IMM GRANULOCYTES NFR BLD AUTO: 0.7 %
LYMPHOCYTES # BLD AUTO: 0.51 X10(3)/MCL (ref 0.6–4.6)
LYMPHOCYTES NFR BLD AUTO: 11.2 %
MCH RBC QN AUTO: 27.1 PG (ref 27–31)
MCHC RBC AUTO-ENTMCNC: 31.1 G/DL (ref 33–36)
MCV RBC AUTO: 87.3 FL (ref 80–94)
MONOCYTES # BLD AUTO: 0.45 X10(3)/MCL (ref 0.1–1.3)
MONOCYTES NFR BLD AUTO: 9.9 %
NEUTROPHILS # BLD AUTO: 3.33 X10(3)/MCL (ref 2.1–9.2)
NEUTROPHILS NFR BLD AUTO: 73.4 %
NRBC BLD AUTO-RTO: 0 %
PLATELET # BLD AUTO: 248 X10(3)/MCL (ref 130–400)
PMV BLD AUTO: 9.6 FL (ref 7.4–10.4)
POTASSIUM SERPL-SCNC: 4.3 MMOL/L (ref 3.5–5.1)
RBC # BLD AUTO: 2.91 X10(6)/MCL (ref 4.7–6.1)
SODIUM SERPL-SCNC: 141 MMOL/L (ref 136–145)
WBC # SPEC AUTO: 4.54 X10(3)/MCL (ref 4.5–11.5)

## 2023-12-08 PROCEDURE — 85025 COMPLETE CBC W/AUTO DIFF WBC: CPT | Performed by: INTERNAL MEDICINE

## 2023-12-08 PROCEDURE — 25000003 PHARM REV CODE 250: Performed by: GENERAL PRACTICE

## 2023-12-08 PROCEDURE — 99233 SBSQ HOSP IP/OBS HIGH 50: CPT | Mod: FS,,, | Performed by: GENERAL PRACTICE

## 2023-12-08 PROCEDURE — 99233 PR SUBSEQUENT HOSPITAL CARE,LEVL III: ICD-10-PCS | Mod: FS,,, | Performed by: GENERAL PRACTICE

## 2023-12-08 PROCEDURE — 63600175 PHARM REV CODE 636 W HCPCS: Performed by: GENERAL PRACTICE

## 2023-12-08 PROCEDURE — 63600175 PHARM REV CODE 636 W HCPCS: Performed by: INTERNAL MEDICINE

## 2023-12-08 PROCEDURE — 25000003 PHARM REV CODE 250: Performed by: INTERNAL MEDICINE

## 2023-12-08 PROCEDURE — 80048 BASIC METABOLIC PNL TOTAL CA: CPT | Performed by: INTERNAL MEDICINE

## 2023-12-08 PROCEDURE — 21400001 HC TELEMETRY ROOM

## 2023-12-08 RX ADMIN — ENOXAPARIN SODIUM 40 MG: 40 INJECTION SUBCUTANEOUS at 04:12

## 2023-12-08 RX ADMIN — METRONIDAZOLE 500 MG: 500 TABLET ORAL at 01:12

## 2023-12-08 RX ADMIN — METRONIDAZOLE 500 MG: 500 TABLET ORAL at 05:12

## 2023-12-08 RX ADMIN — CIPROFLOXACIN HYDROCHLORIDE 500 MG: 500 TABLET, FILM COATED ORAL at 09:12

## 2023-12-08 RX ADMIN — METRONIDAZOLE 500 MG: 500 TABLET ORAL at 09:12

## 2023-12-08 RX ADMIN — VALACYCLOVIR HYDROCHLORIDE 1000 MG: 500 TABLET, FILM COATED ORAL at 09:12

## 2023-12-08 RX ADMIN — MICAFUNGIN SODIUM 100 MG: 100 INJECTION, POWDER, LYOPHILIZED, FOR SOLUTION INTRAVENOUS at 01:12

## 2023-12-08 RX ADMIN — Medication 6 MG: at 09:12

## 2023-12-08 NOTE — PLAN OF CARE
Order received for home IV infusion. Discharge planning discussed with patient and wife. MAHESH obtained for [employer]. Spoke to Campos with [employer]. She will see patient this afternoon.

## 2023-12-08 NOTE — PROGRESS NOTES
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  He completed antibiotic course and was discharged home with home health on 11/30/2023.     Present to the ED tonight reporting a fever of 102.  Report mild nonproductive cough denies chest pain or shortness breath.  Report mild cramping right lower quadrant abdominal pain.  Did not notice any increase in ileostomy output.  Wife report she been changing perineal wound  dressing and did not notice any foul-smelling drainage or necrotic changes.     On arrival to ED he was afebrile and hemodynamically stable.  Labs notable for hemoglobin 7.5, WBC 6.86, creatinine 1.79, BUN 37, COVID 19 and flu negative, urinalysis unremarkable.  Chest x-ray on my review show no airspace disease.  CT abdomen pelvis with IV contrast noted for interval development of peripherally enhancing fluid collection in the right lower abdomen measuring 5.3 x 4.2 x 9 cm and demonstrates a few tiny gas locule concerning for an abscess, also noted small subcutaneous fluid collection with gas locules in the infraumbilical region measuring 2.5 x 2.3 x 4.8 cm likely postoperative though evolving abscess cannot be excluded.     Surgery consulted and recommended IR drainage.  Referred to hospital medicine service for further evaluation and management.    Today  Plan was for dc but cx came back +.  He is going good ready to go  home        OBJECTIVE/PHYSICAL EXAM     VITAL SIGNS (MOST RECENT):  Temp: 99.8 °F (37.7 °C) (12/08/23 1558)  Pulse: 97 (12/08/23 1558)  Resp: 16 (12/07/23 1913)  BP: 104/66 (12/08/23 1558)  SpO2: 96 % (12/08/23 1558) VITAL SIGNS (24 HOUR RANGE):  Temp:  [98.8 °F (37.1 °C)-99.8 °F (37.7 °C)] 99.8 °F (37.7 °C)  Pulse:  [79-97] 97  Resp:  [16] 16  SpO2:  [95 %-97 %] 96 %  BP: (104-120)/(65-72) 104/66   GENERAL: In no acute distress, afebrile  HEENT:  CHEST: Clear to auscultation bilaterally  HEART: S1, S2, no appreciable murmur  ABDOMEN: Soft, nontender, BS +, PATRICE drain  MSK: Warm, no lower extremity edema, no clubbing or cyanosis  NEUROLOGIC: Alert and oriented x4, moving all extremities with good strength   INTEGUMENTARY:  Refer to images  PSYCHIATRY:        ASSESSMENT/PLAN   RLQ Intra-abdominal abscess post  CT-guided drainage/patrice drain  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  Continue cipro glagyl, Valtrex.  Follow-up cultures from abscess.  Nephrology following.  Surgery signed off.  They are planning to remove drain today.  Plastic surgery following.  Continue monitoring since it is already greatly improved, no reconstruction at this time.    DVT prophylaxis:  Lovenox 40    Anticipated discharge and disposition: dc tomorrow if we have culture results back.  If candida glabrate then will need iv micafungin at home, if albicans then oral fluconazole per ID  __________________________________________________________________________    LABS/MICRO/MEDS/DIAGNOSTICS       LABS  Recent Labs     12/07/23  0448 12/08/23  0508    141   K 3.6 4.3   CHLORIDE 109* 110*   CO2 21* 25   BUN 18.3 20.7*   CREATININE 1.85* 1.64*   GLUCOSE 93 90    CALCIUM 7.6* 8.0*   ALKPHOS 88  --    AST 16  --    ALT 18  --    ALBUMIN 1.9*  --        Recent Labs     12/08/23  0508   WBC 4.54   RBC 2.91*   HCT 25.4*   MCV 87.3            MICROBIOLOGY  Microbiology Results (last 7 days)       Procedure Component Value Units Date/Time    Body Fluid Culture [[id number]]  (Abnormal) Collected: 12/04/23 1114    Order Status: Completed Specimen: Fine Needle Aspirate from Abdomen Updated: 12/08/23 0945     Body Fluid Culture Yeast     Comment: Isolated from Thio only       Blood culture x two cultures. Draw prior to antibiotics. [[id number]]  (Normal) Collected: 12/02/23 2327    Order Status: Completed Specimen: Blood from Antecubital, Left Updated: 12/08/23 0001     CULTURE, BLOOD ([facility]) No Growth at 5 days    Blood culture x two cultures. Draw prior to antibiotics. [[id number]]  (Normal) Collected: 12/02/23 2327    Order Status: Completed Specimen: Blood from Antecubital, Right Updated: 12/08/23 0001     CULTURE, BLOOD ([facility]) No Growth at 5 days    Anaerobic Culture [[id number]] Collected: 12/04/23 1114    Order Status: Completed Specimen: Aspirate from Abdomen Updated: 12/07/23 0755     Anaerobe Culture No Anaerobes Isolated    Gram Stain [[id number]] Collected: 12/04/23 1114    Order Status: Completed Specimen: Aspirate from Abdomen Updated: 12/04/23 1326     GRAM STAIN Many WBC observed      No bacteria seen    Fungal Culture [[id number]] Collected: 12/04/23 1114    Order Status: Sent Specimen: Aspirate from Abdomen Updated: 12/04/23 1147               MEDICATIONS   ciprofloxacin HCl  500 mg Oral Q12H    enoxparin  40 mg Subcutaneous Q24H (prophylaxis, 1700)    metroNIDAZOLE  500 mg Oral Q8H    micafungin (MYCAMINE) IVPB  100 mg Intravenous Q24H    valACYclovir  1,000 mg Oral BID         INFUSIONS           DIAGNOSTIC TESTS  US Retroperitoneal Complete   Final Result      No significant abnormalities identified         Electronically signed by: Kemal Sanchez    Date:    12/07/2023   Time:    12:57      CT Pelvis With IV Contrast Routine Oral Contrast   Final Result      Interval placement of a percutaneous pigtail drainage catheter within the rim enhancing fluid collection in the lower abdomen/pelvis.  Significant interval improvement in the volume of this collection.  There is a thin residual collection.         Electronically signed by: Fernanda Robert   Date:    12/06/2023   Time:    18:19      US Soft Tissue Abdomen   Final Result      No abnormality in the area of palpable finding.         Electronically signed by: Ramirez Rivera MD   Date:    12/05/2023   Time:    21:17      CT Guided Abscess Drainage With Catheter   Final Result      Successful placement of a 10 Moldovan drain into the right abdominal collection.  Samples were collected and sent to the lab for analysis.         Electronically signed by: Joseph Carvajal   Date:    12/04/2023   Time:    11:37      CT Abdomen Pelvis With IV Contrast NO Oral Contrast   Final Result   Impression:      1. Both kidneys appear mildly enlarged in size in AP dimension, new since the prior examination. No hypoperfused areas are seen within the renal cortices to suggest acute pyelonephritis on the submitted images. There is subtle mucosal wall enhancement of the renal pelves bilaterally (series 4; images 64-90). An element of pyeloureteritis cannot be excluded. Correlate with clinical and laboratory findings.      2. Again noted is pneumoperitoneum in the right subdiaphragmatic region (series 2; image 19-35), significantly decreased since the prior examination. There is interval resolution of previously noted moderate ascites. Interval development of a peripherally enhancing fluid collection in the right lower abdomen and pelvis (series 2; images , series 5; image 95 and series 4; images 41-60). The collection measures approximately 5.3 x 4.2 x 9 cm and demonstrates a few tiny gas locules. This is of concern for an abscess.  "Correlate clinically as regards further evaluation and followup.      3. There is interval resolution of previously noted soft tissue emphysema in the left ventral mid and lower abdominal wall. Again noted are a few small subcutaneous gas locules in the right ventral mid abdomen. There is interval development of a small subcutaneous fluid collection with gas locule in the infraumbilical region (series 2; image 88), which measures 2.5 x 2.3 x 4.8 cm (AP x T x CC), likely postoperative. An evolving abscess cannot be excluded.      4. Again noted is near total colectomy with an ileostomy in the right lower abdomen. There are multiple moderately distended small bowel loops in the mid and lower abdomen. The caliber of the bowel loops has slightly decreased since the prior examination. This may reflect an ileus versus partial small bowel obstruction.      5. Details and other findings as discussed above. Follow-up as clinically indicated.      No significant discrepancy with overnight report         Electronically signed by: Chip Duran   Date:    12/03/2023   Time:    11:02      X-Ray Chest AP Portable   Final Result      No acute findings.         Electronically signed by: Germain Cedeno   Date:    12/03/2023   Time:    10:30           No results found for: "EF"       NUTRITION STATUS  Patient meets ASPEN criteria for moderate malnutrition of acute illness or injury per RD assessment as evidenced by:  Energy Intake (Malnutrition): other (see comments) (does not meet criteria)  Weight Loss (Malnutrition): greater than 5% in 1 month  Subcutaneous Fat (Malnutrition): moderate depletion  Muscle Mass (Malnutrition): moderate depletion           A minimum of two characteristics is recommended for diagnosis of either severe or non-severe malnutrition.       Case related differential diagnoses have been reviewed; assessment and plan has been documented. I have personally reviewed the labs and test results that are currently " available; I have reviewed the patients medication list. I have reviewed the consulting providers recommendations. I have reviewed or attempted to review medical records based upon their availability.  All of the patient's and/or family's questions have been addressed and answered to the best of my ability.  I will continue to monitor closely and make adjustments to medical management as needed.  This document was created using M*Modal Fluency Direct.  Transcription errors may have been made.  Please contact me if any questions may rise regarding documentation to clarify transcription.        Abdoulaye Garcia MD   Internal Medicine  Department of Hospital Medicine  [facility] - 8th Floor Med Surg

## 2023-12-08 NOTE — PROGRESS NOTES
[facility]  Infectious Disease Progress Note            ASSESSMENT & PLAN:     He is a 43-year-old male with a history of ulcerative colitis known to our service from recent hospitalization secondary to Allison's gangrene.  He underwent multiple debridements at that time as well as a total colectomy on 11/15.  Patient had previously been on Humira, however that has been held since recent hospitalization.  He completed a 14 day course of antimicrobials with Zosyn from date of last debridement, completed on 11/27.  He was discharged home however returned on 12/02 with complaints of fever and abdominal pain.  CT showed concern for abscess in the right abdomen, status post IR drainage earlier today.  He has had several fevers since admit, however no leukocytosis and has been hemodynamically stable.  Currently overall feeling better without complaints.  Blood cultures are negative thus far.  He is currently receiving empiric vancomycin and Zosyn.  Of note, patient reports being notified of the day of discharge recently that EGD results from 11/22 showed herpes esophagitis.  He has been taking prescribed oral acyclovir and reports improvement and throat pain, denies any dysphagia.  We have been consulted for further assistance with antimicrobials.     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  Start micafungin 100mg IV q24h.   Continue ciprofloxacin 500mg PO q12h and Flagyl 500mg PO q8h.  Will need at least a 2 week abx course from drain placement (and at least 2 weeks of antifungal coverage) with repeat CT prior to discontinuing abx.  Continue Valtrex 1g PO q12h for HSV esophagitis - end date: 12/15.   Wound care as ordered.  "  F/u surgery plans re: drain.   Discussed with patient and wife.       SUBJECTIVE:   AF, VSS. No issues overnight.  No complaints.     MEDICATIONS:   Reviewed in EMR    REVIEW OF SYSTEMS:   Except as documented, all other systems reviewed and negative     PHYSICAL EXAM:   T 98.8 °F (37.1 °C)   /65   P 90   RR 16   O2 96 %  GENERAL: NAD; does not appear toxic  SKIN: no rash  HEENT: sclera non-icteric; PERRL   NECK: supple; no LAD  CHEST: CTA; nonlabored, equal expansion; no adventitious BS  CARDIOVASCULAR: RRR, S1S2; no murmur; strong, equal peripheral pulses; no edema  ABDOMEN:  active bowel sounds; abdomen soft, nondistended, nontender to palpation; incision healing well; CYNTHIA with minimal output  EXTREMITIES: no cyanosis or clubbing  NEURO: AAO x4; CN II-XII grossly intact  PSYCH: Mentation and affect appropriate        LABS AND IMAGING:     Recent Labs     12/07/23 0448 12/08/23  0508   WBC 4.86 4.54   RBC 2.84* 2.91*   HGB 7.7* 7.9*   HCT 24.6* 25.4*   MCV 86.6 87.3   MCH 27.1 27.1   MCHC 31.3* 31.1*   RDW 15.7 15.7    248       No results for input(s): "LACTIC" in the last 72 hours.    No results for input(s): "INR", "APTT", "D-DIMER" in the last 72 hours.    No results for input(s): "HGBA1C", "CHOL", "TRIG", "LDL", "VLDL", "HDL" in the last 72 hours.   Recent Labs     12/07/23 0448 12/08/23  0508    141   K 3.6 4.3   CHLORIDE 109* 110*   CO2 21* 25   BUN 18.3 20.7*   CREATININE 1.85* 1.64*   GLUCOSE 93 90   CALCIUM 7.6* 8.0*   ALBUMIN 1.9*  --    GLOBULIN 3.1  --    ALKPHOS 88  --    ALT 18  --    AST 16  --    BILITOT 0.2  --        No results for input(s): "BNP", "CPK", "TROPONINI" in the last 72 hours.         US Retroperitoneal Complete  Narrative: EXAMINATION:  US RETROPERITONEAL COMPLETE    CLINICAL HISTORY:  JUAN PABLO;, .    TECHNIQUE:  Transverse and longitudinal images of the kidneys  and bladder were obtained.    COMPARISON:  None    FINDINGS:  Right Kidney:    Length: 13.3 x 7.9 x 7.9 " cm    Appearance: Normal echogenicity.    Collecting system: No hydronephrosis    Stones: None    Cyst/Mass: None    Left Kidney:    Length: 13.4 x 7 x 6.8 cm    Appearance: Normal echogenicity.    Collecting system: No hydronephrosis    Stones: None    Cyst/Mass: None    Bladder:    Normal    Vessels:    Visualized portions of the IVC and aorta have a normal grayscale appearance.  Impression: No significant abnormalities identified    Electronically signed by: Kemal Sanchez  Date:    12/07/2023  Time:    12:57          SHERLYN Davis  Infectious Disease

## 2023-12-08 NOTE — PROGRESS NOTES
Nephrology consult follow up note    HPI:      Lionel León is a 43 y.o. male  diagnosed with ulcerative colitis in October 23 after a long bout of watery/bloody diarrhea.  For weeks post diagnosis he was admitted with rectal pain and bleeding with complicated hospital course diagnosed with Allison's gangrene requiring multiple I and D's.  On 11/15/2023 he underwent laparoscopic total colectomy and ileostomy.  He was treated with antibiotics and discharged in stable condition on 11/30.  Patient returned to ER on 12/02 with fever and was noted to have fluid collection right lower quadrant.  Drain was placed per IR.  Repeat CT with contrast on yesterday shows improvement in fluid collection.       Starting on 11/24 patient has demonstrated increased creatinine.  Prior to this baseline was less than 1.  Currently creatinine averages about 1.7 with today peak of 1.85.  Total for the past 24 hours he was 3525 cc of output combination between urine and stool.  Patient and wife endorse much larger volume of stool in the past 24 hours which they attribute to the contrast.  Nephrology consulted For acute kidney injury.    Interval history:     No acute events overnight.  Patient states that he drank 3-4 L of water yesterday.  No chest pain, shortness of breath, nausea, vomiting, or lower extremity edema.     Review of Systems:     Comprehensive 10pt ROS negative except as noted per history.    Past medical, family, surgical, and social history reviewed and unchanged from initial consult note.     Objective:       VITAL SIGNS: 24 HR MIN & MAX LAST    Temp  Min: 97.9 °F (36.6 °C)  Max: 99.5 °F (37.5 °C)  98.8 °F (37.1 °C)        BP  Min: 106/65  Max: 126/79  106/65     Pulse  Min: 79  Max: 97  90     Resp  Min: 16  Max: 20  16    SpO2  Min: 95 %  Max: 99 %  96 %      GEN: Chronically ill appearing in NAD  HEENT: Conjunctiva anicteric, pupils equal   CV: RRR +S1,S2 without murmur  PULM: CTAB, unlabored  ABD: Soft, NT/ND  abdomen with NABS.  Colostomy in place.  EXT: No cyanosis or edema  SKIN: Warm and dry  PSYCH: Awake, alert and appropriately conversant.   Dialysis access:  No dialysis access            Component Value Date/Time     12/08/2023 0508     12/07/2023 0448    K 4.3 12/08/2023 0508    K 3.6 12/07/2023 0448    CHLORIDE 110 (H) 12/08/2023 0508    CHLORIDE 109 (H) 12/07/2023 0448    CO2 25 12/08/2023 0508    CO2 21 (L) 12/07/2023 0448    BUN 20.7 (H) 12/08/2023 0508    BUN 18.3 12/07/2023 0448    CREATININE 1.64 (H) 12/08/2023 0508    CREATININE 1.85 (H) 12/07/2023 0448    CALCIUM 8.0 (L) 12/08/2023 0508    CALCIUM 7.6 (L) 12/07/2023 0448    PHOS 3.7 12/03/2023 0918            Component Value Date/Time    WBC 4.54 12/08/2023 0508    WBC 4.86 12/07/2023 0448    WBC 48.35 11/20/2023 0250    WBC 17.21 11/17/2023 0514    HGB 7.9 (L) 12/08/2023 0508    HGB 7.7 (L) 12/07/2023 0448    HCT 25.4 (L) 12/08/2023 0508    HCT 24.6 (L) 12/07/2023 0448     12/08/2023 0508     12/07/2023 0448         Imaging reviewed      Assessment / Plan:       Active Hospital Problems    Diagnosis  POA    *Intraabdominal fluid collection [R18.8]  Yes      Resolved Hospital Problems   No resolved problems to display.       JUAN PABLO versus progression to CKD 3 status post multiple info per with a past 6 weeks.  JUAN PABLO likely prerenal versus ATN  Reason Allison's gangrene status post multiple debridements completed 14 day course of antibiotics we will discharge on 11/30.  Recurrent admission noting intra-abdominal abscess status post IR drainage on 12/04   Ulcerative colitis status post total colectomy 11/15  Anemia of iron-deficiency    Plan:  Renal function slightly improved.  Acute kidney injury appears to be volume related.  Encourage good p.o. water intake. stop IV fluids.  Okay to discharge from a Nephrology standpoint.  He can follow up with me in clinic in a few weeks.    We will sign off at this time.  Please call if we can be  of further assistance.    Mariano Ramirez DO  Nephrology  [facility]  Clinic number: [phone]

## 2023-12-08 NOTE — PLAN OF CARE
Patient asks for records to be sent to [facility] at [phone], ATTN Liana. Records faxed per patient request. This facility is his PCP.

## 2023-12-08 NOTE — PROGRESS NOTES
Inpatient Nutrition Assessment    Admit Date: 12/2/2023   Total duration of encounter: 6 days   Patient Age: 43 y.o.    Nutrition Recommendation/Prescription     Continue regular diet as tolerated  Consider oral supplement as tolerated  RD to monitor po intake and weight     Communication of Recommendations: reviewed with nurse    Nutrition Assessment     Malnutrition Assessment/Nutrition-Focused Physical Exam    Malnutrition Context: acute illness or injury (12/08/23 1604)  Malnutrition Level: moderate (12/08/23 1604)  Energy Intake (Malnutrition): other (see comments) (does not meet criteria) (12/08/23 1604)  Weight Loss (Malnutrition): greater than 5% in 1 month (12/08/23 1604)  Subcutaneous Fat (Malnutrition): moderate depletion (12/08/23 1604)  Orbital Region (Subcutaneous Fat Loss): moderate depletion        Muscle Mass (Malnutrition): moderate depletion (12/08/23 1604)  [place] Region (Muscle Loss): moderate depletion                                A minimum of two characteristics is recommended for diagnosis of either severe or non-severe malnutrition.   Unable to perform NFPA at this time. Per previous RD assessment 11/7, pt with moderate muscle and fat depletion.    Chart Review    Reason Seen: length of stay    Malnutrition Screening Tool Results   Have you recently lost weight without trying?: No  Have you been eating poorly because of a decreased appetite?: No   MST Score: 0   Diagnosis:  Intra-abdominal abscess s/p IR drainage 12/4  Herpes esophagitis   Recent Allison's gangrene s/p multiple debridement on 11/11, 11/12 and 11/20  JUAN PABLO    Relevant Medical History: ulcerative colitis s/p total colectomy and ileostomy 11/15    Nutrition-Related Medications: zofran prn, miralax prn, prochlorperazine prn, senna prn  Calorie Containing IV Medications: no significant kcals from medications at this time    Nutrition-Related Labs: 12/8: RBC-2.91, H/H-7.9/25.4, Cl-110, BUN-20.7, creat-1.64, jen-8.0      Nutrition  "Orders:   Diet Adult Regular      Appetite/Oral Intake: good/% of meals    Factors Affecting Nutritional Intake: none identified    Food/Voodoo/Cultural Preferences: unable to obtain    Food Allergies: no known food allergies    Wound(s):       Last Bowel Movement: 23    Comments    : unable to speak with patient at this time. RN reports good appetite with no GI complaints. Per MST, no decreased appetite or weight loss prior admission reported. Per EMR weights, 12% weight loss in 1 month noted. Per previous RD assessment , pt with moderate muscle and fat depletion.    Anthropometrics    Height: 5' 10" (177.8 cm) Height Method: Stated  Last Weight: 65.8 kg (145 lb) (23) Weight Method: Standard Scale  BMI (Calculated): 20.8  BMI Classification: normal (BMI 18.5-24.9)        Ideal Body Weight (IBW), Male: 166 lb     % Ideal Body Weight, Male (lb): 87.35 %                 Usual Body Weight (UBW), k.8 kg  % Usual Body Weight: 88.11     Usual Weight Provided By: EMR weight history    Wt Readings from Last 5 Encounters:   23 65.8 kg (145 lb)   23 74.8 kg (165 lb)   10/21/23 86.4 kg (190 lb 7.6 oz)     Weight Change(s) Since Admission:   Wt Readings from Last 1 Encounters:   23 65.8 kg (145 lb)   Admit Weight: 65.8 kg (145 lb) (23), Weight Method: Standard Scale    Estimated Needs    Weight Used For Calorie Calculations: 65.8 kg (145 lb 1 oz)  Energy Calorie Requirements (kcal): 1872 kcal (1.2 stress factor)  Energy Need Method: [place]  Weight Used For Protein Calculations: 65.8 kg (145 lb 1 oz)  Protein Requirements: 79 g (1.2 g/kg)  Fluid Requirements (mL): 1872 ml (1 ml/kcal)  Temp (24hrs), Av.2 °F (37.3 °C), Min:98.8 °F (37.1 °C), Max:99.8 °F (37.7 °C)       Enteral Nutrition    Patient not receiving enteral nutrition at this time.    Parenteral Nutrition    Patient not receiving parenteral nutrition support at this time.    Evaluation " of Received Nutrient Intake    Calories: meeting estimated needs  Protein: meeting estimated needs    Patient Education    Not applicable.    Nutrition Diagnosis     PES: Malnutrition related to acute illness as evidenced by moderate muscle and fat depletion and >5% weight loss in 1 month. (new)    Interventions/Goals     Intervention(s): general/healthful diet, commercial beverage, and collaboration with other providers    Goal: Maintain weight throughout hospitalization. (new)    Monitoring & Evaluation     Dietitian will monitor food and beverage intake, weight, electrolyte/renal panel, and gastrointestinal profile.    Nutrition Risk/Follow-Up: moderate (follow-up in 3-5 days)   Please consult if re-assessment needed sooner.

## 2023-12-09 VITALS
SYSTOLIC BLOOD PRESSURE: 113 MMHG | TEMPERATURE: 99 F | BODY MASS INDEX: 20.76 KG/M2 | RESPIRATION RATE: 20 BRPM | OXYGEN SATURATION: 97 % | DIASTOLIC BLOOD PRESSURE: 71 MMHG | HEART RATE: 80 BPM | WEIGHT: 145 LBS | HEIGHT: 70 IN

## 2023-12-09 LAB — BACTERIA FLD CULT: ABNORMAL

## 2023-12-09 PROCEDURE — 25000003 PHARM REV CODE 250: Performed by: GENERAL PRACTICE

## 2023-12-09 PROCEDURE — 25000003 PHARM REV CODE 250: Performed by: INTERNAL MEDICINE

## 2023-12-09 RX ORDER — CIPROFLOXACIN 500 MG/1
500 TABLET ORAL EVERY 12 HOURS
Qty: 28 TABLET | Refills: 0 | Status: SHIPPED | OUTPATIENT
Start: 2023-12-09 | End: 2023-12-21 | Stop reason: SDUPTHER

## 2023-12-09 RX ORDER — FLUCONAZOLE 200 MG/1
400 TABLET ORAL DAILY
Qty: 28 TABLET | Refills: 0 | Status: SHIPPED | OUTPATIENT
Start: 2023-12-09 | End: 2023-12-21 | Stop reason: SDUPTHER

## 2023-12-09 RX ORDER — VALACYCLOVIR HYDROCHLORIDE 1 G/1
1000 TABLET, FILM COATED ORAL 2 TIMES DAILY
Qty: 10 TABLET | Refills: 0 | Status: SHIPPED | OUTPATIENT
Start: 2023-12-09 | End: 2023-12-14

## 2023-12-09 RX ORDER — FLUCONAZOLE 200 MG/1
400 TABLET ORAL DAILY
Status: DISCONTINUED | OUTPATIENT
Start: 2023-12-09 | End: 2023-12-09 | Stop reason: HOSPADM

## 2023-12-09 RX ORDER — METRONIDAZOLE 500 MG/1
500 TABLET ORAL EVERY 8 HOURS
Qty: 42 TABLET | Refills: 0 | Status: SHIPPED | OUTPATIENT
Start: 2023-12-09 | End: 2023-12-21 | Stop reason: SDUPTHER

## 2023-12-09 RX ORDER — LANOLIN ALCOHOL/MO/W.PET/CERES
400 CREAM (GRAM) TOPICAL 2 TIMES DAILY
Qty: 28 TABLET | Refills: 0 | Status: SHIPPED | OUTPATIENT
Start: 2023-12-09 | End: 2023-12-23

## 2023-12-09 RX ORDER — LANOLIN ALCOHOL/MO/W.PET/CERES
400 CREAM (GRAM) TOPICAL 2 TIMES DAILY
Status: DISCONTINUED | OUTPATIENT
Start: 2023-12-09 | End: 2023-12-09 | Stop reason: HOSPADM

## 2023-12-09 RX ADMIN — CIPROFLOXACIN HYDROCHLORIDE 500 MG: 500 TABLET, FILM COATED ORAL at 09:12

## 2023-12-09 RX ADMIN — VALACYCLOVIR HYDROCHLORIDE 1000 MG: 500 TABLET, FILM COATED ORAL at 09:12

## 2023-12-09 RX ADMIN — FLUCONAZOLE 400 MG: 200 TABLET ORAL at 01:12

## 2023-12-09 RX ADMIN — METRONIDAZOLE 500 MG: 500 TABLET ORAL at 05:12

## 2023-12-09 NOTE — NURSING
Patient discharged home with home health. Patient in stable condition. Home care instructions and follow up appointments given. Extra supplies provided. Instructed to  new prescriptions from pharmacy. All questions answered.

## 2023-12-09 NOTE — PLAN OF CARE
Patient is being followed by [facility]. Wife asked if CM could send clinical notes to facility. Clinical notes sent to [facility] at [phone].

## 2023-12-09 NOTE — PLAN OF CARE
SSC sent AVS via [employer] to established [facility] via [employer]- Radha @ [facility] contacted

## 2023-12-11 DIAGNOSIS — N17.9 AKI (ACUTE KIDNEY INJURY): Primary | ICD-10-CM

## 2023-12-11 LAB — FUNGUS SPEC CULT: NORMAL

## 2023-12-12 NOTE — PHYSICIAN QUERY
PT Name: Lionel León  MR #: [id number]    DOCUMENTATION CLARIFICATION     CDS/: Marilyn Ramsey RN CCDS             Contact information: [email or web address]    This form is a permanent document in the medical record.     Query Date: December 12, 2023    Dear Provider,  By submitting this query, we are merely seeking further clarification of documentation. Please utilize your independent clinical judgment when addressing the question(s) below.    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  He completed antibiotic course and was discharged home with home health on 11/30/2023.    CT abdomen pelvis  with IV contrast noted for interval development of peripherally enhancing fluid collection in the right lower abdomen measuring 5.3 x 4.2 x 9 cm and demonstrates a few tiny gas locule concerning for an abscess, also noted small subcutaneous fluid collection  with gas locules in the infraumbilical region measuring 2.5 x 2.3 x 4.8 cm likely postoperative though evolving abscess cannot be excluded.     Surgery consulted and recommended IR drainage.  Referred to hospital medicine service for further evaluation and management.      RLQ Intra-abdominal abscess post  CT-guided drainage/patrice drain ER provider note        ER provider note            H&P/CT scan              H&P        Surgical consult note 12/5 (drainage on 12/4)         Please clarify if _Intra-abdominal abscess____ (as it relates to __  Laparoscopic total colectomy and ileostomy (11/15)___) is:      [  ] Complication of the procedure     [  X ] Present, but not a complication of the procedure     [  ] Other (please specify): __________________     [  ] Clinically Undetermined       Please document in your progress notes daily for the duration of treatment until resolved and include in your discharge summary.

## 2023-12-13 ENCOUNTER — PATIENT OUTREACH (OUTPATIENT)
Dept: ADMINISTRATIVE | Facility: CLINIC | Age: 43
End: 2023-12-13
Payer: COMMERCIAL

## 2023-12-13 ENCOUNTER — PATIENT MESSAGE (OUTPATIENT)
Dept: ADMINISTRATIVE | Facility: CLINIC | Age: 43
End: 2023-12-13
Payer: COMMERCIAL

## 2023-12-13 NOTE — PROGRESS NOTES
C3 nurse attempted to contact [facility] for a TCC post hospital discharge follow up call. No answer. Left voicemail with callback information. The patient has a scheduled HOSFU appointment with Olivia Smith MD on 12/14/23 @ 1:15.

## 2023-12-14 NOTE — PROGRESS NOTES
C3 nurse spoke with Lionel León for a TCC post hospital discharge follow up call. The patient has a scheduled HOSFU appointment with Olivia Smith MD on 12/14/23 @ 1:15.

## 2023-12-14 NOTE — DISCHARGE SUMMARY
[facility] Hospital Medicine Discharge Summary    Admit Date: 12/2/2023  Discharge Date and Time: 12/9/2023, 01:28 pm  Admitting Physician:  Team  Discharging Physician: Shai Pierre MD.  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 Hospital course then complicated by Allison gangrene required multiple I&D on 11/11/2023, 11/12/2023 and 11/20/2023 of almost entire perineum, steroid tapered off and underwent laparoscopic total colectomy and ileostomy on 11/15/2023.  He completed antibiotic course and was discharged home with home health on 11/30/2023.     Presented back to the ED on 12/3/23 reporting a fever of 102 at home with associated mild nonproductive cough, mild cramping right lower quadrant abdominal pain, denies chest pain or shortness breath. Pt reports no increase in ileostomy output. Wife report she been changing perineal wound  dressing and did not notice any foul-smelling drainage or necrotic changes.     On arrival to ED he was afebrile and hemodynamically stable.  Labs notable for hemoglobin 7.5, WBC 6.86, creatinine 1.79, BUN 37, COVID 19 and flu negative, urinalysis unremarkable.  Chest x-ray with no airspace disease.  CT abdomen pelvis with IV  contrast noted for interval development of peripherally enhancing fluid collection in the right lower abdomen measuring 5.3 x 4.2 x 9 cm and demonstrates a few tiny gas locule concerning for an abscess, also noted small subcutaneous fluid collection with gas locules in the infraumbilical region measuring 2.5 x 2.3 x 4.8 cm likely postoperative though evolving abscess cannot be excluded.     Surgery consulted and recommended IR drainage.  Referred to hospital medicine service for further evaluation and management.    Blood cultures x2 were obtained. Started on empiric Zosyn and Vancomycin on admit. Underwent IR drainage with successful placement of a 10 [place] drain into the right abdominal collection om 12/4/23.  He has had several fevers since admit, however no leukocytosis and remained hemodynamically stable. Blood cultures remained negative thus far.  Abscess culture was neg for bacterial growth. During previous admit pt underwent EGD on 11/22 showed Herpes esophagitis and was taking oral acyclovir with improvement of  throat pain, denies any dysphagia. Antibiotic transitioned to oral  ciprofloxacin 500mg PO q12h and Flagyl 500mg PO q8h. On 12/9/23 updated culture reported growth of yeast with final identification of Candida albicans. Pt was initially started on IV Micafungin transition to oral Diflucan 400 mg daily. ID suggested to continue antimicrobial include Cipro, Flagyl and Diflucan for 14 days from 12/9 with end date 12/22/23. Pt will complete Valtrex 1 gram po bid on 12/15/23. ID cleared pt for discharge on 12/9/23. Pt was examined and deemed stable for discharge with Home Health. Of note CYNTHIA drain was removed on 12/8/23.       Pt was seen and examined on the day of discharge  Vitals:  VITAL SIGNS: 24 HRS MIN & MAX LAST   No data recorded 99.1 °F (37.3 °C)   No data recorded 113/71   No data recorded  80   No data recorded 20   No data recorded 97 %       Physical Exam:  GENERAL: In no acute distress,  afebrile  HEENT:  CHEST: Clear to auscultation bilaterally  HEART: S1, S2, no appreciable murmur  ABDOMEN: Soft, nontender, BS +, CYNTHIA drain  MSK: Warm, no lower extremity edema, no clubbing or cyanosis  NEUROLOGIC: Alert and oriented x4, moving all extremities with good strength   INTEGUMENTARY:  Refer to images  PSYCHIATRY:    Procedures Performed: No admission procedures for hospital encounter.     Significant Diagnostic Studies: See Full reports for all details    Recent Labs   Lab 12/07/23 0448 12/08/23  0508   WBC 4.86 4.54   RBC 2.84* 2.91*   HGB 7.7* 7.9*   HCT 24.6* 25.4*   MCV 86.6 87.3   MCH 27.1 27.1   MCHC 31.3* 31.1*   RDW 15.7 15.7    248   MPV 9.2 9.6       Recent Labs   Lab 12/07/23 0448 12/08/23  0508    141   K 3.6 4.3   CO2 21* 25   BUN 18.3 20.7*   CREATININE 1.85* 1.64*   CALCIUM 7.6* 8.0*   ALBUMIN 1.9*  --    ALKPHOS 88  --    ALT 18  --    AST 16  --    BILITOT 0.2  --         Microbiology Results (last 7 days)       Procedure Component Value Units Date/Time    Blood culture x two cultures. Draw prior to antibiotics. [[id number]]  (Normal) Collected: 12/02/23 2327    Order Status: Completed Specimen: Blood from Antecubital, Left Updated: 12/08/23 0001     CULTURE, BLOOD ([facility]) No Growth at 5 days    Blood culture x two cultures. Draw prior to antibiotics. [[id number]]  (Normal) Collected: 12/02/23 2327    Order Status: Completed Specimen: Blood from Antecubital, Right Updated: 12/08/23 0001     CULTURE, BLOOD ([facility]) No Growth at 5 days    Anaerobic Culture [[id number]] Collected: 12/04/23 1114    Order Status: Completed Specimen: Aspirate from Abdomen Updated: 12/07/23 0755     Anaerobe Culture No Anaerobes Isolated    Gram Stain [[id number]] Collected: 12/04/23 1114    Order Status: Completed Specimen: Aspirate from Abdomen Updated: 12/04/23 1326     GRAM STAIN Many WBC observed      No bacteria seen    Fungal Culture [[id number]] Collected: 12/04/23 1114    Order Status:  Resulted Specimen: Aspirate from Abdomen Updated: 12/04/23 1147             US Retroperitoneal Complete  Narrative: EXAMINATION:  US RETROPERITONEAL COMPLETE    CLINICAL HISTORY:  JUAN PABLO;, .    TECHNIQUE:  Transverse and longitudinal images of the kidneys  and bladder were obtained.    COMPARISON:  None    FINDINGS:  Right Kidney:    Length: 13.3 x 7.9 x 7.9 cm    Appearance: Normal echogenicity.    Collecting system: No hydronephrosis    Stones: None    Cyst/Mass: None    Left Kidney:    Length: 13.4 x 7 x 6.8 cm    Appearance: Normal echogenicity.    Collecting system: No hydronephrosis    Stones: None    Cyst/Mass: None    Bladder:    Normal    Vessels:    Visualized portions of the IVC and aorta have a normal grayscale appearance.  Impression: No significant abnormalities identified    Electronically signed by: Kemal Sanchez  Date:    12/07/2023  Time:    12:57         Medication List        START taking these medications      ciprofloxacin HCl 500 MG tablet  Commonly known as: CIPRO  Take 1 tablet (500 mg total) by mouth every 12 (twelve) hours. for 14 days     fluconazole 200 MG Tab  Commonly known as: DIFLUCAN  Take 2 tablets (400 mg total) by mouth once daily. for 14 days     magnesium oxide 400 mg (241.3 mg magnesium) tablet  Commonly known as: MAG-OX  Take 1 tablet (400 mg total) by mouth 2 (two) times daily. for 14 days     metroNIDAZOLE 500 MG tablet  Commonly known as: FLAGYL  Take 1 tablet (500 mg total) by mouth every 8 (eight) hours. for 14 days     valACYclovir 1000 MG tablet  Commonly known as: VALTREX  Take 1 tablet (1,000 mg total) by mouth 2 (two) times daily. for 5 days            CONTINUE taking these medications      oxyCODONE-acetaminophen  mg per tablet  Commonly known as: PERCOCET  Take 1 tablet by mouth every 6 (six) hours as needed for Pain.            STOP taking these medications      acyclovir 400 MG tablet  Commonly known as: ZOVIRAX               Where to Get Your Medications         These medications were sent to [id number] - [place] [address] [facility]  [place] [address]      Phone: [phone]   ciprofloxacin HCl 500 MG tablet  fluconazole 200 MG Tab  magnesium oxide 400 mg (241.3 mg magnesium) tablet  metroNIDAZOLE 500 MG tablet  valACYclovir 1000 MG tablet          Explained in detail to the patient about the discharge plan, medications, and follow-up visits. Pt understands and agrees with the treatment plan  Discharge Disposition: Home-Health Care [facility]   Discharged Condition: stable  Diet-    Medications Per [place] med rec  Activities as tolerated   Follow-up Information       Surgery, [facility]. Go on 12/19/2023.    Why: [address]     12/19/23 @ 1010 for IR drain  Contact information:  [address] [place] [address]  [phone]               Mariano Ramirez, DO Follow up in 1 month(s).    Specialty: Nephrology  Contact information:  [address] [facility]  [place] [address]  [phone]               Jer [place] MD Follow up in 1 week(s).    Specialty: Infectious Diseases  Why: Infectious Disease follow up  Contact information:  [address] [place] [address] [address] [place] [address]  [phone]               [facility], Provider. Schedule an appointment as soon as possible for a visit in 3 day(s).    Why: Have pt follow up with PCP in 3 to 7 days                         For further questions contact hospitalist office    Discharge time 33 minutes    For worsening symptoms, chest pain, shortness of breath, increased abdominal pain, high grade fever, stroke or stroke like symptoms, immediately go to the nearest Emergency Room or call 911 as soon as possible.      Shai Hernandez M.D, on 12/9/2023, 01:28 pm

## 2023-12-15 ENCOUNTER — HOSPITAL ENCOUNTER (OUTPATIENT)
Dept: RADIOLOGY | Facility: HOSPITAL | Age: 43
Discharge: HOME | End: 2023-12-15
Attending: GENERAL PRACTICE
Payer: COMMERCIAL

## 2023-12-15 DIAGNOSIS — N17.9 AKI (ACUTE KIDNEY INJURY): ICD-10-CM

## 2023-12-15 PROCEDURE — 74176 CT ABD & PELVIS W/O CONTRAST: CPT | Mod: TC

## 2023-12-18 LAB — FUNGUS SPEC CULT: NORMAL

## 2023-12-20 RX ORDER — ERGOCALCIFEROL 1.25 MG/1
1 CAPSULE ORAL
COMMUNITY
Start: 2023-04-03 | End: 2024-03-28

## 2023-12-20 RX ORDER — CYCLOPENTOLATE HYDROCHLORIDE 10 MG/ML
1 SOLUTION/ DROPS OPHTHALMIC 4 TIMES DAILY
COMMUNITY
Start: 2023-10-16

## 2023-12-20 RX ORDER — ADALIMUMAB 80MG/0.8ML
KIT SUBCUTANEOUS
COMMUNITY
Start: 2023-11-13

## 2023-12-20 RX ORDER — PREDNISOLONE ACETATE 10 MG/ML
1 SUSPENSION/ DROPS OPHTHALMIC 4 TIMES DAILY
COMMUNITY
Start: 2023-10-16

## 2023-12-20 NOTE — DISCHARGE SUMMARY
[facility] Hospital Medicine Discharge Summary    Admit Date: 11/6/2023  Discharge Date and Time: 11/30/2023  Admitting Physician:  Team  Discharging Physician: Sloan Alvarez MD.  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  Off abx's  Colorectal surgery follow up    Home health with Wound /surgery wound  follow up .    completed hyperbaric therapy.    Tolerating po and Nutritional supplements.  GI follow up  Off of immunosuppression.  Plastic surgery follow up as outpt   follow up on pathology on  Outpatient follow-ups.         Hospital Course:   Lionel León is a 42 y.o. White male with a past medical history of diverticulitis status post colon resection and ulcerative colitis. Patient had recent admission to hospital medicine services at [facility] on 10/21/2023 to 10/24/2023 for sepsis secondary to pancolitis. He was treated with antibiotics and GI was consulted. Colonoscopy was performed on 10/23/2023 with findings concerning for ulcerative colitis and biopsies of the cecum, ascending, transverse, ascending colon and rectum positive for active chronic colitis consistent with inflammatory bowel disorder. No transfusion was required for rectal bleeding. Patient was discharged with prednisone taper and GI follow up appointment on 11/17/2023. The patient presented to [facility] on 11/6/2023 with a primary complaint of bright red rectal bleeding and lower abdominal pain.  Patient reports rectal bleeding has  been ongoing since discharge.  Other associated symptoms include nausea, rectal pain, subjective fevers, weakness, fatigue and a 30- 40 lb weight loss over last 3 weeks.  Patient states this morning he was walking to the bathroom when he felt flushed and vision went black. He passed out and hit his head on the wooden floor. Approximately hour and a half later when he went to get up he passed out again.  Second syncopal episode was witnessed by patient's wife who is at bedside reports loss of consciousness lasts for approximately 30 seconds.    Upon presentation to the ED, temperature 98.3F, heart rate 109, blood pressure 96/51, respiratory rate 17, spO2 99%. Labs with H&H 7.3/23.6 (10.6/33.2 on 10/24/2023), BUN 21.9, creatinine 0.88, calcium 7.7, .6, ESR 86. EKG sinus tachycardia with a ventricular rate of 108 and age undetermined possible inferior infarct. In the ED patient received Dilaudid, Zofran, Hydrocortisone and IVF. He was typed and crossmatched for transfusion of 2 units of packed RBC. Patient is admitted to hospital medicine services for further medical management.       Patient was seen by GI and started on IV steroids, Solu-Medrol 30 mg IV b.I.d. for active ulcerative colitis flare up  Plus Anusol suppository per rectum b.I.d..  Patient is status post 2 units PRBC for symptomatic anemia.  CT abdomen and pelvis was also ordered to assess extent. CT of the abdomen and pelvis was pertinent for findings consistent with colitis involving the ascending colon, portions of the descending colon.  Underlying gastritis also can not be excluded secondary to slightly thickened gastric wall.     Main complain continues to be rectal pain but refers that suppositories are helping.  Patient is tolerating p.o. intake    According to GI notes plans is for patient to start Humira/imura once approved by patient's insurance. Pt spiked several temps and on November 11th he started reporting rectal pain , on exam he had  moderate erythema to perineum /anus  and buttocks.  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  Taken to the operating room same day by General surgery found to have necrotic tissue extending to the muscle encircling his anus.  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  With ongoing infection immunosuppression with steroids have been tapered off and no further plans for Humira.  Infectious Disease was also consulted and recommended continuing Zosyn.  Wound Care Clinic was also consulted and started on hyperbaric therapy and completed.  Patient once again started having fevers and developed concern for worsening sepsis, was taken to the operating room by a surgery November 20th and underwent diagnostic laparoscopy which showed slightly murky serous fluid bilateral pericolic gutters but no succus or stools, also underwent debridement of the scrotum/perineum and found to have some pus draining which was debrided.  Discussed plans of deescalating antibiotics. Patient is off antibiotics.  Kidney function improving   Wife will get help from [facility] wound care on wheels and will set him  up with [facility]. Will make plans for discharge with follow up w all consultants. Pt is off abx's/ tolerating  diet w good ostomy output     Case was discussed with patient's nurse and  on the floor.  Pt was seen and examined on the day of discharge        Physical Exam:  General: In no acute distress, afebrile  Chest: Clear to auscultation bilaterally  Heart: RRR, +S1, S2, no appreciable murmur  Abdomen: Soft, nontender, BS + ileostomy  MSK: Warm, no lower extremity edema, no clubbing or cyanosis  Neurologic: Alert and oriented x4, Cranial nerve II-XII intact, Strength 5/5 in all 4 extremities  Perineum/ sacrum - pictures reviewed. Progress Shown to me by wife                 Medication List        START taking  these medications      oxyCODONE-acetaminophen  mg per tablet  Commonly known as: PERCOCET  Take 1 tablet by mouth every 6 (six) hours as needed for Pain.            STOP taking these medications      dicyclomine 20 mg tablet  Commonly known as: BENTYL     predniSONE 10 MG tablet  Commonly known as: DELTASONE               Where to Get Your Medications        You can get these medications from any pharmacy    Bring a paper prescription for each of these medications  oxyCODONE-acetaminophen  mg per tablet          Explained in detail to the patient about the discharge plan, medications, and follow-up visits. Pt understands and agrees with the treatment plan  Discharge Disposition:home health/wound care  Discharged Condition: stable  Diet- -Continue Low Residue Diet as tolerated.            -Continue Boost Plus and Jed for additional nourishment.    Medications Per [place] med rec  Activities as tolerated   Follow-up Information       Saul He MD Follow up on 12/13/2023.    Specialty: Gastroenterology  Why: @ 8:30  Contact information:  [address]  [place] [address]  [phone]               Fredi Shields MD Follow up on 12/31/2023.    Specialty: Colon and Rectal Surgery  Why: @ 9 am  Contact information:  [address]  [address]  [place] [address]  [phone]               Chandana Guidry MD Follow up.    Specialty: General Surgery  Why: Doctors office will call back with time and date  Contact information:  [address]   [address]  [place] [address]  [phone]               [facility], Provider Follow up on 12/14/2023.    Why: @ 1:15pm with Doctor Rod Chowdhury MD Follow up on 12/6/2023.    Specialty: Plastic Surgery  Why: @ 2:30  Contact information:  [address] [facility]  [address]  [place] [address]  [phone]               [facility] Follow up.    Specialties: Home Health Services, Home Therapy Services, Home Living Aide  Services  Why: This is your home health agency  Contact information:  [address]  [facility] [address]  [phone]                         For further questions contact hospitalist office    Discharge time 33 minutes    For worsening symptoms, chest pain, shortness of breath, increased abdominal pain, high grade fever, stroke or stroke like symptoms, immediately go to the nearest Emergency Room or call 911 as soon as possible.      Sloan Sparks M.D.

## 2023-12-21 ENCOUNTER — OFFICE VISIT (OUTPATIENT)
Dept: INFECTIOUS DISEASES | Facility: CLINIC | Age: 43
End: 2023-12-21
Payer: COMMERCIAL

## 2023-12-21 ENCOUNTER — OFFICE VISIT (OUTPATIENT)
Dept: SURGICAL ONCOLOGY | Facility: CLINIC | Age: 43
End: 2023-12-21
Payer: COMMERCIAL

## 2023-12-21 VITALS
BODY MASS INDEX: 20.1 KG/M2 | WEIGHT: 140.38 LBS | SYSTOLIC BLOOD PRESSURE: 111 MMHG | HEIGHT: 70 IN | DIASTOLIC BLOOD PRESSURE: 79 MMHG | HEART RATE: 117 BPM

## 2023-12-21 VITALS
WEIGHT: 146 LBS | OXYGEN SATURATION: 99 % | SYSTOLIC BLOOD PRESSURE: 109 MMHG | HEART RATE: 112 BPM | HEIGHT: 70 IN | BODY MASS INDEX: 20.9 KG/M2 | RESPIRATION RATE: 18 BRPM | DIASTOLIC BLOOD PRESSURE: 74 MMHG

## 2023-12-21 DIAGNOSIS — K51.919 ULCERATIVE COLITIS WITH COMPLICATION, UNSPECIFIED LOCATION: ICD-10-CM

## 2023-12-21 DIAGNOSIS — R18.8 INTRAABDOMINAL FLUID COLLECTION: Primary | ICD-10-CM

## 2023-12-21 DIAGNOSIS — K51.819 OTHER ULCERATIVE COLITIS WITH COMPLICATION: Primary | ICD-10-CM

## 2023-12-21 DIAGNOSIS — D84.9 IMMUNOSUPPRESSION: ICD-10-CM

## 2023-12-21 DIAGNOSIS — Z90.49 S/P TOTAL COLECTOMY: ICD-10-CM

## 2023-12-21 PROCEDURE — 1160F RVW MEDS BY RX/DR IN RCRD: CPT | Mod: CPTII,S$GLB,, | Performed by: COLON & RECTAL SURGERY

## 2023-12-21 PROCEDURE — 99999 PR PBB SHADOW E&M-EST. PATIENT-LVL III: ICD-10-PCS | Mod: PBBFAC,,, | Performed by: COLON & RECTAL SURGERY

## 2023-12-21 PROCEDURE — 3074F PR MOST RECENT SYSTOLIC BLOOD PRESSURE < 130 MM HG: ICD-10-PCS | Mod: CPTII,S$GLB,,

## 2023-12-21 PROCEDURE — 1159F PR MEDICATION LIST DOCUMENTED IN MEDICAL RECORD: ICD-10-PCS | Mod: CPTII,S$GLB,, | Performed by: COLON & RECTAL SURGERY

## 2023-12-21 PROCEDURE — 3008F BODY MASS INDEX DOCD: CPT | Mod: CPTII,S$GLB,,

## 2023-12-21 PROCEDURE — 1160F PR REVIEW ALL MEDS BY PRESCRIBER/CLIN PHARMACIST DOCUMENTED: ICD-10-PCS | Mod: CPTII,S$GLB,, | Performed by: COLON & RECTAL SURGERY

## 2023-12-21 PROCEDURE — 99215 OFFICE O/P EST HI 40 MIN: CPT | Mod: S$GLB,,,

## 2023-12-21 PROCEDURE — 3078F PR MOST RECENT DIASTOLIC BLOOD PRESSURE < 80 MM HG: ICD-10-PCS | Mod: CPTII,S$GLB,, | Performed by: COLON & RECTAL SURGERY

## 2023-12-21 PROCEDURE — 99999 PR PBB SHADOW E&M-EST. PATIENT-LVL IV: ICD-10-PCS | Mod: PBBFAC,,,

## 2023-12-21 PROCEDURE — 99999 PR PBB SHADOW E&M-EST. PATIENT-LVL III: CPT | Mod: PBBFAC,,, | Performed by: COLON & RECTAL SURGERY

## 2023-12-21 PROCEDURE — 99417 PR PROLONGED SVC, OUTPT, W/WO DIRECT PT CONTACT,  EA ADDTL 15 MIN: ICD-10-PCS | Mod: S$GLB,,,

## 2023-12-21 PROCEDURE — 3008F PR BODY MASS INDEX (BMI) DOCUMENTED: ICD-10-PCS | Mod: CPTII,S$GLB,,

## 2023-12-21 PROCEDURE — 99024 PR POST-OP FOLLOW-UP VISIT: ICD-10-PCS | Mod: S$GLB,,, | Performed by: COLON & RECTAL SURGERY

## 2023-12-21 PROCEDURE — 1111F DSCHRG MED/CURRENT MED MERGE: CPT | Mod: CPTII,S$GLB,,

## 2023-12-21 PROCEDURE — 99024 POSTOP FOLLOW-UP VISIT: CPT | Mod: S$GLB,,, | Performed by: COLON & RECTAL SURGERY

## 2023-12-21 PROCEDURE — 3074F SYST BP LT 130 MM HG: CPT | Mod: CPTII,S$GLB,,

## 2023-12-21 PROCEDURE — 3074F SYST BP LT 130 MM HG: CPT | Mod: CPTII,S$GLB,, | Performed by: COLON & RECTAL SURGERY

## 2023-12-21 PROCEDURE — 1111F PR DISCHARGE MEDS RECONCILED W/ CURRENT OUTPATIENT MED LIST: ICD-10-PCS | Mod: CPTII,S$GLB,,

## 2023-12-21 PROCEDURE — 3078F DIAST BP <80 MM HG: CPT | Mod: CPTII,S$GLB,, | Performed by: COLON & RECTAL SURGERY

## 2023-12-21 PROCEDURE — 3078F DIAST BP <80 MM HG: CPT | Mod: CPTII,S$GLB,,

## 2023-12-21 PROCEDURE — 99215 PR OFFICE/OUTPT VISIT, EST, LEVL V, 40-54 MIN: ICD-10-PCS | Mod: S$GLB,,,

## 2023-12-21 PROCEDURE — 1159F MED LIST DOCD IN RCRD: CPT | Mod: CPTII,S$GLB,, | Performed by: COLON & RECTAL SURGERY

## 2023-12-21 PROCEDURE — 3078F PR MOST RECENT DIASTOLIC BLOOD PRESSURE < 80 MM HG: ICD-10-PCS | Mod: CPTII,S$GLB,,

## 2023-12-21 PROCEDURE — 99999 PR PBB SHADOW E&M-EST. PATIENT-LVL IV: CPT | Mod: PBBFAC,,,

## 2023-12-21 PROCEDURE — 3074F PR MOST RECENT SYSTOLIC BLOOD PRESSURE < 130 MM HG: ICD-10-PCS | Mod: CPTII,S$GLB,, | Performed by: COLON & RECTAL SURGERY

## 2023-12-21 PROCEDURE — 99417 PROLNG OP E/M EACH 15 MIN: CPT | Mod: S$GLB,,,

## 2023-12-21 RX ORDER — METRONIDAZOLE 500 MG/1
500 TABLET ORAL EVERY 8 HOURS
Qty: 42 TABLET | Refills: 0 | Status: SHIPPED | OUTPATIENT
Start: 2023-12-21 | End: 2024-01-04

## 2023-12-21 RX ORDER — FLUCONAZOLE 200 MG/1
400 TABLET ORAL DAILY
Qty: 28 TABLET | Refills: 0 | Status: SHIPPED | OUTPATIENT
Start: 2023-12-21 | End: 2024-01-04

## 2023-12-21 RX ORDER — CIPROFLOXACIN 500 MG/1
500 TABLET ORAL EVERY 12 HOURS
Qty: 28 TABLET | Refills: 0 | Status: SHIPPED | OUTPATIENT
Start: 2023-12-21 | End: 2024-01-04

## 2023-12-21 NOTE — PROGRESS NOTES
Subjective:       Patient ID: Lionel León 43 y.o.     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  He underwent multiple debridements at that time as well as a total colectomy on 11/15.  Patient had previously been on Humira, however that has been held since recent hospitalization.  He completed a 14 day course of antimicrobials with Zosyn from date of last debridement, completed on 11/27.  He was discharged home however returned on 12/02 with complaints of fever and abdominal pain.  CT showed concern for abscess in the right abdomen, status post IR drainage earlier today.  He has had several fevers since admit, however no leukocytosis and has been hemodynamically stable.  Currently overall feeling better without complaints.  Blood cultures are negative thus far.  He is currently receiving empiric vancomycin and Zosyn.  Of note, patient reports being notified of the day of discharge recently that EGD results from 11/22 showed herpes esophagitis.  He has been taking prescribed oral acyclovir and reports improvement and throat pain, denies any dysphagia.  We have been consulted for further assistance with antimicrobials.     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  Continue fluconazole 400 mg p.o. Q 24 hours, ciprofloxacin 500mg PO q12h and Flagyl 500mg PO q8h.  Will need at least a 2 week abx course from drain placement (and at least 2  weeks of antifungal coverage) with repeat CT prior to discontinuing abx.  Continue Valtrex 1g PO q12h for HSV esophagitis - end date: 12/15.   Wound care as ordered.   F/u surgery plans re: drain.   Discussed with patient and wife.     12/21/2023 office visit:  Mr. León presents for follow-up today. He continues on ciprofloxacin, flagyl, and fluconazole.  He denies any nausea or vomiting.  His colostomy output has been around 500-800 cc daily.  He denies any fever, chills, or night sweats.  He is having ongoing abdominal pain. Denies any decrease in appetite or trouble urinating. Is drinking plenty of water. No dysuria or cloudy urine noted.     Past Medical History:   Diagnosis Date    Diverticulitis     Ulcerative colitis         Past Surgical History:   Procedure Laterality Date    COLON SURGERY  11/20/23    COLONOSCOPY, WITH 1 OR MORE BIOPSIES N/A 10/23/2023    Procedure: COLON;  Surgeon: Dragan Underwood MD;  Location: [facility] ENDOSCOPY;  Service: Gastroenterology;  Laterality: N/A;    DEBRIDEMENT OF SACRAL WOUND N/A 11/20/2023    Procedure: DEBRIDEMENT, WOUND, SACRUM;  Surgeon: Chandana Guidry MD;  Location: [facility];  Service: General;  Laterality: N/A;  perineum/sacrum// high lithotomy    DEBRIDEMENT, EXTERNAL GENITALIA, PERINEUM, AND ABDOMINAL WALL, FOR NECROTIZING SOFT TISSUE INFECTION Bilateral 11/11/2023    Procedure: DEBRIDEMENT, EXTERNAL GENITALIA/BUTTOCKS FOR NECROTIZING SOFT TISSUE INFECTION;  Surgeon: Vladimir Vick MD;  Location: [facility];  Service: General;  Laterality: Bilateral;  Prone positioning.    DEBRIDEMENT, EXTERNAL GENITALIA, PERINEUM, AND ABDOMINAL WALL, FOR NECROTIZING SOFT TISSUE INFECTION N/A 11/12/2023    Procedure: DEBRIDEMENT, EXTERNAL GENITALIA, PERINEUM, AND ABDOMINAL WALL, FOR NECROTIZING SOFT TISSUE INFECTION;  Surgeon: Vladimir Vick MD;  Location: [facility];  Service: General;  Laterality: N/A;  Place patient in dorsal lithotomy positioning.    DIAGNOSTIC  "LAPAROSCOPY N/A 11/20/2023    Procedure: LAPAROSCOPY, DIAGNOSTIC;  Surgeon: Fredi Shields MD;  Location: [facility] OR;  Service: General;  Laterality: N/A;    EGD, WITH CLOSED BIOPSY N/A 11/22/2023    Procedure: EGD, WITH CLOSED BIOPSY;  Surgeon: Blayne Mujica MD;  Location: [facility] ENDOSCOPY;  Service: Gastroenterology;  Laterality: N/A;    LAPAROSCOPIC ILEOSTOMY N/A 11/15/2023    Procedure: CREATION, ILEOSTOMY, LAPAROSCOPIC;  Surgeon: Fredi Shields MD;  Location: [facility] OR;  Service: Colon and Rectal;  Laterality: N/A;    LAPAROSCOPIC TOTAL COLECTOMY N/A 11/15/2023    Procedure: COLECTOMY, TOTAL, LAPAROSCOPIC;  Surgeon: Fredi Shields MD;  [facility];  Service: Colon and Rectal;  Laterality: N/A;  LAP TOTAL COLECTOMY WITH ILEOSTOMY    VASECTOMY  3/15/08        Social History     Socioeconomic History    Marital status:    Tobacco Use    Smoking status: Never    Smokeless tobacco: Never   Substance and Sexual Activity    Alcohol use: Not Currently     Comment: Social    Drug use: Never    Sexual activity: Yes     Partners: Female     Social Determinants of Health     Food Insecurity: Unknown (11/7/2023)    Hunger Vital Sign     Worried About Running Out of Food in the Last Year: Never true   Transportation Needs: Unknown (11/7/2023)    PRAPARE - Transportation     Lack of Transportation (Medical): No   Housing Stability: Unknown (11/7/2023)    Housing Stability Vital Sign     Unable to Pay for Housing in the Last Year: No        History reviewed. No pertinent family history.     Review of patient's allergies indicates:  No Known Allergies       There is no immunization history on file for this patient.     Review of Systems   All other systems reviewed and are negative.         Objective:      /74 (BP Location: Right arm)   Pulse (!) 112   Resp 18   Ht 5' 10" (1.778 m)   Wt 66.2 kg (146 lb)   SpO2 99%   BMI 20.95 kg/m²      Physical Exam  Vitals reviewed. "   Constitutional:       General: He is not in acute distress.     Appearance: Normal appearance. He is not toxic-appearing.   HENT:      Mouth/Throat:      Mouth: Mucous membranes are moist.      Pharynx: No oropharyngeal exudate or posterior oropharyngeal erythema.   Eyes:      General: No scleral icterus.     Conjunctiva/sclera: Conjunctivae normal.      Pupils: Pupils are equal, round, and reactive to light.   Cardiovascular:      Rate and Rhythm: Normal rate and regular rhythm.      Pulses: Normal pulses.      Heart sounds: No murmur heard.  Pulmonary:      Effort: Pulmonary effort is normal. No respiratory distress.      Breath sounds: Normal breath sounds.   Abdominal:      General: Abdomen is flat. Bowel sounds are normal.      Palpations: Abdomen is soft.      Tenderness: There is no abdominal tenderness.      Comments: R colostomy    Musculoskeletal:         General: No swelling.      Cervical back: Normal range of motion and neck supple.   Lymphadenopathy:      Cervical: No cervical adenopathy.   Skin:     General: Skin is warm and dry.      Findings: No rash.   Neurological:      General: No focal deficit present.      Mental Status: He is alert and oriented to person, place, and time.   Psychiatric:         Mood and Affect: Mood normal.         Behavior: Behavior normal.          Labs: Reviewed most recent relevant labs available, notable results highlighted in this note    Imaging: Reviewed most recent relevant imaging studies available, notable results highlighted in this note    Assessment:       Problem List Items Addressed This Visit          Immunology/Multi System    Immunosuppression       GI    Ulcerative colitis with complication    Intraabdominal fluid collection - Primary    Relevant Orders    CT Abdomen Pelvis W Wo Contrast    CBC Auto Differential    Comprehensive Metabolic Panel    S/P total colectomy          Plan:      -Mr. León is currently completing a course of oral antibiotics.  He is currently on fluconazole 400 mg p.o. Q 24 hours, ciprofloxacin 500mg PO q12h and Flagyl 500mg PO q8h.  -Repeat CT abdomen/pelvis on 12/15 with questionable mild amount of residual fluid seen in the right paracolic gutter with residual inflammatory changes as well as persistent fluid collection in the anterior abdominal wall periumbilical region. Both kidneys appear enlarged in size with some perinephric stranding bilaterally.   -most recent 19/33 with WBC 10.1, creatinine 1.40, ALT 28/AST 35  -given ongoing amount of residual fluid in the abdomen, we will continue oral antibiotics for an additional 14 days and will repeat CT abdomen and pelvis with and without contrast prior to discontinuing antimicrobial therapy.  -we will repeat CBC and CMP again in 1 week to determine kidney function prior to administering CT contrast.  -educated on systemic signs of infection to monitor for such as fever, chills, or night sweats.  If these persist may need further evaluation with imaging and lab work such as blood cultures.  -we will follow up again in 3 weeks with repeat imaging 1 week prior.    Follow up in about 3 weeks (around 1/11/2024).     73 minutes of total time spent on the encounter, which includes face to face time and non-face to face time preparing to see the patient (eg, review of tests), Obtaining and/or reviewing separately obtained history, Documenting clinical information in the electronic or other health record, Independently interpreting results (not separately reported) and communicating results to the patient/family/caregiver, or Care coordination (not separately reported).

## 2023-12-22 NOTE — PROGRESS NOTES
"   Patient ID: [id number]     HPI:     Lionel León is a 43 y.o. male here today for a post op visit.  Doing fine.  No complaints except not gaining weight.  He is "eating a ton".  Ileostomy working well, reports approximately 500 mL/day.  Denies fever, nausea, vomiting, abdominal pain.    Current Outpatient Medications   Medication Instructions    ciprofloxacin HCl (CIPRO) 500 mg, Oral, Every 12 hours    cyclopentolate 1% (CYCLOGYL) 1 % ophthalmic solution 1 drop, Right Eye, 4 times daily    ergocalciferol (ERGOCALCIFEROL) 50,000 unit Cap 1 capsule    fluconazole (DIFLUCAN) 400 mg, Oral, Daily    HUMIRA,CF, PEN CROHNS-[facility]-HS 80 mg/0.8 mL PnKt Subcutaneous    magnesium oxide (MAG-OX) 400 mg, Oral, 2 times daily    metroNIDAZOLE (FLAGYL) 500 mg, Oral, Every 8 hours    prednisoLONE acetate (PRED FORTE) 1 % DrpS 1 drop, Right Eye, 4 times daily    valACYclovir (VALTREX) 1,000 mg, Oral, 2 times daily       Patient has No Known Allergies.     Patient Care Team:  No, Primary Doctor as PCP - General       Objective:     Visit Vitals  /79   Pulse (!) 117   Ht 5' 10" (1.778 m)   Wt 63.7 kg (140 lb 6.4 oz)   BMI 20.15 kg/m²       Physical Exam    General: Alert and oriented, No acute distress.  Head: Normocephalic, Atraumatic.  Eye: Sclera non-icteric.  Respiratory: Non-labored respirations, Symmetrical chest wall expansion.  Cardiac: Regular rate.  Gastrointestinal: Soft, Non-distended. Incisions healing. RLQ ileostomy pink and working.   Extremities: No lower extremity edema.  Integumentary: Warm, Dry, Intact.  Neurologic: No focal deficits.      Assessment:       ICD-10-CM ICD-9-CM   1. Other ulcerative colitis with complication  K51.819 556.8        Plan:   RTC 4 weeks      No follow-ups on file. In addition to their scheduled follow up, the patient has also been instructed to follow up on as needed basis.     Future Appointments   Date Time Provider Department Center   1/4/2024  2:00 PM [facility] CT1  440 LB LIMIT " [facility]   1/11/2024 11:00 AM Iker Hernandez FNP [facility] INFECT [place]   1/25/2024  8:30 AM Fredi Shields MD [facility] [address]        Fredi Shields MD

## 2023-12-26 ENCOUNTER — TELEPHONE (OUTPATIENT)
Dept: INFECTIOUS DISEASES | Facility: CLINIC | Age: 43
End: 2023-12-26
Payer: COMMERCIAL

## 2023-12-26 LAB
FUNGUS SPEC CULT: NORMAL
MYCOBACTERIUM SPEC QL CULT: NORMAL

## 2023-12-26 NOTE — TELEPHONE ENCOUNTER
----- Message from Radha Gallagher sent at 12/26/2023 10:36 AM CST -----  Regarding: lab orders  Pt wife called asking # [phone] if he can get orders for [facility] to do his labs

## 2023-12-26 NOTE — TELEPHONE ENCOUNTER
CALLED [facility] AND LEFT MESSAGE FOR A VERBAL ORDER FOR A CBC AND CMP.. CALLED WIFE TO INFORM HER BUT NO ANSWER.

## 2023-12-27 PROBLEM — Z90.49 S/P TOTAL COLECTOMY: Status: ACTIVE | Noted: 2023-12-27

## 2023-12-27 PROBLEM — D84.9 IMMUNOSUPPRESSION: Status: ACTIVE | Noted: 2023-12-27

## 2023-12-28 ENCOUNTER — DOCUMENT SCAN (OUTPATIENT)
Dept: HOME HEALTH SERVICES | Facility: HOSPITAL | Age: 43
End: 2023-12-28
Payer: COMMERCIAL

## 2024-01-02 ENCOUNTER — DOCUMENT SCAN (OUTPATIENT)
Dept: HOME HEALTH SERVICES | Facility: HOSPITAL | Age: 44
End: 2024-01-02
Payer: COMMERCIAL

## 2024-01-02 LAB — FUNGUS SPEC CULT: NORMAL

## 2024-01-03 ENCOUNTER — EXTERNAL HOME HEALTH (OUTPATIENT)
Dept: HOME HEALTH SERVICES | Facility: HOSPITAL | Age: 44
End: 2024-01-03
Payer: COMMERCIAL

## 2024-01-04 ENCOUNTER — HOSPITAL ENCOUNTER (OUTPATIENT)
Dept: RADIOLOGY | Facility: HOSPITAL | Age: 44
Discharge: HOME | End: 2024-01-04
Payer: COMMERCIAL

## 2024-01-04 ENCOUNTER — TELEPHONE (OUTPATIENT)
Dept: INFECTIOUS DISEASES | Facility: CLINIC | Age: 44
End: 2024-01-04
Payer: COMMERCIAL

## 2024-01-04 DIAGNOSIS — R18.8 INTRAABDOMINAL FLUID COLLECTION: ICD-10-CM

## 2024-01-04 PROCEDURE — 25500020 PHARM REV CODE 255

## 2024-01-04 PROCEDURE — 74178 CT ABD&PLV WO CNTR FLWD CNTR: CPT | Mod: TC

## 2024-01-04 RX ADMIN — DIATRIZOATE MEGLUMINE AND DIATRIZOATE SODIUM 30 ML: 660; 100 LIQUID ORAL; RECTAL at 03:01

## 2024-01-04 RX ADMIN — IOPAMIDOL 100 ML: 755 INJECTION, SOLUTION INTRAVENOUS at 04:01

## 2024-01-04 NOTE — TELEPHONE ENCOUNTER
----- Message from Radha Gallagher sent at 1/4/2024  2:28 PM CST -----  Re DENNIS with [facility] [phone] would like patient's wound care orders. Fax # [phone]      Spoke with JEANNIE Perrin and advised her to call the Wound care company for wound care orders.   She then advised she just found the Wound care company in patient's new chart she was looking at the old chart and will call them.

## 2024-01-09 ENCOUNTER — DOCUMENT SCAN (OUTPATIENT)
Dept: HOME HEALTH SERVICES | Facility: HOSPITAL | Age: 44
End: 2024-01-09
Payer: COMMERCIAL

## 2024-01-11 ENCOUNTER — OFFICE VISIT (OUTPATIENT)
Dept: INFECTIOUS DISEASES | Facility: CLINIC | Age: 44
End: 2024-01-11
Payer: COMMERCIAL

## 2024-01-11 VITALS
DIASTOLIC BLOOD PRESSURE: 77 MMHG | WEIGHT: 146 LBS | BODY MASS INDEX: 20.9 KG/M2 | SYSTOLIC BLOOD PRESSURE: 109 MMHG | OXYGEN SATURATION: 99 % | HEIGHT: 70 IN | RESPIRATION RATE: 18 BRPM | HEART RATE: 97 BPM

## 2024-01-11 DIAGNOSIS — R18.8 INTRAABDOMINAL FLUID COLLECTION: Primary | ICD-10-CM

## 2024-01-11 DIAGNOSIS — D84.9 IMMUNOSUPPRESSION: ICD-10-CM

## 2024-01-11 DIAGNOSIS — Z90.49 S/P TOTAL COLECTOMY: ICD-10-CM

## 2024-01-11 PROCEDURE — 99214 OFFICE O/P EST MOD 30 MIN: CPT | Mod: S$GLB,,,

## 2024-01-11 PROCEDURE — 3078F DIAST BP <80 MM HG: CPT | Mod: CPTII,S$GLB,,

## 2024-01-11 PROCEDURE — 3074F SYST BP LT 130 MM HG: CPT | Mod: CPTII,S$GLB,,

## 2024-01-11 PROCEDURE — 1159F MED LIST DOCD IN RCRD: CPT | Mod: CPTII,S$GLB,,

## 2024-01-11 PROCEDURE — 3008F BODY MASS INDEX DOCD: CPT | Mod: CPTII,S$GLB,,

## 2024-01-11 PROCEDURE — 99999 PR PBB SHADOW E&M-EST. PATIENT-LVL IV: CPT | Mod: PBBFAC,,,

## 2024-01-11 RX ORDER — TALC
POWDER (GRAM) TOPICAL
COMMUNITY
Start: 2023-12-19 | End: 2024-06-13

## 2024-01-11 NOTE — PROGRESS NOTES
Subjective:       Patient ID: Lionel León 43 y.o.     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  He underwent multiple debridements at that time as well as a total colectomy on 11/15.  Patient had previously been on Humira, however that has been held since recent hospitalization.  He completed a 14 day course of antimicrobials with Zosyn from date of last debridement, completed on 11/27.  He was discharged home however returned on 12/02 with complaints of fever and abdominal pain.  CT showed concern for abscess in the right abdomen, status post IR drainage earlier today.  He has had several fevers since admit, however no leukocytosis and has been hemodynamically stable.  Currently overall feeling better without complaints.  Blood cultures are negative thus far.  He is currently receiving empiric vancomycin and Zosyn.  Of note, patient reports being notified of the day of discharge recently that EGD results from 11/22 showed herpes esophagitis.  He has been taking prescribed oral acyclovir and reports improvement and throat pain, denies any dysphagia.  We have been consulted for further assistance with antimicrobials.     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  Continue fluconazole 400 mg p.o. Q 24 hours, ciprofloxacin 500mg PO q12h and Flagyl 500mg PO q8h.  Will need at least a 2 week abx course from drain placement (and at least 2 weeks of antifungal coverage) with repeat CT prior to  discontinuing abx.  Continue Valtrex 1g PO q12h for HSV esophagitis - end date: 12/15.   Wound care as ordered.   F/u surgery plans re: drain.   Discussed with patient and wife.     12/21/2023 office visit:  Mr. León presents for follow-up today. He continues on ciprofloxacin, flagyl, and fluconazole.  He denies any nausea or vomiting.  His colostomy output has been around 500-800 cc daily.  He denies any fever, chills, or night sweats.  He is having ongoing abdominal pain. Denies any decrease in appetite or trouble urinating. Is drinking plenty of water. No dysuria or cloudy urine noted.     01/11/2024 office visit:   Mr. León presents for follow-up today. Plans to see renal on Tuesday. He has been off of antibiotics since 01/04/2024. He denies any fever, chills, or night sweats. Minimal abdominal tenderness on my exam.       Past Medical History:   Diagnosis Date    Diverticulitis     Ulcerative colitis         Past Surgical History:   Procedure Laterality Date    COLON SURGERY  11/20/23    COLONOSCOPY, WITH 1 OR MORE BIOPSIES N/A 10/23/2023    Procedure: COLON;  Surgeon: Dragan Underwood MD;  Location: [facility] ENDOSCOPY;  Service: Gastroenterology;  Laterality: N/A;    DEBRIDEMENT OF SACRAL WOUND N/A 11/20/2023    Procedure: DEBRIDEMENT, WOUND, SACRUM;  Surgeon: Chandana Guidry MD;  Location: [facility] OR;  Service: General;  Laterality: N/A;  perineum/sacrum// high lithotomy    DEBRIDEMENT, EXTERNAL GENITALIA, PERINEUM, AND ABDOMINAL WALL, FOR NECROTIZING SOFT TISSUE INFECTION Bilateral 11/11/2023    Procedure: DEBRIDEMENT, EXTERNAL GENITALIA/BUTTOCKS FOR NECROTIZING SOFT TISSUE INFECTION;  Surgeon: Vladimir Vick MD;  Location: [facility] OR;  Service: General;  Laterality: Bilateral;  Prone positioning.    DEBRIDEMENT, EXTERNAL GENITALIA, PERINEUM, AND ABDOMINAL WALL, FOR NECROTIZING SOFT TISSUE INFECTION N/A 11/12/2023    Procedure: DEBRIDEMENT, EXTERNAL GENITALIA, PERINEUM, AND ABDOMINAL WALL, FOR  NECROTIZING SOFT TISSUE INFECTION;  Surgeon: Vladimir Vick MD;  Location: [facility];  Service: General;  Laterality: N/A;  Place patient in dorsal lithotomy positioning.    DIAGNOSTIC LAPAROSCOPY N/A 11/20/2023    Procedure: LAPAROSCOPY, DIAGNOSTIC;  Surgeon: Fredi Shields MD;  Location: [facility] OR;  Service: General;  Laterality: N/A;    EGD, WITH CLOSED BIOPSY N/A 11/22/2023    Procedure: EGD, WITH CLOSED BIOPSY;  Surgeon: Blayne Mujica MD;  Location: [facility] ENDOSCOPY;  Service: Gastroenterology;  Laterality: N/A;    LAPAROSCOPIC ILEOSTOMY N/A 11/15/2023    Procedure: CREATION, ILEOSTOMY, LAPAROSCOPIC;  Surgeon: Fredi Shields MD;  Location: [facility] OR;  Service: Colon and Rectal;  Laterality: N/A;    LAPAROSCOPIC TOTAL COLECTOMY N/A 11/15/2023    Procedure: COLECTOMY, TOTAL, LAPAROSCOPIC;  Surgeon: Fredi Shields MD;  Location: [facility];  Service: Colon and Rectal;  Laterality: N/A;  LAP TOTAL COLECTOMY WITH ILEOSTOMY    VASECTOMY  3/15/08        Social History     Socioeconomic History    Marital status:    Tobacco Use    Smoking status: Never    Smokeless tobacco: Never   Substance and Sexual Activity    Alcohol use: Not Currently     Comment: Social    Drug use: Never    Sexual activity: Yes     Partners: Female     Social Determinants of Health     Food Insecurity: Unknown (11/7/2023)    Hunger Vital Sign     Worried About Running Out of Food in the Last Year: Never true   Transportation Needs: Unknown (11/7/2023)    PRAPARE - Transportation     Lack of Transportation (Medical): No   Housing Stability: Unknown (11/7/2023)    Housing Stability Vital Sign     Unable to Pay for Housing in the Last Year: No        History reviewed. No pertinent family history.     Review of patient's allergies indicates:  No Known Allergies       There is no immunization history on file for this patient.     Review of Systems   All other systems reviewed and are negative.         Objective:      BP  "109/77 (BP Location: Right arm)   Pulse 97   Resp 18   Ht 5' 10" (1.778 m)   Wt 66.2 kg (146 lb)   SpO2 99%   BMI 20.95 kg/m²      Physical Exam  Vitals reviewed.   Constitutional:       General: He is not in acute distress.     Appearance: Normal appearance. He is not toxic-appearing.   HENT:      Mouth/Throat:      Mouth: Mucous membranes are moist.      Pharynx: No oropharyngeal exudate or posterior oropharyngeal erythema.   Eyes:      General: No scleral icterus.     Conjunctiva/sclera: Conjunctivae normal.      Pupils: Pupils are equal, round, and reactive to light.   Cardiovascular:      Rate and Rhythm: Normal rate and regular rhythm.      Pulses: Normal pulses.      Heart sounds: No murmur heard.  Pulmonary:      Effort: Pulmonary effort is normal. No respiratory distress.      Breath sounds: Normal breath sounds.   Abdominal:      General: Abdomen is flat. Bowel sounds are normal.      Palpations: Abdomen is soft.      Tenderness: There is no abdominal tenderness.      Comments: R colostomy    Musculoskeletal:         General: No swelling.      Cervical back: Normal range of motion and neck supple.   Lymphadenopathy:      Cervical: No cervical adenopathy.   Skin:     General: Skin is warm and dry.      Findings: No rash.   Neurological:      General: No focal deficit present.      Mental Status: He is alert and oriented to person, place, and time.   Psychiatric:         Mood and Affect: Mood normal.         Behavior: Behavior normal.          Labs: Reviewed most recent relevant labs available, notable results highlighted in this note    Imaging: Reviewed most recent relevant imaging studies available, notable results highlighted in this note    Assessment:       Problem List Items Addressed This Visit          Immunology/Multi System    Immunosuppression       GI    Intraabdominal fluid collection - Primary    S/P total colectomy            Plan:      -Mr. León is currently completing a course of " oral antibiotics. He is currently on fluconazole 400 mg p.o. Q 24 hours, ciprofloxacin 500mg PO q12h and Flagyl 500mg PO q8h.  -Repeat CT abdomen/pelvis on 01/04/24 with resolution of RLQ fluid collection and periumbilical fluid collection.   -He completed oral antibiotics on 1/4/2024.   -He denies any systemic signs of infection at this time.   -Continue to monitor for systemic signs of infection.   -Will follow-up again in 4 weeks.     Follow up in about 4 weeks (around 2/8/2024).    35 minutes of total time spent on the encounter, which includes face to face time and non-face to face time preparing to see the patient (eg, review of tests), Obtaining and/or reviewing separately obtained history, Documenting clinical information in the electronic or other health record, Independently interpreting results (not separately reported) and communicating results to the patient/family/caregiver, or Care coordination (not separately reported).

## 2024-01-25 ENCOUNTER — OFFICE VISIT (OUTPATIENT)
Dept: SURGICAL ONCOLOGY | Facility: CLINIC | Age: 44
End: 2024-01-25
Payer: COMMERCIAL

## 2024-01-25 VITALS
SYSTOLIC BLOOD PRESSURE: 119 MMHG | HEIGHT: 70 IN | BODY MASS INDEX: 23.96 KG/M2 | DIASTOLIC BLOOD PRESSURE: 80 MMHG | HEART RATE: 96 BPM | WEIGHT: 167.38 LBS

## 2024-01-25 DIAGNOSIS — K51.819 OTHER ULCERATIVE COLITIS WITH COMPLICATION: Primary | ICD-10-CM

## 2024-01-25 PROCEDURE — 1159F MED LIST DOCD IN RCRD: CPT | Mod: CPTII,S$GLB,, | Performed by: COLON & RECTAL SURGERY

## 2024-01-25 PROCEDURE — 99024 POSTOP FOLLOW-UP VISIT: CPT | Mod: S$GLB,,, | Performed by: COLON & RECTAL SURGERY

## 2024-01-25 PROCEDURE — 3079F DIAST BP 80-89 MM HG: CPT | Mod: CPTII,S$GLB,, | Performed by: COLON & RECTAL SURGERY

## 2024-01-25 PROCEDURE — 99999 PR PBB SHADOW E&M-EST. PATIENT-LVL III: CPT | Mod: PBBFAC,,, | Performed by: COLON & RECTAL SURGERY

## 2024-01-25 PROCEDURE — 3074F SYST BP LT 130 MM HG: CPT | Mod: CPTII,S$GLB,, | Performed by: COLON & RECTAL SURGERY

## 2024-01-25 PROCEDURE — 1160F RVW MEDS BY RX/DR IN RCRD: CPT | Mod: CPTII,S$GLB,, | Performed by: COLON & RECTAL SURGERY

## 2024-01-25 NOTE — PROGRESS NOTES
"   Patient ID: [id number]     HPI:     Lionel León is a 43 y.o. male here today for a post op visit.  Doing much better.  Has gained 30 lb since his last visit 1 month ago.  Reports good appetite and is tolerating diet.  Normal ileostomy function with no pouching issues.  He denies fever and abdominal pain.  He also denies passing mucus or blood from his rectum.  He is accompanied today by his wound ostomy nurse.      Current Outpatient Medications   Medication Instructions    cyclopentolate 1% (CYCLOGYL) 1 % ophthalmic solution 1 drop, Right Eye, 4 times daily    ergocalciferol (ERGOCALCIFEROL) 50,000 unit Cap 1 capsule    HUMIRA,CF, PEN CROHNS-[facility] 80 mg/0.8 mL PnKt Subcutaneous    magnesium oxide (MAG-OX) 250 mg magnesium Tab 1 tablet as needed Orally Twice a day    prednisoLONE acetate (PRED FORTE) 1 % DrpS 1 drop, Right Eye, 4 times daily    valACYclovir (VALTREX) 1,000 mg, Oral, 2 times daily       Patient has No Known Allergies.     Patient Care Team:  No, Primary Doctor as PCP - General       Objective:     Visit Vitals  /80   Pulse 96   Ht 5' 10" (1.778 m)   Wt 75.9 kg (167 lb 6.4 oz)   BMI 24.02 kg/m²       Physical Exam    General: Alert and oriented, No acute distress.  Head: Normocephalic, Atraumatic.  Eye: Sclera non-icteric.  Respiratory: Non-labored respirations, Symmetrical chest wall expansion.  Cardiac: Regular rate.  Gastrointestinal: Soft, Non-distended. Incisions healed. Ileostomy pink and working.  Perineum: Nearly healed with excellent granulation tissue present.   Extremities: No lower extremity edema.  Integumentary: Warm, Dry, Intact.  Neurologic: No focal deficits.      Assessment:       ICD-10-CM ICD-9-CM   1. Other ulcerative colitis with complication  K51.819 556.8        Plan:   - Continue local wound care  - RTC 3 months to assess overall well being and discuss possible surgery      No follow-ups on file. In addition to their scheduled follow up, the patient has also been " instructed to follow up on as needed basis.     Future Appointments   Date Time Provider Department Center   2/8/2024 10:30 AM Iker Hernandez FNP [facility] INFECT [place]   4/25/2024  9:00 AM Fredi Shields MD [facility] [address]        Fredi Shields MD

## 2024-01-26 LAB — PATH REV: NORMAL

## 2024-01-29 ENCOUNTER — DOCUMENT SCAN (OUTPATIENT)
Dept: HOME HEALTH SERVICES | Facility: HOSPITAL | Age: 44
End: 2024-01-29
Payer: COMMERCIAL

## 2024-02-08 ENCOUNTER — OFFICE VISIT (OUTPATIENT)
Dept: INFECTIOUS DISEASES | Facility: CLINIC | Age: 44
End: 2024-02-08
Payer: COMMERCIAL

## 2024-02-08 VITALS
BODY MASS INDEX: 25.77 KG/M2 | DIASTOLIC BLOOD PRESSURE: 80 MMHG | SYSTOLIC BLOOD PRESSURE: 117 MMHG | RESPIRATION RATE: 18 BRPM | OXYGEN SATURATION: 97 % | WEIGHT: 180 LBS | HEIGHT: 70 IN | HEART RATE: 79 BPM

## 2024-02-08 DIAGNOSIS — Z90.49 S/P TOTAL COLECTOMY: ICD-10-CM

## 2024-02-08 DIAGNOSIS — D84.9 IMMUNOSUPPRESSION: Primary | ICD-10-CM

## 2024-02-08 DIAGNOSIS — R18.8 INTRAABDOMINAL FLUID COLLECTION: ICD-10-CM

## 2024-02-08 PROCEDURE — 3008F BODY MASS INDEX DOCD: CPT | Mod: CPTII,S$GLB,,

## 2024-02-08 PROCEDURE — 3079F DIAST BP 80-89 MM HG: CPT | Mod: CPTII,S$GLB,,

## 2024-02-08 PROCEDURE — 1159F MED LIST DOCD IN RCRD: CPT | Mod: CPTII,S$GLB,,

## 2024-02-08 PROCEDURE — 3074F SYST BP LT 130 MM HG: CPT | Mod: CPTII,S$GLB,,

## 2024-02-08 PROCEDURE — 99214 OFFICE O/P EST MOD 30 MIN: CPT | Mod: S$GLB,,,

## 2024-02-08 PROCEDURE — 99999 PR PBB SHADOW E&M-EST. PATIENT-LVL IV: CPT | Mod: PBBFAC,,,

## 2024-02-08 RX ORDER — METRONIDAZOLE 500 MG/1
TABLET ORAL
Status: ON HOLD | COMMUNITY
End: 2024-06-13

## 2024-02-08 RX ORDER — FLUCONAZOLE 200 MG/1
TABLET ORAL
COMMUNITY
End: 2024-06-13

## 2024-02-08 RX ORDER — CIPROFLOXACIN 500 MG/1
TABLET ORAL
Status: ON HOLD | COMMUNITY
End: 2024-06-13

## 2024-02-08 NOTE — PROGRESS NOTES
Subjective:       Patient ID: Lionel León 43 y.o.     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  He underwent multiple debridements at that time as well as a total colectomy on 11/15.  Patient had previously been on Humira, however that has been held since recent hospitalization.  He completed a 14 day course of antimicrobials with Zosyn from date of last debridement, completed on 11/27.  He was discharged home however returned on 12/02 with complaints of fever and abdominal pain.  CT showed concern for abscess in the right abdomen, status post IR drainage earlier today.  He has had several fevers since admit, however no leukocytosis and has been hemodynamically stable.  Currently overall feeling better without complaints.  Blood cultures are negative thus far.  He is currently receiving empiric vancomycin and Zosyn.  Of note, patient reports being notified of the day of discharge recently that EGD results from 11/22 showed herpes esophagitis.  He has been taking prescribed oral acyclovir and reports improvement and throat pain, denies any dysphagia.  We have been consulted for further assistance with antimicrobials.     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  Continue fluconazole 400 mg p.o. Q 24 hours, ciprofloxacin 500mg PO q12h and Flagyl 500mg PO q8h.  Will need at least a 2 week abx course from drain placement (and at least 2 weeks of antifungal coverage) with repeat CT prior to  discontinuing abx.  Continue Valtrex 1g PO q12h for HSV esophagitis - end date: 12/15.   Wound care as ordered.   F/u surgery plans re: drain.   Discussed with patient and wife.     12/21/2023 office visit:  Mr. León presents for follow-up today. He continues on ciprofloxacin, flagyl, and fluconazole.  He denies any nausea or vomiting.  His colostomy output has been around 500-800 cc daily.  He denies any fever, chills, or night sweats.  He is having ongoing abdominal pain. Denies any decrease in appetite or trouble urinating. Is drinking plenty of water. No dysuria or cloudy urine noted.     01/11/2024 office visit:   Mr. León presents for follow-up today. Plans to see renal on Tuesday. He has been off of antibiotics since 01/04/2024. He denies any fever, chills, or night sweats. Minimal abdominal tenderness on my exam.     02/08/2024 office visit:   Mr. León presents for follow-up today.  He has been off of antibiotics now for 1 month.  He denies any fever, chills, or night sweats.  He denies any abdominal tenderness.  He reports he has been doing well      Past Medical History:   Diagnosis Date    Diverticulitis     Ulcerative colitis         Past Surgical History:   Procedure Laterality Date    COLON SURGERY  11/20/23    COLONOSCOPY, WITH 1 OR MORE BIOPSIES N/A 10/23/2023    Procedure: COLON;  Surgeon: Dragan Underwood MD;  Location: [facility] ENDOSCOPY;  Service: Gastroenterology;  Laterality: N/A;    DEBRIDEMENT OF SACRAL WOUND N/A 11/20/2023    Procedure: DEBRIDEMENT, WOUND, SACRUM;  Surgeon: Chandana Guidry MD;  Location: [facility] OR;  Service: General;  Laterality: N/A;  perineum/sacrum// high lithotomy    DEBRIDEMENT, EXTERNAL GENITALIA, PERINEUM, AND ABDOMINAL WALL, FOR NECROTIZING SOFT TISSUE INFECTION Bilateral 11/11/2023    Procedure: DEBRIDEMENT, EXTERNAL GENITALIA/BUTTOCKS FOR NECROTIZING SOFT TISSUE INFECTION;  Surgeon: Vladimir Vick MD;  [facility];  Service: General;   Laterality: Bilateral;  Prone positioning.    DEBRIDEMENT, EXTERNAL GENITALIA, PERINEUM, AND ABDOMINAL WALL, FOR NECROTIZING SOFT TISSUE INFECTION N/A 11/12/2023    Procedure: DEBRIDEMENT, EXTERNAL GENITALIA, PERINEUM, AND ABDOMINAL WALL, FOR NECROTIZING SOFT TISSUE INFECTION;  Surgeon: Vladimir Vick MD;  Location: [facility] OR;  Service: General;  Laterality: N/A;  Place patient in dorsal lithotomy positioning.    DIAGNOSTIC LAPAROSCOPY N/A 11/20/2023    Procedure: LAPAROSCOPY, DIAGNOSTIC;  Surgeon: Fredi Shields MD;  Location: [facility] OR;  Service: General;  Laterality: N/A;    EGD, WITH CLOSED BIOPSY N/A 11/22/2023    Procedure: EGD, WITH CLOSED BIOPSY;  Surgeon: Blayne Mujica MD;  Location: [facility] ENDOSCOPY;  Service: Gastroenterology;  Laterality: N/A;    LAPAROSCOPIC ILEOSTOMY N/A 11/15/2023    Procedure: CREATION, ILEOSTOMY, LAPAROSCOPIC;  Surgeon: Fredi Shields MD;  Location: [facility] OR;  Service: Colon and Rectal;  Laterality: N/A;    LAPAROSCOPIC TOTAL COLECTOMY N/A 11/15/2023    Procedure: COLECTOMY, TOTAL, LAPAROSCOPIC;  Surgeon: Fredi Shields MD;  Location: [facility];  Service: Colon and Rectal;  Laterality: N/A;  LAP TOTAL COLECTOMY WITH ILEOSTOMY    VASECTOMY  3/15/08        Social History     Socioeconomic History    Marital status:    Tobacco Use    Smoking status: Never    Smokeless tobacco: Never   Substance and Sexual Activity    Alcohol use: Not Currently     Comment: Social    Drug use: Never    Sexual activity: Yes     Partners: Female     Social Determinants of Health     Food Insecurity: Unknown (11/7/2023)    Hunger Vital Sign     Worried About Running Out of Food in the Last Year: Never true   Transportation Needs: Unknown (11/7/2023)    PRAPARE - Transportation     Lack of Transportation (Medical): No   Housing Stability: Unknown (11/7/2023)    Housing Stability Vital Sign     Unable to Pay for Housing in the Last Year: No        History reviewed. No pertinent  "family history.     Review of patient's allergies indicates:  No Known Allergies       There is no immunization history on file for this patient.     Review of Systems   All other systems reviewed and are negative.         Objective:      /80 (BP Location: Right arm)   Pulse 79   Resp 18   Ht 5' 10" (1.778 m)   Wt 81.6 kg (180 lb)   SpO2 97%   BMI 25.83 kg/m²      Physical Exam  Vitals reviewed.   Constitutional:       General: He is not in acute distress.     Appearance: Normal appearance. He is not toxic-appearing.   HENT:      Mouth/Throat:      Mouth: Mucous membranes are moist.      Pharynx: No oropharyngeal exudate or posterior oropharyngeal erythema.   Eyes:      General: No scleral icterus.     Conjunctiva/sclera: Conjunctivae normal.      Pupils: Pupils are equal, round, and reactive to light.   Cardiovascular:      Rate and Rhythm: Normal rate and regular rhythm.      Pulses: Normal pulses.      Heart sounds: No murmur heard.  Pulmonary:      Effort: Pulmonary effort is normal. No respiratory distress.      Breath sounds: Normal breath sounds.   Abdominal:      General: Abdomen is flat. Bowel sounds are normal.      Palpations: Abdomen is soft.      Tenderness: There is no abdominal tenderness.      Comments: R colostomy    Musculoskeletal:         General: No swelling.      Cervical back: Normal range of motion and neck supple.   Lymphadenopathy:      Cervical: No cervical adenopathy.   Skin:     General: Skin is warm and dry.      Findings: No rash.   Neurological:      General: No focal deficit present.      Mental Status: He is alert and oriented to person, place, and time.   Psychiatric:         Mood and Affect: Mood normal.         Behavior: Behavior normal.          Labs: Reviewed most recent relevant labs available, notable results highlighted in this note    Imaging: Reviewed most recent relevant imaging studies available, notable results highlighted in this note    Assessment:     "   Problem List Items Addressed This Visit          Immunology/Multi System    Immunosuppression - Primary       GI    Intraabdominal fluid collection    S/P total colectomy              Plan:      -Mr. León is completed a course of oral antibiotics with fluconazole 400 mg p.o. Q 24 hours, ciprofloxacin 500mg PO q12h and Flagyl 500mg PO q8h on 01/04/2024.   -Repeat CT abdomen/pelvis on 01/04/24 with resolution of RLQ fluid collection and periumbilical fluid collection.   -He denies any systemic signs of infection at this time.   -Continue to monitor for systemic signs of infection.   -Will follow-up in ID clinic as needed    Follow up if symptoms worsen or fail to improve.    35 minutes of total time spent on the encounter, which includes face to face time and non-face to face time preparing to see the patient (eg, review of tests), Obtaining and/or reviewing separately obtained history, Documenting clinical information in the electronic or other health record, Independently interpreting results (not separately reported) and communicating results to the patient/family/caregiver, or Care coordination (not separately reported).

## 2024-03-04 PROBLEM — K92.2 LOWER GI BLEED: Status: RESOLVED | Noted: 2023-11-21 | Resolved: 2024-03-04

## 2024-03-06 NOTE — CONSULTS
[facility]  Gastroenterology  Consult Note    Patient Name: Lionel León  MRN: [id number]  Admission Date: 10/20/2023  Hospital Length of Stay: 0 days  Code Status: Full Code   Attending Provider: Melissa Carr MD   Consulting Provider: Saul He MD  Primary Care Physician: No primary care provider on file.  Principal Problem:<principal problem not specified>    Inpatient consult to Gastroenterology  Consult performed by: Saul He MD  Consult ordered by: Roger Crwaley PA-C        Subjective:     HPI:  42-year-old white man with a history of a partial colon resection for diverticulitis approximately 4 years ago presents to the emergency department today with a 3 week history of cramping abdominal pain and diarrhea.  Patient states he has been having multiple liquid stools with some blood and mucus.  He has diffuse abdominal cramping.  Stool for C diff was negative but positive for WBCs.  A CT scan of his abdomen and pelvis revealed mild diffuse circumferential wall thickening of the ascending, transverse, descending, and sigmoid colon with pericolonic fat stranding consistent with pan colitis.  The patient was given IV fluids Cipro and Flagyl and admitted for further management.  GI was consulted for further management.    No past medical history on file.    No past surgical history on file.    Review of patient's allergies indicates:  No Known Allergies  Family History    None       Tobacco Use    Smoking status: Not on file    Smokeless tobacco: Not on file   Substance and Sexual Activity    Alcohol use: Not on file    Drug use: Not on file    Sexual activity: Not on file     Review of Systems  Objective:  Patient has been having some fever otherwise noncontributory     Vital Signs (Most Recent):  Temp: 99.7 °F (37.6 °C) (10/21/23 1713)  Pulse: 107 (10/21/23 1713)  Resp: 20 (10/21/23 1713)  BP: 107/60 (10/21/23 1713)  SpO2: 97 % (10/21/23 1713) Vital Signs (24h Range):  Temp:   [98.3 °F (36.8 °C)-102.6 °F (39.2 °C)] 99.7 °F (37.6 °C)  Pulse:  [] 107  Resp:  [16-23] 20  SpO2:  [95 %-98 %] 97 %  BP: ()/(56-73) 107/60     Weight: 86.4 kg (190 lb 7.6 oz) (10/21/23 1713)  Body mass index is 27.33 kg/m².      Intake/Output Summary (Last 24 hours) at 10/21/2023 2001  Last data filed at 10/21/2023 0413  Gross per 24 hour   Intake 2803.04 ml   Output --   Net 2803.04 ml       Lines/Drains/Airways       Peripheral Intravenous Line  Duration                  Peripheral IV - Single Lumen 10/21/23 0044 18 G Right Forearm <1 day                    Physical Exam  Vitals and nursing note reviewed.   Constitutional:       General: He is not in acute distress.     Appearance: Normal appearance. He is not ill-appearing.   HENT:      Head: Normocephalic and atraumatic.      Nose: Nose normal.      Mouth/Throat:      Mouth: Mucous membranes are moist.   Eyes:      General: No scleral icterus.     Extraocular Movements: Extraocular movements intact.      Conjunctiva/sclera: Conjunctivae normal.   Cardiovascular:      Rate and Rhythm: Normal rate and regular rhythm.      Heart sounds: Normal heart sounds. No murmur heard.  Pulmonary:      Effort: Pulmonary effort is normal. No respiratory distress.      Breath sounds: Normal breath sounds.   Abdominal:      General: Abdomen is flat. Bowel sounds are normal. There is no distension.      Palpations: Abdomen is soft. There is no mass.      Tenderness: There is abdominal tenderness. There is no guarding or rebound.      Hernia: No hernia is present.   Musculoskeletal:         General: Normal range of motion.      Right lower leg: No edema.      Left lower leg: No edema.   Skin:     General: Skin is warm and dry.   Neurological:      General: No focal deficit present.      Mental Status: He is alert and oriented to person, place, and time.   Psychiatric:         Mood and Affect: Mood normal.         Behavior: Behavior normal.         Thought Content:  Thought content normal.         Significant Labs:  All pertinent lab results from the last 24 hours have been reviewed.    Significant Imaging:  Imaging results within the past 24 hours have been reviewed.    Assessment/Plan:  Chronic diarrhea and abdominal pain possibly consistent with inflammatory bowel disease such as ulcerative colitis.  Abnormal CT scan suggesting pancolitis    Recommendations:  Stool for fecal calprotectin.  We will prep tomorrow for colonoscopy Monday.  We will begin IV Solu-Medrol     There are no hospital problems to display for this patient.            Thank you for your consult.     Saul He MD  Gastroenterology  [facility] - [facility]    Statement Selected

## 2024-04-25 ENCOUNTER — OFFICE VISIT (OUTPATIENT)
Dept: SURGICAL ONCOLOGY | Facility: CLINIC | Age: 44
End: 2024-04-25
Payer: COMMERCIAL

## 2024-04-25 VITALS
DIASTOLIC BLOOD PRESSURE: 85 MMHG | BODY MASS INDEX: 28.72 KG/M2 | WEIGHT: 200.63 LBS | HEIGHT: 70 IN | HEART RATE: 87 BPM | SYSTOLIC BLOOD PRESSURE: 123 MMHG

## 2024-04-25 DIAGNOSIS — K51.218: ICD-10-CM

## 2024-04-25 DIAGNOSIS — K51.218 CHRONIC ULCERATIVE PROCTITIS WITH OTHER COMPLICATION: Primary | ICD-10-CM

## 2024-04-25 PROCEDURE — 99215 OFFICE O/P EST HI 40 MIN: CPT | Mod: 57,S$GLB,, | Performed by: COLON & RECTAL SURGERY

## 2024-04-25 PROCEDURE — 3074F SYST BP LT 130 MM HG: CPT | Mod: CPTII,S$GLB,, | Performed by: COLON & RECTAL SURGERY

## 2024-04-25 PROCEDURE — 3008F BODY MASS INDEX DOCD: CPT | Mod: CPTII,S$GLB,, | Performed by: COLON & RECTAL SURGERY

## 2024-04-25 PROCEDURE — 99999 PR PBB SHADOW E&M-EST. PATIENT-LVL III: CPT | Mod: PBBFAC,,, | Performed by: COLON & RECTAL SURGERY

## 2024-04-25 PROCEDURE — 1159F MED LIST DOCD IN RCRD: CPT | Mod: CPTII,S$GLB,, | Performed by: COLON & RECTAL SURGERY

## 2024-04-25 PROCEDURE — 3079F DIAST BP 80-89 MM HG: CPT | Mod: CPTII,S$GLB,, | Performed by: COLON & RECTAL SURGERY

## 2024-04-25 PROCEDURE — 1160F RVW MEDS BY RX/DR IN RCRD: CPT | Mod: CPTII,S$GLB,, | Performed by: COLON & RECTAL SURGERY

## 2024-04-25 RX ORDER — METRONIDAZOLE 500 MG/100ML
500 INJECTION, SOLUTION INTRAVENOUS
Status: CANCELLED | OUTPATIENT
Start: 2024-04-25

## 2024-04-25 RX ORDER — GABAPENTIN 100 MG/1
200 CAPSULE ORAL
Status: CANCELLED | OUTPATIENT
Start: 2024-04-25

## 2024-04-25 RX ORDER — ENOXAPARIN SODIUM 300 MG/3ML
40 INJECTION INTRAVENOUS; SUBCUTANEOUS EVERY 24 HOURS
Status: CANCELLED | OUTPATIENT
Start: 2024-04-25

## 2024-04-25 RX ORDER — LIDOCAINE HYDROCHLORIDE 10 MG/ML
1 INJECTION, SOLUTION EPIDURAL; INFILTRATION; INTRACAUDAL; PERINEURAL ONCE
Status: CANCELLED | OUTPATIENT
Start: 2024-04-25 | End: 2024-04-25

## 2024-04-25 RX ORDER — ACETAMINOPHEN 500 MG
1000 TABLET ORAL
Status: CANCELLED | OUTPATIENT
Start: 2024-04-25 | End: 2024-04-25

## 2024-04-25 RX ORDER — CELECOXIB 100 MG/1
400 CAPSULE ORAL
Status: CANCELLED | OUTPATIENT
Start: 2024-04-25 | End: 2024-04-25

## 2024-04-25 NOTE — PROGRESS NOTES
"   Patient ID: [id number]     Chief Complaint: Follow-up (3 month f/u/)      HPI:     Lionel León is a 43 y.o. male status post laparoscopic total colectomy with end ileostomy 11/15/2023 for fulminant ulcerative colitis here today to discuss the next operation.  He is feeling great and has no complaints.  He has gained another 30 lb.  His ileostomy is working well.  He denies any drainage or bleeding from his rectum.  He would like to restore intestinal continuity to not have a bag the rest of his life.  He is ready to proceed with surgery.    Current Outpatient Medications   Medication Instructions    ciprofloxacin HCl (CIPRO) 500 MG tablet Oral for 14 Days    cyclopentolate 1% (CYCLOGYL) 1 % ophthalmic solution 1 drop, 4 times daily    fluconazole (DIFLUCAN) 200 MG Tab Oral for 14 Days    MONTEZ FA[facility]ARDO [facility] 80 mg/0.8 mL PnKt Inject into the skin.    magnesium oxide (MAG-OX) 250 mg magnesium Tab 1 tablet as needed Orally Twice a day    metroNIDAZOLE (FLAGYL) 500 MG tablet Oral for 14 Days    prednisoLONE acetate (PRED FORTE) 1 % DrpS 1 drop, 4 times daily    valACYclovir (VALTREX) 1,000 mg, Oral, 2 times daily       Patient has No Known Allergies.     Patient Care Team:  No, Primary Doctor as PCP - General       Subjective:     Review of Systems    12 point review of systems conducted, negative except as stated in the history of present illness. See HPI for details.      Objective:     Visit Vitals  /85   Pulse 87   Ht 5' 10" (1.778 m)   Wt 91 kg (200 lb 9.6 oz)   SpO2 (P) 97%   BMI 28.78 kg/m²       Physical Exam    General: Alert and oriented, No acute distress.  Head: Normocephalic, Atraumatic.  Eye: Sclera non-icteric.  Respiratory: Non-labored respirations, Symmetrical chest wall expansion.  Cardiovascular: Regular rate.  Gastrointestinal: Soft, Non-tender, Non-distended, Normal bowel sounds.  Rectal: Healed perianal wounds with large anterior tag. Good tone with excellent " squeeze.  Integumentary: Warm, Dry, Intact, No rashes.  Extremities: No edema.  Neurologic: No focal deficits.      Labs Reviewed:     Chemistry:  Lab Results   Component Value Date     12/08/2023    K 4.3 12/08/2023    CHLORIDE 110 (H) 12/08/2023    BUN 20.7 (H) 12/08/2023    CREATININE 1.64 (H) 12/08/2023    EGFRNORACEVR 53 12/08/2023    GLUCOSE 90 12/08/2023    CALCIUM 8.0 (L) 12/08/2023    ALKPHOS 88 12/07/2023    LABPROT 5.0 (L) 12/07/2023    ALBUMIN 1.9 (L) 12/07/2023    AST 16 12/07/2023    ALT 18 12/07/2023    MG 1.90 12/04/2023    PHOS 3.7 12/03/2023        Hematology:  Lab Results   Component Value Date    WBC 4.54 12/08/2023    HGB 7.9 (L) 12/08/2023    HCT 25.4 (L) 12/08/2023     12/08/2023         Assessment:       ICD-10-CM ICD-9-CM   1. Chronic ulcerative proctitis with other complication  K51.218 556.2        Plan:   I explained the next stage operation to remove the rectum to rid him of the ulcerative colitis and create an ileal J-pouch to restore intestinal continuity.  I also explained the potential adverse events, need for diverting loop ileostomy, and expected recovery course.  Schedule laparoscopic possible open proctectomy with ileal pouch anal anastomosis and diverting loop ileostomy 05/28/2024.      No follow-ups on file. In addition to their scheduled follow up, the patient has also been instructed to follow up on as needed basis.     No future appointments.     Fredi Shields MD

## 2024-05-17 ENCOUNTER — HOSPITAL ENCOUNTER (OUTPATIENT)
Dept: RADIOLOGY | Facility: HOSPITAL | Age: 44
Discharge: HOME | End: 2024-05-17
Attending: COLON & RECTAL SURGERY
Payer: COMMERCIAL

## 2024-05-17 DIAGNOSIS — K51.218 CHRONIC ULCERATIVE PROCTITIS WITH OTHER COMPLICATION: ICD-10-CM

## 2024-05-17 PROCEDURE — 71046 X-RAY EXAM CHEST 2 VIEWS: CPT | Mod: TC

## 2024-05-23 ENCOUNTER — ANESTHESIA EVENT (OUTPATIENT)
Dept: SURGERY | Facility: HOSPITAL | Age: 44
DRG: 331 | End: 2024-05-23
Payer: COMMERCIAL

## 2024-05-27 NOTE — PRE-PROCEDURE INSTRUCTIONS
"[facility]: Outpatient Surgery  Preprocedure Instructions    Expectations: "Because of inconsistent procedure completion times, an unexpected wait may occur. The physicians would like you to be here to prepare in the event they run ahead of time. We will make you as comfortable as possible and keep you informed. We apologize in advance if this happens."     Your arrival time for your surgery or procedure is 8:00 am  We ask patients to arrive about 2 hours before surgery to allow for enough time to review your health history & medications, start your IV, complete any outstanding labwork or tests, and meet your Anesthesiologist.    We are located at [facility], [address] [place] [address].    Enter through the [place] entrance next to the Emergency Room, and come to the 6th floor to the Outpatient Surgery Department.    If you are in need of a wheelchair the morning of surgery please call [phone] about 15 minutes before you arrive. Parking is available in our parking garage located off [facility], between the hospital and [facility].      Visitory Policy:  You are allowed 2 adult visitors to be with you in the hospital. Please, no switching visitors in pre-op area. All hospital visitors should be in good current health.  No small children.  We will update you and your family hourly on the progression of surgery and any unexpected delays.      What to Bring:  Please have your ID, insurance cards, and all home medication bottles with you at check in.  Bring your CPAP machine if one is used at home.     Fasting:  Nothing to eat or drink after midnight the night before your procedure. This includes no ice, gum, hard candies, and/or tobacco products.    Medications:  Follow your doctor's instructions for taking any medications on the morning of your procedure.  If no instructions for taking medications were given, do not take any medications but bring your medications " in their bottles to your procedure check in.     Follow your doctor's preoperative instructions regarding skin prep, bowel prep, bathing, or showering prior to your procedure.  If any special soaps were provided to you, please use according to your doctor's instructions. If no instructions were given from your doctor, take a good bath or shower with antibacterial soap the night before and the morning of your procedure.  On the morning of procedure, wear loose, comfortable clothing.  No lotions, makeup, perfumes, colognes, deodorant, or jewelry to your procedure.  Removable items (glasses, contact lenses, dentures, retainers, hearing aids) need to be removed for your procedure.  Bring your storage containers for these items if you wear them.     Artificial nails, body jewelry, eyelash extensions, and/or hair extensions with metal clips are not allowed during your surgery.  If you currently wear any of these items, please arrange for them to be removed prior to your arrival to the hospital.     Outpatient or Same Day Surgeries:  Any patients receiving sedation/anesthesia are advised not to drive for 24 hours after their procedure.  We do not allow patients to drive themselves home after discharge.  If you are going home after your procedure, please have someone available to drive you home from the hospital.     You may call the Outpatient Surgery Department at [phone] with any questions or concerns.  We are looking forward to meeting you and taking great care of you for your procedure.  Thank you for choosing [facility] for your surgical needs.

## 2024-05-28 ENCOUNTER — HOSPITAL ENCOUNTER (INPATIENT)
Facility: HOSPITAL | Age: 44
LOS: 3 days | Discharge: HOME | DRG: 331 | End: 2024-05-31
Attending: COLON & RECTAL SURGERY | Admitting: COLON & RECTAL SURGERY
Payer: COMMERCIAL

## 2024-05-28 ENCOUNTER — ANESTHESIA (OUTPATIENT)
Dept: SURGERY | Facility: HOSPITAL | Age: 44
DRG: 331 | End: 2024-05-28
Payer: COMMERCIAL

## 2024-05-28 DIAGNOSIS — K51.218: ICD-10-CM

## 2024-05-28 DIAGNOSIS — K51.20 CHRONIC ULCERATIVE PROCTITIS WITHOUT COMPLICATIONS: Primary | ICD-10-CM

## 2024-05-28 DIAGNOSIS — K51.218 CHRONIC ULCERATIVE PROCTITIS WITH OTHER COMPLICATION: ICD-10-CM

## 2024-05-28 LAB
GROUP & RH: NORMAL
INDIRECT COOMBS: NORMAL
SPECIMEN OUTDATE: NORMAL

## 2024-05-28 PROCEDURE — 25000003 PHARM REV CODE 250: Performed by: COLON & RECTAL SURGERY

## 2024-05-28 PROCEDURE — D9220A PRA ANESTHESIA: Mod: CRNA,,,

## 2024-05-28 PROCEDURE — 86850 RBC ANTIBODY SCREEN: CPT | Performed by: COLON & RECTAL SURGERY

## 2024-05-28 PROCEDURE — 37000009 HC ANESTHESIA EA ADD 15 MINS: Performed by: COLON & RECTAL SURGERY

## 2024-05-28 PROCEDURE — 63600175 PHARM REV CODE 636 W HCPCS: Performed by: ANESTHESIOLOGY

## 2024-05-28 PROCEDURE — 36000711: Performed by: COLON & RECTAL SURGERY

## 2024-05-28 PROCEDURE — 25000003 PHARM REV CODE 250

## 2024-05-28 PROCEDURE — 0HB9XZZ EXCISION OF PERINEUM SKIN, EXTERNAL APPROACH: ICD-10-PCS | Performed by: COLON & RECTAL SURGERY

## 2024-05-28 PROCEDURE — 71000039 HC RECOVERY, EACH ADD'L HOUR: Performed by: COLON & RECTAL SURGERY

## 2024-05-28 PROCEDURE — 99900035 HC TECH TIME PER 15 MIN (STAT)

## 2024-05-28 PROCEDURE — 63600175 PHARM REV CODE 636 W HCPCS: Mod: JZ,JG | Performed by: COLON & RECTAL SURGERY

## 2024-05-28 PROCEDURE — 0WJG4ZZ INSPECTION OF PERITONEAL CAVITY, PERCUTANEOUS ENDOSCOPIC APPROACH: ICD-10-PCS | Performed by: COLON & RECTAL SURGERY

## 2024-05-28 PROCEDURE — P9047 ALBUMIN (HUMAN), 25%, 50ML: HCPCS | Mod: JZ,JG

## 2024-05-28 PROCEDURE — 0D1B4Z4 BYPASS ILEUM TO CUTANEOUS, PERCUTANEOUS ENDOSCOPIC APPROACH: ICD-10-PCS | Performed by: COLON & RECTAL SURGERY

## 2024-05-28 PROCEDURE — 71000033 HC RECOVERY, INTIAL HOUR: Performed by: COLON & RECTAL SURGERY

## 2024-05-28 PROCEDURE — 45397 LAP REMOVE RECTUM W/POUCH: CPT | Mod: ,,, | Performed by: COLON & RECTAL SURGERY

## 2024-05-28 PROCEDURE — 27000221 HC OXYGEN, UP TO 24 HOURS

## 2024-05-28 PROCEDURE — 36000710: Performed by: COLON & RECTAL SURGERY

## 2024-05-28 PROCEDURE — 63600175 PHARM REV CODE 636 W HCPCS: Mod: JZ,JG

## 2024-05-28 PROCEDURE — 37000008 HC ANESTHESIA 1ST 15 MINUTES: Performed by: COLON & RECTAL SURGERY

## 2024-05-28 PROCEDURE — 0DBP0ZZ EXCISION OF RECTUM, OPEN APPROACH: ICD-10-PCS | Performed by: COLON & RECTAL SURGERY

## 2024-05-28 PROCEDURE — 63600175 PHARM REV CODE 636 W HCPCS

## 2024-05-28 PROCEDURE — C9290 INJ, BUPIVACAINE LIPOSOME: HCPCS | Performed by: COLON & RECTAL SURGERY

## 2024-05-28 PROCEDURE — 63600175 PHARM REV CODE 636 W HCPCS: Performed by: COLON & RECTAL SURGERY

## 2024-05-28 PROCEDURE — 27201423 OPTIME MED/SURG SUP & DEVICES STERILE SUPPLY: Performed by: COLON & RECTAL SURGERY

## 2024-05-28 PROCEDURE — 36415 COLL VENOUS BLD VENIPUNCTURE: CPT | Performed by: COLON & RECTAL SURGERY

## 2024-05-28 PROCEDURE — 11000001 HC ACUTE MED/SURG PRIVATE ROOM

## 2024-05-28 PROCEDURE — D9220A PRA ANESTHESIA: Mod: ANES,,, | Performed by: ANESTHESIOLOGY

## 2024-05-28 PROCEDURE — 0DUP07Z SUPPLEMENT RECTUM WITH AUTOLOGOUS TISSUE SUBSTITUTE, OPEN APPROACH: ICD-10-PCS | Performed by: COLON & RECTAL SURGERY

## 2024-05-28 RX ORDER — PHENYLEPHRINE HCL IN 0.9% NACL 1 MG/10 ML
SYRINGE (ML) INTRAVENOUS
Status: DISCONTINUED | OUTPATIENT
Start: 2024-05-28 | End: 2024-05-28

## 2024-05-28 RX ORDER — PROPOFOL 10 MG/ML
VIAL (ML) INTRAVENOUS
Status: DISCONTINUED | OUTPATIENT
Start: 2024-05-28 | End: 2024-05-28

## 2024-05-28 RX ORDER — MORPHINE SULFATE 4 MG/ML
2 INJECTION, SOLUTION INTRAMUSCULAR; INTRAVENOUS EVERY 4 HOURS PRN
Status: DISCONTINUED | OUTPATIENT
Start: 2024-05-28 | End: 2024-05-31 | Stop reason: HOSPADM

## 2024-05-28 RX ORDER — METRONIDAZOLE 500 MG/100ML
500 INJECTION, SOLUTION INTRAVENOUS
Status: COMPLETED | OUTPATIENT
Start: 2024-05-28 | End: 2024-05-28

## 2024-05-28 RX ORDER — HYDROMORPHONE HYDROCHLORIDE 2 MG/ML
0.2 INJECTION, SOLUTION INTRAMUSCULAR; INTRAVENOUS; SUBCUTANEOUS EVERY 5 MIN PRN
Status: DISCONTINUED | OUTPATIENT
Start: 2024-05-28 | End: 2024-05-28

## 2024-05-28 RX ORDER — CELECOXIB 200 MG/1
400 CAPSULE ORAL
Status: COMPLETED | OUTPATIENT
Start: 2024-05-28 | End: 2024-05-28

## 2024-05-28 RX ORDER — ONDANSETRON HYDROCHLORIDE 2 MG/ML
INJECTION, SOLUTION INTRAMUSCULAR; INTRAVENOUS
Status: DISCONTINUED | OUTPATIENT
Start: 2024-05-28 | End: 2024-05-28

## 2024-05-28 RX ORDER — HYDROMORPHONE HYDROCHLORIDE 2 MG/ML
INJECTION, SOLUTION INTRAMUSCULAR; INTRAVENOUS; SUBCUTANEOUS
Status: DISCONTINUED | OUTPATIENT
Start: 2024-05-28 | End: 2024-05-28

## 2024-05-28 RX ORDER — IPRATROPIUM BROMIDE AND ALBUTEROL SULFATE 2.5; .5 MG/3ML; MG/3ML
3 SOLUTION RESPIRATORY (INHALATION) ONCE AS NEEDED
Status: DISCONTINUED | OUTPATIENT
Start: 2024-05-28 | End: 2024-05-28

## 2024-05-28 RX ORDER — HYDROMORPHONE HYDROCHLORIDE 2 MG/ML
0.5 INJECTION, SOLUTION INTRAMUSCULAR; INTRAVENOUS; SUBCUTANEOUS EVERY 5 MIN PRN
Status: DISCONTINUED | OUTPATIENT
Start: 2024-05-28 | End: 2024-05-28

## 2024-05-28 RX ORDER — ACETAMINOPHEN 500 MG
1000 TABLET ORAL ONCE
Status: CANCELLED | OUTPATIENT
Start: 2024-05-28 | End: 2024-05-28

## 2024-05-28 RX ORDER — ENOXAPARIN SODIUM 100 MG/ML
40 INJECTION SUBCUTANEOUS EVERY 24 HOURS
Status: DISCONTINUED | OUTPATIENT
Start: 2024-05-28 | End: 2024-05-28

## 2024-05-28 RX ORDER — LIDOCAINE HYDROCHLORIDE 10 MG/ML
1 INJECTION, SOLUTION EPIDURAL; INFILTRATION; INTRACAUDAL; PERINEURAL ONCE
Status: DISCONTINUED | OUTPATIENT
Start: 2024-05-28 | End: 2024-05-28

## 2024-05-28 RX ORDER — ONDANSETRON HYDROCHLORIDE 2 MG/ML
4 INJECTION, SOLUTION INTRAVENOUS EVERY 8 HOURS PRN
Status: DISCONTINUED | OUTPATIENT
Start: 2024-05-28 | End: 2024-05-31 | Stop reason: HOSPADM

## 2024-05-28 RX ORDER — ACETAMINOPHEN 10 MG/ML
INJECTION, SOLUTION INTRAVENOUS
Status: DISCONTINUED | OUTPATIENT
Start: 2024-05-28 | End: 2024-05-28

## 2024-05-28 RX ORDER — EPHEDRINE SULFATE 50 MG/ML
INJECTION, SOLUTION INTRAVENOUS
Status: DISCONTINUED | OUTPATIENT
Start: 2024-05-28 | End: 2024-05-28

## 2024-05-28 RX ORDER — HYDROCODONE BITARTRATE AND ACETAMINOPHEN 5; 325 MG/1; MG/1
1 TABLET ORAL
Status: DISCONTINUED | OUTPATIENT
Start: 2024-05-28 | End: 2024-05-28

## 2024-05-28 RX ORDER — BUPIVACAINE HYDROCHLORIDE 5 MG/ML
INJECTION, SOLUTION EPIDURAL; INTRACAUDAL
Status: DISCONTINUED | OUTPATIENT
Start: 2024-05-28 | End: 2024-05-28 | Stop reason: HOSPADM

## 2024-05-28 RX ORDER — METOCLOPRAMIDE HYDROCHLORIDE 5 MG/ML
10 INJECTION INTRAMUSCULAR; INTRAVENOUS ONCE
Status: COMPLETED | OUTPATIENT
Start: 2024-05-28 | End: 2024-05-28

## 2024-05-28 RX ORDER — DIPHENHYDRAMINE HYDROCHLORIDE 50 MG/ML
25 INJECTION INTRAMUSCULAR; INTRAVENOUS EVERY 6 HOURS PRN
Status: DISCONTINUED | OUTPATIENT
Start: 2024-05-28 | End: 2024-05-28

## 2024-05-28 RX ORDER — FAMOTIDINE 10 MG/ML
20 INJECTION INTRAVENOUS ONCE
Status: DISCONTINUED | OUTPATIENT
Start: 2024-05-28 | End: 2024-05-28

## 2024-05-28 RX ORDER — ACETAMINOPHEN 500 MG
1000 TABLET ORAL
Status: COMPLETED | OUTPATIENT
Start: 2024-05-28 | End: 2024-05-28

## 2024-05-28 RX ORDER — SODIUM CHLORIDE, SODIUM LACTATE, POTASSIUM CHLORIDE, CALCIUM CHLORIDE 600; 310; 30; 20 MG/100ML; MG/100ML; MG/100ML; MG/100ML
INJECTION, SOLUTION INTRAVENOUS CONTINUOUS
Status: DISCONTINUED | OUTPATIENT
Start: 2024-05-28 | End: 2024-05-30

## 2024-05-28 RX ORDER — MIDAZOLAM HYDROCHLORIDE 2 MG/2ML
2 INJECTION, SOLUTION INTRAMUSCULAR; INTRAVENOUS ONCE AS NEEDED
Status: DISCONTINUED | OUTPATIENT
Start: 2024-05-28 | End: 2024-05-28

## 2024-05-28 RX ORDER — SODIUM CHLORIDE 9 MG/ML
INJECTION, SOLUTION INTRAVENOUS CONTINUOUS
Status: DISCONTINUED | OUTPATIENT
Start: 2024-05-28 | End: 2024-05-28

## 2024-05-28 RX ORDER — MEPERIDINE HYDROCHLORIDE 25 MG/ML
12.5 INJECTION INTRAMUSCULAR; INTRAVENOUS; SUBCUTANEOUS ONCE
Status: DISCONTINUED | OUTPATIENT
Start: 2024-05-28 | End: 2024-05-28

## 2024-05-28 RX ORDER — KETOROLAC TROMETHAMINE 30 MG/ML
15 INJECTION, SOLUTION INTRAMUSCULAR; INTRAVENOUS EVERY 6 HOURS
Status: DISCONTINUED | OUTPATIENT
Start: 2024-05-28 | End: 2024-05-31 | Stop reason: HOSPADM

## 2024-05-28 RX ORDER — ONDANSETRON HYDROCHLORIDE 2 MG/ML
4 INJECTION, SOLUTION INTRAVENOUS ONCE
Status: DISCONTINUED | OUTPATIENT
Start: 2024-05-28 | End: 2024-05-28

## 2024-05-28 RX ORDER — OXYCODONE AND ACETAMINOPHEN 5; 325 MG/1; MG/1
1 TABLET ORAL EVERY 4 HOURS PRN
Status: DISCONTINUED | OUTPATIENT
Start: 2024-05-28 | End: 2024-05-31 | Stop reason: HOSPADM

## 2024-05-28 RX ORDER — METOCLOPRAMIDE HYDROCHLORIDE 5 MG/ML
10 INJECTION INTRAMUSCULAR; INTRAVENOUS EVERY 10 MIN PRN
Status: DISCONTINUED | OUTPATIENT
Start: 2024-05-28 | End: 2024-05-28

## 2024-05-28 RX ORDER — LIDOCAINE HYDROCHLORIDE 20 MG/ML
INJECTION, SOLUTION EPIDURAL; INFILTRATION; INTRACAUDAL; PERINEURAL
Status: DISCONTINUED | OUTPATIENT
Start: 2024-05-28 | End: 2024-05-28

## 2024-05-28 RX ORDER — FENTANYL CITRATE 50 UG/ML
INJECTION, SOLUTION INTRAMUSCULAR; INTRAVENOUS
Status: DISCONTINUED | OUTPATIENT
Start: 2024-05-28 | End: 2024-05-28

## 2024-05-28 RX ORDER — GABAPENTIN 100 MG/1
200 CAPSULE ORAL
Status: COMPLETED | OUTPATIENT
Start: 2024-05-28 | End: 2024-05-28

## 2024-05-28 RX ORDER — DEXAMETHASONE SODIUM PHOSPHATE 4 MG/ML
INJECTION, SOLUTION INTRA-ARTICULAR; INTRALESIONAL; INTRAMUSCULAR; INTRAVENOUS; SOFT TISSUE
Status: DISCONTINUED | OUTPATIENT
Start: 2024-05-28 | End: 2024-05-28

## 2024-05-28 RX ORDER — SUCCINYLCHOLINE CHLORIDE 20 MG/ML
INJECTION INTRAMUSCULAR; INTRAVENOUS
Status: DISCONTINUED | OUTPATIENT
Start: 2024-05-28 | End: 2024-05-28

## 2024-05-28 RX ORDER — ROCURONIUM BROMIDE 10 MG/ML
INJECTION, SOLUTION INTRAVENOUS
Status: DISCONTINUED | OUTPATIENT
Start: 2024-05-28 | End: 2024-05-28

## 2024-05-28 RX ORDER — PROCHLORPERAZINE EDISYLATE 5 MG/ML
5 INJECTION INTRAMUSCULAR; INTRAVENOUS EVERY 6 HOURS PRN
Status: DISCONTINUED | OUTPATIENT
Start: 2024-05-28 | End: 2024-05-31 | Stop reason: HOSPADM

## 2024-05-28 RX ORDER — ALBUMIN HUMAN 250 G/1000ML
SOLUTION INTRAVENOUS
Status: DISCONTINUED | OUTPATIENT
Start: 2024-05-28 | End: 2024-05-28

## 2024-05-28 RX ORDER — MIDAZOLAM HYDROCHLORIDE 1 MG/ML
INJECTION INTRAMUSCULAR; INTRAVENOUS
Status: DISCONTINUED | OUTPATIENT
Start: 2024-05-28 | End: 2024-05-28

## 2024-05-28 RX ADMIN — Medication 100 MCG: at 04:05

## 2024-05-28 RX ADMIN — ACETAMINOPHEN 1000 MG: 500 TABLET ORAL at 08:05

## 2024-05-28 RX ADMIN — HYDROMORPHONE HYDROCHLORIDE 0.4 MG: 2 INJECTION, SOLUTION INTRAMUSCULAR; INTRAVENOUS; SUBCUTANEOUS at 04:05

## 2024-05-28 RX ADMIN — OXYCODONE HYDROCHLORIDE AND ACETAMINOPHEN 1 TABLET: 5; 325 TABLET ORAL at 09:05

## 2024-05-28 RX ADMIN — ACETAMINOPHEN 1000 MG: 10 INJECTION, SOLUTION INTRAVENOUS at 03:05

## 2024-05-28 RX ADMIN — SODIUM CHLORIDE, SODIUM GLUCONATE, SODIUM ACETATE, POTASSIUM CHLORIDE AND MAGNESIUM CHLORIDE: 526; 502; 368; 37; 30 INJECTION, SOLUTION INTRAVENOUS at 12:05

## 2024-05-28 RX ADMIN — ALBUMIN (HUMAN) 100 ML: 12.5 SOLUTION INTRAVENOUS at 12:05

## 2024-05-28 RX ADMIN — ROCURONIUM BROMIDE 90 MG: 10 SOLUTION INTRAVENOUS at 12:05

## 2024-05-28 RX ADMIN — GABAPENTIN 200 MG: 100 CAPSULE ORAL at 08:05

## 2024-05-28 RX ADMIN — METOCLOPRAMIDE 10 MG: 5 INJECTION, SOLUTION INTRAMUSCULAR; INTRAVENOUS at 10:05

## 2024-05-28 RX ADMIN — Medication 100 MCG: at 03:05

## 2024-05-28 RX ADMIN — ROCURONIUM BROMIDE 10 MG: 10 SOLUTION INTRAVENOUS at 12:05

## 2024-05-28 RX ADMIN — HYDROMORPHONE HYDROCHLORIDE 0.4 MG: 2 INJECTION, SOLUTION INTRAMUSCULAR; INTRAVENOUS; SUBCUTANEOUS at 03:05

## 2024-05-28 RX ADMIN — FENTANYL CITRATE 100 MCG: 50 INJECTION, SOLUTION INTRAMUSCULAR; INTRAVENOUS at 12:05

## 2024-05-28 RX ADMIN — ONDANSETRON 4 MG: 2 INJECTION INTRAMUSCULAR; INTRAVENOUS at 03:05

## 2024-05-28 RX ADMIN — ROCURONIUM BROMIDE 50 MG: 10 SOLUTION INTRAVENOUS at 01:05

## 2024-05-28 RX ADMIN — LIDOCAINE HYDROCHLORIDE 80 MG: 20 INJECTION, SOLUTION INTRAVENOUS at 12:05

## 2024-05-28 RX ADMIN — SODIUM CHLORIDE, SODIUM GLUCONATE, SODIUM ACETATE, POTASSIUM CHLORIDE AND MAGNESIUM CHLORIDE: 526; 502; 368; 37; 30 INJECTION, SOLUTION INTRAVENOUS at 01:05

## 2024-05-28 RX ADMIN — MIDAZOLAM HYDROCHLORIDE 2 MG: 1 INJECTION, SOLUTION INTRAMUSCULAR; INTRAVENOUS at 12:05

## 2024-05-28 RX ADMIN — ROCURONIUM BROMIDE 50 MG: 10 SOLUTION INTRAVENOUS at 02:05

## 2024-05-28 RX ADMIN — DEXAMETHASONE SODIUM PHOSPHATE 8 MG: 4 INJECTION, SOLUTION INTRA-ARTICULAR; INTRALESIONAL; INTRAMUSCULAR; INTRAVENOUS; SOFT TISSUE at 12:05

## 2024-05-28 RX ADMIN — METRONIDAZOLE 500 MG: 5 INJECTION, SOLUTION INTRAVENOUS at 12:05

## 2024-05-28 RX ADMIN — KETOROLAC TROMETHAMINE 15 MG: 30 INJECTION, SOLUTION INTRAMUSCULAR at 05:05

## 2024-05-28 RX ADMIN — CELECOXIB 400 MG: 200 CAPSULE ORAL at 08:05

## 2024-05-28 RX ADMIN — ENOXAPARIN SODIUM 40 MG: 40 INJECTION SUBCUTANEOUS at 08:05

## 2024-05-28 RX ADMIN — CEFTRIAXONE SODIUM 2 G: 2 INJECTION, POWDER, FOR SOLUTION INTRAMUSCULAR; INTRAVENOUS at 12:05

## 2024-05-28 RX ADMIN — EPHEDRINE SULFATE 10 MG: 50 INJECTION INTRAVENOUS at 02:05

## 2024-05-28 RX ADMIN — SODIUM CHLORIDE, POTASSIUM CHLORIDE, SODIUM LACTATE AND CALCIUM CHLORIDE: 600; 310; 30; 20 INJECTION, SOLUTION INTRAVENOUS at 08:05

## 2024-05-28 RX ADMIN — SUCCINYLCHOLINE CHLORIDE 160 MG: 20 INJECTION, SOLUTION INTRAMUSCULAR; INTRAVENOUS at 12:05

## 2024-05-28 RX ADMIN — EPHEDRINE SULFATE 10 MG: 50 INJECTION INTRAVENOUS at 01:05

## 2024-05-28 RX ADMIN — PROPOFOL 180 MG: 10 INJECTION, EMULSION INTRAVENOUS at 12:05

## 2024-05-28 NOTE — OP NOTE
[facility] - Periop Services  Operative Note      Date of Procedure: 5/28/2024     Procedure:   Protectomy with ileal J pouch anal anastomosis and diverting loop ileostomy  Excision of anal skin tag    Surgeons and Role:     * Fredi Shields MD - Primary    Assistant: Delaney Garcia NP    Pre-Operative Diagnosis: Chronic ulcerative proctitis with other complication [K51.218]    Post-Operative Diagnosis: Same    Anesthesia: General    Estimated Blood Loss (EBL): 200 mL           Specimens:   Rectum  Ileostomy  Donuts    Description of Technical Procedures:  After informed consent was obtained, patient was brought to the operating room and placed in the supine position.  Next general endotracheal anesthesia was administered by member of the anesthesia team.  A small vertical infraumbilical incision was made with a 15 blade.  Access to the peritoneal cavity was achieved with a 10 mm 0 degree laparoscope and an 11 mm Optiview trocar.  Pneumoperitoneum was achieved.  Bilateral tap blocks were performed using Marcaine and Exparel solution.  Patient was placed in Trendelenburg right-side down position.  Small bowel was delivered out of the pelvis.  The rectal stump was identified and mobilized with gentle blunt dissection.  The peritoneum along the right side was incised.  The superior rectal artery was skeletonized, isolated, and divided with a 45 mm echelon stapler using a white cartridge.  The peritoneum along the left side of the rectum was incised as well.  The presacral space was entered and this plane was developed distally.  There was significant pelvic inflammation due to the chronic proctitis making planes difficult to visualize so I converted to an open procedure.  The infraumbilical incision was extended inferiorly with a 15 blade and electrocautery.  The fascia was divided vertically in the midline and the peritoneum was incised sharply.  A large Baljinder wound protector was utilized.  The  rectum was circumferentially mobilized with electrocautery and gentle blunt dissection down to the pelvic floor.  The 45 mm echelon stapler was then placed at the top of the anal canal and confirmed by digital rectal exam.  The rectum was then removed and sent for permanent pathology.  Hemostasis was achieved in the pelvis with electrocautery.  Attention was turned to the end ileostomy which was excised.  A circular skin incision was made along the mucocutaneous junction with a 15 blade.  The ileostomy was circumferentially mobilized from the surrounding subcutaneous tissue and abdominal wall musculature with the electrocautery then returned to an intra-abdominal position.  It was resected using the echelon stapler with a blue cartridge and sent for permanent pathology.  15 cm was measured and the apex was then marked with a Daniel clamp.  This easily reached into the pelvis.  An enterotomy was made at the marking Daniel along the anti mesenteric border and an ileal J-pouch was created using 2 firings of the 75 mm TLC stapler with blue cartridges.  The anastomosis was reinforced with interrupted 2-0 Vicryl Lembert sutures.  A 2-0 Prolene pursestring suture was placed at the common enterotomy and used to secure a 29 mm EEA anvil.  The 29 mm EEA stapler was introduced and advanced to the top of the distal stump.  Center mahesh was deployed just to the left of the vertical staple line.  The anvil was attached and the stapler was closed.  Proper orientation of the small bowel, pouch, and mesentery was confirmed.  Stapler was fired, opened, and removed without difficulty.  Two intact donut rings were noted and sent for permanent pathology.  Pelvis was filled with saline and a proctoscope was inserted into the anus.  The pouch was gently insufflated and pneumostatic.  It was decompressed and the proctoscope was removed.  I changed gown and gloves and returned to the operating room table.  Vista seal was applied to the  pouch and anastomosis.  A 19 Luxembourger Ross drain was placed in the pelvis and brought out through the left lateral 5 mm laparoscopic trocar incision.  It was secured to the skin with a 2-0 silk suture.  The small bowel was traced proximally from the J-pouch to a loop of small bowel that reached the abdominal wall at the previous ileostomy site.  A small window was made along the mesenteric border and a 14 Luxembourger red rubber catheter was placed.  This loop of bowel was then pulled through the old ileostomy site.  Proper orientation of the small bowel and mesentery was confirmed.  The red rubber catheter was secured to the skin with 2-0 silk sutures.  Excess catheter was cut away and discarded.  The midline fascial defect was then repaired with running loop PDS suture.  Subcutaneous tissue was irrigated and the midline incision was closed in a layered fashion using interrupted Vicryl suture to reapproximate the subcutaneous tissue and running 3-0 Monocryl suture in a subcuticular fashion to reapproximate the skin edges.  Laparoscopic trocar sites were repaired with Monocryl suture in a subcuticular fashion.  Incisions were cleaned and isolated with a blue towel.  A transverse enterotomy was made and a loop ileostomy was matured in a Althea fashion using interrupted 3-0 Vicryl suture.  Both limbs were widely patent upon completion.  Drain was cut to the appropriate length and attached to bulb suctioned.  Incisions were cleaned and sterile dressings applied.  An ostomy appliance was placed around the stoma.  Attention was turned to the perianal area where a large anterior tag was sharply excised with the electrocautery.  The anoderm was reapproximated with interrupted 2-0 chromic sutures.  He tolerated the procedure well and there were no complications.  He was awakened and extubated in the operating room then subsequently transferred to recovery in satisfactory condition.

## 2024-05-28 NOTE — ANESTHESIA POSTPROCEDURE EVALUATION
Anesthesia Post Evaluation    Patient: Beau Mau    Procedure(s) Performed: Procedure(s) (LRB):  PROCTOCOLECTOMY, LAPAROSCOPIC, WITH ILEOANAL ANASTOMOSIS, (N/A)  REVISION, ILEOSTOMY  REMOVAL, SKIN TAG    Final Anesthesia Type: general      Patient location during evaluation: PACU  Patient participation: Yes- Able to Participate  Level of consciousness: awake and alert  Post-procedure vital signs: reviewed and stable  Pain management: adequate  Airway patency: patent    PONV status at discharge: No PONV  Anesthetic complications: no      Cardiovascular status: hemodynamically stable  Respiratory status: unassisted  Hydration status: euvolemic  Follow-up not needed.              Vitals Value Taken Time   /72 05/28/24 1701   Temp 36.9 °C (98.4 °F) 05/28/24 1627   Pulse 99 05/28/24 1705   Resp 14 05/28/24 1705   SpO2 99 % 05/28/24 1705   Vitals shown include unfiled device data.      No case tracking events are documented in the log.      Pain/Leonard Score: Pain Rating Prior to Med Admin: 0 (5/28/2024  8:53 AM)  Leonard Score: 3 (5/28/2024  4:30 PM)

## 2024-05-28 NOTE — ANESTHESIA PROCEDURE NOTES
Intubation    Date/Time: 5/28/2024 12:19 PM    Performed by: Joselyn Suarez CRNA  Authorized by: Juan Azar DO    Intubation:     Induction:  Intravenous    Intubated:  Postinduction    Mask Ventilation:  Easy mask    Attempts:  1    Attempted By:  CRNA    Method of Intubation:  Direct    Blade:  Carlie 3    Laryngeal View Grade: Grade IIA - cords partially seen      Difficult Airway Encountered?: No      Complications:  None    Airway Device:  Oral endotracheal tube    Airway Device Size:  7.5    Style/Cuff Inflation:  Cuffed (inflated to minimal occlusive pressure)    Inflation Amount (mL):  6    Tube secured:  23    Secured at:  The teeth    Placement Verified By:  Capnometry    Complicating Factors:  None    Findings Post-Intubation:  BS equal bilateral

## 2024-05-28 NOTE — H&P
[facility] - Periop Services  Colon & Rectal Surgery  H&P Note    Subjective:     History of Present Illness: 43 y.o. male status post laparoscopic total colectomy with end ileostomy 11/15/2023 for fulminant ulcerative colitis here today for the second stage operation.  He is feeling great and has no complaints.  He has gained another 30 lb.  His ileostomy is working well.  He denies any drainage or bleeding from his rectum.  He is ready to proceed with surgery.     No current facility-administered medications on file prior to encounter.     Current Outpatient Medications on File Prior to Encounter   Medication Sig    ciprofloxacin HCl (CIPRO) 500 MG tablet Oral for 14 Days (Patient not taking: Reported on 4/25/2024)    cyclopentolate 1% (CYCLOGYL) 1 % ophthalmic solution Place 1 drop into the right eye 4 (four) times daily. (Patient not taking: Reported on 4/25/2024)    fluconazole (DIFLUCAN) 200 MG Tab Oral for 14 Days (Patient not taking: Reported on 4/25/2024)    MONTEZ FAJARDO, [facility] 80 mg/0.8 mL PnKt Inject into the skin. (Patient not taking: Reported on 4/25/2024)    magnesium oxide (MAG-OX) 250 mg magnesium Tab 1 tablet as needed Orally Twice a day (Patient not taking: Reported on 4/25/2024)    metroNIDAZOLE (FLAGYL) 500 MG tablet Oral for 14 Days (Patient not taking: Reported on 4/25/2024)    prednisoLONE acetate (PRED FORTE) 1 % DrpS Place 1 drop into the right eye 4 (four) times daily. (Patient not taking: Reported on 4/25/2024)    valACYclovir (VALTREX) 1000 MG tablet Take 1 tablet (1,000 mg total) by mouth 2 (two) times daily. for 5 days       Review of patient's allergies indicates:  No Known Allergies    Past Medical History:   Diagnosis Date    Chronic ulcerative proctitis with other complication     Diverticulitis     Ulcerative colitis      Past Surgical History:   Procedure Laterality Date    BICEPS TENDON REPAIR      COLON SURGERY  11/20/23    COLONOSCOPY, WITH 1 OR MORE  BIOPSIES N/A 10/23/2023    Procedure: COLON;  Surgeon: Dragan Underwood MD;  Location: [facility] ENDOSCOPY;  Service: Gastroenterology;  Laterality: N/A;    DEBRIDEMENT OF SACRAL WOUND N/A 11/20/2023    Procedure: DEBRIDEMENT, WOUND, SACRUM;  Surgeon: Chandana Guidry MD;  Location: [facility];  Service: General;  Laterality: N/A;  perineum/sacrum// high lithotomy    DEBRIDEMENT, EXTERNAL GENITALIA, PERINEUM, AND ABDOMINAL WALL, FOR NECROTIZING SOFT TISSUE INFECTION Bilateral 11/11/2023    Procedure: DEBRIDEMENT, EXTERNAL GENITALIA/BUTTOCKS FOR NECROTIZING SOFT TISSUE INFECTION;  Surgeon: Vladimir Vick MD;  Location: [facility];  Service: General;  Laterality: Bilateral;  Prone positioning.    DEBRIDEMENT, EXTERNAL GENITALIA, PERINEUM, AND ABDOMINAL WALL, FOR NECROTIZING SOFT TISSUE INFECTION N/A 11/12/2023    Procedure: DEBRIDEMENT, EXTERNAL GENITALIA, PERINEUM, AND ABDOMINAL WALL, FOR NECROTIZING SOFT TISSUE INFECTION;  Surgeon: Vladimir Vick MD;  Location: [facility];  Service: General;  Laterality: N/A;  Place patient in dorsal lithotomy positioning.    DIAGNOSTIC LAPAROSCOPY N/A 11/20/2023    Procedure: LAPAROSCOPY, DIAGNOSTIC;  Surgeon: Fredi Shields MD;  Location: [facility];  Service: General;  Laterality: N/A;    EGD, WITH CLOSED BIOPSY N/A 11/22/2023    Procedure: EGD, WITH CLOSED BIOPSY;  Surgeon: Blayne Mujica MD;  Location: [facility] ENDOSCOPY;  Service: Gastroenterology;  Laterality: N/A;    LAPAROSCOPIC ILEOSTOMY N/A 11/15/2023    Procedure: CREATION, ILEOSTOMY, LAPAROSCOPIC;  Surgeon: Fredi Shields MD;  Location: [facility];  Service: Colon and Rectal;  Laterality: N/A;    LAPAROSCOPIC TOTAL COLECTOMY N/A 11/15/2023    Procedure: COLECTOMY, TOTAL, LAPAROSCOPIC;  Surgeon: Fredi Shields MD;  Location: [facility];  Service: Colon and Rectal;  Laterality: N/A;  LAP TOTAL COLECTOMY WITH ILEOSTOMY    ROTATOR CUFF REPAIR Right     VASECTOMY  3/15/08     Family History    None        Tobacco Use    Smoking status: Never    Smokeless tobacco: Never   Substance and Sexual Activity    Alcohol use: Not Currently     Comment: Social    Drug use: Never    Sexual activity: Yes     Partners: Female     Objective:     Vital Signs (Most Recent):  Temp: 98.1 °F (36.7 °C) (05/28/24 0834)  Pulse: 74 (05/28/24 0834)  Resp: 18 (05/28/24 0834)  BP: 134/86 (05/28/24 0834)  SpO2: 96 % (05/28/24 0834) Vital Signs (24h Range):  Temp:  [98.1 °F (36.7 °C)] 98.1 °F (36.7 °C)  Pulse:  [74] 74  Resp:  [18] 18  SpO2:  [96 %] 96 %  BP: (134)/(86) 134/86     Body mass index is 28.28 kg/m².    Physical Exam  Vitals reviewed.   Constitutional:       General: He is not in acute distress.  HENT:      Head: Normocephalic and atraumatic.      Nose: Nose normal.   Eyes:      General: No scleral icterus.  Cardiovascular:      Rate and Rhythm: Normal rate and regular rhythm.   Pulmonary:      Effort: Pulmonary effort is normal. No respiratory distress.   Abdominal:      General: There is no distension.      Palpations: Abdomen is soft.      Tenderness: There is no abdominal tenderness.      Comments: RLQ end ileostomy.   Genitourinary:     Comments: Large anterior tag.  Musculoskeletal:         General: Normal range of motion.   Neurological:      Mental Status: He is alert and oriented to person, place, and time.         Assessment/Plan:     Active Diagnoses:    Diagnosis Date Noted POA    PRINCIPAL PROBLEM:  Chronic ulcerative proctitis without complications [K51.20] 05/28/2024 Yes      Problems Resolved During this Admission:       To OR for Proctectomy with IPAA and diverting loop ileostomy.    Fredi Shields MD  Colon & Rectal Surgery  [facility] - [facility] Services

## 2024-05-28 NOTE — TRANSFER OF CARE
"Anesthesia Transfer of Care Note    Patient: Lionel León    Procedure(s) Performed: Procedure(s) (LRB):  PROCTOCOLECTOMY, LAPAROSCOPIC, WITH ILEOANAL ANASTOMOSIS, (N/A)  REVISION, ILEOSTOMY  REMOVAL, SKIN TAG    Patient location: PACU    Anesthesia Type: general    Transport from OR: Transported from OR on room air with adequate spontaneous ventilation    Post pain: adequate analgesia    Post assessment: no apparent anesthetic complications and tolerated procedure well    Post vital signs: stable    Level of consciousness: sedated    Nausea/Vomiting: no nausea/vomiting    Complications: none    Transfer of care protocol was followed    Last vitals: Visit Vitals  /62 (BP Location: Left arm, Patient Position: Lying)   Pulse 84   Temp 36.7 °C (98.1 °F) (Oral)   Resp 12   Ht 5' 10" (1.778 m)   Wt 89.4 kg (197 lb 1.5 oz)   SpO2 98%   BMI 28.28 kg/m²     "

## 2024-05-28 NOTE — ANESTHESIA PREPROCEDURE EVALUATION
05/28/2024  Lionel León is a 43 y.o., male status post laparoscopic total colectomy with end ileostomy 11/15/2023 for fulminant ulcerative colitis. He is feeling great and has no complaints.  He has gained another 30 lb.  His ileostomy is working well.  He denies any drainage or bleeding from his rectum.  He would like to restore intestinal continuity to not have a bag the rest of his life.  He is ready to proceed with surgery, presenting for laparoscopic proctocolectomy, and ileoanal anastomosis.        Other Medical History   Ulcerative colitis Diverticulitis   Chronic ulcerative proctitis with other complication      Surgical History    COLONOSCOPY, WITH 1 OR MORE BIOPSIES DEBRIDEMENT, EXTERNAL GENITALIA, PERINEUM, AND ABDOMINAL WALL, FOR NECROTIZING SOFT TISSUE INFECTION   DEBRIDEMENT, EXTERNAL GENITALIA, PERINEUM, AND ABDOMINAL WALL, FOR NECROTIZING SOFT TISSUE INFECTION LAPAROSCOPIC TOTAL COLECTOMY   LAPAROSCOPIC ILEOSTOMY DEBRIDEMENT OF SACRAL WOUND   DIAGNOSTIC LAPAROSCOPY EGD, WITH CLOSED BIOPSY   COLON SURGERY VASECTOMY   ROTATOR CUFF REPAIR BICEPS TENDON REPAIR     Pre-op Assessment    I have reviewed the Patient Summary Reports.     I have reviewed the Nursing Notes. I have reviewed the NPO Status.   I have reviewed the Medications.     Review of Systems  Anesthesia Hx:  No problems with previous Anesthesia                Social:  Non-Smoker       Hepatic/GI:   PUD,                 H/H: 16/52    Physical Exam  General: Well nourished, Cooperative, Alert and Oriented    Airway:  Mallampati: III   Mouth Opening: Normal  TM Distance: Normal  Tongue: Normal  Neck ROM: Normal ROM    Dental:  Intact    Chest/Lungs:  Clear to auscultation, Normal Respiratory Rate    Heart:  Rate: Normal  Rhythm: Regular Rhythm  Sounds: Normal    Abdomen:  Normal, Soft, Nontender        Anesthesia Plan  Type of  Anesthesia, risks & benefits discussed:    Anesthesia Type: Gen ETT  Intra-op Monitoring Plan: Standard ASA Monitors  Post Op Pain Control Plan: multimodal analgesia  Induction:  IV  Airway Plan: Direct  Informed Consent: Informed consent signed with the Patient and all parties understand the risks and agree with anesthesia plan.  All questions answered.   ASA Score: 2  Day of Surgery Review of History & Physical: H&P Update referred to the surgeon/provider.  Anesthesia Plan Notes: 2nd PIV    Ready For Surgery From Anesthesia Perspective.     .

## 2024-05-29 LAB
ANION GAP SERPL CALC-SCNC: 8 MEQ/L
BASOPHILS # BLD AUTO: 0.01 X10(3)/MCL
BASOPHILS NFR BLD AUTO: 0.1 %
BUN SERPL-MCNC: 16.1 MG/DL (ref 8.9–20.6)
CALCIUM SERPL-MCNC: 7.8 MG/DL (ref 8.4–10.2)
CHLORIDE SERPL-SCNC: 107 MMOL/L (ref 98–107)
CO2 SERPL-SCNC: 21 MMOL/L (ref 22–29)
CREAT SERPL-MCNC: 1.06 MG/DL (ref 0.73–1.18)
CREAT/UREA NIT SERPL: 15
EOSINOPHIL # BLD AUTO: 0 X10(3)/MCL (ref 0–0.9)
EOSINOPHIL NFR BLD AUTO: 0 %
ERYTHROCYTE [DISTWIDTH] IN BLOOD BY AUTOMATED COUNT: 17 % (ref 11.5–17)
GFR SERPLBLD CREATININE-BSD FMLA CKD-EPI: >60 ML/MIN/1.73/M2
GLUCOSE SERPL-MCNC: 126 MG/DL (ref 74–100)
HCT VFR BLD AUTO: 40.2 % (ref 42–52)
HGB BLD-MCNC: 12.9 G/DL (ref 14–18)
IMM GRANULOCYTES # BLD AUTO: 0.05 X10(3)/MCL (ref 0–0.04)
IMM GRANULOCYTES NFR BLD AUTO: 0.4 %
LYMPHOCYTES # BLD AUTO: 0.4 X10(3)/MCL (ref 0.6–4.6)
LYMPHOCYTES NFR BLD AUTO: 3.2 %
MCH RBC QN AUTO: 29.2 PG (ref 27–31)
MCHC RBC AUTO-ENTMCNC: 32.1 G/DL (ref 33–36)
MCV RBC AUTO: 91 FL (ref 80–94)
MONOCYTES # BLD AUTO: 0.81 X10(3)/MCL (ref 0.1–1.3)
MONOCYTES NFR BLD AUTO: 6.5 %
NEUTROPHILS # BLD AUTO: 11.11 X10(3)/MCL (ref 2.1–9.2)
NEUTROPHILS NFR BLD AUTO: 89.8 %
NRBC BLD AUTO-RTO: 0 %
PLATELET # BLD AUTO: 144 X10(3)/MCL (ref 130–400)
PMV BLD AUTO: 10.4 FL (ref 7.4–10.4)
POTASSIUM SERPL-SCNC: 4.1 MMOL/L (ref 3.5–5.1)
RBC # BLD AUTO: 4.42 X10(6)/MCL (ref 4.7–6.1)
SODIUM SERPL-SCNC: 136 MMOL/L (ref 136–145)
WBC # SPEC AUTO: 12.38 X10(3)/MCL (ref 4.5–11.5)

## 2024-05-29 PROCEDURE — 99900035 HC TECH TIME PER 15 MIN (STAT)

## 2024-05-29 PROCEDURE — 63600175 PHARM REV CODE 636 W HCPCS

## 2024-05-29 PROCEDURE — 25000003 PHARM REV CODE 250

## 2024-05-29 PROCEDURE — 85025 COMPLETE CBC W/AUTO DIFF WBC: CPT

## 2024-05-29 PROCEDURE — 36415 COLL VENOUS BLD VENIPUNCTURE: CPT

## 2024-05-29 PROCEDURE — 80048 BASIC METABOLIC PNL TOTAL CA: CPT

## 2024-05-29 PROCEDURE — 94799 UNLISTED PULMONARY SVC/PX: CPT

## 2024-05-29 PROCEDURE — 25000003 PHARM REV CODE 250: Performed by: COLON & RECTAL SURGERY

## 2024-05-29 PROCEDURE — 11000001 HC ACUTE MED/SURG PRIVATE ROOM

## 2024-05-29 RX ORDER — ENOXAPARIN SODIUM 100 MG/ML
40 INJECTION SUBCUTANEOUS EVERY 24 HOURS
Status: DISCONTINUED | OUTPATIENT
Start: 2024-05-29 | End: 2024-05-31 | Stop reason: HOSPADM

## 2024-05-29 RX ORDER — OXYCODONE AND ACETAMINOPHEN 10; 325 MG/1; MG/1
1 TABLET ORAL EVERY 4 HOURS PRN
Status: DISCONTINUED | OUTPATIENT
Start: 2024-05-29 | End: 2024-05-31 | Stop reason: HOSPADM

## 2024-05-29 RX ADMIN — OXYCODONE HYDROCHLORIDE AND ACETAMINOPHEN 1 TABLET: 10; 325 TABLET ORAL at 05:05

## 2024-05-29 RX ADMIN — KETOROLAC TROMETHAMINE 15 MG: 30 INJECTION, SOLUTION INTRAMUSCULAR at 05:05

## 2024-05-29 RX ADMIN — OXYCODONE HYDROCHLORIDE AND ACETAMINOPHEN 1 TABLET: 10; 325 TABLET ORAL at 08:05

## 2024-05-29 RX ADMIN — MORPHINE SULFATE 2 MG: 4 INJECTION INTRAVENOUS at 07:05

## 2024-05-29 RX ADMIN — ONDANSETRON 4 MG: 2 INJECTION INTRAMUSCULAR; INTRAVENOUS at 05:05

## 2024-05-29 RX ADMIN — ENOXAPARIN SODIUM 40 MG: 40 INJECTION SUBCUTANEOUS at 05:05

## 2024-05-29 RX ADMIN — KETOROLAC TROMETHAMINE 15 MG: 30 INJECTION, SOLUTION INTRAMUSCULAR at 11:05

## 2024-05-29 RX ADMIN — KETOROLAC TROMETHAMINE 15 MG: 30 INJECTION, SOLUTION INTRAMUSCULAR at 12:05

## 2024-05-29 RX ADMIN — OXYCODONE HYDROCHLORIDE AND ACETAMINOPHEN 1 TABLET: 10; 325 TABLET ORAL at 09:05

## 2024-05-29 RX ADMIN — SODIUM CHLORIDE, POTASSIUM CHLORIDE, SODIUM LACTATE AND CALCIUM CHLORIDE: 600; 310; 30; 20 INJECTION, SOLUTION INTRAVENOUS at 02:05

## 2024-05-29 RX ADMIN — SODIUM CHLORIDE, POTASSIUM CHLORIDE, SODIUM LACTATE AND CALCIUM CHLORIDE: 600; 310; 30; 20 INJECTION, SOLUTION INTRAVENOUS at 04:05

## 2024-05-29 RX ADMIN — OXYCODONE HYDROCHLORIDE AND ACETAMINOPHEN 1 TABLET: 10; 325 TABLET ORAL at 12:05

## 2024-05-29 RX ADMIN — MORPHINE SULFATE 2 MG: 4 INJECTION INTRAVENOUS at 03:05

## 2024-05-29 RX ADMIN — OXYCODONE HYDROCHLORIDE AND ACETAMINOPHEN 1 TABLET: 5; 325 TABLET ORAL at 04:05

## 2024-05-29 NOTE — PLAN OF CARE
05/29/24 1547   Discharge Assessment   Assessment Type Discharge Planning Assessment   Confirmed/corrected address, phone number and insurance Yes   Confirmed Demographics Correct on Facesheet   Source of Information patient;family   Communicated TOD with patient/caregiver Date not available/Unable to determine   Reason For Admission Chronic ulcerative proctitis   People in Home spouse;child(karl), dependent   Do you expect to return to your current living situation? Yes   Do you have help at home or someone to help you manage your care at home? Yes   Who are your caregiver(s) and their phone number(s)? Lul (spouse) [phone]   Prior to hospitilization cognitive status: Unable to Assess   Current cognitive status: Alert/Oriented   Walking or Climbing Stairs Difficulty no   Dressing/Bathing Difficulty no   Home Accessibility stairs to enter home   Number of Stairs, Main Entrance four   Home Layout Able to live on 1st floor   Equipment Currently Used at Home wound care supplies   Do you currently have service(s) that help you manage your care at home? Yes   Name and Contact number of agency [facility] Wound and ostomy nurse 2 times a month   Is the pt/caregiver preference to resume services with current agency Yes   Do you take prescription medications? Yes   Do you have prescription coverage? Yes   Coverage BCBS   Who is going to help you get home at discharge? Spouse   How do you get to doctors appointments? car, drives self;family or friend will provide   Are you on dialysis? No   Do you take coumadin? No   Discharge Plan A Home with family   Discharge Plan B Home with family   DME Needed Upon Discharge  none   Discharge Plan discussed with: Patient;Spouse/sig other   Transition of Care Barriers None   Physical Activity   On average, how many days per week do you engage in moderate to strenuous exercise (like a brisk walk)? 4 days   On average, how many minutes do you engage in exercise at this level? 30 min    Financial Resource Strain   How hard is it for you to pay for the very basics like food, housing, medical care, and heating? Not hard   Housing Stability   In the last 12 months, was there a time when you were not able to pay the mortgage or rent on time? N   At any time in the past 12 months, were you homeless or living in a shelter (including now)? N   Transportation Needs   Has the lack of transportation kept you from medical appointments, meetings, work or from getting things needed for daily living? No   Food Insecurity   Within the past 12 months, you worried that your food would run out before you got the money to buy more. Never true   Within the past 12 months, the food you bought just didn't last and you didn't have money to get more. Never true   Stress   Do you feel stress - tense, restless, nervous, or anxious, or unable to sleep at night because your mind is troubled all the time - these days? Not at all   Social Isolation   How often do you feel lonely or isolated from those around you?  Never   Alcohol Use   Q1: How often do you have a drink containing alcohol? Monthly or l   Q2: How many drinks containing alcohol do you have on a typical day when you are drinking? 1 or 2   Q3: How often do you have six or more drinks on one occasion? Less than mo   Utilities   In the past 12 months has the electric, gas, oil, or water company threatened to shut off services in your home? No   Health Literacy   How often do you need to have someone help you when you read instructions, pamphlets, or other written material from your doctor or pharmacy? Never   OTHER   Name(s) of People in Home Lul León and 3 teenage sons     Patient and spouse live together, he is not current with any home health services at this time. Per patient and spouse he has a wound care nurse who sees him twice a week. Wife is able to help at home and will provide transportation home.

## 2024-05-29 NOTE — PROGRESS NOTES
Colon & Rectal Surgery Progress Note    Post Op Day 1     Subjective:  - Tolerating clear liquid diet  - Ambulating  - Reports moderate abdominal tenderness  - Urine output 1400 ml  - Denies ileostomy output  - CYNTHIA drain output 75 ml  - VS stable, afebrile    Objective:  Temp:  [97.4 °F (36.3 °C)-98.4 °F (36.9 °C)]   Pulse:  []   Resp:  [12-20]   BP: ()/(36-90)   SpO2:  [94 %-99 %]     Physical Exam:  NAD  Regular rate and rhythm  Non-labored respirations  Abdomen soft, appropriate, non distended, ileostomy stoma pink without stool in bag, incisions clean and dry  SCDs in place      Intake/Output Summary (Last 24 hours) at 5/29/2024 0813  Last data filed at 5/29/2024 0418  Gross per 24 hour   Intake 3080 ml   Output 1855 ml   Net 1225 ml       Recent Labs     05/29/24  0415   WBC 12.38*   HGB 12.9*   HCT 40.2*         K 4.1   CO2 21*   BUN 16.1   CREATININE 1.06   CALCIUM 7.8*       Assessment/Plan  - Decrease IVF  - Continue pain management  - Continue to ambulate  - DC Devi  - Increase diet to full liquids        Delaney Garcia, SELVINP  Colon & Rectal Surgery  [facility] - 8th Floor Med Surg

## 2024-05-29 NOTE — NURSING
Nurses Note -- 4 Eyes      5/28/2024   8:30 PM      Skin assessed during: Admit      [x] No Altered Skin Integrity Present    []Prevention Measures Documented      [] Yes- Altered Skin Integrity Present or Discovered   [] LDA Added if Not in Epic (Describe Wound)   [] New Altered Skin Integrity was Present on Admit and Documented in LDA   [] Wound Image Taken    Wound Care Consulted? No    Attending Nurse:  Yu Shelton RN/Staff Member:   Yumiko

## 2024-05-30 LAB
ALBUMIN SERPL-MCNC: 3 G/DL (ref 3.5–5)
ALBUMIN/GLOB SERPL: 1.1 RATIO (ref 1.1–2)
ALP SERPL-CCNC: 48 UNIT/L (ref 40–150)
ALT SERPL-CCNC: 21 UNIT/L (ref 0–55)
ANION GAP SERPL CALC-SCNC: 4 MEQ/L
AST SERPL-CCNC: 17 UNIT/L (ref 5–34)
BASOPHILS # BLD AUTO: 0.03 X10(3)/MCL
BASOPHILS NFR BLD AUTO: 0.4 %
BILIRUB SERPL-MCNC: 0.6 MG/DL
BUN SERPL-MCNC: 12.4 MG/DL (ref 8.9–20.6)
CALCIUM SERPL-MCNC: 7.9 MG/DL (ref 8.4–10.2)
CHLORIDE SERPL-SCNC: 108 MMOL/L (ref 98–107)
CO2 SERPL-SCNC: 24 MMOL/L (ref 22–29)
CREAT SERPL-MCNC: 1.07 MG/DL (ref 0.73–1.18)
CREAT/UREA NIT SERPL: 12
EOSINOPHIL # BLD AUTO: 0.04 X10(3)/MCL (ref 0–0.9)
EOSINOPHIL NFR BLD AUTO: 0.6 %
ERYTHROCYTE [DISTWIDTH] IN BLOOD BY AUTOMATED COUNT: 17.3 % (ref 11.5–17)
GFR SERPLBLD CREATININE-BSD FMLA CKD-EPI: >60 ML/MIN/1.73/M2
GLOBULIN SER-MCNC: 2.8 GM/DL (ref 2.4–3.5)
GLUCOSE SERPL-MCNC: 95 MG/DL (ref 74–100)
HCT VFR BLD AUTO: 38.6 % (ref 42–52)
HGB BLD-MCNC: 12.2 G/DL (ref 14–18)
IMM GRANULOCYTES # BLD AUTO: 0.02 X10(3)/MCL (ref 0–0.04)
IMM GRANULOCYTES NFR BLD AUTO: 0.3 %
LYMPHOCYTES # BLD AUTO: 0.72 X10(3)/MCL (ref 0.6–4.6)
LYMPHOCYTES NFR BLD AUTO: 10.7 %
MCH RBC QN AUTO: 29 PG (ref 27–31)
MCHC RBC AUTO-ENTMCNC: 31.6 G/DL (ref 33–36)
MCV RBC AUTO: 91.7 FL (ref 80–94)
MONOCYTES # BLD AUTO: 0.6 X10(3)/MCL (ref 0.1–1.3)
MONOCYTES NFR BLD AUTO: 9 %
NEUTROPHILS # BLD AUTO: 5.29 X10(3)/MCL (ref 2.1–9.2)
NEUTROPHILS NFR BLD AUTO: 79 %
NRBC BLD AUTO-RTO: 0 %
PLATELET # BLD AUTO: 127 X10(3)/MCL (ref 130–400)
PLATELETS.RETICULATED NFR BLD AUTO: 3.8 % (ref 0.9–11.2)
PMV BLD AUTO: 10.6 FL (ref 7.4–10.4)
POTASSIUM SERPL-SCNC: 3.9 MMOL/L (ref 3.5–5.1)
PROT SERPL-MCNC: 5.8 GM/DL (ref 6.4–8.3)
PSYCHE PATHOLOGY RESULT: NORMAL
RBC # BLD AUTO: 4.21 X10(6)/MCL (ref 4.7–6.1)
SODIUM SERPL-SCNC: 136 MMOL/L (ref 136–145)
WBC # SPEC AUTO: 6.7 X10(3)/MCL (ref 4.5–11.5)

## 2024-05-30 PROCEDURE — 63600175 PHARM REV CODE 636 W HCPCS

## 2024-05-30 PROCEDURE — 36415 COLL VENOUS BLD VENIPUNCTURE: CPT | Performed by: COLON & RECTAL SURGERY

## 2024-05-30 PROCEDURE — 80053 COMPREHEN METABOLIC PANEL: CPT | Performed by: COLON & RECTAL SURGERY

## 2024-05-30 PROCEDURE — 25000003 PHARM REV CODE 250: Performed by: COLON & RECTAL SURGERY

## 2024-05-30 PROCEDURE — 11000001 HC ACUTE MED/SURG PRIVATE ROOM

## 2024-05-30 PROCEDURE — 85025 COMPLETE CBC W/AUTO DIFF WBC: CPT | Performed by: COLON & RECTAL SURGERY

## 2024-05-30 RX ADMIN — OXYCODONE HYDROCHLORIDE AND ACETAMINOPHEN 1 TABLET: 10; 325 TABLET ORAL at 01:05

## 2024-05-30 RX ADMIN — MORPHINE SULFATE 2 MG: 4 INJECTION INTRAVENOUS at 09:05

## 2024-05-30 RX ADMIN — KETOROLAC TROMETHAMINE 15 MG: 30 INJECTION, SOLUTION INTRAMUSCULAR at 11:05

## 2024-05-30 RX ADMIN — OXYCODONE HYDROCHLORIDE AND ACETAMINOPHEN 1 TABLET: 10; 325 TABLET ORAL at 11:05

## 2024-05-30 RX ADMIN — ENOXAPARIN SODIUM 40 MG: 40 INJECTION SUBCUTANEOUS at 05:05

## 2024-05-30 RX ADMIN — KETOROLAC TROMETHAMINE 15 MG: 30 INJECTION, SOLUTION INTRAMUSCULAR at 05:05

## 2024-05-30 RX ADMIN — MORPHINE SULFATE 2 MG: 4 INJECTION INTRAVENOUS at 04:05

## 2024-05-30 RX ADMIN — OXYCODONE HYDROCHLORIDE AND ACETAMINOPHEN 1 TABLET: 10; 325 TABLET ORAL at 06:05

## 2024-05-30 RX ADMIN — MORPHINE SULFATE 2 MG: 4 INJECTION INTRAVENOUS at 12:05

## 2024-05-30 RX ADMIN — OXYCODONE HYDROCHLORIDE AND ACETAMINOPHEN 1 TABLET: 10; 325 TABLET ORAL at 10:05

## 2024-05-30 RX ADMIN — OXYCODONE HYDROCHLORIDE AND ACETAMINOPHEN 1 TABLET: 10; 325 TABLET ORAL at 07:05

## 2024-05-30 RX ADMIN — MORPHINE SULFATE 2 MG: 4 INJECTION INTRAVENOUS at 08:05

## 2024-05-30 RX ADMIN — KETOROLAC TROMETHAMINE 15 MG: 30 INJECTION, SOLUTION INTRAMUSCULAR at 06:05

## 2024-05-30 RX ADMIN — OXYCODONE HYDROCHLORIDE AND ACETAMINOPHEN 1 TABLET: 10; 325 TABLET ORAL at 02:05

## 2024-05-30 RX ADMIN — KETOROLAC TROMETHAMINE 15 MG: 30 INJECTION, SOLUTION INTRAMUSCULAR at 12:05

## 2024-05-30 RX ADMIN — ONDANSETRON 4 MG: 2 INJECTION INTRAMUSCULAR; INTRAVENOUS at 04:05

## 2024-05-30 RX ADMIN — MORPHINE SULFATE 2 MG: 4 INJECTION INTRAVENOUS at 05:05

## 2024-05-30 RX ADMIN — MORPHINE SULFATE 2 MG: 4 INJECTION INTRAVENOUS at 01:05

## 2024-05-30 NOTE — PROGRESS NOTES
Colon & Rectal Surgery Progress Note    Post Op Day 2     Subjective:  - Tolerating full liquid diet  - Denies nausea/vomiting  - Reports moderate abdominal pain over night that is well managed with medications  - Ambulating  - Ileostomy output 175 ml  - VS stable, afebrile    Objective:  Temp:  [96.7 °F (35.9 °C)-98.9 °F (37.2 °C)]   Pulse:  [73-85]   Resp:  [17-20]   BP: (122-144)/(77-84)   SpO2:  [95 %-97 %]     Physical Exam:  NAD  Regular rate and rhythm  Non-labored respirations  Abdomen soft, appropriate, non distended, ileostomy stoma pink with stool in bag.  SCDs in place      Intake/Output Summary (Last 24 hours) at 5/30/2024 0835  Last data filed at 5/30/2024 0501  Gross per 24 hour   Intake 3873.93 ml   Output 1390 ml   Net 2483.93 ml       Recent Labs     05/30/24  0353   WBC 6.70   HGB 12.2*   HCT 38.6*   *      K 3.9   CO2 24   BUN 12.4   CREATININE 1.07   BILITOT 0.6   AST 17   ALT 21   ALKPHOS 48   CALCIUM 7.9*   ALBUMIN 3.0*       Assessment/Plan  - DC IVF  - Increase diet to low residue  - Continue to ambulate with pain management        SHERLYN Deleon  Colon & Rectal Surgery  [facility] - 8th Floor Med Surg

## 2024-05-31 VITALS
TEMPERATURE: 99 F | WEIGHT: 197.06 LBS | SYSTOLIC BLOOD PRESSURE: 127 MMHG | HEIGHT: 70 IN | RESPIRATION RATE: 20 BRPM | OXYGEN SATURATION: 94 % | BODY MASS INDEX: 28.21 KG/M2 | DIASTOLIC BLOOD PRESSURE: 80 MMHG | HEART RATE: 75 BPM

## 2024-05-31 PROBLEM — K51.20 CHRONIC ULCERATIVE PROCTITIS WITHOUT COMPLICATIONS: Status: RESOLVED | Noted: 2024-05-28 | Resolved: 2024-05-31

## 2024-05-31 PROCEDURE — 94760 N-INVAS EAR/PLS OXIMETRY 1: CPT

## 2024-05-31 PROCEDURE — 63600175 PHARM REV CODE 636 W HCPCS

## 2024-05-31 PROCEDURE — 94799 UNLISTED PULMONARY SVC/PX: CPT

## 2024-05-31 PROCEDURE — 25000003 PHARM REV CODE 250: Performed by: COLON & RECTAL SURGERY

## 2024-05-31 RX ORDER — KETOROLAC TROMETHAMINE 10 MG/1
10 TABLET, FILM COATED ORAL EVERY 6 HOURS
Qty: 20 TABLET | Refills: 0 | Status: SHIPPED | OUTPATIENT
Start: 2024-05-31 | End: 2024-06-05

## 2024-05-31 RX ORDER — ONDANSETRON 4 MG/1
4 TABLET, ORALLY DISINTEGRATING ORAL EVERY 6 HOURS PRN
Qty: 30 TABLET | Refills: 3 | Status: SHIPPED | OUTPATIENT
Start: 2024-05-31

## 2024-05-31 RX ORDER — OXYCODONE AND ACETAMINOPHEN 10; 325 MG/1; MG/1
1 TABLET ORAL EVERY 4 HOURS PRN
Qty: 30 TABLET | Refills: 0 | Status: SHIPPED | OUTPATIENT
Start: 2024-05-31 | End: 2024-06-05 | Stop reason: SDUPTHER

## 2024-05-31 RX ADMIN — KETOROLAC TROMETHAMINE 15 MG: 30 INJECTION, SOLUTION INTRAMUSCULAR at 05:05

## 2024-05-31 RX ADMIN — OXYCODONE HYDROCHLORIDE AND ACETAMINOPHEN 1 TABLET: 10; 325 TABLET ORAL at 05:05

## 2024-05-31 RX ADMIN — MORPHINE SULFATE 2 MG: 4 INJECTION INTRAVENOUS at 02:05

## 2024-05-31 NOTE — DISCHARGE INSTRUCTIONS
Ok shower and clean incisions with soap & water.      Do not submerge incisions so no baths, jacuzzi, or pool.    No lifting more than 10lbs for 6 weeks.    You can remove steri strips when they begin to peel up.    Ok to drive after 1 week if not taking pain medications.    No dietary restrictions.    Notify MD for temperature >100, worsening abdominal pain, wound redness or drainage.    Empty drain daily or as needed when full. Call Monday June 3rd to update the office on status of the drain to possibly take out early in the week.     ***[employer] to call for assistance on ostomy supplies:    [phone]

## 2024-05-31 NOTE — PROGRESS NOTES
Colon & Rectal Surgery Progress Note    Post Op Day 3     Subjective:  - Tolerating low residue diet  - Abdominal pain much improved  - Denies nausea/vomiting  - VS stable  - Ileostomy output 575 ml  - CYNTHIA output 50 ml    Objective:  Temp:  [96.6 °F (35.9 °C)-99.1 °F (37.3 °C)]   Pulse:  [79-86]   Resp:  [18-20]   BP: (125-156)/(78-91)   SpO2:  [95 %-97 %]     Physical Exam:  NAD  Regular rate and rhythm  Non-labored respirations  Abdomen soft, appropriate, non distended, ileostomy stoma pink with stool in bag, incisions clean and dry  SCDs in place      Intake/Output Summary (Last 24 hours) at 5/31/2024 0939  Last data filed at 5/31/2024 0543  Gross per 24 hour   Intake 3400.33 ml   Output 2935 ml   Net 465.33 ml       Recent Labs     05/30/24  0353   WBC 6.70   HGB 12.2*   HCT 38.6*   *      K 3.9   CO2 24   BUN 12.4   CREATININE 1.07   BILITOT 0.6   AST 17   ALT 21   ALKPHOS 48   CALCIUM 7.9*   ALBUMIN 3.0*       Assessment/Plan  - Continue to ambulate  - Discharge after lunch if no nausea/vomiting        SHERLYN Deleon  Colon & Rectal Surgery  [facility] - 8th Floor Med Surg

## 2024-06-03 NOTE — HOSPITAL COURSE
Patient was admitted through day surgery on 5/28/24 and underwent a  Proctocolectomy with ileoanal anastomosis and diverting loop ileostomy with excision of anal skin tag without complication. Please see operative note for full details. Postoperatively he was admitted to the general surgery floor. Clear liquids diet was initiated and slowly advanced as tolerated. Lovenox was administered daily. Devi was removed on post op day #: 1 which was tolerated well. He had great urine output and his abdominal exam was appropriate. Therefore, she was deemed stable for discharge home. Final pathology showed the rectum segment without evidence of active or chronic proctitis or dysplasia/malignancy. The ileostomy sent to pathology revealed sever chronic inflammation with macro villous atrophy of small bowel mucosa.

## 2024-06-03 NOTE — DISCHARGE SUMMARY
[facility] - 8th Floor Med Surg  Colorectal Surgery  Discharge Summary      Patient Name: Lionel León  MRN: [id number]  Admission Date: 5/28/2024  Hospital Length of Stay: 3 days  Discharge Date and Time: 5/31/2024 12:20 PM  Attending Physician: No att. providers found   Discharging Provider: SHERLYN Deleon  Primary Care Provider: Liana Cabrera FNP     HPI:  No notes on file    Procedure(s) (LRB):  PROCTOCOLECTOMY, LAPAROSCOPIC, WITH ILEOANAL ANASTOMOSIS, (N/A)  REVISION, ILEOSTOMY  REMOVAL, SKIN TAG     Hospital Course:  Patient was admitted through day surgery on 5/28/24 and underwent a  Proctocolectomy with ileoanal anastomosis and diverting loop ileostomy with excision of anal skin tag without complication. Please see operative note for full details. Postoperatively he was admitted to the general surgery floor. Clear liquids diet was initiated and slowly advanced as tolerated. Lovenox was administered daily. Devi was removed on post op day #: 1 which was tolerated well. He had great urine output and his abdominal exam was appropriate. Therefore, she was deemed stable for discharge home. Final pathology showed the rectum segment without evidence of active or chronic proctitis or dysplasia/malignancy. The ileostomy sent to pathology revealed sever chronic inflammation with macro villous atrophy of small bowel mucosa.         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    Specialty: Colon and Rectal Surgery  Why: at 9:00    CALL OFFICE MONDAY ABOUT DRAIN TO REMOVE IF IMPROVED  Contact information:  [address]  [address]  [place] [place] [address]  [phone]                           Patient Instructions:      Diet Adult Regular     Lifting restrictions   Order Comments: No lifting over 10 lbs for 6 weeks     No driving until:   Order Comments: 1 week or until abdomen is no longer tender with movement     Shower on day dressing removed (No bath)   Order Comments: No tub/pool for 2 weeks     Medications:  Reconciled Home Medications:      Medication List        START taking these medications      ketorolac 10 mg tablet  Commonly known as: TORADOL  Take 1 tablet (10 mg total) by mouth every 6 (six) hours. for 5 days     ondansetron 4 MG Tbdl  Commonly known as: ZOFRAN-ODT  Take 1 tablet (4 mg total) by mouth every 6 (six) hours as needed (Nausea).     oxyCODONE-acetaminophen  mg per tablet  Commonly known as: PERCOCET  Take 1 tablet by mouth every 4 (four) hours as needed for Pain.            ASK your doctor about these medications      cyclopentolate 1% 1 % ophthalmic solution  Commonly known as: CYCLOGYL  Place 1 drop into the right eye 4 (four) times daily.     fluconazole 200 MG Tab  Commonly known as: DIFLUCAN  Oral for 14 Days     HUMIRA(CF) PEN CROHNS-[facility]-HS 80 mg/0.8 mL Pnkt  Generic drug: adalimumab  Inject into the skin.     magnesium oxide 250 mg magnesium Tab  Commonly known as: MAG-OX  1 tablet as needed Orally Twice a day     prednisoLONE acetate 1 % Drps  Commonly known as: PRED FORTE  Place 1 drop into the right eye 4 (four) times daily.     valACYclovir 1000 MG tablet  Commonly known as: VALTREX  Take 1 tablet (1,000 mg total) by mouth 2 (two) times daily. for 5 days              SHERLYN Deleon  Colorectal Surgery  [facility] - 8th Floor Med Surg

## 2024-06-04 ENCOUNTER — PATIENT OUTREACH (OUTPATIENT)
Dept: ADMINISTRATIVE | Facility: CLINIC | Age: 44
End: 2024-06-04
Payer: COMMERCIAL

## 2024-06-04 NOTE — PROGRESS NOTES
C3 nurse spoke with Lionel León  for a TCC post hospital discharge follow up call. The patient has a scheduled HOS appointment with Fredi Shields MD (Colon and Rectal Surgery) on 6/13/2024; at 9:00 am

## 2024-06-05 ENCOUNTER — DOCUMENTATION ONLY (OUTPATIENT)
Dept: SURGICAL ONCOLOGY | Facility: CLINIC | Age: 44
End: 2024-06-05
Payer: COMMERCIAL

## 2024-06-05 RX ORDER — OXYCODONE AND ACETAMINOPHEN 10; 325 MG/1; MG/1
1 TABLET ORAL EVERY 4 HOURS PRN
Qty: 30 TABLET | Refills: 0 | Status: SHIPPED | OUTPATIENT
Start: 2024-06-05

## 2024-06-05 NOTE — PROGRESS NOTES
Patient arrived for nurse visit to remove drain from surgery. Drain was removed ad care instructions were given. He had no questions and was told to call if he needed anything. He did request a refill on his pain medication. Will speak to dr nicole and send to requested [facility] if approved.

## 2024-06-13 ENCOUNTER — OFFICE VISIT (OUTPATIENT)
Dept: SURGICAL ONCOLOGY | Facility: CLINIC | Age: 44
End: 2024-06-13
Payer: COMMERCIAL

## 2024-06-13 VITALS
HEIGHT: 70 IN | BODY MASS INDEX: 26.57 KG/M2 | DIASTOLIC BLOOD PRESSURE: 79 MMHG | SYSTOLIC BLOOD PRESSURE: 116 MMHG | WEIGHT: 185.63 LBS

## 2024-06-13 DIAGNOSIS — Z93.2 ILEOSTOMY STATUS: Primary | ICD-10-CM

## 2024-06-13 DIAGNOSIS — K51.218 CHRONIC ULCERATIVE PROCTITIS WITH OTHER COMPLICATION: Primary | ICD-10-CM

## 2024-06-13 PROCEDURE — 3074F SYST BP LT 130 MM HG: CPT | Mod: CPTII,S$GLB,, | Performed by: COLON & RECTAL SURGERY

## 2024-06-13 PROCEDURE — 1160F RVW MEDS BY RX/DR IN RCRD: CPT | Mod: CPTII,S$GLB,, | Performed by: COLON & RECTAL SURGERY

## 2024-06-13 PROCEDURE — 99024 POSTOP FOLLOW-UP VISIT: CPT | Mod: S$GLB,,, | Performed by: COLON & RECTAL SURGERY

## 2024-06-13 PROCEDURE — 99999 PR PBB SHADOW E&M-EST. PATIENT-LVL III: CPT | Mod: PBBFAC,,, | Performed by: COLON & RECTAL SURGERY

## 2024-06-13 PROCEDURE — 3078F DIAST BP <80 MM HG: CPT | Mod: CPTII,S$GLB,, | Performed by: COLON & RECTAL SURGERY

## 2024-06-13 PROCEDURE — 1159F MED LIST DOCD IN RCRD: CPT | Mod: CPTII,S$GLB,, | Performed by: COLON & RECTAL SURGERY

## 2024-06-13 RX ORDER — ENOXAPARIN SODIUM 300 MG/3ML
40 INJECTION INTRAVENOUS; SUBCUTANEOUS EVERY 24 HOURS
OUTPATIENT
Start: 2024-06-13

## 2024-06-13 RX ORDER — METRONIDAZOLE 500 MG/100ML
500 INJECTION, SOLUTION INTRAVENOUS
OUTPATIENT
Start: 2024-06-13

## 2024-06-13 RX ORDER — ACETAMINOPHEN 500 MG
1000 TABLET ORAL
OUTPATIENT
Start: 2024-06-13 | End: 2024-06-13

## 2024-06-13 RX ORDER — DEXTROAMPHETAMINE SACCHARATE, AMPHETAMINE ASPARTATE, DEXTROAMPHETAMINE SULFATE AND AMPHETAMINE SULFATE 7.5; 7.5; 7.5; 7.5 MG/1; MG/1; MG/1; MG/1
0.5 TABLET ORAL 2 TIMES DAILY
COMMUNITY
Start: 2024-05-17

## 2024-06-13 RX ORDER — CELECOXIB 100 MG/1
400 CAPSULE ORAL
OUTPATIENT
Start: 2024-06-13 | End: 2024-06-13

## 2024-06-13 RX ORDER — GABAPENTIN 100 MG/1
200 CAPSULE ORAL
OUTPATIENT
Start: 2024-06-13

## 2024-06-13 RX ORDER — LIDOCAINE HYDROCHLORIDE 10 MG/ML
1 INJECTION, SOLUTION EPIDURAL; INFILTRATION; INTRACAUDAL; PERINEURAL ONCE
OUTPATIENT
Start: 2024-06-13 | End: 2024-06-13

## 2024-06-13 NOTE — PROGRESS NOTES
Patient ID: [id number]     Chief Complaint: Post-op Evaluation ( PO: LAP PROCTECTOMY 05/28/2024/)      HPI:     Lionel León is a 43 y.o. male s/p Proctectomy with IPAA 05/28/2024 here today for scheduled follow-up.  He is doing doing well but still has some occasional incisional pain.  He is tolerating a diet and reports good ileostomy function.  His main complaint is issues with pouching and peristomal skin excoriation.  He is ready to proceed with ileostomy closure.       Past Medical History:  has a past medical history of Chronic ulcerative proctitis with other complication, Diverticulitis, and Ulcerative colitis.    Surgical History:  has a past surgical history that includes colonoscopy, with 1 or more biopsies (N/A, 10/23/2023); debridement, external genitalia, perineum, and abdominal wall, for necrotizing soft tissue infection (Bilateral, 11/11/2023); debridement, external genitalia, perineum, and abdominal wall, for necrotizing soft tissue infection (N/A, 11/12/2023); Laparoscopic total colectomy (N/A, 11/15/2023); Laparoscopic ileostomy (N/A, 11/15/2023); Debridement of sacral wound (N/A, 11/20/2023); Diagnostic laparoscopy (N/A, 11/20/2023); egd, with closed biopsy (N/A, 11/22/2023); Colon surgery (11/20/23); Vasectomy (3/15/08); Rotator cuff repair (Right); Biceps tendon repair; Laparoscopic proctocolectomy with ileoanal anastomosis, creation of ileal pouch, and ileostomy (N/A, 5/28/2024); Ileostomy revision (5/28/2024); and Skin tag removal (5/28/2024).    Family History: family history is not on file.    Social History:  reports that he has never smoked. He has never used smokeless tobacco. He reports that he does not currently use alcohol. He reports that he does not use drugs.    Current Outpatient Medications   Medication Instructions    dextroamphetamine-amphetamine 30 mg Tab 0.5 tablets, Oral, 2 times daily    ondansetron (ZOFRAN-ODT) 4 mg, Oral, Every 6 hours PRN    oxyCODONE-acetaminophen  "(PERCOCET)  mg per tablet 1 tablet, Oral, Every 4 hours PRN    prednisoLONE acetate (PRED FORTE) 1 % DrpS 1 drop, 4 times daily    valACYclovir (VALTREX) 1,000 mg, Oral, 2 times daily       Patient has No Known Allergies.     Patient Care Team:  Liana Cabrera FNP as PCP - General (Family Medicine)       Subjective:     Review of Systems    12 point review of systems conducted, negative except as stated in the history of present illness. See HPI for details.      Objective:     Visit Vitals  /79   Pulse (P) 69   Ht 5' 10" (1.778 m)   Wt 84.2 kg (185 lb 9.6 oz)   BMI 26.63 kg/m²       Physical Exam    General: Alert and oriented, No acute distress.  Head: Normocephalic, Atraumatic.  Eye: Pupils are equal and round, Extraocular movements are intact, Sclera non-icteric.  Ears/Nose/Throat: Normal, Mucosa moist, Clear.  Respiratory: No wheezes, Non-labored respirations, Symmetrical chest wall expansion.  Cardiovascular: Regular rate.  Gastrointestinal: Soft, Non-tender, Non-distended, Normal bowel sounds, No palpable masses. RLQ loop ileostomy pink and working.  Integumentary: Warm, Dry, Intact, No rashes.  Extremities: No edema.  Neurologic: No focal deficits.  Psychiatric: Normal interaction, Coherent speech, Euthymic mood, Appropriate affect.       Labs Reviewed:     Chemistry:  Lab Results   Component Value Date     05/30/2024    K 3.9 05/30/2024    BUN 12.4 05/30/2024    CREATININE 1.07 05/30/2024    EGFRNORACEVR >60 05/30/2024    GLUCOSE 95 05/30/2024    CALCIUM 7.9 (L) 05/30/2024    ALKPHOS 48 05/30/2024    LABPROT 5.8 (L) 05/30/2024    ALBUMIN 3.0 (L) 05/30/2024    AST 17 05/30/2024    ALT 21 05/30/2024    MG 1.90 12/04/2023    PHOS 3.7 12/03/2023        Hematology:  Lab Results   Component Value Date    WBC 6.70 05/30/2024    HGB 12.2 (L) 05/30/2024    HCT 38.6 (L) 05/30/2024     (L) 05/30/2024         Assessment:       ICD-10-CM ICD-9-CM   1. Ileostomy status  Z93.2 V44.2    "     Plan:     Check Gastrografin study of ileal J-pouch via right lower quadrant loop ileostomy.  If J-pouch and anastomosis are intact, then we will proceed with loop ileostomy closure 07/02/2024.  I explained the surgery, potential adverse events, and expected recovery course.      No follow-ups on file. In addition to their scheduled follow up, the patient has also been instructed to follow up on as needed basis.     Future Appointments   Date Time Provider Department Center   6/24/2024 10:30 AM [facility] XR3 FL1 820 LB LIMIT [facility] XRAY [place]    7/11/2024  8:30 AM [facility]        Fredi Shields MD

## 2024-06-13 NOTE — H&P (VIEW-ONLY)
Patient ID: [id number]     Chief Complaint: Post-op Evaluation ( PO: LAP PROCTECTOMY 05/28/2024/)      HPI:     Lionel León is a 43 y.o. male s/p Proctectomy with IPAA 05/28/2024 here today for scheduled follow-up.  He is doing doing well but still has some occasional incisional pain.  He is tolerating a diet and reports good ileostomy function.  His main complaint is issues with pouching and peristomal skin excoriation.  He is ready to proceed with ileostomy closure.       Past Medical History:  has a past medical history of Chronic ulcerative proctitis with other complication, Diverticulitis, and Ulcerative colitis.    Surgical History:  has a past surgical history that includes colonoscopy, with 1 or more biopsies (N/A, 10/23/2023); debridement, external genitalia, perineum, and abdominal wall, for necrotizing soft tissue infection (Bilateral, 11/11/2023); debridement, external genitalia, perineum, and abdominal wall, for necrotizing soft tissue infection (N/A, 11/12/2023); Laparoscopic total colectomy (N/A, 11/15/2023); Laparoscopic ileostomy (N/A, 11/15/2023); Debridement of sacral wound (N/A, 11/20/2023); Diagnostic laparoscopy (N/A, 11/20/2023); egd, with closed biopsy (N/A, 11/22/2023); Colon surgery (11/20/23); Vasectomy (3/15/08); Rotator cuff repair (Right); Biceps tendon repair; Laparoscopic proctocolectomy with ileoanal anastomosis, creation of ileal pouch, and ileostomy (N/A, 5/28/2024); Ileostomy revision (5/28/2024); and Skin tag removal (5/28/2024).    Family History: family history is not on file.    Social History:  reports that he has never smoked. He has never used smokeless tobacco. He reports that he does not currently use alcohol. He reports that he does not use drugs.    Current Outpatient Medications   Medication Instructions    dextroamphetamine-amphetamine 30 mg Tab 0.5 tablets, Oral, 2 times daily    ondansetron (ZOFRAN-ODT) 4 mg, Oral, Every 6 hours PRN    oxyCODONE-acetaminophen  "(PERCOCET)  mg per tablet 1 tablet, Oral, Every 4 hours PRN    prednisoLONE acetate (PRED FORTE) 1 % DrpS 1 drop, 4 times daily    valACYclovir (VALTREX) 1,000 mg, Oral, 2 times daily       Patient has No Known Allergies.     Patient Care Team:  Liana Cabrera FNP as PCP - General (Family Medicine)       Subjective:     Review of Systems    12 point review of systems conducted, negative except as stated in the history of present illness. See HPI for details.      Objective:     Visit Vitals  /79   Pulse (P) 69   Ht 5' 10" (1.778 m)   Wt 84.2 kg (185 lb 9.6 oz)   BMI 26.63 kg/m²       Physical Exam    General: Alert and oriented, No acute distress.  Head: Normocephalic, Atraumatic.  Eye: Pupils are equal and round, Extraocular movements are intact, Sclera non-icteric.  Ears/Nose/Throat: Normal, Mucosa moist, Clear.  Respiratory: No wheezes, Non-labored respirations, Symmetrical chest wall expansion.  Cardiovascular: Regular rate.  Gastrointestinal: Soft, Non-tender, Non-distended, Normal bowel sounds, No palpable masses. RLQ loop ileostomy pink and working.  Integumentary: Warm, Dry, Intact, No rashes.  Extremities: No edema.  Neurologic: No focal deficits.  Psychiatric: Normal interaction, Coherent speech, Euthymic mood, Appropriate affect.       Labs Reviewed:     Chemistry:  Lab Results   Component Value Date     05/30/2024    K 3.9 05/30/2024    BUN 12.4 05/30/2024    CREATININE 1.07 05/30/2024    EGFRNORACEVR >60 05/30/2024    GLUCOSE 95 05/30/2024    CALCIUM 7.9 (L) 05/30/2024    ALKPHOS 48 05/30/2024    LABPROT 5.8 (L) 05/30/2024    ALBUMIN 3.0 (L) 05/30/2024    AST 17 05/30/2024    ALT 21 05/30/2024    MG 1.90 12/04/2023    PHOS 3.7 12/03/2023        Hematology:  Lab Results   Component Value Date    WBC 6.70 05/30/2024    HGB 12.2 (L) 05/30/2024    HCT 38.6 (L) 05/30/2024     (L) 05/30/2024         Assessment:       ICD-10-CM ICD-9-CM   1. Ileostomy status  Z93.2 V44.2    "     Plan:     Check Gastrografin study of ileal J-pouch via right lower quadrant loop ileostomy.  If J-pouch and anastomosis are intact, then we will proceed with loop ileostomy closure 07/02/2024.  I explained the surgery, potential adverse events, and expected recovery course.      No follow-ups on file. In addition to their scheduled follow up, the patient has also been instructed to follow up on as needed basis.     Future Appointments   Date Time Provider Department Center   6/24/2024 10:30 AM [facility] XR3 FL1 820 LB LIMIT [facility] XRAY [place]    7/11/2024  8:30 AM [facility]        Fredi Shields MD

## 2024-06-24 ENCOUNTER — HOSPITAL ENCOUNTER (OUTPATIENT)
Dept: RADIOLOGY | Facility: HOSPITAL | Age: 44
Discharge: HOME | End: 2024-06-24
Attending: COLON & RECTAL SURGERY
Payer: COMMERCIAL

## 2024-06-24 DIAGNOSIS — K51.218 CHRONIC ULCERATIVE PROCTITIS WITH OTHER COMPLICATION: ICD-10-CM

## 2024-06-24 PROCEDURE — 25500020 PHARM REV CODE 255: Performed by: COLON & RECTAL SURGERY

## 2024-06-24 PROCEDURE — 74270 X-RAY XM COLON 1CNTRST STD: CPT | Mod: TC

## 2024-06-24 RX ORDER — CETIRIZINE HYDROCHLORIDE 10 MG/1
10 TABLET ORAL DAILY
COMMUNITY

## 2024-06-24 RX ADMIN — DIATRIZOATE MEGLUMINE AND DIATRIZOATE SODIUM 50 ML: 600; 100 SOLUTION ORAL; RECTAL at 11:06

## 2024-07-01 NOTE — PRE-PROCEDURE INSTRUCTIONS
"[facility]: Outpatient Surgery  Preprocedure Instructions    Expectations: "Because of inconsistent procedure completion times, an unexpected wait may occur. The physicians would like you to be here to prepare in the event they run ahead of time. We will make you as comfortable as possible and keep you informed. We apologize in advance if this happens."     Your arrival time for your surgery or procedure is __7 am____.  We ask patients to arrive about 2 hours before surgery to allow for enough time to review your health history & medications, start your IV, complete any outstanding labwork or tests, and meet your Anesthesiologist.    We are located at [facility], [address] [place] [address].    Enter through the [place] entrance next to the Emergency Room, and come to the 6th floor to the Outpatient Surgery Department.    If you are in need of a wheelchair the morning of surgery please call [phone] about 15 minutes before you arrive. Parking is available in our parking garage located off [facility], between the hospital and [facility].      Visitory Policy:  You are allowed 2 adult visitors to be with you in the hospital. Please, no switching visitors in pre-op area. All hospital visitors should be in good current health.  No small children.  We will update you and your family hourly on the progression of surgery and any unexpected delays.      What to Bring:  Please have your ID, insurance cards, and all home medication bottles with you at check in.  Bring your CPAP machine if one is used at home.     Fasting:  Nothing to eat or drink after midnight the night before your procedure. This includes no ice, gum, hard candies, and/or tobacco products.    Medications:  Follow your doctor's instructions for taking any medications on the morning of your procedure.  If no instructions for taking medications were given, do not take any medications but bring your " medications in their bottles to your procedure check in.     Follow your doctor's preoperative instructions regarding skin prep, bowel prep, bathing, or showering prior to your procedure.  If any special soaps were provided to you, please use according to your doctor's instructions. If no instructions were given from your doctor, take a good bath or shower with antibacterial soap the night before and the morning of your procedure.  On the morning of procedure, wear loose, comfortable clothing.  No lotions, makeup, perfumes, colognes, deodorant, or jewelry to your procedure.  Removable items (glasses, contact lenses, dentures, retainers, hearing aids) need to be removed for your procedure.  Bring your storage containers for these items if you wear them.     Artificial nails, body jewelry, eyelash extensions, and/or hair extensions with metal clips are not allowed during your surgery.  If you currently wear any of these items, please arrange for them to be removed prior to your arrival to the hospital.     Outpatient or Same Day Surgeries:  Any patients receiving sedation/anesthesia are advised not to drive for 24 hours after their procedure.  We do not allow patients to drive themselves home after discharge.  If you are going home after your procedure, please have someone available to drive you home from the hospital.     You may call the Outpatient Surgery Department at [phone] with any questions or concerns.  We are looking forward to meeting you and taking great care of you for your procedure.  Thank you for choosing [facility] for your surgical needs.

## 2024-07-02 ENCOUNTER — ANESTHESIA (OUTPATIENT)
Dept: SURGERY | Facility: HOSPITAL | Age: 44
End: 2024-07-02
Payer: COMMERCIAL

## 2024-07-02 ENCOUNTER — HOSPITAL ENCOUNTER (INPATIENT)
Facility: HOSPITAL | Age: 44
LOS: 1 days | Discharge: HOME | DRG: 331 | End: 2024-07-03
Attending: COLON & RECTAL SURGERY | Admitting: COLON & RECTAL SURGERY
Payer: COMMERCIAL

## 2024-07-02 ENCOUNTER — ANESTHESIA EVENT (OUTPATIENT)
Dept: SURGERY | Facility: HOSPITAL | Age: 44
End: 2024-07-02
Payer: COMMERCIAL

## 2024-07-02 DIAGNOSIS — Z93.2 ILEOSTOMY STATUS: Primary | ICD-10-CM

## 2024-07-02 LAB
GROUP & RH: NORMAL
INDIRECT COOMBS: NORMAL
SPECIMEN OUTDATE: NORMAL

## 2024-07-02 PROCEDURE — 37000008 HC ANESTHESIA 1ST 15 MINUTES: Performed by: COLON & RECTAL SURGERY

## 2024-07-02 PROCEDURE — 36000706: Performed by: COLON & RECTAL SURGERY

## 2024-07-02 PROCEDURE — 99900035 HC TECH TIME PER 15 MIN (STAT)

## 2024-07-02 PROCEDURE — 63600175 PHARM REV CODE 636 W HCPCS: Performed by: COLON & RECTAL SURGERY

## 2024-07-02 PROCEDURE — 63600175 PHARM REV CODE 636 W HCPCS

## 2024-07-02 PROCEDURE — 0DBB0ZZ EXCISION OF ILEUM, OPEN APPROACH: ICD-10-PCS | Performed by: COLON & RECTAL SURGERY

## 2024-07-02 PROCEDURE — 94799 UNLISTED PULMONARY SVC/PX: CPT

## 2024-07-02 PROCEDURE — 44625 REPAIR BOWEL OPENING: CPT | Mod: 58,,, | Performed by: COLON & RECTAL SURGERY

## 2024-07-02 PROCEDURE — 11000001 HC ACUTE MED/SURG PRIVATE ROOM

## 2024-07-02 PROCEDURE — 25000003 PHARM REV CODE 250

## 2024-07-02 PROCEDURE — 71000033 HC RECOVERY, INTIAL HOUR: Performed by: COLON & RECTAL SURGERY

## 2024-07-02 PROCEDURE — 63600175 PHARM REV CODE 636 W HCPCS: Performed by: STUDENT IN AN ORGANIZED HEALTH CARE EDUCATION/TRAINING PROGRAM

## 2024-07-02 PROCEDURE — 86901 BLOOD TYPING SEROLOGIC RH(D): CPT | Performed by: COLON & RECTAL SURGERY

## 2024-07-02 PROCEDURE — 25000003 PHARM REV CODE 250: Performed by: COLON & RECTAL SURGERY

## 2024-07-02 PROCEDURE — C9290 INJ, BUPIVACAINE LIPOSOME: HCPCS | Performed by: COLON & RECTAL SURGERY

## 2024-07-02 PROCEDURE — 25000003 PHARM REV CODE 250: Performed by: STUDENT IN AN ORGANIZED HEALTH CARE EDUCATION/TRAINING PROGRAM

## 2024-07-02 PROCEDURE — 86900 BLOOD TYPING SEROLOGIC ABO: CPT | Performed by: COLON & RECTAL SURGERY

## 2024-07-02 PROCEDURE — 36415 COLL VENOUS BLD VENIPUNCTURE: CPT | Performed by: COLON & RECTAL SURGERY

## 2024-07-02 PROCEDURE — 36000707: Performed by: COLON & RECTAL SURGERY

## 2024-07-02 PROCEDURE — 27201423 OPTIME MED/SURG SUP & DEVICES STERILE SUPPLY: Performed by: COLON & RECTAL SURGERY

## 2024-07-02 PROCEDURE — 71000039 HC RECOVERY, EACH ADD'L HOUR: Performed by: COLON & RECTAL SURGERY

## 2024-07-02 PROCEDURE — 37000009 HC ANESTHESIA EA ADD 15 MINS: Performed by: COLON & RECTAL SURGERY

## 2024-07-02 PROCEDURE — 63600175 PHARM REV CODE 636 W HCPCS: Performed by: ANESTHESIOLOGY

## 2024-07-02 RX ORDER — FENTANYL CITRATE 50 UG/ML
INJECTION, SOLUTION INTRAMUSCULAR; INTRAVENOUS
Status: DISCONTINUED | OUTPATIENT
Start: 2024-07-02 | End: 2024-07-02

## 2024-07-02 RX ORDER — ROCURONIUM BROMIDE 10 MG/ML
INJECTION, SOLUTION INTRAVENOUS
Status: DISCONTINUED | OUTPATIENT
Start: 2024-07-02 | End: 2024-07-02

## 2024-07-02 RX ORDER — OXYCODONE AND ACETAMINOPHEN 5; 325 MG/1; MG/1
1 TABLET ORAL EVERY 4 HOURS PRN
Status: DISCONTINUED | OUTPATIENT
Start: 2024-07-02 | End: 2024-07-03 | Stop reason: HOSPADM

## 2024-07-02 RX ORDER — MIDAZOLAM HYDROCHLORIDE 1 MG/ML
INJECTION INTRAMUSCULAR; INTRAVENOUS
Status: DISCONTINUED | OUTPATIENT
Start: 2024-07-02 | End: 2024-07-02

## 2024-07-02 RX ORDER — BUPIVACAINE HYDROCHLORIDE 5 MG/ML
INJECTION, SOLUTION EPIDURAL; INTRACAUDAL
Status: DISCONTINUED | OUTPATIENT
Start: 2024-07-02 | End: 2024-07-02 | Stop reason: HOSPADM

## 2024-07-02 RX ORDER — LIDOCAINE HYDROCHLORIDE 20 MG/ML
INJECTION INTRAVENOUS
Status: DISCONTINUED | OUTPATIENT
Start: 2024-07-02 | End: 2024-07-02

## 2024-07-02 RX ORDER — EPHEDRINE SULFATE 50 MG/ML
INJECTION, SOLUTION INTRAVENOUS
Status: DISCONTINUED | OUTPATIENT
Start: 2024-07-02 | End: 2024-07-02

## 2024-07-02 RX ORDER — DEXAMETHASONE SODIUM PHOSPHATE 4 MG/ML
INJECTION, SOLUTION INTRA-ARTICULAR; INTRALESIONAL; INTRAMUSCULAR; INTRAVENOUS; SOFT TISSUE
Status: DISCONTINUED | OUTPATIENT
Start: 2024-07-02 | End: 2024-07-02

## 2024-07-02 RX ORDER — PROPOFOL 10 MG/ML
VIAL (ML) INTRAVENOUS
Status: DISCONTINUED | OUTPATIENT
Start: 2024-07-02 | End: 2024-07-02

## 2024-07-02 RX ORDER — HYDROMORPHONE HYDROCHLORIDE 2 MG/ML
0.4 INJECTION, SOLUTION INTRAMUSCULAR; INTRAVENOUS; SUBCUTANEOUS EVERY 5 MIN PRN
Status: DISCONTINUED | OUTPATIENT
Start: 2024-07-02 | End: 2024-07-02 | Stop reason: HOSPADM

## 2024-07-02 RX ORDER — ONDANSETRON HYDROCHLORIDE 2 MG/ML
4 INJECTION, SOLUTION INTRAVENOUS EVERY 8 HOURS PRN
Status: DISCONTINUED | OUTPATIENT
Start: 2024-07-02 | End: 2024-07-03 | Stop reason: HOSPADM

## 2024-07-02 RX ORDER — ONDANSETRON 4 MG/1
4 TABLET, ORALLY DISINTEGRATING ORAL EVERY 6 HOURS PRN
Status: DISCONTINUED | OUTPATIENT
Start: 2024-07-02 | End: 2024-07-02 | Stop reason: HOSPADM

## 2024-07-02 RX ORDER — PHENYLEPHRINE HCL IN 0.9% NACL 1 MG/10 ML
SYRINGE (ML) INTRAVENOUS
Status: DISCONTINUED | OUTPATIENT
Start: 2024-07-02 | End: 2024-07-02

## 2024-07-02 RX ORDER — SODIUM CITRATE AND CITRIC ACID MONOHYDRATE 334; 500 MG/5ML; MG/5ML
30 SOLUTION ORAL
Status: DISCONTINUED | OUTPATIENT
Start: 2024-07-02 | End: 2024-07-02 | Stop reason: HOSPADM

## 2024-07-02 RX ORDER — ENOXAPARIN SODIUM 100 MG/ML
40 INJECTION SUBCUTANEOUS EVERY 24 HOURS
Status: DISCONTINUED | OUTPATIENT
Start: 2024-07-02 | End: 2024-07-02 | Stop reason: HOSPADM

## 2024-07-02 RX ORDER — MORPHINE SULFATE 4 MG/ML
2 INJECTION, SOLUTION INTRAMUSCULAR; INTRAVENOUS EVERY 4 HOURS PRN
Status: DISCONTINUED | OUTPATIENT
Start: 2024-07-02 | End: 2024-07-03 | Stop reason: HOSPADM

## 2024-07-02 RX ORDER — SODIUM CHLORIDE, SODIUM LACTATE, POTASSIUM CHLORIDE, CALCIUM CHLORIDE 600; 310; 30; 20 MG/100ML; MG/100ML; MG/100ML; MG/100ML
INJECTION, SOLUTION INTRAVENOUS CONTINUOUS
Status: DISCONTINUED | OUTPATIENT
Start: 2024-07-02 | End: 2024-07-02

## 2024-07-02 RX ORDER — MEPERIDINE HYDROCHLORIDE 25 MG/ML
12.5 INJECTION INTRAMUSCULAR; INTRAVENOUS; SUBCUTANEOUS ONCE AS NEEDED
Status: DISCONTINUED | OUTPATIENT
Start: 2024-07-02 | End: 2024-07-02 | Stop reason: HOSPADM

## 2024-07-02 RX ORDER — METRONIDAZOLE 500 MG/100ML
500 INJECTION, SOLUTION INTRAVENOUS
Status: COMPLETED | OUTPATIENT
Start: 2024-07-02 | End: 2024-07-02

## 2024-07-02 RX ORDER — LIDOCAINE HYDROCHLORIDE 10 MG/ML
1 INJECTION, SOLUTION EPIDURAL; INFILTRATION; INTRACAUDAL; PERINEURAL ONCE
Status: DISCONTINUED | OUTPATIENT
Start: 2024-07-02 | End: 2024-07-02 | Stop reason: HOSPADM

## 2024-07-02 RX ORDER — GABAPENTIN 100 MG/1
200 CAPSULE ORAL
Status: COMPLETED | OUTPATIENT
Start: 2024-07-02 | End: 2024-07-02

## 2024-07-02 RX ORDER — ONDANSETRON HYDROCHLORIDE 2 MG/ML
INJECTION, SOLUTION INTRAVENOUS
Status: DISCONTINUED | OUTPATIENT
Start: 2024-07-02 | End: 2024-07-02

## 2024-07-02 RX ORDER — ACETAMINOPHEN 10 MG/ML
1000 INJECTION, SOLUTION INTRAVENOUS ONCE
Status: DISCONTINUED | OUTPATIENT
Start: 2024-07-02 | End: 2024-07-02 | Stop reason: HOSPADM

## 2024-07-02 RX ORDER — PROCHLORPERAZINE EDISYLATE 5 MG/ML
5 INJECTION INTRAMUSCULAR; INTRAVENOUS EVERY 6 HOURS PRN
Status: DISCONTINUED | OUTPATIENT
Start: 2024-07-02 | End: 2024-07-03 | Stop reason: HOSPADM

## 2024-07-02 RX ORDER — SODIUM CHLORIDE, SODIUM LACTATE, POTASSIUM CHLORIDE, CALCIUM CHLORIDE 600; 310; 30; 20 MG/100ML; MG/100ML; MG/100ML; MG/100ML
INJECTION, SOLUTION INTRAVENOUS CONTINUOUS
Status: DISCONTINUED | OUTPATIENT
Start: 2024-07-02 | End: 2024-07-03 | Stop reason: HOSPADM

## 2024-07-02 RX ORDER — METHOCARBAMOL 100 MG/ML
1000 INJECTION, SOLUTION INTRAMUSCULAR; INTRAVENOUS ONCE
Status: COMPLETED | OUTPATIENT
Start: 2024-07-02 | End: 2024-07-02

## 2024-07-02 RX ORDER — ACETAMINOPHEN 500 MG
1000 TABLET ORAL
Status: COMPLETED | OUTPATIENT
Start: 2024-07-02 | End: 2024-07-02

## 2024-07-02 RX ORDER — ENOXAPARIN SODIUM 100 MG/ML
40 INJECTION SUBCUTANEOUS EVERY 24 HOURS
Status: DISCONTINUED | OUTPATIENT
Start: 2024-07-02 | End: 2024-07-02

## 2024-07-02 RX ORDER — KETOROLAC TROMETHAMINE 30 MG/ML
15 INJECTION, SOLUTION INTRAMUSCULAR; INTRAVENOUS EVERY 6 HOURS
Status: DISCONTINUED | OUTPATIENT
Start: 2024-07-02 | End: 2024-07-03 | Stop reason: HOSPADM

## 2024-07-02 RX ORDER — MIDAZOLAM HYDROCHLORIDE 2 MG/2ML
2 INJECTION, SOLUTION INTRAMUSCULAR; INTRAVENOUS ONCE AS NEEDED
Status: DISCONTINUED | OUTPATIENT
Start: 2024-07-02 | End: 2024-07-02 | Stop reason: HOSPADM

## 2024-07-02 RX ORDER — DEXMEDETOMIDINE HYDROCHLORIDE 100 UG/ML
INJECTION, SOLUTION INTRAVENOUS
Status: DISCONTINUED | OUTPATIENT
Start: 2024-07-02 | End: 2024-07-02

## 2024-07-02 RX ORDER — CELECOXIB 200 MG/1
400 CAPSULE ORAL
Status: COMPLETED | OUTPATIENT
Start: 2024-07-02 | End: 2024-07-02

## 2024-07-02 RX ADMIN — HYDROMORPHONE HYDROCHLORIDE 0.4 MG: 2 INJECTION INTRAMUSCULAR; INTRAVENOUS; SUBCUTANEOUS at 09:07

## 2024-07-02 RX ADMIN — CEFTRIAXONE SODIUM 2 G: 2 INJECTION, POWDER, FOR SOLUTION INTRAMUSCULAR; INTRAVENOUS at 07:07

## 2024-07-02 RX ADMIN — KETOROLAC TROMETHAMINE 15 MG: 30 INJECTION, SOLUTION INTRAMUSCULAR; INTRAVENOUS at 10:07

## 2024-07-02 RX ADMIN — Medication 100 MCG: at 07:07

## 2024-07-02 RX ADMIN — ONDANSETRON 4 MG: 2 INJECTION INTRAMUSCULAR; INTRAVENOUS at 08:07

## 2024-07-02 RX ADMIN — ROCURONIUM BROMIDE 30 MG: 10 SOLUTION INTRAVENOUS at 08:07

## 2024-07-02 RX ADMIN — EPHEDRINE SULFATE 10 MG: 50 INJECTION INTRAVENOUS at 08:07

## 2024-07-02 RX ADMIN — DEXMEDETOMIDINE 4 MCG: 200 INJECTION, SOLUTION INTRAVENOUS at 08:07

## 2024-07-02 RX ADMIN — CELECOXIB 400 MG: 200 CAPSULE ORAL at 06:07

## 2024-07-02 RX ADMIN — HYDROMORPHONE HYDROCHLORIDE 0.4 MG: 2 INJECTION INTRAMUSCULAR; INTRAVENOUS; SUBCUTANEOUS at 10:07

## 2024-07-02 RX ADMIN — DEXAMETHASONE SODIUM PHOSPHATE 4 MG: 4 INJECTION, SOLUTION INTRA-ARTICULAR; INTRALESIONAL; INTRAMUSCULAR; INTRAVENOUS; SOFT TISSUE at 07:07

## 2024-07-02 RX ADMIN — OXYCODONE HYDROCHLORIDE AND ACETAMINOPHEN 1 TABLET: 5; 325 TABLET ORAL at 11:07

## 2024-07-02 RX ADMIN — DEXMEDETOMIDINE 2 MCG: 200 INJECTION, SOLUTION INTRAVENOUS at 08:07

## 2024-07-02 RX ADMIN — EPHEDRINE SULFATE 5 MG: 50 INJECTION INTRAVENOUS at 08:07

## 2024-07-02 RX ADMIN — SODIUM CHLORIDE, POTASSIUM CHLORIDE, SODIUM LACTATE AND CALCIUM CHLORIDE: 600; 310; 30; 20 INJECTION, SOLUTION INTRAVENOUS at 06:07

## 2024-07-02 RX ADMIN — SODIUM CHLORIDE, SODIUM GLUCONATE, SODIUM ACETATE, POTASSIUM CHLORIDE AND MAGNESIUM CHLORIDE: 526; 502; 368; 37; 30 INJECTION, SOLUTION INTRAVENOUS at 07:07

## 2024-07-02 RX ADMIN — ONDANSETRON 4 MG: 2 INJECTION INTRAMUSCULAR; INTRAVENOUS at 09:07

## 2024-07-02 RX ADMIN — LIDOCAINE HYDROCHLORIDE 80 MG: 20 INJECTION INTRAVENOUS at 07:07

## 2024-07-02 RX ADMIN — SODIUM CHLORIDE, SODIUM GLUCONATE, SODIUM ACETATE, POTASSIUM CHLORIDE AND MAGNESIUM CHLORIDE: 526; 502; 368; 37; 30 INJECTION, SOLUTION INTRAVENOUS at 08:07

## 2024-07-02 RX ADMIN — ENOXAPARIN SODIUM 40 MG: 40 INJECTION SUBCUTANEOUS at 06:07

## 2024-07-02 RX ADMIN — METHOCARBAMOL 1000 MG: 100 INJECTION, SOLUTION INTRAMUSCULAR; INTRAVENOUS at 10:07

## 2024-07-02 RX ADMIN — FENTANYL CITRATE 50 MCG: 50 INJECTION, SOLUTION INTRAMUSCULAR; INTRAVENOUS at 08:07

## 2024-07-02 RX ADMIN — ROCURONIUM BROMIDE 20 MG: 10 SOLUTION INTRAVENOUS at 08:07

## 2024-07-02 RX ADMIN — KETOROLAC TROMETHAMINE 15 MG: 30 INJECTION, SOLUTION INTRAMUSCULAR; INTRAVENOUS at 05:07

## 2024-07-02 RX ADMIN — GABAPENTIN 200 MG: 100 CAPSULE ORAL at 06:07

## 2024-07-02 RX ADMIN — SODIUM CHLORIDE, POTASSIUM CHLORIDE, SODIUM LACTATE AND CALCIUM CHLORIDE: 600; 310; 30; 20 INJECTION, SOLUTION INTRAVENOUS at 10:07

## 2024-07-02 RX ADMIN — KETOROLAC TROMETHAMINE 15 MG: 30 INJECTION, SOLUTION INTRAMUSCULAR; INTRAVENOUS at 11:07

## 2024-07-02 RX ADMIN — SUGAMMADEX 200 MG: 100 INJECTION, SOLUTION INTRAVENOUS at 09:07

## 2024-07-02 RX ADMIN — METRONIDAZOLE 500 MG: 500 INJECTION, SOLUTION INTRAVENOUS at 07:07

## 2024-07-02 RX ADMIN — Medication 175 MG: at 07:07

## 2024-07-02 RX ADMIN — ROCURONIUM BROMIDE 50 MG: 10 SOLUTION INTRAVENOUS at 07:07

## 2024-07-02 RX ADMIN — ACETAMINOPHEN 1000 MG: 500 TABLET ORAL at 06:07

## 2024-07-02 RX ADMIN — MIDAZOLAM HYDROCHLORIDE 2 MG: 1 INJECTION, SOLUTION INTRAMUSCULAR; INTRAVENOUS at 07:07

## 2024-07-02 RX ADMIN — Medication 100 MG: at 07:07

## 2024-07-02 RX ADMIN — FENTANYL CITRATE 100 MCG: 50 INJECTION, SOLUTION INTRAMUSCULAR; INTRAVENOUS at 07:07

## 2024-07-02 NOTE — OP NOTE
[facility] - Periop Services  Operative Note      Date of Procedure: 7/2/2024     Procedure:  Closure ileostomy with resection and anastomosis    Surgeons and Role:     * Fredi Shields MD - Primary    Assistant:  Delaney Garcia NP    Pre-Operative Diagnosis: Ileostomy status [Z93.2]    Post-Operative Diagnosis:  Same    Anesthesia: General    Estimated Blood Loss (EBL):  50 mL           Specimens:  Ileostomy     Description of Technical Procedures: After informed consent was obtained, patient was brought to the operating room and placed in the supine position.  Next general endotracheal anesthesia was administered by member of the anesthesia team.  The abdomen was prepped and draped in sterile surgical fashion.  A transverse elliptical incision was made with a 15 blade encompassing the ileostomy.  The incision was deepened with electrocautery and the ileostomy was dissected free from the surrounding subcutaneous tissue and abdominal wall musculature.  Both limbs then easily pulled up into the wound.  Transection points were identified and the mesentery was serially divided with the electrocautery between hemostats and Vicryl ties.  The proximal and distal limbs were divided with the electrocautery and the ileostomy was passed off the table as surgical specimen.  Then a side-to-side, functional end-to-end enteroenterostomy was performed using the TLC 75 mm stapler with blue cartridges.  The anastomosis was reinforced with interrupted 2-0 Vicryl Lembert sutures.  The anastomosis was widely patent upon completion.  The anastomosis was returned to an intra-abdominal position.  We changed gloves and repaired the fascial defect vertically using interrupted figure-of-eight 1 PDS sutures.  Subcutaneous tissue was irrigated and hemostasis was achieved with spot cautery.  The incision was repaired with running Monocryl suture in a subcuticular fashion.  Incision was cleaned and a sterile dressing was  applied.  Patient tolerated the procedure well and there were no complications.  Patient was awakened and extubated in the operating room then subsequently transferred to recovery in satisfactory condition.

## 2024-07-02 NOTE — TRANSFER OF CARE
"Anesthesia Transfer of Care Note    Patient: Lionel León    Procedure(s) Performed: Procedure(s) (LRB):  CLOSURE, ILEOSTOMY (N/A)    Patient location: PACU    Anesthesia Type: general    Transport from OR: Transported from OR on room air with adequate spontaneous ventilation    Post pain: adequate analgesia    Post assessment: no apparent anesthetic complications    Post vital signs: stable    Level of consciousness: alert and awake    Nausea/Vomiting: no nausea/vomiting    Complications: none    Transfer of care protocol was followed      Last vitals: Visit Vitals  /75   Pulse 71   Temp 36.6 °C (97.8 °F) (Oral)   Resp 20   Ht 5' 10" (1.778 m)   Wt 81.3 kg (179 lb 3.7 oz)   SpO2 98%   BMI 25.72 kg/m²     "

## 2024-07-02 NOTE — NURSING
Nurses Note -- 4 Eyes      7/2/2024   1200        Skin assessed during: Admit      [x] No Altered Skin Integrity Present    []Prevention Measures Documented    No pressure injury present, surgical incision present and documented in LDA.     [] Yes- Altered Skin Integrity Present or Discovered   [] LDA Added if Not in Epic (Describe Wound)   [] New Altered Skin Integrity was Present on Admit and Documented in LDA   [] Wound Image Taken    Wound Care Consulted? No    Attending Nurse:  Danae SANTANA RN    Second RN/Staff Member:   Radha TRUJILLO RN

## 2024-07-02 NOTE — INTERVAL H&P NOTE
The patient has been examined and the H&P has been reviewed:    I concur with the findings and no changes have occurred since H&P was written.    Surgery risks, benefits and alternative options discussed and understood by patient/family.          Active Hospital Problems    Diagnosis  POA    *Ileostomy status [Z93.2]  Not Applicable      Resolved Hospital Problems   No resolved problems to display.

## 2024-07-02 NOTE — ANESTHESIA PROCEDURE NOTES
Intubation    Date/Time: 7/2/2024 7:36 AM    Performed by: Sandra Delacruz CRNA  Authorized by: Mitul Jean Baptiste MD    Intubation:     Induction:  Intravenous    Intubated:  Postinduction    Mask Ventilation:  Very difficult with oral airway (beard complicating masking)    Attempts:  1    Method of Intubation:  Direct    Blade:  Pollard 2    Laryngeal View Grade: Grade I - full view of cords      Difficult Airway Encountered?: No      Complications:  None    Airway Device:  Oral endotracheal tube    Airway Device Size:  7.5    Style/Cuff Inflation:  Cuffed (inflated to minimal occlusive pressure) (inflated to 25 cmH20 as verified by manometer)    Inflation Amount (mL):  6    Tube secured:  23    Secured at:  The teeth    Placement Verified By:  Capnometry    Complicating Factors:  None    Findings Post-Intubation:  BS equal bilateral and atraumatic/condition of teeth unchanged

## 2024-07-02 NOTE — PLAN OF CARE
07/02/24 1251   Discharge Assessment   Assessment Type Discharge Planning Assessment   Confirmed/corrected address, phone number and insurance Yes   Confirmed Demographics Correct on Facesheet   Source of Information patient;family   Communicated TOD with patient/caregiver Yes  (7/4)   Reason For Admission Ileostomy status   People in Home spouse;child(karl), dependent   Do you expect to return to your current living situation? Yes   Do you have help at home or someone to help you manage your care at home? Yes   Who are your caregiver(s) and their phone number(s)? uLl (spouse) [phone]   Prior to hospitilization cognitive status: Unable to Assess   Current cognitive status: Alert/Oriented   Walking or Climbing Stairs Difficulty no   Dressing/Bathing Difficulty no   Home Accessibility stairs to enter home   Number of Stairs, Main Entrance five   Stair Railings, Main Entrance none   Home Layout Able to live on 1st floor   Equipment Currently Used at Home shower chair  (Not being used.)   Do you currently have service(s) that help you manage your care at home? No   Do you take prescription medications? Yes   Do you have prescription coverage? Yes   Coverage BCBS   Who is going to help you get home at discharge? Spouse   How do you get to doctors appointments? car, drives self   Are you on dialysis? No   Do you take coumadin? No   Discharge Plan A Home with family   Discharge Plan B Home with family   DME Needed Upon Discharge  none   Discharge Plan discussed with: Patient;Spouse/sig other   Transition of Care Barriers None   OTHER   Name(s) of People in Home Lul León and 3 teeange sons

## 2024-07-02 NOTE — ANESTHESIA PREPROCEDURE EVALUATION
07/02/2024  Lionel León is a 43 y.o., male.      Pre-op Assessment    I have reviewed the Patient Summary Reports.     I have reviewed the Nursing Notes. I have reviewed the NPO Status.   I have reviewed the Medications.     Review of Systems  Anesthesia Hx:  No problems with previous Anesthesia                Cardiovascular:  Exercise tolerance: good                                               Physical Exam  General: Well nourished and Cooperative    Airway:  Mallampati: II   Mouth Opening: Normal  TM Distance: Normal  Tongue: Normal  Neck ROM: Normal ROM    Dental:  Intact    Chest/Lungs:  Clear to auscultation    Heart:  Rhythm: Regular Rhythm        Anesthesia Plan  Type of Anesthesia, risks & benefits discussed:    Anesthesia Type: Gen ETT  Intra-op Monitoring Plan: Standard ASA Monitors  Post Op Pain Control Plan: multimodal analgesia  Induction:  IV  Informed Consent: Informed consent signed with the Patient and all parties understand the risks and agree with anesthesia plan.  All questions answered.   ASA Score: 2  Day of Surgery Review of History & Physical: H&P Update referred to the surgeon/provider.    Ready For Surgery From Anesthesia Perspective.     .  I explained anesthesia plan to patient/responsbile party if available.  Anesthesia consent done going over the material facts, risks, complications & alternatives, obtained which includes the possibility of altering the anesthesia plan.  I reviewed problem list, prior to admission medication list, appropriate labs, any workup, Xray, EKG etc noted below.  Patients condition is satisfactory to proceed with anesthesia plan unless otherwise noted (see anesthesia chart for details of the anesthesia plan carried out).      Pre-operative evaluation for Procedure(s) (LRB):  CLOSURE, ILEOSTOMY (N/A)    /75   Pulse 71   Temp 36.6 °C  "(97.8 °F) (Oral)   Resp 20   Ht 5' 10" (1.778 m)   Wt 81.3 kg (179 lb 3.7 oz)   SpO2 98%   BMI 25.72 kg/m²     Patient Active Problem List   Diagnosis    Ulcerative colitis with complication    Allison's gangrene    Intraabdominal fluid collection    S/P total colectomy    Immunosuppression       Review of patient's allergies indicates:  No Known Allergies    Current Outpatient Medications   Medication Instructions    cetirizine (ZYRTEC) 10 mg, Oral, Daily    dextroamphetamine-amphetamine 30 mg Tab 0.5 tablets, Oral, 2 times daily    ondansetron (ZOFRAN-ODT) 4 mg, Oral, Every 6 hours PRN    oxyCODONE-acetaminophen (PERCOCET)  mg per tablet 1 tablet, Oral, Every 4 hours PRN    prednisoLONE acetate (PRED FORTE) 1 % DrpS 1 drop, 4 times daily    valACYclovir (VALTREX) 1,000 mg, Oral, 2 times daily       Past Surgical History:   Procedure Laterality Date    BICEPS TENDON REPAIR      COLON SURGERY  11/20/23    COLONOSCOPY, WITH 1 OR MORE BIOPSIES N/A 10/23/2023    Procedure: COLON;  Surgeon: Dragan Underwood MD;  Location: [facility] ENDOSCOPY;  Service: Gastroenterology;  Laterality: N/A;    DEBRIDEMENT OF SACRAL WOUND N/A 11/20/2023    Procedure: DEBRIDEMENT, WOUND, SACRUM;  Surgeon: Chandana Guidry MD;  Location: [facility];  Service: General;  Laterality: N/A;  perineum/sacrum// high lithotomy    DEBRIDEMENT, EXTERNAL GENITALIA, PERINEUM, AND ABDOMINAL WALL, FOR NECROTIZING SOFT TISSUE INFECTION Bilateral 11/11/2023    Procedure: DEBRIDEMENT, EXTERNAL GENITALIA/BUTTOCKS FOR NECROTIZING SOFT TISSUE INFECTION;  Surgeon: Vladimir Vick MD;  Location: [facility] OR;  Service: General;  Laterality: Bilateral;  Prone positioning.    DEBRIDEMENT, EXTERNAL GENITALIA, PERINEUM, AND ABDOMINAL WALL, FOR NECROTIZING SOFT TISSUE INFECTION N/A 11/12/2023    Procedure: DEBRIDEMENT, EXTERNAL GENITALIA, PERINEUM, AND ABDOMINAL WALL, FOR NECROTIZING SOFT TISSUE INFECTION;  Surgeon: Vladimir Vick MD;  Location: [facility] OR;  " Service: General;  Laterality: N/A;  Place patient in dorsal lithotomy positioning.    DIAGNOSTIC LAPAROSCOPY N/A 11/20/2023    Procedure: LAPAROSCOPY, DIAGNOSTIC;  Surgeon: Fredi Shields MD;  Location: [facility] OR;  Service: General;  Laterality: N/A;    EGD, WITH CLOSED BIOPSY N/A 11/22/2023    Procedure: EGD, WITH CLOSED BIOPSY;  Surgeon: Blayne Mujica MD;  Location: [facility] ENDOSCOPY;  Service: Gastroenterology;  Laterality: N/A;    ILEOSTOMY REVISION  5/28/2024    Procedure: REVISION, ILEOSTOMY;  Surgeon: Fredi Shields MD;  Location: [facility] OR;  Service: Colon and Rectal;;  loop illeostomy creation from existing illeostomy    LAPAROSCOPIC ILEOSTOMY N/A 11/15/2023    Procedure: CREATION, ILEOSTOMY, LAPAROSCOPIC;  Surgeon: Fredi Shields MD;  Location: [facility] OR;  Service: Colon and Rectal;  Laterality: N/A;    LAPAROSCOPIC PROCTOCOLECTOMY WITH ILEOANAL ANASTOMOSIS, CREATION OF ILEAL POUCH, AND ILEOSTOMY N/A 5/28/2024    Procedure: PROCTOCOLECTOMY, LAPAROSCOPIC, WITH ILEOANAL ANASTOMOSIS,;  Surgeon: Fredi Shields MD;  Location: [facility] OR;  Service: Colon and Rectal;  Laterality: N/A;  PROCTOCOLECTOMY LAPAROSCOPIC WITH ILEOANAL ANASTOMOSIS, ILEAL POUCH CREATION  AND ILEOSTOMY CREATION  converted to open @1330    LAPAROSCOPIC TOTAL COLECTOMY N/A 11/15/2023    Procedure: COLECTOMY, TOTAL, LAPAROSCOPIC;  Surgeon: Fredi Shields MD;  Location: [facility];  Service: Colon and Rectal;  Laterality: N/A;  LAP TOTAL COLECTOMY WITH ILEOSTOMY    ROTATOR CUFF REPAIR Right     SKIN TAG REMOVAL  5/28/2024    Procedure: REMOVAL, SKIN TAG;  Surgeon: Fredi Shields MD;  Location: [facility] OR;  Service: Colon and Rectal;;  perianal tag excision    VASECTOMY  3/15/08       Social History     Socioeconomic History    Marital status:    Tobacco Use    Smoking status: Never    Smokeless tobacco: Never   Substance and Sexual Activity    Alcohol use: Not Currently     Comment: Social    Drug use:  "Never    Sexual activity: Yes     Partners: Female     Social Determinants of Health     Financial Resource Strain: Low Risk  (5/29/2024)    Overall Financial Resource Strain (CARDIA)     Difficulty of Paying Living Expenses: Not hard at all   Food Insecurity: No Food Insecurity (5/29/2024)    Hunger Vital Sign     Worried About Running Out of Food in the Last Year: Never true     Ran Out of Food in the Last Year: Never true   Transportation Needs: No Transportation Needs (5/29/2024)    TRANSPORTATION NEEDS     Transportation : No   Physical Activity: Insufficiently Active (5/29/2024)    Exercise Vital Sign     Days of Exercise per Week: 4 days     Minutes of Exercise per Session: 30 min   Stress: No Stress Concern Present (5/29/2024)    [employer] - Occupational Stress Questionnaire     Feeling of Stress : Not at all   Housing Stability: Low Risk  (5/29/2024)    Housing Stability Vital Sign     Unable to Pay for Housing in the Last Year: No     Homeless in the Last Year: No       Lab Results   Component Value Date    WBC 7.68 06/24/2024    HGB 14.7 06/24/2024    HCT 44.1 06/24/2024    MCV 87.5 06/24/2024     06/24/2024          BMP  Lab Results   Component Value Date    HCT 44.1 06/24/2024     06/24/2024    K 4.1 06/24/2024    BUN 19.1 06/24/2024    CREATININE 1.04 06/24/2024    CALCIUM 9.6 06/24/2024        INR  No results for input(s): "PT", "INR", "PROTIME", "APTT" in the last 72 hours.        Diagnostic Studies:      EKG:  Results for orders placed or performed in visit on 05/17/24   EKG 12-lead    Collection Time: 05/17/24 10:45 AM   Result Value Ref Range    QRS Duration 76 ms    [facility] QTC Calculation 384 ms    Narrative    Test Reason : K51.218,    Vent. Rate : 077 BPM     Atrial Rate : 077 BPM     P-R Int : 148 ms          QRS Dur : 076 ms      QT Int : 340 ms       P-R-T Axes : 053 025 -08 degrees     QTc Int : 384 ms    Normal sinus rhythm  Normal ECG  Confirmed by " Judd CORRALES, Asim ([id number]) on 5/17/2024 1:24:59 PM    Referred By:             Confirmed By:Asim Jean Baptiste MD

## 2024-07-03 VITALS
HEIGHT: 70 IN | SYSTOLIC BLOOD PRESSURE: 112 MMHG | HEART RATE: 77 BPM | TEMPERATURE: 98 F | WEIGHT: 179.25 LBS | BODY MASS INDEX: 25.66 KG/M2 | OXYGEN SATURATION: 96 % | DIASTOLIC BLOOD PRESSURE: 73 MMHG | RESPIRATION RATE: 16 BRPM

## 2024-07-03 LAB
ANION GAP SERPL CALC-SCNC: 6 MEQ/L
BASOPHILS # BLD AUTO: 0.01 X10(3)/MCL
BASOPHILS NFR BLD AUTO: 0.2 %
BUN SERPL-MCNC: 10 MG/DL (ref 8.9–20.6)
CALCIUM SERPL-MCNC: 8.1 MG/DL (ref 8.4–10.2)
CHLORIDE SERPL-SCNC: 107 MMOL/L (ref 98–107)
CO2 SERPL-SCNC: 25 MMOL/L (ref 22–29)
CREAT SERPL-MCNC: 1.02 MG/DL (ref 0.73–1.18)
CREAT/UREA NIT SERPL: 10
EOSINOPHIL # BLD AUTO: 0.08 X10(3)/MCL (ref 0–0.9)
EOSINOPHIL NFR BLD AUTO: 1.4 %
ERYTHROCYTE [DISTWIDTH] IN BLOOD BY AUTOMATED COUNT: 16.7 % (ref 11.5–17)
GFR SERPLBLD CREATININE-BSD FMLA CKD-EPI: >60 ML/MIN/1.73/M2
GLUCOSE SERPL-MCNC: 88 MG/DL (ref 74–100)
HCT VFR BLD AUTO: 34.8 % (ref 42–52)
HGB BLD-MCNC: 11.3 G/DL (ref 14–18)
IMM GRANULOCYTES # BLD AUTO: 0.02 X10(3)/MCL (ref 0–0.04)
IMM GRANULOCYTES NFR BLD AUTO: 0.3 %
LYMPHOCYTES # BLD AUTO: 0.83 X10(3)/MCL (ref 0.6–4.6)
LYMPHOCYTES NFR BLD AUTO: 14 %
MCH RBC QN AUTO: 29.4 PG (ref 27–31)
MCHC RBC AUTO-ENTMCNC: 32.5 G/DL (ref 33–36)
MCV RBC AUTO: 90.6 FL (ref 80–94)
MONOCYTES # BLD AUTO: 0.58 X10(3)/MCL (ref 0.1–1.3)
MONOCYTES NFR BLD AUTO: 9.8 %
NEUTROPHILS # BLD AUTO: 4.39 X10(3)/MCL (ref 2.1–9.2)
NEUTROPHILS NFR BLD AUTO: 74.3 %
NRBC BLD AUTO-RTO: 0 %
PLATELET # BLD AUTO: 109 X10(3)/MCL (ref 130–400)
PLATELETS.RETICULATED NFR BLD AUTO: 4.9 % (ref 0.9–11.2)
PMV BLD AUTO: 10.1 FL (ref 7.4–10.4)
POTASSIUM SERPL-SCNC: 3.5 MMOL/L (ref 3.5–5.1)
PSYCHE PATHOLOGY RESULT: NORMAL
RBC # BLD AUTO: 3.84 X10(6)/MCL (ref 4.7–6.1)
SODIUM SERPL-SCNC: 138 MMOL/L (ref 136–145)
WBC # BLD AUTO: 5.91 X10(3)/MCL (ref 4.5–11.5)

## 2024-07-03 PROCEDURE — 85025 COMPLETE CBC W/AUTO DIFF WBC: CPT

## 2024-07-03 PROCEDURE — 25000003 PHARM REV CODE 250

## 2024-07-03 PROCEDURE — 94799 UNLISTED PULMONARY SVC/PX: CPT

## 2024-07-03 PROCEDURE — 36415 COLL VENOUS BLD VENIPUNCTURE: CPT

## 2024-07-03 PROCEDURE — 80048 BASIC METABOLIC PNL TOTAL CA: CPT

## 2024-07-03 PROCEDURE — 63600175 PHARM REV CODE 636 W HCPCS

## 2024-07-03 RX ORDER — OXYCODONE AND ACETAMINOPHEN 5; 325 MG/1; MG/1
1 TABLET ORAL EVERY 4 HOURS PRN
Qty: 20 TABLET | Refills: 0 | Status: SHIPPED | OUTPATIENT
Start: 2024-07-03

## 2024-07-03 RX ADMIN — KETOROLAC TROMETHAMINE 15 MG: 30 INJECTION, SOLUTION INTRAMUSCULAR; INTRAVENOUS at 11:07

## 2024-07-03 RX ADMIN — OXYCODONE HYDROCHLORIDE AND ACETAMINOPHEN 1 TABLET: 5; 325 TABLET ORAL at 11:07

## 2024-07-03 RX ADMIN — KETOROLAC TROMETHAMINE 15 MG: 30 INJECTION, SOLUTION INTRAMUSCULAR; INTRAVENOUS at 05:07

## 2024-07-03 RX ADMIN — OXYCODONE HYDROCHLORIDE AND ACETAMINOPHEN 1 TABLET: 5; 325 TABLET ORAL at 05:07

## 2024-07-03 RX ADMIN — SODIUM CHLORIDE, POTASSIUM CHLORIDE, SODIUM LACTATE AND CALCIUM CHLORIDE: 600; 310; 30; 20 INJECTION, SOLUTION INTRAVENOUS at 02:07

## 2024-07-03 NOTE — PROGRESS NOTES
Colon & Rectal Surgery Progress Note    Post Op Day 1     Subjective:  Tolerating clears  Pain well controlled  Reports multiple nonbloody Bms overnight  Urinating well    Objective:  Temp:  [97.2 °F (36.2 °C)-98.4 °F (36.9 °C)]   Pulse:  []   Resp:  [10-23]   BP: ()/(63-96)   SpO2:  [94 %-100 %]     Physical Exam:  NAD  Regular rate and rhythm  Non-labored respirations  Abdomen soft, appropriate, incision c/d/i  SCDs in place      Intake/Output Summary (Last 24 hours) at 7/3/2024 0855  Last data filed at 7/3/2024 0525  Gross per 24 hour   Intake 2100 ml   Output 280 ml   Net 1820 ml       Recent Labs     07/03/24  0420   WBC 5.91   HGB 11.3*   HCT 34.8*   *      K 3.5      CO2 25   BUN 10.0   CREATININE 1.02   CALCIUM 8.1*       Assessment/Plan  - advance diet, possibly home later today if tolerates      Fredi Shields MD  Colon and Rectal Surgery

## 2024-07-03 NOTE — NURSING
DC note: Patient and spouse educated on care after surgery, discharge instructions. All questions answered. NAD noted. F/u appointment made and given to patient.

## 2024-07-03 NOTE — PLAN OF CARE
07/03/24 1247   Final Note   Assessment Type Discharge Planning Assessment   Anticipated Discharge Disposition Home   Hospital Resources/Appts/Education Provided Appointments scheduled and added to AVS   Post-Acute Status   Discharge Delays None known at this time     Pt will dc home

## 2024-07-03 NOTE — DISCHARGE INSTRUCTIONS
Ok shower and clean incisions with soap & water.      Do not submerge incisions so no baths, jacuzzi, or pool.    No lifting more than 10lbs for 6 weeks.    You can remove steri strips when they begin to peel up.    Ok to drive after 1 week if not taking pain medications.    No dietary restrictions.    Notify MD for temperature >100, worsening abdominal pain, wound redness or drainage.

## 2024-07-03 NOTE — ANESTHESIA POSTPROCEDURE EVALUATION
Anesthesia Post Evaluation    [facility]    Procedure(s) Performed: Procedure(s) (LRB):  CLOSURE, ILEOSTOMY (N/A)    Final Anesthesia Type: general (/Regional//MAC)      Patient location during evaluation: PACU  Post-procedure mental status: @ basline.  Pain management: adequate    PONV status: See postop meds for drugs used to control n/v if any.  Anesthetic complications: no      Cardiovascular status: blood pressure returned to baseline  Respiratory status: @ baseline.  Hydration status: euvolemic                Vitals Value Taken Time   /72 07/03/24 0752   Temp 36.7 °C (98 °F) 07/03/24 0752   Pulse 80 07/03/24 0752   Resp 20 07/03/24 0752   SpO2 98 % 07/03/24 0752         Event Time   Out of Recovery 10:50:00         Pain/Leonard Score: Pain Rating Prior to Med Admin: 5 (7/3/2024  5:34 AM)  Pain Rating Post Med Admin: 3 (7/2/2024  6:27 PM)  Leonard Score: 9 (7/2/2024  9:30 AM)

## 2024-07-05 NOTE — HOSPITAL COURSE
Patient was admitted through day surgery on 7/2/24 and underwent a closure ileostomy with resection and anastomosis without complication. Please see operative note for full details. Postoperatively he was admitted to the general surgery floor. Clear liquids diet was initiated and slowly advanced as tolerated. Lovenox was administered daily. Devi was removed on post op day #: 1 which was tolerated well. He had great urine output and his abdominal exam was appropriate. Therefore, he was deemed stable for discharge home. Final pathology showed fibrosis and chronic inflammation.

## 2024-07-05 NOTE — DISCHARGE SUMMARY
"[facility] - 8th Floor Med Surg  Colorectal Surgery  Discharge Summary      Patient Name: Lionel León  MRN: [id number]  Admission Date: 7/2/2024  Hospital Length of Stay: 1 days  Discharge Date and Time: 7/3/2024  1:00 PM  Attending Physician: Tierra att. providers found   Discharging Provider: SHERLYN Deleon  Primary Care Provider: Liana Cabrera FNP     HPI:  No notes on file    Procedure(s) (LRB):  CLOSURE, ILEOSTOMY (N/A)     Hospital Course:  Patient was admitted through day surgery on 7/2/24 and underwent a closure ileostomy with resection and anastomosis without complication. Please see operative note for full details. Postoperatively he was admitted to the general surgery floor. Clear liquids diet was initiated and slowly advanced as tolerated. Lovenox was administered daily. Devi was removed on post op day #: 1 which was tolerated well. He had great urine output and his abdominal exam was appropriate. Therefore, he was deemed stable for discharge home. Final pathology showed fibrosis and chronic inflammation.     Goals of Care Treatment Preferences:  Code Status: Full Code          Significant Diagnostic Studies: Labs: CMP No results for input(s): "NA", "K", "CL", "CO2", "GLU", "BUN", "CREATININE", "CALCIUM", "PROT", "ALBUMIN", "BILITOT", "ALKPHOS", "AST", "ALT", "ANIONGAP", "ESTGFRAFRICA", "EGFRNONAA" in the last 48 hours. and CBC No results for input(s): "WBC", "HGB", "HCT", "PLT" in the last 48 hours.    Pending Diagnostic Studies:       None          Final Active Diagnoses:    Diagnosis Date Noted POA    PRINCIPAL PROBLEM:  Ileostomy status [Z93.2] 07/02/2024 Not Applicable      Problems Resolved During this Admission:      Discharged Condition: stable    Disposition: Home or Self Care    Follow Up:   Follow-up Information       Fredi Shields MD Follow up on 7/11/2024.    Specialty: Colon and Rectal Surgery  Why: at 8:30  Contact information:  [address] "   [address]  [facility] [id number]  [phone]                           Patient Instructions:      Diet Adult Regular     Lifting restrictions   Order Comments: No lifting over 10 lbs for 6 weeks     Shower on day dressing removed (No bath)   Order Comments: May shower tomorrow     Medications:  Reconciled Home Medications:      Medication List        START taking these medications      * oxyCODONE-acetaminophen 5-325 mg per tablet  Commonly known as: PERCOCET  Take 1 tablet by mouth every 4 (four) hours as needed for Pain.           * This list has 1 medication(s) that are the same as other medications prescribed for you. Read the directions carefully, and ask your doctor or other care provider to review them with you.                CONTINUE taking these medications      cetirizine 10 MG tablet  Commonly known as: ZYRTEC  Take 10 mg by mouth once daily.     dextroamphetamine-amphetamine 30 mg Tab  Take 0.5 tablets by mouth 2 (two) times daily.     ondansetron 4 MG Tbdl  Commonly known as: ZOFRAN-ODT  Take 1 tablet (4 mg total) by mouth every 6 (six) hours as needed (Nausea).            ASK your doctor about these medications      * oxyCODONE-acetaminophen  mg per tablet  Commonly known as: PERCOCET  Take 1 tablet by mouth every 4 (four) hours as needed for Pain.     prednisoLONE acetate 1 % Drps  Commonly known as: PRED FORTE  Place 1 drop into the right eye 4 (four) times daily.     valACYclovir 1000 MG tablet  Commonly known as: VALTREX  Take 1 tablet (1,000 mg total) by mouth 2 (two) times daily. for 5 days           * This list has 1 medication(s) that are the same as other medications prescribed for you. Read the directions carefully, and ask your doctor or other care provider to review them with you.                  SHERLYN Deleon  Colorectal Surgery  [facility] - 8th Floor Med Surg

## 2024-07-11 ENCOUNTER — OFFICE VISIT (OUTPATIENT)
Dept: SURGICAL ONCOLOGY | Facility: CLINIC | Age: 44
End: 2024-07-11
Payer: COMMERCIAL

## 2024-07-11 VITALS
HEIGHT: 70 IN | BODY MASS INDEX: 27.15 KG/M2 | WEIGHT: 189.63 LBS | SYSTOLIC BLOOD PRESSURE: 118 MMHG | HEART RATE: 81 BPM | DIASTOLIC BLOOD PRESSURE: 76 MMHG

## 2024-07-11 DIAGNOSIS — Z98.890 S/P CLOSURE OF ILEOSTOMY: Primary | ICD-10-CM

## 2024-07-11 PROCEDURE — 99999 PR PBB SHADOW E&M-EST. PATIENT-LVL III: CPT | Mod: PBBFAC,,, | Performed by: COLON & RECTAL SURGERY

## 2024-07-11 PROCEDURE — 99024 POSTOP FOLLOW-UP VISIT: CPT | Mod: S$GLB,,, | Performed by: COLON & RECTAL SURGERY

## 2024-07-11 PROCEDURE — 3074F SYST BP LT 130 MM HG: CPT | Mod: CPTII,S$GLB,, | Performed by: COLON & RECTAL SURGERY

## 2024-07-11 PROCEDURE — 1159F MED LIST DOCD IN RCRD: CPT | Mod: CPTII,S$GLB,, | Performed by: COLON & RECTAL SURGERY

## 2024-07-11 PROCEDURE — 3078F DIAST BP <80 MM HG: CPT | Mod: CPTII,S$GLB,, | Performed by: COLON & RECTAL SURGERY

## 2024-07-11 PROCEDURE — 1160F RVW MEDS BY RX/DR IN RCRD: CPT | Mod: CPTII,S$GLB,, | Performed by: COLON & RECTAL SURGERY

## 2024-07-11 NOTE — PROGRESS NOTES
"   Patient ID: [id number]     HPI:     Lionel León is a 43 y.o. male here today for a post op visit.  Doing well.  Tolerating diet.  Reports multiple Bms in the evening.  Denies fever, nausea, vomiting, abdominal pain.      Current Outpatient Medications   Medication Instructions    cetirizine (ZYRTEC) 10 mg, Daily    dextroamphetamine-amphetamine 30 mg Tab 0.5 tablets, 2 times daily    ondansetron (ZOFRAN-ODT) 4 mg, Oral, Every 6 hours PRN    oxyCODONE-acetaminophen (PERCOCET)  mg per tablet 1 tablet, Oral, Every 4 hours PRN    oxyCODONE-acetaminophen (PERCOCET) 5-325 mg per tablet 1 tablet, Oral, Every 4 hours PRN    prednisoLONE acetate (PRED FORTE) 1 % DrpS 1 drop, 4 times daily    valACYclovir (VALTREX) 1,000 mg, Oral, 2 times daily       Patient has No Known Allergies.     Patient Care Team:  Liana Cabrera FNP as PCP - General (Family Medicine)       Objective:     Visit Vitals  /76   Pulse 81   Ht 5' 10" (1.778 m)   Wt 86 kg (189 lb 9.6 oz)   BMI 27.20 kg/m²       Physical Exam    General: Alert and oriented, No acute distress.  Head: Normocephalic, Atraumatic.  Eye: Sclera non-icteric.  Respiratory: Non-labored respirations, Symmetrical chest wall expansion.  Cardiac: Regular rate.  Gastrointestinal: Soft, Non-distended. Incision healing.   Extremities: No lower extremity edema.  Integumentary: Warm, Dry, Intact.  Neurologic: No focal deficits.      Assessment:       ICD-10-CM ICD-9-CM   1. S/P closure of ileostomy  Z98.890 V45.89        Plan:     - Continue lifting restrictions for another 5 weeks  - RTC 1 month      No follow-ups on file. In addition to their scheduled follow up, the patient has also been instructed to follow up on as needed basis.     Future Appointments   Date Time Provider Department Center   8/8/2024 10:00 AM Fredi Shields MD [facility]        Fredi Shields MD    "

## 2024-08-29 ENCOUNTER — OFFICE VISIT (OUTPATIENT)
Dept: SURGICAL ONCOLOGY | Facility: CLINIC | Age: 44
End: 2024-08-29
Payer: COMMERCIAL

## 2024-08-29 VITALS
SYSTOLIC BLOOD PRESSURE: 119 MMHG | BODY MASS INDEX: 28.49 KG/M2 | DIASTOLIC BLOOD PRESSURE: 77 MMHG | HEIGHT: 70 IN | WEIGHT: 199 LBS

## 2024-08-29 DIAGNOSIS — Z98.890 S/P CLOSURE OF ILEOSTOMY: Primary | ICD-10-CM

## 2024-08-29 PROCEDURE — 3078F DIAST BP <80 MM HG: CPT | Mod: CPTII,S$GLB,, | Performed by: COLON & RECTAL SURGERY

## 2024-08-29 PROCEDURE — 3074F SYST BP LT 130 MM HG: CPT | Mod: CPTII,S$GLB,, | Performed by: COLON & RECTAL SURGERY

## 2024-08-29 PROCEDURE — 1159F MED LIST DOCD IN RCRD: CPT | Mod: CPTII,S$GLB,, | Performed by: COLON & RECTAL SURGERY

## 2024-08-29 PROCEDURE — 99024 POSTOP FOLLOW-UP VISIT: CPT | Mod: S$GLB,,, | Performed by: COLON & RECTAL SURGERY

## 2024-08-29 PROCEDURE — 99999 PR PBB SHADOW E&M-EST. PATIENT-LVL III: CPT | Mod: PBBFAC,,, | Performed by: COLON & RECTAL SURGERY

## 2024-08-29 PROCEDURE — 1160F RVW MEDS BY RX/DR IN RCRD: CPT | Mod: CPTII,S$GLB,, | Performed by: COLON & RECTAL SURGERY

## 2024-08-29 NOTE — PROGRESS NOTES
"   Patient ID: [id number]     HPI:     Lionel León is a 43 y.o. male here today for a post op visit.  Doing well.  No complaints today.  He is eating well and has gained approximately 10 lb since his last visit.  He reports 3-4 stools per day, sometimes more depending on what he eats.      Current Outpatient Medications   Medication Instructions    cetirizine (ZYRTEC) 10 mg, Daily    dextroamphetamine-amphetamine 30 mg Tab 0.5 tablets, 2 times daily    ondansetron (ZOFRAN-ODT) 4 mg, Oral, Every 6 hours PRN    oxyCODONE-acetaminophen (PERCOCET)  mg per tablet 1 tablet, Oral, Every 4 hours PRN    oxyCODONE-acetaminophen (PERCOCET) 5-325 mg per tablet 1 tablet, Oral, Every 4 hours PRN    prednisoLONE acetate (PRED FORTE) 1 % DrpS 1 drop, 4 times daily    valACYclovir (VALTREX) 1,000 mg, Oral, 2 times daily       Patient has No Known Allergies.     Patient Care Team:  Liana Cabrera FNP as PCP - General (Family Medicine)       Objective:     Visit Vitals  /77   Pulse (P) 83   Ht 5' 10" (1.778 m)   Wt 90.3 kg (199 lb)   BMI 28.55 kg/m²       Physical Exam    General: Alert and oriented, No acute distress.  Head: Normocephalic, Atraumatic.  Eye: Sclera non-icteric.  Respiratory: Non-labored respirations, Symmetrical chest wall expansion.  Cardiac: Regular rate.  Gastrointestinal: Soft, Non-distended. Incisions healed.   Extremities: No lower extremity edema.  Integumentary: Warm, Dry, Intact.  Neurologic: No focal deficits.      Assessment:       ICD-10-CM ICD-9-CM   1. S/P closure of ileostomy  Z98.890 V45.89        Plan:     - No restrictions  - RTC PRN      No follow-ups on file. In addition to their scheduled follow up, the patient has also been instructed to follow up on as needed basis.     No future appointments.     Freid Shields MD    "

## 2024-10-13 ENCOUNTER — OFFICE VISIT (OUTPATIENT)
Dept: URGENT CARE | Facility: CLINIC | Age: 44
End: 2024-10-13
Payer: COMMERCIAL

## 2024-10-13 VITALS
TEMPERATURE: 98 F | HEART RATE: 94 BPM | HEIGHT: 70 IN | DIASTOLIC BLOOD PRESSURE: 88 MMHG | BODY MASS INDEX: 27.92 KG/M2 | OXYGEN SATURATION: 97 % | SYSTOLIC BLOOD PRESSURE: 130 MMHG | WEIGHT: 195 LBS | RESPIRATION RATE: 20 BRPM

## 2024-10-13 DIAGNOSIS — H57.89 IRRITATION OF EYE: ICD-10-CM

## 2024-10-13 DIAGNOSIS — T15.91XA FOREIGN BODY OF RIGHT EYE, INITIAL ENCOUNTER: Primary | ICD-10-CM

## 2024-10-13 DIAGNOSIS — Z29.9 PROPHYLACTIC MEASURE: ICD-10-CM

## 2024-10-13 PROBLEM — M54.16 LUMBAR RADICULOPATHY: Status: ACTIVE | Noted: 2022-02-21

## 2024-10-13 PROBLEM — N18.9 ANEMIA IN CHRONIC KIDNEY DISEASE: Chronic | Status: ACTIVE | Noted: 2024-01-16

## 2024-10-13 PROBLEM — E11.9 TYPE 2 DIABETES MELLITUS WITHOUT COMPLICATION: Status: ACTIVE | Noted: 2024-10-13

## 2024-10-13 PROBLEM — M54.32 NEURALGIA OF LEFT SCIATIC NERVE: Status: ACTIVE | Noted: 2022-01-31

## 2024-10-13 PROBLEM — F41.9 ANXIETY DISORDER: Status: ACTIVE | Noted: 2021-04-24

## 2024-10-13 PROBLEM — N18.31 STAGE 3A CHRONIC KIDNEY DISEASE: Status: ACTIVE | Noted: 2024-01-16

## 2024-10-13 PROBLEM — D63.1 ANEMIA IN CHRONIC KIDNEY DISEASE: Chronic | Status: ACTIVE | Noted: 2024-01-16

## 2024-10-13 PROBLEM — F90.9 ATTENTION DEFICIT HYPERACTIVITY DISORDER (ADHD): Status: ACTIVE | Noted: 2022-05-06

## 2024-10-13 PROBLEM — E78.2 MIXED HYPERLIPIDEMIA: Status: ACTIVE | Noted: 2024-10-13

## 2024-10-13 PROBLEM — M25.529 ARTHRALGIA OF UPPER ARM: Status: ACTIVE | Noted: 2022-02-21

## 2024-10-13 PROBLEM — K57.92 DIVERTICULITIS: Status: ACTIVE | Noted: 2024-10-13

## 2024-10-13 PROCEDURE — 65205 REMOVE FOREIGN BODY FROM EYE: CPT | Mod: RT,S$GLB,,

## 2024-10-13 PROCEDURE — 99203 OFFICE O/P NEW LOW 30 MIN: CPT | Mod: 25,S$GLB,,

## 2024-10-13 RX ORDER — AZITHROMYCIN 250 MG/1
TABLET, FILM COATED ORAL
COMMUNITY
Start: 2024-10-11 | End: 2024-10-16

## 2024-10-13 RX ORDER — OLOPATADINE HYDROCHLORIDE 1 MG/ML
1 SOLUTION/ DROPS OPHTHALMIC 2 TIMES DAILY PRN
Qty: 5 ML | Refills: 0 | Status: SHIPPED | OUTPATIENT
Start: 2024-10-13

## 2024-10-13 RX ORDER — DEXTROAMPHETAMINE SACCHARATE, AMPHETAMINE ASPARTATE, DEXTROAMPHETAMINE SULFATE AND AMPHETAMINE SULFATE 7.5; 7.5; 7.5; 7.5 MG/1; MG/1; MG/1; MG/1
TABLET ORAL
COMMUNITY
Start: 2024-09-13 | End: 2024-10-13

## 2024-10-13 RX ORDER — MINERAL OIL AND PETROLATUM 150; 830 MG/G; MG/G
OINTMENT OPHTHALMIC NIGHTLY PRN
Qty: 3.5 G | Refills: 0 | Status: SHIPPED | OUTPATIENT
Start: 2024-10-13

## 2024-10-13 RX ORDER — OFLOXACIN 3 MG/ML
1 SOLUTION/ DROPS OPHTHALMIC 4 TIMES DAILY
Qty: 5 ML | Refills: 0 | Status: SHIPPED | OUTPATIENT
Start: 2024-10-13 | End: 2024-10-20

## 2024-10-13 NOTE — PROCEDURES
Foreign body removal    Date/Time: 10/13/2024 9:00 AM    Performed by: Mich Roa PA-C  Authorized by: Mich Roa PA-C  Body area: eye  Location details: right conjunctiva  Anesthesia: local infiltration    Anesthesia:  Local Anesthetic: proparacaine drops    Patient sedated: no  Patient restrained: no  Patient cooperative: yes  Localization method: magnification (wood lamp)  Removal mechanism: moist cotton swab  Eye examined with fluorescein.  No fluorescein uptake (FB enhanced by fluroescein).  Corneal abrasion size: none.  Corneal abrasion location: none.  No residual rust ring present.  Dressing: none.  Depth: superficial  Complexity: simple  1 objects recovered.  Post-procedure assessment: foreign body removed  Patient tolerance: Patient tolerated the procedure well with no immediate complications

## 2024-10-13 NOTE — PATIENT INSTRUCTIONS
Please return here or go to the Emergency Department for any concerns or worsening of condition.    Please take OFLOXACIN for eye infection prophylaxis.    Please use OLOPATADINE eye drops for eye itching, eye irritation, eye redness.    Please use ARTIFICAL TEARS to help with eye irritation.    Please follow up with your primary care doctor or specialist as needed.    If you  smoke, please stop smoking.

## 2024-10-13 NOTE — PROGRESS NOTES
"Subjective:     Patient ID: Lionel León is a 43 y.o. male.    Vitals:  height is 5' 10" (1.778 m) and weight is 88.5 kg (195 lb). His oral temperature is 98 °F (36.7 °C). His blood pressure is 130/88 and his pulse is 94. His respiration is 20 and oxygen saturation is 97%.     Chief Complaint: Eye Problem    2 days ago  Cutting grass and grass may have flown into?    Provider note starts here:     42 yo male with PMH of ADHD, HLD, CKD, DM. Primary concerns for today's visit is eye irritation. He states that he was cutting grass and believes that something could have gotten into his eye or he could have scratched. Patient states that they also reports blurred vision. Patient states that nothing worsens symptoms. Patient denies fever, chills, loss of vision, diplopia, photophobia. Patient states that he does not wear contact. Patient is present with his wife and states that she was able to use cool compresses and ibuprofen to help with swelling.     Eye Problem   The right eye is affected. This is a new problem. The current episode started in the past 7 days. The problem occurs constantly. The problem has been gradually worsening. There was no injury mechanism. The pain is at a severity of 8/10. The pain is severe. He Does not wear contacts. Associated symptoms include blurred vision, eye redness and a foreign body sensation. Pertinent negatives include no eye discharge, double vision, fever, itching, nausea, photophobia, recent URI or vomiting. He has tried eye drops for the symptoms. The treatment provided no relief.       Constitution: Negative for chills and fever.   Eyes:  Positive for eye pain (irritation), eye redness, blurred vision and eyelid swelling. Negative for eye trauma, foreign body in eye, eye discharge, eye itching, photophobia, vision loss and double vision.   Gastrointestinal:  Negative for nausea and vomiting.     Objective:     Physical Exam   Constitutional:  Non-toxic appearance. He " does not appear ill. No distress.      Comments:Patient is in no acute distress, patient is non-toxic appearing, patient is ox3, patient is answering question appropriately.   normal  Eyes: Lids are normal. Pupils are equal, round, and reactive to light. No visual field deficit is present. Right eye exhibits no chemosis, no discharge, no exudate and no hordeolum. Foreign body (<1mm FB on exam) present in the right eye. Left eye exhibits no chemosis, no discharge, no exudate and no hordeolum. No foreign body present in the left eye. Right conjunctiva is injected. Right conjunctiva has no hemorrhage. Left conjunctiva is not injected. Left conjunctiva has no hemorrhage. No scleral icterus. Right eye exhibits normal extraocular motion and no nystagmus. Left eye exhibits normal extraocular motion and no nystagmus.     Extraocular movement intact vision grossly intact gaze aligned appropriately periorbital hyperpigmentation     Comments: No swelling, erythema, warmth, or tenderness to the periorbital region. Red represents area in which FB was appreciated.   Neurological: He is alert.   Skin: Skin is not diaphoretic.   Nursing note and vitals reviewed.    Vision Screening    Right eye Left eye Both eyes   Without correction 20/25 20/25 20/20   With correction        Patient states improvement of symptoms with PROPARACAINE application.    Assessment:     1. Foreign body of right eye, initial encounter    2. Irritation of eye    3. Prophylactic measure      Plan:   Previous notes reviewed.  Vital signs reviewed.  Discussed FB of right eye, home care, tx options, and given follow up precautions.  Patient was briefed on my thought process and diagnosis.   Patient involved with the treatment plan and agreed to the plan.  Patient informed on warning signs, patient understood warning signs and to go to urgent care or ER if warning signs appear.  Please excuse grammatical/spelling errors appreciated throughout this visit  encounter for a remote dictation device was used during this encounter.    Patient Instructions   Please return here or go to the Emergency Department for any concerns or worsening of condition.    Please take OFLOXACIN for eye infection prophylaxis.    Please use OLOPATADINE eye drops for eye itching, eye irritation, eye redness.    Please use ARTIFICAL TEARS to help with eye irritation.    Please follow up with your primary care doctor or specialist as needed.    If you  smoke, please stop smoking.    Foreign body of right eye, initial encounter  -     Foreign body removal    Irritation of eye  -     olopatadine (PATANOL) 0.1 % ophthalmic solution; Place 1 drop into the right eye 2 (two) times daily as needed (eye redness, irritation, eye itching).  Dispense: 5 mL; Refill: 0  -     white petrolatum-mineral oiL (ARTIFICIAL TEARS, JOSH/MIN,) 83-15 % Oint; Place into the right eye nightly as needed (eye irritation).  Dispense: 3.5 g; Refill: 0    Prophylactic measure  -     ofloxacin (OCUFLOX) 0.3 % ophthalmic solution; Place 1 drop into the right eye 4 (four) times daily. for 7 days  Dispense: 5 mL; Refill: 0      Mich Roa PA-C

## 2024-12-18 NOTE — SUBJECTIVE & OBJECTIVE
Subjective:     HPI:  HBO    42-year-old WM in normal health until fairly recent diagnosis of  ulcerative colitis during a recent admission to [facility] from 10/21/2023 - 10/24/2023. He was readmitted 2 weeks later on 11/6/23 with complaint of rectal pain, bleeding and severe anemia.  He was subsequently diagnosed with necrotizing fasciitis of the  perineal/bilateral gluteal areas and underwent operative debridements as well as laparoscopic total colectomy with end ileostomy  . We are using adjuvant HBOT sessions while hospitalized and he is tolerating well    Hospital Course:   No notes on file      Follow-up For: Procedure(s) (LRB):  EGD, WITH CLOSED BIOPSY (N/A)    Post-Operative Day: 6 Days Post-Op    Scheduled Meds:   multivitamin  1 tablet Oral Daily     Continuous Infusions:   sodium chloride 0.9% 75 mL/hr at 11/26/23 1826     PRN Meds:acetaminophen, acetaminophen, aluminum-magnesium hydroxide-simethicone, cyclobenzaprine, dextrose 10%, dextrose 10%, dicyclomine, glucagon (human recombinant), glucose, glucose, lactated ringers, melatonin, morphine, ondansetron, oxyCODONE, sodium chloride 0.9%    Review of Systems   Constitutional: Negative.      Objective:     Vital Signs (Most Recent):  Temp: 98.3 °F (36.8 °C) (11/28/23 0700)  Pulse: 96 (11/28/23 0700)  Resp: 20 (11/28/23 0700)  BP: 110/72 (11/28/23 0700)  SpO2: 99 % (11/28/23 0700) Vital Signs (24h Range):  Temp:  [98.3 °F (36.8 °C)-99 °F (37.2 °C)] 98.3 °F (36.8 °C)  Pulse:  [72-96] 96  Resp:  [14-20] 20  SpO2:  [97 %-100 %] 99 %  BP: (106-110)/(62-72) 110/72     Weight: 74.8 kg (165 lb)  Body mass index is 23.68 kg/m².     Physical Exam  Vitals reviewed.   Cardiovascular:      Pulses: Normal pulses.   Neurological:      General: No focal deficit present.      Mental Status: He is alert.           Copied from CRM #[id number]. Topic: MW Schedule Appointment - MW Schedule Primary Care  >> Dec 18, 2024  1:35 PM Eliceo CAMARGO wrote:  mk rodrigez called requesting to schedule a primary care visit with a Clinician. Checked insurance.  Looked for any active requests, recalls, service to orders, scheduling tickets prior to scheduling. The decision tree DT PC Was used for scheduling (an)     Non-Acute need. Declined all scheduling options and will pursue care elsewhere. Selected 'Wrap Up CRM' and chose appropriate 'Resolve' reason.-- DO NOT REPLY / DO NOT REPLY ALL --  -- This inbox is not monitored. If this was sent to the wrong provider or department, reroute message to P ECO Reroute pool. --  -- Message is from Engagement Center Operations (ECO) --  Reason for Appointment Message: Clinician Schedule is unreleased    Reason for Visit: discuss care since stopping trulicity shots     Is the patient currently scheduled? No    Preferred time to be seen: as soon as possible     Caller Information       Contact Date/Time Type Contact Phone/Fax    12/18/2024 01:34 PM CST Phone (Incoming) mk rodrigez [phone]            Alternative phone number:     Can a detailed message be left?  Yes - Voicemail   Patient has been advised the message will be addressed within 2-3 business days

## 2025-02-25 DIAGNOSIS — K46.9 ABDOMINAL HERNIA WITHOUT OBSTRUCTION AND WITHOUT GANGRENE, RECURRENCE NOT SPECIFIED, UNSPECIFIED HERNIA TYPE: Primary | ICD-10-CM

## 2025-03-07 ENCOUNTER — HOSPITAL ENCOUNTER (OUTPATIENT)
Dept: RADIOLOGY | Facility: HOSPITAL | Age: 45
Discharge: HOME | End: 2025-03-07
Attending: SURGERY
Payer: COMMERCIAL

## 2025-03-07 DIAGNOSIS — K46.9 ABDOMINAL HERNIA WITHOUT OBSTRUCTION AND WITHOUT GANGRENE, RECURRENCE NOT SPECIFIED, UNSPECIFIED HERNIA TYPE: ICD-10-CM

## 2025-03-07 PROCEDURE — 74176 CT ABD & PELVIS W/O CONTRAST: CPT | Mod: TC

## 2025-04-10 ENCOUNTER — ANESTHESIA EVENT (OUTPATIENT)
Dept: SURGERY | Facility: HOSPITAL | Age: 45
End: 2025-04-10

## 2025-04-10 ENCOUNTER — HOSPITAL ENCOUNTER (OUTPATIENT)
Facility: HOSPITAL | Age: 45
Discharge: HOME | End: 2025-04-10
Attending: SURGERY | Admitting: SURGERY

## 2025-04-10 ENCOUNTER — ANESTHESIA (OUTPATIENT)
Dept: SURGERY | Facility: HOSPITAL | Age: 45
End: 2025-04-10

## 2025-04-10 DIAGNOSIS — Z98.890 S/P COSMETIC PLASTIC SURGERY: Primary | ICD-10-CM

## 2025-04-10 PROCEDURE — 17999 UNLISTD PX SKN MUC MEMB SUBQ: CPT | Mod: CSM,,, | Performed by: SURGERY

## 2025-04-10 PROCEDURE — 37000008 HC ANESTHESIA 1ST 15 MINUTES: Performed by: SURGERY

## 2025-04-10 PROCEDURE — 63600175 PHARM REV CODE 636 W HCPCS: Performed by: SURGERY

## 2025-04-10 PROCEDURE — 25000003 PHARM REV CODE 250: Performed by: NURSE ANESTHETIST, CERTIFIED REGISTERED

## 2025-04-10 PROCEDURE — 36000706: Performed by: SURGERY

## 2025-04-10 PROCEDURE — C1729 CATH, DRAINAGE: HCPCS | Performed by: SURGERY

## 2025-04-10 PROCEDURE — 63600175 PHARM REV CODE 636 W HCPCS: Performed by: ANESTHESIOLOGY

## 2025-04-10 PROCEDURE — 36000707: Performed by: SURGERY

## 2025-04-10 PROCEDURE — 25000003 PHARM REV CODE 250: Performed by: SURGERY

## 2025-04-10 PROCEDURE — 63600175 PHARM REV CODE 636 W HCPCS: Performed by: NURSE ANESTHETIST, CERTIFIED REGISTERED

## 2025-04-10 PROCEDURE — 63600175 PHARM REV CODE 636 W HCPCS

## 2025-04-10 PROCEDURE — 71000016 HC POSTOP RECOV ADDL HR: Performed by: SURGERY

## 2025-04-10 PROCEDURE — 71000033 HC RECOVERY, INTIAL HOUR: Performed by: SURGERY

## 2025-04-10 PROCEDURE — 71000015 HC POSTOP RECOV 1ST HR: Performed by: SURGERY

## 2025-04-10 PROCEDURE — 37000009 HC ANESTHESIA EA ADD 15 MINS: Performed by: SURGERY

## 2025-04-10 RX ORDER — FENTANYL CITRATE 50 UG/ML
INJECTION, SOLUTION INTRAMUSCULAR; INTRAVENOUS
Status: DISCONTINUED | OUTPATIENT
Start: 2025-04-10 | End: 2025-04-10

## 2025-04-10 RX ORDER — PROCHLORPERAZINE EDISYLATE 5 MG/ML
5 INJECTION INTRAMUSCULAR; INTRAVENOUS EVERY 6 HOURS PRN
Status: DISCONTINUED | OUTPATIENT
Start: 2025-04-10 | End: 2025-04-10 | Stop reason: HOSPADM

## 2025-04-10 RX ORDER — SODIUM CHLORIDE 9 MG/ML
INJECTION, SOLUTION INTRAVENOUS CONTINUOUS
Status: DISCONTINUED | OUTPATIENT
Start: 2025-04-10 | End: 2025-04-10 | Stop reason: HOSPADM

## 2025-04-10 RX ORDER — LIDOCAINE HYDROCHLORIDE 10 MG/ML
1 INJECTION, SOLUTION EPIDURAL; INFILTRATION; INTRACAUDAL; PERINEURAL ONCE
OUTPATIENT
Start: 2025-04-10 | End: 2025-04-10

## 2025-04-10 RX ORDER — HYDROMORPHONE HYDROCHLORIDE 2 MG/ML
0.4 INJECTION, SOLUTION INTRAMUSCULAR; INTRAVENOUS; SUBCUTANEOUS EVERY 5 MIN PRN
Status: DISCONTINUED | OUTPATIENT
Start: 2025-04-10 | End: 2025-04-10 | Stop reason: HOSPADM

## 2025-04-10 RX ORDER — ONDANSETRON HYDROCHLORIDE 2 MG/ML
4 INJECTION, SOLUTION INTRAVENOUS DAILY PRN
Status: DISCONTINUED | OUTPATIENT
Start: 2025-04-10 | End: 2025-04-10 | Stop reason: HOSPADM

## 2025-04-10 RX ORDER — LIDOCAINE HYDROCHLORIDE 10 MG/ML
INJECTION, SOLUTION EPIDURAL; INFILTRATION; INTRACAUDAL; PERINEURAL
Status: DISCONTINUED | OUTPATIENT
Start: 2025-04-10 | End: 2025-04-10

## 2025-04-10 RX ORDER — CEFAZOLIN SODIUM 1 G/3ML
1 INJECTION, POWDER, FOR SOLUTION INTRAMUSCULAR; INTRAVENOUS
Status: COMPLETED | OUTPATIENT
Start: 2025-04-10 | End: 2025-04-10

## 2025-04-10 RX ORDER — CYCLOBENZAPRINE HCL 10 MG
10 TABLET ORAL 3 TIMES DAILY PRN
Status: DISCONTINUED | OUTPATIENT
Start: 2025-04-10 | End: 2025-04-10 | Stop reason: HOSPADM

## 2025-04-10 RX ORDER — DIPHENHYDRAMINE HYDROCHLORIDE 50 MG/ML
25 INJECTION, SOLUTION INTRAMUSCULAR; INTRAVENOUS EVERY 6 HOURS PRN
Status: DISCONTINUED | OUTPATIENT
Start: 2025-04-10 | End: 2025-04-10 | Stop reason: HOSPADM

## 2025-04-10 RX ORDER — MUPIROCIN 20 MG/G
OINTMENT TOPICAL 2 TIMES DAILY
Status: DISCONTINUED | OUTPATIENT
Start: 2025-04-10 | End: 2025-04-10 | Stop reason: HOSPADM

## 2025-04-10 RX ORDER — ONDANSETRON HYDROCHLORIDE 2 MG/ML
4 INJECTION, SOLUTION INTRAVENOUS EVERY 12 HOURS PRN
Status: DISCONTINUED | OUTPATIENT
Start: 2025-04-10 | End: 2025-04-10 | Stop reason: HOSPADM

## 2025-04-10 RX ORDER — ROCURONIUM BROMIDE 10 MG/ML
INJECTION, SOLUTION INTRAVENOUS
Status: DISCONTINUED | OUTPATIENT
Start: 2025-04-10 | End: 2025-04-10

## 2025-04-10 RX ORDER — HEPARIN SODIUM 5000 [USP'U]/ML
5000 INJECTION, SOLUTION INTRAVENOUS; SUBCUTANEOUS ONCE
Status: COMPLETED | OUTPATIENT
Start: 2025-04-10 | End: 2025-04-10

## 2025-04-10 RX ORDER — MORPHINE SULFATE 4 MG/ML
2 INJECTION, SOLUTION INTRAMUSCULAR; INTRAVENOUS EVERY 4 HOURS PRN
Status: DISCONTINUED | OUTPATIENT
Start: 2025-04-10 | End: 2025-04-10 | Stop reason: HOSPADM

## 2025-04-10 RX ORDER — BUPIVACAINE HYDROCHLORIDE AND EPINEPHRINE 5; 5 MG/ML; UG/ML
INJECTION, SOLUTION EPIDURAL; INTRACAUDAL; PERINEURAL
Status: DISCONTINUED
Start: 2025-04-10 | End: 2025-04-10 | Stop reason: HOSPADM

## 2025-04-10 RX ORDER — CEFAZOLIN SODIUM 1 G/3ML
2 INJECTION, POWDER, FOR SOLUTION INTRAMUSCULAR; INTRAVENOUS
Status: DISCONTINUED | OUTPATIENT
Start: 2025-04-10 | End: 2025-04-10 | Stop reason: HOSPADM

## 2025-04-10 RX ORDER — ACETAMINOPHEN 10 MG/ML
1000 INJECTION, SOLUTION INTRAVENOUS ONCE
Status: DISCONTINUED | OUTPATIENT
Start: 2025-04-10 | End: 2025-04-10 | Stop reason: HOSPADM

## 2025-04-10 RX ORDER — OXYCODONE AND ACETAMINOPHEN 5; 325 MG/1; MG/1
2 TABLET ORAL EVERY 4 HOURS PRN
Refills: 0 | Status: DISCONTINUED | OUTPATIENT
Start: 2025-04-10 | End: 2025-04-10 | Stop reason: HOSPADM

## 2025-04-10 RX ORDER — ONDANSETRON HYDROCHLORIDE 2 MG/ML
INJECTION, SOLUTION INTRAMUSCULAR; INTRAVENOUS
Status: DISCONTINUED | OUTPATIENT
Start: 2025-04-10 | End: 2025-04-10

## 2025-04-10 RX ORDER — ACETAMINOPHEN 10 MG/ML
INJECTION, SOLUTION INTRAVENOUS
Status: DISCONTINUED | OUTPATIENT
Start: 2025-04-10 | End: 2025-04-10

## 2025-04-10 RX ORDER — BUPIVACAINE HYDROCHLORIDE AND EPINEPHRINE 5; 5 MG/ML; UG/ML
INJECTION, SOLUTION EPIDURAL; INTRACAUDAL; PERINEURAL
Status: DISCONTINUED | OUTPATIENT
Start: 2025-04-10 | End: 2025-04-10 | Stop reason: HOSPADM

## 2025-04-10 RX ORDER — DEXMEDETOMIDINE HYDROCHLORIDE 100 UG/ML
INJECTION, SOLUTION INTRAVENOUS
Status: DISCONTINUED | OUTPATIENT
Start: 2025-04-10 | End: 2025-04-10

## 2025-04-10 RX ORDER — SODIUM CHLORIDE, SODIUM GLUCONATE, SODIUM ACETATE, POTASSIUM CHLORIDE AND MAGNESIUM CHLORIDE 30; 37; 368; 526; 502 MG/100ML; MG/100ML; MG/100ML; MG/100ML; MG/100ML
INJECTION, SOLUTION INTRAVENOUS CONTINUOUS
OUTPATIENT
Start: 2025-04-10 | End: 2025-05-10

## 2025-04-10 RX ORDER — PROPOFOL 10 MG/ML
VIAL (ML) INTRAVENOUS
Status: DISCONTINUED | OUTPATIENT
Start: 2025-04-10 | End: 2025-04-10

## 2025-04-10 RX ORDER — DEXAMETHASONE SODIUM PHOSPHATE 4 MG/ML
INJECTION, SOLUTION INTRA-ARTICULAR; INTRALESIONAL; INTRAMUSCULAR; INTRAVENOUS; SOFT TISSUE
Status: DISCONTINUED | OUTPATIENT
Start: 2025-04-10 | End: 2025-04-10

## 2025-04-10 RX ORDER — GLUCAGON 1 MG
1 KIT INJECTION
OUTPATIENT
Start: 2025-04-10

## 2025-04-10 RX ORDER — MIDAZOLAM HYDROCHLORIDE 2 MG/2ML
2 INJECTION, SOLUTION INTRAMUSCULAR; INTRAVENOUS ONCE AS NEEDED
Status: COMPLETED | OUTPATIENT
Start: 2025-04-10 | End: 2025-04-10

## 2025-04-10 RX ORDER — MIDAZOLAM HYDROCHLORIDE 2 MG/2ML
INJECTION, SOLUTION INTRAMUSCULAR; INTRAVENOUS
Status: COMPLETED
Start: 2025-04-10 | End: 2025-04-10

## 2025-04-10 RX ADMIN — ACETAMINOPHEN 1000 MG: 10 INJECTION, SOLUTION INTRAVENOUS at 07:04

## 2025-04-10 RX ADMIN — ROCURONIUM BROMIDE 30 MG: 10 INJECTION, SOLUTION INTRAVENOUS at 07:04

## 2025-04-10 RX ADMIN — SUGAMMADEX 140 MG: 100 INJECTION, SOLUTION INTRAVENOUS at 08:04

## 2025-04-10 RX ADMIN — DEXMEDETOMIDINE 4 MCG: 200 INJECTION, SOLUTION INTRAVENOUS at 07:04

## 2025-04-10 RX ADMIN — DEXAMETHASONE SODIUM PHOSPHATE 8 MG: 4 INJECTION, SOLUTION INTRA-ARTICULAR; INTRALESIONAL; INTRAMUSCULAR; INTRAVENOUS; SOFT TISSUE at 06:04

## 2025-04-10 RX ADMIN — ROCURONIUM BROMIDE 50 MG: 10 INJECTION, SOLUTION INTRAVENOUS at 06:04

## 2025-04-10 RX ADMIN — HYDROMORPHONE HYDROCHLORIDE 0.4 MG: 2 INJECTION INTRAMUSCULAR; INTRAVENOUS; SUBCUTANEOUS at 09:04

## 2025-04-10 RX ADMIN — CEFAZOLIN 1 G: 330 INJECTION, POWDER, FOR SOLUTION INTRAMUSCULAR; INTRAVENOUS at 06:04

## 2025-04-10 RX ADMIN — MIDAZOLAM HYDROCHLORIDE 2 MG: 2 INJECTION, SOLUTION INTRAMUSCULAR; INTRAVENOUS at 06:04

## 2025-04-10 RX ADMIN — DEXMEDETOMIDINE 4 MCG: 200 INJECTION, SOLUTION INTRAVENOUS at 08:04

## 2025-04-10 RX ADMIN — HYDROMORPHONE HYDROCHLORIDE 0.4 MG: 2 INJECTION INTRAMUSCULAR; INTRAVENOUS; SUBCUTANEOUS at 08:04

## 2025-04-10 RX ADMIN — SODIUM CHLORIDE, POTASSIUM CHLORIDE, SODIUM LACTATE AND CALCIUM CHLORIDE: 600; 310; 30; 20 INJECTION, SOLUTION INTRAVENOUS at 06:04

## 2025-04-10 RX ADMIN — LIDOCAINE HYDROCHLORIDE 20 MG: 10 INJECTION, SOLUTION EPIDURAL; INFILTRATION; INTRACAUDAL; PERINEURAL at 06:04

## 2025-04-10 RX ADMIN — PROPOFOL 150 MG: 10 INJECTION, EMULSION INTRAVENOUS at 06:04

## 2025-04-10 RX ADMIN — MIDAZOLAM HYDROCHLORIDE 2 MG: 1 INJECTION, SOLUTION INTRAMUSCULAR; INTRAVENOUS at 06:04

## 2025-04-10 RX ADMIN — HEPARIN SODIUM 5000 UNITS: 5000 INJECTION, SOLUTION INTRAVENOUS; SUBCUTANEOUS at 06:04

## 2025-04-10 RX ADMIN — FENTANYL CITRATE 100 MCG: 50 INJECTION, SOLUTION INTRAMUSCULAR; INTRAVENOUS at 06:04

## 2025-04-10 RX ADMIN — ONDANSETRON 4 MG: 2 INJECTION INTRAMUSCULAR; INTRAVENOUS at 06:04

## 2025-04-10 NOTE — ANESTHESIA PREPROCEDURE EVALUATION
04/10/2025  Lionel León is a 44 y.o., male with   -------------------------------------    ADD (attention deficit disorder)    Chronic ulcerative proctitis with other complication    Diverticulitis    Ileostomy status    Ulcerative colitis       And   ----------------------------    Biceps tendon repair    Colon surgery    Colonoscopy, with 1 or more biopsies    Procedure: COLON;  Surgeon: Dragan Underwood MD;  Location: [facility] ENDOSCOPY;  Service: Gastroenterology;  Laterality: N/A;    Debridement of sacral wound    Procedure: DEBRIDEMENT, WOUND, SACRUM;  Surgeon: Chandana Guidry MD;  Location: [facility];  Service: General;  Laterality: N/A;  perineum/sacrum// high lithotomy    Debridement, external genitalia, perineum, and abdominal wall, for necrotizing soft tissue infection    Procedure: DEBRIDEMENT, EXTERNAL GENITALIA/BUTTOCKS FOR NECROTIZING SOFT TISSUE INFECTION;  Surgeon: Vladimir Vick MD;  Location: [facility];  Service: General;  Laterality: Bilateral;  Prone positioning.    Debridement, external genitalia, perineum, and abdominal wall, for necrotizing soft tissue infection    Procedure: DEBRIDEMENT, EXTERNAL GENITALIA, PERINEUM, AND ABDOMINAL WALL, FOR NECROTIZING SOFT TISSUE INFECTION;  Surgeon: Vladimir Vick MD;  Location: [facility];  Service: General;  Laterality: N/A;  Place patient in dorsal lithotomy positioning.    Diagnostic laparoscopy    Procedure: LAPAROSCOPY, DIAGNOSTIC;  Surgeon: Fredi Shields MD;  Location: [facility];  Service: General;  Laterality: N/A;    Egd, with closed biopsy    Procedure: EGD, WITH CLOSED BIOPSY;  Surgeon: Blayne Mujica MD;  Location: [facility] ENDOSCOPY;  Service: Gastroenterology;  Laterality: N/A;    Ileostomy closure    Procedure: CLOSURE, ILEOSTOMY;  Surgeon: Fredi Shields MD;  Location: [facility];  Service: Colon  and Rectal;  Laterality: N/A;    Ileostomy revision    Procedure: REVISION, ILEOSTOMY;  Surgeon: Fredi Shields MD;  Location: [facility] OR;  Service: Colon and Rectal;;  loop illeostomy creation from existing illeostomy    Laparoscopic ileostomy    Procedure: CREATION, ILEOSTOMY, LAPAROSCOPIC;  Surgeon: Fredi Shields MD;  Location: [facility] OR;  Service: Colon and Rectal;  Laterality: N/A;    Laparoscopic proctocolectomy with ileoanal anastomosis, creation of ileal pouch, and ileostomy    Procedure: PROCTOCOLECTOMY, LAPAROSCOPIC, WITH ILEOANAL ANASTOMOSIS,;  Surgeon: Fredi Shields MD;  Location: [facility] OR;  Service: Colon and Rectal;  Laterality: N/A;  PROCTOCOLECTOMY LAPAROSCOPIC WITH ILEOANAL ANASTOMOSIS, ILEAL POUCH CREATION  AND ILEOSTOMY CREATION  converted to open @1330    Laparoscopic total colectomy    Procedure: COLECTOMY, TOTAL, LAPAROSCOPIC;  Surgeon: Fredi Shields MD;  Location: [facility] OR;  Service: Colon and Rectal;  Laterality: N/A;  LAP TOTAL COLECTOMY WITH ILEOSTOMY    Rotator cuff repair    Skin tag removal    Procedure: REMOVAL, SKIN TAG;  Surgeon: Fredi Shields MD;  Location: [facility] OR;  Service: Colon and Rectal;;  perianal tag excision    Vasectomy     43yo with recent history of pancolitis and 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  He is here today for abdominoplasty.  He is calm and conversant, feeling well today from a cardiopulmonary standpoint.    Pre-op Assessment    I have reviewed the NPO Status.      Review of Systems  Anesthesia Hx:  No problems with previous Anesthesia                Hematology/Oncology:       -- Anemia:                                  Cardiovascular:  Exercise tolerance: good                                             Pulmonary:       Denies Shortness of breath.                  Renal/:  Chronic Renal Disease                Hepatic/GI:    PUD,                  Neurological:    Neuromuscular Disease,                                   Endocrine:  Diabetes           Psych:  Psychiatric History                  Physical Exam  General: Well nourished, Cooperative, Alert and Oriented    Airway:  Mallampati: II   Mouth Opening: Normal  TM Distance: Normal  Tongue: Normal  Neck ROM: Normal ROM  Bearded face  Dental:  Intact    Chest/Lungs:  Normal Respiratory Rate    Heart:  Rate: Normal  Rhythm: Regular Rhythm        Anesthesia Plan  Type of Anesthesia, risks & benefits discussed:    Anesthesia Type: Gen ETT  Intra-op Monitoring Plan: Standard ASA Monitors  Post Op Pain Control Plan: IV/PO Opioids PRN  Induction:  IV  Airway Plan: Direct, Post-Induction  Informed Consent: Informed consent signed with the Patient and all parties understand the risks and agree with anesthesia plan.  All questions answered. Patient consented to blood products? No  ASA Score: 3  Day of Surgery Review of History & Physical: H&P Update referred to the surgeon/provider.    Ready For Surgery From Anesthesia Perspective.     .

## 2025-04-10 NOTE — ANESTHESIA PROCEDURE NOTES
Intubation    Date/Time: 4/10/2025 6:55 AM    Performed by: Joseph Hernandez CRNA  Authorized by: Gio Curtis MD    Intubation:     Induction:  Intravenous    Intubated:  Postinduction    Mask Ventilation:  Very difficult with oral airway    Attempts:  1    Attempted By:  CRNA    Method of Intubation:  Direct    Blade:  Carlie 3    Laryngeal View Grade: Grade I - full view of cords      Difficult Airway Encountered?: No      Complications:  Laryngospasm    Airway Device:  Oral endotracheal tube    Airway Device Size:  7.5    Style/Cuff Inflation:  Cuffed (inflated to minimal occlusive pressure)    Tube secured:  21    Secured at:  The lips    Placement Verified By:  Capnometry    Complicating Factors:  Small mouth, poor neck/head extension and large prominent central incisors    Findings Post-Intubation:  BS equal bilateral and atraumatic/condition of teeth unchanged

## 2025-04-10 NOTE — TRANSFER OF CARE
"Anesthesia Transfer of Care Note    Patient: Lionel León    Procedure(s) Performed: Procedure(s) (LRB):  ABDOMINOPLASTY (CASH) (N/A)    Patient location: PACU    Anesthesia Type: general    Transport from OR: Transported from OR on room air with adequate spontaneous ventilation    Post pain: adequate analgesia    Post assessment: no apparent anesthetic complications    Post vital signs: stable    Level of consciousness: responds to stimulation    Nausea/Vomiting: no nausea/vomiting    Complications: none    Transfer of care protocol was followed      Last vitals: Visit Vitals  /77   Pulse 86   Temp 36.8 °C (98.2 °F) (Oral)   Resp 18   Ht 5' 9" (1.753 m)   Wt 89.2 kg (196 lb 10.4 oz)   SpO2 99%   BMI 29.04 kg/m²     "

## 2025-04-10 NOTE — DISCHARGE INSTRUCTIONS
Patient Education        Tummy Tuck Discharge Instructions   About this topic   A tummy tuck is also called abdominoplasty. With this surgery, the doctor removes extra fat and skin from your lower and middle abdomen and also tightens the muscles in your belly. The doctor may also do liposuction at the same time as a tummy tuck.    What care is needed at home?   Do not remove dressings.  Sponge bathe only until released by your doctor.   No tub baths, swimming, hot tubs, or saunas until your incisions are healed.  Ask when your doctor says it is okay to exercise, add it into your daily routine. Walk around the house often when you get home. Try to walk a little more each day.  Your bowel movements may take some time to get back to normal. Eat small meals high in fiber. Drink 6 to 8 glasses of water each day to avoid hard stools.  To get out of bed, turn on your side, then use your arms to push yourself up to sitting. This will help take some of the pressure off of your cut sites and your abdominal muscles.  Wear binder around your belly for support and to make moving easier at all times.   Do not lift anything over 10 pounds (4.5 kg).   Ask when you may go back to your normal activities like work, driving, exercise, or sex.     CYNTHIA Drain Instructions  Be sure to wash your hands before and after touching your wound or dressing.  CYNTHIA drain in place and care:  Wash your hands every time before and after you empty the drain.  Empty the drain 2-3 times a day.  Reset the suction, compress the drain on a flat surface with the stopper open. While the drain is flat, replace the stopper.  Milk the tube often to prevent clots.  Measure the amount of fluid collected each day and write it down on the drain chart.  Look for signs of infection: swelling, redness, warmth around the wound; too much pain when touched; yellowish, greenish, or bloody discharge; foul smell coming from the cut site.     What follow-up care is needed?   Your  doctor may ask you to make visits to the office to check on your progress. Be sure to keep these visits.  If you have stitches or staples, you will need to have them taken out. Your doctor will often want to do this in 1 to 2 weeks.  If you have surgical glue over the incision, it will peel and flake off on its own. Do not pick or peel at it.  Talk to your doctor about when your drain will be removed if you have one.    Will physical activity be limited?   You may have to limit your activity for a while. Talk to your doctor about the right amount of activity for you.    What problems could happen?   Bleeding or blood clots  Infection  Change in how your skin looks or feels  Injury to nearby organs  Wound does not heal properly  Skin is numb    When do I need to call the doctor?   Signs of infection. These include a fever of 100.4°F (38°C) or higher, chills, cough, pain with passing urine.  Signs of wound infection. These include swelling, redness, warmth around the wound; too much pain when touched; yellowish, greenish, or bloody discharge; foul smell coming from the cut site; cut site opens up.  Fluid in the drain turns cloudy or smells  Drain becomes loose, comes apart, won't stay compressed, or falls out

## 2025-04-10 NOTE — CARE UPDATE
Received patient from the OR, he is arousing upon pacu arrival, rejected oral airway, oriented/reassured him, he c/o pain-will medicate per anesthesia orders, respirations full-regular-deep-clear,hob up 30 degrees.

## 2025-04-10 NOTE — OP NOTE
[facility] [address] [place] [address]    PATIENT NAME:      MARQUIS DODGE  YOB: 1980  CSN:               [id number]  MRN:               [id number]  ADMIT DATE:        04/10/2025 05:43:00  PHYSICIAN:         Rod Diamond MD                          OPERATIVE REPORT      DATE OF SURGERY:    04/10/2025 00:00:00    SURGEON:  Rod Diamond MD    ANESTHESIA:  General.    PREOPERATIVE DIAGNOSIS:  Cosmetic surgery.    POSTOPERATIVE DIAGNOSIS:  Cosmetic surgery.    PROCEDURE PERFORMED:  Abdominoplasty.    INDICATION FOR PROCEDURE:  This is a 44-year-old gentleman.  He has a very   significant past medical history of multiple abdominal procedures.  He has also   recently lost some weight and he now has some scarring of the lower abdomen as   well as excess skin and he presented to me desiring an improvement in the   overall appearance.  Options were discussed with him and he elected to proceed   with abdominoplasty.  Risks, benefits, and potential complications of surgery   were discussed in detail.  He signed informed consent.    DESCRIPTION OF PROCEDURE:  The patient was identified in the preoperative   holding area.  Informed consent was reviewed with him.  He was taken to the   operating room, placed in the supine position.  General endotracheal anesthesia   provided.  He was prepped and draped in a sterile surgical fashion.  A time-out   was taken, everyone in agreement.  We started the procedure by evaluating his   markings.  Once they were confirmed, we incised the transverse abdominoplasty   scar incision with a 10 blade scalpel.  Further dissection was achieved with   electrocautery.  We carefully elevated the skin and subcutaneous tissue off the   abdominal wall fascia using electrocautery.  We incised around the umbilicus and   maintained it on its stalk and then continued our  dissection to the closer   margins bilaterally and the xiphoid process superiorly.  We evaluated his   abdominal muscles.  He had no rectus diastasis.  He did not require rectus   muscle plication and we injected local anesthetic throughout the field, placed   the patient in a beach chair type position, marked out the area of abdominal   flap removal, incised the skin, and further removed this with electrocautery.    Hemostasis was ensured in all areas.  Two 15 round Ross drains were placed at   the lateralmost aspect of the incisions on the right and left side, secured to   skin with 2-0 silk sutures.  We then performed a layered closure with 0 Vicryl   sutures in the superficial fascial system and in the deep dermis in 2-0   subcuticular Stratafix.  Umbilicus was translocated through a small circular   incision.  Skin was removed.  Umbilicus was then brought up inset with 0 Vicryl   sutures in deep dermis and 3-0 subcuticular Stratafix.  Dermabond tape was used   as dressing.  There were no complications.  All counts correct.        ______________________________  MD ISIDRO Bonilla/AQS  DD:  04/10/2025  Time:  08:39AM  DT:  04/10/2025  Time:  12:42PM  Job #:  [id number]      OPERATIVE REPORT

## 2025-04-10 NOTE — DISCHARGE SUMMARY
[facility] Surgical - Periop Services  Discharge Note  Short Stay    Procedure(s) (LRB):  ABDOMINOPLASTY (CASH) (N/A)      OUTCOME: Patient tolerated treatment/procedure well without complication and is now ready for discharge.    DISPOSITION: Home or Self Care    FINAL DIAGNOSIS:  <principal problem not specified>    FOLLOWUP: In clinic    DISCHARGE INSTRUCTIONS:    Discharge Procedure Orders   Diet general     Sponge bath only until clinic visit     Leave dressing on - Keep it clean, dry, and intact until clinic visit     Call MD for:  temperature >100.4     Call MD for:  persistent nausea and vomiting     Call MD for:  severe uncontrolled pain     Call MD for:  difficulty breathing, headache or visual disturbances     Call MD for:  redness, tenderness, or signs of infection (pain, swelling, redness, odor or green/yellow discharge around incision site)     Call MD for:  hives     Call MD for:  persistent dizziness or light-headedness     Call MD for:  extreme fatigue     Weight bearing restrictions (specify)   Order Comments: Do not lift more than 10 pounds until follow up visit        TIME SPENT ON DISCHARGE: 15 minutes    Milton Mora MD  [facility] General Surgery PGY-1

## 2025-04-12 VITALS
HEART RATE: 95 BPM | TEMPERATURE: 98 F | WEIGHT: 196.63 LBS | SYSTOLIC BLOOD PRESSURE: 116 MMHG | OXYGEN SATURATION: 96 % | RESPIRATION RATE: 18 BRPM | HEIGHT: 69 IN | BODY MASS INDEX: 29.12 KG/M2 | DIASTOLIC BLOOD PRESSURE: 72 MMHG

## 2025-06-18 ENCOUNTER — LAB REQUISITION (OUTPATIENT)
Dept: LAB | Facility: HOSPITAL | Age: 45
End: 2025-06-18
Payer: COMMERCIAL

## 2025-06-18 DIAGNOSIS — R19.7 DIARRHEA, UNSPECIFIED: ICD-10-CM

## 2025-06-18 DIAGNOSIS — R10.30 LOWER ABDOMINAL PAIN, UNSPECIFIED: ICD-10-CM

## 2025-06-18 DIAGNOSIS — K51.00 ULCERATIVE (CHRONIC) PANCOLITIS WITHOUT COMPLICATIONS: ICD-10-CM

## 2025-06-18 LAB
ADV 40+41 DNA STL QL NAA+NON-PROBE: NOT DETECTED
ASTRO TYP 1-8 RNA STL QL NAA+NON-PROBE: NOT DETECTED
C CAYETANENSIS DNA STL QL NAA+NON-PROBE: NOT DETECTED
C COLI+JEJ+UPSA DNA STL QL NAA+NON-PROBE: NOT DETECTED
C DIFF TOX A+B STL QL IA: NEGATIVE
CLOSTRIDIOIDES DIFFICILE GDH ANTIGEN (OHS): NEGATIVE
CRYPTOSP DNA STL QL NAA+NON-PROBE: NOT DETECTED
E HISTOLYT DNA STL QL NAA+NON-PROBE: NOT DETECTED
EAEC PAA PLAS AGGR+AATA ST NAA+NON-PRB: NOT DETECTED
EC STX1+STX2 GENES STL QL NAA+NON-PROBE: NOT DETECTED
EPEC EAE GENE STL QL NAA+NON-PROBE: NOT DETECTED
ETEC LTA+ST1A+ST1B TOX ST NAA+NON-PROBE: NOT DETECTED
G LAMBLIA DNA STL QL NAA+NON-PROBE: NOT DETECTED
NOROVIRUS GI+II RNA STL QL NAA+NON-PROBE: NOT DETECTED
P SHIGELLOIDES DNA STL QL NAA+NON-PROBE: NOT DETECTED
RVA RNA STL QL NAA+NON-PROBE: NOT DETECTED
S ENT+BONG DNA STL QL NAA+NON-PROBE: NOT DETECTED
SAPO I+II+IV+V RNA STL QL NAA+NON-PROBE: NOT DETECTED
SHIGELLA SP+EIEC IPAH ST NAA+NON-PROBE: NOT DETECTED
V CHOL+PARA+VUL DNA STL QL NAA+NON-PROBE: NOT DETECTED
V CHOLERAE DNA STL QL NAA+NON-PROBE: NOT DETECTED
Y ENTEROCOL DNA STL QL NAA+NON-PROBE: NOT DETECTED

## 2025-06-18 PROCEDURE — 86318 IA INFECTIOUS AGENT ANTIBODY: CPT | Performed by: PHYSICIAN ASSISTANT

## 2025-06-18 PROCEDURE — 83993 ASSAY FOR CALPROTECTIN FECAL: CPT | Performed by: PHYSICIAN ASSISTANT

## 2025-06-18 PROCEDURE — 87507 IADNA-DNA/RNA PROBE TQ 12-25: CPT | Performed by: PHYSICIAN ASSISTANT

## 2025-06-20 LAB — CALPROTECTIN STL-MCNT: 593 MCG/G

## (undated) DEVICE — GLOVE SENSICARE PI GRN 7

## (undated) DEVICE — CANNULA ENDOPATH XCEL 5X100MM

## (undated) DEVICE — SUT PROLENE 2-0 KS BL MONO

## (undated) DEVICE — Device

## (undated) DEVICE — SOL NACL IRR 1000ML BTL

## (undated) DEVICE — TAPE MEDIPORE 3 X 10YD

## (undated) DEVICE — FORCEP BX CAPT 2.8X2.4MM160CM

## (undated) DEVICE — CONTAINER SPEC STRL PATH 4OZ

## (undated) DEVICE — BULB BLADDER ASSEMBLY STRL

## (undated) DEVICE — COUNT NDL FOAM MAGNET 40COUNT

## (undated) DEVICE — DRAPE FLUID WARMER ORS 44X44IN

## (undated) DEVICE — NDL 20GX1-1/2IN IB

## (undated) DEVICE — SYS CLSR DERMABOND PRINEO 22CM

## (undated) DEVICE — TROCAR ENDOPATH XCEL 11MM 10CM

## (undated) DEVICE — SUT VICRYL+ 2-0 SH 18IN

## (undated) DEVICE — BLOCK BLOX BITE DENT RIM 54FR

## (undated) DEVICE — SUT CTD VICRYL 3-0 CR/SH

## (undated) DEVICE — RELOAD GREEN FOR ECHELON

## (undated) DEVICE — RELOAD PROXIMATE CUT BLUE 75MM

## (undated) DEVICE — KIT SURGICAL TURNOVER

## (undated) DEVICE — TAPE SURGICAL MICROPORE 3IN

## (undated) DEVICE — COVER PROXIMA MAYO STAND

## (undated) DEVICE — KIT SURGICAL COLON .25 1.1OZ

## (undated) DEVICE — GLOVE PROTEXIS BLUE LATEX 7.5

## (undated) DEVICE — DRAPE LEGGINGS CUFF 31X48IN

## (undated) DEVICE — SYR 10CC LUER LOCK

## (undated) DEVICE — KIT CANIST SUCTION 1200CC

## (undated) DEVICE — BLADE SURG STAINLESS STEEL #15

## (undated) DEVICE — GLOVE PROTEXIS HYDROGEL SZ7.5

## (undated) DEVICE — COVER CAMERA/LASER 9X96IN

## (undated) DEVICE — SHEARS HARMONIC CRVD TIP 36CM

## (undated) DEVICE — SUT MFIL VIOL PDS II TP-1 30IN

## (undated) DEVICE — DRESSING ISLAND TELFA 4X5IN

## (undated) DEVICE — PAD PINK TRENDELENBURG POS XL

## (undated) DEVICE — GOWN SMARTSLEEVE AAMI LVL4 LG

## (undated) DEVICE — BANDAGE GAUZE COT STRL 4.5X4.1

## (undated) DEVICE — SPONGE COTTON TRAY 4X4IN

## (undated) DEVICE — STAPLER SKIN PROXIMATE WIDE

## (undated) DEVICE — SHEET DRAPE MEDIUM

## (undated) DEVICE — SCISSOR CURVED ENDOPATH 5MM

## (undated) DEVICE — STAPLER ECHELON PWR 45MM

## (undated) DEVICE — HOLDER STRIP-T SELF ADH 2X10IN

## (undated) DEVICE — SUT 2/0 30IN SILK BLK BRAI

## (undated) DEVICE — BOWL STERILE LARGE 32OZ

## (undated) DEVICE — BAG LABGUARD BIOHAZARD 6X9IN

## (undated) DEVICE — NDL SAFETY 21G X 1 1/2 ECLPSE

## (undated) DEVICE — CONTAINER SPECIMEN SCREW 4OZ

## (undated) DEVICE — GLOVE PROTEXIS LTX MICRO  7.5

## (undated) DEVICE — GLOVE SIGNATURE MICRO LTX 8

## (undated) DEVICE — CATH RED RUBBER LATEX 14F 16IN

## (undated) DEVICE — SPONGE X-RAY LAP DETCT 18X18IN

## (undated) DEVICE — SUT SILK 2.0 BLK 18

## (undated) DEVICE — GLOVE PROTEXIS BLUE LATEX 8

## (undated) DEVICE — GOWN POLY REINF BRTH SLV XL

## (undated) DEVICE — CULTSWAB+ AMIES W/O CHARC SNG

## (undated) DEVICE — KIT GEN LAPAROSCOPY LAFAYETTE

## (undated) DEVICE — SPONGE LAP 18X18 PREWASHED

## (undated) DEVICE — SUT VICRYL PLUS 3-0 SH 18IN

## (undated) DEVICE — TROCAR ENDOPATH XCEL 5X100MM

## (undated) DEVICE — BANDAGE KERLIX AMD

## (undated) DEVICE — SEALANT VISTASEAL FIBRIN 10ML

## (undated) DEVICE — KIT IRR SUCTION HND PIECE

## (undated) DEVICE — TRAY SKIN SCRUB WET PREMIUM

## (undated) DEVICE — DRESSING TELFA + BARR 4X6IN

## (undated) DEVICE — SYR IRRIGATION BULB STER 60ML

## (undated) DEVICE — CHLORAPREP W TINT 26ML APPL

## (undated) DEVICE — RESERVOIR JACKSON-PRATT 100CC

## (undated) DEVICE — APPLICATOR CHLORAPREP ORN 26ML

## (undated) DEVICE — TROCAR LAPSCP XCEL 12MM 10CM

## (undated) DEVICE — GLOVE PROTEXIS LTX MICRO 6.5

## (undated) DEVICE — TUBING O2 FEMALE CONN 13FT

## (undated) DEVICE — CLOSURE SKIN STERI STRIP 1/2X4

## (undated) DEVICE — UNDERPAD DISPOSABLE 30X30IN

## (undated) DEVICE — GLOVE PROTEXIS BLUE LATEX 7

## (undated) DEVICE — DRAPE SLUSH WARMER 66X44IN

## (undated) DEVICE — CUTTER PROXIMATE BLUE 75MM

## (undated) DEVICE — GAUZE SPONGE BULKEE 6X6.75IN

## (undated) DEVICE — SUT 3-0 MONOCRYL PLUS PS-2

## (undated) DEVICE — GLOVE SENSICARE PI SURG 7

## (undated) DEVICE — ELECTRODE PATIENT RETURN DISP

## (undated) DEVICE — TROCAR ENDOPATH XCEL 12X100MM

## (undated) DEVICE — XPERIENCE

## (undated) DEVICE — STRIP MEDI WND CLSR 1/2X4IN

## (undated) DEVICE — SUT STRATAFIX MCRYL 27IN 2-0

## (undated) DEVICE — COVER PROBE US 5.5X58L NON LTX

## (undated) DEVICE — COVER MAYO STAND REINFRCD 30

## (undated) DEVICE — SUT CTD VICRYL BR CR/SH VIL

## (undated) DEVICE — APPLICATOR STRL COT 2INNR 6IN

## (undated) DEVICE — KIT DRAIN WOUND RND SPRNG RESV

## (undated) DEVICE — ELECTRODE BLADE INSULATED 1 IN

## (undated) DEVICE — BLADE SURG STAINLESS STEEL #10

## (undated) DEVICE — TRAY CATH FOL SIL URIMTR 16FR

## (undated) DEVICE — SUT VICRYL PLUS 0 CT1 18IN

## (undated) DEVICE — SUT MCRYL PLUS 4-0 PS2 27IN

## (undated) DEVICE — SUT PDS PLUS 1 TP1 96IN

## (undated) DEVICE — DRAPE MEDIUM SHEET 40X70IN

## (undated) DEVICE — SOL IRRI STRL WATER 1000ML

## (undated) DEVICE — SPONGE LAP STRL 18X18IN

## (undated) DEVICE — FORCEP BX LG CAP 2.8MMX240CM

## (undated) DEVICE — PAD PREP CUFFED NS 24X48IN

## (undated) DEVICE — SWAB CULTURETTE SINGLE

## (undated) DEVICE — PENCIL SMOKE EVAC TELSCP 15FT

## (undated) DEVICE — SUT VICRYL+ 27 UR-6 VIOL

## (undated) DEVICE — DRAPE STERI INSTRUMENT 1018

## (undated) DEVICE — ADAPTER DUAL NSL LUER M-M 7FT

## (undated) DEVICE — SET CYSTO IRR DRP CHMBR 84IN

## (undated) DEVICE — SYR BULB EAR/ULCER STER 3OZ

## (undated) DEVICE — PAD ABD 8X10 STERILE

## (undated) DEVICE — SUT PROLENE 2-0 SH 36IN BLU

## (undated) DEVICE — DRESSING TRANS 4X4 TEGADERM

## (undated) DEVICE — TOWEL OR BLUE STRL 16X26 4/PK

## (undated) DEVICE — RELOAD ECHELON ENDOPATH 45MM

## (undated) DEVICE — SPONGE LAP XRAY STERILE 18X18

## (undated) DEVICE — BLANKET WARMING UPPER BODY

## (undated) DEVICE — IRRIGATOR HYDRO-SURG PLUS 5MM

## (undated) DEVICE — BINDER ABDOMINAL UNIV XLN 10IN

## (undated) DEVICE — GAUZE DRAIN N WVN 6PLY 4X4IN

## (undated) DEVICE — COLLECTION SPECIMEN NEPTUNE

## (undated) DEVICE — SUT STRATAFIX 3-0 30CM

## (undated) DEVICE — DRAPE UND BUTT W/POUCH 40X44IN

## (undated) DEVICE — TAPE SILK 3IN

## (undated) DEVICE — JELLY SURGILUBE LUBE TUBE 2OZ

## (undated) DEVICE — SUT MONOCRYL 3-0 PS-2 UND

## (undated) DEVICE — TOWEL OR DISP STRL BLUE 4/PK

## (undated) DEVICE — SOL IRR NACL .9% 3000ML

## (undated) DEVICE — PAD ABDOMINAL STERILE 8X10IN

## (undated) DEVICE — STAPLER ECHELON LONG 45X440MM

## (undated) DEVICE — SUPPORT ULNA NERVE PROTECTOR

## (undated) DEVICE — TIP SUCTION YANKAUER

## (undated) DEVICE — SUT CTD VICRYL 0 UND BR

## (undated) DEVICE — DECANTER FLUID TRNSF WHITE 9IN

## (undated) DEVICE — PAD PREP 50/CA

## (undated) DEVICE — STAPLER ECHELON PWR CIR 29MM

## (undated) DEVICE — DRESSING XEROFORM NONADH 1X8IN

## (undated) DEVICE — BARRIER COLOSTOMY 2 3/4IN

## (undated) DEVICE — MARKER FN REG DUAL UTIL RULER

## (undated) DEVICE — DRAIN SURG HUBLESS 30CM 15FR

## (undated) DEVICE — DRAPE UNDER BUTTOCKS SUC PORT

## (undated) DEVICE — SYR 50ML CATH TIP

## (undated) DEVICE — NDL SYR 10ML 18X1.5 LL BLUNT

## (undated) DEVICE — GLOVE SIGNATURE MICRO LTX 6.5